# Patient Record
Sex: FEMALE | Race: WHITE | NOT HISPANIC OR LATINO | Employment: OTHER | URBAN - METROPOLITAN AREA
[De-identification: names, ages, dates, MRNs, and addresses within clinical notes are randomized per-mention and may not be internally consistent; named-entity substitution may affect disease eponyms.]

---

## 2017-01-06 DIAGNOSIS — M15.9 GENERALIZED OSTEOARTHRITIS: ICD-10-CM

## 2017-01-06 RX ORDER — LEVOTHYROXINE SODIUM 0.1 MG/1
TABLET ORAL
Qty: 90 TABLET | Refills: 1 | Status: SHIPPED | OUTPATIENT
Start: 2017-01-06 | End: 2017-10-09 | Stop reason: DRUGHIGH

## 2017-01-06 RX ORDER — AMLODIPINE BESYLATE 5 MG/1
TABLET ORAL
Qty: 90 TABLET | Refills: 1 | Status: SHIPPED | OUTPATIENT
Start: 2017-01-06 | End: 2017-06-05 | Stop reason: SDUPTHER

## 2017-01-06 RX ORDER — LISINOPRIL AND HYDROCHLOROTHIAZIDE 25; 20 MG/1; MG/1
TABLET ORAL
Qty: 90 TABLET | Refills: 1 | Status: SHIPPED | OUTPATIENT
Start: 2017-01-06 | End: 2017-05-11

## 2017-01-06 RX ORDER — OMEPRAZOLE 40 MG/1
CAPSULE, DELAYED RELEASE ORAL
Qty: 90 CAPSULE | Refills: 1 | Status: SHIPPED | OUTPATIENT
Start: 2017-01-06 | End: 2017-08-30

## 2017-01-06 RX ORDER — ALENDRONATE SODIUM 70 MG/1
TABLET ORAL
Qty: 12 TABLET | Refills: 1 | Status: SHIPPED | OUTPATIENT
Start: 2017-01-06 | End: 2017-08-30 | Stop reason: SDUPTHER

## 2017-01-06 RX ORDER — ATENOLOL 50 MG/1
TABLET ORAL
Qty: 90 TABLET | Refills: 1 | Status: SHIPPED | OUTPATIENT
Start: 2017-01-06 | End: 2017-09-22 | Stop reason: SDUPTHER

## 2017-01-06 RX ORDER — ATORVASTATIN CALCIUM 40 MG/1
TABLET, FILM COATED ORAL
Qty: 90 TABLET | Refills: 1 | Status: SHIPPED | OUTPATIENT
Start: 2017-01-06 | End: 2017-06-05 | Stop reason: SDUPTHER

## 2017-01-06 RX ORDER — PAROXETINE HYDROCHLORIDE 20 MG/1
TABLET, FILM COATED ORAL
Qty: 90 TABLET | Refills: 1 | Status: SHIPPED | OUTPATIENT
Start: 2017-01-06 | End: 2017-05-11 | Stop reason: SDUPTHER

## 2017-01-06 RX ORDER — DICLOFENAC SODIUM 75 MG/1
TABLET, DELAYED RELEASE ORAL
Qty: 90 TABLET | Refills: 1 | Status: SHIPPED | OUTPATIENT
Start: 2017-01-06 | End: 2017-06-05 | Stop reason: SDUPTHER

## 2017-05-11 ENCOUNTER — OFFICE VISIT (OUTPATIENT)
Dept: INTERNAL MEDICINE | Facility: CLINIC | Age: 77
End: 2017-05-11

## 2017-05-11 VITALS
DIASTOLIC BLOOD PRESSURE: 72 MMHG | HEIGHT: 64 IN | OXYGEN SATURATION: 98 % | BODY MASS INDEX: 27.72 KG/M2 | HEART RATE: 56 BPM | WEIGHT: 162.4 LBS | SYSTOLIC BLOOD PRESSURE: 122 MMHG

## 2017-05-11 DIAGNOSIS — F43.21 GRIEF REACTION: ICD-10-CM

## 2017-05-11 DIAGNOSIS — R15.9 INCONTINENCE OF BOWEL: ICD-10-CM

## 2017-05-11 DIAGNOSIS — I10 ESSENTIAL HYPERTENSION: ICD-10-CM

## 2017-05-11 DIAGNOSIS — Z23 ENCOUNTER FOR IMMUNIZATION: ICD-10-CM

## 2017-05-11 DIAGNOSIS — E55.9 VITAMIN D DEFICIENCY: ICD-10-CM

## 2017-05-11 DIAGNOSIS — I35.8 AORTIC VALVE SCLEROSIS: ICD-10-CM

## 2017-05-11 DIAGNOSIS — Z00.00 MEDICARE ANNUAL WELLNESS VISIT, SUBSEQUENT: Primary | ICD-10-CM

## 2017-05-11 DIAGNOSIS — I70.0 ATHEROSCLEROSIS OF AORTA (HCC): ICD-10-CM

## 2017-05-11 PROBLEM — I35.0 AORTIC VALVE STENOSIS: Status: ACTIVE | Noted: 2017-05-11

## 2017-05-11 LAB
25(OH)D3 SERPL-MCNC: 31.1 NG/ML
ALBUMIN SERPL-MCNC: 4.3 G/DL (ref 3.2–4.8)
ALBUMIN/GLOB SERPL: 1.7 G/DL (ref 1.5–2.5)
ALP SERPL-CCNC: 85 U/L (ref 25–100)
ALT SERPL W P-5'-P-CCNC: 20 U/L (ref 7–40)
ANION GAP SERPL CALCULATED.3IONS-SCNC: 8 MMOL/L (ref 3–11)
ARTICHOKE IGE QN: 144 MG/DL (ref 0–130)
AST SERPL-CCNC: 26 U/L (ref 0–33)
BILIRUB SERPL-MCNC: 0.6 MG/DL (ref 0.3–1.2)
BUN BLD-MCNC: 15 MG/DL (ref 9–23)
BUN/CREAT SERPL: 15 (ref 7–25)
CALCIUM SPEC-SCNC: 9.5 MG/DL (ref 8.7–10.4)
CHLORIDE SERPL-SCNC: 95 MMOL/L (ref 99–109)
CHOLEST SERPL-MCNC: 246 MG/DL (ref 0–200)
CO2 SERPL-SCNC: 29 MMOL/L (ref 20–31)
CREAT BLD-MCNC: 1 MG/DL (ref 0.6–1.3)
DEPRECATED RDW RBC AUTO: 44.1 FL (ref 37–54)
ERYTHROCYTE [DISTWIDTH] IN BLOOD BY AUTOMATED COUNT: 13.4 % (ref 11.3–14.5)
GFR SERPL CREATININE-BSD FRML MDRD: 54 ML/MIN/1.73
GLOBULIN UR ELPH-MCNC: 2.6 GM/DL
GLUCOSE BLD-MCNC: 93 MG/DL (ref 70–100)
HCT VFR BLD AUTO: 39.2 % (ref 34.5–44)
HDLC SERPL-MCNC: 74 MG/DL (ref 40–60)
HGB BLD-MCNC: 13.1 G/DL (ref 11.5–15.5)
MCH RBC QN AUTO: 29.7 PG (ref 27–31)
MCHC RBC AUTO-ENTMCNC: 33.4 G/DL (ref 32–36)
MCV RBC AUTO: 88.9 FL (ref 80–99)
PLATELET # BLD AUTO: 275 10*3/MM3 (ref 150–450)
PMV BLD AUTO: 9.6 FL (ref 6–12)
POTASSIUM BLD-SCNC: 3.8 MMOL/L (ref 3.5–5.5)
PROT SERPL-MCNC: 6.9 G/DL (ref 5.7–8.2)
RBC # BLD AUTO: 4.41 10*6/MM3 (ref 3.89–5.14)
SODIUM BLD-SCNC: 132 MMOL/L (ref 132–146)
TRIGL SERPL-MCNC: 114 MG/DL (ref 0–150)
WBC NRBC COR # BLD: 5.12 10*3/MM3 (ref 3.5–10.8)

## 2017-05-11 PROCEDURE — 90670 PCV13 VACCINE IM: CPT | Performed by: INTERNAL MEDICINE

## 2017-05-11 PROCEDURE — G0439 PPPS, SUBSEQ VISIT: HCPCS | Performed by: INTERNAL MEDICINE

## 2017-05-11 PROCEDURE — 80053 COMPREHEN METABOLIC PANEL: CPT | Performed by: INTERNAL MEDICINE

## 2017-05-11 PROCEDURE — 80061 LIPID PANEL: CPT | Performed by: INTERNAL MEDICINE

## 2017-05-11 PROCEDURE — G0009 ADMIN PNEUMOCOCCAL VACCINE: HCPCS | Performed by: INTERNAL MEDICINE

## 2017-05-11 PROCEDURE — 99214 OFFICE O/P EST MOD 30 MIN: CPT | Performed by: INTERNAL MEDICINE

## 2017-05-11 PROCEDURE — 85027 COMPLETE CBC AUTOMATED: CPT | Performed by: INTERNAL MEDICINE

## 2017-05-11 PROCEDURE — 82306 VITAMIN D 25 HYDROXY: CPT | Performed by: INTERNAL MEDICINE

## 2017-05-11 PROCEDURE — G0444 DEPRESSION SCREEN ANNUAL: HCPCS | Performed by: INTERNAL MEDICINE

## 2017-05-11 RX ORDER — MIRTAZAPINE 15 MG/1
15 TABLET, FILM COATED ORAL NIGHTLY
Qty: 30 TABLET | Refills: 3 | Status: SHIPPED | OUTPATIENT
Start: 2017-05-11 | End: 2017-08-30

## 2017-05-11 RX ORDER — LOSARTAN POTASSIUM 100 MG/1
100 TABLET ORAL DAILY
Qty: 30 TABLET | Refills: 3 | Status: SHIPPED | OUTPATIENT
Start: 2017-05-11 | End: 2017-08-30 | Stop reason: SDUPTHER

## 2017-05-11 RX ORDER — PAROXETINE HYDROCHLORIDE 20 MG/1
40 TABLET, FILM COATED ORAL EVERY MORNING
Qty: 180 TABLET | Refills: 1 | Status: SHIPPED | OUTPATIENT
Start: 2017-05-11 | End: 2017-06-05 | Stop reason: SDUPTHER

## 2017-05-12 ENCOUNTER — HOSPITAL ENCOUNTER (OUTPATIENT)
Dept: CARDIOLOGY | Facility: HOSPITAL | Age: 77
Discharge: HOME | End: 2017-05-12
Attending: INTERNAL MEDICINE | Admitting: INTERNAL MEDICINE

## 2017-05-12 LAB
BH CV ECHO MEAS - AO MAX PG (FULL): 5.6 MMHG
BH CV ECHO MEAS - AO MAX PG: 9.1 MMHG
BH CV ECHO MEAS - AO MEAN PG (FULL): 3 MMHG
BH CV ECHO MEAS - AO MEAN PG: 5 MMHG
BH CV ECHO MEAS - AO ROOT AREA (BSA CORRECTED): 1.6
BH CV ECHO MEAS - AO ROOT AREA: 6.2 CM^2
BH CV ECHO MEAS - AO ROOT DIAM: 2.8 CM
BH CV ECHO MEAS - AO V2 MAX: 151 CM/SEC
BH CV ECHO MEAS - AO V2 MEAN: 101 CM/SEC
BH CV ECHO MEAS - AO V2 VTI: 42.9 CM
BH CV ECHO MEAS - AVA(I,A): 2 CM^2
BH CV ECHO MEAS - AVA(I,D): 2 CM^2
BH CV ECHO MEAS - AVA(V,A): 2 CM^2
BH CV ECHO MEAS - AVA(V,D): 2 CM^2
BH CV ECHO MEAS - BSA(HAYCOCK): 1.8 M^2
BH CV ECHO MEAS - BSA: 1.8 M^2
BH CV ECHO MEAS - BZI_BMI: 28.7 KILOGRAMS/M^2
BH CV ECHO MEAS - BZI_METRIC_HEIGHT: 160 CM
BH CV ECHO MEAS - BZI_METRIC_WEIGHT: 73.5 KG
BH CV ECHO MEAS - EDV(CUBED): 65.9 ML
BH CV ECHO MEAS - EDV(TEICH): 71.7 ML
BH CV ECHO MEAS - EF(CUBED): 69.8 %
BH CV ECHO MEAS - EF(TEICH): 62 %
BH CV ECHO MEAS - ESV(CUBED): 19.9 ML
BH CV ECHO MEAS - ESV(TEICH): 27.3 ML
BH CV ECHO MEAS - FS: 32.9 %
BH CV ECHO MEAS - IVS/LVPW: 0.95
BH CV ECHO MEAS - IVSD: 0.91 CM
BH CV ECHO MEAS - LA DIMENSION: 3.9 CM
BH CV ECHO MEAS - LA/AO: 1.4
BH CV ECHO MEAS - LV MASS(C)D: 116.6 GRAMS
BH CV ECHO MEAS - LV MASS(C)DI: 66 GRAMS/M^2
BH CV ECHO MEAS - LV MAX PG: 3.5 MMHG
BH CV ECHO MEAS - LV MEAN PG: 2 MMHG
BH CV ECHO MEAS - LV V1 MAX: 94 CM/SEC
BH CV ECHO MEAS - LV V1 MEAN: 58.7 CM/SEC
BH CV ECHO MEAS - LV V1 VTI: 27.8 CM
BH CV ECHO MEAS - LVIDD: 4 CM
BH CV ECHO MEAS - LVIDS: 2.7 CM
BH CV ECHO MEAS - LVOT AREA (M): 3.1 CM^2
BH CV ECHO MEAS - LVOT AREA: 3.1 CM^2
BH CV ECHO MEAS - LVOT DIAM: 2 CM
BH CV ECHO MEAS - LVPWD: 0.96 CM
BH CV ECHO MEAS - MV A MAX VEL: 86.4 CM/SEC
BH CV ECHO MEAS - MV DEC SLOPE: 404 CM/SEC^2
BH CV ECHO MEAS - MV E MAX VEL: 91.8 CM/SEC
BH CV ECHO MEAS - MV E/A: 1.1
BH CV ECHO MEAS - MV P1/2T MAX VEL: 89.3 CM/SEC
BH CV ECHO MEAS - MV P1/2T: 64.7 MSEC
BH CV ECHO MEAS - MVA P1/2T LCG: 2.5 CM^2
BH CV ECHO MEAS - MVA(P1/2T): 3.4 CM^2
BH CV ECHO MEAS - PA ACC SLOPE: 549.5 CM/SEC^2
BH CV ECHO MEAS - PA ACC TIME: 0.16 SEC
BH CV ECHO MEAS - PA MAX PG: 4.1 MMHG
BH CV ECHO MEAS - PA PR(ACCEL): 5.4 MMHG
BH CV ECHO MEAS - PA V2 MAX: 101.1 CM/SEC
BH CV ECHO MEAS - RAP SYSTOLE: 3 MMHG
BH CV ECHO MEAS - RVDD: 1.6 CM
BH CV ECHO MEAS - RVSP: 33 MMHG
BH CV ECHO MEAS - SI(AO): 149.4 ML/M^2
BH CV ECHO MEAS - SI(CUBED): 26 ML/M^2
BH CV ECHO MEAS - SI(LVOT): 49.4 ML/M^2
BH CV ECHO MEAS - SI(TEICH): 25.1 ML/M^2
BH CV ECHO MEAS - SV(AO): 264.2 ML
BH CV ECHO MEAS - SV(CUBED): 46 ML
BH CV ECHO MEAS - SV(LVOT): 87.3 ML
BH CV ECHO MEAS - SV(TEICH): 44.4 ML
BH CV ECHO MEAS - TAPSE (>1.6): 2.7 CM2
BH CV ECHO MEAS - TR MAX VEL: 275 CM/SEC
BH CV XLRA - RV BASE: 2.4 CM
BH CV XLRA - RV LENGTH: 5.1 CM
BH CV XLRA - RV MID: 2.5 CM
LV EF 2D ECHO EST: 60 %

## 2017-05-12 PROCEDURE — 93306 TTE W/DOPPLER COMPLETE: CPT | Performed by: INTERNAL MEDICINE

## 2017-05-12 PROCEDURE — 93306 TTE W/DOPPLER COMPLETE: CPT

## 2017-05-25 ENCOUNTER — OFFICE VISIT (OUTPATIENT)
Dept: INTERNAL MEDICINE | Facility: CLINIC | Age: 77
End: 2017-05-25

## 2017-05-25 VITALS — SYSTOLIC BLOOD PRESSURE: 120 MMHG | HEART RATE: 61 BPM | OXYGEN SATURATION: 98 % | DIASTOLIC BLOOD PRESSURE: 74 MMHG

## 2017-05-25 DIAGNOSIS — G47.09 OTHER INSOMNIA: ICD-10-CM

## 2017-05-25 DIAGNOSIS — Z91.89 ENCOUNTER FOR GYNECOLOGIC EXAMINATION FOR HIGH-RISK PATIENT COVERED BY MEDICARE: ICD-10-CM

## 2017-05-25 DIAGNOSIS — IMO0002 CYSTOCELE, UNSPECIFIED CYSTOCELE LOCATION: Primary | ICD-10-CM

## 2017-05-25 PROCEDURE — G0101 CA SCREEN;PELVIC/BREAST EXAM: HCPCS | Performed by: INTERNAL MEDICINE

## 2017-06-05 DIAGNOSIS — F43.21 GRIEF REACTION: ICD-10-CM

## 2017-06-05 DIAGNOSIS — M15.9 GENERALIZED OSTEOARTHRITIS: ICD-10-CM

## 2017-06-05 RX ORDER — LISINOPRIL AND HYDROCHLOROTHIAZIDE 25; 20 MG/1; MG/1
TABLET ORAL
Qty: 90 TABLET | Refills: 1 | Status: SHIPPED | OUTPATIENT
Start: 2017-06-05 | End: 2017-08-30

## 2017-06-05 RX ORDER — ATORVASTATIN CALCIUM 40 MG/1
TABLET, FILM COATED ORAL
Qty: 90 TABLET | Refills: 1 | Status: SHIPPED | OUTPATIENT
Start: 2017-06-05 | End: 2018-04-18 | Stop reason: SDUPTHER

## 2017-06-05 RX ORDER — PAROXETINE HYDROCHLORIDE 20 MG/1
TABLET, FILM COATED ORAL
Qty: 90 TABLET | Refills: 1 | Status: SHIPPED | OUTPATIENT
Start: 2017-06-05 | End: 2018-03-08 | Stop reason: SDUPTHER

## 2017-06-05 RX ORDER — AMLODIPINE BESYLATE 5 MG/1
TABLET ORAL
Qty: 90 TABLET | Refills: 1 | Status: SHIPPED | OUTPATIENT
Start: 2017-06-05 | End: 2017-09-22 | Stop reason: SDUPTHER

## 2017-06-05 RX ORDER — DICLOFENAC SODIUM 75 MG/1
TABLET, DELAYED RELEASE ORAL
Qty: 90 TABLET | Refills: 1 | Status: SHIPPED | OUTPATIENT
Start: 2017-06-05 | End: 2017-08-30

## 2017-06-08 ENCOUNTER — HOSPITAL ENCOUNTER (OUTPATIENT)
Dept: MAMMOGRAPHY | Facility: HOSPITAL | Age: 77
Discharge: HOME | End: 2017-06-08
Attending: INTERNAL MEDICINE | Admitting: INTERNAL MEDICINE

## 2017-06-08 DIAGNOSIS — R92.8 ABNORMAL MAMMOGRAM: ICD-10-CM

## 2017-06-08 PROCEDURE — G0206 DX MAMMO INCL CAD UNI: HCPCS | Performed by: RADIOLOGY

## 2017-06-08 PROCEDURE — G0279 TOMOSYNTHESIS, MAMMO: HCPCS

## 2017-06-08 PROCEDURE — G0279 TOMOSYNTHESIS, MAMMO: HCPCS | Performed by: RADIOLOGY

## 2017-06-08 PROCEDURE — G0206 DX MAMMO INCL CAD UNI: HCPCS

## 2017-08-30 ENCOUNTER — OFFICE VISIT (OUTPATIENT)
Dept: INTERNAL MEDICINE | Facility: CLINIC | Age: 77
End: 2017-08-30

## 2017-08-30 VITALS
DIASTOLIC BLOOD PRESSURE: 80 MMHG | BODY MASS INDEX: 28.94 KG/M2 | HEART RATE: 62 BPM | SYSTOLIC BLOOD PRESSURE: 156 MMHG | WEIGHT: 166 LBS | OXYGEN SATURATION: 99 %

## 2017-08-30 DIAGNOSIS — R60.9 EDEMA, UNSPECIFIED TYPE: ICD-10-CM

## 2017-08-30 DIAGNOSIS — R59.0 CERVICAL LYMPHADENOPATHY: ICD-10-CM

## 2017-08-30 DIAGNOSIS — K21.9 GERD WITHOUT ESOPHAGITIS: Primary | ICD-10-CM

## 2017-08-30 DIAGNOSIS — I10 ESSENTIAL HYPERTENSION: ICD-10-CM

## 2017-08-30 PROCEDURE — 99214 OFFICE O/P EST MOD 30 MIN: CPT | Performed by: INTERNAL MEDICINE

## 2017-08-30 PROCEDURE — G0008 ADMIN INFLUENZA VIRUS VAC: HCPCS | Performed by: INTERNAL MEDICINE

## 2017-08-30 PROCEDURE — 90662 IIV NO PRSV INCREASED AG IM: CPT | Performed by: INTERNAL MEDICINE

## 2017-08-30 RX ORDER — LOSARTAN POTASSIUM 100 MG/1
100 TABLET ORAL DAILY
Qty: 90 TABLET | Refills: 1 | Status: SHIPPED | OUTPATIENT
Start: 2017-08-30 | End: 2017-11-30

## 2017-08-30 RX ORDER — TRIAMTERENE AND HYDROCHLOROTHIAZIDE 37.5; 25 MG/1; MG/1
.5-1 TABLET ORAL DAILY PRN
Qty: 90 TABLET | Refills: 1 | Status: SHIPPED | OUTPATIENT
Start: 2017-08-30 | End: 2017-11-30

## 2017-08-30 RX ORDER — ALENDRONATE SODIUM 70 MG/1
70 TABLET ORAL
Qty: 12 TABLET | Refills: 1 | Status: SHIPPED | OUTPATIENT
Start: 2017-08-30 | End: 2018-04-18 | Stop reason: SDUPTHER

## 2017-08-30 RX ORDER — OMEPRAZOLE 20 MG/1
20 CAPSULE, DELAYED RELEASE ORAL DAILY
Qty: 90 CAPSULE | Refills: 0 | Status: SHIPPED | OUTPATIENT
Start: 2017-08-30 | End: 2017-12-12 | Stop reason: SDUPTHER

## 2017-09-06 ENCOUNTER — HOSPITAL ENCOUNTER (OUTPATIENT)
Dept: CT IMAGING | Facility: HOSPITAL | Age: 77
Discharge: HOME | End: 2017-09-06
Attending: INTERNAL MEDICINE | Admitting: INTERNAL MEDICINE

## 2017-09-06 PROCEDURE — 70490 CT SOFT TISSUE NECK W/O DYE: CPT

## 2017-09-11 ENCOUNTER — TELEPHONE (OUTPATIENT)
Dept: INTERNAL MEDICINE | Facility: CLINIC | Age: 77
End: 2017-09-11

## 2017-09-13 ENCOUNTER — CONSULT (OUTPATIENT)
Dept: CARDIOLOGY | Facility: CLINIC | Age: 77
End: 2017-09-13

## 2017-09-13 VITALS
HEIGHT: 63 IN | BODY MASS INDEX: 29.59 KG/M2 | HEART RATE: 76 BPM | WEIGHT: 167 LBS | DIASTOLIC BLOOD PRESSURE: 78 MMHG | SYSTOLIC BLOOD PRESSURE: 150 MMHG | OXYGEN SATURATION: 97 %

## 2017-09-13 DIAGNOSIS — I10 ESSENTIAL HYPERTENSION: Primary | ICD-10-CM

## 2017-09-13 DIAGNOSIS — I50.32 CHRONIC DIASTOLIC HEART FAILURE (HCC): ICD-10-CM

## 2017-09-13 PROCEDURE — 93000 ELECTROCARDIOGRAM COMPLETE: CPT | Performed by: INTERNAL MEDICINE

## 2017-09-13 PROCEDURE — 99204 OFFICE O/P NEW MOD 45 MIN: CPT | Performed by: INTERNAL MEDICINE

## 2017-09-13 NOTE — PROGRESS NOTES
Encounter Date:09/13/2017    Location: [facility]    Patient ID: Zohra Hall is a 76 y.o. female    1940  Subjective:      Chief Complaint/Reason for visit:    Chief Complaint   Patient presents with   • abn echo       Problem List:  1.  Diastolic heart failure   A.  Echocardiogram 5/2017: EF 60%, grade 2 diastolic dysfunction consistent with pseudonormalization, trace MR and trace TR with RVSP of 33 mmHg  2.  Hypertension  3.  Hyperlipidemia  4.  Surgeries:   A.  spinal fusion   B.  Hip replacement   C.  Thyroidectomy   D.  Cholecystectomy   E.  Hysterectomy    HPI:  Patient is a pleasant 76-year-old white female with the above-noted medical history who presents today in consultation for further evaluation of an abnormal echocardiogram revealing diastolic dysfunction.  She recently been seen by her primary care physician for difficulty maintaining her blood pressure was beginning to have bilateral lower extremity edema of unknown origin.  An echocardiogram was performed with results as noted above.  She states that she's been having difficulty maintaining her blood pressure over the past 3 months.  She had been tried on lisinopril and developed an Ace induced cough.  She recently was started on losartan and did not start taking it immediately.  She states that she is reluctant to put anything into her body that she does not have to.  Due to her blood pressure continuing to be greater than 160 systolic, she recently did start taking the losartan and is giving her benefit.  Her blood pressures at home tend to run in the upper 130s and low 140s.  She denies having 2 pillow orthopnea, PND, dyspnea on exertion.  She does state that she's noticed an increase in shortness of breath.  She denies having any chest pain, fatigue, palpitations, dizziness and syncope.    Cardiac ROS:  Positive for shortness of breath, edema.  Negative for chest pain,  dyspnea on exertion, orthopnea, PND,  fatigue, palpitations, dizziness, pre-syncope/ syncope, snoring/ JOEL    Cardiac Risk Factors: advanced age (older than 55 for men, 65 for women), dyslipidemia, hypertension, sedentary lifestyle and smoking/ tobacco exposure  Social History     Social History   • Marital status:      Spouse name: N/A   • Number of children: N/A   • Years of education: N/A     Occupational History   • Not on file.     Social History Main Topics   • Smoking status: Former Smoker     Packs/day: 1.00     Years: 30.00     Types: Cigarettes     Quit date: 9/13/1992   • Smokeless tobacco: Never Used   • Alcohol use No   • Drug use: No   • Sexual activity: Defer     Other Topics Concern   • Not on file     Social History Narrative       family history includes Breast cancer (age of onset: 84) in her mother; Cancer in her mother. There is no history of BRCA 1/2, Colon cancer, Endometrial cancer, or Ovarian cancer.     has a past medical history of Breast cancer (1987); Cellulitis; Cervical lymphadenopathy; Chest pain; Convulsions; GERD (gastroesophageal reflux disease); Glossitis; Grief reaction; Hypertension; Lower back pain; Oral thrush; Papillary adenocarcinoma; Papillary adenocarcinoma of thyroid; Piriformis syndrome; Tingling; and Xerostomia.    Allergies   Allergen Reactions   • Dilaudid [Hydromorphone Hcl]      TABS         Current Outpatient Prescriptions:   •  alendronate (FOSAMAX) 70 MG tablet, Take 1 tablet by mouth Every 7 (Seven) Days., Disp: 12 tablet, Rfl: 1  •  amLODIPine (NORVASC) 5 MG tablet, Take 1 tablet by mouth  daily, Disp: 90 tablet, Rfl: 1  •  atenolol (TENORMIN) 50 MG tablet, Take 1 tablet by mouth  daily, Disp: 90 tablet, Rfl: 1  •  atorvastatin (LIPITOR) 40 MG tablet, Take 1 tablet by mouth  daily, Disp: 90 tablet, Rfl: 1  •  levothyroxine (SYNTHROID, LEVOTHROID) 100 MCG tablet, Take 1 tablet by mouth  daily, Disp: 90 tablet, Rfl: 1  •  losartan (COZAAR) 100 MG tablet, Take 1 tablet by mouth Daily., Disp: 90  tablet, Rfl: 1  •  metroNIDAZOLE (METROCREAM) 0.75 % cream, Apply  topically 2 (Two) Times a Day., Disp: 60 g, Rfl: 2  •  omeprazole (priLOSEC) 20 MG capsule, Take 1 capsule by mouth Daily., Disp: 90 capsule, Rfl: 0  •  PARoxetine (PAXIL) 20 MG tablet, Take 1 tablet by mouth  daily, Disp: 90 tablet, Rfl: 1  •  triamterene-hydrochlorothiazide (MAXZIDE-25) 37.5-25 MG per tablet, Take 0.5-1 tablets by mouth Daily As Needed (edema)., Disp: 90 tablet, Rfl: 1    Review of Systems   Constitution: Negative.   HENT: Negative.    Eyes:        Wears glasses   Cardiovascular: Positive for leg swelling.   Respiratory: Negative.    Endocrine: Positive for cold intolerance.   Hematologic/Lymphatic: Bruises/bleeds easily.   Skin: Negative.    Musculoskeletal: Positive for arthritis and joint pain (bilateral hips).   Gastrointestinal: Positive for heartburn.   Genitourinary: Positive for bladder incontinence.   Neurological: Negative.    Psychiatric/Behavioral: Positive for depression.   Allergic/Immunologic: Negative.        Vitals:    09/13/17 1049   BP: 150/78   Pulse: 76   SpO2: 97%          Objective:       Physical Exam   Constitutional: She is oriented to person, place, and time. She appears well-developed and well-nourished.   HENT:   Head: Normocephalic and atraumatic.   Eyes: Right eye exhibits no discharge. Left eye exhibits no discharge.   Neck: Normal range of motion. Neck supple. No JVD present.   Cardiovascular: Normal rate, regular rhythm and intact distal pulses.  Exam reveals no gallop and no friction rub.    Murmur (1/6 systolic ejection murmur; likely phsyiologic) heard.  Pulmonary/Chest: Effort normal and breath sounds normal. She has no wheezes.   Abdominal: Soft. Bowel sounds are normal. There is no tenderness.   Musculoskeletal: Normal range of motion. She exhibits no edema.   Neurological: She is alert and oriented to person, place, and time.   Skin: Skin is warm and dry.   Psychiatric: She has a normal mood  and affect. Her behavior is normal.       Data Review:     ECG 12 Lead  Date/Time: 9/13/2017 11:08 AM  Performed by: RASHID CÁRDENAS  Authorized by: RASHID CÁRDENAS   Rhythm: sinus rhythm  Rate: normal  BPM: 75  QRS axis: normal  Clinical impression: normal ECG        Assessment:      ICD-10-CM ICD-9-CM   1. Essential hypertension- well controlled per home readings I10 401.9   2. Chronic diastolic heart failure- stable I50.32 428.32       Plan:  Continue current medications  F/up in 12 months or sooner if needed.        Scribed for Kelly Simpson MD by ELVIS Ryder. 9/13/2017  11:20 AM     I Kelly Simpson MD personally performed the services described in this documentation as scribed by the above individual in my presence, and it is both accurate and complete.    Kelly Simpson MD, FACC

## 2017-09-22 RX ORDER — ATENOLOL 50 MG/1
TABLET ORAL
Qty: 90 TABLET | Refills: 0 | Status: SHIPPED | OUTPATIENT
Start: 2017-09-22 | End: 2017-12-12 | Stop reason: SDUPTHER

## 2017-09-22 RX ORDER — AMLODIPINE BESYLATE 5 MG/1
TABLET ORAL
Qty: 90 TABLET | Refills: 0 | Status: SHIPPED | OUTPATIENT
Start: 2017-09-22 | End: 2017-11-30 | Stop reason: SDUPTHER

## 2017-09-22 RX ORDER — DICLOFENAC SODIUM 75 MG/1
TABLET, DELAYED RELEASE ORAL
Qty: 90 TABLET | Refills: 0 | Status: SHIPPED | OUTPATIENT
Start: 2017-09-22 | End: 2018-02-06

## 2017-09-29 ENCOUNTER — HOSPITAL ENCOUNTER (OUTPATIENT)
Dept: GENERAL RADIOLOGY | Facility: HOSPITAL | Age: 77
Discharge: HOME | End: 2017-09-29
Admitting: NURSE PRACTITIONER

## 2017-09-29 ENCOUNTER — OFFICE VISIT (OUTPATIENT)
Dept: INTERNAL MEDICINE | Facility: CLINIC | Age: 77
End: 2017-09-29

## 2017-09-29 ENCOUNTER — TELEPHONE (OUTPATIENT)
Dept: INTERNAL MEDICINE | Facility: CLINIC | Age: 77
End: 2017-09-29

## 2017-09-29 VITALS
OXYGEN SATURATION: 98 % | WEIGHT: 161 LBS | BODY MASS INDEX: 28.53 KG/M2 | SYSTOLIC BLOOD PRESSURE: 134 MMHG | HEART RATE: 76 BPM | DIASTOLIC BLOOD PRESSURE: 88 MMHG | HEIGHT: 63 IN

## 2017-09-29 DIAGNOSIS — M54.50 ACUTE RIGHT-SIDED LOW BACK PAIN WITHOUT SCIATICA: Primary | ICD-10-CM

## 2017-09-29 DIAGNOSIS — M54.50 ACUTE RIGHT-SIDED LOW BACK PAIN WITHOUT SCIATICA: ICD-10-CM

## 2017-09-29 PROCEDURE — 99213 OFFICE O/P EST LOW 20 MIN: CPT | Performed by: NURSE PRACTITIONER

## 2017-09-29 PROCEDURE — 96372 THER/PROPH/DIAG INJ SC/IM: CPT | Performed by: NURSE PRACTITIONER

## 2017-09-29 PROCEDURE — 72100 X-RAY EXAM L-S SPINE 2/3 VWS: CPT

## 2017-09-29 RX ORDER — MIRABEGRON 25 MG/1
TABLET, FILM COATED, EXTENDED RELEASE ORAL
COMMUNITY
Start: 2017-09-27 | End: 2018-02-06 | Stop reason: ALTCHOICE

## 2017-09-29 RX ORDER — IBUPROFEN 800 MG/1
800 TABLET ORAL EVERY 6 HOURS PRN
Qty: 20 TABLET | Refills: 0 | Status: SHIPPED | OUTPATIENT
Start: 2017-09-29 | End: 2018-02-06 | Stop reason: SDUPTHER

## 2017-09-29 RX ORDER — METHYLPREDNISOLONE 4 MG/1
TABLET ORAL
Qty: 21 TABLET | Refills: 0 | Status: SHIPPED | OUTPATIENT
Start: 2017-09-29 | End: 2018-02-06

## 2017-09-29 NOTE — TELEPHONE ENCOUNTER
Informed patient of worsening of degenerative disc disease, told her to continue heat cold therapy and medication, Mel will message Dr. Regan about any follow up. Pt verbalized understanding.

## 2017-10-03 PROBLEM — M54.50 ACUTE RIGHT-SIDED LOW BACK PAIN WITHOUT SCIATICA: Status: ACTIVE | Noted: 2017-10-03

## 2017-10-09 ENCOUNTER — OFFICE VISIT (OUTPATIENT)
Dept: INTERNAL MEDICINE | Facility: CLINIC | Age: 77
End: 2017-10-09

## 2017-10-09 ENCOUNTER — TELEPHONE (OUTPATIENT)
Dept: INTERNAL MEDICINE | Facility: CLINIC | Age: 77
End: 2017-10-09

## 2017-10-09 VITALS
SYSTOLIC BLOOD PRESSURE: 128 MMHG | BODY MASS INDEX: 28.72 KG/M2 | WEIGHT: 162.1 LBS | HEIGHT: 63 IN | OXYGEN SATURATION: 95 % | DIASTOLIC BLOOD PRESSURE: 80 MMHG | HEART RATE: 82 BPM

## 2017-10-09 DIAGNOSIS — E03.9 HYPOTHYROIDISM (ACQUIRED): ICD-10-CM

## 2017-10-09 DIAGNOSIS — E55.9 VITAMIN D DEFICIENCY: ICD-10-CM

## 2017-10-09 DIAGNOSIS — G25.2 COARSE TREMORS: ICD-10-CM

## 2017-10-09 DIAGNOSIS — J30.2 ACUTE SEASONAL ALLERGIC RHINITIS, UNSPECIFIED TRIGGER: Primary | ICD-10-CM

## 2017-10-09 DIAGNOSIS — M79.10 MYALGIA: ICD-10-CM

## 2017-10-09 DIAGNOSIS — R53.82 CHRONIC FATIGUE: ICD-10-CM

## 2017-10-09 DIAGNOSIS — E03.9 HYPOTHYROIDISM (ACQUIRED): Primary | ICD-10-CM

## 2017-10-09 LAB
25(OH)D3 SERPL-MCNC: 31.3 NG/ML
BASOPHILS # BLD AUTO: 0.03 10*3/MM3 (ref 0–0.2)
BASOPHILS NFR BLD AUTO: 0.5 % (ref 0–1)
CRP SERPL-MCNC: 0.73 MG/DL (ref 0–1)
DEPRECATED RDW RBC AUTO: 41.5 FL (ref 37–54)
EOSINOPHIL # BLD AUTO: 0.08 10*3/MM3 (ref 0–0.3)
EOSINOPHIL NFR BLD AUTO: 1.3 % (ref 0–3)
ERYTHROCYTE [DISTWIDTH] IN BLOOD BY AUTOMATED COUNT: 13.2 % (ref 11.3–14.5)
ERYTHROCYTE [SEDIMENTATION RATE] IN BLOOD: 8 MM/HR (ref 0–30)
HCT VFR BLD AUTO: 40.9 % (ref 34.5–44)
HGB BLD-MCNC: 13.7 G/DL (ref 11.5–15.5)
IMM GRANULOCYTES # BLD: 0.08 10*3/MM3 (ref 0–0.03)
IMM GRANULOCYTES NFR BLD: 1.3 % (ref 0–0.6)
LYMPHOCYTES # BLD AUTO: 1.31 10*3/MM3 (ref 0.6–4.8)
LYMPHOCYTES NFR BLD AUTO: 21.2 % (ref 24–44)
MCH RBC QN AUTO: 28.7 PG (ref 27–31)
MCHC RBC AUTO-ENTMCNC: 33.5 G/DL (ref 32–36)
MCV RBC AUTO: 85.6 FL (ref 80–99)
MONOCYTES # BLD AUTO: 0.85 10*3/MM3 (ref 0–1)
MONOCYTES NFR BLD AUTO: 13.8 % (ref 0–12)
NEUTROPHILS # BLD AUTO: 3.83 10*3/MM3 (ref 1.5–8.3)
NEUTROPHILS NFR BLD AUTO: 61.9 % (ref 41–71)
PLATELET # BLD AUTO: 249 10*3/MM3 (ref 150–450)
PMV BLD AUTO: 9.2 FL (ref 6–12)
RBC # BLD AUTO: 4.78 10*6/MM3 (ref 3.89–5.14)
TSH SERPL DL<=0.05 MIU/L-ACNC: 0.09 MIU/ML (ref 0.35–5.35)
WBC NRBC COR # BLD: 6.18 10*3/MM3 (ref 3.5–10.8)

## 2017-10-09 PROCEDURE — 85652 RBC SED RATE AUTOMATED: CPT | Performed by: NURSE PRACTITIONER

## 2017-10-09 PROCEDURE — 84443 ASSAY THYROID STIM HORMONE: CPT | Performed by: NURSE PRACTITIONER

## 2017-10-09 PROCEDURE — 86038 ANTINUCLEAR ANTIBODIES: CPT | Performed by: NURSE PRACTITIONER

## 2017-10-09 PROCEDURE — 85025 COMPLETE CBC W/AUTO DIFF WBC: CPT | Performed by: NURSE PRACTITIONER

## 2017-10-09 PROCEDURE — 82306 VITAMIN D 25 HYDROXY: CPT | Performed by: NURSE PRACTITIONER

## 2017-10-09 PROCEDURE — 99214 OFFICE O/P EST MOD 30 MIN: CPT | Performed by: NURSE PRACTITIONER

## 2017-10-09 PROCEDURE — 86140 C-REACTIVE PROTEIN: CPT | Performed by: NURSE PRACTITIONER

## 2017-10-09 PROCEDURE — 86431 RHEUMATOID FACTOR QUANT: CPT | Performed by: NURSE PRACTITIONER

## 2017-10-09 RX ORDER — FLUTICASONE PROPIONATE 50 MCG
1 SPRAY, SUSPENSION (ML) NASAL 2 TIMES DAILY
Qty: 1 BOTTLE | Refills: 3 | Status: SHIPPED | OUTPATIENT
Start: 2017-10-09 | End: 2018-08-29

## 2017-10-09 RX ORDER — LEVOTHYROXINE SODIUM 0.07 MG/1
75 TABLET ORAL DAILY
Qty: 30 TABLET | Refills: 1 | Status: SHIPPED | OUTPATIENT
Start: 2017-10-09 | End: 2018-02-21

## 2017-10-09 NOTE — PROGRESS NOTES
"Chief Complaint   Patient presents with   • Follow-up   • Back Pain       HPI  Zohra Hall is a 76 y.o. female  presents with complaint of intermittent weakness, myalgias, increased fatigue over past few months. She is also having tremors in both hands which make it very difficult to sew and fiona or apply make-up--tremors are worse when she is concentrating; she has discomfort in joints; tightness in neck and low back up; extreme muscle fatigue; occ lightheadedness; she also c/o continuously being cold;  She denies head congestion and other upper respiratory symptoms; she denies falling, headaches, chest pain, shortness of breath, palpitations, swelling in lower extremities.      Past Medical History:   Diagnosis Date   • Breast cancer 1987    left- pt states \"in situ\", no radiation, Tamoxifen for 5 years   • Cellulitis    • Cervical lymphadenopathy    • Chest pain    • Convulsions    • GERD (gastroesophageal reflux disease)    • Glossitis    • Grief reaction     · Last Impression: 29 Jan 2015  counselled, given ativan for prn use.  Ale[facility] Regan   • Hypertension    • Lower back pain    • Oral thrush    • Papillary adenocarcinoma     Papillary adenocarcinoma of thyroid   • Papillary adenocarcinoma of thyroid     · resection Ramirez- 1/13,  2 x 2 x 1.5 cm  T3 N1 MXpost op low calcium and diminished  voiceablation Ain- 3/7 metastatic nodes and invasion to strap muscles · Last Impression: 29 Oct 2014  s/p Eval by berry Méndez.  Aleah Regan (Internal   • Piriformis syndrome    • Tingling    • Xerostomia        Family History   Problem Relation Age of Onset   • Breast cancer Mother 84   • Cancer Mother    • BRCA 1/2 Neg Hx    • Colon cancer Neg Hx    • Endometrial cancer Neg Hx    • Ovarian cancer Neg Hx        Social History     Social History   • Marital status:      Spouse name: N/A   • Number of children: N/A   • Years of education: N/A     Occupational History   • Not on file.     Social " "History Main Topics   • Smoking status: Former Smoker     Packs/day: 1.00     Years: 30.00     Types: Cigarettes     Quit date: 9/13/1992   • Smokeless tobacco: Never Used   • Alcohol use No   • Drug use: No   • Sexual activity: Defer     Other Topics Concern   • Not on file     Social History Narrative       Review of Systems   Constitutional: Positive for activity change and fatigue. Negative for appetite change.   Respiratory: Negative for shortness of breath.    Cardiovascular: Negative for chest pain, palpitations and leg swelling.   Endocrine: Positive for cold intolerance. Negative for heat intolerance.   Musculoskeletal: Positive for arthralgias, back pain and myalgias.   Neurological: Positive for light-headedness. Negative for dizziness, facial asymmetry, numbness and headaches.   All other systems reviewed and are negative.        /80  Pulse 82  Ht 63\" (160 cm)  Wt 162 lb 1.6 oz (73.5 kg)  SpO2 95%  BMI 28.71 kg/m2      Physical Exam:     General: Alert, NAD  Head:  Normocephalic, without obvious abnormality, atraumatic  Eye:    PERRL, EOMI,  Ears:   TMs are bulging with clear effusion, dull, light reflex is   distorted bilaterally  Nose:   Nares are normal, septum is midline, mucosa normal, no   drainage or sinus tenderness  Throat:  Oral mucosa is normal w/o lesions; tongue is normal;   oropharynx is clear; Uvula rises midline  Neck:  Supple, ROM; no thyromegaly, no JVD  Lymph: No cervical adenopathy  Cardiac: RRR, S1/S2; no murmur, gallops, or rubs  Lungs: Clear to auscultation; no respiratory distress; no wheezes,   rhonchi, rales; Normal air movement    Extremities: Extremities normal; no LE edema, clubbing, or cyanosis;   Coarse tremors noted in bilateral hands  Musc:  FROM of all extremities; strength and sensation intact  Skin:  Normal color, turgor; no lesions or rashes  Neuro:   DTRs are 2+; normal strength; gait is steady  Psych:  Mood stable; no anxiety/depression, normal thought "   content; behavior is normal          1. Acute seasonal allergic rhinitis, unspecified trigger  -start steroid nasal spray and OTC antihistamine--zyrtec, claritin, allegra  - fluticasone (FLONASE) 50 MCG/ACT nasal spray; 1 spray into each nostril 2 (Two) Times a Day.  Dispense: 1 bottle; Refill: 3    2. Chronic fatigue  -will check for anemia due to past history  - CBC & Differential  - MARIAJOSE    3. Coarse tremors  -evaluate thyroid level which could be cause  -TSH  -if TSH not abnormal, consider other neurology testing; possible neuro referral    4. Myalgia  -will check for other autoimmune d/o  - C-reactive protein  - Sedimentation Rate  - Rheumatoid Factor    5. Hypothyroidism (acquired)  -evaluate thyroid and response to medication  - TSH    6. Vitamin D deficiency  -check for deficiency since this has been an issue in the past  - Vitamin D 25 Hydroxy    F/U if symptoms persist or do not improve; otherwise keep next scheduled appointment with Dr. Regan    Patient verbalizes understanding of medication dosage, comfort measures, instructions for treatment and follow-up.    ELVIS Godfrey

## 2017-10-10 LAB
ANA SER QL: NEGATIVE
RHEUMATOID FACT SERPL-ACNC: NEGATIVE [IU]/ML

## 2017-10-19 ENCOUNTER — TRANSCRIBE ORDERS (OUTPATIENT)
Dept: ADMINISTRATIVE | Facility: HOSPITAL | Age: 77
End: 2017-10-19

## 2017-10-19 DIAGNOSIS — R92.8 ABNORMAL MAMMOGRAM: Primary | ICD-10-CM

## 2017-11-30 ENCOUNTER — OFFICE VISIT (OUTPATIENT)
Dept: INTERNAL MEDICINE | Facility: CLINIC | Age: 77
End: 2017-11-30

## 2017-11-30 VITALS
SYSTOLIC BLOOD PRESSURE: 142 MMHG | OXYGEN SATURATION: 94 % | HEART RATE: 54 BPM | WEIGHT: 160 LBS | HEIGHT: 63 IN | DIASTOLIC BLOOD PRESSURE: 78 MMHG | BODY MASS INDEX: 28.35 KG/M2

## 2017-11-30 DIAGNOSIS — E55.9 VITAMIN D DEFICIENCY: ICD-10-CM

## 2017-11-30 DIAGNOSIS — M54.50 LOW BACK PAIN WITHOUT SCIATICA, UNSPECIFIED BACK PAIN LATERALITY, UNSPECIFIED CHRONICITY: ICD-10-CM

## 2017-11-30 DIAGNOSIS — E61.1 IRON DEFICIENCY: ICD-10-CM

## 2017-11-30 DIAGNOSIS — I70.0 ATHEROSCLEROSIS OF AORTA (HCC): ICD-10-CM

## 2017-11-30 DIAGNOSIS — K21.9 GERD WITHOUT ESOPHAGITIS: ICD-10-CM

## 2017-11-30 DIAGNOSIS — I10 ESSENTIAL HYPERTENSION: Primary | ICD-10-CM

## 2017-11-30 LAB
25(OH)D3 SERPL-MCNC: 37.3 NG/ML
ALBUMIN SERPL-MCNC: 4.5 G/DL (ref 3.2–4.8)
ALBUMIN/GLOB SERPL: 1.8 G/DL (ref 1.5–2.5)
ALP SERPL-CCNC: 92 U/L (ref 25–100)
ALT SERPL W P-5'-P-CCNC: 18 U/L (ref 7–40)
ANION GAP SERPL CALCULATED.3IONS-SCNC: 8 MMOL/L (ref 3–11)
ARTICHOKE IGE QN: 161 MG/DL (ref 0–130)
AST SERPL-CCNC: 23 U/L (ref 0–33)
BILIRUB SERPL-MCNC: 0.5 MG/DL (ref 0.3–1.2)
BUN BLD-MCNC: 19 MG/DL (ref 9–23)
BUN/CREAT SERPL: 14.6 (ref 7–25)
CALCIUM SPEC-SCNC: 9.4 MG/DL (ref 8.7–10.4)
CHLORIDE SERPL-SCNC: 97 MMOL/L (ref 99–109)
CHOLEST SERPL-MCNC: 241 MG/DL (ref 0–200)
CO2 SERPL-SCNC: 28 MMOL/L (ref 20–31)
CREAT BLD-MCNC: 1.3 MG/DL (ref 0.6–1.3)
DEPRECATED RDW RBC AUTO: 46.9 FL (ref 37–54)
ERYTHROCYTE [DISTWIDTH] IN BLOOD BY AUTOMATED COUNT: 14.5 % (ref 11.3–14.5)
GFR SERPL CREATININE-BSD FRML MDRD: 40 ML/MIN/1.73
GLOBULIN UR ELPH-MCNC: 2.5 GM/DL
GLUCOSE BLD-MCNC: 80 MG/DL (ref 70–100)
HCT VFR BLD AUTO: 38.2 % (ref 34.5–44)
HDLC SERPL-MCNC: 68 MG/DL (ref 40–60)
HGB BLD-MCNC: 12.4 G/DL (ref 11.5–15.5)
MCH RBC QN AUTO: 28.4 PG (ref 27–31)
MCHC RBC AUTO-ENTMCNC: 32.5 G/DL (ref 32–36)
MCV RBC AUTO: 87.6 FL (ref 80–99)
PLATELET # BLD AUTO: 305 10*3/MM3 (ref 150–450)
PMV BLD AUTO: 9.4 FL (ref 6–12)
POTASSIUM BLD-SCNC: 4.8 MMOL/L (ref 3.5–5.5)
PROT SERPL-MCNC: 7 G/DL (ref 5.7–8.2)
RBC # BLD AUTO: 4.36 10*6/MM3 (ref 3.89–5.14)
SODIUM BLD-SCNC: 133 MMOL/L (ref 132–146)
TRIGL SERPL-MCNC: 140 MG/DL (ref 0–150)
WBC NRBC COR # BLD: 6.4 10*3/MM3 (ref 3.5–10.8)

## 2017-11-30 PROCEDURE — 85027 COMPLETE CBC AUTOMATED: CPT | Performed by: INTERNAL MEDICINE

## 2017-11-30 PROCEDURE — 80061 LIPID PANEL: CPT | Performed by: INTERNAL MEDICINE

## 2017-11-30 PROCEDURE — 82306 VITAMIN D 25 HYDROXY: CPT | Performed by: INTERNAL MEDICINE

## 2017-11-30 PROCEDURE — 80053 COMPREHEN METABOLIC PANEL: CPT | Performed by: INTERNAL MEDICINE

## 2017-11-30 PROCEDURE — 99214 OFFICE O/P EST MOD 30 MIN: CPT | Performed by: INTERNAL MEDICINE

## 2017-11-30 RX ORDER — AMLODIPINE BESYLATE 5 MG/1
5 TABLET ORAL DAILY
Qty: 90 TABLET | Refills: 1 | Status: SHIPPED | OUTPATIENT
Start: 2017-11-30 | End: 2018-04-18 | Stop reason: SDUPTHER

## 2017-11-30 RX ORDER — LOSARTAN POTASSIUM AND HYDROCHLOROTHIAZIDE 25; 100 MG/1; MG/1
1 TABLET ORAL DAILY
Qty: 90 TABLET | Refills: 1 | Status: SHIPPED | OUTPATIENT
Start: 2017-11-30 | End: 2018-04-18 | Stop reason: SDUPTHER

## 2017-11-30 RX ORDER — TIZANIDINE 2 MG/1
2 TABLET ORAL NIGHTLY PRN
Qty: 30 TABLET | Refills: 5 | Status: SHIPPED | OUTPATIENT
Start: 2017-11-30 | End: 2019-06-18

## 2017-11-30 RX ORDER — TIZANIDINE 2 MG/1
TABLET ORAL
COMMUNITY
Start: 2017-10-23 | End: 2017-11-30 | Stop reason: SDUPTHER

## 2017-12-11 ENCOUNTER — HOSPITAL ENCOUNTER (OUTPATIENT)
Dept: MAMMOGRAPHY | Facility: HOSPITAL | Age: 77
Discharge: HOME | End: 2017-12-11
Attending: INTERNAL MEDICINE | Admitting: INTERNAL MEDICINE

## 2017-12-11 DIAGNOSIS — R92.8 ABNORMAL MAMMOGRAM: ICD-10-CM

## 2017-12-11 PROCEDURE — G0204 DX MAMMO INCL CAD BI: HCPCS | Performed by: RADIOLOGY

## 2017-12-11 PROCEDURE — G0204 DX MAMMO INCL CAD BI: HCPCS

## 2017-12-11 PROCEDURE — G0279 TOMOSYNTHESIS, MAMMO: HCPCS

## 2017-12-11 PROCEDURE — G0279 TOMOSYNTHESIS, MAMMO: HCPCS | Performed by: RADIOLOGY

## 2017-12-12 DIAGNOSIS — K21.9 GERD WITHOUT ESOPHAGITIS: ICD-10-CM

## 2017-12-12 RX ORDER — OMEPRAZOLE 20 MG/1
CAPSULE, DELAYED RELEASE ORAL
Qty: 90 CAPSULE | Refills: 1 | Status: SHIPPED | OUTPATIENT
Start: 2017-12-12 | End: 2018-04-18 | Stop reason: SDUPTHER

## 2017-12-12 RX ORDER — ATENOLOL 50 MG/1
TABLET ORAL
Qty: 90 TABLET | Refills: 1 | Status: SHIPPED | OUTPATIENT
Start: 2017-12-12 | End: 2018-04-18 | Stop reason: SDUPTHER

## 2018-02-06 ENCOUNTER — OFFICE VISIT (OUTPATIENT)
Dept: INTERNAL MEDICINE | Facility: CLINIC | Age: 78
End: 2018-02-06

## 2018-02-06 VITALS
RESPIRATION RATE: 12 BRPM | DIASTOLIC BLOOD PRESSURE: 68 MMHG | BODY MASS INDEX: 26.66 KG/M2 | OXYGEN SATURATION: 97 % | HEART RATE: 55 BPM | HEIGHT: 65 IN | WEIGHT: 160 LBS | SYSTOLIC BLOOD PRESSURE: 102 MMHG

## 2018-02-06 DIAGNOSIS — M54.50 CHRONIC RIGHT-SIDED LOW BACK PAIN WITHOUT SCIATICA: ICD-10-CM

## 2018-02-06 DIAGNOSIS — M25.531 RIGHT WRIST PAIN: Primary | ICD-10-CM

## 2018-02-06 DIAGNOSIS — G89.29 CHRONIC RIGHT-SIDED LOW BACK PAIN WITHOUT SCIATICA: ICD-10-CM

## 2018-02-06 PROCEDURE — 99214 OFFICE O/P EST MOD 30 MIN: CPT | Performed by: INTERNAL MEDICINE

## 2018-02-06 RX ORDER — PREDNISONE 10 MG/1
TABLET ORAL
Qty: 18 TABLET | Refills: 0 | Status: SHIPPED | OUTPATIENT
Start: 2018-02-06 | End: 2018-03-08

## 2018-02-06 RX ORDER — TRAMADOL HYDROCHLORIDE 50 MG/1
50 TABLET ORAL EVERY 6 HOURS PRN
Qty: 15 TABLET | Refills: 0 | Status: SHIPPED | OUTPATIENT
Start: 2018-02-06 | End: 2018-08-29

## 2018-02-06 RX ORDER — IBUPROFEN 800 MG/1
800 TABLET ORAL EVERY 6 HOURS PRN
Qty: 20 TABLET | Refills: 0 | Status: SHIPPED | OUTPATIENT
Start: 2018-02-06 | End: 2018-04-10

## 2018-02-06 NOTE — PROGRESS NOTES
Back Pain (Lower back. Pt stated that her lower back muscles and down into her hip joint, the muscles feel worn out.) and Wrist Pain (Right wrist. Pt stated she is not sure but her symptoms fit carpal tunnel. )    Subjective   Zohra Hall is a 77 y.o. female is here today for follow-up.    History of Present Illness   Having some chronic low back pain, - dull tired feeling, gets worse as she walks.  Also rt wrist pain that's on and off, with numbness of the 1st 2 fingers and pain on the ulnar aspect of wrist and hand.  Back pain feels like Muscle spasm, and is helped by stretching    Current Outpatient Prescriptions:   •  alendronate (FOSAMAX) 70 MG tablet, Take 1 tablet by mouth Every 7 (Seven) Days., Disp: 12 tablet, Rfl: 1  •  amLODIPine (NORVASC) 5 MG tablet, Take 1 tablet by mouth Daily., Disp: 90 tablet, Rfl: 1  •  atenolol (TENORMIN) 50 MG tablet, TAKE 1 TABLET BY MOUTH  DAILY, Disp: 90 tablet, Rfl: 1  •  atorvastatin (LIPITOR) 40 MG tablet, Take 1 tablet by mouth  daily, Disp: 90 tablet, Rfl: 1  •  fluticasone (FLONASE) 50 MCG/ACT nasal spray, 1 spray into each nostril 2 (Two) Times a Day., Disp: 1 bottle, Rfl: 3  •  ibuprofen (ADVIL,MOTRIN) 800 MG tablet, Take 1 tablet by mouth Every 6 (Six) Hours As Needed for Mild Pain ., Disp: 20 tablet, Rfl: 0  •  levothyroxine (SYNTHROID) 75 MCG tablet, Take 1 tablet by mouth Daily., Disp: 30 tablet, Rfl: 1  •  losartan-hydrochlorothiazide (HYZAAR) 100-25 MG per tablet, Take 1 tablet by mouth Daily., Disp: 90 tablet, Rfl: 1  •  metroNIDAZOLE (METROCREAM) 0.75 % cream, Apply  topically 2 (Two) Times a Day., Disp: 60 g, Rfl: 2  •  omeprazole (priLOSEC) 20 MG capsule, TAKE 1 CAPSULE BY MOUTH  DAILY, Disp: 90 capsule, Rfl: 1  •  PARoxetine (PAXIL) 20 MG tablet, Take 1 tablet by mouth  daily, Disp: 90 tablet, Rfl: 1  •  tiZANidine (ZANAFLEX) 2 MG tablet, Take 1 tablet by mouth At Night As Needed for Muscle Spasms., Disp: 30 tablet, Rfl: 5  •  predniSONE (DELTASONE) 10  "MG tablet, Take 3 tabs daily x 3 days then 2 tabs daily x 3 days then 1 tab daily x 3 days then stop., Disp: 18 tablet, Rfl: 0  •  traMADol (ULTRAM) 50 MG tablet, Take 1 tablet by mouth Every 6 (Six) Hours As Needed for Moderate Pain ., Disp: 15 tablet, Rfl: 0      The following portions of the patient's history were reviewed and updated as appropriate: allergies, current medications, past family history, past medical history, past social history, past surgical history and problem list.    Review of Systems   Constitutional: Negative.  Negative for chills and fever.   HENT: Negative for ear discharge, ear pain, sinus pressure and sore throat.    Respiratory: Negative for cough, chest tightness and shortness of breath.    Cardiovascular: Negative for chest pain, palpitations and leg swelling.   Gastrointestinal: Negative for diarrhea, nausea and vomiting.   Musculoskeletal: Positive for back pain and gait problem. Negative for arthralgias and myalgias.   Neurological: Negative for dizziness, syncope and headaches.   Psychiatric/Behavioral: Negative for confusion and sleep disturbance.       Objective   /68  Pulse 55  Resp 12  Ht 165.1 cm (65\")  Wt 72.6 kg (160 lb)  SpO2 97%  BMI 26.63 kg/m2  Physical Exam   Constitutional: She is oriented to person, place, and time. She appears well-developed and well-nourished.   HENT:   Head: Normocephalic and atraumatic.   Mouth/Throat: No oropharyngeal exudate.   Eyes: Conjunctivae are normal. Pupils are equal, round, and reactive to light.   Neck: Neck supple. No thyromegaly present.   Cardiovascular: Normal rate and regular rhythm.    Pulmonary/Chest: Effort normal and breath sounds normal.   Abdominal: Soft. Bowel sounds are normal. She exhibits no distension. There is no tenderness.   Musculoskeletal: She exhibits tenderness. She exhibits no edema or deformity.   Neurological: She is alert and oriented to person, place, and time. No cranial nerve deficit.   Skin: " Skin is warm and dry.   Psychiatric: She has a normal mood and affect. Judgment normal.   Nursing note and vitals reviewed.        Results for orders placed or performed in visit on 11/30/17   CBC (No Diff)   Result Value Ref Range    WBC 6.40 3.50 - 10.80 10*3/mm3    RBC 4.36 3.89 - 5.14 10*6/mm3    Hemoglobin 12.4 11.5 - 15.5 g/dL    Hematocrit 38.2 34.5 - 44.0 %    MCV 87.6 80.0 - 99.0 fL    MCH 28.4 27.0 - 31.0 pg    MCHC 32.5 32.0 - 36.0 g/dL    RDW 14.5 11.3 - 14.5 %    RDW-SD 46.9 37.0 - 54.0 fl    MPV 9.4 6.0 - 12.0 fL    Platelets 305 150 - 450 10*3/mm3   Comprehensive Metabolic Panel   Result Value Ref Range    Glucose 80 70 - 100 mg/dL    BUN 19 9 - 23 mg/dL    Creatinine 1.30 0.60 - 1.30 mg/dL    Sodium 133 132 - 146 mmol/L    Potassium 4.8 3.5 - 5.5 mmol/L    Chloride 97 (L) 99 - 109 mmol/L    CO2 28.0 20.0 - 31.0 mmol/L    Calcium 9.4 8.7 - 10.4 mg/dL    Total Protein 7.0 5.7 - 8.2 g/dL    Albumin 4.50 3.20 - 4.80 g/dL    ALT (SGPT) 18 7 - 40 U/L    AST (SGOT) 23 0 - 33 U/L    Alkaline Phosphatase 92 25 - 100 U/L    Total Bilirubin 0.5 0.3 - 1.2 mg/dL    eGFR Non African Amer 40 (L) >60 mL/min/1.73    Globulin 2.5 gm/dL    A/G Ratio 1.8 1.5 - 2.5 g/dL    BUN/Creatinine Ratio 14.6 7.0 - 25.0    Anion Gap 8.0 3.0 - 11.0 mmol/L   Lipid Panel   Result Value Ref Range    Total Cholesterol 241 (H) 0 - 200 mg/dL    Triglycerides 140 0 - 150 mg/dL    HDL Cholesterol 68 (H) 40 - 60 mg/dL    LDL Cholesterol  161 (H) 0 - 130 mg/dL   Vitamin D 25 Hydroxy   Result Value Ref Range    25 Hydroxy, Vitamin D 37.3 ng/ml             Assessment/Plan   Diagnoses and all orders for this visit:    Right wrist pain  -     ibuprofen (ADVIL,MOTRIN) 800 MG tablet; Take 1 tablet by mouth Every 6 (Six) Hours As Needed for Mild Pain .  -     XR wrist 2 vw right    Chronic right-sided low back pain without sciatica  Comments:  Take 2 zanaflex HS, and ibuprofen prn.  check xrays  Orders:  -     ibuprofen (ADVIL,MOTRIN) 800 MG tablet;  Take 1 tablet by mouth Every 6 (Six) Hours As Needed for Mild Pain .  -     predniSONE (DELTASONE) 10 MG tablet; Take 3 tabs daily x 3 days then 2 tabs daily x 3 days then 1 tab daily x 3 days then stop.  -     XR Spine Lumbar AP & Lateral  -     traMADol (ULTRAM) 50 MG tablet; Take 1 tablet by mouth Every 6 (Six) Hours As Needed for Moderate Pain .                 Return in about 2 weeks (around 2/20/2018) for Recheck.

## 2018-02-07 ENCOUNTER — HOSPITAL ENCOUNTER (OUTPATIENT)
Dept: GENERAL RADIOLOGY | Facility: HOSPITAL | Age: 78
Discharge: HOME | End: 2018-02-07
Attending: INTERNAL MEDICINE | Admitting: INTERNAL MEDICINE

## 2018-02-07 PROCEDURE — 73100 X-RAY EXAM OF WRIST: CPT

## 2018-02-07 PROCEDURE — 72100 X-RAY EXAM L-S SPINE 2/3 VWS: CPT

## 2018-02-21 ENCOUNTER — OFFICE VISIT (OUTPATIENT)
Dept: INTERNAL MEDICINE | Facility: CLINIC | Age: 78
End: 2018-02-21

## 2018-02-21 VITALS
HEART RATE: 84 BPM | SYSTOLIC BLOOD PRESSURE: 120 MMHG | HEIGHT: 65 IN | DIASTOLIC BLOOD PRESSURE: 58 MMHG | OXYGEN SATURATION: 96 % | BODY MASS INDEX: 26.33 KG/M2 | WEIGHT: 158 LBS

## 2018-02-21 DIAGNOSIS — C73 PAPILLARY ADENOCARCINOMA OF THYROID (HCC): ICD-10-CM

## 2018-02-21 DIAGNOSIS — R63.4 UNINTENDED WEIGHT LOSS: Primary | ICD-10-CM

## 2018-02-21 LAB
BASOPHILS # BLD AUTO: 0.02 10*3/MM3 (ref 0–0.2)
BASOPHILS NFR BLD AUTO: 0.3 % (ref 0–1)
BILIRUB BLD-MCNC: ABNORMAL MG/DL
CLARITY, POC: CLEAR
COLOR UR: YELLOW
DEPRECATED RDW RBC AUTO: 43.4 FL (ref 37–54)
EOSINOPHIL # BLD AUTO: 0.12 10*3/MM3 (ref 0–0.3)
EOSINOPHIL NFR BLD AUTO: 2.1 % (ref 0–3)
ERYTHROCYTE [DISTWIDTH] IN BLOOD BY AUTOMATED COUNT: 13.5 % (ref 11.3–14.5)
ERYTHROCYTE [SEDIMENTATION RATE] IN BLOOD: 56 MM/HR (ref 0–30)
GLUCOSE UR STRIP-MCNC: NEGATIVE MG/DL
HCT VFR BLD AUTO: 34.5 % (ref 34.5–44)
HGB BLD-MCNC: 11.4 G/DL (ref 11.5–15.5)
IMM GRANULOCYTES # BLD: 0.03 10*3/MM3 (ref 0–0.03)
IMM GRANULOCYTES NFR BLD: 0.5 % (ref 0–0.6)
KETONES UR QL: NEGATIVE
LEUKOCYTE EST, POC: ABNORMAL
LYMPHOCYTES # BLD AUTO: 1.44 10*3/MM3 (ref 0.6–4.8)
LYMPHOCYTES NFR BLD AUTO: 24.7 % (ref 24–44)
MCH RBC QN AUTO: 28.9 PG (ref 27–31)
MCHC RBC AUTO-ENTMCNC: 33 G/DL (ref 32–36)
MCV RBC AUTO: 87.3 FL (ref 80–99)
MONOCYTES # BLD AUTO: 0.61 10*3/MM3 (ref 0–1)
MONOCYTES NFR BLD AUTO: 10.5 % (ref 0–12)
NEUTROPHILS # BLD AUTO: 3.61 10*3/MM3 (ref 1.5–8.3)
NEUTROPHILS NFR BLD AUTO: 61.9 % (ref 41–71)
NITRITE UR-MCNC: NEGATIVE MG/ML
PH UR: 7 [PH] (ref 5–8)
PLATELET # BLD AUTO: 309 10*3/MM3 (ref 150–450)
PMV BLD AUTO: 9.6 FL (ref 6–12)
PROT UR STRIP-MCNC: NEGATIVE MG/DL
RBC # BLD AUTO: 3.95 10*6/MM3 (ref 3.89–5.14)
RBC # UR STRIP: NEGATIVE /UL
SP GR UR: 1.01 (ref 1–1.03)
T4 FREE SERPL-MCNC: 1.74 NG/DL (ref 0.89–1.76)
TSH SERPL DL<=0.05 MIU/L-ACNC: 3.39 MIU/ML (ref 0.35–5.35)
UROBILINOGEN UR QL: ABNORMAL
WBC NRBC COR # BLD: 5.83 10*3/MM3 (ref 3.5–10.8)

## 2018-02-21 PROCEDURE — 81003 URINALYSIS AUTO W/O SCOPE: CPT | Performed by: INTERNAL MEDICINE

## 2018-02-21 PROCEDURE — 84439 ASSAY OF FREE THYROXINE: CPT | Performed by: INTERNAL MEDICINE

## 2018-02-21 PROCEDURE — 85025 COMPLETE CBC W/AUTO DIFF WBC: CPT | Performed by: INTERNAL MEDICINE

## 2018-02-21 PROCEDURE — 84443 ASSAY THYROID STIM HORMONE: CPT | Performed by: INTERNAL MEDICINE

## 2018-02-21 PROCEDURE — 99214 OFFICE O/P EST MOD 30 MIN: CPT | Performed by: INTERNAL MEDICINE

## 2018-02-21 RX ORDER — LEVOTHYROXINE SODIUM 0.1 MG/1
100 TABLET ORAL DAILY
Qty: 90 TABLET | Refills: 1 | Status: SHIPPED | OUTPATIENT
Start: 2018-02-21 | End: 2018-08-10 | Stop reason: DRUGHIGH

## 2018-02-21 NOTE — PROGRESS NOTES
Wrist Pain (2 week follow up)    Subjective   Zohra Hall is a 77 y.o. female is here today for follow-up.    History of Present Illness   Feels tired from head to toe. Last time she felt this way, had labs and her thyroid was cut down to 75mcg from 100 in October., which she took for 30 days, and now back on the 100, since the beginining of the Year.  Reports she couldn't tell any difference on the lower dose.  Also having a lot of unintentional weight loss.  Prior smoker , quit in '92, has a 30 + pack year smoking history.    Current Outpatient Prescriptions:   •  alendronate (FOSAMAX) 70 MG tablet, Take 1 tablet by mouth Every 7 (Seven) Days., Disp: 12 tablet, Rfl: 1  •  amLODIPine (NORVASC) 5 MG tablet, Take 1 tablet by mouth Daily., Disp: 90 tablet, Rfl: 1  •  atenolol (TENORMIN) 50 MG tablet, TAKE 1 TABLET BY MOUTH  DAILY, Disp: 90 tablet, Rfl: 1  •  atorvastatin (LIPITOR) 40 MG tablet, Take 1 tablet by mouth  daily, Disp: 90 tablet, Rfl: 1  •  fluticasone (FLONASE) 50 MCG/ACT nasal spray, 1 spray into each nostril 2 (Two) Times a Day., Disp: 1 bottle, Rfl: 3  •  ibuprofen (ADVIL,MOTRIN) 800 MG tablet, Take 1 tablet by mouth Every 6 (Six) Hours As Needed for Mild Pain ., Disp: 20 tablet, Rfl: 0  •  levothyroxine (SYNTHROID) 100 MCG tablet, Take 1 tablet by mouth Daily., Disp: 90 tablet, Rfl: 1  •  losartan-hydrochlorothiazide (HYZAAR) 100-25 MG per tablet, Take 1 tablet by mouth Daily., Disp: 90 tablet, Rfl: 1  •  metroNIDAZOLE (METROCREAM) 0.75 % cream, Apply  topically 2 (Two) Times a Day., Disp: 60 g, Rfl: 2  •  omeprazole (priLOSEC) 20 MG capsule, TAKE 1 CAPSULE BY MOUTH  DAILY, Disp: 90 capsule, Rfl: 1  •  PARoxetine (PAXIL) 20 MG tablet, Take 1 tablet by mouth  daily, Disp: 90 tablet, Rfl: 1  •  predniSONE (DELTASONE) 10 MG tablet, Take 3 tabs daily x 3 days then 2 tabs daily x 3 days then 1 tab daily x 3 days then stop., Disp: 18 tablet, Rfl: 0  •  tiZANidine (ZANAFLEX) 2 MG tablet, Take 1 tablet  "by mouth At Night As Needed for Muscle Spasms., Disp: 30 tablet, Rfl: 5  •  traMADol (ULTRAM) 50 MG tablet, Take 1 tablet by mouth Every 6 (Six) Hours As Needed for Moderate Pain ., Disp: 15 tablet, Rfl: 0      The following portions of the patient's history were reviewed and updated as appropriate: allergies, current medications, past family history, past medical history, past social history, past surgical history and problem list.    Review of Systems   Constitutional: Positive for fatigue and unexpected weight change. Negative for chills and fever.   HENT: Negative for ear discharge, ear pain, sinus pressure and sore throat.    Respiratory: Negative for cough, chest tightness and shortness of breath.    Cardiovascular: Negative for chest pain, palpitations and leg swelling.   Gastrointestinal: Positive for abdominal distention and nausea. Negative for diarrhea and vomiting.   Musculoskeletal: Positive for arthralgias, myalgias and neck pain. Negative for back pain.   Neurological: Positive for dizziness and weakness. Negative for syncope and headaches.   Psychiatric/Behavioral: Negative for confusion and sleep disturbance.       Objective   /58  Pulse 84  Ht 165.1 cm (65\")  Wt 71.7 kg (158 lb)  SpO2 96%  BMI 26.29 kg/m2  Physical Exam   Constitutional: She is oriented to person, place, and time. She appears well-developed and well-nourished. She appears distressed (appears tired , weak).   HENT:   Head: Normocephalic and atraumatic.   Mouth/Throat: No oropharyngeal exudate.   Eyes: Conjunctivae are normal. Pupils are equal, round, and reactive to light.   Neck: Neck supple. No thyromegaly present.   Cardiovascular: Normal rate and regular rhythm.    No murmur heard.  Pulmonary/Chest: Effort normal and breath sounds normal. She has no wheezes.   Abdominal: Soft. Bowel sounds are normal. She exhibits no distension. There is no tenderness.   Musculoskeletal: She exhibits tenderness.   Neurological: She is " alert and oriented to person, place, and time.   Skin: Skin is warm and dry.   Psychiatric: She has a normal mood and affect. Judgment normal.   Nursing note and vitals reviewed.        Results for orders placed or performed in visit on 02/21/18   TSH   Result Value Ref Range    TSH 3.394 0.350 - 5.350 mIU/mL   T4, Free   Result Value Ref Range    Free T4 1.74 0.89 - 1.76 ng/dL   Sedimentation Rate   Result Value Ref Range    Sed Rate 56 (H) 0 - 30 mm/hr   CBC Auto Differential   Result Value Ref Range    WBC 5.83 3.50 - 10.80 10*3/mm3    RBC 3.95 3.89 - 5.14 10*6/mm3    Hemoglobin 11.4 (L) 11.5 - 15.5 g/dL    Hematocrit 34.5 34.5 - 44.0 %    MCV 87.3 80.0 - 99.0 fL    MCH 28.9 27.0 - 31.0 pg    MCHC 33.0 32.0 - 36.0 g/dL    RDW 13.5 11.3 - 14.5 %    RDW-SD 43.4 37.0 - 54.0 fl    MPV 9.6 6.0 - 12.0 fL    Platelets 309 150 - 450 10*3/mm3    Neutrophil % 61.9 41.0 - 71.0 %    Lymphocyte % 24.7 24.0 - 44.0 %    Monocyte % 10.5 0.0 - 12.0 %    Eosinophil % 2.1 0.0 - 3.0 %    Basophil % 0.3 0.0 - 1.0 %    Immature Grans % 0.5 0.0 - 0.6 %    Neutrophils, Absolute 3.61 1.50 - 8.30 10*3/mm3    Lymphocytes, Absolute 1.44 0.60 - 4.80 10*3/mm3    Monocytes, Absolute 0.61 0.00 - 1.00 10*3/mm3    Eosinophils, Absolute 0.12 0.00 - 0.30 10*3/mm3    Basophils, Absolute 0.02 0.00 - 0.20 10*3/mm3    Immature Grans, Absolute 0.03 0.00 - 0.03 10*3/mm3   POCT urinalysis dipstick, automated   Result Value Ref Range    Color Yellow Yellow, Straw, Dark Yellow, Ninfa    Clarity, UA Clear Clear    Glucose, UA Negative Negative, 1000 mg/dL (3+) mg/dL    Bilirubin 1 mg/dL (A) Negative    Ketones, UA Negative Negative    Specific Gravity  1.010 1.005 - 1.030    Blood, UA Negative Negative    pH, Urine 7.0 5.0 - 8.0    Protein, POC Negative Negative mg/dL    Urobilinogen, UA 1 E.U./dL  (A) Normal    Leukocytes 25 Jc/ul (A) Negative    Nitrite, UA Negative Negative       Low-Dose CT: Lung Cancer Screening  Criteria:  • Age 55-77 years of age  (CMS) , 55-80 years of age (Commercial) and;  o Patient Age: 77 y.o.  • 30 pack-year smoking history (# yrs smoked X avg #ppd = pack/yrs); if  pt has quit smoking it must be within <15yrs and;  • Asymptomatic for lung cancer  ICD-10 Codes:  • History of smoking  • Tobacco abuse/addiction  ? Z72.0 (current smoker)  ? Z87.891 (personal history of smoking/nicotine dependence) use with CMS   • Patient Smoking History  History   Smoking Status   • Former Smoker   • Packs/day: 1.00   • Years: 30.00   • Types: Cigarettes   • Quit date: 9/13/1992     CPT Codes:  • 27882 low dose (must say low dose otherwise is CT without contrast)  / (for patients with [facility] and CMS)      Assessment/Plan   Diagnoses and all orders for this visit:    Unintended weight loss  -     CBC & Differential  -     TSH  -     T4, Free  -     levothyroxine (SYNTHROID) 100 MCG tablet; Take 1 tablet by mouth Daily.  -     Sedimentation Rate  -     CT Chest Without Contrast  -     CT Abdomen Pelvis Without Contrast  -     CBC Auto Differential  -     POCT urinalysis dipstick, automated    Papillary adenocarcinoma of thyroid  -     CT Chest Without Contrast  -     CT Abdomen Pelvis Without Contrast         Adv. If workup negative, will change or increase anti-depressant.      Return in about 3 weeks (around 3/14/2018) for Next scheduled follow up.

## 2018-02-28 ENCOUNTER — HOSPITAL ENCOUNTER (OUTPATIENT)
Dept: CT IMAGING | Facility: HOSPITAL | Age: 78
Discharge: HOME | End: 2018-02-28
Attending: INTERNAL MEDICINE | Admitting: INTERNAL MEDICINE

## 2018-02-28 PROCEDURE — 71250 CT THORAX DX C-: CPT

## 2018-02-28 PROCEDURE — A9270 NON-COVERED ITEM OR SERVICE: HCPCS | Performed by: INTERNAL MEDICINE

## 2018-02-28 PROCEDURE — 74176 CT ABD & PELVIS W/O CONTRAST: CPT

## 2018-02-28 PROCEDURE — 63710000001 BARIUM 2 % SUSPENSION: Performed by: INTERNAL MEDICINE

## 2018-02-28 RX ADMIN — BARIUM SULFATE 450 ML: 21 SUSPENSION ORAL at 16:00

## 2018-03-05 ENCOUNTER — TELEPHONE (OUTPATIENT)
Dept: INTERNAL MEDICINE | Facility: CLINIC | Age: 78
End: 2018-03-05

## 2018-03-08 ENCOUNTER — OFFICE VISIT (OUTPATIENT)
Dept: INTERNAL MEDICINE | Facility: CLINIC | Age: 78
End: 2018-03-08

## 2018-03-08 VITALS
RESPIRATION RATE: 16 BRPM | BODY MASS INDEX: 26.85 KG/M2 | HEART RATE: 78 BPM | DIASTOLIC BLOOD PRESSURE: 62 MMHG | SYSTOLIC BLOOD PRESSURE: 124 MMHG | WEIGHT: 161.38 LBS

## 2018-03-08 DIAGNOSIS — R10.13 EPIGASTRIC PAIN: Primary | ICD-10-CM

## 2018-03-08 DIAGNOSIS — F43.21 GRIEF REACTION: ICD-10-CM

## 2018-03-08 LAB
AMYLASE SERPL-CCNC: 35 U/L (ref 30–118)
LIPASE SERPL-CCNC: 31 U/L (ref 6–51)

## 2018-03-08 PROCEDURE — 99214 OFFICE O/P EST MOD 30 MIN: CPT | Performed by: INTERNAL MEDICINE

## 2018-03-08 PROCEDURE — 86677 HELICOBACTER PYLORI ANTIBODY: CPT | Performed by: INTERNAL MEDICINE

## 2018-03-08 PROCEDURE — 82150 ASSAY OF AMYLASE: CPT | Performed by: INTERNAL MEDICINE

## 2018-03-08 PROCEDURE — 83690 ASSAY OF LIPASE: CPT | Performed by: INTERNAL MEDICINE

## 2018-03-08 RX ORDER — PAROXETINE HYDROCHLORIDE 20 MG/1
40 TABLET, FILM COATED ORAL EVERY MORNING
Qty: 180 TABLET | Refills: 1 | Status: SHIPPED | OUTPATIENT
Start: 2018-03-08 | End: 2018-08-01 | Stop reason: SDUPTHER

## 2018-03-08 NOTE — PROGRESS NOTES
Follow-up (on labs)    Subjective   Zohra Hall is a 77 y.o. female is here today for follow-up.    History of Present Illness   Zohra is here for a follow upon her recent weight loss and depression and CT scans and labs.  She is under a lot of stress  With her son who has seizure hx and will not take care of himself as a 56 yo.holding off moving him in her house.  Continues to have  Some pain in her upper stomach.    Current Outpatient Prescriptions:   •  alendronate (FOSAMAX) 70 MG tablet, Take 1 tablet by mouth Every 7 (Seven) Days., Disp: 12 tablet, Rfl: 1  •  amLODIPine (NORVASC) 5 MG tablet, Take 1 tablet by mouth Daily., Disp: 90 tablet, Rfl: 1  •  atenolol (TENORMIN) 50 MG tablet, TAKE 1 TABLET BY MOUTH  DAILY, Disp: 90 tablet, Rfl: 1  •  atorvastatin (LIPITOR) 40 MG tablet, Take 1 tablet by mouth  daily, Disp: 90 tablet, Rfl: 1  •  fluticasone (FLONASE) 50 MCG/ACT nasal spray, 1 spray into each nostril 2 (Two) Times a Day., Disp: 1 bottle, Rfl: 3  •  ibuprofen (ADVIL,MOTRIN) 800 MG tablet, Take 1 tablet by mouth Every 6 (Six) Hours As Needed for Mild Pain ., Disp: 20 tablet, Rfl: 0  •  levothyroxine (SYNTHROID) 100 MCG tablet, Take 1 tablet by mouth Daily., Disp: 90 tablet, Rfl: 1  •  losartan-hydrochlorothiazide (HYZAAR) 100-25 MG per tablet, Take 1 tablet by mouth Daily., Disp: 90 tablet, Rfl: 1  •  metroNIDAZOLE (METROCREAM) 0.75 % cream, Apply  topically 2 (Two) Times a Day., Disp: 60 g, Rfl: 2  •  omeprazole (priLOSEC) 20 MG capsule, TAKE 1 CAPSULE BY MOUTH  DAILY, Disp: 90 capsule, Rfl: 1  •  PARoxetine (PAXIL) 20 MG tablet, Take 2 tablets by mouth Every Morning., Disp: 180 tablet, Rfl: 1  •  tiZANidine (ZANAFLEX) 2 MG tablet, Take 1 tablet by mouth At Night As Needed for Muscle Spasms., Disp: 30 tablet, Rfl: 5  •  traMADol (ULTRAM) 50 MG tablet, Take 1 tablet by mouth Every 6 (Six) Hours As Needed for Moderate Pain ., Disp: 15 tablet, Rfl: 0      The following portions of the patient's history  were reviewed and updated as appropriate: allergies, current medications, past family history, past medical history, past social history, past surgical history and problem list.    Review of Systems   Constitutional: Positive for fatigue. Negative for chills and fever.   HENT: Negative for ear discharge, ear pain, sinus pressure and sore throat.    Respiratory: Negative for cough, chest tightness and shortness of breath.    Cardiovascular: Negative for chest pain, palpitations and leg swelling.   Gastrointestinal: Negative for diarrhea, nausea and vomiting.   Musculoskeletal: Negative for arthralgias, back pain and myalgias.   Neurological: Positive for tremors. Negative for dizziness, syncope and headaches.   Psychiatric/Behavioral: Positive for dysphoric mood. Negative for confusion and sleep disturbance. The patient is nervous/anxious.        Objective   /62 (BP Location: Left arm, Patient Position: Sitting)  Pulse 78  Resp 16  Wt 73.2 kg (161 lb 6 oz)  BMI 26.85 kg/m2  Physical Exam   Constitutional: She is oriented to person, place, and time. She appears well-developed and well-nourished.   HENT:   Head: Normocephalic and atraumatic.   Right Ear: External ear normal.   Left Ear: External ear normal.   Mouth/Throat: No oropharyngeal exudate.   Eyes: Conjunctivae are normal. Pupils are equal, round, and reactive to light.   Neck: Neck supple. No thyromegaly present.   Cardiovascular: Normal rate and regular rhythm.    No murmur heard.  Pulmonary/Chest: Effort normal and breath sounds normal. She has no wheezes.   Abdominal: Soft. Bowel sounds are normal. She exhibits no distension. There is no tenderness.   Musculoskeletal: She exhibits edema and tenderness.   Neurological: She is alert and oriented to person, place, and time. No cranial nerve deficit.   tremors   Skin: Skin is warm and dry.   Psychiatric: She has a normal mood and affect. Judgment normal.   Nursing note and vitals  reviewed.        Results for orders placed or performed in visit on 03/08/18   Amylase   Result Value Ref Range    Amylase 35 30 - 118 U/L   Lipase   Result Value Ref Range    Lipase 31 6 - 51 U/L   H.pylori,IgG / IgA Antibodies   Result Value Ref Range    H. pylori, IgA ABS <9.0 0.0 - 8.9 units    H. pylori IgG 0.75 0.00 - 0.79 Index Value             Assessment/Plan   Diagnoses and all orders for this visit:    Epigastric pain  -     Amylase  -     Lipase  -     H.pylori,IgG / IgA Antibodies    Grief reaction  Comments:  start remeron hs. Given info for Hospice grief counseling, she will contact them.  Orders:  -     PARoxetine (PAXIL) 20 MG tablet; Take 2 tablets by mouth Every Morning.      Reassured tremors likely essential and not parkinsonian. Monitor for now.             Return in about 6 weeks (around 4/19/2018).

## 2018-03-09 LAB
H PYLORI IGA SER IA-ACNC: <9 UNITS (ref 0–8.9)
H PYLORI IGG SER IA-ACNC: 0.75 INDEX VALUE (ref 0–0.79)

## 2018-04-10 ENCOUNTER — OFFICE VISIT (OUTPATIENT)
Dept: INTERNAL MEDICINE | Facility: CLINIC | Age: 78
End: 2018-04-10

## 2018-04-10 VITALS
HEART RATE: 70 BPM | WEIGHT: 162 LBS | HEIGHT: 65 IN | BODY MASS INDEX: 26.99 KG/M2 | SYSTOLIC BLOOD PRESSURE: 122 MMHG | OXYGEN SATURATION: 98 % | DIASTOLIC BLOOD PRESSURE: 78 MMHG

## 2018-04-10 DIAGNOSIS — M54.50 CHRONIC LEFT-SIDED LOW BACK PAIN WITHOUT SCIATICA: ICD-10-CM

## 2018-04-10 DIAGNOSIS — G25.0 BENIGN ESSENTIAL TREMOR: ICD-10-CM

## 2018-04-10 DIAGNOSIS — G89.29 CHRONIC LEFT-SIDED LOW BACK PAIN WITHOUT SCIATICA: ICD-10-CM

## 2018-04-10 DIAGNOSIS — R10.13 EPIGASTRIC PAIN: ICD-10-CM

## 2018-04-10 DIAGNOSIS — D50.0 IRON DEFICIENCY ANEMIA DUE TO CHRONIC BLOOD LOSS: Primary | ICD-10-CM

## 2018-04-10 PROCEDURE — 99214 OFFICE O/P EST MOD 30 MIN: CPT | Performed by: INTERNAL MEDICINE

## 2018-04-10 RX ORDER — TRIAMTERENE AND HYDROCHLOROTHIAZIDE 37.5; 25 MG/1; MG/1
TABLET ORAL
COMMUNITY
Start: 2018-02-01 | End: 2018-04-18 | Stop reason: SDUPTHER

## 2018-04-10 NOTE — PROGRESS NOTES
Follow-up (6 week); Abdominal Pain; and Grief reaction    Subjective   Zohra Hall is a 77 y.o. female is here today for follow-up.    History of Present Illness   Here for a follow up on her abdominal pain, s/p labs and CT.Concerned about her tremor.  Left low back pain is acting up, better with physical therapy.    Current Outpatient Prescriptions:   •  alendronate (FOSAMAX) 70 MG tablet, Take 1 tablet by mouth Every 7 (Seven) Days., Disp: 12 tablet, Rfl: 1  •  amLODIPine (NORVASC) 5 MG tablet, Take 1 tablet by mouth Daily., Disp: 90 tablet, Rfl: 1  •  atenolol (TENORMIN) 50 MG tablet, TAKE 1 TABLET BY MOUTH  DAILY, Disp: 90 tablet, Rfl: 1  •  atorvastatin (LIPITOR) 40 MG tablet, Take 1 tablet by mouth  daily, Disp: 90 tablet, Rfl: 1  •  fluticasone (FLONASE) 50 MCG/ACT nasal spray, 1 spray into each nostril 2 (Two) Times a Day., Disp: 1 bottle, Rfl: 3  •  levothyroxine (SYNTHROID) 100 MCG tablet, Take 1 tablet by mouth Daily., Disp: 90 tablet, Rfl: 1  •  losartan-hydrochlorothiazide (HYZAAR) 100-25 MG per tablet, Take 1 tablet by mouth Daily., Disp: 90 tablet, Rfl: 1  •  metroNIDAZOLE (METROCREAM) 0.75 % cream, Apply  topically 2 (Two) Times a Day., Disp: 60 g, Rfl: 2  •  omeprazole (priLOSEC) 20 MG capsule, TAKE 1 CAPSULE BY MOUTH  DAILY, Disp: 90 capsule, Rfl: 1  •  PARoxetine (PAXIL) 20 MG tablet, Take 2 tablets by mouth Every Morning., Disp: 180 tablet, Rfl: 1  •  tiZANidine (ZANAFLEX) 2 MG tablet, Take 1 tablet by mouth At Night As Needed for Muscle Spasms., Disp: 30 tablet, Rfl: 5  •  traMADol (ULTRAM) 50 MG tablet, Take 1 tablet by mouth Every 6 (Six) Hours As Needed for Moderate Pain ., Disp: 15 tablet, Rfl: 0  •  triamterene-hydrochlorothiazide (MAXZIDE-25) 37.5-25 MG per tablet, , Disp: , Rfl:       The following portions of the patient's history were reviewed and updated as appropriate: allergies, current medications, past family history, past medical history, past social history, past surgical  "history and problem list.    Review of Systems   Constitutional: Positive for fatigue. Negative for chills and fever.   HENT: Negative for ear discharge, ear pain, sinus pressure and sore throat.    Respiratory: Negative for cough, chest tightness and shortness of breath.    Cardiovascular: Negative for chest pain, palpitations and leg swelling.   Gastrointestinal: Negative for diarrhea, nausea and vomiting.   Musculoskeletal: Positive for arthralgias, back pain and myalgias.   Neurological: Positive for tremors. Negative for dizziness, syncope and headaches.   Psychiatric/Behavioral: Negative for confusion and sleep disturbance.       Objective   /78   Pulse 70   Ht 165.1 cm (65\")   Wt 73.5 kg (162 lb)   SpO2 98%   BMI 26.96 kg/m²   Physical Exam   Constitutional: She is oriented to person, place, and time. She appears well-developed and well-nourished.   HENT:   Head: Normocephalic and atraumatic.   Mouth/Throat: No oropharyngeal exudate.   Eyes: Conjunctivae are normal. Pupils are equal, round, and reactive to light.   Neck: Neck supple. No thyromegaly present.   Cardiovascular: Normal rate and regular rhythm.    Pulmonary/Chest: Effort normal and breath sounds normal.   Abdominal: Soft. Bowel sounds are normal. She exhibits no distension. There is tenderness.   Musculoskeletal: She exhibits tenderness. She exhibits no edema.   Neurological: She is alert and oriented to person, place, and time. No cranial nerve deficit.   Tremors fine, -intentional, not present at rest   Skin: Skin is warm and dry.   Psychiatric: She has a normal mood and affect. Judgment normal.   Nursing note and vitals reviewed.        Results for orders placed or performed in visit on 03/08/18   Amylase   Result Value Ref Range    Amylase 35 30 - 118 U/L   Lipase   Result Value Ref Range    Lipase 31 6 - 51 U/L   H.pylori,IgG / IgA Antibodies   Result Value Ref Range    H. pylori, IgA ABS <9.0 0.0 - 8.9 units    H. pylori IgG 0.75 " 0.00 - 0.79 Index Value             Assessment/Plan   Diagnoses and all orders for this visit:    Iron deficiency anemia due to chronic blood loss  -     Ambulatory referral for Screening EGD    Chronic left-sided low back pain without sciatica  Comments:  continue zanaflex and tylenol, avoid nsaids.    Epigastric pain    Benign essential tremor  Comments:  Reassured not c/w Parkinsons. Not severe, hence no indication for BB.    Other orders  -     triamterene-hydrochlorothiazide (MAXZIDE-25) 37.5-25 MG per tablet;       Labs with normal pancreas enzymes, CT unremarkable.reviewed with patient.  She does have a drop in her hemoglobin, hence proceed with egd.             Return in about 10 weeks (around 6/19/2018) for Next scheduled follow up.

## 2018-04-12 PROBLEM — G25.0 BENIGN ESSENTIAL TREMOR: Status: ACTIVE | Noted: 2018-04-12

## 2018-04-18 DIAGNOSIS — R60.9 EDEMA, UNSPECIFIED TYPE: ICD-10-CM

## 2018-04-18 DIAGNOSIS — I10 ESSENTIAL HYPERTENSION: ICD-10-CM

## 2018-04-18 DIAGNOSIS — K21.9 GERD WITHOUT ESOPHAGITIS: ICD-10-CM

## 2018-04-18 RX ORDER — ALENDRONATE SODIUM 70 MG/1
TABLET ORAL
Qty: 12 TABLET | Refills: 0 | Status: SHIPPED | OUTPATIENT
Start: 2018-04-18 | End: 2018-06-02 | Stop reason: SDUPTHER

## 2018-04-18 RX ORDER — OMEPRAZOLE 20 MG/1
CAPSULE, DELAYED RELEASE ORAL
Qty: 90 CAPSULE | Refills: 0 | Status: SHIPPED | OUTPATIENT
Start: 2018-04-18 | End: 2018-09-08

## 2018-04-18 RX ORDER — DICLOFENAC SODIUM 75 MG/1
TABLET, DELAYED RELEASE ORAL
Qty: 90 TABLET | Refills: 0 | Status: SHIPPED | OUTPATIENT
Start: 2018-04-18 | End: 2018-06-06 | Stop reason: SDUPTHER

## 2018-04-18 RX ORDER — LOSARTAN POTASSIUM 100 MG/1
100 TABLET ORAL DAILY
Qty: 90 TABLET | Refills: 0 | Status: SHIPPED | OUTPATIENT
Start: 2018-04-18 | End: 2018-06-06 | Stop reason: SDUPTHER

## 2018-04-18 RX ORDER — ATORVASTATIN CALCIUM 40 MG/1
TABLET, FILM COATED ORAL
Qty: 90 TABLET | Refills: 0 | Status: SHIPPED | OUTPATIENT
Start: 2018-04-18 | End: 2018-06-06 | Stop reason: SDUPTHER

## 2018-04-18 RX ORDER — TRIAMTERENE AND HYDROCHLOROTHIAZIDE 37.5; 25 MG/1; MG/1
TABLET ORAL
Qty: 90 TABLET | Refills: 0 | Status: SHIPPED | OUTPATIENT
Start: 2018-04-18 | End: 2018-06-06 | Stop reason: SDUPTHER

## 2018-04-18 RX ORDER — ATENOLOL 50 MG/1
TABLET ORAL
Qty: 90 TABLET | Refills: 0 | Status: SHIPPED | OUTPATIENT
Start: 2018-04-18 | End: 2018-09-27 | Stop reason: SDUPTHER

## 2018-04-18 RX ORDER — AMLODIPINE BESYLATE 5 MG/1
5 TABLET ORAL DAILY
Qty: 90 TABLET | Refills: 0 | Status: SHIPPED | OUTPATIENT
Start: 2018-04-18 | End: 2018-06-06 | Stop reason: SDUPTHER

## 2018-04-26 ENCOUNTER — LAB REQUISITION (OUTPATIENT)
Dept: LAB | Facility: HOSPITAL | Age: 78
End: 2018-04-26

## 2018-04-26 DIAGNOSIS — D64.9 ANEMIA: ICD-10-CM

## 2018-04-26 PROCEDURE — 88305 TISSUE EXAM BY PATHOLOGIST: CPT | Performed by: INTERNAL MEDICINE

## 2018-04-27 LAB
CYTO UR: NORMAL
LAB AP CASE REPORT: NORMAL
LAB AP CLINICAL INFORMATION: NORMAL
Lab: NORMAL
PATH REPORT.FINAL DX SPEC: NORMAL
PATH REPORT.GROSS SPEC: NORMAL

## 2018-05-21 ENCOUNTER — OFFICE VISIT (OUTPATIENT)
Dept: INTERNAL MEDICINE | Facility: CLINIC | Age: 78
End: 2018-05-21

## 2018-05-21 VITALS
HEIGHT: 65 IN | OXYGEN SATURATION: 100 % | HEART RATE: 78 BPM | SYSTOLIC BLOOD PRESSURE: 140 MMHG | DIASTOLIC BLOOD PRESSURE: 84 MMHG

## 2018-05-21 DIAGNOSIS — M25.551 ACUTE HIP PAIN, RIGHT: Primary | ICD-10-CM

## 2018-05-21 PROCEDURE — 99213 OFFICE O/P EST LOW 20 MIN: CPT | Performed by: NURSE PRACTITIONER

## 2018-05-21 RX ORDER — IBUPROFEN 800 MG/1
800 TABLET ORAL EVERY 6 HOURS PRN
Qty: 30 TABLET | Refills: 1 | Status: SHIPPED | OUTPATIENT
Start: 2018-05-21 | End: 2018-09-08

## 2018-05-21 NOTE — PROGRESS NOTES
"CHIEF COMPLAINT  Chief Complaint   Patient presents with   • Hip Pain     Right hip       HPI  Zohra Hall is a 77 y.o. female  presents with complaint of pain in right hip and right low back; this is a chronic issue, but the right hip pain has worsened.  She would like to have an xray to find out what might be causing the hip pain; she denies injury or any activity that could have exacerbated the pain.  She would also like to have rx strength ibuprofen which does help with the pain      Past Medical History:   Diagnosis Date   • Breast cancer 1987    left- pt states \"in situ\", no radiation, Tamoxifen for 5 years   • Cellulitis    • Cervical lymphadenopathy    • Chest pain    • Convulsions    • GERD (gastroesophageal reflux disease)    • Glossitis    • Grief reaction     · Last Impression: 29 Jan 2015  counselled, given ativan for prn use.  Aleah Regan   • Hypertension    • Lower back pain    • Oral thrush    • Papillary adenocarcinoma     Papillary adenocarcinoma of thyroid   • Papillary adenocarcinoma of thyroid     · resection Ramirez- 1/13,  2 x 2 x 1.5 cm  T3 N1 MXpost op low calcium and diminished  voiceablation Ain- 3/7 metastatic nodes and invasion to strap muscles · Last Impression: 29 Oct 2014  s/p Eval by berry Méndez.  Aleah Regan (Internal   • Piriformis syndrome    • Tingling    • Xerostomia        Family History   Problem Relation Age of Onset   • Breast cancer Mother 84   • Cancer Mother    • BRCA 1/2 Neg Hx    • Colon cancer Neg Hx    • Endometrial cancer Neg Hx    • Ovarian cancer Neg Hx        Social History     Social History   • Marital status:      Spouse name: N/A   • Number of children: N/A   • Years of education: N/A     Occupational History   • Not on file.     Social History Main Topics   • Smoking status: Former Smoker     Packs/day: 1.00     Years: 30.00     Types: Cigarettes     Quit date: 9/13/1992   • Smokeless tobacco: Never Used   • Alcohol use No   • Drug " "use: No   • Sexual activity: Defer     Other Topics Concern   • Not on file     Social History Narrative   • No narrative on file       ROS  Review of Systems   Constitutional: Negative for activity change.   Musculoskeletal: Positive for arthralgias.       /84   Pulse 78   Ht 165.1 cm (65\")   SpO2 100%     PHYSICAL EXAM  Physical Exam   Constitutional: She is oriented to person, place, and time. She appears well-developed and well-nourished.   HENT:   Head: Normocephalic and atraumatic.   Musculoskeletal:   No TTP of right hip or SI joint; no swelling noted; gait steady w/o limp   Neurological: She is alert and oriented to person, place, and time.   Skin: Skin is warm, dry and intact.   Vitals reviewed.      ASSESSMENT/PLAN  1. Acute hip pain, right  -will call with result of xray  - XR hip w or wo pelvis 2-3 view right; Future  - ibuprofen (ADVIL,MOTRIN) 800 MG tablet; Take 1 tablet by mouth Every 6 (Six) Hours As Needed for Mild Pain .  Dispense: 30 tablet; Refill: 1      FOLLOW-UP  Keep next scheduled f/u on 6/19/18 with Dr. Regan    RTC sooner as needed.    Patient verbalizes understanding of medication dosage, comfort measures, instructions for treatment and follow-up.    Mel Lynne, APRN  05/21/2018      "

## 2018-05-22 ENCOUNTER — HOSPITAL ENCOUNTER (OUTPATIENT)
Dept: GENERAL RADIOLOGY | Facility: HOSPITAL | Age: 78
Discharge: HOME | End: 2018-05-22
Admitting: NURSE PRACTITIONER

## 2018-05-22 DIAGNOSIS — M25.551 ACUTE HIP PAIN, RIGHT: ICD-10-CM

## 2018-05-22 PROCEDURE — 73502 X-RAY EXAM HIP UNI 2-3 VIEWS: CPT

## 2018-05-23 ENCOUNTER — TELEPHONE (OUTPATIENT)
Dept: INTERNAL MEDICINE | Facility: CLINIC | Age: 78
End: 2018-05-23

## 2018-05-23 NOTE — TELEPHONE ENCOUNTER
Please let Ms. Hall know that her right hip xray shows mild-moderate arthritis, but no fracture or dislocation of the joint.  I recommend she discuss further treatment with Dr. Regan when she sees her on 6/19/18; I can go ahead and initiate physical therapy if she is agreeable to this.

## 2018-05-23 NOTE — TELEPHONE ENCOUNTER
Spoke with the patient regarding her hip xray results. Pt verbalized understanding. Pt did not want to continue with PT and will just discuss further options at her appt with Dr. Regan.

## 2018-05-28 ENCOUNTER — TELEPHONE (OUTPATIENT)
Dept: INTERNAL MEDICINE | Facility: CLINIC | Age: 78
End: 2018-05-28

## 2018-05-28 DIAGNOSIS — M25.551 PAIN OF RIGHT HIP JOINT: Primary | ICD-10-CM

## 2018-05-28 NOTE — TELEPHONE ENCOUNTER
----- Message from ELVIS Yousif sent at 5/28/2018  5:43 PM EDT -----  Please notify Ms. Hall that her hip xray shows mild-moderate degenerative changes in the right hip--arthritic changes; no evidence of fracture or dislocation; I checked with Dr. Regan and she would like to send her to Ortho--I will placed the referral for Dr. Donovan Fountain, unless she prefers another ortho surgeon

## 2018-05-29 NOTE — TELEPHONE ENCOUNTER
Pt has called and I advised her what the note said and that the referral has been placed.  She will wait on a call from the ortho office.  Thanks.

## 2018-06-02 RX ORDER — ALENDRONATE SODIUM 70 MG/1
TABLET ORAL
Qty: 12 TABLET | Refills: 0 | Status: SHIPPED | OUTPATIENT
Start: 2018-06-02 | End: 2018-09-23 | Stop reason: SDUPTHER

## 2018-06-06 ENCOUNTER — OFFICE VISIT (OUTPATIENT)
Dept: ORTHOPEDIC SURGERY | Facility: CLINIC | Age: 78
End: 2018-06-06

## 2018-06-06 VITALS — WEIGHT: 148.81 LBS | HEART RATE: 71 BPM | HEIGHT: 65 IN | OXYGEN SATURATION: 98 % | BODY MASS INDEX: 24.79 KG/M2

## 2018-06-06 DIAGNOSIS — I10 ESSENTIAL HYPERTENSION: ICD-10-CM

## 2018-06-06 DIAGNOSIS — M16.11 ARTHRITIS OF RIGHT HIP: Primary | ICD-10-CM

## 2018-06-06 DIAGNOSIS — R60.9 EDEMA, UNSPECIFIED TYPE: ICD-10-CM

## 2018-06-06 DIAGNOSIS — Z96.642 HISTORY OF LEFT HIP REPLACEMENT: ICD-10-CM

## 2018-06-06 PROCEDURE — 99204 OFFICE O/P NEW MOD 45 MIN: CPT | Performed by: PHYSICIAN ASSISTANT

## 2018-06-06 RX ORDER — AMLODIPINE BESYLATE 5 MG/1
5 TABLET ORAL DAILY
Qty: 90 TABLET | Refills: 0 | Status: SHIPPED | OUTPATIENT
Start: 2018-06-06 | End: 2018-09-27 | Stop reason: SDUPTHER

## 2018-06-06 RX ORDER — ATORVASTATIN CALCIUM 40 MG/1
TABLET, FILM COATED ORAL
Qty: 90 TABLET | Refills: 0 | Status: SHIPPED | OUTPATIENT
Start: 2018-06-06 | End: 2018-09-27 | Stop reason: SDUPTHER

## 2018-06-06 RX ORDER — TRIAMTERENE AND HYDROCHLOROTHIAZIDE 37.5; 25 MG/1; MG/1
TABLET ORAL
Qty: 90 TABLET | Refills: 0 | Status: SHIPPED | OUTPATIENT
Start: 2018-06-06 | End: 2018-09-27 | Stop reason: SDUPTHER

## 2018-06-06 RX ORDER — LOSARTAN POTASSIUM 100 MG/1
100 TABLET ORAL DAILY
Qty: 90 TABLET | Refills: 0 | Status: SHIPPED | OUTPATIENT
Start: 2018-06-06 | End: 2018-09-27 | Stop reason: SDUPTHER

## 2018-06-06 RX ORDER — DICLOFENAC SODIUM 75 MG/1
TABLET, DELAYED RELEASE ORAL
Qty: 90 TABLET | Refills: 0 | Status: SHIPPED | OUTPATIENT
Start: 2018-06-06 | End: 2018-06-27

## 2018-06-06 NOTE — PROGRESS NOTES
"        [facility] Note    Subjective     Patient: Zohra Hall  : 1940    Primary Care Provider: Aleah Regan MD    Requesting Provider: As above    Pain of the Right Hip and Pain of the Left Hip (Total left hip arthroplasty 10-12 years ago -- [facility])      History    Chief Complaint: Bilateral hip fatigue and right groin pain     History of Present Illness: This is a very pleasant 77-year-old female presenting today to discuss her right groin and buttock pain as well as bilateral \"hip tiredness\" for the past 1 month.  She denies any trauma or injury.  She denies any radiating pain.  She complains of groin and buttock right-sided pain with abduction of the hip getting up from a seated position and sometimes with walking and weightbearing.  She also has occasional pain at night from the right hip.  She reports that her activity has been limited secondary to both hips being very fatigued and having to stop while shopping and doing housework and yardwork.  She is status post left total hip arthroplasty in [place] 10-12 years ago and has done very well.  She denies any groin pain or buttock pain on the left.  Rates pain on the right to be moderate to severe when it does bother her.  She has a history of lumbar surgery.  She is treated with diclofenac without any improved pain.    Current Outpatient Prescriptions on File Prior to Visit   Medication Sig Dispense Refill   • alendronate (FOSAMAX) 70 MG tablet TAKE 1 TABLET BY MOUTH  EVERY 7 DAYS 12 tablet 0   • atenolol (TENORMIN) 50 MG tablet TAKE 1 TABLET BY MOUTH  DAILY 90 tablet 0   • fluticasone (FLONASE) 50 MCG/ACT nasal spray 1 spray into each nostril 2 (Two) Times a Day. 1 bottle 3   • ibuprofen (ADVIL,MOTRIN) 800 MG tablet Take 1 tablet by mouth Every 6 (Six) Hours As Needed for Mild Pain . 30 tablet 1   • levothyroxine (SYNTHROID) 100 MCG tablet Take 1 tablet by mouth Daily. 90 tablet 1   • metroNIDAZOLE " "(METROCREAM) 0.75 % cream Apply  topically 2 (Two) Times a Day. 60 g 2   • omeprazole (priLOSEC) 20 MG capsule TAKE 1 CAPSULE BY MOUTH  DAILY 90 capsule 0   • PARoxetine (PAXIL) 20 MG tablet Take 2 tablets by mouth Every Morning. 180 tablet 1   • tiZANidine (ZANAFLEX) 2 MG tablet Take 1 tablet by mouth At Night As Needed for Muscle Spasms. 30 tablet 5   • traMADol (ULTRAM) 50 MG tablet Take 1 tablet by mouth Every 6 (Six) Hours As Needed for Moderate Pain . 15 tablet 0   • [DISCONTINUED] amLODIPine (NORVASC) 5 MG tablet TAKE 1 TABLET BY MOUTH  DAILY 90 tablet 0   • [DISCONTINUED] atorvastatin (LIPITOR) 40 MG tablet TAKE 1 TABLET BY MOUTH  DAILY 90 tablet 0   • [DISCONTINUED] diclofenac (VOLTAREN) 75 MG EC tablet TAKE 1 TABLET BY MOUTH  DAILY 90 tablet 0   • [DISCONTINUED] losartan (COZAAR) 100 MG tablet TAKE 1 TABLET BY MOUTH  DAILY 90 tablet 0   • [DISCONTINUED] triamterene-hydrochlorothiazide (MAXZIDE-25) 37.5-25 MG per tablet TAKE 1/2 TO 1 TABLET BY  MOUTH DAILY AS NEEDED FOR  EDEMA 90 tablet 0     No current facility-administered medications on file prior to visit.       Allergies   Allergen Reactions   • Dilaudid [Hydromorphone Hcl] Hallucinations     TABS      Past Medical History:   Diagnosis Date   • Breast cancer 1987    left- pt states \"in situ\", no radiation, Tamoxifen for 5 years   • Cellulitis    • Cervical lymphadenopathy    • Chest pain    • Convulsions    • GERD (gastroesophageal reflux disease)    • Glossitis    • Grief reaction     · Last Impression: 29 Jan 2015  counselled, given ativan for prn use.  Aleah Regan   • Hypertension    • Lower back pain    • Oral thrush    • Papillary adenocarcinoma     Papillary adenocarcinoma of thyroid   • Papillary adenocarcinoma of thyroid     · resection Ramirez- 1/13,  2 x 2 x 1.5 cm  T3 N1 MXpost op low calcium and diminished  voiceablation Ain- 3/7 metastatic nodes and invasion to strap muscles · Last Impression: 29 Oct 2014  s/p Eval by Dr. Elizabeth, " stable.  Aleah Regan (Internal   • Piriformis syndrome    • Tingling    • Xerostomia      Past Surgical History:   Procedure Laterality Date   • BREAST BIOPSY Right 10/10/2013    stereo bx   • BREAST BIOPSY Left 10/19/2015    stereo bx   • BREAST CYST EXCISION      right   • BREAST EXCISIONAL BIOPSY Right     affirm bx and loc 2016.   • BREAST LUMPECTOMY Left 1987   • HYSTERECTOMY  1979   • TOTAL HIP ARTHROPLASTY     • TOTAL THYROIDECTOMY      Thyroid Surgery Total Thyroidectomy     Family History   Problem Relation Age of Onset   • Breast cancer Mother 84   • Cancer Mother    • BRCA 1/2 Neg Hx    • Colon cancer Neg Hx    • Endometrial cancer Neg Hx    • Ovarian cancer Neg Hx       Social History     Social History   • Marital status:      Spouse name: N/A   • Number of children: N/A   • Years of education: N/A     Occupational History   • Not on file.     Social History Main Topics   • Smoking status: Former Smoker     Packs/day: 1.00     Years: 30.00     Types: Cigarettes     Quit date: 9/13/1992   • Smokeless tobacco: Never Used   • Alcohol use No   • Drug use: No   • Sexual activity: Defer     Other Topics Concern   • Not on file     Social History Narrative   • No narrative on file        Review of Systems   Constitutional: Positive for activity change.   HENT: Negative.    Eyes: Negative.    Respiratory: Negative.    Cardiovascular: Negative.    Gastrointestinal: Positive for constipation.   Endocrine: Positive for cold intolerance.   Genitourinary: Negative.    Musculoskeletal: Positive for arthralgias and back pain.   Skin: Negative.    Allergic/Immunologic: Negative.    Neurological: Positive for tremors.   Hematological: Negative.    Psychiatric/Behavioral: Positive for sleep disturbance.       The following portions of the patient's history were reviewed and updated as appropriate: allergies, current medications, past family history, past medical history, past social history, past surgical  "history and problem list.      Objective      Physical Exam  Pulse 71   Ht 165.1 cm (65\")   Wt 67.5 kg (148 lb 13 oz)   SpO2 98%   BMI 24.76 kg/m²     Body mass index is 24.76 kg/m².    GENERAL: Body habitus: normal weight for height    Lower extremity edema: Left: trace; Right: trace    Varicose veins:  Left: mild; Right: mild    Gait: antalgic     Mental Status:  awake and alert; oriented to person, place, and time    Voice:  clear  SKIN:  Normal    Hair Growth:  Right:normal; Left:  normal  HEENT: Head: Normocephalic, atraumatic,  without obvious abnormality.  eye: normal external eye, no icterus   PULM:  Repiratory effort normal    Ortho Exam  Right  hip    RANGE OF MOTION:   FLEXION CONTRACTURE: None   FLEXION: 110 degrees   INTERNAL ROTATION: 20 degrees at 90 degrees of flexion  With pain with IR  EXTERNAL ROTATION: 40 degrees at 90 degrees of flexion    PAIN WITH HIP MOTION: moderate with IR and abduction  PAIN WITH LOGROLL: no  STINCHFIELD TEST: negative    STRENGTH:  5/5 hip adduction, abduction, flexion. 5/5 strength knee flexion, extension. 5/5 strength ankle dorsiflexion and plantarflexion.     GREATER TROCHANTER BURSAL PAIN:  No    SENSATION TO LIGHT TOUCH:  DEEP PERONEAL/SUPERFICIAL PERONEAL/SURAL/SAPHENOUS/TIBIAL:  intact    ESTRAIGHT LEG TEST:   negative      Left hip exam:    RANGE OF MOTION:   FLEXION CONTRACTURE: None   FLEXION: 110 degrees   INTERNAL ROTATION: 20 degrees at 90 degrees of flexion   EXTERNAL ROTATION: 40 degrees at 90 degrees of flexion    PAIN WITH HIP MOTION: no  PAIN WITH LOGROLL: no  STINCHFIELD TEST: negative    STRENGTH:  5/5 hip adduction, abduction, flexion. 5/5 strength knee flexion, extension. 5/5 strength ankle dorsiflexion and plantarflexion.     GREATER TROCHANTER BURSAL PAIN:  No    SENSATION TO LIGHT TOUCH:  DEEP PERONEAL/SUPERFICIAL PERONEAL/SURAL/SAPHENOUS/TIBIAL:  intact    STRAIGHT LEG TEST:   negative          Medical Decision Making    Data Review: " "  reviewed radiology images    Assessment:  1. Arthritis of right hip        Plan:  Right hip arthritis.  I reviewed x-rays of the right hip from 5/22/18 and clinical findings with the patient.  On exam, she has groin and low buttock pain with internal rotation and abduction of the right hip.  The left hip has good range of motion with no reproducible pain.  X-rays today from 5/22/18 show moderate degenerative changes in the right hip with well-positioned left total hip arthroplasty with no evidence of osteolysis, subsidence or fracture in the acetabulum or the stem that is visible on x-ray.  I think her right-sided groin and buttock pain are secondary to the hip arthritis.  Her complaints of \"tiredness\" in both legs may be coming from her lumbar spine or make the secondary to the hip.  We discussed treatment options for hip arthritis including continued conservative treatment with cane, anti-inflammatories and activity modification, intra-articular joint injection or hip replacement.  I explained the intra-articular injection would be both diagnostic and therapeutic E us know how much of her pain would be resolved following hip replacement.  It also be helpful in understanding how much of her pain is coming from her lumbar spine.  Patient would like to think about her options and will call when she is ready for further treatment.     Patient history, diagnosis and treatment plan discussed with Dr. Fountain.        Leisa Maldonado PA-C  06/06/18  5:20 PM  "

## 2018-06-19 ENCOUNTER — OFFICE VISIT (OUTPATIENT)
Dept: INTERNAL MEDICINE | Facility: CLINIC | Age: 78
End: 2018-06-19

## 2018-06-19 VITALS
BODY MASS INDEX: 25.76 KG/M2 | SYSTOLIC BLOOD PRESSURE: 124 MMHG | HEART RATE: 75 BPM | OXYGEN SATURATION: 98 % | WEIGHT: 154.8 LBS | DIASTOLIC BLOOD PRESSURE: 68 MMHG

## 2018-06-19 DIAGNOSIS — M54.41 CHRONIC BILATERAL LOW BACK PAIN WITH BILATERAL SCIATICA: ICD-10-CM

## 2018-06-19 DIAGNOSIS — M54.42 CHRONIC BILATERAL LOW BACK PAIN WITH BILATERAL SCIATICA: ICD-10-CM

## 2018-06-19 DIAGNOSIS — D50.9 IRON DEFICIENCY ANEMIA, UNSPECIFIED IRON DEFICIENCY ANEMIA TYPE: Primary | ICD-10-CM

## 2018-06-19 DIAGNOSIS — I10 ESSENTIAL HYPERTENSION: ICD-10-CM

## 2018-06-19 DIAGNOSIS — G89.29 CHRONIC BILATERAL LOW BACK PAIN WITH BILATERAL SCIATICA: ICD-10-CM

## 2018-06-19 DIAGNOSIS — E78.5 HYPERLIPIDEMIA, UNSPECIFIED HYPERLIPIDEMIA TYPE: ICD-10-CM

## 2018-06-19 DIAGNOSIS — M25.551 RIGHT HIP PAIN: ICD-10-CM

## 2018-06-19 LAB
ALBUMIN SERPL-MCNC: 4.47 G/DL (ref 3.2–4.8)
ALBUMIN/GLOB SERPL: 1.8 G/DL (ref 1.5–2.5)
ALP SERPL-CCNC: 89 U/L (ref 25–100)
ALT SERPL W P-5'-P-CCNC: 13 U/L (ref 7–40)
ANION GAP SERPL CALCULATED.3IONS-SCNC: 9 MMOL/L (ref 3–11)
ARTICHOKE IGE QN: 128 MG/DL (ref 0–130)
AST SERPL-CCNC: 23 U/L (ref 0–33)
BILIRUB SERPL-MCNC: 0.6 MG/DL (ref 0.3–1.2)
BUN BLD-MCNC: 22 MG/DL (ref 9–23)
BUN/CREAT SERPL: 14.3 (ref 7–25)
CALCIUM SPEC-SCNC: 9.7 MG/DL (ref 8.7–10.4)
CHLORIDE SERPL-SCNC: 98 MMOL/L (ref 99–109)
CHOLEST SERPL-MCNC: 211 MG/DL (ref 0–200)
CO2 SERPL-SCNC: 28 MMOL/L (ref 20–31)
CREAT BLD-MCNC: 1.54 MG/DL (ref 0.6–1.3)
DEPRECATED RDW RBC AUTO: 43 FL (ref 37–54)
ERYTHROCYTE [DISTWIDTH] IN BLOOD BY AUTOMATED COUNT: 13.6 % (ref 11.3–14.5)
GFR SERPL CREATININE-BSD FRML MDRD: 32 ML/MIN/1.73
GLOBULIN UR ELPH-MCNC: 2.5 GM/DL
GLUCOSE BLD-MCNC: 82 MG/DL (ref 70–100)
HCT VFR BLD AUTO: 36.4 % (ref 34.5–44)
HDLC SERPL-MCNC: 67 MG/DL (ref 40–60)
HGB BLD-MCNC: 12 G/DL (ref 11.5–15.5)
MCH RBC QN AUTO: 28.4 PG (ref 27–31)
MCHC RBC AUTO-ENTMCNC: 33 G/DL (ref 32–36)
MCV RBC AUTO: 86.3 FL (ref 80–99)
PLATELET # BLD AUTO: 286 10*3/MM3 (ref 150–450)
PMV BLD AUTO: 10 FL (ref 6–12)
POTASSIUM BLD-SCNC: 4.5 MMOL/L (ref 3.5–5.5)
PROT SERPL-MCNC: 7 G/DL (ref 5.7–8.2)
RBC # BLD AUTO: 4.22 10*6/MM3 (ref 3.89–5.14)
SODIUM BLD-SCNC: 135 MMOL/L (ref 132–146)
TRIGL SERPL-MCNC: 120 MG/DL (ref 0–150)
WBC NRBC COR # BLD: 6.27 10*3/MM3 (ref 3.5–10.8)

## 2018-06-19 PROCEDURE — 99214 OFFICE O/P EST MOD 30 MIN: CPT | Performed by: INTERNAL MEDICINE

## 2018-06-19 PROCEDURE — 85027 COMPLETE CBC AUTOMATED: CPT | Performed by: INTERNAL MEDICINE

## 2018-06-19 PROCEDURE — 80053 COMPREHEN METABOLIC PANEL: CPT | Performed by: INTERNAL MEDICINE

## 2018-06-19 PROCEDURE — 80061 LIPID PANEL: CPT | Performed by: INTERNAL MEDICINE

## 2018-06-19 NOTE — PROGRESS NOTES
Follow-up (10 week follow up )    Subjective   Zohra Hall is a 77 y.o. female is here today for follow-up.    History of Present Illness   Here for a follow up on her iron deficiency anemia, and Since then has had rt hip pain, and has seen Ortho, and was advised injection.  Has been waiting to decide.  Reports the tiredness in her legs have been more of a problem than just the pain in her hip.  S/p egd and dxed with Richey's.    Current Outpatient Prescriptions:   •  alendronate (FOSAMAX) 70 MG tablet, TAKE 1 TABLET BY MOUTH  EVERY 7 DAYS, Disp: 12 tablet, Rfl: 0  •  amLODIPine (NORVASC) 5 MG tablet, TAKE 1 TABLET BY MOUTH  DAILY, Disp: 90 tablet, Rfl: 0  •  atenolol (TENORMIN) 50 MG tablet, TAKE 1 TABLET BY MOUTH  DAILY, Disp: 90 tablet, Rfl: 0  •  atorvastatin (LIPITOR) 40 MG tablet, TAKE 1 TABLET BY MOUTH  DAILY, Disp: 90 tablet, Rfl: 0  •  diclofenac (VOLTAREN) 75 MG EC tablet, TAKE 1 TABLET BY MOUTH  DAILY, Disp: 90 tablet, Rfl: 0  •  fluticasone (FLONASE) 50 MCG/ACT nasal spray, 1 spray into each nostril 2 (Two) Times a Day., Disp: 1 bottle, Rfl: 3  •  ibuprofen (ADVIL,MOTRIN) 800 MG tablet, Take 1 tablet by mouth Every 6 (Six) Hours As Needed for Mild Pain ., Disp: 30 tablet, Rfl: 1  •  levothyroxine (SYNTHROID) 100 MCG tablet, Take 1 tablet by mouth Daily., Disp: 90 tablet, Rfl: 1  •  losartan (COZAAR) 100 MG tablet, TAKE 1 TABLET BY MOUTH  DAILY, Disp: 90 tablet, Rfl: 0  •  metroNIDAZOLE (METROCREAM) 0.75 % cream, Apply  topically 2 (Two) Times a Day., Disp: 60 g, Rfl: 2  •  omeprazole (priLOSEC) 20 MG capsule, TAKE 1 CAPSULE BY MOUTH  DAILY, Disp: 90 capsule, Rfl: 0  •  PARoxetine (PAXIL) 20 MG tablet, Take 2 tablets by mouth Every Morning., Disp: 180 tablet, Rfl: 1  •  tiZANidine (ZANAFLEX) 2 MG tablet, Take 1 tablet by mouth At Night As Needed for Muscle Spasms., Disp: 30 tablet, Rfl: 5  •  traMADol (ULTRAM) 50 MG tablet, Take 1 tablet by mouth Every 6 (Six) Hours As Needed for Moderate Pain .,  Disp: 15 tablet, Rfl: 0  •  triamterene-hydrochlorothiazide (MAXZIDE-25) 37.5-25 MG per tablet, TAKE 1/2 TO 1 TABLET BY  MOUTH DAILY AS NEEDED FOR  EDEMA, Disp: 90 tablet, Rfl: 0      The following portions of the patient's history were reviewed and updated as appropriate: allergies, current medications, past family history, past medical history, past social history, past surgical history and problem list.    Review of Systems   Constitutional: Positive for fatigue and unexpected weight change. Negative for chills and fever.   HENT: Negative for ear discharge, ear pain, sinus pressure and sore throat.    Respiratory: Negative for cough, chest tightness and shortness of breath.    Cardiovascular: Negative for chest pain, palpitations and leg swelling.   Gastrointestinal: Negative for diarrhea, nausea and vomiting.   Musculoskeletal: Positive for arthralgias, back pain, gait problem and myalgias.   Neurological: Positive for weakness. Negative for dizziness, syncope and headaches.   Psychiatric/Behavioral: Negative for confusion and sleep disturbance.       Objective   /68   Pulse 75   Wt 70.2 kg (154 lb 12.8 oz)   SpO2 98%   BMI 25.76 kg/m²   Physical Exam      Results for orders placed or performed in visit on 06/19/18   Comprehensive Metabolic Panel   Result Value Ref Range    Glucose 82 70 - 100 mg/dL    BUN 22 9 - 23 mg/dL    Creatinine 1.54 (H) 0.60 - 1.30 mg/dL    Sodium 135 132 - 146 mmol/L    Potassium 4.5 3.5 - 5.5 mmol/L    Chloride 98 (L) 99 - 109 mmol/L    CO2 28.0 20.0 - 31.0 mmol/L    Calcium 9.7 8.7 - 10.4 mg/dL    Total Protein 7.0 5.7 - 8.2 g/dL    Albumin 4.47 3.20 - 4.80 g/dL    ALT (SGPT) 13 7 - 40 U/L    AST (SGOT) 23 0 - 33 U/L    Alkaline Phosphatase 89 25 - 100 U/L    Total Bilirubin 0.6 0.3 - 1.2 mg/dL    eGFR Non African Amer 32 (L) >60 mL/min/1.73    Globulin 2.5 gm/dL    A/G Ratio 1.8 1.5 - 2.5 g/dL    BUN/Creatinine Ratio 14.3 7.0 - 25.0    Anion Gap 9.0 3.0 - 11.0 mmol/L    Lipid Panel   Result Value Ref Range    Total Cholesterol 211 (H) 0 - 200 mg/dL    Triglycerides 120 0 - 150 mg/dL    HDL Cholesterol 67 (H) 40 - 60 mg/dL    LDL Cholesterol  128 0 - 130 mg/dL   CBC (No Diff)   Result Value Ref Range    WBC 6.27 3.50 - 10.80 10*3/mm3    RBC 4.22 3.89 - 5.14 10*6/mm3    Hemoglobin 12.0 11.5 - 15.5 g/dL    Hematocrit 36.4 34.5 - 44.0 %    MCV 86.3 80.0 - 99.0 fL    MCH 28.4 27.0 - 31.0 pg    MCHC 33.0 32.0 - 36.0 g/dL    RDW 13.6 11.3 - 14.5 %    RDW-SD 43.0 37.0 - 54.0 fl    MPV 10.0 6.0 - 12.0 fL    Platelets 286 150 - 450 10*3/mm3         Tissue Pathology Exam   Order: [id number]   Status:  Final result   Visible to patient:  Yes (MyChart) Dx:  Anemia    1mo ago   Clinical Information    The working history is anemia, esophageal ring, small sliding hiatal hernia, normal stomach, normal duodenum, exclude celiac disease and cause for anemia not identified.    Final Diagnosis    1. DUODENUM BIOPSY:  Duodenum with no significant histopathologic abnormalities.   2. 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               Assessment/Plan   Diagnoses and all orders for this visit:    Iron deficiency anemia, unspecified iron deficiency anemia type  Comments:  s/p EGD and c-scope, reviewed reports with Pt. + Richey's, recheck labs at f/u with Dr. GENAO, and cPill camera if worsening anemia.  Orders:  -     CBC (No Diff)    Chronic bilateral low back pain with bilateral sciatica  -     Ambulatory Referral to Physical Therapy Evaluate and treat    Right hip pain  -     Ambulatory Referral to Physical Therapy Evaluate and treat    Essential hypertension  Comments:  BP stable, continue current regimen.    Hyperlipidemia, unspecified hyperlipidemia type  -     Comprehensive Metabolic Panel  -     Lipid Panel             S/p EGD and c-scope, EGD with Richey's. SHe does have a follow up and he plans to  get labs on her and proceed with pill cam pending results.    Return in about 3 months (around 9/19/2018).

## 2018-06-26 ENCOUNTER — TELEPHONE (OUTPATIENT)
Dept: ORTHOPEDIC SURGERY | Facility: CLINIC | Age: 78
End: 2018-06-26

## 2018-06-26 DIAGNOSIS — M16.11 PRIMARY OSTEOARTHRITIS OF RIGHT HIP: Primary | ICD-10-CM

## 2018-06-26 NOTE — TELEPHONE ENCOUNTER
The patient was seen on 06/06 and discussed getting a hip injection. She would like to talk to someone about that and potentially go ahead and get it scheduled. She can be called back at [phone].

## 2018-06-26 NOTE — TELEPHONE ENCOUNTER
I called MsDada Isabel, she states she would like to get the intra-articular injection in her right hip.  Can you please put an order in for this so Central scheduling can get this set up.  Thank you!  Khushboo

## 2018-06-26 NOTE — TELEPHONE ENCOUNTER
I called patient and explained this and am sending this to Dr. Fountain to put the case request in.  Khushboo

## 2018-06-26 NOTE — TELEPHONE ENCOUNTER
Dr. Fountain performs the intra-articular hip injections himself at [facility].  If you would like to call the patient and explain this to her, that is fine.  We need to get Dr. Fountain to put in a case request.  Thanks

## 2018-06-27 ENCOUNTER — TELEPHONE (OUTPATIENT)
Dept: INTERNAL MEDICINE | Facility: CLINIC | Age: 78
End: 2018-06-27

## 2018-06-27 RX ORDER — CELECOXIB 200 MG/1
200 CAPSULE ORAL DAILY
Qty: 30 CAPSULE | Refills: 5 | Status: SHIPPED | OUTPATIENT
Start: 2018-06-27 | End: 2018-08-29

## 2018-06-27 NOTE — TELEPHONE ENCOUNTER
PATIENT STATES THAT AT HER June 19 2018 APPOINTMENT WITH DR CARNEY  SHE SPOKE WITH DR NAJERA ABOUT GETTING SOMETHING ELSE CALLED INTO HER PHARMACY FOR HER ARTHRITIS PAIN SHE DOES NOT REMEMBER THE NAME OF THE MEDICATION BUT STATES THAT DR NAJERA WOULD. THE PATIENT WOULD LIKE THIS TO BE CALLED INTO HER PHARMACY THE PATIENT CAN BE REACHED [phone]

## 2018-06-28 NOTE — TELEPHONE ENCOUNTER
She is on diclofenac currently , I suggested celebrex or Meloxicam due to her Richey's esophagus as its safer for her stomach.    I have sent the Rx for celebrex to [facility], she should try to fill it and let us know if not covered.    thanks

## 2018-07-10 ENCOUNTER — TELEPHONE (OUTPATIENT)
Dept: ORTHOPEDIC SURGERY | Facility: CLINIC | Age: 78
End: 2018-07-10

## 2018-07-10 NOTE — TELEPHONE ENCOUNTER
----- Message from Molly Rivera sent at 7/10/2018  1:13 PM EDT -----  PATIENT WANTS TO CANCEL HIP INJ PLEASE CALL HER TO [facility]

## 2018-08-01 DIAGNOSIS — F43.21 GRIEF REACTION: ICD-10-CM

## 2018-08-01 RX ORDER — PAROXETINE HYDROCHLORIDE 20 MG/1
40 TABLET, FILM COATED ORAL EVERY MORNING
Qty: 180 TABLET | Refills: 0 | Status: SHIPPED | OUTPATIENT
Start: 2018-08-01 | End: 2018-11-01 | Stop reason: SDUPTHER

## 2018-08-08 RX ORDER — PEG-3350, SODIUM SULFATE, SODIUM CHLORIDE, POTASSIUM CHLORIDE, SODIUM ASCORBATE AND ASCORBIC ACID 7.5-2.691G
KIT ORAL
Qty: 1000 ML | Refills: 0 | Status: SHIPPED | OUTPATIENT
Start: 2018-08-08 | End: 2019-01-02

## 2018-08-09 ENCOUNTER — TELEPHONE (OUTPATIENT)
Dept: GASTROENTEROLOGY | Facility: CLINIC | Age: 78
End: 2018-08-09

## 2018-08-10 ENCOUNTER — OFFICE VISIT (OUTPATIENT)
Dept: INTERNAL MEDICINE | Facility: CLINIC | Age: 78
End: 2018-08-10

## 2018-08-10 VITALS
WEIGHT: 147 LBS | HEIGHT: 65 IN | DIASTOLIC BLOOD PRESSURE: 68 MMHG | OXYGEN SATURATION: 97 % | HEART RATE: 94 BPM | SYSTOLIC BLOOD PRESSURE: 158 MMHG | BODY MASS INDEX: 24.49 KG/M2

## 2018-08-10 DIAGNOSIS — R25.1 TREMOR OF HANDS AND FACE: Primary | ICD-10-CM

## 2018-08-10 PROCEDURE — 99213 OFFICE O/P EST LOW 20 MIN: CPT | Performed by: PHYSICIAN ASSISTANT

## 2018-08-10 RX ORDER — LEVOTHYROXINE SODIUM 112 UG/1
1 TABLET ORAL DAILY
COMMUNITY
Start: 2018-08-03 | End: 2020-09-16 | Stop reason: SDUPTHER

## 2018-08-10 RX ORDER — DICLOFENAC SODIUM 75 MG/1
1 TABLET, DELAYED RELEASE ORAL DAILY
COMMUNITY
Start: 2018-08-09 | End: 2018-08-10

## 2018-08-10 NOTE — PROGRESS NOTES
Chief Complaint   Patient presents with   • Jaw, hands, body shaking       Subjective   Zohra Hall is a 77 y.o. female.       History of Present Illness     Pt noticed some shaking in her jaw about 2-3 weeks ago. Shakes so much at times that her teeth will chatter. She has had a new right hand tremor for several months. Her hand writing has changed. She has had some falls in the past, now is more careful and does not fall. Some mild difficulty forming words d/t her mouth shaking. No memory loss. No swallowing issues. She gets fatigued easily.     Over the past few months she has become anemic and some weight loss. Undergoing eval for blood loss.      Current Outpatient Prescriptions:   •  amLODIPine (NORVASC) 5 MG tablet, TAKE 1 TABLET BY MOUTH  DAILY, Disp: 90 tablet, Rfl: 0  •  atenolol (TENORMIN) 50 MG tablet, TAKE 1 TABLET BY MOUTH  DAILY, Disp: 90 tablet, Rfl: 0  •  atorvastatin (LIPITOR) 40 MG tablet, TAKE 1 TABLET BY MOUTH  DAILY, Disp: 90 tablet, Rfl: 0  •  levothyroxine (SYNTHROID, LEVOTHROID) 112 MCG tablet, Take 1 tablet by mouth Daily., Disp: , Rfl:   •  omeprazole (priLOSEC) 20 MG capsule, TAKE 1 CAPSULE BY MOUTH  DAILY, Disp: 90 capsule, Rfl: 0  •  PARoxetine (PAXIL) 20 MG tablet, TAKE 2 TABLETS BY MOUTH  EVERY MORNING, Disp: 180 tablet, Rfl: 0  •  tiZANidine (ZANAFLEX) 2 MG tablet, Take 1 tablet by mouth At Night As Needed for Muscle Spasms., Disp: 30 tablet, Rfl: 5  •  alendronate (FOSAMAX) 70 MG tablet, TAKE 1 TABLET BY MOUTH  EVERY 7 DAYS, Disp: 12 tablet, Rfl: 0  •  celecoxib (CELEBREX) 200 MG capsule, Take 1 capsule by mouth Daily. Replaces diclofenac., Disp: 30 capsule, Rfl: 5  •  fluticasone (FLONASE) 50 MCG/ACT nasal spray, 1 spray into each nostril 2 (Two) Times a Day., Disp: 1 bottle, Rfl: 3  •  ibuprofen (ADVIL,MOTRIN) 800 MG tablet, Take 1 tablet by mouth Every 6 (Six) Hours As Needed for Mild Pain ., Disp: 30 tablet, Rfl: 1  •  losartan (COZAAR) 100 MG tablet, TAKE 1 TABLET BY MOUTH  " DAILY, Disp: 90 tablet, Rfl: 0  •  metroNIDAZOLE (METROCREAM) 0.75 % cream, Apply  topically 2 (Two) Times a Day., Disp: 60 g, Rfl: 2  •  PEG-KCl-NaCl-NaSulf-Na Asc-C (MOVIPREP) 100 g reconstituted solution powder, Starting at 5am on morning of procedure, drink 1/4 liter (250ml) every 15 minutes so full liter is consumed in 1 hour., Disp: 1000 mL, Rfl: 0  •  traMADol (ULTRAM) 50 MG tablet, Take 1 tablet by mouth Every 6 (Six) Hours As Needed for Moderate Pain ., Disp: 15 tablet, Rfl: 0  •  triamterene-hydrochlorothiazide (MAXZIDE-25) 37.5-25 MG per tablet, TAKE 1/2 TO 1 TABLET BY  MOUTH DAILY AS NEEDED FOR  EDEMA, Disp: 90 tablet, Rfl: 0     PMFSH  The following portions of the patient's history were reviewed and updated as appropriate: allergies, current medications, past family history, past medical history, past social history, past surgical history and problem list.    Review of Systems   Constitutional: Negative for activity change, appetite change and fatigue.   HENT: Negative for congestion and rhinorrhea.    Eyes: Negative for visual disturbance.   Respiratory: Negative for chest tightness and shortness of breath.    Cardiovascular: Negative for chest pain and palpitations.   Gastrointestinal: Negative for abdominal pain.   Genitourinary: Negative for dysuria.   Musculoskeletal: Negative for arthralgias and myalgias.   Neurological: Positive for tremors. Negative for dizziness, syncope, facial asymmetry, speech difficulty, weakness, light-headedness and headaches.   Psychiatric/Behavioral: Negative for dysphoric mood. The patient is not nervous/anxious.        Objective   /68   Pulse 94   Ht 165.1 cm (65\")   Wt 66.7 kg (147 lb)   SpO2 97%   BMI 24.46 kg/m²     Physical Exam   Constitutional: She appears well-developed and well-nourished.   HENT:   Head: Normocephalic.   Right Ear: Hearing, tympanic membrane, external ear and ear canal normal.   Left Ear: Hearing, tympanic membrane, external ear " and ear canal normal.   Nose: Nose normal.   Mouth/Throat: Oropharynx is clear and moist.   Eyes: Pupils are equal, round, and reactive to light. Conjunctivae are normal.   Neck: Normal range of motion.   Cardiovascular: Normal rate, regular rhythm and normal heart sounds.    Pulmonary/Chest: Effort normal and breath sounds normal. She has no decreased breath sounds. She has no wheezes. She has no rhonchi. She has no rales.   Musculoskeletal: Normal range of motion.   Neurological: She is alert. She has normal strength and normal reflexes. She displays tremor (resting tremor in bilateral hands, R>L; jaw and head). No cranial nerve deficit or sensory deficit. She displays a negative Romberg sign. Gait (slow) abnormal.   Skin: Skin is warm and dry.   Psychiatric: She has a normal mood and affect. Her behavior is normal.   Nursing note and vitals reviewed.      Results for orders placed or performed in visit on 06/19/18   Comprehensive Metabolic Panel   Result Value Ref Range    Glucose 82 70 - 100 mg/dL    BUN 22 9 - 23 mg/dL    Creatinine 1.54 (H) 0.60 - 1.30 mg/dL    Sodium 135 132 - 146 mmol/L    Potassium 4.5 3.5 - 5.5 mmol/L    Chloride 98 (L) 99 - 109 mmol/L    CO2 28.0 20.0 - 31.0 mmol/L    Calcium 9.7 8.7 - 10.4 mg/dL    Total Protein 7.0 5.7 - 8.2 g/dL    Albumin 4.47 3.20 - 4.80 g/dL    ALT (SGPT) 13 7 - 40 U/L    AST (SGOT) 23 0 - 33 U/L    Alkaline Phosphatase 89 25 - 100 U/L    Total Bilirubin 0.6 0.3 - 1.2 mg/dL    eGFR Non African Amer 32 (L) >60 mL/min/1.73    Globulin 2.5 gm/dL    A/G Ratio 1.8 1.5 - 2.5 g/dL    BUN/Creatinine Ratio 14.3 7.0 - 25.0    Anion Gap 9.0 3.0 - 11.0 mmol/L   Lipid Panel   Result Value Ref Range    Total Cholesterol 211 (H) 0 - 200 mg/dL    Triglycerides 120 0 - 150 mg/dL    HDL Cholesterol 67 (H) 40 - 60 mg/dL    LDL Cholesterol  128 0 - 130 mg/dL   CBC (No Diff)   Result Value Ref Range    WBC 6.27 3.50 - 10.80 10*3/mm3    RBC 4.22 3.89 - 5.14 10*6/mm3    Hemoglobin 12.0  11.5 - 15.5 g/dL    Hematocrit 36.4 34.5 - 44.0 %    MCV 86.3 80.0 - 99.0 fL    MCH 28.4 27.0 - 31.0 pg    MCHC 33.0 32.0 - 36.0 g/dL    RDW 13.6 11.3 - 14.5 %    RDW-SD 43.0 37.0 - 54.0 fl    MPV 10.0 6.0 - 12.0 fL    Platelets 286 150 - 450 10*3/mm3        ASSESSMENT/PLAN    Problem List Items Addressed This Visit     None      Visit Diagnoses     Tremor of hands and face    -  Primary    Refer to neurology. Will defer imaging to their recommendations.    Relevant Orders    Ambulatory Referral to Neurology               Return for Next scheduled follow up.

## 2018-08-13 NOTE — PROGRESS NOTES
I have reviewed the notes, assessments, and/or procedures performed by Jessica Strange PA-C, I concur with her/his documentation of Zohra Hall.

## 2018-08-20 ENCOUNTER — HOSPITAL ENCOUNTER (OUTPATIENT)
Dept: GASTROENTEROLOGY | Facility: HOSPITAL | Age: 78
Setting detail: HOSPITAL OUTPATIENT SURGERY
Discharge: HOME | End: 2018-08-20
Admitting: PHYSICIAN ASSISTANT

## 2018-08-20 DIAGNOSIS — D50.0 IRON DEFICIENCY ANEMIA DUE TO CHRONIC BLOOD LOSS: Primary | ICD-10-CM

## 2018-08-20 PROCEDURE — 91110 GI TRC IMG INTRAL ESOPH-ILE: CPT

## 2018-08-20 RX ORDER — SIMETHICONE 20 MG/.3ML
200 EMULSION ORAL ONCE
Status: COMPLETED | OUTPATIENT
Start: 2018-08-20 | End: 2018-08-20

## 2018-08-20 RX ADMIN — SIMETHICONE 200 MG: 20 SUSPENSION/ DROPS ORAL at 09:04

## 2018-08-20 NOTE — PERIOPERATIVE NURSING NOTE
Pt given instructions about pill cam exam and agrees to proceed.  Mylicon given with a sip of water.  Pill cam swallowed without difficulty.  Pt to return in am with equipment.

## 2018-08-29 ENCOUNTER — OFFICE VISIT (OUTPATIENT)
Dept: NEUROLOGY | Facility: CLINIC | Age: 78
End: 2018-08-29

## 2018-08-29 VITALS
WEIGHT: 147 LBS | HEART RATE: 68 BPM | BODY MASS INDEX: 24.49 KG/M2 | SYSTOLIC BLOOD PRESSURE: 140 MMHG | HEIGHT: 65 IN | DIASTOLIC BLOOD PRESSURE: 80 MMHG | OXYGEN SATURATION: 98 %

## 2018-08-29 DIAGNOSIS — R25.1 TREMORS OF NERVOUS SYSTEM: Primary | ICD-10-CM

## 2018-08-29 PROBLEM — C73 THYROID CANCER (HCC): Status: ACTIVE | Noted: 2018-08-29

## 2018-08-29 PROCEDURE — 99204 OFFICE O/P NEW MOD 45 MIN: CPT | Performed by: PSYCHIATRY & NEUROLOGY

## 2018-08-29 RX ORDER — PRIMIDONE 50 MG/1
TABLET ORAL
Qty: 30 TABLET | Refills: 3 | Status: SHIPPED | OUTPATIENT
Start: 2018-08-29 | End: 2018-10-10 | Stop reason: SDUPTHER

## 2018-08-29 NOTE — PROGRESS NOTES
Subjective:    CC: Zohra Hall is seen today in consultation at the request of ROSENDO Lane for Tremors       HPI:  77-year-old female with a history of hypertension, hyperlipidemia, thyroid cancer status post radiation in remission for the past 6 years, arthritis, depression, low back pain status post spinal fusion at L3, 4, 5 several years ago presents with tremors.  As per patient she started having tremors about 6 weeks ago.  She denies any recent changes in medications and has never been on any antipsychotic medications.  Her tremors are mainly in her chin, tongue and both hands.  They become worse on doing tasks such as reaching out for objects.  Otherwise there are no aggravating or relieving factors.  She does not have any family history of tremors.  She also reports to having balance problems for the past 6-8 months.  Was found to be anemic when she started to have balance problems with dizziness. She is still undergoing workup for that.  She has also had a 10 pound weight loss in the past 3-4 months.  Denies any history of constipation or acting out her dreams.  Has mild anosmia since her thyroid surgery.    The following portions of the patient's history were reviewed today and updated as of 08/29/2018  : allergies, current medications, past family history, past medical history, past social history, past surgical history and problem list  These document will be scanned to patient's chart.      Current Outpatient Prescriptions:   •  alendronate (FOSAMAX) 70 MG tablet, TAKE 1 TABLET BY MOUTH  EVERY 7 DAYS, Disp: 12 tablet, Rfl: 0  •  amLODIPine (NORVASC) 5 MG tablet, TAKE 1 TABLET BY MOUTH  DAILY, Disp: 90 tablet, Rfl: 0  •  atenolol (TENORMIN) 50 MG tablet, TAKE 1 TABLET BY MOUTH  DAILY, Disp: 90 tablet, Rfl: 0  •  atorvastatin (LIPITOR) 40 MG tablet, TAKE 1 TABLET BY MOUTH  DAILY, Disp: 90 tablet, Rfl: 0  •  ibuprofen (ADVIL,MOTRIN) 800 MG tablet, Take 1 tablet by mouth Every 6 (Six) Hours As  "Needed for Mild Pain ., Disp: 30 tablet, Rfl: 1  •  levothyroxine (SYNTHROID, LEVOTHROID) 112 MCG tablet, Take 1 tablet by mouth Daily., Disp: , Rfl:   •  losartan (COZAAR) 100 MG tablet, TAKE 1 TABLET BY MOUTH  DAILY, Disp: 90 tablet, Rfl: 0  •  metroNIDAZOLE (METROCREAM) 0.75 % cream, Apply  topically 2 (Two) Times a Day., Disp: 60 g, Rfl: 2  •  omeprazole (priLOSEC) 20 MG capsule, TAKE 1 CAPSULE BY MOUTH  DAILY, Disp: 90 capsule, Rfl: 0  •  PARoxetine (PAXIL) 20 MG tablet, TAKE 2 TABLETS BY MOUTH  EVERY MORNING, Disp: 180 tablet, Rfl: 0  •  PEG-KCl-NaCl-NaSulf-Na Asc-C (MOVIPREP) 100 g reconstituted solution powder, Starting at 5am on morning of procedure, drink 1/4 liter (250ml) every 15 minutes so full liter is consumed in 1 hour., Disp: 1000 mL, Rfl: 0  •  tiZANidine (ZANAFLEX) 2 MG tablet, Take 1 tablet by mouth At Night As Needed for Muscle Spasms., Disp: 30 tablet, Rfl: 5  •  triamterene-hydrochlorothiazide (MAXZIDE-25) 37.5-25 MG per tablet, TAKE 1/2 TO 1 TABLET BY  MOUTH DAILY AS NEEDED FOR  EDEMA, Disp: 90 tablet, Rfl: 0  •  primidone (MYSOLINE) 50 MG tablet, Start with half a tablet at night for 2 weeks then increase to 1 tablet at night thereafter, Disp: 30 tablet, Rfl: 3   Past Medical History:   Diagnosis Date   • Breast cancer (CMS/HCC) 1987    left- pt states \"in situ\", no radiation, Tamoxifen for 5 years   • Cellulitis    • Cervical lymphadenopathy    • Chest pain    • Convulsions (CMS/HCC)    • GERD (gastroesophageal reflux disease)    • Glossitis    • Grief reaction     · Last Impression: 29 Jan 2015  counselled, given ativan for prn use.  Aleah Regan   • Hypertension    • Lower back pain    • Oral thrush    • Papillary adenocarcinoma (CMS/HCC)     Papillary adenocarcinoma of thyroid   • Papillary adenocarcinoma of thyroid (CMS/HCC)     · resection Ramirez- 1/13,  2 x 2 x 1.5 cm  T3 N1 MXpost op low calcium and diminished  voiceablation Ain- 3/7 metastatic nodes and invasion to strap " "muscles · Last Impression: 29 Oct 2014  s/p Eval by berry Méndez.  Aleah Regan (Internal   • Piriformis syndrome    • Tingling    • Xerostomia       Past Surgical History:   Procedure Laterality Date   • BREAST BIOPSY Right 10/10/2013    stereo bx   • BREAST BIOPSY Left 10/19/2015    stereo bx   • BREAST CYST EXCISION      right   • BREAST EXCISIONAL BIOPSY Right     affirm bx and loc 2016.   • BREAST LUMPECTOMY Left 1987   • HYSTERECTOMY  1979   • TOTAL HIP ARTHROPLASTY     • TOTAL THYROIDECTOMY      Thyroid Surgery Total Thyroidectomy      Family History   Problem Relation Age of Onset   • Breast cancer Mother 84   • Cancer Mother    • BRCA 1/2 Neg Hx    • Colon cancer Neg Hx    • Endometrial cancer Neg Hx    • Ovarian cancer Neg Hx       Social History     Social History   • Marital status:      Spouse name: N/A   • Number of children: N/A   • Years of education: N/A     Occupational History   • Not on file.     Social History Main Topics   • Smoking status: Former Smoker     Packs/day: 1.00     Years: 30.00     Types: Cigarettes     Quit date: 9/13/1992   • Smokeless tobacco: Never Used   • Alcohol use No   • Drug use: No   • Sexual activity: Defer     Other Topics Concern   • Not on file     Social History Narrative   • No narrative on file     Review of Systems   Constitutional: Positive for fatigue and unexpected weight loss.   Endocrine: Positive for cold intolerance.   Musculoskeletal: Positive for gait problem.   Neurological: Positive for tremors and weakness.   All other systems reviewed and are negative.      Objective:    /80 (BP Location: Right arm, Patient Position: Sitting, Cuff Size: Adult)   Pulse 68   Ht 165.1 cm (65\")   Wt 66.7 kg (147 lb)   SpO2 98%   BMI 24.46 kg/m²     Neurology Exam:    General apperance: NAD.     Mental status: Alert, awake and oriented to time place and person.    Recent and Remote memory: Intact.    Attention span and Concentration: Normal. "     Language and Speech: Intact- No dysarthria.    Fluency, Naming , Repitition and Comprehension:  Intact    Cranial Nerves:   CN II: Visual fields are full. Intact. Fundi - Normal, No papillederma, Pupils - MELI  CN III, IV and VI: Extraocular movements are intact. Normal saccades.   CN V: Facial sensation is intact.   CN VII: Muscles of facial expression reveal no asymmetry. Intact.   CN VIII: Hearing is intact. Whispered voice intact.   CN IX and X: Palate elevates symmetrically. Intact  CN XI: Shoulder shrug is intact.   CN XII: Tongue is midline without evidence of atrophy or fasciculation.  Tremors on tongue protrusion    Motor: Has chin and tongue tremors along with postural > intention tremors in both hands.  No resting tremors appreciated  Right UE muscle strength 5/5.  Mildly increased tone in both upper extremities    Left UE muscle strength 5/5.      Right LE muscle strength5/5. Normal tone.     Left LE muscle strength 5/5. Normal tone.      Sensory: Normal light touch, vibration and pinprick sensation bilaterally.    DTRs: 2+ bilaterally in upper and lower extremities.    Babinski: Negative bilaterally.    Co-ordination: Normal finger-to-nose, heel to shin B/L, Normal finger tapping    Rhomberg: Negative.    Gait: Normal.  Normal arm swing, very mild tremors noted in hands    No micrographia.  Could draw a Roths wheel perfectly    Cardiovascular: Regular rate and rhythm without murmur, gallop or rub.    Assessment and Plan:  1. Tremors of nervous system  I feel she most likely has essential tremors.  I will start her on primidone gradually increasing to 50 mg at night.  I have made her aware of the side effect profile.  I have also asked her to take vitamin B12 1000 µg daily as it may help with her balance.  I will get an MRI brain as she has a history of thyroid cancer and has had recent weight loss.  - MRI Brain Without Contrast; Future       Return in about 6 weeks (around 10/10/2018).          Angy Gallagher MD

## 2018-09-04 ENCOUNTER — HOSPITAL ENCOUNTER (OUTPATIENT)
Dept: MRI IMAGING | Facility: HOSPITAL | Age: 78
Discharge: HOME | End: 2018-09-04
Attending: PSYCHIATRY & NEUROLOGY | Admitting: PSYCHIATRY & NEUROLOGY

## 2018-09-04 DIAGNOSIS — R25.1 TREMORS OF NERVOUS SYSTEM: ICD-10-CM

## 2018-09-04 PROCEDURE — 70551 MRI BRAIN STEM W/O DYE: CPT

## 2018-09-06 ENCOUNTER — OFFICE VISIT (OUTPATIENT)
Dept: INTERNAL MEDICINE | Facility: CLINIC | Age: 78
End: 2018-09-06

## 2018-09-06 ENCOUNTER — TELEPHONE (OUTPATIENT)
Dept: NEUROLOGY | Facility: CLINIC | Age: 78
End: 2018-09-06

## 2018-09-06 VITALS
SYSTOLIC BLOOD PRESSURE: 130 MMHG | OXYGEN SATURATION: 98 % | BODY MASS INDEX: 24.83 KG/M2 | WEIGHT: 149 LBS | DIASTOLIC BLOOD PRESSURE: 62 MMHG | HEIGHT: 65 IN | HEART RATE: 52 BPM

## 2018-09-06 DIAGNOSIS — Z23 NEED FOR IMMUNIZATION AGAINST INFLUENZA: ICD-10-CM

## 2018-09-06 DIAGNOSIS — G89.29 CHRONIC MIDLINE LOW BACK PAIN WITH RIGHT-SIDED SCIATICA: ICD-10-CM

## 2018-09-06 DIAGNOSIS — M54.41 CHRONIC MIDLINE LOW BACK PAIN WITH RIGHT-SIDED SCIATICA: ICD-10-CM

## 2018-09-06 DIAGNOSIS — G25.0 ESSENTIAL TREMOR: ICD-10-CM

## 2018-09-06 DIAGNOSIS — N17.9 AKI (ACUTE KIDNEY INJURY) (HCC): Primary | ICD-10-CM

## 2018-09-06 LAB
ANION GAP SERPL CALCULATED.3IONS-SCNC: 2 MMOL/L (ref 3–11)
BUN BLD-MCNC: 34 MG/DL (ref 9–23)
BUN/CREAT SERPL: 18.7 (ref 7–25)
CALCIUM SPEC-SCNC: 9.3 MG/DL (ref 8.7–10.4)
CHLORIDE SERPL-SCNC: 106 MMOL/L (ref 99–109)
CO2 SERPL-SCNC: 27 MMOL/L (ref 20–31)
CREAT BLD-MCNC: 1.82 MG/DL (ref 0.6–1.3)
GFR SERPL CREATININE-BSD FRML MDRD: 27 ML/MIN/1.73
GLUCOSE BLD-MCNC: 66 MG/DL (ref 70–100)
POTASSIUM BLD-SCNC: 5.1 MMOL/L (ref 3.5–5.5)
SODIUM BLD-SCNC: 135 MMOL/L (ref 132–146)

## 2018-09-06 PROCEDURE — 99214 OFFICE O/P EST MOD 30 MIN: CPT | Performed by: INTERNAL MEDICINE

## 2018-09-06 PROCEDURE — 90662 IIV NO PRSV INCREASED AG IM: CPT | Performed by: INTERNAL MEDICINE

## 2018-09-06 PROCEDURE — 80048 BASIC METABOLIC PNL TOTAL CA: CPT | Performed by: INTERNAL MEDICINE

## 2018-09-06 PROCEDURE — G0008 ADMIN INFLUENZA VIRUS VAC: HCPCS | Performed by: INTERNAL MEDICINE

## 2018-09-06 RX ORDER — LANOLIN ALCOHOL/MO/W.PET/CERES
1000 CREAM (GRAM) TOPICAL DAILY
COMMUNITY
End: 2019-08-23

## 2018-09-06 NOTE — TELEPHONE ENCOUNTER
----- Message from Angy Gallagher MD sent at 9/5/2018 10:56 PM EDT -----  Her mri shows age related changes as well as small old strokes but no new changes

## 2018-09-06 NOTE — PROGRESS NOTES
Jaw problems ( Patient in today for f/u on jaw problems)    Subjective   Zohra Hall is a 77 y.o. female is here today for follow-up.    History of Present Illness   Zohra is here for a follow up on her tremor,GRACIA and LBP,  has seen Dr. Gallagher, who's dxed her with Essential tremor and is on Primidone with significant improvement.  Last labs with elevated kidney function, reports not on any NSAIDs.  Does not drink as much fluids as she needs.      Current Outpatient Prescriptions:   •  alendronate (FOSAMAX) 70 MG tablet, TAKE 1 TABLET BY MOUTH  EVERY 7 DAYS, Disp: 12 tablet, Rfl: 0  •  amLODIPine (NORVASC) 5 MG tablet, TAKE 1 TABLET BY MOUTH  DAILY, Disp: 90 tablet, Rfl: 0  •  atenolol (TENORMIN) 50 MG tablet, TAKE 1 TABLET BY MOUTH  DAILY, Disp: 90 tablet, Rfl: 0  •  atorvastatin (LIPITOR) 40 MG tablet, TAKE 1 TABLET BY MOUTH  DAILY, Disp: 90 tablet, Rfl: 0  •  levothyroxine (SYNTHROID, LEVOTHROID) 112 MCG tablet, Take 1 tablet by mouth Daily., Disp: , Rfl:   •  losartan (COZAAR) 100 MG tablet, TAKE 1 TABLET BY MOUTH  DAILY, Disp: 90 tablet, Rfl: 0  •  metroNIDAZOLE (METROCREAM) 0.75 % cream, Apply  topically 2 (Two) Times a Day., Disp: 60 g, Rfl: 2  •  PARoxetine (PAXIL) 20 MG tablet, TAKE 2 TABLETS BY MOUTH  EVERY MORNING, Disp: 180 tablet, Rfl: 0  •  PEG-KCl-NaCl-NaSulf-Na Asc-C (MOVIPREP) 100 g reconstituted solution powder, Starting at 5am on morning of procedure, drink 1/4 liter (250ml) every 15 minutes so full liter is consumed in 1 hour., Disp: 1000 mL, Rfl: 0  •  primidone (MYSOLINE) 50 MG tablet, Start with half a tablet at night for 2 weeks then increase to 1 tablet at night thereafter, Disp: 30 tablet, Rfl: 3  •  tiZANidine (ZANAFLEX) 2 MG tablet, Take 1 tablet by mouth At Night As Needed for Muscle Spasms., Disp: 30 tablet, Rfl: 5  •  triamterene-hydrochlorothiazide (MAXZIDE-25) 37.5-25 MG per tablet, TAKE 1/2 TO 1 TABLET BY  MOUTH DAILY AS NEEDED FOR  EDEMA, Disp: 90 tablet, Rfl: 0  •  vitamin  "B-12 (CYANOCOBALAMIN) 1000 MCG tablet, Take 1,000 mcg by mouth Daily., Disp: , Rfl:       The following portions of the patient's history were reviewed and updated as appropriate: allergies, current medications, past family history, past medical history, past social history, past surgical history and problem list.    Review of Systems   Constitutional: Negative.  Negative for chills and fever.   HENT: Negative for ear discharge, ear pain, sinus pressure and sore throat.    Respiratory: Negative for cough, chest tightness and shortness of breath.    Cardiovascular: Negative for chest pain, palpitations and leg swelling.   Gastrointestinal: Negative for diarrhea, nausea and vomiting.   Musculoskeletal: Negative for arthralgias, back pain and myalgias.   Neurological: Positive for tremors. Negative for dizziness, syncope and headaches.   Psychiatric/Behavioral: Negative for confusion and sleep disturbance.       Objective   /62   Pulse 52   Ht 165.1 cm (65\")   Wt 67.6 kg (149 lb)   SpO2 98%   BMI 24.79 kg/m²   Physical Exam   Constitutional: She is oriented to person, place, and time. She appears well-developed and well-nourished.   HENT:   Head: Normocephalic and atraumatic.   Mouth/Throat: No oropharyngeal exudate.   Eyes: Pupils are equal, round, and reactive to light. Conjunctivae are normal.   Neck: Neck supple. No thyromegaly present.   Cardiovascular: Normal rate and regular rhythm.    Pulmonary/Chest: Effort normal and breath sounds normal.   Abdominal: Soft. Bowel sounds are normal. She exhibits no distension. There is no tenderness.   Musculoskeletal: She exhibits tenderness. She exhibits no edema.   Neurological: She is alert and oriented to person, place, and time. No cranial nerve deficit.   Hand tremors significantly better,  Jaw tremor minimal.   Skin: Skin is warm and dry.   Psychiatric: She has a normal mood and affect. Judgment normal.   Nursing note and vitals reviewed.        Results for " orders placed or performed in visit on 09/06/18   Basic Metabolic Panel   Result Value Ref Range    Glucose 66 (L) 70 - 100 mg/dL    BUN 34 (H) 9 - 23 mg/dL    Creatinine 1.82 (H) 0.60 - 1.30 mg/dL    Sodium 135 132 - 146 mmol/L    Potassium 5.1 3.5 - 5.5 mmol/L    Chloride 106 99 - 109 mmol/L    CO2 27.0 20.0 - 31.0 mmol/L    Calcium 9.3 8.7 - 10.4 mg/dL    eGFR Non African Amer 27 (L) >60 mL/min/1.73    BUN/Creatinine Ratio 18.7 7.0 - 25.0    Anion Gap 2.0 (L) 3.0 - 11.0 mmol/L             Assessment/Plan   Diagnoses and all orders for this visit:    GRACIA (acute kidney injury) (CMS/[facility])  -     Basic Metabolic Panel  -     Ambulatory Referral to Nephrology    Chronic midline low back pain with right-sided sciatica    Essential tremor  Comments:  managed by dr. Gallagher. on primidone, better.    Need for immunization against influenza  -     Flu Vaccine High Dose PF 65YR+    Other orders  -     vitamin B-12 (CYANOCOBALAMIN) 1000 MCG tablet; Take 1,000 mcg by mouth Daily.    back stable, continue exercises. Avoid nsaids.  Check labs and if not better stop hctz, omeprazole and ibuprofen.           Return in about 4 months (around 1/6/2019) for Medicare Wellness in 4 months.

## 2018-09-10 ENCOUNTER — TELEPHONE (OUTPATIENT)
Dept: NEUROLOGY | Facility: CLINIC | Age: 78
End: 2018-09-10

## 2018-09-10 NOTE — TELEPHONE ENCOUNTER
Advised patient of test results. Patient stated that she had no questions or concerns at this time

## 2018-09-24 RX ORDER — ALENDRONATE SODIUM 70 MG/1
TABLET ORAL
Qty: 12 TABLET | Refills: 0 | Status: SHIPPED | OUTPATIENT
Start: 2018-09-24 | End: 2018-12-16 | Stop reason: SDUPTHER

## 2018-09-27 DIAGNOSIS — I10 ESSENTIAL HYPERTENSION: ICD-10-CM

## 2018-09-27 DIAGNOSIS — R60.9 EDEMA, UNSPECIFIED TYPE: ICD-10-CM

## 2018-09-27 RX ORDER — DICLOFENAC SODIUM 75 MG/1
TABLET, DELAYED RELEASE ORAL
Qty: 90 TABLET | Refills: 0 | Status: SHIPPED | OUTPATIENT
Start: 2018-09-27 | End: 2019-03-19 | Stop reason: SDUPTHER

## 2018-09-27 RX ORDER — OMEPRAZOLE 20 MG/1
CAPSULE, DELAYED RELEASE ORAL
Qty: 90 CAPSULE | Refills: 0 | Status: SHIPPED | OUTPATIENT
Start: 2018-09-27 | End: 2018-12-20 | Stop reason: SDUPTHER

## 2018-09-27 RX ORDER — ATENOLOL 50 MG/1
TABLET ORAL
Qty: 90 TABLET | Refills: 0 | Status: SHIPPED | OUTPATIENT
Start: 2018-09-27 | End: 2018-12-20 | Stop reason: SDUPTHER

## 2018-09-27 RX ORDER — ATORVASTATIN CALCIUM 40 MG/1
TABLET, FILM COATED ORAL
Qty: 90 TABLET | Refills: 0 | Status: SHIPPED | OUTPATIENT
Start: 2018-09-27 | End: 2018-12-20 | Stop reason: SDUPTHER

## 2018-09-27 RX ORDER — TRIAMTERENE AND HYDROCHLOROTHIAZIDE 37.5; 25 MG/1; MG/1
TABLET ORAL
Qty: 90 TABLET | Refills: 0 | Status: SHIPPED | OUTPATIENT
Start: 2018-09-27 | End: 2018-12-20 | Stop reason: SDUPTHER

## 2018-09-27 RX ORDER — AMLODIPINE BESYLATE 5 MG/1
5 TABLET ORAL DAILY
Qty: 90 TABLET | Refills: 0 | Status: SHIPPED | OUTPATIENT
Start: 2018-09-27 | End: 2018-12-20 | Stop reason: SDUPTHER

## 2018-09-27 RX ORDER — LOSARTAN POTASSIUM 100 MG/1
100 TABLET ORAL DAILY
Qty: 90 TABLET | Refills: 0 | Status: SHIPPED | OUTPATIENT
Start: 2018-09-27 | End: 2018-12-20 | Stop reason: SDUPTHER

## 2018-10-10 ENCOUNTER — OFFICE VISIT (OUTPATIENT)
Dept: NEUROLOGY | Facility: CLINIC | Age: 78
End: 2018-10-10

## 2018-10-10 VITALS
HEIGHT: 65 IN | OXYGEN SATURATION: 99 % | DIASTOLIC BLOOD PRESSURE: 72 MMHG | SYSTOLIC BLOOD PRESSURE: 126 MMHG | WEIGHT: 149 LBS | HEART RATE: 59 BPM | RESPIRATION RATE: 16 BRPM | BODY MASS INDEX: 24.83 KG/M2

## 2018-10-10 DIAGNOSIS — G25.0 BENIGN ESSENTIAL TREMOR: Primary | ICD-10-CM

## 2018-10-10 PROCEDURE — 99213 OFFICE O/P EST LOW 20 MIN: CPT | Performed by: PSYCHIATRY & NEUROLOGY

## 2018-10-10 RX ORDER — PRIMIDONE 50 MG/1
TABLET ORAL
Qty: 60 TABLET | Refills: 5 | Status: SHIPPED | OUTPATIENT
Start: 2018-10-10 | End: 2019-01-10 | Stop reason: SDUPTHER

## 2018-10-10 NOTE — PROGRESS NOTES
Subjective:    CC: Zohra Hall is seen today  for Tremors (6 week follow up)       HPI:  Current visit-since her last visit she has noticed a marked improvement in her hand tremors after starting primidone now at 50 mg.  Her chin tremors have also improved but she still has them.  She denies having any side effects with primidone.  She had an MRI brain that showed chronic ischemic changes as well as chronic lacunar infarcts bilaterally in the corona radiata but no other acute intracranial abnormalities.  She is also currently being worked up for renal failure and has an appointment with her nephrologist.     Initial visit- 77-year-old female with a history of hypertension, hyperlipidemia, thyroid cancer status post radiation in remission for the past 6 years, arthritis, depression, low back pain status post spinal fusion at L3, 4, 5 several years ago presents with tremors.  As per patient she started having tremors about 6 weeks ago.  She denies any recent changes in medications and has never been on any antipsychotic medications.  Her tremors are mainly in her chin, tongue and both hands.  They become worse on doing tasks such as reaching out for objects.  Otherwise there are no aggravating or relieving factors.  She does not have any family history of tremors.  She also reports to having balance problems for the past 6-8 months.  Was found to be anemic when she started to have balance problems with dizziness. She is still undergoing workup for that.  She has also had a 10 pound weight loss in the past 3-4 months.  Denies any history of constipation or acting out her dreams.  Has mild anosmia since her thyroid surgery.    The following portions of the patient's history were reviewed today and updated as of 10/10/2018  : allergies, current medications, past family history, past medical history, past social history, past surgical history and problem list  These document will be scanned to patient's  "chart.      Current Outpatient Prescriptions:   •  alendronate (FOSAMAX) 70 MG tablet, TAKE 1 TABLET BY MOUTH  EVERY 7 DAYS, Disp: 12 tablet, Rfl: 0  •  amLODIPine (NORVASC) 5 MG tablet, TAKE 1 TABLET BY MOUTH  DAILY, Disp: 90 tablet, Rfl: 0  •  atenolol (TENORMIN) 50 MG tablet, TAKE 1 TABLET BY MOUTH  DAILY, Disp: 90 tablet, Rfl: 0  •  atorvastatin (LIPITOR) 40 MG tablet, TAKE 1 TABLET BY MOUTH  DAILY, Disp: 90 tablet, Rfl: 0  •  diclofenac (VOLTAREN) 75 MG EC tablet, TAKE 1 TABLET BY MOUTH  DAILY, Disp: 90 tablet, Rfl: 0  •  levothyroxine (SYNTHROID, LEVOTHROID) 112 MCG tablet, Take 1 tablet by mouth Daily., Disp: , Rfl:   •  losartan (COZAAR) 100 MG tablet, TAKE 1 TABLET BY MOUTH  DAILY, Disp: 90 tablet, Rfl: 0  •  metroNIDAZOLE (METROCREAM) 0.75 % cream, Apply  topically 2 (Two) Times a Day., Disp: 60 g, Rfl: 2  •  omeprazole (priLOSEC) 20 MG capsule, TAKE 1 CAPSULE BY MOUTH  DAILY, Disp: 90 capsule, Rfl: 0  •  PARoxetine (PAXIL) 20 MG tablet, TAKE 2 TABLETS BY MOUTH  EVERY MORNING, Disp: 180 tablet, Rfl: 0  •  PEG-KCl-NaCl-NaSulf-Na Asc-C (MOVIPREP) 100 g reconstituted solution powder, Starting at 5am on morning of procedure, drink 1/4 liter (250ml) every 15 minutes so full liter is consumed in 1 hour., Disp: 1000 mL, Rfl: 0  •  primidone (MYSOLINE) 50 MG tablet, Take 2 tablets at night, Disp: 60 tablet, Rfl: 5  •  tiZANidine (ZANAFLEX) 2 MG tablet, Take 1 tablet by mouth At Night As Needed for Muscle Spasms., Disp: 30 tablet, Rfl: 5  •  triamterene-hydrochlorothiazide (MAXZIDE-25) 37.5-25 MG per tablet, TAKE 1/2 TO 1 TABLET BY  MOUTH DAILY AS NEEDED FOR  EDEMA, Disp: 90 tablet, Rfl: 0  •  vitamin B-12 (CYANOCOBALAMIN) 1000 MCG tablet, Take 1,000 mcg by mouth Daily., Disp: , Rfl:    Past Medical History:   Diagnosis Date   • Breast cancer (CMS/HCC) 1987    left- pt states \"in situ\", no radiation, Tamoxifen for 5 years   • Cellulitis    • Cervical lymphadenopathy    • Chest pain    • Convulsions (CMS/HCC)    • " "GERD (gastroesophageal reflux disease)    • Glossitis    • Grief reaction     · Last Impression: 29 Jan 2015  counselled, given ativan for prn use.  Aleah Regan   • Hypertension    • Lower back pain    • Oral thrush    • Papillary adenocarcinoma (CMS/HCC)     Papillary adenocarcinoma of thyroid   • Papillary adenocarcinoma of thyroid (CMS/HCC)     · resection Ramirez- 1/13,  2 x 2 x 1.5 cm  T3 N1 MXpost op low calcium and diminished  voiceablation Ain- 3/7 metastatic nodes and invasion to strap muscles · Last Impression: 29 Oct 2014  s/p Eval by berry Méndez.  Aleah Regan (Internal   • Piriformis syndrome    • Tingling    • Xerostomia       Past Surgical History:   Procedure Laterality Date   • BREAST BIOPSY Right 10/10/2013    stereo bx   • BREAST BIOPSY Left 10/19/2015    stereo bx   • BREAST CYST EXCISION      right   • BREAST EXCISIONAL BIOPSY Right     affirm bx and loc 2016.   • BREAST LUMPECTOMY Left 1987   • HYSTERECTOMY  1979   • TOTAL HIP ARTHROPLASTY     • TOTAL THYROIDECTOMY      Thyroid Surgery Total Thyroidectomy      Family History   Problem Relation Age of Onset   • Breast cancer Mother 84   • Cancer Mother    • BRCA 1/2 Neg Hx    • Colon cancer Neg Hx    • Endometrial cancer Neg Hx    • Ovarian cancer Neg Hx       Social History     Social History   • Marital status:      Spouse name: N/A   • Number of children: N/A   • Years of education: N/A     Occupational History   • Not on file.     Social History Main Topics   • Smoking status: Former Smoker     Packs/day: 1.00     Years: 30.00     Types: Cigarettes     Quit date: 9/13/1992   • Smokeless tobacco: Never Used   • Alcohol use No   • Drug use: No   • Sexual activity: Defer     Other Topics Concern   • Not on file     Social History Narrative   • No narrative on file     Review of Systems    Objective:    /72   Pulse 59   Resp 16   Ht 165.1 cm (65\")   Wt 67.6 kg (149 lb)   SpO2 99%   BMI 24.79 kg/m²     Neurology " Exam:    General apperance: NAD.     Mental status: Alert, awake and oriented to time place and person.    Recent and Remote memory: Intact.    Attention span and Concentration: Normal.     Language and Speech: Intact- No dysarthria.    Fluency, Naming , Repitition and Comprehension:  Intact    Cranial Nerves:   CN II: Visual fields are full. Intact. Fundi - Normal, No papillederma, Pupils - MELI  CN III, IV and VI: Extraocular movements are intact. Normal saccades.   CN V: Facial sensation is intact.   CN VII: Muscles of facial expression reveal no asymmetry. Intact.   CN VIII: Hearing is intact. Whispered voice intact.   CN IX and X: Palate elevates symmetrically. Intact  CN XI: Shoulder shrug is intact.   CN XII: Tongue is midline without evidence of atrophy or fasciculation.     Ophthalmoscopic exam of optic disc-normal    Motor:-Chin tremors present, very mild postural tremors in the left hand but no kinetic tremors (marked improvement since last visit)  Right UE muscle strength 5/5. Normal tone.     Left UE muscle strength 5/5. Normal tone.      Right LE muscle strength5/5. Normal tone.     Left LE muscle strength 5/5. Normal tone.      Sensory: Normal light touch, vibration and pinprick sensation bilaterally.    DTRs: 2+ bilaterally in upper and lower extremities.    Babinski: Negative bilaterally.    Co-ordination: Normal finger-to-nose, heel to shin B/L.    Rhomberg: Negative.    Gait: Normal.    Cardiovascular: Regular rate and rhythm without murmur, gallop or rub.    Assessment and Plan:  1. Benign essential tremor  I will further increase her primidone to 100 mg at night gradually.  Since her MRI showed chronic lacunar infarcts I have asked her to take aspirin 81 mg every day or if she has bruising every other day.       Return in about 3 months (around 1/10/2019).         Angy Gallagher MD

## 2018-10-18 ENCOUNTER — TRANSCRIBE ORDERS (OUTPATIENT)
Dept: LAB | Facility: HOSPITAL | Age: 78
End: 2018-10-18

## 2018-10-18 ENCOUNTER — LAB (OUTPATIENT)
Dept: LAB | Facility: HOSPITAL | Age: 78
End: 2018-10-18

## 2018-10-18 DIAGNOSIS — N18.9 CHRONIC KIDNEY DISEASE, UNSPECIFIED CKD STAGE: ICD-10-CM

## 2018-10-18 DIAGNOSIS — N18.9 CHRONIC KIDNEY DISEASE, UNSPECIFIED CKD STAGE: Primary | ICD-10-CM

## 2018-10-18 LAB
ALBUMIN SERPL-MCNC: 4.61 G/DL (ref 3.2–4.8)
ANION GAP SERPL CALCULATED.3IONS-SCNC: 8 MMOL/L (ref 3–11)
BACTERIA UR QL AUTO: ABNORMAL /HPF
BASOPHILS # BLD AUTO: 0.01 10*3/MM3 (ref 0–0.2)
BASOPHILS NFR BLD AUTO: 0.2 % (ref 0–1)
BILIRUB UR QL STRIP: NEGATIVE
BUN BLD-MCNC: 20 MG/DL (ref 9–23)
BUN/CREAT SERPL: 17.5 (ref 7–25)
CALCIUM SPEC-SCNC: 9.4 MG/DL (ref 8.7–10.4)
CHLORIDE SERPL-SCNC: 93 MMOL/L (ref 99–109)
CLARITY UR: CLEAR
CO2 SERPL-SCNC: 29 MMOL/L (ref 20–31)
COLOR UR: YELLOW
CREAT BLD-MCNC: 1.14 MG/DL (ref 0.6–1.3)
CREAT UR-MCNC: 55.4 MG/DL
DEPRECATED RDW RBC AUTO: 44.5 FL (ref 37–54)
EOSINOPHIL # BLD AUTO: 0.09 10*3/MM3 (ref 0–0.3)
EOSINOPHIL NFR BLD AUTO: 1.4 % (ref 0–3)
ERYTHROCYTE [DISTWIDTH] IN BLOOD BY AUTOMATED COUNT: 13.6 % (ref 11.3–14.5)
GFR SERPL CREATININE-BSD FRML MDRD: 46 ML/MIN/1.73
GLUCOSE BLD-MCNC: 88 MG/DL (ref 70–100)
GLUCOSE UR STRIP-MCNC: NEGATIVE MG/DL
HCT VFR BLD AUTO: 38.2 % (ref 34.5–44)
HGB BLD-MCNC: 12.1 G/DL (ref 11.5–15.5)
HGB UR QL STRIP.AUTO: NEGATIVE
HYALINE CASTS UR QL AUTO: ABNORMAL /LPF
IMM GRANULOCYTES # BLD: 0.02 10*3/MM3 (ref 0–0.03)
IMM GRANULOCYTES NFR BLD: 0.3 % (ref 0–0.6)
KETONES UR QL STRIP: NEGATIVE
LEUKOCYTE ESTERASE UR QL STRIP.AUTO: NEGATIVE
LYMPHOCYTES # BLD AUTO: 1.63 10*3/MM3 (ref 0.6–4.8)
LYMPHOCYTES NFR BLD AUTO: 24.5 % (ref 24–44)
MCH RBC QN AUTO: 28 PG (ref 27–31)
MCHC RBC AUTO-ENTMCNC: 31.7 G/DL (ref 32–36)
MCV RBC AUTO: 88.4 FL (ref 80–99)
MONOCYTES # BLD AUTO: 0.42 10*3/MM3 (ref 0–1)
MONOCYTES NFR BLD AUTO: 6.3 % (ref 0–12)
NEUTROPHILS # BLD AUTO: 4.49 10*3/MM3 (ref 1.5–8.3)
NEUTROPHILS NFR BLD AUTO: 67.3 % (ref 41–71)
NITRITE UR QL STRIP: NEGATIVE
PH UR STRIP.AUTO: 7 [PH] (ref 5–8)
PHOSPHATE SERPL-MCNC: 4.3 MG/DL (ref 2.4–5.1)
PLATELET # BLD AUTO: 294 10*3/MM3 (ref 150–450)
PMV BLD AUTO: 10.1 FL (ref 6–12)
POTASSIUM BLD-SCNC: 4.7 MMOL/L (ref 3.5–5.5)
PROT UR QL STRIP: NEGATIVE
PROT UR-MCNC: 8 MG/DL (ref 1–14)
RBC # BLD AUTO: 4.32 10*6/MM3 (ref 3.89–5.14)
RBC # UR: ABNORMAL /HPF
REF LAB TEST METHOD: ABNORMAL
SODIUM BLD-SCNC: 130 MMOL/L (ref 132–146)
SP GR UR STRIP: 1.01 (ref 1–1.03)
SQUAMOUS #/AREA URNS HPF: ABNORMAL /HPF
UROBILINOGEN UR QL STRIP: NORMAL
WBC NRBC COR # BLD: 6.66 10*3/MM3 (ref 3.5–10.8)
WBC UR QL AUTO: ABNORMAL /HPF

## 2018-10-18 PROCEDURE — 82570 ASSAY OF URINE CREATININE: CPT

## 2018-10-18 PROCEDURE — 85025 COMPLETE CBC W/AUTO DIFF WBC: CPT

## 2018-10-18 PROCEDURE — 81001 URINALYSIS AUTO W/SCOPE: CPT | Performed by: INTERNAL MEDICINE

## 2018-10-18 PROCEDURE — 84156 ASSAY OF PROTEIN URINE: CPT

## 2018-10-18 PROCEDURE — 80069 RENAL FUNCTION PANEL: CPT

## 2018-10-18 PROCEDURE — 36415 COLL VENOUS BLD VENIPUNCTURE: CPT

## 2018-11-01 DIAGNOSIS — F43.21 GRIEF REACTION: ICD-10-CM

## 2018-11-01 RX ORDER — PAROXETINE HYDROCHLORIDE 20 MG/1
40 TABLET, FILM COATED ORAL EVERY MORNING
Qty: 180 TABLET | Refills: 0 | Status: SHIPPED | OUTPATIENT
Start: 2018-11-01 | End: 2018-12-20 | Stop reason: SDUPTHER

## 2018-11-05 ENCOUNTER — LAB (OUTPATIENT)
Dept: LAB | Facility: HOSPITAL | Age: 78
End: 2018-11-05

## 2018-11-05 ENCOUNTER — TRANSCRIBE ORDERS (OUTPATIENT)
Dept: LAB | Facility: HOSPITAL | Age: 78
End: 2018-11-05

## 2018-11-05 DIAGNOSIS — N18.30 CHRONIC KIDNEY DISEASE, STAGE III (MODERATE) (HCC): ICD-10-CM

## 2018-11-05 DIAGNOSIS — N18.30 CHRONIC KIDNEY DISEASE, STAGE III (MODERATE) (HCC): Primary | ICD-10-CM

## 2018-11-05 LAB
25(OH)D3 SERPL-MCNC: 22.4 NG/ML
ALBUMIN SERPL-MCNC: 4.46 G/DL (ref 3.2–4.8)
ANION GAP SERPL CALCULATED.3IONS-SCNC: 12 MMOL/L (ref 3–11)
BACTERIA UR QL AUTO: ABNORMAL /HPF
BILIRUB UR QL STRIP: NEGATIVE
BUN BLD-MCNC: 16 MG/DL (ref 9–23)
BUN/CREAT SERPL: 16 (ref 7–25)
CALCIUM SPEC-SCNC: 9.2 MG/DL (ref 8.7–10.4)
CHLORIDE SERPL-SCNC: 95 MMOL/L (ref 99–109)
CK SERPL-CCNC: 76 U/L (ref 26–174)
CLARITY UR: CLEAR
CO2 SERPL-SCNC: 24 MMOL/L (ref 20–31)
COLOR UR: YELLOW
CREAT BLD-MCNC: 1 MG/DL (ref 0.6–1.3)
EOSINOPHIL SPEC QL MICRO: 0 % EOS/100 CELLS (ref 0–0)
GFR SERPL CREATININE-BSD FRML MDRD: 53 ML/MIN/1.73
GLUCOSE BLD-MCNC: 75 MG/DL (ref 70–100)
GLUCOSE UR STRIP-MCNC: NEGATIVE MG/DL
HGB UR QL STRIP.AUTO: ABNORMAL
HYALINE CASTS UR QL AUTO: ABNORMAL /LPF
KETONES UR QL STRIP: NEGATIVE
LEUKOCYTE ESTERASE UR QL STRIP.AUTO: ABNORMAL
NITRITE UR QL STRIP: NEGATIVE
PH UR STRIP.AUTO: 7 [PH] (ref 5–8)
PHOSPHATE SERPL-MCNC: 4.3 MG/DL (ref 2.4–5.1)
POTASSIUM BLD-SCNC: 4.7 MMOL/L (ref 3.5–5.5)
PROT UR QL STRIP: NEGATIVE
PTH-INTACT SERPL-MCNC: 52.4 PG/ML (ref 11–80)
RBC # UR: ABNORMAL /HPF
REF LAB TEST METHOD: ABNORMAL
SODIUM BLD-SCNC: 131 MMOL/L (ref 132–146)
SP GR UR STRIP: 1.02 (ref 1–1.03)
SQUAMOUS #/AREA URNS HPF: ABNORMAL /HPF
UROBILINOGEN UR QL STRIP: ABNORMAL
WBC UR QL AUTO: ABNORMAL /HPF

## 2018-11-05 PROCEDURE — 81001 URINALYSIS AUTO W/SCOPE: CPT | Performed by: INTERNAL MEDICINE

## 2018-11-05 PROCEDURE — 36415 COLL VENOUS BLD VENIPUNCTURE: CPT

## 2018-11-05 PROCEDURE — 80069 RENAL FUNCTION PANEL: CPT

## 2018-11-05 PROCEDURE — 82306 VITAMIN D 25 HYDROXY: CPT

## 2018-11-05 PROCEDURE — 82550 ASSAY OF CK (CPK): CPT

## 2018-11-05 PROCEDURE — 83970 ASSAY OF PARATHORMONE: CPT

## 2018-11-05 PROCEDURE — 87205 SMEAR GRAM STAIN: CPT

## 2018-12-14 ENCOUNTER — LAB (OUTPATIENT)
Dept: LAB | Facility: HOSPITAL | Age: 78
End: 2018-12-14

## 2018-12-14 ENCOUNTER — TRANSCRIBE ORDERS (OUTPATIENT)
Dept: LAB | Facility: HOSPITAL | Age: 78
End: 2018-12-14

## 2018-12-14 DIAGNOSIS — N18.30 CHRONIC KIDNEY DISEASE, STAGE III (MODERATE) (HCC): Primary | ICD-10-CM

## 2018-12-14 DIAGNOSIS — E55.9 AVITAMINOSIS D: ICD-10-CM

## 2018-12-14 DIAGNOSIS — N18.30 CHRONIC KIDNEY DISEASE, STAGE III (MODERATE) (HCC): ICD-10-CM

## 2018-12-14 LAB
25(OH)D3 SERPL-MCNC: 34.5 NG/ML
ALBUMIN SERPL-MCNC: 4.25 G/DL (ref 3.2–4.8)
ANION GAP SERPL CALCULATED.3IONS-SCNC: 7 MMOL/L (ref 3–11)
BACTERIA UR QL AUTO: ABNORMAL /HPF
BILIRUB UR QL STRIP: NEGATIVE
BUN BLD-MCNC: 15 MG/DL (ref 9–23)
BUN/CREAT SERPL: 15.6 (ref 7–25)
CALCIUM SPEC-SCNC: 9 MG/DL (ref 8.7–10.4)
CHLORIDE SERPL-SCNC: 98 MMOL/L (ref 99–109)
CK SERPL-CCNC: 122 U/L (ref 26–174)
CLARITY UR: CLEAR
CO2 SERPL-SCNC: 28 MMOL/L (ref 20–31)
COLOR UR: YELLOW
CREAT BLD-MCNC: 0.96 MG/DL (ref 0.6–1.3)
EOSINOPHIL SPEC QL MICRO: 0 % EOS/100 CELLS (ref 0–0)
GFR SERPL CREATININE-BSD FRML MDRD: 56 ML/MIN/1.73
GLUCOSE BLD-MCNC: 101 MG/DL (ref 70–100)
GLUCOSE UR STRIP-MCNC: NEGATIVE MG/DL
HGB UR QL STRIP.AUTO: NEGATIVE
HYALINE CASTS UR QL AUTO: ABNORMAL /LPF
KETONES UR QL STRIP: NEGATIVE
LEUKOCYTE ESTERASE UR QL STRIP.AUTO: NEGATIVE
NITRITE UR QL STRIP: NEGATIVE
PH UR STRIP.AUTO: 7.5 [PH] (ref 5–8)
PHOSPHATE SERPL-MCNC: 4.2 MG/DL (ref 2.4–5.1)
POTASSIUM BLD-SCNC: 4.5 MMOL/L (ref 3.5–5.5)
PROT UR QL STRIP: NEGATIVE
PTH-INTACT SERPL-MCNC: 55.2 PG/ML (ref 11–80)
RBC # UR: ABNORMAL /HPF
REF LAB TEST METHOD: ABNORMAL
SODIUM BLD-SCNC: 133 MMOL/L (ref 132–146)
SP GR UR STRIP: 1.01 (ref 1–1.03)
SQUAMOUS #/AREA URNS HPF: ABNORMAL /HPF
UROBILINOGEN UR QL STRIP: NORMAL
WBC UR QL AUTO: ABNORMAL /HPF

## 2018-12-14 PROCEDURE — 81001 URINALYSIS AUTO W/SCOPE: CPT | Performed by: INTERNAL MEDICINE

## 2018-12-14 PROCEDURE — 80069 RENAL FUNCTION PANEL: CPT

## 2018-12-14 PROCEDURE — 82550 ASSAY OF CK (CPK): CPT

## 2018-12-14 PROCEDURE — 82306 VITAMIN D 25 HYDROXY: CPT

## 2018-12-14 PROCEDURE — 87205 SMEAR GRAM STAIN: CPT

## 2018-12-14 PROCEDURE — 36415 COLL VENOUS BLD VENIPUNCTURE: CPT

## 2018-12-14 PROCEDURE — 83970 ASSAY OF PARATHORMONE: CPT

## 2018-12-17 RX ORDER — ALENDRONATE SODIUM 70 MG/1
TABLET ORAL
Qty: 12 TABLET | Refills: 0 | Status: SHIPPED | OUTPATIENT
Start: 2018-12-17 | End: 2019-03-19 | Stop reason: SDUPTHER

## 2018-12-20 DIAGNOSIS — I10 ESSENTIAL HYPERTENSION: ICD-10-CM

## 2018-12-20 DIAGNOSIS — R60.9 EDEMA, UNSPECIFIED TYPE: ICD-10-CM

## 2018-12-20 DIAGNOSIS — F43.21 GRIEF REACTION: ICD-10-CM

## 2018-12-20 RX ORDER — ATENOLOL 50 MG/1
TABLET ORAL
Qty: 90 TABLET | Refills: 0 | Status: SHIPPED | OUTPATIENT
Start: 2018-12-20 | End: 2019-03-19 | Stop reason: SDUPTHER

## 2018-12-20 RX ORDER — TRIAMTERENE AND HYDROCHLOROTHIAZIDE 37.5; 25 MG/1; MG/1
TABLET ORAL
Qty: 90 TABLET | Refills: 1 | Status: SHIPPED | OUTPATIENT
Start: 2018-12-20 | End: 2019-06-18 | Stop reason: SDUPTHER

## 2018-12-20 RX ORDER — OMEPRAZOLE 20 MG/1
CAPSULE, DELAYED RELEASE ORAL
Qty: 90 CAPSULE | Refills: 0 | Status: SHIPPED | OUTPATIENT
Start: 2018-12-20 | End: 2019-06-18

## 2018-12-20 RX ORDER — AMLODIPINE BESYLATE 5 MG/1
5 TABLET ORAL DAILY
Qty: 90 TABLET | Refills: 0 | Status: SHIPPED | OUTPATIENT
Start: 2018-12-20 | End: 2019-03-19 | Stop reason: SDUPTHER

## 2018-12-20 RX ORDER — ATORVASTATIN CALCIUM 40 MG/1
TABLET, FILM COATED ORAL
Qty: 90 TABLET | Refills: 0 | Status: SHIPPED | OUTPATIENT
Start: 2018-12-20 | End: 2019-03-19 | Stop reason: SDUPTHER

## 2018-12-20 RX ORDER — PAROXETINE HYDROCHLORIDE 20 MG/1
40 TABLET, FILM COATED ORAL EVERY MORNING
Qty: 180 TABLET | Refills: 1 | Status: SHIPPED | OUTPATIENT
Start: 2018-12-20 | End: 2019-01-02 | Stop reason: ALTCHOICE

## 2018-12-20 RX ORDER — LOSARTAN POTASSIUM 100 MG/1
100 TABLET ORAL DAILY
Qty: 90 TABLET | Refills: 1 | Status: SHIPPED | OUTPATIENT
Start: 2018-12-20 | End: 2019-01-02 | Stop reason: ALTCHOICE

## 2019-01-02 ENCOUNTER — OFFICE VISIT (OUTPATIENT)
Dept: INTERNAL MEDICINE | Facility: CLINIC | Age: 79
End: 2019-01-02

## 2019-01-02 VITALS
OXYGEN SATURATION: 98 % | HEART RATE: 56 BPM | SYSTOLIC BLOOD PRESSURE: 156 MMHG | BODY MASS INDEX: 27.16 KG/M2 | WEIGHT: 163.2 LBS | DIASTOLIC BLOOD PRESSURE: 88 MMHG

## 2019-01-02 DIAGNOSIS — I10 ESSENTIAL HYPERTENSION: Primary | ICD-10-CM

## 2019-01-02 DIAGNOSIS — I63.81 LACUNAR STROKE (HCC): ICD-10-CM

## 2019-01-02 DIAGNOSIS — M77.8 RIGHT HAND TENDONITIS: ICD-10-CM

## 2019-01-02 DIAGNOSIS — M25.551 PAIN OF RIGHT HIP JOINT: ICD-10-CM

## 2019-01-02 DIAGNOSIS — F43.89 REACTION TO CHRONIC STRESS: ICD-10-CM

## 2019-01-02 PROCEDURE — 99215 OFFICE O/P EST HI 40 MIN: CPT | Performed by: INTERNAL MEDICINE

## 2019-01-02 RX ORDER — IRBESARTAN 150 MG/1
150 TABLET ORAL DAILY
Qty: 30 TABLET | Refills: 5 | Status: SHIPPED | OUTPATIENT
Start: 2019-01-02 | End: 2019-03-19

## 2019-01-02 RX ORDER — DULOXETIN HYDROCHLORIDE 60 MG/1
60 CAPSULE, DELAYED RELEASE ORAL DAILY
Qty: 30 CAPSULE | Refills: 5 | Status: SHIPPED | OUTPATIENT
Start: 2019-01-02 | End: 2019-06-18 | Stop reason: SDUPTHER

## 2019-01-02 RX ORDER — ASPIRIN 81 MG/1
81 TABLET ORAL DAILY
COMMUNITY
End: 2019-03-19

## 2019-01-02 NOTE — PROGRESS NOTES
Follow-up and back and leg pain    Subjective   Zohra Hall is a 78 y.o. female is here today for follow-up.    History of Present Illness   S/p recent MRI by Neurology, and showed lacunar infarcts.  Tremors are a little better ont he primidone, and reports mints helps.  TMJs feel tired and weak.  Paxil is not helping. Son who Is epileptic,and she believes may be addicted to opiates, is stressing her. would like alternatives to paxil.  Rt. Hip acting up. S/p left THR ( [facility])  Rt isa finger is weak, unable to sraighten, able to form a .    Current Outpatient Medications:   •  alendronate (FOSAMAX) 70 MG tablet, TAKE 1 TABLET BY MOUTH  EVERY 7 DAYS, Disp: 12 tablet, Rfl: 0  •  amLODIPine (NORVASC) 5 MG tablet, TAKE 1 TABLET BY MOUTH  DAILY, Disp: 90 tablet, Rfl: 0  •  aspirin 81 MG EC tablet, Take 81 mg by mouth Daily., Disp: , Rfl:   •  atenolol (TENORMIN) 50 MG tablet, TAKE 1 TABLET BY MOUTH  DAILY, Disp: 90 tablet, Rfl: 0  •  atorvastatin (LIPITOR) 40 MG tablet, TAKE 1 TABLET BY MOUTH  DAILY, Disp: 90 tablet, Rfl: 0  •  diclofenac (VOLTAREN) 75 MG EC tablet, TAKE 1 TABLET BY MOUTH  DAILY, Disp: 90 tablet, Rfl: 0  •  levothyroxine (SYNTHROID, LEVOTHROID) 112 MCG tablet, Take 1 tablet by mouth Daily., Disp: , Rfl:   •  metroNIDAZOLE (METROCREAM) 0.75 % cream, Apply  topically 2 (Two) Times a Day., Disp: 60 g, Rfl: 2  •  omeprazole (priLOSEC) 20 MG capsule, TAKE 1 CAPSULE BY MOUTH  DAILY, Disp: 90 capsule, Rfl: 0  •  primidone (MYSOLINE) 50 MG tablet, Take 2 tablets at night, Disp: 60 tablet, Rfl: 5  •  tiZANidine (ZANAFLEX) 2 MG tablet, Take 1 tablet by mouth At Night As Needed for Muscle Spasms., Disp: 30 tablet, Rfl: 5  •  triamterene-hydrochlorothiazide (MAXZIDE-25) 37.5-25 MG per tablet, TAKE 1/2 TO 1 TABLET BY  MOUTH DAILY AS NEEDED FOR  EDEMA, Disp: 90 tablet, Rfl: 1  •  vitamin B-12 (CYANOCOBALAMIN) 1000 MCG tablet, Take 1,000 mcg by mouth Daily., Disp: , Rfl:   •  DULoxetine (CYMBALTA) 60 MG  capsule, Take 1 capsule by mouth Daily. For stress, Disp: 30 capsule, Rfl: 5  •  irbesartan (AVAPRO) 150 MG tablet, Take 1 tablet by mouth Daily., Disp: 30 tablet, Rfl: 5      The following portions of the patient's history were reviewed and updated as appropriate: allergies, current medications, past family history, past medical history, past social history, past surgical history and problem list.    Review of Systems   Constitutional: Positive for fatigue. Negative for chills and fever.   HENT: Negative for ear discharge, ear pain, sinus pressure and sore throat.    Respiratory: Negative for cough, chest tightness and shortness of breath.    Cardiovascular: Negative for chest pain, palpitations and leg swelling.   Gastrointestinal: Negative for diarrhea, nausea and vomiting.   Endocrine: Negative for heat intolerance and polydipsia.   Genitourinary: Negative for frequency and pelvic pain.   Musculoskeletal: Positive for arthralgias, gait problem (due to hip pain) and joint swelling (weakness). Negative for back pain and myalgias.   Neurological: Positive for tremors. Negative for dizziness, syncope and headaches.   Psychiatric/Behavioral: Positive for dysphoric mood. Negative for confusion and sleep disturbance.       Objective   /88   Pulse 56   Wt 74 kg (163 lb 3.2 oz)   SpO2 98% Comment: ra  BMI 27.16 kg/m²   Physical Exam   Constitutional: She is oriented to person, place, and time. She appears well-developed and well-nourished.   HENT:   Head: Normocephalic and atraumatic.   Right Ear: External ear normal.   Left Ear: External ear normal.   Mouth/Throat: No oropharyngeal exudate.   Eyes: Conjunctivae are normal. Pupils are equal, round, and reactive to light.   Neck: Neck supple. No thyromegaly present.   Cardiovascular: Normal rate and regular rhythm.   No murmur heard.  Pulmonary/Chest: Effort normal and breath sounds normal.   Abdominal: Soft. Bowel sounds are normal. She exhibits no distension.  There is no tenderness.   Musculoskeletal: She exhibits tenderness. She exhibits no edema.        Right wrist: She exhibits decreased range of motion and tenderness.        Arms:  Antalgic gait   Neurological: She is alert and oriented to person, place, and time. No cranial nerve deficit.   tremors   Skin: Skin is warm and dry.   Psychiatric: She has a normal mood and affect. Judgment normal.   Anxious   Nursing note and vitals reviewed.        Results for orders placed or performed in visit on 12/14/18   Eosinophil Smear - Urine, Urine, Clean Catch   Result Value Ref Range    Eosinophil Smear 0 0 - 0 % EOS/100 Cells   CK   Result Value Ref Range    Creatine Kinase 122 26 - 174 U/L   PTH, Intact   Result Value Ref Range    PTH, Intact 55.2 11.0 - 80.0 pg/mL   Renal Function Panel   Result Value Ref Range    Glucose 101 (H) 70 - 100 mg/dL    BUN 15 9 - 23 mg/dL    Creatinine 0.96 0.60 - 1.30 mg/dL    Sodium 133 132 - 146 mmol/L    Potassium 4.5 3.5 - 5.5 mmol/L    Chloride 98 (L) 99 - 109 mmol/L    CO2 28.0 20.0 - 31.0 mmol/L    Calcium 9.0 8.7 - 10.4 mg/dL    Albumin 4.25 3.20 - 4.80 g/dL    Phosphorus 4.2 2.4 - 5.1 mg/dL    Anion Gap 7.0 3.0 - 11.0 mmol/L    BUN/Creatinine Ratio 15.6 7.0 - 25.0    eGFR Non African Amer 56 (L) >60 mL/min/1.73   Vitamin D 25 Hydroxy   Result Value Ref Range    25 Hydroxy, Vitamin D 34.5 ng/ml             Assessment/Plan   Diagnoses and all orders for this visit:    Essential hypertension  Comments:  start irbesartan as uncontrolled on losartan, adv. importance of BP contol with lacunar strokes.  Orders:  -     irbesartan (AVAPRO) 150 MG tablet; Take 1 tablet by mouth Daily.    Reaction to chronic stress  Comments:  start cymbalta, counseled re: meds and behavioural txt.  Orders:  -     DULoxetine (CYMBALTA) 60 MG capsule; Take 1 capsule by mouth Daily. For stress    Pain of right hip joint  -     Ambulatory Referral to Orthopedic Surgery  -     XR Hip With or Without Pelvis 2 - 3  View Right    Lacunar stroke  Comments:  on asa. BP control as above    Right hand tendonitis  Comments:  likely from weakness, suggested PT, she wants to wait until hip better.  Adv. to xercise regularly with stress ball.    Other orders  -     aspirin 81 MG EC tablet; Take 81 mg by mouth Daily.    Reviewed Neurology notes and recent imaging done.         Total time spent today with Zohra Hall  was 45 minutes (level 5).  Of this time, 75% was spent face-to-face time coordinating care, answering her questions and counseling regarding pathophysiology of her presenting problem along with plans for any diagnositc work-up and treatment.  Return in about 4 weeks (around 1/30/2019) for Next scheduled follow up.

## 2019-01-03 ENCOUNTER — HOSPITAL ENCOUNTER (OUTPATIENT)
Dept: GENERAL RADIOLOGY | Facility: HOSPITAL | Age: 79
Discharge: HOME | End: 2019-01-03
Attending: INTERNAL MEDICINE | Admitting: INTERNAL MEDICINE

## 2019-01-03 PROCEDURE — 73502 X-RAY EXAM HIP UNI 2-3 VIEWS: CPT

## 2019-01-10 ENCOUNTER — OFFICE VISIT (OUTPATIENT)
Dept: NEUROLOGY | Facility: CLINIC | Age: 79
End: 2019-01-10

## 2019-01-10 VITALS
HEIGHT: 65 IN | HEART RATE: 56 BPM | SYSTOLIC BLOOD PRESSURE: 132 MMHG | BODY MASS INDEX: 27.16 KG/M2 | DIASTOLIC BLOOD PRESSURE: 84 MMHG | OXYGEN SATURATION: 99 % | WEIGHT: 163 LBS

## 2019-01-10 DIAGNOSIS — G25.0 BENIGN ESSENTIAL TREMOR: Primary | ICD-10-CM

## 2019-01-10 PROCEDURE — 99213 OFFICE O/P EST LOW 20 MIN: CPT | Performed by: PSYCHIATRY & NEUROLOGY

## 2019-01-10 RX ORDER — PRIMIDONE 50 MG/1
TABLET ORAL
Qty: 90 TABLET | Refills: 5 | Status: SHIPPED | OUTPATIENT
Start: 2019-01-10 | End: 2019-07-25 | Stop reason: SDUPTHER

## 2019-01-10 NOTE — PROGRESS NOTES
Subjective:    CC: Zohra Hall is seen today for Tremors       HPI:  Current visit-patient tells me today that her hand tremors have nearly resolved and she only notices them on fine movements such as writing.  She still has chin tremors although they have also improved since she started taking primidone.  Currently on a dose of 100 mg at night which she is tolerating well.  She has also started to take aspirin 81 mg as prescribed by me for chronic lacunar infarcts and Cymbalta given to her by her PCP for anxiety and depression.    Last visit- since her last visit she has noticed a marked improvement in her hand tremors after starting primidone now at 50 mg.  Her chin tremors have also improved but she still has them.  She denies having any side effects with primidone.  She had an MRI brain that showed chronic ischemic changes as well as chronic lacunar infarcts bilaterally in the corona radiata but no other acute intracranial abnormalities.  She is also currently being worked up for renal failure and has an appointment with her nephrologist.      Initial visit- 77-year-old female with a history of hypertension, hyperlipidemia, thyroid cancer status post radiation in remission for the past 6 years, arthritis, depression, low back pain status post spinal fusion at L3, 4, 5 several years ago presents with tremors.  As per patient she started having tremors about 6 weeks ago.  She denies any recent changes in medications and has never been on any antipsychotic medications.  Her tremors are mainly in her chin, tongue and both hands.  They become worse on doing tasks such as reaching out for objects.  Otherwise there are no aggravating or relieving factors.  She does not have any family history of tremors.  She also reports to having balance problems for the past 6-8 months.  Was found to be anemic when she started to have balance problems with dizziness. She is still undergoing workup for that.  She has also had a  10 pound weight loss in the past 3-4 months.  Denies any history of constipation or acting out her dreams.  Has mild anosmia since her thyroid surgery.       The following portions of the patient's history were reviewed today and updated as of 01/10/2019  : allergies, current medications, past family history, past medical history, past social history, past surgical history and problem list  These document will be scanned to patient's chart.      Current Outpatient Medications:   •  alendronate (FOSAMAX) 70 MG tablet, TAKE 1 TABLET BY MOUTH  EVERY 7 DAYS, Disp: 12 tablet, Rfl: 0  •  amLODIPine (NORVASC) 5 MG tablet, TAKE 1 TABLET BY MOUTH  DAILY, Disp: 90 tablet, Rfl: 0  •  aspirin 81 MG EC tablet, Take 81 mg by mouth Daily., Disp: , Rfl:   •  atenolol (TENORMIN) 50 MG tablet, TAKE 1 TABLET BY MOUTH  DAILY, Disp: 90 tablet, Rfl: 0  •  atorvastatin (LIPITOR) 40 MG tablet, TAKE 1 TABLET BY MOUTH  DAILY, Disp: 90 tablet, Rfl: 0  •  diclofenac (VOLTAREN) 75 MG EC tablet, TAKE 1 TABLET BY MOUTH  DAILY, Disp: 90 tablet, Rfl: 0  •  DULoxetine (CYMBALTA) 60 MG capsule, Take 1 capsule by mouth Daily. For stress, Disp: 30 capsule, Rfl: 5  •  irbesartan (AVAPRO) 150 MG tablet, Take 1 tablet by mouth Daily., Disp: 30 tablet, Rfl: 5  •  levothyroxine (SYNTHROID, LEVOTHROID) 112 MCG tablet, Take 1 tablet by mouth Daily., Disp: , Rfl:   •  metroNIDAZOLE (METROCREAM) 0.75 % cream, Apply  topically 2 (Two) Times a Day., Disp: 60 g, Rfl: 2  •  omeprazole (priLOSEC) 20 MG capsule, TAKE 1 CAPSULE BY MOUTH  DAILY, Disp: 90 capsule, Rfl: 0  •  primidone (MYSOLINE) 50 MG tablet, Take 3 tablets at night, Disp: 90 tablet, Rfl: 5  •  tiZANidine (ZANAFLEX) 2 MG tablet, Take 1 tablet by mouth At Night As Needed for Muscle Spasms., Disp: 30 tablet, Rfl: 5  •  triamterene-hydrochlorothiazide (MAXZIDE-25) 37.5-25 MG per tablet, TAKE 1/2 TO 1 TABLET BY  MOUTH DAILY AS NEEDED FOR  EDEMA, Disp: 90 tablet, Rfl: 1  •  vitamin B-12 (CYANOCOBALAMIN) 1000  "MCG tablet, Take 1,000 mcg by mouth Daily., Disp: , Rfl:    Past Medical History:   Diagnosis Date   • Breast cancer (CMS/HCC) 1987    left- pt states \"in situ\", no radiation, Tamoxifen for 5 years   • Cellulitis    • Cervical lymphadenopathy    • Chest pain    • Convulsions (CMS/HCC)    • GERD (gastroesophageal reflux disease)    • Glossitis    • Grief reaction     · Last Impression: 29 Jan 2015  counselled, given ativan for prn use.  Aleah Regan   • Hypertension    • Lower back pain    • Oral thrush    • Papillary adenocarcinoma (CMS/HCC)     Papillary adenocarcinoma of thyroid   • Papillary adenocarcinoma of thyroid (CMS/HCC)     · resection Ramirez- 1/13,  2 x 2 x 1.5 cm  T3 N1 MXpost op low calcium and diminished  voiceablation Ain- 3/7 metastatic nodes and invasion to strap muscles · Last Impression: 29 Oct 2014  s/p Eval by berry Méndez.  Aleah Regan (Internal   • Piriformis syndrome    • Tingling    • Xerostomia       Past Surgical History:   Procedure Laterality Date   • BREAST BIOPSY Right 10/10/2013    stereo bx   • BREAST BIOPSY Left 10/19/2015    stereo bx   • BREAST CYST EXCISION      right   • BREAST EXCISIONAL BIOPSY Right     affirm bx and loc 2016.   • BREAST LUMPECTOMY Left 1987   • HYSTERECTOMY  1979   • TOTAL HIP ARTHROPLASTY     • TOTAL THYROIDECTOMY      Thyroid Surgery Total Thyroidectomy      Family History   Problem Relation Age of Onset   • Breast cancer Mother 84   • Cancer Mother    • BRCA 1/2 Neg Hx    • Colon cancer Neg Hx    • Endometrial cancer Neg Hx    • Ovarian cancer Neg Hx       Social History     Socioeconomic History   • Marital status:      Spouse name: Not on file   • Number of children: Not on file   • Years of education: Not on file   • Highest education level: Not on file   Social Needs   • Financial resource strain: Not on file   • Food insecurity - worry: Not on file   • Food insecurity - inability: Not on file   • Transportation needs - medical: " "Not on file   • Transportation needs - non-medical: Not on file   Occupational History   • Not on file   Tobacco Use   • Smoking status: Former Smoker     Packs/day: 1.00     Years: 30.00     Pack years: 30.00     Types: Cigarettes     Last attempt to quit: 1992     Years since quittin.3   • Smokeless tobacco: Never Used   Substance and Sexual Activity   • Alcohol use: No   • Drug use: No   • Sexual activity: Defer   Other Topics Concern   • Not on file   Social History Narrative   • Not on file     Review of Systems   Cardiovascular: Positive for leg swelling.   Endocrine: Positive for cold intolerance.   Musculoskeletal: Positive for arthralgias and back pain.   Neurological: Positive for tremors.   Hematological: Bruises/bleeds easily.   All other systems reviewed and are negative.      Objective:    /84 (BP Location: Right arm, Patient Position: Sitting, Cuff Size: Adult)   Pulse 56   Ht 165.1 cm (65\")   Wt 73.9 kg (163 lb)   SpO2 99%   BMI 27.12 kg/m²     Neurology Exam:    General apperance: NAD.     Mental status: Alert, awake and oriented to time place and person.    Recent and Remote memory: Intact.    Attention span and Concentration: Normal.     Language and Speech: Intact- No dysarthria.    Fluency, Naming , Repitition and Comprehension:  Intact    Cranial Nerves:   CN II: Visual fields are full. Intact. Fundi - Normal, No papillederma, Pupils - MELI  CN III, IV and VI: Extraocular movements are intact. Normal saccades.   CN V: Facial sensation is intact.   CN VII: Muscles of facial expression reveal no asymmetry. Intact.   CN VIII: Hearing is intact. Whispered voice intact.   CN IX and X: Palate elevates symmetrically. Intact  CN XI: Shoulder shrug is intact.   CN XII: Tongue is midline without evidence of atrophy or fasciculation.     Ophthalmoscopic exam of optic disc-normal    Motor:-Mild chin tremors noted   Right UE muscle strength 5/5. Normal tone.     Left UE muscle strength " 5/5. Normal tone.      Right LE muscle strength5/5. Normal tone.     Left LE muscle strength 5/5. Normal tone.      Sensory: Normal light touch, vibration and pinprick sensation bilaterally.    DTRs: 2+ bilaterally in upper and lower extremities.    Babinski: Negative bilaterally.    Co-ordination: Normal finger-to-nose, heel to shin B/L.    Rhomberg: Negative.    Gait: Normal.    Cardiovascular: Regular rate and rhythm without murmur, gallop or rub.    Assessment and Plan:  1. Benign essential tremor  I will gradually increase her dose of primidone to 150 mg daily however I have told her to stay at a dose of 925 mg if that helps       Return in about 3 months (around 4/10/2019).       Angy Gallagher MD

## 2019-02-04 ENCOUNTER — OFFICE VISIT (OUTPATIENT)
Dept: INTERNAL MEDICINE | Facility: CLINIC | Age: 79
End: 2019-02-04

## 2019-02-04 VITALS
HEIGHT: 65 IN | WEIGHT: 153 LBS | HEART RATE: 54 BPM | SYSTOLIC BLOOD PRESSURE: 124 MMHG | OXYGEN SATURATION: 99 % | DIASTOLIC BLOOD PRESSURE: 62 MMHG | BODY MASS INDEX: 25.49 KG/M2

## 2019-02-04 DIAGNOSIS — I10 ESSENTIAL HYPERTENSION: ICD-10-CM

## 2019-02-04 DIAGNOSIS — M25.551 PAIN OF BOTH HIP JOINTS: ICD-10-CM

## 2019-02-04 DIAGNOSIS — I63.81 LACUNAR STROKE (HCC): Primary | ICD-10-CM

## 2019-02-04 DIAGNOSIS — Z78.0 POSTMENOPAUSAL: ICD-10-CM

## 2019-02-04 DIAGNOSIS — N17.9 ACUTE RENAL FAILURE, UNSPECIFIED ACUTE RENAL FAILURE TYPE (HCC): ICD-10-CM

## 2019-02-04 DIAGNOSIS — M25.552 PAIN OF BOTH HIP JOINTS: ICD-10-CM

## 2019-02-04 LAB
ANION GAP SERPL CALCULATED.3IONS-SCNC: 10 MMOL/L (ref 3–11)
BUN BLD-MCNC: 32 MG/DL (ref 9–23)
BUN/CREAT SERPL: 20.4 (ref 7–25)
CALCIUM SPEC-SCNC: 9.5 MG/DL (ref 8.7–10.4)
CHLORIDE SERPL-SCNC: 92 MMOL/L (ref 99–109)
CO2 SERPL-SCNC: 24 MMOL/L (ref 20–31)
CREAT BLD-MCNC: 1.57 MG/DL (ref 0.6–1.3)
GFR SERPL CREATININE-BSD FRML MDRD: 32 ML/MIN/1.73
GLUCOSE BLD-MCNC: 94 MG/DL (ref 70–100)
POTASSIUM BLD-SCNC: 4.8 MMOL/L (ref 3.5–5.5)
SODIUM BLD-SCNC: 126 MMOL/L (ref 132–146)

## 2019-02-04 PROCEDURE — 80048 BASIC METABOLIC PNL TOTAL CA: CPT | Performed by: INTERNAL MEDICINE

## 2019-02-04 PROCEDURE — 99214 OFFICE O/P EST MOD 30 MIN: CPT | Performed by: INTERNAL MEDICINE

## 2019-02-04 NOTE — PROGRESS NOTES
Hypertension (Med change) and Depression (Med change)    Subjective   Zohra Hall is a 78 y.o. female is here today for follow-up.    History of Present Illness   Here for a follow up on her HTN and chronic stress. Feels like she is doing better on the cymbalta.  BP better today.  B/ hips feel Adan on 2/19.    Current Outpatient Medications:   •  alendronate (FOSAMAX) 70 MG tablet, TAKE 1 TABLET BY MOUTH  EVERY 7 DAYS, Disp: 12 tablet, Rfl: 0  •  amLODIPine (NORVASC) 5 MG tablet, TAKE 1 TABLET BY MOUTH  DAILY, Disp: 90 tablet, Rfl: 0  •  aspirin 81 MG EC tablet, Take 81 mg by mouth Daily., Disp: , Rfl:   •  atenolol (TENORMIN) 50 MG tablet, TAKE 1 TABLET BY MOUTH  DAILY, Disp: 90 tablet, Rfl: 0  •  atorvastatin (LIPITOR) 40 MG tablet, TAKE 1 TABLET BY MOUTH  DAILY, Disp: 90 tablet, Rfl: 0  •  diclofenac (VOLTAREN) 75 MG EC tablet, TAKE 1 TABLET BY MOUTH  DAILY, Disp: 90 tablet, Rfl: 0  •  DULoxetine (CYMBALTA) 60 MG capsule, Take 1 capsule by mouth Daily. For stress, Disp: 30 capsule, Rfl: 5  •  irbesartan (AVAPRO) 150 MG tablet, Take 1 tablet by mouth Daily., Disp: 30 tablet, Rfl: 5  •  levothyroxine (SYNTHROID, LEVOTHROID) 112 MCG tablet, Take 1 tablet by mouth Daily., Disp: , Rfl:   •  metroNIDAZOLE (METROCREAM) 0.75 % cream, Apply  topically 2 (Two) Times a Day., Disp: 60 g, Rfl: 2  •  omeprazole (priLOSEC) 20 MG capsule, TAKE 1 CAPSULE BY MOUTH  DAILY, Disp: 90 capsule, Rfl: 0  •  primidone (MYSOLINE) 50 MG tablet, Take 3 tablets at night, Disp: 90 tablet, Rfl: 5  •  tiZANidine (ZANAFLEX) 2 MG tablet, Take 1 tablet by mouth At Night As Needed for Muscle Spasms., Disp: 30 tablet, Rfl: 5  •  triamterene-hydrochlorothiazide (MAXZIDE-25) 37.5-25 MG per tablet, TAKE 1/2 TO 1 TABLET BY  MOUTH DAILY AS NEEDED FOR  EDEMA, Disp: 90 tablet, Rfl: 1  •  vitamin B-12 (CYANOCOBALAMIN) 1000 MCG tablet, Take 1,000 mcg by mouth Daily., Disp: , Rfl:       The following portions of the patient's history were reviewed  "and updated as appropriate: allergies, current medications, past family history, past medical history, past social history, past surgical history and problem list.    Review of Systems   Constitutional: Negative for chills, fatigue and fever.   HENT: Negative for ear discharge, ear pain, sinus pressure and sore throat.    Respiratory: Negative for cough, chest tightness and shortness of breath.    Cardiovascular: Negative for chest pain, palpitations and leg swelling.   Gastrointestinal: Negative for diarrhea, nausea and vomiting.   Endocrine: Negative for heat intolerance and polydipsia.   Genitourinary: Negative for frequency and pelvic pain.   Musculoskeletal: Positive for arthralgias and gait problem (due to hip pain). Negative for back pain, joint swelling and myalgias.   Neurological: Positive for tremors. Negative for dizziness, syncope and headaches.   Psychiatric/Behavioral: Negative for confusion, dysphoric mood and sleep disturbance.       Objective   /62   Pulse 54   Ht 165.1 cm (65\")   Wt 69.4 kg (153 lb)   SpO2 99% Comment: ra  BMI 25.46 kg/m²   Physical Exam   Constitutional: She is oriented to person, place, and time. She appears well-developed and well-nourished.   HENT:   Head: Normocephalic and atraumatic.   Mouth/Throat: No oropharyngeal exudate.   Eyes: Conjunctivae are normal. Pupils are equal, round, and reactive to light.   Neck: Neck supple. No thyromegaly present.   Cardiovascular: Normal rate and regular rhythm.   Pulmonary/Chest: Effort normal and breath sounds normal.   Musculoskeletal: She exhibits tenderness. She exhibits no edema.   Neurological: She is alert and oriented to person, place, and time. No cranial nerve deficit.   Hand tremors significantly better,  Jaw tremor minimal.   Skin: Skin is warm and dry.   Psychiatric: She has a normal mood and affect. Judgment normal.   Nursing note and vitals reviewed.        Results for orders placed or performed in visit on " 02/04/19   Basic Metabolic Panel   Result Value Ref Range    Glucose 94 70 - 100 mg/dL    BUN 32 (H) 9 - 23 mg/dL    Creatinine 1.57 (H) 0.60 - 1.30 mg/dL    Sodium 126 (L) 132 - 146 mmol/L    Potassium 4.8 3.5 - 5.5 mmol/L    Chloride 92 (L) 99 - 109 mmol/L    CO2 24.0 20.0 - 31.0 mmol/L    Calcium 9.5 8.7 - 10.4 mg/dL    eGFR Non African Amer 32 (L) >60 mL/min/1.73    BUN/Creatinine Ratio 20.4 7.0 - 25.0    Anion Gap 10.0 3.0 - 11.0 mmol/L             Assessment/Plan   Diagnoses and all orders for this visit:    Lacunar stroke  Comments:  Monitored by Neurology. Continue blood pressure control.    Essential hypertension  Comments:  Improved on avapro. continue curent meds.  Orders:  -     Basic Metabolic Panel    Postmenopausal  -     DEXA Bone Density Axial    Pain of both hip joints  -     Ambulatory Referral to Physical Therapy      Adv. To proceed with PT, when waiting on Ortho appointment.       Abnormal bmp,? Dehydration. Increase fluids and recheck in 1 week.    Return in about 3 months (around 5/4/2019) for Next scheduled follow up.

## 2019-02-05 ENCOUNTER — TRANSCRIBE ORDERS (OUTPATIENT)
Dept: ADMINISTRATIVE | Facility: HOSPITAL | Age: 79
End: 2019-02-05

## 2019-02-05 DIAGNOSIS — Z12.31 VISIT FOR SCREENING MAMMOGRAM: Primary | ICD-10-CM

## 2019-02-06 ENCOUNTER — TELEPHONE (OUTPATIENT)
Dept: INTERNAL MEDICINE | Facility: CLINIC | Age: 79
End: 2019-02-06

## 2019-02-06 NOTE — TELEPHONE ENCOUNTER
----- Message from Aleah Regan MD sent at 2/5/2019 12:07 AM EST -----  Please make sure patient is aware of the following [facility] message:    Your kidney function and electrolytes show you are very dehydrated.    Please increase your fluids to 80- 100 oz daily and stop by for recheck labs in 1 week, no appointment needed.

## 2019-02-11 ENCOUNTER — LAB (OUTPATIENT)
Dept: INTERNAL MEDICINE | Facility: CLINIC | Age: 79
End: 2019-02-11

## 2019-02-11 DIAGNOSIS — N17.9 ACUTE RENAL FAILURE, UNSPECIFIED ACUTE RENAL FAILURE TYPE (HCC): ICD-10-CM

## 2019-02-11 LAB
ANION GAP SERPL CALCULATED.3IONS-SCNC: 4 MMOL/L (ref 3–11)
BUN BLD-MCNC: 20 MG/DL (ref 9–23)
BUN/CREAT SERPL: 18.7 (ref 7–25)
CALCIUM SPEC-SCNC: 9.3 MG/DL (ref 8.7–10.4)
CHLORIDE SERPL-SCNC: 97 MMOL/L (ref 99–109)
CO2 SERPL-SCNC: 29 MMOL/L (ref 20–31)
CREAT BLD-MCNC: 1.07 MG/DL (ref 0.6–1.3)
GFR SERPL CREATININE-BSD FRML MDRD: 49 ML/MIN/1.73
GLUCOSE BLD-MCNC: 89 MG/DL (ref 70–100)
POTASSIUM BLD-SCNC: 4.8 MMOL/L (ref 3.5–5.5)
SODIUM BLD-SCNC: 130 MMOL/L (ref 132–146)

## 2019-02-11 PROCEDURE — 80048 BASIC METABOLIC PNL TOTAL CA: CPT | Performed by: INTERNAL MEDICINE

## 2019-02-12 ENCOUNTER — HOSPITAL ENCOUNTER (OUTPATIENT)
Dept: PHYSICAL THERAPY | Facility: HOSPITAL | Age: 79
Setting detail: THERAPIES SERIES
Discharge: HOME | End: 2019-02-12

## 2019-02-12 DIAGNOSIS — M25.552 BILATERAL HIP PAIN: Primary | ICD-10-CM

## 2019-02-12 DIAGNOSIS — M25.551 BILATERAL HIP PAIN: Primary | ICD-10-CM

## 2019-02-12 PROCEDURE — 97161 PT EVAL LOW COMPLEX 20 MIN: CPT | Performed by: PHYSICAL THERAPIST

## 2019-02-12 NOTE — THERAPY EVALUATION
"    Outpatient Physical Therapy Ortho Initial Evaluation  [facility]     Patient Name: Zohra Hall  : 1940  MRN: [id number]  Today's Date: 2019      Visit Date: 2019    Patient Active Problem List   Diagnosis   • Generalized osteoarthritis   • Iron deficiency   • Vitamin D deficiency   • Skin neoplasm   • Sialadenitis   • Restless leg syndrome   • Reaction to chronic stress   • Piriformis syndrome   • Papillary adenocarcinoma of thyroid (CMS/HCC)   • Hypothyroidism (acquired)   • Hypertension   • GERD without esophagitis   • Dyslipidemia   • Cervical lymphadenopathy   • Cellulitis   • Atherosclerosis of aorta (CMS/HCC)   • Breast mass   • Aortic valve stenosis   • Incontinence of bowel   • Chronic diastolic heart failure (CMS/HCC)   • Acute right-sided low back pain without sciatica   • Benign essential tremor   • Pain of right hip joint   • Thyroid cancer (CMS/HCC)   • Lacunar stroke        Past Medical History:   Diagnosis Date   • Breast cancer (CMS/HCC)     left- pt states \"in situ\", no radiation, Tamoxifen for 5 years   • Cellulitis    • Cervical lymphadenopathy    • Chest pain    • Convulsions (CMS/HCC)    • GERD (gastroesophageal reflux disease)    • Glossitis    • Grief reaction     · Last Impression: 2015  counselled, given ativan for prn use.  Aleah Regan   • Hypertension    • Lower back pain    • Oral thrush    • Papillary adenocarcinoma (CMS/HCC)     Papillary adenocarcinoma of thyroid   • Papillary adenocarcinoma of thyroid (CMS/HCC)     · resection Ramirez- 113,  2 x 2 x 1.5 cm  T3 N1 MXpost op low calcium and diminished  voiceablation Ain- 3/ metastatic nodes and invasion to strap muscles · Last Impression: 29 Oct 2014  s/p Eval by berry Méndez.  Aleah Regan (Internal   • Piriformis syndrome    • Tingling    • Xerostomia         Past Surgical History:   Procedure Laterality Date   • BREAST BIOPSY Right 10/10/2013    stereo bx   • BREAST BIOPSY " Left 10/19/2015    stereo bx   • BREAST CYST EXCISION      right   • BREAST EXCISIONAL BIOPSY Right     affirm bx and loc 2016.   • BREAST LUMPECTOMY Left 1987   • HYSTERECTOMY  1979   • TOTAL HIP ARTHROPLASTY     • TOTAL THYROIDECTOMY      Thyroid Surgery Total Thyroidectomy       Visit Dx:     ICD-10-CM ICD-9-CM   1. Bilateral hip pain M25.551 719.45    M25.552        Patient History     Row Name 02/12/19 1400             History    Chief Complaint  Pain  -CR      Type of Pain  Hip pain  -CR      Date Current Problem(s) Began  02/12/17  -CR      Brief Description of Current Complaint  Client reports 2 years ago after losing , she underwent period of inactivity that has lead to bilateral hip pain left > right.  She reports sleeping on hips are bothersome.  History includes lumbar laminectomy and spinal fusion.  Chief complaint is low back and bilateral hip pain, limiting functional activity.    -CR      Patient/Caregiver Goals  Relieve pain;Improve mobility  -CR      Current Tobacco Use  none  -CR      Smoking Status  none  -CR      Patient's Rating of General Health  Good  -CR      Hand Dominance  right-handed  -CR      Occupation/sports/leisure activities  sewing  -CR      Patient seeing anyone else for problem(s)?  no  -CR      How has patient tried to help current problem?  none  -CR      What clinical tests have you had for this problem?  X-ray  -CR      Results of Clinical Tests  osteoarthritis   -CR      Surgery/Hospitalization  none  -CR      History of Previous Related Injuries  lumbar laminectomy and fusion around 2003  -CR      Are you or can you be pregnant  No  -CR         Pain     Pain Location  Hip  -CR      Pain at Present  3  -CR      Pain at Worst  10  -CR      Pain Frequency  Several days a week;Constant/continuous  -CR      Pain Description  Aching  -CR      What Performance Factors Make the Current Problem(s) WORSE?  walking, sitting, laying on affected side   -CR      Tolerance Time-  Sitting  10 minutes  -CR      Tolerance Time- Walking  20 feet  -CR      Is your sleep disturbed?  Yes  -CR      Is medication used to assist with sleep?  No  -CR      Difficulties at work?  n/a  -CR      Difficulties with ADL's?  yes, household chores  -CR      Multiple Pain Sites  No  -CR         Fall Risk Assessment    Any falls in the past year:  No  -CR      Does patient have a fear of falling  No  -CR         Services    Prior Rehab/Home Health Experiences  No  -CR      Do you plan to receive Home Health services in the near future  No  -CR         Daily Activities    Primary Language  English  -CR      Are you able to read  Yes  -CR      Are you able to write  Yes  -CR      How does patient learn best?  Demonstration  -CR      Teaching needs identified  Home Exercise Program;Management of Condition  -CR      Patient is concerned about/has problems with  Performing home management (household chores, shopping, care of dependents)  -CR      Does patient have problems with the following?  None  -CR      Barriers to learning  None  -CR      Functional Status  mobility issues preventing performance of daily activities;grooming  -CR      Pt Participated in POC and Goals  Yes  -CR         Safety    Are you being hurt, hit, or frightened by anyone at home or in your life?  No  -CR      Are you being neglected by a caregiver  No  -CR        User Key  (r) = Recorded By, (t) = Taken By, (c) = Cosigned By    Initials Name Provider Type    Brady Ramey PT Physical Therapist          PT Ortho     Row Name 02/12/19 1400       Special Tests/Palpation    Special Tests/Palpation  Hip  -CR       Hip/Thigh Palpation    Hip/Thigh Palpation?  Yes  -CR    Gluteus Medius  Right:;Tender  -CR    SI  Right:;Tender  -CR       Hip Special Tests    LOLITA (hip vs SI pathology)  Right:;Positive  -CR    Hip scour test (labral vs hip pathology)  Right:;Positive  -CR       General ROM    Head/Neck/Trunk  Trunk Extension;Trunk  Flexion;Trunk Lt Lateral Flexion;Trunk Rt Lateral Flexion  -CR    RT Lower Ext  Rt Hip ABduction;Rt Hip Flexion;Rt Hip External Rotation;Rt Hip Internal Rotation  -CR    LT Lower Ext  Lt Hip ABduction;Lt Hip Flexion;Lt Hip External Rotation;Lt Hip Internal Rotation  -CR       Head/Neck/Trunk    Trunk Extension AROM  mod restriction  -CR    Trunk Flexion AROM  min restriction  -CR    Trunk Lt Lateral Flexion AROM  mod restriction right sided low back pain   -CR    Trunk Rt Lateral Flexion AROM  mod restriction  right sided low back pain   -CR       Right Lower Ext    Rt Hip ABduction PROM  30 adductor stretch  -CR    Rt Hip Flexion PROM  95  -CR    Rt Hip External Rotation PROM  50  -CR    Rt Hip Internal Rotation PROM  10 right hip/groin pain   -CR       Left Lower Ext    Lt Hip ABduction PROM  30 adductor stretch  -CR    Lt Hip Flexion PROM  100  -CR    Lt Hip External Rotation PROM  50  -CR    Lt Hip Internal Rotation PROM  25  -CR       MMT (Manual Muscle Testing)    Rt Lower Ext  Rt Hip Flexion;Rt Hip ADduction;Rt Hip ABduction;Rt Hip Extension  -CR    Lt Lower Ext  Lt Hip Flexion;Lt Hip Extension;Lt Hip ABduction;Lt Hip ADduction  -CR       MMT Right Lower Ext    Rt Hip Flexion MMT, Gross Movement  (4-/5) good minus  -CR    Rt Hip Extension MMT, Gross Movement  (3+/5) fair plus  -CR    Rt Hip ABduction MMT, Gross Movement  (3+/5) fair plus  -CR    Rt Hip ADduction MMT, Gross Movement  (4-/5) good minus  -CR       MMT Left Lower Ext    Lt Hip Flexion MMT, Gross Movement  (4-/5) good minus  -CR    Lt Hip Extension MMT, Gross Movement  (3+/5) fair plus  -CR    Lt Hip ABduction MMT, Gross Movement  (3+/5) fair plus  -CR    Lt Hip ADduction MMT, Gross Movement  (4-/5) good minus  -CR       Flexibility    Flexibility Tested?  Lower Extremity  -CR       Lower Extremity Flexibility    Hamstrings  Mildly limited  -CR    Hip Flexors  Moderately limited  -CR    Quadriceps  Moderately limited  -CR       Transfers     Sit-Stand [place] (Transfers)  independent  -CR    Stand-Sit [place] (Transfers)  independent  -CR       Gait/Stairs Assessment/Training    [place] Level (Gait)  independent  -CR      User Key  (r) = Recorded By, (t) = Taken By, (c) = Cosigned By    Initials Name Provider Type    Brady Ramey PT Physical Therapist                      Therapy Education  Education Details: Written HEP provided to client including hip abduction, hip adduction, HS stretching, and bridging TE for hip strengthening   Given: HEP, Symptoms/condition management, Posture/body mechanics  Program: New  How Provided: Verbal, Demonstration, Written  Provided to: Patient  Level of Understanding: Verbalized, Demonstrated     PT OP Goals     Row Name 02/12/19 1400          PT Short Term Goals    STG Date to Achieve  03/12/19  -CR     STG 1  Client will be independent with HEP   -CR     STG 1 Progress  New  -CR     STG 2  Client will demontrate pain free PROM right hip   -CR     STG 2 Progress  New  -CR     STG 3  Client will demonstrate 4/5 hip extension strength  -CR     STG 3 Progress  New  -CR     STG 4  Client will report decreased TTP right posterior hip  -CR     STG 4 Progress  New  -CR        Long Term Goals    LTG Date to Achieve  04/09/19  -CR     LTG 1  Client will complete house chores with min pain right hip  -CR     LTG 1 Progress  New  -CR     LTG 2  Client will report OSW limited < 20%   -CR     LTG 2 Progress  New  -CR        Time Calculation    PT Goal Re-Cert Due Date  05/13/19  -CR       User Key  (r) = Recorded By, (t) = Taken By, (c) = Cosigned By    Initials Name Provider Type    Brady Ramey PT Physical Therapist          PT Assessment/Plan     Row Name 02/12/19 1446          PT Assessment    Functional Limitations  Limitation in home management;Performance in self-care ADL  -CR     Impairments  Muscle strength;Impaired flexibility;Pain;Joint integrity;Poor body mechanics;Joint  mobility;Locomotion;Range of motion  -CR     Assessment Comments  77 yo client arrives with chief complaint of bilateral hip pain R > L contributing to functional mobility limtiations.  She demonstrates deficits in PROM, strength, and functional use of right LE for ambulation and housechores.  Signs and symptoms are consistent with contribution of right hip to chief complaint.   Client is appropriate for skilled therapy services to address impairments and progress towards goals.    -CR     Please refer to paper survey for additional self-reported information  Yes  -CR     Rehab Potential  Fair  -CR     Patient/caregiver participated in establishment of treatment plan and goals  Yes  -CR     Patient would benefit from skilled therapy intervention  Yes  -CR        PT Plan    PT Frequency  1x/week;2x/week  -CR     Predicted Duration of Therapy Intervention (Therapy Eval)  12 visits   -CR     Planned CPT's?  PT EVAL LOW COMPLEXITY: 11083;PT RE-EVAL: 64945;PT MANUAL THERAPY EA 15 MIN: 13728;PT THER PROC EA 15 MIN: 03742;PT NEUROMUSC RE-EDUCATION EA 15 MIN: 28626;PT SELF CARE/HOME MGMT/TRAIN EA 15: 65998;PT GAIT TRAINING EA 15 MIN: 45502;PT THER ACT EA 15 MIN: 63094;PT SELF CARE/MGMT/TRAIN 15 MIN: 48468  -CR     Physical Therapy Interventions (Optional Details)  home exercise program;joint mobilization;manual therapy techniques;lumbar stabilization;neuromuscular re-education;stretching;strengthening;ROM (Range of Motion);patient/family education  -CR     PT Plan Comments  1-2x/week for hip ROM, strengthening, functional mobility training, and development of HEP   -CR       User Key  (r) = Recorded By, (t) = Taken By, (c) = Cosigned By    Initials Name Provider Type    Brady Ramey, PT Physical Therapist                              Outcome Measure Options: Lower Extremity Functional Scale (LEFS)  Lower Extremity Functional Index  Walking between rooms: Quite a bit of difficulty  Performing heavy activities  around your home: Quite a bit of difficulty  Getting into or out of a car: Moderate difficulty  Walking 2 blocks: Extreme difficulty or unable to perform activity  Walking a mile: Extreme difficulty or unable to perform activity  Sitting for 1 hour: Extreme difficulty or unable to perform activity  Running on even ground: Extreme difficulty or unable to perform activity  Running on uneven ground: Extreme difficulty or unable to perform activity  Making sharp turns while running fast: Extreme difficulty or unable to perform activity  Hopping: Extreme difficulty or unable to perform activity  Rolling over in bed: Quite a bit of difficulty      Time Calculation:     Therapy Suggested Charges     Code   Minutes Charges    None             Start Time: 1400     Therapy Charges for Today     Code Description Service Date Service Provider Modifiers Qty    66311138919 HC PT EVAL LOW COMPLEXITY 3 2/12/2019 Brady Haynes, PT GP 1          PT G-Codes  Outcome Measure Options: Lower Extremity Functional Scale (LEFS)         Brady Haynes, PT  2/12/2019

## 2019-02-18 ENCOUNTER — HOSPITAL ENCOUNTER (OUTPATIENT)
Dept: PHYSICAL THERAPY | Facility: HOSPITAL | Age: 79
Setting detail: THERAPIES SERIES
Discharge: HOME | End: 2019-02-18

## 2019-02-18 DIAGNOSIS — M25.552 BILATERAL HIP PAIN: Primary | ICD-10-CM

## 2019-02-18 DIAGNOSIS — M25.551 BILATERAL HIP PAIN: Primary | ICD-10-CM

## 2019-02-18 PROCEDURE — 97110 THERAPEUTIC EXERCISES: CPT | Performed by: PHYSICAL THERAPIST

## 2019-02-18 NOTE — THERAPY TREATMENT NOTE
"    Outpatient Physical Therapy Ortho Treatment Note  [facility]     Patient Name: Zohra Hall  : 1940  MRN: [id number]  Today's Date: 2019      Visit Date: 2019    Visit Dx:    ICD-10-CM ICD-9-CM   1. Bilateral hip pain M25.551 719.45    M25.552        Patient Active Problem List   Diagnosis   • Generalized osteoarthritis   • Iron deficiency   • Vitamin D deficiency   • Skin neoplasm   • Sialadenitis   • Restless leg syndrome   • Reaction to chronic stress   • Piriformis syndrome   • Papillary adenocarcinoma of thyroid (CMS/HCC)   • Hypothyroidism (acquired)   • Hypertension   • GERD without esophagitis   • Dyslipidemia   • Cervical lymphadenopathy   • Cellulitis   • Atherosclerosis of aorta (CMS/HCC)   • Breast mass   • Aortic valve stenosis   • Incontinence of bowel   • Chronic diastolic heart failure (CMS/HCC)   • Acute right-sided low back pain without sciatica   • Benign essential tremor   • Pain of right hip joint   • Thyroid cancer (CMS/HCC)   • Lacunar stroke        Past Medical History:   Diagnosis Date   • Breast cancer (CMS/HCC) 1987    left- pt states \"in situ\", no radiation, Tamoxifen for 5 years   • Cellulitis    • Cervical lymphadenopathy    • Chest pain    • Convulsions (CMS/HCC)    • GERD (gastroesophageal reflux disease)    • Glossitis    • Grief reaction     · Last Impression: 2015  counselled, given ativan for prn use.  Aleah Regan   • Hypertension    • Lower back pain    • Oral thrush    • Papillary adenocarcinoma (CMS/HCC)     Papillary adenocarcinoma of thyroid   • Papillary adenocarcinoma of thyroid (CMS/HCC)     · resection Ramirez- ,  2 x 2 x 1.5 cm  T3 N1 MXpost op low calcium and diminished  voiceablation Ain- 3/ metastatic nodes and invasion to strap muscles · Last Impression: 29 Oct 2014  s/p Eval by berry Méndez.  Aleah Regan (Internal   • Piriformis syndrome    • Tingling    • Xerostomia         Past Surgical History:   Procedure " Laterality Date   • BREAST BIOPSY Right 10/10/2013    stereo bx   • BREAST BIOPSY Left 10/19/2015    stereo bx   • BREAST CYST EXCISION      right   • BREAST EXCISIONAL BIOPSY Right     affirm bx and loc 2016.   • BREAST LUMPECTOMY Left 1987   • HYSTERECTOMY  1979   • TOTAL HIP ARTHROPLASTY     • TOTAL THYROIDECTOMY      Thyroid Surgery Total Thyroidectomy                       PT Assessment/Plan     Row Name 02/18/19 1546          PT Assessment    Assessment Comments  Client tolerating TE interventions well today with required frequent rest breaks in session. Verbal cueing for hip fili pattern and utilization of glutes in transfers educated in session today.  Hip strength/endurance playing role in fatigue and limitations in functional mobility tolerance.  Client will benefit from ongoing progression with endurance work in session.   -CR        PT Plan    PT Plan Comments  Progress with care  -CR       User Key  (r) = Recorded By, (t) = Taken By, (c) = Cosigned By    Initials Name Provider Type    CR Brady Haynes, PT Physical Therapist              Exercises     Row Name 02/18/19 1400             Subjective Comments    Subjective Comments  States not able to work on home exercises  -CR         Subjective Pain    Able to rate subjective pain?  yes  -CR      Pre-Treatment Pain Level  2  -CR         Total Minutes    98390 - PT Therapeutic Exercise Minutes  40  -CR         Exercise 1    Exercise Name 1  Hamstring / Quad stretching   -CR      Sets 1  5  -CR         Exercise 2    Exercise Name 2  Prone hip extension  -CR      Sets 2  1  -CR      Reps 2  20  -CR      Additional Comments  bilaterally   -CR         Exercise 3    Exercise Name 3  clamshells  -CR      Sets 3  1  -CR      Reps 3  30  -CR      Additional Comments  red t band  -CR         Exercise 4    Exercise Name 4  Hip adduction and bridging  -CR      Reps 4  30  -CR         Exercise 5    Exercise Name 5  Sit to stands  -CR      Reps 5  10  -CR          Exercise 6    Exercise Name 6  NU step  -CR      Additional Comments  6'  -CR         Exercise 7    Exercise Name 7  Standing hip abduction  -CR      Sets 7  2  -CR      Reps 7  10  -CR         Exercise 8    Exercise Name 8  standing marching   -CR      Sets 8  2  -CR      Reps 8  10  -CR        User Key  (r) = Recorded By, (t) = Taken By, (c) = Cosigned By    Initials Name Provider Type    CR Brady Haynes, PT Physical Therapist                                            Time Calculation:   Start Time: 0430  Therapy Suggested Charges     Code   Minutes Charges    91485 (CPT®) Hc Pt Neuromusc Re Education Ea 15 Min      90419 (CPT®) Hc Pt Ther Proc Ea 15 Min 40 3    85877 (CPT®) Hc Gait Training Ea 15 Min      81223 (CPT®) Hc Pt Therapeutic Act Ea 15 Min      65413 (CPT®) Hc Pt Manual Therapy Ea 15 Min      38029 (CPT®) Hc Pt Ther Massage- Per 15 Min      57017 (CPT®) Hc Pt Iontophoresis Ea 15 Min      43754 (CPT®) Hc Pt Elec Stim Ea-Per 15 Min      86091 (CPT®) Hc Pt Ultrasound Ea 15 Min      77854 (CPT®) Hc Pt Self Care/Mgmt/Train Ea 15 Min      40881 (CPT®) Hc Pt Prosthetic (S) Train Initial Encounter, Each 15 Min      80627 (CPT®) Hc Orthotic(S) Mgmt/Train Initial Encounter, Each 15min      68210 (CPT®) Hc Pt Aquatic Therapy Ea 15 Min      49114 (CPT®) Hc Pt Orthotic(S)/Prosthetic(S) Encounter, Each 15 Min       (CPT®) Hc Pt Electrical Stim Unattended      Total  40 3        Therapy Charges for Today     Code Description Service Date Service Provider Modifiers Qty    08538141224 HC PT THER PROC EA 15 MIN 2/18/2019 Brady Haynes, PT GP 3                    Brady Haynes, PT  2/18/2019

## 2019-02-20 ENCOUNTER — HOSPITAL ENCOUNTER (OUTPATIENT)
Dept: PHYSICAL THERAPY | Facility: HOSPITAL | Age: 79
Setting detail: THERAPIES SERIES
Discharge: HOME | End: 2019-02-20

## 2019-02-20 DIAGNOSIS — M25.551 BILATERAL HIP PAIN: Primary | ICD-10-CM

## 2019-02-20 DIAGNOSIS — M25.552 BILATERAL HIP PAIN: Primary | ICD-10-CM

## 2019-02-20 PROCEDURE — 97110 THERAPEUTIC EXERCISES: CPT | Performed by: PHYSICAL THERAPIST

## 2019-02-20 NOTE — THERAPY TREATMENT NOTE
"    Outpatient Physical Therapy Ortho Treatment Note  [facility]     Patient Name: Zohra Hall  : 1940  MRN: [id number]  Today's Date: 2019      Visit Date: 2019    Visit Dx:    ICD-10-CM ICD-9-CM   1. Bilateral hip pain M25.551 719.45    M25.552        Patient Active Problem List   Diagnosis   • Generalized osteoarthritis   • Iron deficiency   • Vitamin D deficiency   • Skin neoplasm   • Sialadenitis   • Restless leg syndrome   • Reaction to chronic stress   • Piriformis syndrome   • Papillary adenocarcinoma of thyroid (CMS/HCC)   • Hypothyroidism (acquired)   • Hypertension   • GERD without esophagitis   • Dyslipidemia   • Cervical lymphadenopathy   • Cellulitis   • Atherosclerosis of aorta (CMS/HCC)   • Breast mass   • Aortic valve stenosis   • Incontinence of bowel   • Chronic diastolic heart failure (CMS/HCC)   • Acute right-sided low back pain without sciatica   • Benign essential tremor   • Pain of right hip joint   • Thyroid cancer (CMS/HCC)   • Lacunar stroke        Past Medical History:   Diagnosis Date   • Breast cancer (CMS/HCC) 1987    left- pt states \"in situ\", no radiation, Tamoxifen for 5 years   • Cellulitis    • Cervical lymphadenopathy    • Chest pain    • Convulsions (CMS/HCC)    • GERD (gastroesophageal reflux disease)    • Glossitis    • Grief reaction     · Last Impression: 2015  counselled, given ativan for prn use.  Aleah Regan   • Hypertension    • Lower back pain    • Oral thrush    • Papillary adenocarcinoma (CMS/HCC)     Papillary adenocarcinoma of thyroid   • Papillary adenocarcinoma of thyroid (CMS/HCC)     · resection Ramirez- ,  2 x 2 x 1.5 cm  T3 N1 MXpost op low calcium and diminished  voiceablation Ain- 3/ metastatic nodes and invasion to strap muscles · Last Impression: 29 Oct 2014  s/p Eval by berry Méndez.  Aleah Regan (Internal   • Piriformis syndrome    • Tingling    • Xerostomia         Past Surgical History:   Procedure " Laterality Date   • BREAST BIOPSY Right 10/10/2013    stereo bx   • BREAST BIOPSY Left 10/19/2015    stereo bx   • BREAST CYST EXCISION      right   • BREAST EXCISIONAL BIOPSY Right     affirm bx and loc 2016.   • BREAST LUMPECTOMY Left 1987   • HYSTERECTOMY  1979   • TOTAL HIP ARTHROPLASTY     • TOTAL THYROIDECTOMY      Thyroid Surgery Total Thyroidectomy       PT Ortho     Row Name 02/20/19 1500       Posture/Observations    Posture/Observations Comments  Tenderness to palpation along anterior left hip, hip flexors and TFL   -CR      User Key  (r) = Recorded By, (t) = Taken By, (c) = Cosigned By    Initials Name Provider Type    Brady Ramey, PT Physical Therapist                      PT Assessment/Plan     Row Name 02/20/19 1517          PT Assessment    Assessment Comments  Client experiencing anterior left hip soreness that appears to be muscular in nature and benefits in session by heat and stretching.  Recreation of complaint with standing hip abduction work, and discussion with client regarding reps and sets of TE as well as natural progression of muscle sorness in session today. Todays soreness is likely contributed to initiation of new TE program and anticipate this will decrease in irritability over 2-3 sessions.   -CR        PT Plan    PT Plan Comments  Progres with care, assess left hip soreness, flexibility included in program   -CR       User Key  (r) = Recorded By, (t) = Taken By, (c) = Cosigned By    Initials Name Provider Type    Brady Ramey, PT Physical Therapist          Modalities     Row Name 02/20/19 1500             Moist Heat    MH Applied  Yes  -CR      Location  left anterior hip  -CR      Rx Minutes  10 mins  -CR      MH Prior to Rx  Yes  -CR        User Key  (r) = Recorded By, (t) = Taken By, (c) = Cosigned By    Initials Name Provider Type    Brady Ramey, PT Physical Therapist          Exercises     Row Name 02/20/19 1500             Subjective Comments     Subjective Comments  Client reports soreness along   -CR         Subjective Pain    Able to rate subjective pain?  yes  -CR      Pre-Treatment Pain Level  4  -CR      Post-Treatment Pain Level  4  -CR         Total Minutes    31351 - PT Therapeutic Exercise Minutes  30  -CR         Exercise 1    Exercise Name 1  Hamstring / Quad stretching   -CR      Sets 1  5  -CR         Exercise 5    Exercise Name 5  Sit to stands  -CR      Reps 5  10  -CR         Exercise 6    Exercise Name 6  NU step  -CR      Additional Comments  6'  -CR         Exercise 7    Exercise Name 7  Standing hip abduction  -CR      Sets 7  2  -CR      Reps 7  10  -CR         Exercise 9    Exercise Name 9  Bacilio stretch off edge of mat  -CR      Sets 9  5  -CR        User Key  (r) = Recorded By, (t) = Taken By, (c) = Cosigned By    Initials Name Provider Type    Brady Ramye PT Physical Therapist                                            Time Calculation:   Start Time: 1430  Therapy Suggested Charges     Code   Minutes Charges    73319 (CPT®) Hc Pt Neuromusc Re Education Ea 15 Min      69570 (CPT®) Hc Pt Ther Proc Ea 15 Min 30 2    59677 (CPT®) Hc Gait Training Ea 15 Min      02704 (CPT®) Hc Pt Therapeutic Act Ea 15 Min      93999 (CPT®) Hc Pt Manual Therapy Ea 15 Min      11930 (CPT®) Hc Pt Ther Massage- Per 15 Min      30546 (CPT®) Hc Pt Iontophoresis Ea 15 Min      99879 (CPT®) Hc Pt Elec Stim Ea-Per 15 Min      68118 (CPT®) Hc Pt Ultrasound Ea 15 Min      84334 (CPT®) Hc Pt Self Care/Mgmt/Train Ea 15 Min      62453 (CPT®) Hc Pt Prosthetic (S) Train Initial Encounter, Each 15 Min      41540 (CPT®) Hc Orthotic(S) Mgmt/Train Initial Encounter, Each 15min      00436 (CPT®) Hc Pt Aquatic Therapy Ea 15 Min      83154 (CPT®) Hc Pt Orthotic(S)/Prosthetic(S) Encounter, Each 15 Min       (CPT®) Hc Pt Electrical Stim Unattended      Total  30 2        Therapy Charges for Today     Code Description Service Date Service Provider Modifiers  Qty    43831187511  PT THER PROC EA 15 MIN 2/20/2019 Brady Haynes, PT GP 2                    Brady Haynes, PT  2/20/2019

## 2019-02-21 ENCOUNTER — HOSPITAL ENCOUNTER (OUTPATIENT)
Dept: BONE DENSITY | Facility: HOSPITAL | Age: 79
Discharge: HOME | End: 2019-02-21
Attending: INTERNAL MEDICINE | Admitting: INTERNAL MEDICINE

## 2019-02-21 PROCEDURE — 77080 DXA BONE DENSITY AXIAL: CPT

## 2019-02-27 ENCOUNTER — HOSPITAL ENCOUNTER (OUTPATIENT)
Dept: PHYSICAL THERAPY | Facility: HOSPITAL | Age: 79
Setting detail: THERAPIES SERIES
Discharge: HOME | End: 2019-02-27

## 2019-02-27 DIAGNOSIS — M25.551 BILATERAL HIP PAIN: Primary | ICD-10-CM

## 2019-02-27 DIAGNOSIS — M25.552 BILATERAL HIP PAIN: Primary | ICD-10-CM

## 2019-02-27 PROCEDURE — 97535 SELF CARE MNGMENT TRAINING: CPT | Performed by: PHYSICAL THERAPIST

## 2019-02-27 NOTE — THERAPY TREATMENT NOTE
"    Outpatient Physical Therapy Ortho Treatment Note  [facility]     Patient Name: Zohra Hall  : 1940  MRN: [id number]  Today's Date: 2019      Visit Date: 2019    Visit Dx:    ICD-10-CM ICD-9-CM   1. Bilateral hip pain M25.551 719.45    M25.552        Patient Active Problem List   Diagnosis   • Generalized osteoarthritis   • Iron deficiency   • Vitamin D deficiency   • Skin neoplasm   • Sialadenitis   • Restless leg syndrome   • Reaction to chronic stress   • Piriformis syndrome   • Papillary adenocarcinoma of thyroid (CMS/HCC)   • Hypothyroidism (acquired)   • Hypertension   • GERD without esophagitis   • Dyslipidemia   • Cervical lymphadenopathy   • Cellulitis   • Atherosclerosis of aorta (CMS/HCC)   • Breast mass   • Aortic valve stenosis   • Incontinence of bowel   • Chronic diastolic heart failure (CMS/HCC)   • Acute right-sided low back pain without sciatica   • Benign essential tremor   • Pain of right hip joint   • Thyroid cancer (CMS/HCC)   • Lacunar stroke        Past Medical History:   Diagnosis Date   • Breast cancer (CMS/HCC) 1987    left- pt states \"in situ\", no radiation, Tamoxifen for 5 years   • Cellulitis    • Cervical lymphadenopathy    • Chest pain    • Convulsions (CMS/HCC)    • GERD (gastroesophageal reflux disease)    • Glossitis    • Grief reaction     · Last Impression: 2015  counselled, given ativan for prn use.  Aleah Regan   • Hypertension    • Lower back pain    • Oral thrush    • Papillary adenocarcinoma (CMS/HCC)     Papillary adenocarcinoma of thyroid   • Papillary adenocarcinoma of thyroid (CMS/HCC)     · resection Ramirez- ,  2 x 2 x 1.5 cm  T3 N1 MXpost op low calcium and diminished  voiceablation Ain- 3/ metastatic nodes and invasion to strap muscles · Last Impression: 29 Oct 2014  s/p Eval by berry Méndez.  Aleah Regan (Internal   • Piriformis syndrome    • Tingling    • Xerostomia         Past Surgical History:   Procedure " Laterality Date   • BREAST BIOPSY Right 10/10/2013    stereo bx   • BREAST BIOPSY Left 10/19/2015    stereo bx   • BREAST CYST EXCISION      right   • BREAST EXCISIONAL BIOPSY Right     affirm bx and loc 2016.   • BREAST LUMPECTOMY Left 1987   • HYSTERECTOMY  1979   • TOTAL HIP ARTHROPLASTY     • TOTAL THYROIDECTOMY      Thyroid Surgery Total Thyroidectomy                       PT Assessment/Plan     Row Name 02/27/19 1502          PT Assessment    Assessment Comments  Client arrives today to discuss POC and managment of condition with recent development of plan to undergo DENISE.  She is unaware of exact date for DENISE completion, however will have intracapsular hip injection next week and plans to follow up [facility] PT services post injection to discuss further POC.  Discussion today with client re: self managment, pain management, and ongoing self care pre operatively.    -CR        PT Plan    PT Plan Comments  Follow up post injection.    -CR       User Key  (r) = Recorded By, (t) = Taken By, (c) = Cosigned By    Initials Name Provider Type    Brady Ramey, PT Physical Therapist              Exercises     Row Name 02/27/19 1400             Subjective Comments    Subjective Comments  Client states she met with ortho and has decided to undergo DENISE.  She states this will not occur for > 1 month however will have an injection completed next week.. She indicates plan for injection and will folllow up wt therapy to addres POC following injection.     -CR         Subjective Pain    Able to rate subjective pain?  yes  -CR      Pre-Treatment Pain Level  5  -CR      Post-Treatment Pain Level  5  -CR        User Key  (r) = Recorded By, (t) = Taken By, (c) = Cosigned By    Initials Name Provider Type    Brady Ramey, PT Physical Therapist                             Therapy Education  Education Details: Discussion with client re: POC, expectations and outcomes s/p DENISE, as well as HEP and ongoing pain management  suggestions.  Client indicates plan to follow up with PT after injection.    Given: Symptoms/condition management, Pain management, HEP  Program: Reinforced  36347 - PT Self Care/Mgmt Minutes: 15              Time Calculation:   Start Time: 1430  Therapy Suggested Charges     Code   Minutes Charges    54736 (CPT®) Hc Pt Neuromusc Re Education Ea 15 Min      10751 (CPT®) Hc Pt Ther Proc Ea 15 Min      15100 (CPT®) Hc Gait Training Ea 15 Min      93614 (CPT®) Hc Pt Therapeutic Act Ea 15 Min      88518 (CPT®) Hc Pt Manual Therapy Ea 15 Min      28151 (CPT®) Hc Pt Ther Massage- Per 15 Min      57709 (CPT®) Hc Pt Iontophoresis Ea 15 Min      91746 (CPT®) Hc Pt Elec Stim Ea-Per 15 Min      41960 (CPT®) Hc Pt Ultrasound Ea 15 Min      24264 (CPT®) Hc Pt Self Care/Mgmt/Train Ea 15 Min 15 1    64000 (CPT®) Hc Pt Prosthetic (S) Train Initial Encounter, Each 15 Min      26970 (CPT®) Hc Orthotic(S) Mgmt/Train Initial Encounter, Each 15min      12648 (CPT®) Hc Pt Aquatic Therapy Ea 15 Min      55625 (CPT®) Hc Pt Orthotic(S)/Prosthetic(S) Encounter, Each 15 Min       (CPT®) Hc Pt Electrical Stim Unattended      Total  15 1        Therapy Charges for Today     Code Description Service Date Service Provider Modifiers Qty    18088200101 HC PT SELF CARE/MGMT/TRAIN EA 15 MIN 2/27/2019 Brady Haynes, PT GP 1                    Brady Haynes, PT  2/27/2019

## 2019-03-14 ENCOUNTER — TELEPHONE (OUTPATIENT)
Dept: INTERNAL MEDICINE | Facility: CLINIC | Age: 79
End: 2019-03-14

## 2019-03-14 NOTE — TELEPHONE ENCOUNTER
PTS BLOOD PRESSURE HAS BEEN RUNNING HIGH 200/98 SHE WANTED TO KNOW WHAT SHE NEEDS TO DO. IF SHE NEEDS TO CHANGE HER MEDICI ATION TO SOMETHING ELSE     PLEASE CALL [phone]

## 2019-03-14 NOTE — TELEPHONE ENCOUNTER
Pt states her bp has been running 216/90 or around there since her appt in Jan.  Has appt for fu here 4/22/18, advised pt that she needs seen sooner, sched appt for 3/18/19 tbs with Dr Regan.

## 2019-03-19 ENCOUNTER — DOCUMENTATION (OUTPATIENT)
Dept: PHYSICAL THERAPY | Facility: HOSPITAL | Age: 79
End: 2019-03-19

## 2019-03-19 ENCOUNTER — OFFICE VISIT (OUTPATIENT)
Dept: INTERNAL MEDICINE | Facility: CLINIC | Age: 79
End: 2019-03-19

## 2019-03-19 VITALS — DIASTOLIC BLOOD PRESSURE: 76 MMHG | HEART RATE: 56 BPM | SYSTOLIC BLOOD PRESSURE: 174 MMHG | OXYGEN SATURATION: 98 %

## 2019-03-19 DIAGNOSIS — M66.241 NONTRAUMATIC RUPTURE OF EXTENSOR TENDONS OF RIGHT HAND AND WRIST: Primary | ICD-10-CM

## 2019-03-19 DIAGNOSIS — M25.551 BILATERAL HIP PAIN: Primary | ICD-10-CM

## 2019-03-19 DIAGNOSIS — M81.0 AGE-RELATED OSTEOPOROSIS WITHOUT CURRENT PATHOLOGICAL FRACTURE: ICD-10-CM

## 2019-03-19 DIAGNOSIS — I10 ESSENTIAL HYPERTENSION: ICD-10-CM

## 2019-03-19 DIAGNOSIS — M25.552 BILATERAL HIP PAIN: Primary | ICD-10-CM

## 2019-03-19 DIAGNOSIS — M66.231 NONTRAUMATIC RUPTURE OF EXTENSOR TENDONS OF RIGHT HAND AND WRIST: Primary | ICD-10-CM

## 2019-03-19 DIAGNOSIS — I63.81 LACUNAR STROKE (HCC): ICD-10-CM

## 2019-03-19 PROCEDURE — 99213 OFFICE O/P EST LOW 20 MIN: CPT | Performed by: INTERNAL MEDICINE

## 2019-03-19 RX ORDER — ALENDRONATE SODIUM 70 MG/1
70 TABLET ORAL
Qty: 12 TABLET | Refills: 1 | Status: SHIPPED | OUTPATIENT
Start: 2019-03-19 | End: 2019-06-18 | Stop reason: SDUPTHER

## 2019-03-19 RX ORDER — ATENOLOL 50 MG/1
50 TABLET ORAL DAILY
Qty: 90 TABLET | Refills: 1 | Status: SHIPPED | OUTPATIENT
Start: 2019-03-19 | End: 2019-06-18 | Stop reason: SDUPTHER

## 2019-03-19 RX ORDER — IRBESARTAN 150 MG/1
150 TABLET ORAL 2 TIMES DAILY
Qty: 60 TABLET | Refills: 5 | Status: SHIPPED | OUTPATIENT
Start: 2019-03-19 | End: 2019-03-28 | Stop reason: CLARIF

## 2019-03-19 RX ORDER — AMLODIPINE BESYLATE 5 MG/1
5 TABLET ORAL DAILY
Qty: 90 TABLET | Refills: 1 | Status: SHIPPED | OUTPATIENT
Start: 2019-03-19 | End: 2019-06-18 | Stop reason: SDUPTHER

## 2019-03-19 RX ORDER — ATORVASTATIN CALCIUM 40 MG/1
40 TABLET, FILM COATED ORAL DAILY
Qty: 90 TABLET | Refills: 1 | Status: SHIPPED | OUTPATIENT
Start: 2019-03-19 | End: 2019-06-18 | Stop reason: SDUPTHER

## 2019-03-19 RX ORDER — DICLOFENAC SODIUM 75 MG/1
75 TABLET, DELAYED RELEASE ORAL DAILY
Qty: 90 TABLET | Refills: 1 | Status: SHIPPED | OUTPATIENT
Start: 2019-03-19 | End: 2019-03-29

## 2019-03-19 NOTE — THERAPY DISCHARGE NOTE
Outpatient Physical Therapy Discharge Summary         Patient Name: Zohra Hall  : 1940  MRN: [id number]    Today's Date: 3/19/2019    Visit Dx:    ICD-10-CM ICD-9-CM   1. Bilateral hip pain M25.551 719.45    M25.552        PT OP Goals     Row Name 19 0900          PT Short Term Goals    STG Date to Achieve  19  -CR     STG 1  Client will be independent with HEP   -CR     STG 1 Progress  Met  -CR     STG 2  Client will demontrate pain free PROM right hip   -CR     STG 2 Progress  Not Met  -CR     STG 3  Client will demonstrate 4/5 hip extension strength  -CR     STG 3 Progress  Not Met  -CR     STG 4  Client will report decreased TTP right posterior hip  -CR     STG 4 Progress  Not Met  -CR        Long Term Goals    LTG Date to Achieve  19  -CR     LTG 1  Client will complete house chores with min pain right hip  -CR     LTG 1 Progress  New  -CR     LTG 2  Client will report OSW limited < 20%   -CR     LTG 2 Progress  New  -CR       User Key  (r) = Recorded By, (t) = Taken By, (c) = Cosigned By    Initials Name Provider Type    Brady Ramey, PT Physical Therapist          OP PT Discharge Summary  Date of Discharge: 19  Reason for Discharge: Patient/Caregiver request  Outcomes Achieved: Patient able to partially acheive established goals, Unable to tolerate or actively participate in therapy  Discharge Destination: Home with home program  Discharge Instructions/Additional Comments: Client to persue DENISE per report.  WIll restart therapy services post intervention.       Brady Haynes, PT  3/19/2019

## 2019-03-19 NOTE — PROGRESS NOTES
Elevated Blood Pressure and Bleeding/Bruising (of hand)    Subjective   Zohra Hall is a 78 y.o. female is here today for follow-up.    History of Present Illness   Rt. Hand  Swelling and pain , seen at [facility]- dxed with broken blood vessel. Seems to be getting worse.   BP has been high , bith at [facility] and since.   No known injury but notes bent down to  something and felt something snap in the back of her hand    Current Outpatient Medications:   •  alendronate (FOSAMAX) 70 MG tablet, Take 1 tablet by mouth Every 7 (Seven) Days., Disp: 12 tablet, Rfl: 1  •  amLODIPine (NORVASC) 5 MG tablet, Take 1 tablet by mouth Daily., Disp: 90 tablet, Rfl: 1  •  atenolol (TENORMIN) 50 MG tablet, Take 1 tablet by mouth Daily., Disp: 90 tablet, Rfl: 1  •  atorvastatin (LIPITOR) 40 MG tablet, Take 1 tablet by mouth Daily., Disp: 90 tablet, Rfl: 1  •  diclofenac (VOLTAREN) 75 MG EC tablet, Take 1 tablet by mouth Daily., Disp: 90 tablet, Rfl: 1  •  DULoxetine (CYMBALTA) 60 MG capsule, Take 1 capsule by mouth Daily. For stress, Disp: 30 capsule, Rfl: 5  •  irbesartan (AVAPRO) 150 MG tablet, Take 1 tablet by mouth 2 (Two) Times a Day., Disp: 60 tablet, Rfl: 5  •  levothyroxine (SYNTHROID, LEVOTHROID) 112 MCG tablet, Take 1 tablet by mouth Daily., Disp: , Rfl:   •  metroNIDAZOLE (METROCREAM) 0.75 % cream, Apply  topically 2 (Two) Times a Day., Disp: 60 g, Rfl: 2  •  omeprazole (priLOSEC) 20 MG capsule, TAKE 1 CAPSULE BY MOUTH  DAILY, Disp: 90 capsule, Rfl: 0  •  primidone (MYSOLINE) 50 MG tablet, Take 3 tablets at night, Disp: 90 tablet, Rfl: 5  •  tiZANidine (ZANAFLEX) 2 MG tablet, Take 1 tablet by mouth At Night As Needed for Muscle Spasms., Disp: 30 tablet, Rfl: 5  •  triamterene-hydrochlorothiazide (MAXZIDE-25) 37.5-25 MG per tablet, TAKE 1/2 TO 1 TABLET BY  MOUTH DAILY AS NEEDED FOR  EDEMA, Disp: 90 tablet, Rfl: 1  •  vitamin B-12 (CYANOCOBALAMIN) 1000 MCG tablet, Take 1,000 mcg by mouth Daily., Disp: , Rfl:       The  following portions of the patient's history were reviewed and updated as appropriate: allergies, current medications, past family history, past medical history, past social history, past surgical history and problem list.    Review of Systems   Constitutional: Positive for activity change and fatigue. Negative for chills and fever.   HENT: Negative for ear discharge, ear pain, sinus pressure and sore throat.    Respiratory: Negative for cough, chest tightness and shortness of breath.    Cardiovascular: Negative for chest pain, palpitations and leg swelling.   Gastrointestinal: Negative for diarrhea, nausea and vomiting.   Musculoskeletal: Negative for arthralgias, back pain and myalgias.   Skin: Positive for color change. Negative for rash and wound.   Neurological: Positive for tremors. Negative for dizziness, syncope and headaches.   Hematological: Bruises/bleeds easily.   Psychiatric/Behavioral: Negative for confusion and sleep disturbance.       Objective   /76   Pulse 56   SpO2 98% Comment: ra  Physical Exam   Constitutional: She is oriented to person, place, and time. She appears well-developed and well-nourished.   HENT:   Head: Normocephalic and atraumatic.   Mouth/Throat: No oropharyngeal exudate.   Eyes: Conjunctivae are normal. Pupils are equal, round, and reactive to light.   Cardiovascular: Normal rate and regular rhythm.   Pulmonary/Chest: Effort normal and breath sounds normal.   Abdominal: Soft. Bowel sounds are normal.   Musculoskeletal: She exhibits no edema.        Arms:       Right hand: She exhibits decreased range of motion, tenderness, deformity and swelling. Decreased strength noted. She exhibits finger abduction.        Hands:  Neurological: She is alert and oriented to person, place, and time. No cranial nerve deficit.   Skin: Skin is warm and dry. Bruising and ecchymosis noted. There is erythema.   Rt hand   Psychiatric: She has a normal mood and affect. Judgment normal.   Nursing  note and vitals reviewed.        Results for orders placed or performed in visit on 02/11/19   Basic Metabolic Panel   Result Value Ref Range    Glucose 89 70 - 100 mg/dL    BUN 20 9 - 23 mg/dL    Creatinine 1.07 0.60 - 1.30 mg/dL    Sodium 130 (L) 132 - 146 mmol/L    Potassium 4.8 3.5 - 5.5 mmol/L    Chloride 97 (L) 99 - 109 mmol/L    CO2 29.0 20.0 - 31.0 mmol/L    Calcium 9.3 8.7 - 10.4 mg/dL    eGFR Non African Amer 49 (L) >60 mL/min/1.73    BUN/Creatinine Ratio 18.7 7.0 - 25.0    Anion Gap 4.0 3.0 - 11.0 mmol/L             Assessment/Plan   Diagnoses and all orders for this visit:    Nontraumatic rupture of extensor tendons of right hand and wrist  Comments:  with blood vessel tear causing sxs.   Rec Ice and PT.  Orders:  -     diclofenac (VOLTAREN) 75 MG EC tablet; Take 1 tablet by mouth Daily.  -     Ambulatory Referral to Physical Therapy Evaluate and treat    Essential hypertension  Comments:  start irbesartan as uncontrolled on losartan, adv. importance of BP contol with lacunar strokes.  Orders:  -     amLODIPine (NORVASC) 5 MG tablet; Take 1 tablet by mouth Daily.  -     atenolol (TENORMIN) 50 MG tablet; Take 1 tablet by mouth Daily.  -     irbesartan (AVAPRO) 150 MG tablet; Take 1 tablet by mouth 2 (Two) Times a Day.    Lacunar stroke  -     atorvastatin (LIPITOR) 40 MG tablet; Take 1 tablet by mouth Daily.    Age-related osteoporosis without current pathological fracture  -     alendronate (FOSAMAX) 70 MG tablet; Take 1 tablet by mouth Every 7 (Seven) Days.                 Return in about 10 days (around 3/29/2019).

## 2019-03-20 ENCOUNTER — HOSPITAL ENCOUNTER (OUTPATIENT)
Dept: MAMMOGRAPHY | Facility: HOSPITAL | Age: 79
Discharge: HOME | End: 2019-03-20
Admitting: INTERNAL MEDICINE

## 2019-03-20 DIAGNOSIS — Z12.31 VISIT FOR SCREENING MAMMOGRAM: ICD-10-CM

## 2019-03-20 PROCEDURE — 77067 SCR MAMMO BI INCL CAD: CPT | Performed by: RADIOLOGY

## 2019-03-20 PROCEDURE — 77063 BREAST TOMOSYNTHESIS BI: CPT | Performed by: RADIOLOGY

## 2019-03-20 PROCEDURE — 77067 SCR MAMMO BI INCL CAD: CPT

## 2019-03-20 PROCEDURE — 77063 BREAST TOMOSYNTHESIS BI: CPT

## 2019-03-28 RX ORDER — IRBESARTAN 300 MG/1
TABLET ORAL
Qty: 30 TABLET | Refills: 5 | Status: SHIPPED | OUTPATIENT
Start: 2019-03-28 | End: 2019-03-30

## 2019-03-28 NOTE — TELEPHONE ENCOUNTER
Changing medication from 150mg bid to 300mg 1/2 tablet bid due to insurance notice. Left a detailed message for pt, and new script sent to pharmacy with message to ca script for 150mg.

## 2019-03-29 ENCOUNTER — TELEPHONE (OUTPATIENT)
Dept: INTERNAL MEDICINE | Facility: CLINIC | Age: 79
End: 2019-03-29

## 2019-03-29 ENCOUNTER — OFFICE VISIT (OUTPATIENT)
Dept: INTERNAL MEDICINE | Facility: CLINIC | Age: 79
End: 2019-03-29

## 2019-03-29 VITALS
OXYGEN SATURATION: 98 % | SYSTOLIC BLOOD PRESSURE: 172 MMHG | HEART RATE: 63 BPM | DIASTOLIC BLOOD PRESSURE: 82 MMHG | WEIGHT: 164 LBS | BODY MASS INDEX: 29.06 KG/M2 | HEIGHT: 63 IN

## 2019-03-29 DIAGNOSIS — M66.231 NONTRAUMATIC RUPTURE OF EXTENSOR TENDONS OF RIGHT HAND AND WRIST: Primary | ICD-10-CM

## 2019-03-29 DIAGNOSIS — M66.241 NONTRAUMATIC RUPTURE OF EXTENSOR TENDONS OF RIGHT HAND AND WRIST: Primary | ICD-10-CM

## 2019-03-29 DIAGNOSIS — I10 UNCONTROLLED HYPERTENSION: ICD-10-CM

## 2019-03-29 PROCEDURE — 99213 OFFICE O/P EST LOW 20 MIN: CPT | Performed by: INTERNAL MEDICINE

## 2019-03-29 RX ORDER — IRBESARTAN 300 MG/1
300 TABLET ORAL DAILY
Qty: 30 TABLET | Refills: 5 | Status: CANCELLED | OUTPATIENT
Start: 2019-03-29

## 2019-03-29 NOTE — PROGRESS NOTES
Hypertension (discussed dose change with pt from yesterday) and Bleeding/Bruising (right hand, worse)    Subjective   Zohra Hall is a 78 y.o. female is here today for follow-up.    History of Present Illness   Here for a follow up on her rt hand swelling and bruising. And Blood pressures are staying high, has not been on the 300 mg irbesartan.    Current Outpatient Medications:   •  alendronate (FOSAMAX) 70 MG tablet, Take 1 tablet by mouth Every 7 (Seven) Days., Disp: 12 tablet, Rfl: 1  •  amLODIPine (NORVASC) 5 MG tablet, Take 1 tablet by mouth Daily., Disp: 90 tablet, Rfl: 1  •  atenolol (TENORMIN) 50 MG tablet, Take 1 tablet by mouth Daily., Disp: 90 tablet, Rfl: 1  •  atorvastatin (LIPITOR) 40 MG tablet, Take 1 tablet by mouth Daily., Disp: 90 tablet, Rfl: 1  •  diclofenac (VOLTAREN) 75 MG EC tablet, Take 1 tablet by mouth Daily As Needed (joint pain)., Disp: 90 tablet, Rfl: 1  •  DULoxetine (CYMBALTA) 60 MG capsule, Take 1 capsule by mouth Daily. For stress, Disp: 30 capsule, Rfl: 5  •  levothyroxine (SYNTHROID, LEVOTHROID) 112 MCG tablet, Take 1 tablet by mouth Daily., Disp: , Rfl:   •  metroNIDAZOLE (METROCREAM) 0.75 % cream, Apply  topically 2 (Two) Times a Day., Disp: 60 g, Rfl: 2  •  omeprazole (priLOSEC) 20 MG capsule, TAKE 1 CAPSULE BY MOUTH  DAILY, Disp: 90 capsule, Rfl: 0  •  primidone (MYSOLINE) 50 MG tablet, Take 3 tablets at night, Disp: 90 tablet, Rfl: 5  •  tiZANidine (ZANAFLEX) 2 MG tablet, Take 1 tablet by mouth At Night As Needed for Muscle Spasms., Disp: 30 tablet, Rfl: 5  •  triamterene-hydrochlorothiazide (MAXZIDE-25) 37.5-25 MG per tablet, TAKE 1/2 TO 1 TABLET BY  MOUTH DAILY AS NEEDED FOR  EDEMA, Disp: 90 tablet, Rfl: 1  •  vitamin B-12 (CYANOCOBALAMIN) 1000 MCG tablet, Take 1,000 mcg by mouth Daily., Disp: , Rfl:   •  valsartan (DIOVAN) 320 MG tablet, Take 1 tablet by mouth Daily. Replaces irbesartan, Disp: 30 tablet, Rfl: 3      The following portions of the patient's history were  "reviewed and updated as appropriate: allergies, current medications, past family history, past medical history, past social history, past surgical history and problem list.    Review of Systems   Constitutional: Negative.  Negative for chills and fever.   HENT: Negative for ear discharge, ear pain, sinus pressure and sore throat.    Respiratory: Negative for cough, chest tightness and shortness of breath.    Cardiovascular: Negative for chest pain, palpitations and leg swelling.   Gastrointestinal: Negative for diarrhea, nausea and vomiting.   Musculoskeletal: Positive for arthralgias and joint swelling. Negative for back pain and myalgias.   Skin: Positive for color change.   Neurological: Negative for dizziness, syncope and headaches.   Psychiatric/Behavioral: Negative for confusion and sleep disturbance.       Objective   /82   Pulse 63   Ht 160 cm (63\")   Wt 74.4 kg (164 lb)   SpO2 98% Comment: ra  BMI 29.05 kg/m²   Physical Exam   Constitutional: She is oriented to person, place, and time. She appears well-developed and well-nourished.   HENT:   Head: Normocephalic and atraumatic.   Mouth/Throat: No oropharyngeal exudate.   Eyes: Conjunctivae are normal. Pupils are equal, round, and reactive to light.   Cardiovascular: Normal rate and regular rhythm.   Pulmonary/Chest: Effort normal and breath sounds normal.   Abdominal: Soft. Bowel sounds are normal.   Musculoskeletal: She exhibits tenderness (better). She exhibits no edema.        Arms:       Right hand: She exhibits decreased range of motion, tenderness, deformity and swelling. Decreased strength noted. She exhibits finger abduction.        Hands:  Neurological: She is alert and oriented to person, place, and time. No cranial nerve deficit.   Skin: Skin is warm and dry. Bruising and ecchymosis noted. There is erythema.   Rt hand- bruising more extesnive to fingers and wrists, but swelling better   Psychiatric: She has a normal mood and affect. " Judgment normal.   Nursing note and vitals reviewed.        Results for orders placed or performed in visit on 02/11/19   Basic Metabolic Panel   Result Value Ref Range    Glucose 89 70 - 100 mg/dL    BUN 20 9 - 23 mg/dL    Creatinine 1.07 0.60 - 1.30 mg/dL    Sodium 130 (L) 132 - 146 mmol/L    Potassium 4.8 3.5 - 5.5 mmol/L    Chloride 97 (L) 99 - 109 mmol/L    CO2 29.0 20.0 - 31.0 mmol/L    Calcium 9.3 8.7 - 10.4 mg/dL    eGFR Non African Amer 49 (L) >60 mL/min/1.73    BUN/Creatinine Ratio 18.7 7.0 - 25.0    Anion Gap 4.0 3.0 - 11.0 mmol/L             Assessment/Plan   Diagnoses and all orders for this visit:    Nontraumatic rupture of extensor tendons of right hand and wrist  Comments:  with blood vessel tear causing sxs.   Rec Ice and PT.  Orders:  -     diclofenac (VOLTAREN) 75 MG EC tablet; Take 1 tablet by mouth Daily As Needed (joint pain).    Uncontrolled hypertension  -     valsartan (DIOVAN) 320 MG tablet; Take 1 tablet by mouth Daily. Replaces irbesartan    Other orders  -     Cancel: irbesartan (AVAPRO) 300 MG tablet; Take 1 tablet by mouth Daily.    Pharmacy contacted - irbesartan 300 on backorder, so changed to diovan.             Return in about 3 weeks (around 4/19/2019).

## 2019-03-30 RX ORDER — VALSARTAN 320 MG/1
320 TABLET ORAL DAILY
Qty: 30 TABLET | Refills: 3 | Status: SHIPPED | OUTPATIENT
Start: 2019-03-30 | End: 2019-06-18 | Stop reason: SDUPTHER

## 2019-03-30 RX ORDER — DICLOFENAC SODIUM 75 MG/1
75 TABLET, DELAYED RELEASE ORAL DAILY PRN
Qty: 90 TABLET | Refills: 1 | Status: SHIPPED | OUTPATIENT
Start: 2019-03-30 | End: 2019-05-06

## 2019-04-11 ENCOUNTER — OFFICE VISIT (OUTPATIENT)
Dept: NEUROLOGY | Facility: CLINIC | Age: 79
End: 2019-04-11

## 2019-04-11 VITALS
SYSTOLIC BLOOD PRESSURE: 116 MMHG | BODY MASS INDEX: 29.06 KG/M2 | HEIGHT: 63 IN | OXYGEN SATURATION: 98 % | DIASTOLIC BLOOD PRESSURE: 70 MMHG | HEART RATE: 62 BPM | WEIGHT: 164 LBS

## 2019-04-11 DIAGNOSIS — R25.1 TREMORS OF NERVOUS SYSTEM: Primary | ICD-10-CM

## 2019-04-11 PROCEDURE — 99213 OFFICE O/P EST LOW 20 MIN: CPT | Performed by: PSYCHIATRY & NEUROLOGY

## 2019-04-11 NOTE — PROGRESS NOTES
Subjective:    CC: Zohra Hall is seen today   for Tremors       HPI:  Current visit- since her last visit her hand tremors have remained well controlled however she continues to get chin tremors.  In fact they have been a little worse in the past few days as she has been very anxious regarding her son's illness.  Continues to take primidone 150 mg nightly and is tolerating it well.    Last visit- patient tells me today that her hand tremors have nearly resolved and she only notices them on fine movements such as writing.  She still has chin tremors although they have also improved since she started taking primidone.  Currently on a dose of 100 mg at night which she is tolerating well.  She has also started to take aspirin 81 mg as prescribed by me for chronic lacunar infarcts and Cymbalta given to her by her PCP for anxiety and depression.     Last visit- since her last visit she has noticed a marked improvement in her hand tremors after starting primidone now at 50 mg.  Her chin tremors have also improved but she still has them.  She denies having any side effects with primidone.  She had an MRI brain that showed chronic ischemic changes as well as chronic lacunar infarcts bilaterally in the corona radiata but no other acute intracranial abnormalities.  She is also currently being worked up for renal failure and has an appointment with her nephrologist.      Initial visit- 77-year-old female with a history of hypertension, hyperlipidemia, thyroid cancer status post radiation in remission for the past 6 years, arthritis, depression, low back pain status post spinal fusion at L3, 4, 5 several years ago presents with tremors.  As per patient she started having tremors about 6 weeks ago.  She denies any recent changes in medications and has never been on any antipsychotic medications.  Her tremors are mainly in her chin, tongue and both hands.  They become worse on doing tasks such as reaching out for objects.   Otherwise there are no aggravating or relieving factors.  She does not have any family history of tremors.  She also reports to having balance problems for the past 6-8 months.  Was found to be anemic when she started to have balance problems with dizziness. She is still undergoing workup for that.  She has also had a 10 pound weight loss in the past 3-4 months.  Denies any history of constipation or acting out her dreams.  Has mild anosmia since her thyroid surgery.    The following portions of the patient's history were reviewed today and updated as of 04/11/2019  : allergies, current medications, past family history, past medical history, past social history, past surgical history and problem list  These document will be scanned to patient's chart.      Current Outpatient Medications:   •  alendronate (FOSAMAX) 70 MG tablet, Take 1 tablet by mouth Every 7 (Seven) Days., Disp: 12 tablet, Rfl: 1  •  amLODIPine (NORVASC) 5 MG tablet, Take 1 tablet by mouth Daily., Disp: 90 tablet, Rfl: 1  •  atenolol (TENORMIN) 50 MG tablet, Take 1 tablet by mouth Daily., Disp: 90 tablet, Rfl: 1  •  atorvastatin (LIPITOR) 40 MG tablet, Take 1 tablet by mouth Daily., Disp: 90 tablet, Rfl: 1  •  diclofenac (VOLTAREN) 75 MG EC tablet, Take 1 tablet by mouth Daily As Needed (joint pain)., Disp: 90 tablet, Rfl: 1  •  DULoxetine (CYMBALTA) 60 MG capsule, Take 1 capsule by mouth Daily. For stress, Disp: 30 capsule, Rfl: 5  •  levothyroxine (SYNTHROID, LEVOTHROID) 112 MCG tablet, Take 1 tablet by mouth Daily., Disp: , Rfl:   •  metroNIDAZOLE (METROCREAM) 0.75 % cream, Apply  topically 2 (Two) Times a Day., Disp: 60 g, Rfl: 2  •  omeprazole (priLOSEC) 20 MG capsule, TAKE 1 CAPSULE BY MOUTH  DAILY, Disp: 90 capsule, Rfl: 0  •  primidone (MYSOLINE) 50 MG tablet, Take 3 tablets at night, Disp: 90 tablet, Rfl: 5  •  tiZANidine (ZANAFLEX) 2 MG tablet, Take 1 tablet by mouth At Night As Needed for Muscle Spasms., Disp: 30 tablet, Rfl: 5  •   "triamterene-hydrochlorothiazide (MAXZIDE-25) 37.5-25 MG per tablet, TAKE 1/2 TO 1 TABLET BY  MOUTH DAILY AS NEEDED FOR  EDEMA, Disp: 90 tablet, Rfl: 1  •  valsartan (DIOVAN) 320 MG tablet, Take 1 tablet by mouth Daily. Replaces irbesartan, Disp: 30 tablet, Rfl: 3  •  vitamin B-12 (CYANOCOBALAMIN) 1000 MCG tablet, Take 1,000 mcg by mouth Daily., Disp: , Rfl:    Past Medical History:   Diagnosis Date   • Breast cancer (CMS/HCC) 1987    left- pt states \"in situ\", no radiation, Tamoxifen for 5 years   • Cellulitis    • Cervical lymphadenopathy    • Chest pain    • Convulsions (CMS/HCC)    • GERD (gastroesophageal reflux disease)    • Glossitis    • Grief reaction     · Last Impression: 29 Jan 2015  counselled, given ativan for prn use.  Aleah Regan   • Hypertension    • Lower back pain    • Oral thrush    • Papillary adenocarcinoma (CMS/HCC)     Papillary adenocarcinoma of thyroid   • Papillary adenocarcinoma of thyroid (CMS/HCC)     · resection Ramirez- 1/13,  2 x 2 x 1.5 cm  T3 N1 MXpost op low calcium and diminished  voiceablation Ain- 3/7 metastatic nodes and invasion to strap muscles · Last Impression: 29 Oct 2014  s/p Eval by berry Méndez.  Aleah Regan (Internal   • Piriformis syndrome    • Tingling    • Xerostomia       Past Surgical History:   Procedure Laterality Date   • BREAST BIOPSY Right 10/10/2013    stereo bx   • BREAST BIOPSY Left 10/19/2015    stereo bx   • BREAST CYST EXCISION      right   • BREAST EXCISIONAL BIOPSY Right     affirm bx and loc 2016.   • BREAST LUMPECTOMY Left 1987   • HYSTERECTOMY  1979   • TOTAL HIP ARTHROPLASTY     • TOTAL THYROIDECTOMY      Thyroid Surgery Total Thyroidectomy      Family History   Problem Relation Age of Onset   • Breast cancer Mother 84   • Cancer Mother    • BRCA 1/2 Neg Hx    • Colon cancer Neg Hx    • Endometrial cancer Neg Hx    • Ovarian cancer Neg Hx       Social History     Socioeconomic History   • Marital status:      Spouse name: Not " "on file   • Number of children: Not on file   • Years of education: Not on file   • Highest education level: Not on file   Tobacco Use   • Smoking status: Former Smoker     Packs/day: 1.00     Years: 30.00     Pack years: 30.00     Types: Cigarettes     Last attempt to quit: 1992     Years since quittin.5   • Smokeless tobacco: Never Used   Substance and Sexual Activity   • Alcohol use: No   • Drug use: No   • Sexual activity: Not Currently     Review of Systems   Constitutional: Positive for fatigue.   Musculoskeletal: Positive for arthralgias and gait problem.   Psychiatric/Behavioral: Positive for decreased concentration and sleep disturbance. The patient is nervous/anxious.    All other systems reviewed and are negative.      Objective:    /70 (BP Location: Right arm, Patient Position: Sitting, Cuff Size: Adult)   Pulse 62   Ht 160 cm (63\")   Wt 74.4 kg (164 lb)   SpO2 98%   BMI 29.05 kg/m²     Neurology Exam:    General apperance: NAD.     Mental status: Alert, awake and oriented to time place and person.    Recent and Remote memory: Intact.    Attention span and Concentration: Normal.     Language and Speech: Intact- No dysarthria.    Fluency, Naming , Repitition and Comprehension:  Intact    Cranial Nerves:   CN II: Visual fields are full. Intact. Fundi - Normal, No papillederma, Pupils - MELI  CN III, IV and VI: Extraocular movements are intact. Normal saccades.   CN V: Facial sensation is intact.   CN VII: Muscles of facial expression reveal no asymmetry. Intact.   CN VIII: Hearing is intact. Whispered voice intact.   CN IX and X: Palate elevates symmetrically. Intact  CN XI: Shoulder shrug is intact.   CN XII: Tongue is midline without evidence of atrophy or fasciculation.     Ophthalmoscopic exam of optic disc-normal    Motor:-No postural hand tremors noted today.  Mild chin tremors noted  Right UE muscle strength 5/5. Normal tone.     Left UE muscle strength 5/5. Normal tone.    "   Right LE muscle strength5/5. Normal tone.     Left LE muscle strength 5/5. Normal tone.      Sensory: Normal light touch, vibration and pinprick sensation bilaterally.    DTRs: 2+ bilaterally in upper and lower extremities.    Babinski: Negative bilaterally.    Co-ordination: Normal finger-to-nose, heel to shin B/L.    Rhomberg: Negative.    Gait: Normal.    Cardiovascular: Regular rate and rhythm without murmur, gallop or rub.    Assessment and Plan:  1. Tremors of nervous system  Continue primidone 150 mg however I have asked her to divide the dose into and take 50 mg in the morning and 100 mg at night.       Return in about 6 months (around 10/11/2019).     I spent over 15  minutes with the patient face to face out of which over 50% (7.5 minutes) was spent in management, instructions and education regarding her tremors.     Angy Gallagher MD

## 2019-04-15 ENCOUNTER — TRANSCRIBE ORDERS (OUTPATIENT)
Dept: LAB | Facility: HOSPITAL | Age: 79
End: 2019-04-15

## 2019-04-15 ENCOUNTER — LAB (OUTPATIENT)
Dept: LAB | Facility: HOSPITAL | Age: 79
End: 2019-04-15

## 2019-04-15 DIAGNOSIS — N18.30 CHRONIC KIDNEY DISEASE, STAGE III (MODERATE) (HCC): ICD-10-CM

## 2019-04-15 DIAGNOSIS — N18.30 CHRONIC KIDNEY DISEASE, STAGE III (MODERATE) (HCC): Primary | ICD-10-CM

## 2019-04-15 LAB
BACTERIA UR QL AUTO: ABNORMAL /HPF
BILIRUB UR QL STRIP: NEGATIVE
CLARITY UR: CLEAR
COLOR UR: ABNORMAL
GLUCOSE UR STRIP-MCNC: NEGATIVE MG/DL
HGB UR QL STRIP.AUTO: NEGATIVE
HYALINE CASTS UR QL AUTO: ABNORMAL /LPF
KETONES UR QL STRIP: ABNORMAL
LEUKOCYTE ESTERASE UR QL STRIP.AUTO: ABNORMAL
NITRITE UR QL STRIP: NEGATIVE
PH UR STRIP.AUTO: 6 [PH] (ref 5–8)
PROT UR QL STRIP: ABNORMAL
RBC # UR: ABNORMAL /HPF
REF LAB TEST METHOD: ABNORMAL
SP GR UR STRIP: 1.02 (ref 1–1.03)
SQUAMOUS #/AREA URNS HPF: ABNORMAL /HPF
UROBILINOGEN UR QL STRIP: ABNORMAL
WBC UR QL AUTO: ABNORMAL /HPF

## 2019-04-15 PROCEDURE — 80069 RENAL FUNCTION PANEL: CPT

## 2019-04-15 PROCEDURE — 36415 COLL VENOUS BLD VENIPUNCTURE: CPT

## 2019-04-15 PROCEDURE — 81001 URINALYSIS AUTO W/SCOPE: CPT | Performed by: INTERNAL MEDICINE

## 2019-04-16 LAB
ALBUMIN SERPL-MCNC: 4.4 G/DL (ref 3.5–5.2)
ANION GAP SERPL CALCULATED.3IONS-SCNC: 17.1 MMOL/L
BUN BLD-MCNC: 7 MG/DL (ref 8–23)
BUN/CREAT SERPL: 7.5 (ref 7–25)
CALCIUM SPEC-SCNC: 9.7 MG/DL (ref 8.6–10.5)
CHLORIDE SERPL-SCNC: 90 MMOL/L (ref 98–107)
CO2 SERPL-SCNC: 24.9 MMOL/L (ref 22–29)
CREAT BLD-MCNC: 0.93 MG/DL (ref 0.57–1)
GFR SERPL CREATININE-BSD FRML MDRD: 58 ML/MIN/1.73
GLUCOSE BLD-MCNC: 141 MG/DL (ref 65–99)
PHOSPHATE SERPL-MCNC: 3.3 MG/DL (ref 2.5–4.5)
POTASSIUM BLD-SCNC: 3.8 MMOL/L (ref 3.5–5.2)
SODIUM BLD-SCNC: 132 MMOL/L (ref 136–145)

## 2019-04-22 ENCOUNTER — OFFICE VISIT (OUTPATIENT)
Dept: INTERNAL MEDICINE | Facility: CLINIC | Age: 79
End: 2019-04-22

## 2019-04-22 ENCOUNTER — TELEPHONE (OUTPATIENT)
Dept: INTERNAL MEDICINE | Facility: CLINIC | Age: 79
End: 2019-04-22

## 2019-04-22 VITALS
WEIGHT: 162 LBS | SYSTOLIC BLOOD PRESSURE: 130 MMHG | HEART RATE: 62 BPM | TEMPERATURE: 98.2 F | DIASTOLIC BLOOD PRESSURE: 66 MMHG | OXYGEN SATURATION: 96 % | BODY MASS INDEX: 28.7 KG/M2

## 2019-04-22 DIAGNOSIS — M66.231 NONTRAUMATIC RUPTURE OF EXTENSOR TENDONS OF RIGHT HAND AND WRIST: Primary | ICD-10-CM

## 2019-04-22 DIAGNOSIS — M66.241 NONTRAUMATIC RUPTURE OF EXTENSOR TENDONS OF RIGHT HAND AND WRIST: Primary | ICD-10-CM

## 2019-04-22 PROCEDURE — 99214 OFFICE O/P EST MOD 30 MIN: CPT | Performed by: INTERNAL MEDICINE

## 2019-04-22 RX ORDER — KETOROLAC TROMETHAMINE 30 MG/ML
30 INJECTION, SOLUTION INTRAMUSCULAR; INTRAVENOUS ONCE
Status: SHIPPED | OUTPATIENT
Start: 2019-04-22 | End: 2019-04-27

## 2019-04-22 NOTE — PROGRESS NOTES
Hand Pain (not any better- very painful)    Subjective   Zohra Hall is a 78 y.o. female is here today for follow-up.    History of Present Illness   Pt. Initially scheduled for a preop exam for her hip surgery that is scheduled in July. Reports will not be able to use a walker with her rt. Hand due to the severe pain.  She is unabel to move her left fingers wo pain.    Current Outpatient Medications:   •  alendronate (FOSAMAX) 70 MG tablet, Take 1 tablet by mouth Every 7 (Seven) Days., Disp: 12 tablet, Rfl: 1  •  amLODIPine (NORVASC) 5 MG tablet, Take 1 tablet by mouth Daily., Disp: 90 tablet, Rfl: 1  •  atenolol (TENORMIN) 50 MG tablet, Take 1 tablet by mouth Daily., Disp: 90 tablet, Rfl: 1  •  atorvastatin (LIPITOR) 40 MG tablet, Take 1 tablet by mouth Daily., Disp: 90 tablet, Rfl: 1  •  diclofenac (VOLTAREN) 75 MG EC tablet, Take 1 tablet by mouth Daily As Needed (joint pain)., Disp: 90 tablet, Rfl: 1  •  DULoxetine (CYMBALTA) 60 MG capsule, Take 1 capsule by mouth Daily. For stress, Disp: 30 capsule, Rfl: 5  •  levothyroxine (SYNTHROID, LEVOTHROID) 112 MCG tablet, Take 1 tablet by mouth Daily., Disp: , Rfl:   •  metroNIDAZOLE (METROCREAM) 0.75 % cream, Apply  topically 2 (Two) Times a Day., Disp: 60 g, Rfl: 2  •  omeprazole (priLOSEC) 20 MG capsule, TAKE 1 CAPSULE BY MOUTH  DAILY, Disp: 90 capsule, Rfl: 0  •  primidone (MYSOLINE) 50 MG tablet, Take 3 tablets at night, Disp: 90 tablet, Rfl: 5  •  tiZANidine (ZANAFLEX) 2 MG tablet, Take 1 tablet by mouth At Night As Needed for Muscle Spasms., Disp: 30 tablet, Rfl: 5  •  triamterene-hydrochlorothiazide (MAXZIDE-25) 37.5-25 MG per tablet, TAKE 1/2 TO 1 TABLET BY  MOUTH DAILY AS NEEDED FOR  EDEMA, Disp: 90 tablet, Rfl: 1  •  valsartan (DIOVAN) 320 MG tablet, Take 1 tablet by mouth Daily. Replaces irbesartan, Disp: 30 tablet, Rfl: 3  •  vitamin B-12 (CYANOCOBALAMIN) 1000 MCG tablet, Take 1,000 mcg by mouth Daily., Disp: , Rfl:   •  Ketamine HCl powder,  Ketoprofen 10 % cream, bupivacaine PF 0.25 % solution, lidocaine 5 % ointment, Meloxicam powder, Apply to rt hand bid - tid, Disp: 60 g, Rfl: 0    Current Facility-Administered Medications:   •  ketorolac (TORADOL) injection 30 mg, 30 mg, Intramuscular, Once, Aleah Regan MD      The following portions of the patient's history were reviewed and updated as appropriate: allergies, current medications, past family history, past medical history, past social history, past surgical history and problem list.    Review of Systems   Constitutional: Negative.  Negative for chills and fever.   HENT: Negative for ear discharge, ear pain, sinus pressure and sore throat.    Respiratory: Negative for cough, chest tightness and shortness of breath.    Cardiovascular: Negative for chest pain, palpitations and leg swelling.   Gastrointestinal: Negative for diarrhea, nausea and vomiting.   Musculoskeletal: Positive for arthralgias and joint swelling. Negative for back pain and myalgias.   Skin: Positive for color change (better). Negative for wound.   Neurological: Negative for dizziness, syncope and headaches.   Psychiatric/Behavioral: Negative for confusion and sleep disturbance.       Objective   /66 (BP Location: Left arm, Patient Position: Sitting, Cuff Size: Adult)   Pulse 62   Temp 98.2 °F (36.8 °C) (Oral)   Wt 73.5 kg (162 lb)   SpO2 96%   BMI 28.70 kg/m²   Physical Exam   Constitutional: She is oriented to person, place, and time. She appears well-developed and well-nourished.   HENT:   Head: Normocephalic and atraumatic.   Mouth/Throat: No oropharyngeal exudate.   Eyes: Conjunctivae are normal. Pupils are equal, round, and reactive to light.   Cardiovascular: Normal rate and regular rhythm.   Pulmonary/Chest: Effort normal and breath sounds normal.   Abdominal: Soft. Bowel sounds are normal.   Musculoskeletal: She exhibits tenderness (persisting). She exhibits no edema.        Arms:       Right hand: She  exhibits decreased range of motion, tenderness, deformity and swelling. Decreased strength noted. She exhibits finger abduction.        Hands:  Severely restricted rom - rt hand   Neurological: She is alert and oriented to person, place, and time. No cranial nerve deficit.   Skin: Skin is warm and dry. Bruising and ecchymosis noted. There is erythema.   Rt hand- bruising and swelling better   Psychiatric: She has a normal mood and affect. Judgment normal.   Nursing note and vitals reviewed.        Results for orders placed or performed in visit on 04/15/19   Renal Function Panel   Result Value Ref Range    Glucose 141 (H) 65 - 99 mg/dL    BUN 7 (L) 8 - 23 mg/dL    Creatinine 0.93 0.57 - 1.00 mg/dL    Sodium 132 (L) 136 - 145 mmol/L    Potassium 3.8 3.5 - 5.2 mmol/L    Chloride 90 (L) 98 - 107 mmol/L    CO2 24.9 22.0 - 29.0 mmol/L    Calcium 9.7 8.6 - 10.5 mg/dL    Albumin 4.40 3.50 - 5.20 g/dL    Phosphorus 3.3 2.5 - 4.5 mg/dL    Anion Gap 17.1 mmol/L    BUN/Creatinine Ratio 7.5 7.0 - 25.0    eGFR Non African Amer 58 (L) >60 mL/min/1.73     Study Result     EXAMINATION: XR HAND 3+ VW, RIGHT-03/14/2019:      INDICATION: Hand swelling and pain.      COMPARISON: 02/07/2018.     FINDINGS: Extensive degenerative changes and joint space loss fairly  symmetric within the first CMC joint of osteoarthritis again noted.  Carpal rows grossly intact without acute fracture or dislocation.  Degenerative changes of the distal interphalangeal joints including  asymmetric subluxed appearance of the second ray DIP joint.     IMPRESSION:  No acute osseous abnormality of the right hand or visualized  wrist grossly unchanged from 02/07/2018 comparison with extensive  degenerative changes of the radial side of wrist at the first CMC joint  from osteoarthritis as well as interphalangeal degenerative disease,  greatest second ray where there is mild subluxation and asymmetric joint  space narrowing present.             Assessment/Plan    Diagnoses and all orders for this visit:    Nontraumatic rupture of extensor tendons of right hand and wrist  -     Ambulatory Referral to Hand Surgery  -     ketorolac (TORADOL) injection 30 mg  -     Ketamine HCl powder, Ketoprofen 10 % cream, bupivacaine PF 0.25 % solution, lidocaine 5 % ointment, Meloxicam powder; Apply to rt hand bid - tid    compound cream rx to help until hand surgery eval.             Return in about 1 month (around 5/22/2019) for Next scheduled follow up.

## 2019-04-22 NOTE — TELEPHONE ENCOUNTER
MS CARLOS WOULD LIKE TO KNOW IF SHE CAN RETURN TO THE OFFICE FOR THE INJECTION FOR HAND PAIN THAT WAS DISCUSSED DURING TODAY'S VISIT.

## 2019-05-06 ENCOUNTER — OFFICE VISIT (OUTPATIENT)
Dept: INTERNAL MEDICINE | Facility: CLINIC | Age: 79
End: 2019-05-06

## 2019-05-06 VITALS
BODY MASS INDEX: 28.35 KG/M2 | HEIGHT: 63 IN | WEIGHT: 160 LBS | OXYGEN SATURATION: 98 % | DIASTOLIC BLOOD PRESSURE: 90 MMHG | HEART RATE: 57 BPM | SYSTOLIC BLOOD PRESSURE: 156 MMHG

## 2019-05-06 DIAGNOSIS — M66.241 NONTRAUMATIC RUPTURE OF EXTENSOR TENDONS OF RIGHT HAND AND WRIST: Primary | ICD-10-CM

## 2019-05-06 DIAGNOSIS — E03.9 HYPOTHYROIDISM (ACQUIRED): ICD-10-CM

## 2019-05-06 DIAGNOSIS — S60.221D: ICD-10-CM

## 2019-05-06 DIAGNOSIS — M66.231 NONTRAUMATIC RUPTURE OF EXTENSOR TENDONS OF RIGHT HAND AND WRIST: Primary | ICD-10-CM

## 2019-05-06 DIAGNOSIS — I10 ESSENTIAL HYPERTENSION: ICD-10-CM

## 2019-05-06 LAB
ALBUMIN SERPL-MCNC: 4.4 G/DL (ref 3.5–5.2)
ALBUMIN/GLOB SERPL: 1.6 G/DL
ALP SERPL-CCNC: 62 U/L (ref 39–117)
ALT SERPL W P-5'-P-CCNC: 15 U/L (ref 1–33)
ANION GAP SERPL CALCULATED.3IONS-SCNC: 10.7 MMOL/L
AST SERPL-CCNC: 16 U/L (ref 1–32)
BILIRUB SERPL-MCNC: 0.3 MG/DL (ref 0.2–1.2)
BUN BLD-MCNC: 10 MG/DL (ref 8–23)
BUN/CREAT SERPL: 12 (ref 7–25)
CALCIUM SPEC-SCNC: 9 MG/DL (ref 8.6–10.5)
CHLORIDE SERPL-SCNC: 85 MMOL/L (ref 98–107)
CHOLEST SERPL-MCNC: 246 MG/DL (ref 0–200)
CO2 SERPL-SCNC: 28.3 MMOL/L (ref 22–29)
CREAT BLD-MCNC: 0.83 MG/DL (ref 0.57–1)
DEPRECATED RDW RBC AUTO: 45.4 FL (ref 37–54)
ERYTHROCYTE [DISTWIDTH] IN BLOOD BY AUTOMATED COUNT: 13.4 % (ref 12.3–15.4)
GFR SERPL CREATININE-BSD FRML MDRD: 66 ML/MIN/1.73
GLOBULIN UR ELPH-MCNC: 2.7 GM/DL
GLUCOSE BLD-MCNC: 94 MG/DL (ref 65–99)
HCT VFR BLD AUTO: 38.4 % (ref 34–46.6)
HDLC SERPL-MCNC: 93 MG/DL (ref 40–60)
HGB BLD-MCNC: 12.6 G/DL (ref 12–15.9)
LDLC SERPL CALC-MCNC: 121 MG/DL (ref 0–100)
LDLC/HDLC SERPL: 1.3 {RATIO}
MCH RBC QN AUTO: 30.1 PG (ref 26.6–33)
MCHC RBC AUTO-ENTMCNC: 32.8 G/DL (ref 31.5–35.7)
MCV RBC AUTO: 91.6 FL (ref 79–97)
PLATELET # BLD AUTO: 290 10*3/MM3 (ref 140–450)
PMV BLD AUTO: 9.4 FL (ref 6–12)
POTASSIUM BLD-SCNC: 3.7 MMOL/L (ref 3.5–5.2)
PROT SERPL-MCNC: 7.1 G/DL (ref 6–8.5)
RBC # BLD AUTO: 4.19 10*6/MM3 (ref 3.77–5.28)
SODIUM BLD-SCNC: 124 MMOL/L (ref 136–145)
TRIGL SERPL-MCNC: 161 MG/DL (ref 0–150)
TSH SERPL DL<=0.05 MIU/L-ACNC: 28.5 MIU/ML (ref 0.27–4.2)
VLDLC SERPL-MCNC: 32.2 MG/DL (ref 5–40)
WBC NRBC COR # BLD: 5.81 10*3/MM3 (ref 3.4–10.8)

## 2019-05-06 PROCEDURE — 96372 THER/PROPH/DIAG INJ SC/IM: CPT | Performed by: INTERNAL MEDICINE

## 2019-05-06 PROCEDURE — 80061 LIPID PANEL: CPT | Performed by: INTERNAL MEDICINE

## 2019-05-06 PROCEDURE — 85027 COMPLETE CBC AUTOMATED: CPT | Performed by: INTERNAL MEDICINE

## 2019-05-06 PROCEDURE — 84443 ASSAY THYROID STIM HORMONE: CPT | Performed by: INTERNAL MEDICINE

## 2019-05-06 PROCEDURE — 99214 OFFICE O/P EST MOD 30 MIN: CPT | Performed by: INTERNAL MEDICINE

## 2019-05-06 PROCEDURE — 80053 COMPREHEN METABOLIC PANEL: CPT | Performed by: INTERNAL MEDICINE

## 2019-05-06 RX ORDER — KETOROLAC TROMETHAMINE 30 MG/ML
30 INJECTION, SOLUTION INTRAMUSCULAR; INTRAVENOUS ONCE
Status: COMPLETED | OUTPATIENT
Start: 2019-05-06 | End: 2019-05-06

## 2019-05-06 RX ORDER — SENNOSIDES 8.6 MG
650 CAPSULE ORAL EVERY 8 HOURS PRN
Start: 2019-05-06 | End: 2021-01-05 | Stop reason: HOSPADM

## 2019-05-06 RX ADMIN — KETOROLAC TROMETHAMINE 30 MG: 30 INJECTION, SOLUTION INTRAMUSCULAR; INTRAVENOUS at 13:10

## 2019-05-06 NOTE — PROGRESS NOTES
Hypertension and Hand Pain    Subjective   Zohra Hall is a 78 y.o. female is here today for follow-up.    History of Present Illness   Left hand hematoma- seen by Hand surgery, and hematoma aspirated and cortisone shot injected. Moved it the wrong way and is again in severe pain today.    Current Outpatient Medications:   •  alendronate (FOSAMAX) 70 MG tablet, Take 1 tablet by mouth Every 7 (Seven) Days., Disp: 12 tablet, Rfl: 1  •  amLODIPine (NORVASC) 5 MG tablet, Take 1 tablet by mouth Daily., Disp: 90 tablet, Rfl: 1  •  atenolol (TENORMIN) 50 MG tablet, Take 1 tablet by mouth Daily., Disp: 90 tablet, Rfl: 1  •  atorvastatin (LIPITOR) 40 MG tablet, Take 1 tablet by mouth Daily., Disp: 90 tablet, Rfl: 1  •  DULoxetine (CYMBALTA) 60 MG capsule, Take 1 capsule by mouth Daily. For stress, Disp: 30 capsule, Rfl: 5  •  Ketamine HCl powder, Ketoprofen 10 % cream, bupivacaine PF 0.25 % solution, lidocaine 5 % ointment, Meloxicam powder, Apply to rt hand bid - tid, Disp: 60 g, Rfl: 0  •  levothyroxine (SYNTHROID, LEVOTHROID) 112 MCG tablet, Take 1 tablet by mouth Daily., Disp: , Rfl:   •  metroNIDAZOLE (METROCREAM) 0.75 % cream, Apply  topically 2 (Two) Times a Day., Disp: 60 g, Rfl: 2  •  omeprazole (priLOSEC) 20 MG capsule, TAKE 1 CAPSULE BY MOUTH  DAILY, Disp: 90 capsule, Rfl: 0  •  primidone (MYSOLINE) 50 MG tablet, Take 3 tablets at night, Disp: 90 tablet, Rfl: 5  •  tiZANidine (ZANAFLEX) 2 MG tablet, Take 1 tablet by mouth At Night As Needed for Muscle Spasms., Disp: 30 tablet, Rfl: 5  •  triamterene-hydrochlorothiazide (MAXZIDE-25) 37.5-25 MG per tablet, TAKE 1/2 TO 1 TABLET BY  MOUTH DAILY AS NEEDED FOR  EDEMA, Disp: 90 tablet, Rfl: 1  •  valsartan (DIOVAN) 320 MG tablet, Take 1 tablet by mouth Daily. Replaces irbesartan, Disp: 30 tablet, Rfl: 3  •  vitamin B-12 (CYANOCOBALAMIN) 1000 MCG tablet, Take 1,000 mcg by mouth Daily., Disp: , Rfl:   •  acetaminophen (TYLENOL) 650 MG 8 hr tablet, Take 1 tablet by  "mouth Every 8 (Eight) Hours As Needed for Mild Pain ., Disp: , Rfl:       The following portions of the patient's history were reviewed and updated as appropriate: allergies, current medications, past family history, past medical history, past social history, past surgical history and problem list.    Review of Systems   Constitutional: Positive for activity change. Negative for chills and fever.   HENT: Negative for ear discharge, ear pain, sinus pressure and sore throat.    Respiratory: Negative for cough, chest tightness and shortness of breath.    Cardiovascular: Negative for chest pain, palpitations and leg swelling.   Gastrointestinal: Negative for diarrhea, nausea and vomiting.   Musculoskeletal: Positive for arthralgias and joint swelling. Negative for back pain and myalgias.   Skin: Positive for color change. Negative for wound.   Neurological: Negative for dizziness, syncope and headaches.   Psychiatric/Behavioral: Negative for confusion and sleep disturbance.       Objective   /90   Pulse 57   Ht 160 cm (63\")   Wt 72.6 kg (160 lb)   SpO2 98% Comment: ra  BMI 28.34 kg/m²   Physical Exam   Constitutional: She is oriented to person, place, and time. She appears well-developed and well-nourished.   HENT:   Head: Normocephalic and atraumatic.   Mouth/Throat: No oropharyngeal exudate.   Eyes: Conjunctivae are normal. Pupils are equal, round, and reactive to light.   Cardiovascular: Normal rate and regular rhythm.   Pulmonary/Chest: Effort normal and breath sounds normal.   Abdominal: Soft. Bowel sounds are normal.   Musculoskeletal: She exhibits tenderness (persisting). She exhibits no edema.        Arms:       Right hand: She exhibits decreased range of motion, tenderness, deformity and swelling. Decreased strength noted. She exhibits finger abduction.        Hands:  Severely restricted rom - rt hand   Neurological: She is alert and oriented to person, place, and time. No cranial nerve deficit. "   Skin: Skin is warm and dry. Bruising and ecchymosis noted. There is erythema.   Rt hand- bruising and swelling better   Psychiatric: She has a normal mood and affect. Judgment normal.   Nursing note and vitals reviewed.        Results for orders placed or performed in visit on 05/06/19   CBC (No Diff)   Result Value Ref Range    WBC 5.81 3.40 - 10.80 10*3/mm3    RBC 4.19 3.77 - 5.28 10*6/mm3    Hemoglobin 12.6 12.0 - 15.9 g/dL    Hematocrit 38.4 34.0 - 46.6 %    MCV 91.6 79.0 - 97.0 fL    MCH 30.1 26.6 - 33.0 pg    MCHC 32.8 31.5 - 35.7 g/dL    RDW 13.4 12.3 - 15.4 %    RDW-SD 45.4 37.0 - 54.0 fl    MPV 9.4 6.0 - 12.0 fL    Platelets 290 140 - 450 10*3/mm3   Lipid Panel   Result Value Ref Range    Total Cholesterol 246 (H) 0 - 200 mg/dL    Triglycerides 161 (H) 0 - 150 mg/dL    HDL Cholesterol 93 (H) 40 - 60 mg/dL    LDL Cholesterol  121 (H) 0 - 100 mg/dL    VLDL Cholesterol 32.2 5 - 40 mg/dL    LDL/HDL Ratio 1.30    Comprehensive Metabolic Panel   Result Value Ref Range    Glucose 94 65 - 99 mg/dL    BUN 10 8 - 23 mg/dL    Creatinine 0.83 0.57 - 1.00 mg/dL    Sodium 124 (L) 136 - 145 mmol/L    Potassium 3.7 3.5 - 5.2 mmol/L    Chloride 85 (L) 98 - 107 mmol/L    CO2 28.3 22.0 - 29.0 mmol/L    Calcium 9.0 8.6 - 10.5 mg/dL    Total Protein 7.1 6.0 - 8.5 g/dL    Albumin 4.40 3.50 - 5.20 g/dL    ALT (SGPT) 15 1 - 33 U/L    AST (SGOT) 16 1 - 32 U/L    Alkaline Phosphatase 62 39 - 117 U/L    Total Bilirubin 0.3 0.2 - 1.2 mg/dL    eGFR Non African Amer 66 >60 mL/min/1.73    Globulin 2.7 gm/dL    A/G Ratio 1.6 g/dL    BUN/Creatinine Ratio 12.0 7.0 - 25.0    Anion Gap 10.7 mmol/L   TSH   Result Value Ref Range    TSH 28.500 (H) 0.270 - 4.200 mIU/mL             Assessment/Plan   Diagnoses and all orders for this visit:    Nontraumatic rupture of extensor tendons of right hand and wrist  -     acetaminophen (TYLENOL) 650 MG 8 hr tablet; Take 1 tablet by mouth Every 8 (Eight) Hours As Needed for Mild Pain .  -     CBC (No  Diff)  -     ketorolac (TORADOL) injection 30 mg    Paroxysmal hematoma of right hand, subsequent encounter  -     CBC (No Diff)    Essential hypertension  Comments:  up from pain, monitor    Hypothyroidism (acquired)  -     Lipid Panel  -     Comprehensive Metabolic Panel  -     TSH    continue to ice, and keep elevated.         Repeat hand surgery apt. In 2 weeks, monior.  D/c diclofenac to avoid bruising, and tylenol 650 inn[place]    Return in about 6 weeks (around 6/17/2019).

## 2019-06-18 ENCOUNTER — OFFICE VISIT (OUTPATIENT)
Dept: INTERNAL MEDICINE | Facility: CLINIC | Age: 79
End: 2019-06-18

## 2019-06-18 VITALS
BODY MASS INDEX: 28.35 KG/M2 | WEIGHT: 160 LBS | SYSTOLIC BLOOD PRESSURE: 180 MMHG | DIASTOLIC BLOOD PRESSURE: 90 MMHG | HEART RATE: 62 BPM | HEIGHT: 63 IN | OXYGEN SATURATION: 90 %

## 2019-06-18 DIAGNOSIS — M66.241 NONTRAUMATIC RUPTURE OF EXTENSOR TENDONS OF RIGHT HAND AND WRIST: ICD-10-CM

## 2019-06-18 DIAGNOSIS — M66.231 NONTRAUMATIC RUPTURE OF EXTENSOR TENDONS OF RIGHT HAND AND WRIST: ICD-10-CM

## 2019-06-18 DIAGNOSIS — E03.8 OTHER SPECIFIED HYPOTHYROIDISM: Primary | ICD-10-CM

## 2019-06-18 DIAGNOSIS — R60.9 EDEMA, UNSPECIFIED TYPE: ICD-10-CM

## 2019-06-18 DIAGNOSIS — I10 ESSENTIAL HYPERTENSION: ICD-10-CM

## 2019-06-18 DIAGNOSIS — F43.89 REACTION TO CHRONIC STRESS: ICD-10-CM

## 2019-06-18 DIAGNOSIS — M81.0 AGE-RELATED OSTEOPOROSIS WITHOUT CURRENT PATHOLOGICAL FRACTURE: ICD-10-CM

## 2019-06-18 DIAGNOSIS — E87.1 HYPONATREMIA: ICD-10-CM

## 2019-06-18 DIAGNOSIS — I63.81 LACUNAR STROKE (HCC): ICD-10-CM

## 2019-06-18 DIAGNOSIS — I10 UNCONTROLLED HYPERTENSION: ICD-10-CM

## 2019-06-18 PROCEDURE — 99214 OFFICE O/P EST MOD 30 MIN: CPT | Performed by: INTERNAL MEDICINE

## 2019-06-18 PROCEDURE — 84443 ASSAY THYROID STIM HORMONE: CPT | Performed by: INTERNAL MEDICINE

## 2019-06-18 PROCEDURE — 80048 BASIC METABOLIC PNL TOTAL CA: CPT | Performed by: INTERNAL MEDICINE

## 2019-06-18 PROCEDURE — 84439 ASSAY OF FREE THYROXINE: CPT | Performed by: INTERNAL MEDICINE

## 2019-06-18 RX ORDER — TRIAMTERENE AND HYDROCHLOROTHIAZIDE 37.5; 25 MG/1; MG/1
1 TABLET ORAL DAILY
Qty: 90 TABLET | Refills: 1 | Status: SHIPPED | OUTPATIENT
Start: 2019-06-18 | End: 2020-01-10 | Stop reason: SDUPTHER

## 2019-06-18 RX ORDER — DULOXETIN HYDROCHLORIDE 60 MG/1
60 CAPSULE, DELAYED RELEASE ORAL DAILY
Qty: 90 CAPSULE | Refills: 1 | Status: SHIPPED | OUTPATIENT
Start: 2019-06-18 | End: 2019-10-22 | Stop reason: SDUPTHER

## 2019-06-18 RX ORDER — ALENDRONATE SODIUM 70 MG/1
70 TABLET ORAL
Qty: 12 TABLET | Refills: 1 | Status: SHIPPED | OUTPATIENT
Start: 2019-06-18 | End: 2020-01-10 | Stop reason: SDUPTHER

## 2019-06-18 RX ORDER — AMLODIPINE BESYLATE 5 MG/1
5 TABLET ORAL DAILY
Qty: 90 TABLET | Refills: 1 | Status: SHIPPED | OUTPATIENT
Start: 2019-06-18 | End: 2020-01-10 | Stop reason: SDUPTHER

## 2019-06-18 RX ORDER — ATENOLOL 50 MG/1
50 TABLET ORAL DAILY
Qty: 90 TABLET | Refills: 1 | Status: SHIPPED | OUTPATIENT
Start: 2019-06-18 | End: 2020-01-10 | Stop reason: SDUPTHER

## 2019-06-18 RX ORDER — VALSARTAN 320 MG/1
320 TABLET ORAL DAILY
Qty: 90 TABLET | Refills: 1 | Status: SHIPPED | OUTPATIENT
Start: 2019-06-18 | End: 2020-01-10 | Stop reason: SDUPTHER

## 2019-06-18 RX ORDER — ATORVASTATIN CALCIUM 40 MG/1
40 TABLET, FILM COATED ORAL DAILY
Qty: 90 TABLET | Refills: 1 | Status: SHIPPED | OUTPATIENT
Start: 2019-06-18 | End: 2019-12-30

## 2019-06-18 NOTE — PROGRESS NOTES
fu on hand (has not improved) and worried about memory    Subjective   Zohra Hall is a 78 y.o. female is here today for follow-up.    History of Present Illness   Hand still painful, and trouble with mvmts.  Hand surgery released and asked her to come prn.  Also afraid to drive so stays at home for the most part, uses the computer a lot which doesn't hep.      Here for f/u on her labs- thyroid was very low, TSH- 28, and     Current Outpatient Medications:   •  acetaminophen (TYLENOL) 650 MG 8 hr tablet, Take 1 tablet by mouth Every 8 (Eight) Hours As Needed for Mild Pain ., Disp: , Rfl:   •  alendronate (FOSAMAX) 70 MG tablet, Take 1 tablet by mouth Every 7 (Seven) Days., Disp: 12 tablet, Rfl: 1  •  amLODIPine (NORVASC) 5 MG tablet, Take 1 tablet by mouth Daily., Disp: 90 tablet, Rfl: 1  •  atenolol (TENORMIN) 50 MG tablet, Take 1 tablet by mouth Daily., Disp: 90 tablet, Rfl: 1  •  atorvastatin (LIPITOR) 40 MG tablet, Take 1 tablet by mouth Daily., Disp: 90 tablet, Rfl: 1  •  DULoxetine (CYMBALTA) 60 MG capsule, Take 1 capsule by mouth Daily. For stress, Disp: 90 capsule, Rfl: 1  •  primidone (MYSOLINE) 50 MG tablet, Take 3 tablets at night, Disp: 90 tablet, Rfl: 5  •  triamterene-hydrochlorothiazide (MAXZIDE-25) 37.5-25 MG per tablet, Take 1 tablet by mouth Daily., Disp: 90 tablet, Rfl: 1  •  Ketamine HCl powder, Ketoprofen 10 % cream, bupivacaine PF 0.25 % solution, lidocaine 5 % ointment, Meloxicam powder, Apply to rt hand bid - tid, Disp: 60 g, Rfl: 0  •  levothyroxine (SYNTHROID, LEVOTHROID) 112 MCG tablet, Take 1 tablet by mouth Daily., Disp: , Rfl:   •  metroNIDAZOLE (METROCREAM) 0.75 % cream, Apply  topically 2 (Two) Times a Day., Disp: 60 g, Rfl: 2  •  valsartan (DIOVAN) 320 MG tablet, Take 1 tablet by mouth Daily. Replaces irbesartan, Disp: 90 tablet, Rfl: 1  •  vitamin B-12 (CYANOCOBALAMIN) 1000 MCG tablet, Take 1,000 mcg by mouth Daily., Disp: , Rfl:       The following portions of the patient's  "history were reviewed and updated as appropriate: allergies, current medications, past family history, past medical history, past social history, past surgical history and problem list.    Review of Systems   Constitutional: Positive for activity change. Negative for chills and fever.   HENT: Negative for ear discharge, ear pain, sinus pressure and sore throat.    Respiratory: Negative for cough, chest tightness and shortness of breath.    Cardiovascular: Negative for chest pain, palpitations and leg swelling.   Gastrointestinal: Negative for diarrhea, nausea and vomiting.   Musculoskeletal: Positive for arthralgias and joint swelling. Negative for back pain and myalgias.   Skin: Positive for color change. Negative for wound.   Neurological: Negative for dizziness, syncope and headaches.   Psychiatric/Behavioral: Negative for confusion and sleep disturbance.       Objective   /90   Pulse 62   Ht 160 cm (63\")   Wt 72.6 kg (160 lb)   SpO2 90% Comment: ra  BMI 28.34 kg/m²   Physical Exam   Constitutional: She is oriented to person, place, and time. She appears well-developed and well-nourished.   HENT:   Head: Normocephalic and atraumatic.   Mouth/Throat: No oropharyngeal exudate.   Eyes: Conjunctivae are normal. Pupils are equal, round, and reactive to light.   Cardiovascular: Normal rate and regular rhythm.   Pulmonary/Chest: Effort normal and breath sounds normal.   Abdominal: Soft. Bowel sounds are normal.   Musculoskeletal: She exhibits tenderness (persisting). She exhibits no edema.        Right hand: She exhibits decreased range of motion, tenderness, deformity and swelling. Decreased strength noted.        Left hand: Normal.        Hands:  Severely restricted rom - rt hand   Neurological: She is alert and oriented to person, place, and time. No cranial nerve deficit.   Skin: Skin is warm and dry. Bruising and ecchymosis noted. There is erythema.        Rt hand- bruising and swelling better "   Psychiatric: She has a normal mood and affect. Judgment normal.   Nursing note and vitals reviewed.        Results for orders placed or performed in visit on 06/18/19   TSH   Result Value Ref Range    TSH 0.898 0.270 - 4.200 mIU/mL   T4, Free   Result Value Ref Range    Free T4 1.44 0.93 - 1.70 ng/dL   Basic Metabolic Panel   Result Value Ref Range    Glucose 87 65 - 99 mg/dL    BUN 12 8 - 23 mg/dL    Creatinine 0.89 0.57 - 1.00 mg/dL    Sodium 137 136 - 145 mmol/L    Potassium 4.2 3.5 - 5.2 mmol/L    Chloride 97 (L) 98 - 107 mmol/L    CO2 27.0 22.0 - 29.0 mmol/L    Calcium 9.6 8.6 - 10.5 mg/dL    eGFR Non African Amer 61 >60 mL/min/1.73    BUN/Creatinine Ratio 13.5 7.0 - 25.0    Anion Gap 13.0 mmol/L             Assessment/Plan   Diagnoses and all orders for this visit:    Other specified hypothyroidism  Comments:  followed by Dr. Elizabeth's office. recheck and if low, reiterated to f/u with them due to h/o thyroid cancer.  Orders:  -     TSH  -     T4, Free    Hyponatremia  -     Basic Metabolic Panel    Nontraumatic rupture of extensor tendons of right hand and wrist  -     Ambulatory Referral to Physical Therapy Evaluate and treat    Uncontrolled hypertension  -     valsartan (DIOVAN) 320 MG tablet; Take 1 tablet by mouth Daily. Replaces irbesartan    Edema, unspecified type  -     triamterene-hydrochlorothiazide (MAXZIDE-25) 37.5-25 MG per tablet; Take 1 tablet by mouth Daily.    Reaction to chronic stress  Comments:  start cymbalta, counseled re: meds and behavioural txt.  Orders:  -     DULoxetine (CYMBALTA) 60 MG capsule; Take 1 capsule by mouth Daily. For stress    Lacunar stroke  -     atorvastatin (LIPITOR) 40 MG tablet; Take 1 tablet by mouth Daily.    Essential hypertension  Comments:  better on meds  Orders:  -     atenolol (TENORMIN) 50 MG tablet; Take 1 tablet by mouth Daily.  -     amLODIPine (NORVASC) 5 MG tablet; Take 1 tablet by mouth Daily.    Age-related osteoporosis without current pathological  fracture  -     alendronate (FOSAMAX) 70 MG tablet; Take 1 tablet by mouth Every 7 (Seven) Days.             Return in about 10 weeks (around 8/27/2019) for Next scheduled follow up.

## 2019-06-19 LAB
ANION GAP SERPL CALCULATED.3IONS-SCNC: 13 MMOL/L
BUN BLD-MCNC: 12 MG/DL (ref 8–23)
BUN/CREAT SERPL: 13.5 (ref 7–25)
CALCIUM SPEC-SCNC: 9.6 MG/DL (ref 8.6–10.5)
CHLORIDE SERPL-SCNC: 97 MMOL/L (ref 98–107)
CO2 SERPL-SCNC: 27 MMOL/L (ref 22–29)
CREAT BLD-MCNC: 0.89 MG/DL (ref 0.57–1)
GFR SERPL CREATININE-BSD FRML MDRD: 61 ML/MIN/1.73
GLUCOSE BLD-MCNC: 87 MG/DL (ref 65–99)
POTASSIUM BLD-SCNC: 4.2 MMOL/L (ref 3.5–5.2)
SODIUM BLD-SCNC: 137 MMOL/L (ref 136–145)
T4 FREE SERPL-MCNC: 1.44 NG/DL (ref 0.93–1.7)
TSH SERPL DL<=0.05 MIU/L-ACNC: 0.9 MIU/ML (ref 0.27–4.2)

## 2019-07-03 ENCOUNTER — HOSPITAL ENCOUNTER (OUTPATIENT)
Dept: PHYSICAL THERAPY | Facility: HOSPITAL | Age: 79
Setting detail: THERAPIES SERIES
Discharge: HOME | End: 2019-07-03

## 2019-07-03 DIAGNOSIS — M66.241 NONTRAUMATIC RUPTURE OF EXTENSOR TENDONS OF RIGHT HAND AND WRIST: Primary | ICD-10-CM

## 2019-07-03 DIAGNOSIS — M66.231 NONTRAUMATIC RUPTURE OF EXTENSOR TENDONS OF RIGHT HAND AND WRIST: Primary | ICD-10-CM

## 2019-07-03 DIAGNOSIS — M25.531 RIGHT WRIST PAIN: Primary | ICD-10-CM

## 2019-07-03 PROCEDURE — 97161 PT EVAL LOW COMPLEX 20 MIN: CPT | Performed by: PHYSICAL THERAPIST

## 2019-07-03 NOTE — THERAPY EVALUATION
"    Outpatient Physical Therapy Ortho Initial Evaluation  [facility]     Patient Name: Zohra Hall  : 1940  MRN: [id number]  Today's Date: 7/3/2019      Visit Date: 2019    Patient Active Problem List   Diagnosis   • Generalized osteoarthritis   • Iron deficiency   • Vitamin D deficiency   • Skin neoplasm   • Sialadenitis   • Restless leg syndrome   • Reaction to chronic stress   • Piriformis syndrome   • Papillary adenocarcinoma of thyroid (CMS/HCC)   • Hypothyroidism (acquired)   • Hypertension   • GERD without esophagitis   • Dyslipidemia   • Cervical lymphadenopathy   • Cellulitis   • Atherosclerosis of aorta (CMS/HCC)   • Breast mass   • Aortic valve stenosis   • Incontinence of bowel   • Chronic diastolic heart failure (CMS/HCC)   • Acute right-sided low back pain without sciatica   • Benign essential tremor   • Pain of right hip joint   • Thyroid cancer (CMS/HCC)   • Lacunar stroke   • Nontraumatic rupture of extensor tendons of right hand and wrist        Past Medical History:   Diagnosis Date   • Breast cancer (CMS/HCC) 1987    left- pt states \"in situ\", no radiation, Tamoxifen for 5 years   • Cellulitis    • Cervical lymphadenopathy    • Chest pain    • Convulsions (CMS/HCC)    • GERD (gastroesophageal reflux disease)    • Glossitis    • Grief reaction     · Last Impression: 2015  counselled, given ativan for prn use.  Aleah Regan   • Hypertension    • Lower back pain    • Oral thrush    • Papillary adenocarcinoma (CMS/HCC)     Papillary adenocarcinoma of thyroid   • Papillary adenocarcinoma of thyroid (CMS/HCC)     · resection Ramirez- ,  2 x 2 x 1.5 cm  T3 N1 MXpost op low calcium and diminished  voiceablation Ain- 3/7 metastatic nodes and invasion to strap muscles · Last Impression: 29 Oct 2014  s/p Eval by berry Méndez.  Aleah Regan (Internal   • Piriformis syndrome    • Tingling    • Xerostomia         Past Surgical History:   Procedure Laterality Date "   • BREAST BIOPSY Right 10/10/2013    stereo bx   • BREAST BIOPSY Left 10/19/2015    stereo bx   • BREAST CYST EXCISION      right   • BREAST EXCISIONAL BIOPSY Right     affirm bx and loc 2016.   • BREAST LUMPECTOMY Left 1987   • HYSTERECTOMY  1979   • TOTAL HIP ARTHROPLASTY     • TOTAL THYROIDECTOMY      Thyroid Surgery Total Thyroidectomy       Visit Dx:     ICD-10-CM ICD-9-CM   1. Nontraumatic rupture of extensor tendons of right hand and wrist M66.241 727.63         Patient History     Row Name 07/03/19 1400             History    Chief Complaint  Pain;Difficulty with daily activities  -RON      Type of Pain  Wrist pain right  -RON      Date Current Problem(s) Began  -- March 2019  -RON      Brief Description of Current Complaint  Pt. reports no injury or trauma to R hand.  She was sitting in her chair and she reached down to pick something up when she felt a burning pain in her wrist and hand.  She had x-rays and was referred to hand specialist who aspirated blood from the affected area.  She continues to have pain and limited use of her R hand.  She reports that her R 5th finger has not moved for about a year after she injured it while trimming hedges.  Pt. also says she had a stroke in January of this year.    -RON      Patient/Caregiver Goals  Improve mobility;Know what to do to help the symptoms  -RON      Hand Dominance  right-handed  -RON      Occupation/sports/leisure activities  retired nurse  -RON      What clinical tests have you had for this problem?  X-ray  -RON      Results of Clinical Tests  no fractures  -RON         Pain     Pain Location  Wrist;Hand  -RON      Pain at Present  5  -RON      Pain at Best  5  -RON      Pain at Worst  10  -RON      What Performance Factors Make the Current Problem(s) WORSE?  movement  -RON      What Performance Factors Make the Current Problem(s) BETTER?  rest  -RON      Is your sleep disturbed?  Yes  -RON         Daily Activities    Primary Language  English  -RON      Patient is  concerned about/has problems with  Repetitive movements of the hand, arm, shoulder;Performing home management (household chores, shopping, care of dependents);Difficulty with self care (i.e. bathing, dressing, toileting:  -RON      Does patient have problems with the following?  Anxiety  -RON      Recommended Referrals  Occupational Therapy  -RON      Pt Participated in POC and Goals  Yes  -RON         Safety    Are you being hurt, hit, or frightened by anyone at home or in your life?  No  -RON      Are you being neglected by a caregiver  No  -RON        User Key  (r) = Recorded By, (t) = Taken By, (c) = Cosigned By    Initials Name Provider Type    Molly Shay, PT Physical Therapist          PT Ortho     Row Name 07/03/19 1600       Posture/Observations    Posture/Observations Comments  R distal forearm and wrist are discolored/bruised on both dorsal and ventral surfaces.  -RON       Right Upper Ext    Rt Elbow Supination AROM  84  -RON    Rt Elbow Pronation AROM  WNL  -RON    Rt Wrist Flexion AROM  55  -RON    Rt Wrist Extension AROM  35  -RON    Rt  Ulnar Deviation AROM  30  -RON    Rt  Radial Deviation AROM  15  -RON    Rt Upper Extremity Comments   Elbow flex/ext WNL  -RON       Left Upper Ext    Lt Elbow Supination AROM  WNL  -RON    Lt Elbow Pronation AROM  WNL  -RON    Lt Wrist Flexion AROM  75  -RON    Lt Wrist Extension AROM  75  -RON    Lt  Ulnar Deviation AROM  45  -RON    Lt  Radial Deviation AROM  25  -RON    Lt Upper Extremity Comments   elbow ROM WNL's  -RON       MMT Right Upper Ext    Rt Elbow Flexion MMT, Gross Movement:  (5/5) normal  -RON    Rt Elbow Extension MMT, Gross Movement:  (5/5) normal  -RON    Rt Wrist Flexion MMT, Gross Movement  (5/5) normal  -RON    Rt Wrist Extension MMT, Gross Movement  (4/5) good within available ROM  -RON    Rt Upper Extremity Comments   Pt. lacks active extension of R 5th finger.  4th finger partially extends to near neutral.  1st and 2nd fingers extend to neutral and pt. is  able to hold fingers extended against resistance.  R thumb is WNL's.  -RON        Strength Right    # Reps  3  -RON    Right Rung  2  -RON    Right  Test 1  26  -RON    Right  Test 2  27  -RON    Right  Test 3  32  -RON     Strength Average Right  28.33  -RON        Strength Left    # Reps  3  -RON    Left Rung  2  -RON    Left  Test 1  45  -RON    Left  Test 2  45  -RON    Left  Test 3  52  -RON     Strength Average Left  47.33  -RON      User Key  (r) = Recorded By, (t) = Taken By, (c) = Cosigned By    Initials Name Provider Type    Molly Shay, PT Physical Therapist           Hand Therapy (last 24 hours)      Hand Eval     Row Name 07/03/19 1600             Right Hand Strength - Pinch (lbs)    Lateral  10 lbs  -RON      Tip (2 point)  3 lbs  -RON      Tip (3 point)  4 lbs  -RON         Left Hand Strength - Pinch (lbs)    Lateral  10 lbs  -RON      Tip (2 point)  9 lbs  -RON      Tip (3 point)  7 lbs  -RON        User Key  (r) = Recorded By, (t) = Taken By, (c) = Cosigned By    Initials Name Provider Type    Molly Shay, PT Physical Therapist                    Therapy Education  Given: HEP  Program: New  How Provided: Verbal, Demonstration, Written  Provided to: Patient  Level of Understanding: Verbalized, Demonstrated     PT OP Goals     Row Name 07/03/19 1600          PT Short Term Goals    STG 1  Refer to OT.  -RON       User Key  (r) = Recorded By, (t) = Taken By, (c) = Cosigned By    Initials Name Provider Type    Molly Shay, PRAVEEN Physical Therapist          PT Assessment/Plan     Row Name 07/03/19 1632          PT Assessment    Functional Limitations  Limitations in functional capacity and performance;Limitation in home management;Performance in self-care ADL  -RON     Impairments  Muscle strength;Pain;Range of motion;Joint mobility  -RON     Assessment Comments  Tendon injury of R wrist and hand/finger extensors, most pronounced in 4th and 5th digits of R  hand.  Pt. demonstrates partial AROM of R wrist extension, and finger extension to neutral or near neutral of digits 1-3.  The thumb is unaffected.  She still has significant bruising 4 minths after the non-traumativ event.  She would benefit from referral to OT for detailed treatment of R hand function.     -RON     Please refer to paper survey for additional self-reported information  Yes  -RON     Rehab Potential  Fair  -RON     Patient/caregiver participated in establishment of treatment plan and goals  Yes  -RON     Patient would benefit from skilled therapy intervention  Yes  -RON        PT Plan    Planned CPT's?  PT EVAL LOW COMPLEXITY: 30823  -RON     PT Plan Comments  Refer to OT for wrist and hand rehab.  -RON       User Key  (r) = Recorded By, (t) = Taken By, (c) = Cosigned By    Initials Name Provider Type    Molly Shay, PT Physical Therapist                              Outcome Measure Options: Quick DASH  Quick DASH  Open a tight or new jar.: Unable  Do heavy household chores (e.g., wash walls, wash floors): Unable  Carry a shopping bag or briefcase: Unable  Wash your back: Unable  Use a knife to cut food: Unable  Recreational activities in which you take some force or impact through your arm, should or hand (e.g. golf, hammering, tennis, etc.): Unable  During the past week, to what extent has your arm, shoulder, or hand problem interfered with your normal social activites with family, friends, neighbors or groups?: Extremely  During the past week, were you limited in your work or other regular daily activities as a result of your arm, shoulder or hand problem?: Unable  Arm, Shoulder, or hand pain: Extreme  Tingling (pins and needles) in your arm, shoulder, or hand: Extreme  During the past week, how much difficulty have you had sleeping because of the pain in your arm, shoulder or hand?: So much Difficulty that I can't sleep  Number of Questions Answered: 11  Quick DASH Score: 100  Quick Dash  Comments: (Pt. used R hand to fill out paperwork today.)         Time Calculation:     Start Time: 1430     Therapy Charges for Today     Code Description Service Date Service Provider Modifiers Qty    [id number] HC PT EVAL LOW COMPLEXITY 3 7/3/2019 Molly Hernandez, PT GP 1          PT G-Codes  Outcome Measure Options: Quick DASH  Quick DASH Score: 100         Molly Hernandez, PT  7/3/2019

## 2019-07-03 NOTE — THERAPY EVALUATION
"    Outpatient Physical Therapy Ortho Initial Evaluation  [facility]     Patient Name: Zohra Hall  : 1940  MRN: [id number]  Today's Date: 7/3/2019      Visit Date: 2019    Patient Active Problem List   Diagnosis   • Generalized osteoarthritis   • Iron deficiency   • Vitamin D deficiency   • Skin neoplasm   • Sialadenitis   • Restless leg syndrome   • Reaction to chronic stress   • Piriformis syndrome   • Papillary adenocarcinoma of thyroid (CMS/HCC)   • Hypothyroidism (acquired)   • Hypertension   • GERD without esophagitis   • Dyslipidemia   • Cervical lymphadenopathy   • Cellulitis   • Atherosclerosis of aorta (CMS/HCC)   • Breast mass   • Aortic valve stenosis   • Incontinence of bowel   • Chronic diastolic heart failure (CMS/HCC)   • Acute right-sided low back pain without sciatica   • Benign essential tremor   • Pain of right hip joint   • Thyroid cancer (CMS/HCC)   • Lacunar stroke   • Nontraumatic rupture of extensor tendons of right hand and wrist        Past Medical History:   Diagnosis Date   • Breast cancer (CMS/HCC) 1987    left- pt states \"in situ\", no radiation, Tamoxifen for 5 years   • Cellulitis    • Cervical lymphadenopathy    • Chest pain    • Convulsions (CMS/HCC)    • GERD (gastroesophageal reflux disease)    • Glossitis    • Grief reaction     · Last Impression: 2015  counselled, given ativan for prn use.  Aleah Regan   • Hypertension    • Lower back pain    • Oral thrush    • Papillary adenocarcinoma (CMS/HCC)     Papillary adenocarcinoma of thyroid   • Papillary adenocarcinoma of thyroid (CMS/HCC)     · resection Ramirez- ,  2 x 2 x 1.5 cm  T3 N1 MXpost op low calcium and diminished  voiceablation Ain- 3/7 metastatic nodes and invasion to strap muscles · Last Impression: 29 Oct 2014  s/p Eval by berry Méndez.  Aleah Regan (Internal   • Piriformis syndrome    • Tingling    • Xerostomia         Past Surgical History:   Procedure Laterality Date "   • BREAST BIOPSY Right 10/10/2013    stereo bx   • BREAST BIOPSY Left 10/19/2015    stereo bx   • BREAST CYST EXCISION      right   • BREAST EXCISIONAL BIOPSY Right     affirm bx and loc 2016.   • BREAST LUMPECTOMY Left 1987   • HYSTERECTOMY  1979   • TOTAL HIP ARTHROPLASTY     • TOTAL THYROIDECTOMY      Thyroid Surgery Total Thyroidectomy       Visit Dx:     ICD-10-CM ICD-9-CM   1. Nontraumatic rupture of extensor tendons of right hand and wrist M66.241 727.63         Patient History     Row Name 07/03/19 1400             History    Chief Complaint  Pain;Difficulty with daily activities  -RON      Type of Pain  Wrist pain right  -RON      Date Current Problem(s) Began  — March 2019  -RON      Brief Description of Current Complaint  Pt. reports no injury or trauma to R hand.  She was sitting in her chair and she reached down to pick something up when she felt a burning pain in her wrist and hand.  She had x-rays and was referred to hand specialist who aspirated blood from the affected area.  She continues to have pain and limited use of her R hand.  She reports that her R 5th finger has not moved for about a year after she injured it while trimming hedges.  Pt. also says she had a stroke in January of this year.  Pt. lives alone.  She is able to drive. SHe does as much as she can with her left hand.  -RON      Patient/Caregiver Goals  Improve mobility;Know what to do to help the symptoms  -RON      Hand Dominance  right-handed  -RON      Occupation/sports/leisure activities  retired nurse  -RON      What clinical tests have you had for this problem?  X-ray  -RON      Results of Clinical Tests  no fractures  -RON         Pain     Pain Location  Wrist;Hand  -RON      Pain at Present  5  -RON      Pain at Best  5  -RON      Pain at Worst  10  -RON      What Performance Factors Make the Current Problem(s) WORSE?  movement  -RON      What Performance Factors Make the Current Problem(s) BETTER?  rest  -RON      Is your sleep disturbed?   Yes  -RON         Daily Activities    Primary Language  English  -RON      Patient is concerned about/has problems with  Repetitive movements of the hand, arm, shoulder;Performing home management (household chores, shopping, care of dependents);Difficulty with self care (i.e. bathing, dressing, toileting:  -RON      Does patient have problems with the following?  Anxiety  -ORN      Recommended Referrals  Occupational Therapy  -RON      Pt Participated in POC and Goals  Yes  -RON         Safety    Are you being hurt, hit, or frightened by anyone at home or in your life?  No  -RON      Are you being neglected by a caregiver  No  -RON        User Key  (r) = Recorded By, (t) = Taken By, (c) = Cosigned By    Initials Name Provider Type    RON Molly Hernandez, PT Physical Therapist          PT Ortho     Row Name 07/03/19 1600       Posture/Observations    Posture/Observations Comments  R distal forearm and wrist are discolored/bruised on both dorsal and ventral surfaces.  -RON       Right Upper Ext    Rt Elbow Supination AROM  84  -RON    Rt Elbow Pronation AROM  WNL  -RON    Rt Wrist Flexion AROM  55  -RON    Rt Wrist Extension AROM  35  -RON    Rt  Ulnar Deviation AROM  30  -RON    Rt  Radial Deviation AROM  15  -RON    Rt Upper Extremity Comments   Elbow flex/ext WNL  -RON       Left Upper Ext    Lt Elbow Supination AROM  WNL  -RON    Lt Elbow Pronation AROM  WNL  -RON    Lt Wrist Flexion AROM  75  -RON    Lt Wrist Extension AROM  75  -RON    Lt  Ulnar Deviation AROM  45  -RON    Lt  Radial Deviation AROM  25  -RON    Lt Upper Extremity Comments   elbow ROM WNL's  -RON       MMT Right Upper Ext    Rt Elbow Flexion MMT, Gross Movement:  (5/5) normal  -RON    Rt Elbow Extension MMT, Gross Movement:  (5/5) normal  -RON    Rt Wrist Flexion MMT, Gross Movement  (5/5) normal  -RON    Rt Wrist Extension MMT, Gross Movement  (4/5) good within available ROM  -RON    Rt Upper Extremity Comments   Pt. lacks active extension of R 5th finger.  4th finger  partially extends to near neutral.  1st and 2nd fingers extend to neutral and pt. is able to hold fingers extended against resistance.  R thumb is WNL's.  -RON        Strength Right    # Reps  3  -RON    Right Rung  2  -RON    Right  Test 1  26  -RON    Right  Test 2  27  -RON    Right  Test 3  32  -RON     Strength Average Right  28.33  -RON        Strength Left    # Reps  3  -RON    Left Rung  2  -RON    Left  Test 1  45  -RON    Left  Test 2  45  -RON    Left  Test 3  52  -RON     Strength Average Left  47.33  -RON       RUE Quick Girth (cm)    Wrist crease  18.3 cm  -RON       LUE Quick Girth (cm)    Wrist crease  16 cm  -RON      User Key  (r) = Recorded By, (t) = Taken By, (c) = Cosigned By    Initials Name Provider Type    Mloly Shay, PRAVEEN Physical Therapist           Hand Therapy (last 24 hours)      Hand Eval     Row Name 07/03/19 1600             Right Hand Strength - Pinch (lbs)    Lateral  10 lbs  -RON      Tip (2 point)  3 lbs  -RON      Tip (3 point)  4 lbs  -RON         Left Hand Strength - Pinch (lbs)    Lateral  10 lbs  -RON      Tip (2 point)  9 lbs  -RON      Tip (3 point)  7 lbs  -RON        User Key  (r) = Recorded By, (t) = Taken By, (c) = Cosigned By    Initials Name Provider Type    Molly Shay, PT Physical Therapist                    Therapy Education  Given: HEP  Program: New  How Provided: Verbal, Demonstration, Written  Provided to: Patient  Level of Understanding: Verbalized, Demonstrated     PT OP Goals     Row Name 07/03/19 1600          PT Short Term Goals    STG 1  Refer to OT.  -RON       User Key  (r) = Recorded By, (t) = Taken By, (c) = Cosigned By    Initials Name Provider Type    Molly Shay, PRAVEEN Physical Therapist          PT Assessment/Plan     Row Name 07/03/19 1632          PT Assessment    Functional Limitations  Limitations in functional capacity and performance;Limitation in home management;Performance in self-care ADL  -RON      Impairments  Muscle strength;Pain;Range of motion;Joint mobility  -     Assessment Comments  Tendon injury of R wrist and hand/finger extensors, most pronounced in 4th and 5th digits of R hand.  Pt. demonstrates partial AROM of R wrist extension, and finger extension to neutral or near neutral of digits 1-3.  The thumb is unaffected.  She still has significant bruising 4 minths after the non-traumativ event.  She would benefit from referral to OT for detailed treatment of R hand function.     -RON     Please refer to paper survey for additional self-reported information  Yes  -RON     Rehab Potential  Fair  -RON     Patient/caregiver participated in establishment of treatment plan and goals  Yes  -RON     Patient would benefit from skilled therapy intervention  Yes  -RON        PT Plan    Planned CPT's?  PT EVAL LOW COMPLEXITY: 86321  -RON     PT Plan Comments  Refer to OT for wrist and hand rehab.  -RON       User Key  (r) = Recorded By, (t) = Taken By, (c) = Cosigned By    Initials Name Provider Type    Molly Shay, PT Physical Therapist                              Outcome Measure Options: Quick DASH  Quick DASH  Open a tight or new jar.: Unable  Do heavy household chores (e.g., wash walls, wash floors): Unable  Carry a shopping bag or briefcase: Unable  Wash your back: Unable  Use a knife to cut food: Unable  Recreational activities in which you take some force or impact through your arm, should or hand (e.g. golf, hammering, tennis, etc.): Unable  During the past week, to what extent has your arm, shoulder, or hand problem interfered with your normal social activites with family, friends, neighbors or groups?: Extremely  During the past week, were you limited in your work or other regular daily activities as a result of your arm, shoulder or hand problem?: Unable  Arm, Shoulder, or hand pain: Extreme  Tingling (pins and needles) in your arm, shoulder, or hand: Extreme  During the past week, how much  difficulty have you had sleeping because of the pain in your arm, shoulder or hand?: So much Difficulty that I can't sleep  Number of Questions Answered: 11  Quick DASH Score: 100  Quick Dash Comments: (Pt. used R hand to fill out paperwork today.)         Time Calculation:     Start Time: 1430     Therapy Charges for Today     Code Description Service Date Service Provider Modifiers Qty    52317180627 HC PT EVAL LOW COMPLEXITY 3 7/3/2019 Molly Hernandez, PT GP 1          PT G-Codes  Outcome Measure Options: Quick DASH  Quick DASH Score: 100         Molly Hernandez, PT  7/3/2019

## 2019-07-09 ENCOUNTER — OFFICE VISIT (OUTPATIENT)
Dept: INTERNAL MEDICINE | Facility: CLINIC | Age: 79
End: 2019-07-09

## 2019-07-09 VITALS
OXYGEN SATURATION: 92 % | BODY MASS INDEX: 28.34 KG/M2 | HEART RATE: 60 BPM | DIASTOLIC BLOOD PRESSURE: 70 MMHG | HEIGHT: 63 IN | SYSTOLIC BLOOD PRESSURE: 146 MMHG

## 2019-07-09 DIAGNOSIS — M66.231 NONTRAUMATIC RUPTURE OF EXTENSOR TENDONS OF RIGHT HAND AND WRIST: ICD-10-CM

## 2019-07-09 DIAGNOSIS — F43.9 SITUATIONAL STRESS: Primary | ICD-10-CM

## 2019-07-09 DIAGNOSIS — M66.241 NONTRAUMATIC RUPTURE OF EXTENSOR TENDONS OF RIGHT HAND AND WRIST: ICD-10-CM

## 2019-07-09 PROCEDURE — 99213 OFFICE O/P EST LOW 20 MIN: CPT | Performed by: INTERNAL MEDICINE

## 2019-07-09 NOTE — PROGRESS NOTES
Fu on hand (Has ca'ed hip surgery due to son and issues)    Subjective   Zohra Hall is a 78 y.o. female is here today for follow-up.    History of Present Illness   Zohra was originally scheduled for her preop for her hip surgery.  Her son in the meantime had his meds stolen from him- seizure meds( diazepam and depakote) , and since then has been very confused, lethargic, and depressed, so is unable to count on him after her surgery for recovery.  Hand is still recovering- getting PT, and to start OT next week.        Current Outpatient Medications:   •  acetaminophen (TYLENOL) 650 MG 8 hr tablet, Take 1 tablet by mouth Every 8 (Eight) Hours As Needed for Mild Pain ., Disp: , Rfl:   •  alendronate (FOSAMAX) 70 MG tablet, Take 1 tablet by mouth Every 7 (Seven) Days., Disp: 12 tablet, Rfl: 1  •  amLODIPine (NORVASC) 5 MG tablet, Take 1 tablet by mouth Daily., Disp: 90 tablet, Rfl: 1  •  atenolol (TENORMIN) 50 MG tablet, Take 1 tablet by mouth Daily., Disp: 90 tablet, Rfl: 1  •  atorvastatin (LIPITOR) 40 MG tablet, Take 1 tablet by mouth Daily., Disp: 90 tablet, Rfl: 1  •  DULoxetine (CYMBALTA) 60 MG capsule, Take 1 capsule by mouth Daily. For stress, Disp: 90 capsule, Rfl: 1  •  Ketamine HCl powder, Ketoprofen 10 % cream, bupivacaine PF 0.25 % solution, lidocaine 5 % ointment, Meloxicam powder, Apply to rt hand bid - tid, Disp: 60 g, Rfl: 0  •  levothyroxine (SYNTHROID, LEVOTHROID) 112 MCG tablet, Take 1 tablet by mouth Daily., Disp: , Rfl:   •  metroNIDAZOLE (METROCREAM) 0.75 % cream, Apply  topically 2 (Two) Times a Day., Disp: 60 g, Rfl: 2  •  primidone (MYSOLINE) 50 MG tablet, Take 3 tablets at night, Disp: 90 tablet, Rfl: 5  •  triamterene-hydrochlorothiazide (MAXZIDE-25) 37.5-25 MG per tablet, Take 1 tablet by mouth Daily., Disp: 90 tablet, Rfl: 1  •  valsartan (DIOVAN) 320 MG tablet, Take 1 tablet by mouth Daily. Replaces irbesartan, Disp: 90 tablet, Rfl: 1  •  vitamin B-12 (CYANOCOBALAMIN) 1000 MCG  "tablet, Take 1,000 mcg by mouth Daily., Disp: , Rfl:       The following portions of the patient's history were reviewed and updated as appropriate: allergies, current medications, past family history, past medical history, past social history, past surgical history and problem list.    Review of Systems   Constitutional: Negative.  Negative for chills and fever.   HENT: Negative for ear discharge, ear pain, sinus pressure and sore throat.    Respiratory: Negative for cough, chest tightness and shortness of breath.    Cardiovascular: Negative for chest pain, palpitations and leg swelling.   Gastrointestinal: Negative for diarrhea, nausea and vomiting.   Musculoskeletal: Positive for arthralgias and joint swelling. Negative for back pain and myalgias.   Skin: Positive for color change.   Neurological: Negative for dizziness, syncope and headaches.   Psychiatric/Behavioral: Positive for dysphoric mood. Negative for confusion and sleep disturbance. The patient is nervous/anxious.        Objective   /70   Pulse 60   Ht 160 cm (63\")   SpO2 92% Comment: ra  BMI 28.34 kg/m²   Physical Exam   Constitutional: She is oriented to person, place, and time. She appears well-developed and well-nourished.   HENT:   Head: Normocephalic and atraumatic.   Mouth/Throat: No oropharyngeal exudate.   Neck: Neck supple. No thyromegaly present.   Cardiovascular: Normal rate and regular rhythm.   Pulmonary/Chest: Effort normal and breath sounds normal.   Abdominal: Soft. Bowel sounds are normal. She exhibits no distension. There is no tenderness.   Musculoskeletal: She exhibits edema and deformity.   Neurological: She is alert and oriented to person, place, and time. No cranial nerve deficit.   Skin: Skin is warm and dry. There is erythema.   Swelling hand better   Psychiatric: She has a normal mood and affect. Judgment normal.   Nursing note and vitals reviewed.        Results for orders placed or performed in visit on 06/18/19 "   TSH   Result Value Ref Range    TSH 0.898 0.270 - 4.200 mIU/mL   T4, Free   Result Value Ref Range    Free T4 1.44 0.93 - 1.70 ng/dL   Basic Metabolic Panel   Result Value Ref Range    Glucose 87 65 - 99 mg/dL    BUN 12 8 - 23 mg/dL    Creatinine 0.89 0.57 - 1.00 mg/dL    Sodium 137 136 - 145 mmol/L    Potassium 4.2 3.5 - 5.2 mmol/L    Chloride 97 (L) 98 - 107 mmol/L    CO2 27.0 22.0 - 29.0 mmol/L    Calcium 9.6 8.6 - 10.5 mg/dL    eGFR Non African Amer 61 >60 mL/min/1.73    BUN/Creatinine Ratio 13.5 7.0 - 25.0    Anion Gap 13.0 mmol/L             Assessment/Plan   Diagnoses and all orders for this visit:    Situational stress  Comments:  counseled, adv. to take son to [place] is not better after starting on meds    Nontraumatic rupture of extensor tendons of right hand and wrist  Comments:  improving, continue PT/OT.    OT order placed last week to help wit her hand       Will hold on to the preop paperwork, encouraged to get back on Dr. Arellano's schedule.    Total time spent today with Zohra Hall  was 20 minutes (level 3).  Of this time, 15% was spent face-to-face time coordinating care, answering her questions and counseling regarding pathophysiology of her presenting problem along with plans for any diagnositc work-up and treatment.    Return for Next scheduled follow up.

## 2019-07-17 ENCOUNTER — HOSPITAL ENCOUNTER (OUTPATIENT)
Dept: OCCUPATIONAL THERAPY | Facility: HOSPITAL | Age: 79
Setting detail: THERAPIES SERIES
Discharge: HOME | End: 2019-07-17

## 2019-07-17 DIAGNOSIS — R27.8 DECREASED COORDINATION: ICD-10-CM

## 2019-07-17 DIAGNOSIS — Z78.9 DECREASED INDEPENDENCE WITH ACTIVITIES OF DAILY LIVING: Primary | ICD-10-CM

## 2019-07-17 DIAGNOSIS — M79.601 PAIN OF RIGHT UPPER EXTREMITY: ICD-10-CM

## 2019-07-17 PROCEDURE — 97165 OT EVAL LOW COMPLEX 30 MIN: CPT | Performed by: OCCUPATIONAL THERAPIST

## 2019-07-17 NOTE — THERAPY EVALUATION
" Outpatient Occupational Therapy Neuro Initial Evaluation  [facility]     Patient Name: Zohra Hall  : 1940  MRN: [id number]  Today's Date: 2019      Visit Date: 2019    Patient Active Problem List   Diagnosis   • Generalized osteoarthritis   • Iron deficiency   • Vitamin D deficiency   • Skin neoplasm   • Sialadenitis   • Restless leg syndrome   • Reaction to chronic stress   • Piriformis syndrome   • Papillary adenocarcinoma of thyroid (CMS/HCC)   • Hypothyroidism (acquired)   • Hypertension   • GERD without esophagitis   • Dyslipidemia   • Cervical lymphadenopathy   • Cellulitis   • Atherosclerosis of aorta (CMS/HCC)   • Breast mass   • Aortic valve stenosis   • Incontinence of bowel   • Chronic diastolic heart failure (CMS/HCC)   • Acute right-sided low back pain without sciatica   • Benign essential tremor   • Pain of right hip joint   • Thyroid cancer (CMS/HCC)   • Lacunar stroke   • Nontraumatic rupture of extensor tendons of right hand and wrist        Past Medical History:   Diagnosis Date   • Breast cancer (CMS/HCC) 1987    left- pt states \"in situ\", no radiation, Tamoxifen for 5 years   • Cellulitis    • Cervical lymphadenopathy    • Chest pain    • Convulsions (CMS/HCC)    • GERD (gastroesophageal reflux disease)    • Glossitis    • Grief reaction     · Last Impression: 2015  counselled, given ativan for prn use.  Aleah Regan   • Hypertension    • Lower back pain    • Oral thrush    • Papillary adenocarcinoma (CMS/HCC)     Papillary adenocarcinoma of thyroid   • Papillary adenocarcinoma of thyroid (CMS/HCC)     · resection Ramirez- ,  2 x 2 x 1.5 cm  T3 N1 MXpost op low calcium and diminished  voiceablation Ain- 3/7 metastatic nodes and invasion to strap muscles · Last Impression: 29 Oct 2014  s/p Eval by berry Méndez.  Aleah Regan (Internal   • Piriformis syndrome    • Tingling    • Xerostomia         Past Surgical History:   Procedure Laterality Date "   • BREAST BIOPSY Right 10/10/2013    stereo bx   • BREAST BIOPSY Left 10/19/2015    stereo bx   • BREAST CYST EXCISION      right   • BREAST EXCISIONAL BIOPSY Right     affirm bx and loc 2016.   • BREAST LUMPECTOMY Left 1987   • HYSTERECTOMY  1979   • TOTAL HIP ARTHROPLASTY     • TOTAL THYROIDECTOMY      Thyroid Surgery Total Thyroidectomy         Visit Dx:      ICD-10-CM ICD-9-CM   1. Decreased independence with activities of daily living Z65.8 V62.89   2. Decreased coordination R27.9 781.3   3. Pain of right upper extremity M79.601 729.5       Patient History     Row Name 07/17/19 1445 07/17/19 0245          History    Chief Complaint  Pain;Difficulty with daily activities  -EM  --     Type of Pain  Wrist pain right  -EM  --  -EM     Date Current Problem(s) Began  -- March 2019  -EM  --  -EM     Brief Description of Current Complaint  Pt. reports no injury or trauma to R hand.  She was sitting in her chair and she reached down to pick something up when she felt a burning pain in her wrist and hand.  She had x-rays and was referred to hand specialist who aspirated blood from the affected area.  She continues to have pain and limited use of her R hand.  She reports that her R 5th finger has not moved for about a year after she injured it while trimming hedges.  Pt. also says she had a stroke in January of this year.  Pt. lives alone.  She is able to drive. SHe does as much as she can with her left hand.  -EM  --     Patient/Caregiver Goals  Improve mobility;Know what to do to help the symptoms  -EM  --     Hand Dominance  right-handed  -EM  --     Occupation/sports/leisure activities  retired nurse, enjoys sewing, [employer], [employer], enjoys reading  -EM  --     What clinical tests have you had for this problem?  X-ray  -EM  --     Results of Clinical Tests  no fractures  -EM  --        Pain     Pain Location  Hand;Wrist  -EM  --     Pain at Present  2  -EM  --     Pain at Best  2  -EM  --     Pain at Worst  10   -EM  --     What Performance Factors Make the Current Problem(s) WORSE?  movement  -EM  --     What Performance Factors Make the Current Problem(s) BETTER?  rest  -EM  --     Is your sleep disturbed?  Yes  -EM  --        Daily Activities    Primary Language  English  -EM  --     Patient is concerned about/has problems with  Repetitive movements of the hand, arm, shoulder;Performing home management (household chores, shopping, care of dependents);Difficulty with self care (i.e. bathing, dressing, toileting:  -EM  --     Does patient have problems with the following?  Anxiety  -EM  --     Pt Participated in POC and Goals  Yes  -EM  --        Safety    Are you being hurt, hit, or frightened by anyone at home or in your life?  No  -EM  --     Are you being neglected by a caregiver  No  -EM  --       User Key  (r) = Recorded By, (t) = Taken By, (c) = Cosigned By    Initials Name Provider Type    Yaquelin Lawrence OTR Occupational Therapist          OT Neuro     Row Name 07/17/19 4381             Subjective Pain    Able to rate subjective pain?  yes  -EM      Pre-Treatment Pain Level  2  -EM      Post-Treatment Pain Level  2  -EM         Home Living    Living Environment Comment  lives alone with no concerns.  -EM         Right Upper Ext    Rt Elbow Supination AROM  84  -EM      Rt Elbow Pronation AROM  WFL  -EM      Rt Wrist Flexion AROM  55  -EM      Rt Wrist Extension AROM  35  -EM      Rt  Ulnar Deviation AROM  30  -EM      Rt  Radial Deviation AROM  15  -EM      Rt Upper Extremity Comments   elbow flex/ext WFL  -EM         Left Upper Ext    Lt Elbow Supination AROM  WFL  -EM      Lt Elbow Pronation AROM  WFL  -EM      Lt Wrist Flexion AROM  75  -EM      Lt Wrist Extension AROM  75  -EM      Lt  Ulnar Deviation AROM  45  -EM      Lt  Radial Deviation AROM  25  -EM      Lt Upper Extremity Comments   elbow ROM WFL's  -EM         MMT Right Upper Ext    Rt Elbow Flexion MMT, Gross Movement:  (5/5) normal  -EM      Rt  Elbow Extension MMT, Gross Movement:  (5/5) normal  -EM      Rt Wrist Flexion MMT, Gross Movement  (5/5) normal  -EM      Rt Wrist Extension MMT, Gross Movement  (4/5) good within available range  -EM      Rt Upper Extremity Comments   Pt. lacks active extension of R 5th digit. 4th finger partially extends to near neutral. 1st and 2nd fingers extend to neutral and pt. is able to hold fingers extended against resistance. R thumb is WFL's.  -EM         ADL Assessment/Intervention    Comment, IADL Assessment/Training  Pt reports she has to use her L hand a lot for ADL tasks and breaks up the activities d/t pain at times in R wrist.   -EM      Additional Documentation  Comment, IADL Assessment/Training (Row)  -EM        User Key  (r) = Recorded By, (t) = Taken By, (c) = Cosigned By    Initials Name Provider Type    Yaquelin Lawrence OTR Occupational Therapist             Hand Therapy (last 24 hours)      Hand Eval     Row Name 07/17/19 1445              Strength Right    # Reps  3  -EM      Right Rung  2  -EM      Right  Test 1  33  -EM      Right  Test 2  37  -EM      Right  Test 3  40  -EM       Strength Average Right  36.67  -EM          Strength Left    # Reps  3  -EM      Left Rung  2  -EM      Left  Test 1  50  -EM      Left  Test 2  52  -EM      Left  Test 3  36  -EM       Strength Average Left  46  -EM         Right Hand Strength - Pinch (lbs)    Lateral  13 lbs  -EM         Left Hand Strength - Pinch (lbs)    Lateral  12.3 lbs  -EM        User Key  (r) = Recorded By, (t) = Taken By, (c) = Cosigned By    Initials Name Provider Type    Yaquelin Lawrence OTR Occupational Therapist              Therapy Education  Education Details: role of OT and POC  Program: New  How Provided: Verbal  Provided to: Patient  Level of Understanding: Verbalized    OT Goals     Row Name 07/17/19 1445          OT Short Term Goals    STG Date to Achieve  08/14/19  -EM     STG 1  Pt will complete  hand and wrist strengthening and ROM with min A following written HEP  -EM     STG 1 Progress  New  -EM     STG 2  Pt will complete 9 HPT in < 30 secs w/ R hand to indicate increased FMC for daily tasks  -EM     STG 2 Progress  New  -EM     STG 3  Pt will demo increased  strength in R hand to 40 avg. to indicated increased functional strengthe  -EM     STG 3 Progress  New  -EM        Long Term Goals    LTG Date to Achieve  09/11/19  -EM     LTG 1  Pt will complete all HEPs with independence to increase strength, coordination and decrease pain of R UE for functional tasks  -EM     LTG 1 Progress  New  -EM     LTG 2  Pt will complete 9 HPT in <28 secs to indicate increased functional coordination  -EM     LTG 2 Progress  New  -EM     LTG 3  Pt will demo increased  strength in R hand to 45# to indicate increased functional strength for ADLs  -EM     LTG 3 Progress  New  -EM        Time Calculation    OT Goal Re-Cert Due Date  10/15/19  -EM       User Key  (r) = Recorded By, (t) = Taken By, (c) = Cosigned By    Initials Name Provider Type    Yaquelin Lawrence, OTR Occupational Therapist          OT Assessment/Plan     Row Name 07/17/19 1446          OT Assessment    Functional Limitations  Limitation in home management;Limitations in community activities;Performance in leisure activities;Limitations in functional capacity and performance;Performance in self-care ADL  -EM     Impairments  Coordination;Dexterity;Muscle strength;Pain;Range of motion;Motor function  -EM     Assessment Comments  Pt is a 79 y/o female w/ PMH of CVA in January and comes to therapy today to address pain and decreased use of R hand and wrist. Pt demo decreased independence, safety and satisfaction w/ ADL tasks and engaging in desired occuaptions d/t above listed impairments.   -EM     Please refer to paper survey for additional self-reported information  Yes  -EM     OT Rehab Potential  Good  -EM     Patient/caregiver participated in  establishment of treatment plan and goals  Yes  -EM     Patient would benefit from skilled therapy intervention  Yes  -EM        OT Plan    OT Frequency  1x/week  -EM     Predicted Duration of Therapy Intervention (Therapy Eval)  8 visits  -EM     Planned CPT's?  OT EVAL LOW COMPLEXITY: 90189;OT NEUROMUSC RE EDUCATION EA 15 MIN: 62612;OT SELF CARE/MGMT/TRAIN 15 MIN: 95346;OT THER ACT EA 15 MIN: 90045HB;OT THER PROC EA 15 MIN: 05970XC  -EM     Planned Therapy Interventions (Optional Details)  home exercise program;motor coordination training;strengthening;ROM (Range of Motion);patient/family education;neuromuscular re-education;stretching  -EM       User Key  (r) = Recorded By, (t) = Taken By, (c) = Cosigned By    Initials Name Provider Type    Yaquelin Lawrence, OTR Occupational Therapist              Outcome Measure Options: 9 Hole Peg, Quick DASH  9 Hole Peg  9-Hole Peg Left: 28.09  9-Hole Peg Right: 36.81  Quick DASH  Open a tight or new jar.: Moderate Difficulty  Do heavy household chores (e.g., wash walls, wash floors): Moderate Difficulty  Carry a shopping bag or briefcase: Moderate Difficulty  Wash your back: Severe Difficulty  Use a knife to cut food: Moderate Difficulty  Recreational activities in which you take some force or impact through your arm, should or hand (e.g. golf, hammering, tennis, etc.): Moderate Difficulty  During the past week, to what extent has your arm, shoulder, or hand problem interfered with your normal social activites with family, friends, neighbors or groups?: Slightly  During the past week, were you limited in your work or other regular daily activities as a result of your arm, shoulder or hand problem?: Moderately Limited  Arm, Shoulder, or hand pain: Severe  Tingling (pins and needles) in your arm, shoulder, or hand: None  During the past week, how much difficulty have you had sleeping because of the pain in your arm, shoulder or hand?: Mild Difficulty  Number of Questions  Answered: 11  Quick DASH Score: 45.45         Time Calculation:   OT Start Time: 1445     Therapy Charges for Today     Code Description Service Date Service Provider Modifiers Qty    31361874901 HC OT EVAL LOW COMPLEXITY 4 7/17/2019 Yaquelin Betancur, OTR GO 1                     Yaquelin Betancur, OTR  7/17/2019

## 2019-07-25 ENCOUNTER — TELEPHONE (OUTPATIENT)
Dept: NEUROLOGY | Facility: CLINIC | Age: 79
End: 2019-07-25

## 2019-07-25 RX ORDER — PRIMIDONE 50 MG/1
TABLET ORAL
Qty: 90 TABLET | Refills: 5 | Status: SHIPPED | OUTPATIENT
Start: 2019-07-25 | End: 2019-11-13 | Stop reason: SDUPTHER

## 2019-07-25 NOTE — TELEPHONE ENCOUNTER
----- Message from Tamika Greenberg sent at 7/25/2019  3:59 PM EDT -----  Regarding: Elliott- RX refill  Contact: [phone]  Patient needs refill on Primidone.

## 2019-07-31 ENCOUNTER — HOSPITAL ENCOUNTER (OUTPATIENT)
Dept: OCCUPATIONAL THERAPY | Facility: HOSPITAL | Age: 79
Setting detail: THERAPIES SERIES
Discharge: HOME | End: 2019-07-31

## 2019-07-31 DIAGNOSIS — R27.8 DECREASED COORDINATION: ICD-10-CM

## 2019-07-31 DIAGNOSIS — Z78.9 DECREASED INDEPENDENCE WITH ACTIVITIES OF DAILY LIVING: Primary | ICD-10-CM

## 2019-07-31 DIAGNOSIS — M79.601 PAIN OF RIGHT UPPER EXTREMITY: ICD-10-CM

## 2019-07-31 PROCEDURE — 97110 THERAPEUTIC EXERCISES: CPT | Performed by: OCCUPATIONAL THERAPIST

## 2019-08-01 NOTE — THERAPY TREATMENT NOTE
"Outpatient Occupational Therapy Neuro Treatment Note  [facility]     Patient Name: Zohra Hall  : 1940  MRN: [id number]  Today's Date: 2019       Visit Date: 2019    Patient Active Problem List   Diagnosis   • Generalized osteoarthritis   • Iron deficiency   • Vitamin D deficiency   • Skin neoplasm   • Sialadenitis   • Restless leg syndrome   • Reaction to chronic stress   • Piriformis syndrome   • Papillary adenocarcinoma of thyroid (CMS/HCC)   • Hypothyroidism (acquired)   • Hypertension   • GERD without esophagitis   • Dyslipidemia   • Cervical lymphadenopathy   • Cellulitis   • Atherosclerosis of aorta (CMS/HCC)   • Breast mass   • Aortic valve stenosis   • Incontinence of bowel   • Chronic diastolic heart failure (CMS/HCC)   • Acute right-sided low back pain without sciatica   • Benign essential tremor   • Pain of right hip joint   • Thyroid cancer (CMS/HCC)   • Lacunar stroke   • Nontraumatic rupture of extensor tendons of right hand and wrist        Past Medical History:   Diagnosis Date   • Breast cancer (CMS/HCC) 1987    left- pt states \"in situ\", no radiation, Tamoxifen for 5 years   • Cellulitis    • Cervical lymphadenopathy    • Chest pain    • Convulsions (CMS/HCC)    • GERD (gastroesophageal reflux disease)    • Glossitis    • Grief reaction     · Last Impression: 2015  counselled, given ativan for prn use.  Aleah Regan   • Hypertension    • Lower back pain    • Oral thrush    • Papillary adenocarcinoma (CMS/HCC)     Papillary adenocarcinoma of thyroid   • Papillary adenocarcinoma of thyroid (CMS/HCC)     · resection Ramirez- ,  2 x 2 x 1.5 cm  T3 N1 MXpost op low calcium and diminished  voiceablation Aiannika- 3/7 metastatic nodes and invasion to strap muscles · Last Impression: 29 Oct 2014  s/p Eval by berry Méndez.  Aleah Regan (Internal   • Piriformis syndrome    • Tingling    • Xerostomia         Past Surgical History:   Procedure Laterality Date   • " BREAST BIOPSY Right 10/10/2013    stereo bx   • BREAST BIOPSY Left 10/19/2015    stereo bx   • BREAST CYST EXCISION      right   • BREAST EXCISIONAL BIOPSY Right     affirm bx and loc 2016.   • BREAST LUMPECTOMY Left 1987   • HYSTERECTOMY  1979   • TOTAL HIP ARTHROPLASTY     • TOTAL THYROIDECTOMY      Thyroid Surgery Total Thyroidectomy         Visit Dx:    ICD-10-CM ICD-9-CM   1. Decreased independence with activities of daily living Z65.8 V62.89   2. Decreased coordination R27.9 781.3   3. Pain of right upper extremity M79.601 729.5                   OT Assessment/Plan     Row Name 07/31/19 1400          OT Assessment    Assessment Comments  Pt reports her pain isn't as bad as it was and she has been doing a lot of projects around the house. She reports she is eager to get her hip replaced and wants to hold off on OT until after that. She has tried WBing through wrist using her RW at home and hasn't had a problem. She dmeo good understanding of stretches and ways to protect wrist if using the RW following therapy becomes a problem.   -EM        OT Plan    OT Plan Comments  On hold at this time. Pt to notify therapist if she needs further therapy on her wrist prior to hip replacement. Will discharge in 15-30 days if OT is not needed so pt can focus on therapy for her hip.  -EM       User Key  (r) = Recorded By, (t) = Taken By, (c) = Cosigned By    Initials Name Provider Type    Yaquelin Lawrence, OTR Occupational Therapist          OT Goals     Row Name 07/31/19 1400          OT Short Term Goals    STG Date to Achieve  08/14/19  -EM     STG 1  Pt will complete hand and wrist strengthening and ROM with min A following written HEP  -EM     STG 1 Progress  Ongoing  -EM     STG 2  Pt will complete 9 HPT in < 30 secs w/ R hand to indicate increased FMC for daily tasks  -EM     STG 2 Progress  Ongoing  -EM     STG 3  Pt will demo increased  strength in R hand to 40 avg. to indicated increased functional strengthe  -EM      STG 3 Progress  Ongoing  -EM        Long Term Goals    LTG Date to Achieve  09/11/19  -EM     LTG 1  Pt will complete all HEPs with independence to increase strength, coordination and decrease pain of R UE for functional tasks  -EM     LTG 1 Progress  Ongoing  -EM     LTG 2  Pt will complete 9 HPT in <28 secs to indicate increased functional coordination  -EM     LTG 2 Progress  Ongoing  -EM     LTG 3  Pt will demo increased  strength in R hand to 45# to indicate increased functional strength for ADLs  -EM     LTG 3 Progress  Ongoing  -EM       User Key  (r) = Recorded By, (t) = Taken By, (c) = Cosigned By    Initials Name Provider Type    Yaquelin Lawrence, OTR Occupational Therapist          Therapy Education  Education Details: discussed patient's desire to focus on hip replacement and taking a break with OT to address that. Also discussed ways to protect wrist after hip replacement when using RW.   Given: HEP  Program: Reinforced  How Provided: Verbal, Demonstration  Provided to: Patient  Level of Understanding: Verbalized, Demonstrated      OT Exercises     Row Name 07/31/19 1400             Precautions    Existing Precautions/Restrictions  no known precautions/restrictions  -EM         Subjective Comments    Subjective Comments  Pt reports she is scheduled to get her hip replacement and wants to focus on that.   -EM         Subjective Pain    Able to rate subjective pain?  yes  -EM      Pre-Treatment Pain Level  1  -EM      Post-Treatment Pain Level  1  -EM         Total Minutes    88225 - OT Therapeutic Exercise Minutes  30  -EM         Exercise 1    Exercise Name 1  UE ROM and strength  -EM      Equipment 1  UE Ergometer  -EM      Time (Minutes) 1  5 mins each direction at level 6  -EM         Exercise 2    Exercise Name 2  Educated and reviewed wrist stretches and ROM throughout wrist and digits.   -EM        User Key  (r) = Recorded By, (t) = Taken By, (c) = Cosigned By    Initials Name Provider  Type    Yaquelin Lawrence, OTCHON Occupational Therapist                      Time Calculation:   OT Start Time: 1400     Therapy Charges for Today     Code Description Service Date Service Provider Modifiers Qty    [id number]  OT THER PROC EA 15 MIN 7/31/2019 Yaquelin Betancur OTR GO 2                    NATALIA Ware  8/1/2019

## 2019-08-21 ENCOUNTER — DOCUMENTATION (OUTPATIENT)
Dept: OCCUPATIONAL THERAPY | Facility: HOSPITAL | Age: 79
End: 2019-08-21

## 2019-08-21 DIAGNOSIS — Z78.9 DECREASED INDEPENDENCE WITH ACTIVITIES OF DAILY LIVING: Primary | ICD-10-CM

## 2019-08-21 DIAGNOSIS — M79.601 PAIN OF RIGHT UPPER EXTREMITY: ICD-10-CM

## 2019-08-21 DIAGNOSIS — R27.8 DECREASED COORDINATION: ICD-10-CM

## 2019-08-21 NOTE — THERAPY DISCHARGE NOTE
Outpatient Occupational Therapy Discharge Summary         Patient Name: Zohra Hall  : 1940  MRN: [id number]    Today's Date: 2019      Visit Date: 2019      OT Goals     Row Name 19 1537          OT Short Term Goals    STG Date to Achieve  19  -EM     STG 1  Pt will complete hand and wrist strengthening and ROM with min A following written HEP  -EM     STG 1 Progress  Not Met  -EM     STG 2  Pt will complete 9 HPT in < 30 secs w/ R hand to indicate increased FMC for daily tasks  -EM     STG 2 Progress  Not Met  -EM     STG 3  Pt will demo increased  strength in R hand to 40 avg. to indicated increased functional strengthe  -EM     STG 3 Progress  Not Met  -EM        Long Term Goals    LTG Date to Achieve  19  -EM     LTG 1  Pt will complete all HEPs with independence to increase strength, coordination and decrease pain of R UE for functional tasks  -EM     LTG 1 Progress  Not Met  -EM     LTG 2  Pt will complete 9 HPT in <28 secs to indicate increased functional coordination  -EM     LTG 2 Progress  Not Met  -EM     LTG 3  Pt will demo increased  strength in R hand to 45# to indicate increased functional strength for ADLs  -EM     LTG 3 Progress  Not Met  -EM       User Key  (r) = Recorded By, (t) = Taken By, (c) = Cosigned By    Initials Name Provider Type    Yaquelin Lawrence OTR Occupational Therapist           OP OT Discharge Summary  Date of Discharge: 19  Reason for Discharge: other (comment)(focusing on her hip replacement)  Outcomes Achieved: Unable to make functional progress toward goals at this time  Discharge Destination: Unknown      Time Calculation:                         NATALIA Ware   2019

## 2019-08-22 ENCOUNTER — DOCUMENTATION (OUTPATIENT)
Dept: PHYSICAL THERAPY | Facility: HOSPITAL | Age: 79
End: 2019-08-22

## 2019-08-22 DIAGNOSIS — M66.231 NONTRAUMATIC RUPTURE OF EXTENSOR TENDONS OF RIGHT HAND AND WRIST: Primary | ICD-10-CM

## 2019-08-22 DIAGNOSIS — M66.241 NONTRAUMATIC RUPTURE OF EXTENSOR TENDONS OF RIGHT HAND AND WRIST: Primary | ICD-10-CM

## 2019-08-22 NOTE — THERAPY DISCHARGE NOTE
Outpatient Physical Therapy Discharge Summary         Patient Name: Zohra Hall  : 1940  MRN: [id number]    Today's Date: 2019    Visit Dx:    ICD-10-CM ICD-9-CM   1. Nontraumatic rupture of extensor tendons of right hand and wrist M66.241 727.63       PT OP Goals     Row Name 19 1400          PT Short Term Goals    STG 1  Refer to OT.  -RON       User Key  (r) = Recorded By, (t) = Taken By, (c) = Cosigned By    Initials Name Provider Type    Molly Shay, PT Physical Therapist          OP PT Discharge Summary  Date of Discharge: 19  Reason for Discharge: other (comment)(referred to OT after initial evaluation)  Outcomes Achieved: Discharge from facility occurred on same date as evluation  Discharge Destination: Other (comment)(referred to OT)      Time Calculation:                    Molly Hernandez PT  2019

## 2019-08-23 ENCOUNTER — OFFICE VISIT (OUTPATIENT)
Dept: INTERNAL MEDICINE | Facility: CLINIC | Age: 79
End: 2019-08-23

## 2019-08-23 VITALS
BODY MASS INDEX: 26.93 KG/M2 | OXYGEN SATURATION: 95 % | HEART RATE: 56 BPM | WEIGHT: 152 LBS | HEIGHT: 63 IN | SYSTOLIC BLOOD PRESSURE: 136 MMHG | DIASTOLIC BLOOD PRESSURE: 64 MMHG | RESPIRATION RATE: 16 BRPM

## 2019-08-23 DIAGNOSIS — E87.1 HYPONATREMIA: ICD-10-CM

## 2019-08-23 DIAGNOSIS — N30.00 ACUTE CYSTITIS WITHOUT HEMATURIA: Primary | ICD-10-CM

## 2019-08-23 PROCEDURE — 99213 OFFICE O/P EST LOW 20 MIN: CPT | Performed by: NURSE PRACTITIONER

## 2019-08-23 RX ORDER — CEFDINIR 300 MG/1
300 CAPSULE ORAL 2 TIMES DAILY
Qty: 20 CAPSULE | Refills: 0 | Status: SHIPPED | OUTPATIENT
Start: 2019-08-23 | End: 2019-09-04

## 2019-08-23 NOTE — PATIENT INSTRUCTIONS
We will repeat BMP today.  We will obtain urine culture if she can urinate.  We will begin patient on Omnicef.  Because of the hyponatremia she is to reduce her fluid intake by one fourth..  Activity as tolerated, diet as tolerated.  If her weakness worsens in any way she is to go to the emergency department immediately.Pt verbalizes understanding and agreement with plan of care.

## 2019-08-23 NOTE — PROGRESS NOTES
Subjective   Zohra Hall is a 78 y.o. female.   Chief Complaint   Patient presents with   • Urinary Frequency      History of Present Illness patient had preop testing done yesterday.  It was noted that her sodium was low at 123.  Has urinary frequecny (has positive urine at [facility] yesterday during preop testing)  Feels weak.   No SOA, C/P  abdominal pain,  painful urination, hematuria.   She has not been taking anything for her symptoms.    The following portions of the patient's history were reviewed and updated as appropriate: allergies, current medications, past family history, past medical history, past social history, past surgical history and problem list.    Current Outpatient Medications:   •  acetaminophen (TYLENOL) 650 MG 8 hr tablet, Take 1 tablet by mouth Every 8 (Eight) Hours As Needed for Mild Pain ., Disp: , Rfl:   •  alendronate (FOSAMAX) 70 MG tablet, Take 1 tablet by mouth Every 7 (Seven) Days., Disp: 12 tablet, Rfl: 1  •  amLODIPine (NORVASC) 5 MG tablet, Take 1 tablet by mouth Daily., Disp: 90 tablet, Rfl: 1  •  atenolol (TENORMIN) 50 MG tablet, Take 1 tablet by mouth Daily., Disp: 90 tablet, Rfl: 1  •  atorvastatin (LIPITOR) 40 MG tablet, Take 1 tablet by mouth Daily., Disp: 90 tablet, Rfl: 1  •  DULoxetine (CYMBALTA) 60 MG capsule, Take 1 capsule by mouth Daily. For stress, Disp: 90 capsule, Rfl: 1  •  Ketamine HCl powder, Ketoprofen 10 % cream, bupivacaine PF 0.25 % solution, lidocaine 5 % ointment, Meloxicam powder, Apply to rt hand bid - tid, Disp: 60 g, Rfl: 0  •  levothyroxine (SYNTHROID, LEVOTHROID) 112 MCG tablet, Take 1 tablet by mouth Daily., Disp: , Rfl:   •  primidone (MYSOLINE) 50 MG tablet, Take 3 tablets at night, Disp: 90 tablet, Rfl: 5  •  triamterene-hydrochlorothiazide (MAXZIDE-25) 37.5-25 MG per tablet, Take 1 tablet by mouth Daily., Disp: 90 tablet, Rfl: 1  •  valsartan (DIOVAN) 320 MG tablet, Take 1 tablet by mouth Daily. Replaces irbesartan, Disp: 90 tablet, Rfl: 1  •   "cefdinir (OMNICEF) 300 MG capsule, Take 1 capsule by mouth 2 (Two) Times a Day., Disp: 20 capsule, Rfl: 0  •  metroNIDAZOLE (METROCREAM) 0.75 % cream, Apply  topically 2 (Two) Times a Day., Disp: 60 g, Rfl: 2    Review of Systems Consitutional, HEENT, Respiratory, CV, GI, , Skin, Musculoskeletal, Neuro-mental, Endocrinological, Hematological were reviewed.  Positives were discussed in the HPI, otherwise ROS was negative   /64   Pulse 56   Resp 16   Ht 160 cm (63\")   Wt 68.9 kg (152 lb)   SpO2 95%   Breastfeeding? No   BMI 26.93 kg/m²     Objective   Allergies   Allergen Reactions   • Dilaudid [Hydromorphone Hcl] Hallucinations     TABS       Physical Exam   Constitutional: She is oriented to person, place, and time. She appears well-developed and well-nourished. No distress.   HENT:   Head: Normocephalic.   Neck: Neck supple.   Cardiovascular: Normal rate, regular rhythm, normal heart sounds and intact distal pulses. Exam reveals no gallop and no friction rub.   No murmur heard.  Pulmonary/Chest: Effort normal and breath sounds normal. No stridor. No respiratory distress. She has no wheezes. She has no rales.   Abdominal: Soft. She exhibits no mass. There is no tenderness.   Musculoskeletal:   Walks slowly and with assistance from walker   Neurological: She is alert and oriented to person, place, and time.   Skin: Skin is warm and dry. Capillary refill takes less than 2 seconds.   Is pink, no rash    Nursing note and vitals reviewed.      Procedures    LABS  Results for orders placed or performed in visit on 06/18/19   TSH   Result Value Ref Range    TSH 0.898 0.270 - 4.200 mIU/mL   T4, Free   Result Value Ref Range    Free T4 1.44 0.93 - 1.70 ng/dL   Basic Metabolic Panel   Result Value Ref Range    Glucose 87 65 - 99 mg/dL    BUN 12 8 - 23 mg/dL    Creatinine 0.89 0.57 - 1.00 mg/dL    Sodium 137 136 - 145 mmol/L    Potassium 4.2 3.5 - 5.2 mmol/L    Chloride 97 (L) 98 - 107 mmol/L    CO2 27.0 22.0 - " 29.0 mmol/L    Calcium 9.6 8.6 - 10.5 mg/dL    eGFR Non African Amer 61 >60 mL/min/1.73    BUN/Creatinine Ratio 13.5 7.0 - 25.0    Anion Gap 13.0 mmol/L       Assessment/Plan   Zohra was seen today for urinary frequency.    Diagnoses and all orders for this visit:    Acute cystitis without hematuria  -     cefdinir (OMNICEF) 300 MG capsule; Take 1 capsule by mouth 2 (Two) Times a Day.    Hyponatremia  -     Basic metabolic panel; Future      Patient Instructions   We will repeat BMP today.  We will obtain urine culture if she can urinate.  We will begin patient on Omnicef.  Because of the hyponatremia she is to reduce her fluid intake by one fourth..  Activity as tolerated, diet as tolerated.  If her weakness worsens in any way she is to go to the emergency department immediately.Pt verbalizes understanding and agreement with plan of care.       EMR Dragon/transcription disclaimer:  Please note that portions of this note were completed with a voice recognition program.  Electronic transcription of the voice recognition program may permit erroneous words or phrases to be inadvertently transcribed.  Although I have reviewed the note for such errors, some may still exist in this documentation     ELVIS Kearney

## 2019-08-26 ENCOUNTER — OFFICE VISIT (OUTPATIENT)
Dept: INTERNAL MEDICINE | Facility: CLINIC | Age: 79
End: 2019-08-26

## 2019-08-26 VITALS
HEART RATE: 58 BPM | OXYGEN SATURATION: 98 % | SYSTOLIC BLOOD PRESSURE: 142 MMHG | WEIGHT: 156.6 LBS | BODY MASS INDEX: 27.75 KG/M2 | DIASTOLIC BLOOD PRESSURE: 84 MMHG | HEIGHT: 63 IN

## 2019-08-26 DIAGNOSIS — Z01.818 PREOP GENERAL PHYSICAL EXAM: Primary | ICD-10-CM

## 2019-08-26 DIAGNOSIS — E87.1 HYPONATREMIA: ICD-10-CM

## 2019-08-26 DIAGNOSIS — N39.0 URINARY TRACT INFECTION WITHOUT HEMATURIA, SITE UNSPECIFIED: ICD-10-CM

## 2019-08-26 LAB
ANION GAP SERPL CALCULATED.3IONS-SCNC: 11.8 MMOL/L (ref 5–15)
BUN BLD-MCNC: 14 MG/DL (ref 8–23)
BUN/CREAT SERPL: 15.2 (ref 7–25)
CALCIUM SPEC-SCNC: 9.1 MG/DL (ref 8.6–10.5)
CHLORIDE SERPL-SCNC: 86 MMOL/L (ref 98–107)
CO2 SERPL-SCNC: 27.2 MMOL/L (ref 22–29)
CREAT BLD-MCNC: 0.92 MG/DL (ref 0.57–1)
GFR SERPL CREATININE-BSD FRML MDRD: 59 ML/MIN/1.73
GLUCOSE BLD-MCNC: 92 MG/DL (ref 65–99)
POTASSIUM BLD-SCNC: 3.9 MMOL/L (ref 3.5–5.2)
SODIUM BLD-SCNC: 125 MMOL/L (ref 136–145)

## 2019-08-26 PROCEDURE — 99214 OFFICE O/P EST MOD 30 MIN: CPT | Performed by: INTERNAL MEDICINE

## 2019-08-26 PROCEDURE — 80048 BASIC METABOLIC PNL TOTAL CA: CPT | Performed by: INTERNAL MEDICINE

## 2019-08-26 NOTE — PROGRESS NOTES
Had pre op at [facility] (has a UTI and low sodium (132))    Subjective   Zohra Hall is a 78 y.o. female is here today for follow-up.    Pre-Op Visit (Brief): The patient is being seen for a preoperative visit.   Procedure : THR  Date scheduled : 9/10/19  Surgeon: .Dr. Arellano  Indication for surgery:OA hip, pain.    Surgical Risk Assessment:   Prior Anesthesia: yes.  Pertinent Past Medical History: see problem list  Exercise Capacity: unable to walk 4 blocks, two flights of stairs without symptoms, due to hip pain.  Lifestyle Factors: Denies tobacco use, alcohol se or Illicit drug use.   Symptoms: no easy bleeding, no easy bruising, no frequent nosebleeds, no chest pain, rt hand tendon rupture.  Pertinent Family History: no pertinent family history early sudden death, ischemic heart disease :   but no family history of an adverse reaction to anesthesia, no aneurysm, no bleeding problems and no stroke.   Living Situation: home is secure and supportive and no post-op concerns with his living situation      History of Present Illness   Verna reports she had her Preop workup at [place] and was found to have hyponatremia, with sodium of 123. Previously was low but better after thyroid corected.  Also dxed with uti, and started on Omnicef int he meantime.  She still has 7 more days of Omnicef.      Patient Active Problem List    Diagnosis   • Hyponatremia [E87.1]   • Nontraumatic rupture of extensor tendons of right hand and wrist [M66.241]   • Lacunar stroke [I63.81]   • Thyroid cancer (CMS/HCC) [C73]   • Pain of right hip joint [M25.551]   • Benign essential tremor [G25.0]   • Acute right-sided low back pain without sciatica [M54.5]   • Chronic diastolic heart failure (CMS/HCC) [I50.32]   • Aortic valve stenosis [I35.0]   • Incontinence of bowel [R15.9]   • Breast mass [N63.0]   • Vitamin D deficiency [E55.9]   • Skin neoplasm [D49.2]   • Sialadenitis [K11.20]   • Restless leg syndrome [G25.81]   • Reaction to  chronic stress [F43.8]   • Hypothyroidism (acquired) [E03.9]   • Hypertension [I10]   • GERD without esophagitis [K21.9]   • Dyslipidemia [E78.5]   • Cervical lymphadenopathy [R59.0]   • Atherosclerosis of aorta (CMS/HCC) [I70.0]   • Piriformis syndrome [G57.00]   • 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]   • Cellulitis [L03.90]   • Generalized osteoarthritis [M15.9]   • Iron deficiency [E61.1]         Current Outpatient Medications:   •  acetaminophen (TYLENOL) 650 MG 8 hr tablet, Take 1 tablet by mouth Every 8 (Eight) Hours As Needed for Mild Pain ., Disp: , Rfl:   •  alendronate (FOSAMAX) 70 MG tablet, Take 1 tablet by mouth Every 7 (Seven) Days., Disp: 12 tablet, Rfl: 1  •  amLODIPine (NORVASC) 5 MG tablet, Take 1 tablet by mouth Daily., Disp: 90 tablet, Rfl: 1  •  atenolol (TENORMIN) 50 MG tablet, Take 1 tablet by mouth Daily., Disp: 90 tablet, Rfl: 1  •  atorvastatin (LIPITOR) 40 MG tablet, Take 1 tablet by mouth Daily., Disp: 90 tablet, Rfl: 1  •  cefdinir (OMNICEF) 300 MG capsule, Take 1 capsule by mouth 2 (Two) Times a Day., Disp: 20 capsule, Rfl: 0  •  DULoxetine (CYMBALTA) 60 MG capsule, Take 1 capsule by mouth Daily. For stress, Disp: 90 capsule, Rfl: 1  •  Ketamine HCl powder, Ketoprofen 10 % cream, bupivacaine PF 0.25 % solution, lidocaine 5 % ointment, Meloxicam powder, Apply to rt hand bid - tid, Disp: 60 g, Rfl: 0  •  levothyroxine (SYNTHROID, LEVOTHROID) 112 MCG tablet, Take 1 tablet by mouth Daily., Disp: , Rfl:   •  metroNIDAZOLE (METROCREAM) 0.75 % cream, Apply  topically 2 (Two) Times a Day., Disp: 60 g, Rfl: 2  •  primidone (MYSOLINE) 50 MG tablet, Take 3 tablets at night, Disp: 90 tablet, Rfl: 5  •  triamterene-hydrochlorothiazide (MAXZIDE-25) 37.5-25 MG per tablet, Take 1 tablet by mouth Daily., Disp: 90 tablet, Rfl: 1  •  valsartan (DIOVAN) 320 MG tablet, Take 1 tablet by mouth Daily. Replaces irbesartan, Disp: 90 tablet, Rfl: 1      The following portions of the patient's  "history were reviewed and updated as appropriate: allergies, current medications, past family history, past medical history, past social history, past surgical history and problem list.    Review of Systems   Constitutional: Positive for fatigue. Negative for chills and fever.   HENT: Negative for ear discharge, ear pain, sinus pressure and sore throat.    Respiratory: Negative for cough, chest tightness and shortness of breath.    Cardiovascular: Negative for chest pain, palpitations and leg swelling.   Gastrointestinal: Negative for diarrhea, nausea and vomiting.   Musculoskeletal: Positive for arthralgias, joint swelling and myalgias. Negative for back pain.   Neurological: Positive for weakness. Negative for dizziness, syncope and headaches.   Psychiatric/Behavioral: Negative for confusion and sleep disturbance. The patient is nervous/anxious.        Objective   /84   Pulse 58   Ht 160 cm (63\")   Wt 71 kg (156 lb 9.6 oz)   SpO2 98% Comment: ra  BMI 27.74 kg/m²   Physical Exam   Constitutional: She is oriented to person, place, and time. She appears well-developed and well-nourished.   HENT:   Head: Normocephalic and atraumatic.   Right Ear: External ear normal.   Left Ear: External ear normal.   Mouth/Throat: No oropharyngeal exudate.   Eyes: Conjunctivae are normal. Pupils are equal, round, and reactive to light.   Neck: Neck supple. No thyromegaly present.   Cardiovascular: Normal rate and regular rhythm.   Pulmonary/Chest: Effort normal and breath sounds normal.   Abdominal: Soft. Bowel sounds are normal. She exhibits no distension. There is tenderness.   Musculoskeletal: She exhibits tenderness. She exhibits no edema.   Neurological: She is alert and oriented to person, place, and time. No cranial nerve deficit.   Skin: Skin is warm and dry.   Psychiatric: She has a normal mood and affect. Judgment normal.   Nursing note and vitals reviewed.        Results for orders placed or performed in visit " on 08/26/19   Basic Metabolic Panel   Result Value Ref Range    Glucose 92 65 - 99 mg/dL    BUN 14 8 - 23 mg/dL    Creatinine 0.92 0.57 - 1.00 mg/dL    Sodium 125 (L) 136 - 145 mmol/L    Potassium 3.9 3.5 - 5.2 mmol/L    Chloride 86 (L) 98 - 107 mmol/L    CO2 27.2 22.0 - 29.0 mmol/L    Calcium 9.1 8.6 - 10.5 mg/dL    eGFR Non African Amer 59 (L) >60 mL/min/1.73    BUN/Creatinine Ratio 15.2 7.0 - 25.0    Anion Gap 11.8 5.0 - 15.0 mmol/L             Assessment/Plan   Diagnoses and all orders for this visit:    Preop general physical exam    Hyponatremia  -     Basic Metabolic Panel    Urinary tract infection without hematuria, site unspecified  -     Cancel: Urinalysis With Culture If Indicated - Urine, Clean Catch      Reviewed EKG and labs from her preop at [facility].  Sodium is very low at 123.  Recheck today and if persistently low will stop the Maxide.  Regarding the UTI, patient was advised to come in after completing the Omnicef and give a urine sample.  She was advised to take only 5 more days of the Omnicef.  Her last day will be Thursday, August 29 and she will come in on August 30 to give urine sample and repeat blood if needed.           Discussion/Summary  Surgical Clearance: She is at a Low to moderate risk from a cardiovascular standpoint at this time without any additional cardiac testing. Reevaluation needed, if he should present with symptoms prior to surgery/procedure.    Return in about 3 months (around 11/26/2019) for Next scheduled follow up and wellness in 6 months.

## 2019-08-27 ENCOUNTER — TELEPHONE (OUTPATIENT)
Dept: INTERNAL MEDICINE | Facility: CLINIC | Age: 79
End: 2019-08-27

## 2019-08-27 DIAGNOSIS — E87.1 HYPONATREMIA: Primary | ICD-10-CM

## 2019-08-27 NOTE — TELEPHONE ENCOUNTER
ROSENDO TAN WITH [facility] IS REQUESTING A RETURN CALL [phone] TO DISCUSS THIS PATIENT'S LOW SODIUM LEVEL AND PRE-OP CLEARANCE. SHE STATES THAT SODIUM LEVEL IS TOO LOW FOR SURGERY AT THIS TIME.

## 2019-08-27 NOTE — TELEPHONE ENCOUNTER
Spoke to Kelly, states that the sodium cut off is 130 and pt is at 125.  Please advise on if you are wanting to do.

## 2019-08-30 ENCOUNTER — LAB (OUTPATIENT)
Dept: INTERNAL MEDICINE | Facility: CLINIC | Age: 79
End: 2019-08-30

## 2019-08-30 DIAGNOSIS — N39.0 URINARY TRACT INFECTION WITHOUT HEMATURIA, SITE UNSPECIFIED: Primary | ICD-10-CM

## 2019-08-30 LAB
ANION GAP SERPL CALCULATED.3IONS-SCNC: 12.1 MMOL/L (ref 5–15)
BUN BLD-MCNC: 11 MG/DL (ref 8–23)
BUN/CREAT SERPL: 12.8 (ref 7–25)
CALCIUM SPEC-SCNC: 9.1 MG/DL (ref 8.6–10.5)
CHLORIDE SERPL-SCNC: 88 MMOL/L (ref 98–107)
CO2 SERPL-SCNC: 26.9 MMOL/L (ref 22–29)
CREAT BLD-MCNC: 0.86 MG/DL (ref 0.57–1)
GFR SERPL CREATININE-BSD FRML MDRD: 63 ML/MIN/1.73
GLUCOSE BLD-MCNC: 114 MG/DL (ref 65–99)
POTASSIUM BLD-SCNC: 4.3 MMOL/L (ref 3.5–5.2)
SODIUM BLD-SCNC: 127 MMOL/L (ref 136–145)

## 2019-08-30 PROCEDURE — 81003 URINALYSIS AUTO W/O SCOPE: CPT | Performed by: INTERNAL MEDICINE

## 2019-08-30 PROCEDURE — 80048 BASIC METABOLIC PNL TOTAL CA: CPT | Performed by: INTERNAL MEDICINE

## 2019-08-31 LAB
BILIRUB UR QL STRIP: NEGATIVE
CLARITY UR: CLEAR
COLOR UR: ABNORMAL
GLUCOSE UR STRIP-MCNC: NEGATIVE MG/DL
HGB UR QL STRIP.AUTO: NEGATIVE
KETONES UR QL STRIP: ABNORMAL
LEUKOCYTE ESTERASE UR QL STRIP.AUTO: NEGATIVE
NITRITE UR QL STRIP: NEGATIVE
PH UR STRIP.AUTO: 6 [PH] (ref 5–8)
PROT UR QL STRIP: NEGATIVE
SP GR UR STRIP: 1.02 (ref 1–1.03)
UROBILINOGEN UR QL STRIP: ABNORMAL

## 2019-09-04 ENCOUNTER — OFFICE VISIT (OUTPATIENT)
Dept: INTERNAL MEDICINE | Facility: CLINIC | Age: 79
End: 2019-09-04

## 2019-09-04 VITALS
BODY MASS INDEX: 28.03 KG/M2 | DIASTOLIC BLOOD PRESSURE: 90 MMHG | OXYGEN SATURATION: 96 % | SYSTOLIC BLOOD PRESSURE: 200 MMHG | WEIGHT: 158.2 LBS | HEART RATE: 58 BPM | HEIGHT: 63 IN

## 2019-09-04 DIAGNOSIS — I10 UNCONTROLLED HYPERTENSION: ICD-10-CM

## 2019-09-04 DIAGNOSIS — N39.0 URINARY TRACT INFECTION WITHOUT HEMATURIA, SITE UNSPECIFIED: ICD-10-CM

## 2019-09-04 DIAGNOSIS — E87.1 HYPONATREMIA: Primary | ICD-10-CM

## 2019-09-04 LAB
ANION GAP SERPL CALCULATED.3IONS-SCNC: 12.7 MMOL/L (ref 5–15)
BUN BLD-MCNC: 11 MG/DL (ref 8–23)
BUN/CREAT SERPL: 12.9 (ref 7–25)
CALCIUM SPEC-SCNC: 9.4 MG/DL (ref 8.6–10.5)
CHLORIDE SERPL-SCNC: 92 MMOL/L (ref 98–107)
CO2 SERPL-SCNC: 26.3 MMOL/L (ref 22–29)
CREAT BLD-MCNC: 0.85 MG/DL (ref 0.57–1)
GFR SERPL CREATININE-BSD FRML MDRD: 64 ML/MIN/1.73
GLUCOSE BLD-MCNC: 81 MG/DL (ref 65–99)
POTASSIUM BLD-SCNC: 4 MMOL/L (ref 3.5–5.2)
SODIUM BLD-SCNC: 131 MMOL/L (ref 136–145)

## 2019-09-04 PROCEDURE — 99213 OFFICE O/P EST LOW 20 MIN: CPT | Performed by: INTERNAL MEDICINE

## 2019-09-04 PROCEDURE — 80048 BASIC METABOLIC PNL TOTAL CA: CPT | Performed by: INTERNAL MEDICINE

## 2019-09-04 RX ORDER — CLONIDINE HYDROCHLORIDE 0.1 MG/1
0.1 TABLET ORAL 2 TIMES DAILY
Qty: 60 TABLET | Refills: 2 | Status: SHIPPED | OUTPATIENT
Start: 2019-09-04 | End: 2019-09-06
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
## 2019-09-06 ENCOUNTER — OFFICE VISIT (OUTPATIENT)
Dept: INTERNAL MEDICINE | Facility: CLINIC | Age: 79
End: 2019-09-06

## 2019-09-06 VITALS
OXYGEN SATURATION: 97 % | HEART RATE: 58 BPM | HEIGHT: 63 IN | BODY MASS INDEX: 28.02 KG/M2 | DIASTOLIC BLOOD PRESSURE: 90 MMHG | SYSTOLIC BLOOD PRESSURE: 172 MMHG

## 2019-09-06 DIAGNOSIS — R60.9 EDEMA, UNSPECIFIED TYPE: ICD-10-CM

## 2019-09-06 DIAGNOSIS — E87.1 HYPONATREMIA: Primary | ICD-10-CM

## 2019-09-06 DIAGNOSIS — I10 UNCONTROLLED HYPERTENSION: ICD-10-CM

## 2019-09-06 PROCEDURE — 99213 OFFICE O/P EST LOW 20 MIN: CPT | Performed by: INTERNAL MEDICINE

## 2019-09-06 RX ORDER — CLONIDINE HYDROCHLORIDE 0.1 MG/1
0.2 TABLET ORAL 2 TIMES DAILY
Qty: 120 TABLET | Refills: 2 | Status: SHIPPED | OUTPATIENT
Start: 2019-09-06 | End: 2020-06-10

## 2019-09-06 NOTE — PROGRESS NOTES
Fu on labs and Hypertension    Subjective   Zohra Hall is a 78 y.o. female is here today for follow-up.    History of Present Illness   Here for a follow up on her low sodium and BP this am was 176/73, legs swollen.  She has been driving her son around for all his errands,  And has been off the maxzide.      Current Outpatient Medications:   •  acetaminophen (TYLENOL) 650 MG 8 hr tablet, Take 1 tablet by mouth Every 8 (Eight) Hours As Needed for Mild Pain ., Disp: , Rfl:   •  alendronate (FOSAMAX) 70 MG tablet, Take 1 tablet by mouth Every 7 (Seven) Days., Disp: 12 tablet, Rfl: 1  •  amLODIPine (NORVASC) 5 MG tablet, Take 1 tablet by mouth Daily., Disp: 90 tablet, Rfl: 1  •  atenolol (TENORMIN) 50 MG tablet, Take 1 tablet by mouth Daily., Disp: 90 tablet, Rfl: 1  •  atorvastatin (LIPITOR) 40 MG tablet, Take 1 tablet by mouth Daily., Disp: 90 tablet, Rfl: 1  •  CloNIDine (CATAPRES) 0.1 MG tablet, Take 2 tablets by mouth 2 (Two) Times a Day. Note- dose change, Disp: 120 tablet, Rfl: 2  •  DULoxetine (CYMBALTA) 60 MG capsule, Take 1 capsule by mouth Daily. For stress, Disp: 90 capsule, Rfl: 1  •  Ketamine HCl powder, Ketoprofen 10 % cream, bupivacaine PF 0.25 % solution, lidocaine 5 % ointment, Meloxicam powder, Apply to rt hand bid - tid, Disp: 60 g, Rfl: 0  •  levothyroxine (SYNTHROID, LEVOTHROID) 112 MCG tablet, Take 1 tablet by mouth Daily., Disp: , Rfl:   •  metroNIDAZOLE (METROCREAM) 0.75 % cream, Apply  topically 2 (Two) Times a Day., Disp: 60 g, Rfl: 2  •  primidone (MYSOLINE) 50 MG tablet, Take 3 tablets at night, Disp: 90 tablet, Rfl: 5  •  triamterene-hydrochlorothiazide (MAXZIDE-25) 37.5-25 MG per tablet, Take 1 tablet by mouth Daily., Disp: 90 tablet, Rfl: 1  •  valsartan (DIOVAN) 320 MG tablet, Take 1 tablet by mouth Daily. Replaces irbesartan, Disp: 90 tablet, Rfl: 1      The following portions of the patient's history were reviewed and updated as appropriate: allergies, current medications, past  "family history, past medical history, past social history, past surgical history and problem list.    Review of Systems   Constitutional: Negative.  Negative for chills and fever.   HENT: Negative for ear discharge, ear pain, sinus pressure and sore throat.    Respiratory: Negative for cough, chest tightness and shortness of breath.    Cardiovascular: Positive for leg swelling. Negative for chest pain and palpitations.   Gastrointestinal: Negative for diarrhea, nausea and vomiting.   Musculoskeletal: Positive for arthralgias and gait problem. Negative for back pain and myalgias.   Neurological: Negative for dizziness, syncope and headaches.   Psychiatric/Behavioral: Negative for confusion and sleep disturbance.        Stressed       Objective   /90   Pulse 58   Ht 160 cm (63\")   SpO2 97% Comment: ra  BMI 28.02 kg/m²   Physical Exam   Constitutional: She is oriented to person, place, and time. She appears well-developed and well-nourished.   HENT:   Head: Normocephalic and atraumatic.   Mouth/Throat: No oropharyngeal exudate.   Eyes: Conjunctivae are normal. Pupils are equal, round, and reactive to light.   Neck: Neck supple. No thyromegaly present.   Cardiovascular: Normal rate and regular rhythm.   Pulmonary/Chest: Effort normal and breath sounds normal.   Abdominal: Soft. Bowel sounds are normal.   Musculoskeletal: She exhibits edema.   Neurological: She is alert and oriented to person, place, and time. No cranial nerve deficit.   Skin: Skin is warm and dry.   Psychiatric: She has a normal mood and affect. Judgment normal.   Nursing note and vitals reviewed.        Results for orders placed or performed in visit on 09/04/19   Basic Metabolic Panel   Result Value Ref Range    Glucose 81 65 - 99 mg/dL    BUN 11 8 - 23 mg/dL    Creatinine 0.85 0.57 - 1.00 mg/dL    Sodium 131 (L) 136 - 145 mmol/L    Potassium 4.0 3.5 - 5.2 mmol/L    Chloride 92 (L) 98 - 107 mmol/L    CO2 26.3 22.0 - 29.0 mmol/L    Calcium " 9.4 8.6 - 10.5 mg/dL    eGFR Non African Amer 64 >60 mL/min/1.73    BUN/Creatinine Ratio 12.9 7.0 - 25.0    Anion Gap 12.7 5.0 - 15.0 mmol/L             Assessment/Plan   Diagnoses and all orders for this visit:    Hyponatremia  Comments:  sodium up to 131, better.    Uncontrolled hypertension  Comments:  improved, increase clonidine to 0.2mg bid.  monitor BP.  Orders:  -     CloNIDine (CATAPRES) 0.1 MG tablet; Take 2 tablets by mouth 2 (Two) Times a Day. Note- dose change    Edema, unspecified type  Comments:  restart maxzide at 1/2 po every other day.               Zohra is cleared medically for surgery.She is at a LOW risk from a cardiovascular standpoint at this time without any additional cardiac testing. Reevaluation needed, if she should present with symptoms prior to surgery/procedure.    Return in about 6 weeks (around 10/18/2019) for Next scheduled follow up.

## 2019-09-08 ENCOUNTER — OFFICE VISIT (OUTPATIENT)
Dept: INTERNAL MEDICINE | Facility: CLINIC | Age: 79
End: 2019-09-08

## 2019-09-08 VITALS
TEMPERATURE: 98.2 F | HEART RATE: 58 BPM | HEIGHT: 63 IN | DIASTOLIC BLOOD PRESSURE: 80 MMHG | BODY MASS INDEX: 27.64 KG/M2 | OXYGEN SATURATION: 98 % | SYSTOLIC BLOOD PRESSURE: 150 MMHG | WEIGHT: 156 LBS

## 2019-09-08 DIAGNOSIS — N30.00 ACUTE CYSTITIS WITHOUT HEMATURIA: Primary | ICD-10-CM

## 2019-09-08 LAB
BILIRUB BLD-MCNC: NEGATIVE MG/DL
CLARITY, POC: ABNORMAL
COLOR UR: YELLOW
GLUCOSE UR STRIP-MCNC: NEGATIVE MG/DL
KETONES UR QL: NEGATIVE
LEUKOCYTE EST, POC: ABNORMAL
NITRITE UR-MCNC: NEGATIVE MG/ML
PH UR: 7 [PH] (ref 5–8)
PROT UR STRIP-MCNC: NEGATIVE MG/DL
RBC # UR STRIP: NEGATIVE /UL
SP GR UR: 1 (ref 1–1.03)
UROBILINOGEN UR QL: NORMAL

## 2019-09-08 PROCEDURE — 87086 URINE CULTURE/COLONY COUNT: CPT | Performed by: NURSE PRACTITIONER

## 2019-09-08 PROCEDURE — 99213 OFFICE O/P EST LOW 20 MIN: CPT | Performed by: NURSE PRACTITIONER

## 2019-09-08 PROCEDURE — 81003 URINALYSIS AUTO W/O SCOPE: CPT | Performed by: NURSE PRACTITIONER

## 2019-09-08 RX ORDER — CEFUROXIME AXETIL 500 MG/1
500 TABLET ORAL 2 TIMES DAILY
Qty: 20 TABLET | Refills: 0 | Status: SHIPPED | OUTPATIENT
Start: 2019-09-08 | End: 2019-09-18

## 2019-09-08 NOTE — PROGRESS NOTES
"Chief Complaint   Patient presents with   • Urinary Tract Infection       History of Present Illness  78 y.o.female presents for uti  The patient describes dysuria for 2 days not improving with home care.  The patient reports associated urinary frequency, urgency and nocturia.  There is no associated gross hematuria, incontinence or pelvic pain.  The patient denies fever, urethral/urogenital discharge or flank pain.  The patient continues to hydrate well.  This is a recurrent problem for her she had same symptoms at the end of August and was treated with 7 days of Omnicef.  Patient states that her symptoms definitely improved but returned within just a couple of days of being off the antibiotics.  The voices concern as she has an upcoming hip surgery next week on Tuesday.      Review of Systems   Constitutional: Negative for chills, fatigue and fever.   Respiratory: Negative for shortness of breath.    Cardiovascular: Negative for chest pain.   Gastrointestinal: Positive for abdominal pain. Negative for constipation, diarrhea, nausea and vomiting.   Genitourinary: Positive for dysuria, frequency and urgency. Negative for difficulty urinating, flank pain and hematuria.   Musculoskeletal: Negative for myalgias.       [facility]  The following portions of the patient's history were reviewed and updated as appropriate: allergies, current medications, past family history, past medical history, past social history, past surgical history and problem list.       Past Medical History:   Diagnosis Date   • Breast cancer (CMS/HCC) 1987    left- pt states \"in situ\", no radiation, Tamoxifen for 5 years   • Cellulitis    • Cervical lymphadenopathy    • Chest pain    • Convulsions (CMS/HCC)    • GERD (gastroesophageal reflux disease)    • Glossitis    • Grief reaction     · Last Impression: 29 Jan 2015  counselled, given ativan for prn use.  Aleah Regan   • Hypertension    • Lower back pain    • Oral thrush    • Papillary " adenocarcinoma (CMS/HCC)     Papillary adenocarcinoma of thyroid   • Papillary adenocarcinoma of thyroid (CMS/HCC)     · resection Ramirez- 1/13,  2 x 2 x 1.5 cm  T3 N1 MXpost op low calcium and diminished  voiceablation Ain- 3/7 metastatic nodes and invasion to strap muscles · Last Impression: 29 Oct 2014  s/p Eval by berry Méndez.  Aleah Regan (Internal   • Piriformis syndrome    • Tingling    • Xerostomia       Past Surgical History:   Procedure Laterality Date   • BREAST BIOPSY Right 10/10/2013    stereo bx   • BREAST BIOPSY Left 10/19/2015    stereo bx   • BREAST CYST EXCISION      right   • BREAST EXCISIONAL BIOPSY Right     affirm bx and loc 2016.   • BREAST LUMPECTOMY Left 1987   • HYSTERECTOMY  1979   • TOTAL HIP ARTHROPLASTY     • TOTAL THYROIDECTOMY      Thyroid Surgery Total Thyroidectomy      Allergies   Allergen Reactions   • Dilaudid [Hydromorphone Hcl] Hallucinations     TABS      Family History   Problem Relation Age of Onset   • Breast cancer Mother 84   • Cancer Mother    • BRCA 1/2 Neg Hx    • Colon cancer Neg Hx    • Endometrial cancer Neg Hx    • Ovarian cancer Neg Hx             Current Outpatient Medications:   •  acetaminophen (TYLENOL) 650 MG 8 hr tablet, Take 1 tablet by mouth Every 8 (Eight) Hours As Needed for Mild Pain ., Disp: , Rfl:   •  alendronate (FOSAMAX) 70 MG tablet, Take 1 tablet by mouth Every 7 (Seven) Days., Disp: 12 tablet, Rfl: 1  •  amLODIPine (NORVASC) 5 MG tablet, Take 1 tablet by mouth Daily., Disp: 90 tablet, Rfl: 1  •  atenolol (TENORMIN) 50 MG tablet, Take 1 tablet by mouth Daily., Disp: 90 tablet, Rfl: 1  •  atorvastatin (LIPITOR) 40 MG tablet, Take 1 tablet by mouth Daily., Disp: 90 tablet, Rfl: 1  •  CloNIDine (CATAPRES) 0.1 MG tablet, Take 2 tablets by mouth 2 (Two) Times a Day. Note- dose change, Disp: 120 tablet, Rfl: 2  •  DULoxetine (CYMBALTA) 60 MG capsule, Take 1 capsule by mouth Daily. For stress, Disp: 90 capsule, Rfl: 1  •  Ketamine HCl powder,  "Ketoprofen 10 % cream, bupivacaine PF 0.25 % solution, lidocaine 5 % ointment, Meloxicam powder, Apply to rt hand bid - tid, Disp: 60 g, Rfl: 0  •  levothyroxine (SYNTHROID, LEVOTHROID) 112 MCG tablet, Take 1 tablet by mouth Daily., Disp: , Rfl:   •  metroNIDAZOLE (METROCREAM) 0.75 % cream, Apply  topically 2 (Two) Times a Day., Disp: 60 g, Rfl: 2  •  primidone (MYSOLINE) 50 MG tablet, Take 3 tablets at night, Disp: 90 tablet, Rfl: 5  •  triamterene-hydrochlorothiazide (MAXZIDE-25) 37.5-25 MG per tablet, Take 1 tablet by mouth Daily., Disp: 90 tablet, Rfl: 1  •  valsartan (DIOVAN) 320 MG tablet, Take 1 tablet by mouth Daily. Replaces irbesartan, Disp: 90 tablet, Rfl: 1    VITALS:  /80   Pulse 58   Temp 98.2 °F (36.8 °C)   Ht 160 cm (63\")   Wt 70.8 kg (156 lb)   SpO2 98%   BMI 27.63 kg/m²     Physical Exam   Constitutional: She is oriented to person, place, and time. She appears well-developed and well-nourished. No distress.   Cardiovascular: Normal rate.   Pulmonary/Chest: Effort normal.   Abdominal: There is no tenderness. There is no CVA tenderness.   Neurological: She is alert and oriented to person, place, and time.   Skin: Skin is warm and dry. No rash noted.       LABS  Results for orders placed or performed in visit on 09/08/19   POCT urinalysis dipstick, automated   Result Value Ref Range    Color Yellow Yellow, Straw, Dark Yellow, Ninfa    Clarity, UA Cloudy (A) Clear    Specific Gravity  1.000 (A) 1.005 - 1.030    pH, Urine 7.0 5.0 - 8.0    Leukocytes 25 Jc/ul (A) Negative    Nitrite, UA Negative Negative    Protein, POC Negative Negative mg/dL    Glucose, UA Negative Negative, 1000 mg/dL (3+) mg/dL    Ketones, UA Negative Negative    Urobilinogen, UA Normal Normal    Bilirubin Negative Negative    Blood, UA Negative Negative       ASSESSMENT/PLAN  Zohra was seen today for urinary tract infection.    Diagnoses and all orders for this visit:    Acute cystitis without hematuria  -     POCT " urinalysis dipstick, automated  -     Urine Culture - Urine, Urine, Clean Catch  -     cefuroxime (CEFTIN) 500 MG tablet; Take 1 tablet by mouth 2 (Two) Times a Day for 10 days.    Drink plenty water.  If has bladder spasms can try OTC AZO; if uses AZO does turn urine orange.  Complete all medication as instructed.  Will follow up on urine culture and notify patient if antibiotic prescribed does not cover identified bacteria. If patient has worsening of symptoms or no improvement of fever >101.5 or molina flank pain, pt should notify our office for reeval or seek emergency care.  I did review different antibiotic options for treatment in UTI for an 78-year-old female.  Would try to avoid nitrofurantoin and Bactrim.  Since she did get some better with a cephalosporin we will try Ceftin for a total of 10 days and again we will check her culture.  I advised her to notify her orthopedic surgeon office tomorrow that she has had recent UTI.    I discussed the patients findings and my recommendations with patient.  Patient was encouraged to keep me informed of any acute changes, lack of improvement, or any new concerning symptoms.    Patient voiced understanding of all instructions and denied further questions.      FOLLOW-UP  Return if symptoms worsen or fail to improve.    Electronically signed by:    ELVIS Hernandez  09/08/2019

## 2019-09-09 LAB — BACTERIA SPEC AEROBE CULT: NO GROWTH

## 2019-09-13 ENCOUNTER — TELEPHONE (OUTPATIENT)
Dept: ENDOCRINOLOGY | Facility: CLINIC | Age: 79
End: 2019-09-13

## 2019-09-13 NOTE — TELEPHONE ENCOUNTER
----- Message from ELVIS Whitman sent at 9/12/2019  8:06 PM EDT -----  Let pt know urine culture negative. Can stop abx.

## 2019-09-23 ENCOUNTER — TELEPHONE (OUTPATIENT)
Dept: INTERNAL MEDICINE | Facility: CLINIC | Age: 79
End: 2019-09-23

## 2019-09-23 NOTE — TELEPHONE ENCOUNTER
JUAN WITH [facility] CALLED TODAY TO REQUEST AN UPDATED ORDER FOR PHYSICAL THERAPY WITH HOME HEALTH. THE PATIENT HAS RECENTLY BEEN DISCHARGED FROM [facility] AFTER HIP SURGERY AND WILL NEED CONTINUED PT SERVICES AT HOME.     [phone] [facility]

## 2019-10-22 DIAGNOSIS — F43.89 REACTION TO CHRONIC STRESS: ICD-10-CM

## 2019-10-23 RX ORDER — DULOXETIN HYDROCHLORIDE 60 MG/1
60 CAPSULE, DELAYED RELEASE ORAL DAILY
Qty: 90 CAPSULE | Refills: 1 | Status: SHIPPED | OUTPATIENT
Start: 2019-10-23 | End: 2020-01-10 | Stop reason: SDUPTHER

## 2019-11-08 ENCOUNTER — TRANSCRIBE ORDERS (OUTPATIENT)
Dept: LAB | Facility: HOSPITAL | Age: 79
End: 2019-11-08

## 2019-11-08 ENCOUNTER — LAB (OUTPATIENT)
Dept: LAB | Facility: HOSPITAL | Age: 79
End: 2019-11-08

## 2019-11-08 DIAGNOSIS — M06.049 SERONEGATIVE RHEUMATOID ARTHRITIS OF HAND, UNSPECIFIED LATERALITY (HCC): ICD-10-CM

## 2019-11-08 DIAGNOSIS — M06.049 SERONEGATIVE RHEUMATOID ARTHRITIS OF HAND, UNSPECIFIED LATERALITY (HCC): Primary | ICD-10-CM

## 2019-11-08 LAB
CHROMATIN AB SERPL-ACNC: 13 IU/ML (ref 0–14)
CRP SERPL-MCNC: 11.7 MG/DL (ref 0–0.5)
ERYTHROCYTE [SEDIMENTATION RATE] IN BLOOD: 122 MM/HR (ref 0–30)
URATE SERPL-MCNC: 5.1 MG/DL (ref 2.4–5.7)

## 2019-11-08 PROCEDURE — 86140 C-REACTIVE PROTEIN: CPT

## 2019-11-08 PROCEDURE — 86038 ANTINUCLEAR ANTIBODIES: CPT

## 2019-11-08 PROCEDURE — 86200 CCP ANTIBODY: CPT

## 2019-11-08 PROCEDURE — 84550 ASSAY OF BLOOD/URIC ACID: CPT

## 2019-11-08 PROCEDURE — 86431 RHEUMATOID FACTOR QUANT: CPT

## 2019-11-08 PROCEDURE — 85652 RBC SED RATE AUTOMATED: CPT

## 2019-11-08 PROCEDURE — 36415 COLL VENOUS BLD VENIPUNCTURE: CPT

## 2019-11-11 LAB
ANA SER QL IA: POSITIVE
ANA SPECKLED TITR SER: NORMAL {TITER}
CCP IGA+IGG SERPL IA-ACNC: 8 UNITS (ref 0–19)
Lab: NORMAL

## 2019-11-13 ENCOUNTER — TELEPHONE (OUTPATIENT)
Dept: NEUROLOGY | Facility: CLINIC | Age: 79
End: 2019-11-13

## 2019-11-13 RX ORDER — PRIMIDONE 50 MG/1
TABLET ORAL
Qty: 90 TABLET | Refills: 1 | Status: SHIPPED | OUTPATIENT
Start: 2019-11-13 | End: 2020-01-13

## 2019-11-13 NOTE — TELEPHONE ENCOUNTER
----- Message from Khushboo Ashby sent at 11/13/2019  9:51 AM EST -----  Contact: Zohra Gallagher,    Pt called in requesting a refill on RX primidone (MYSOLINE) 50 MG tablet to be called into [address] - [address] [place] [address] AT [facility] [place] - [phone]  - [phone] FX.

## 2019-12-30 DIAGNOSIS — I63.81 LACUNAR STROKE (HCC): ICD-10-CM

## 2019-12-30 RX ORDER — ATORVASTATIN CALCIUM 40 MG/1
40 TABLET, FILM COATED ORAL DAILY
Qty: 90 TABLET | Refills: 1 | Status: SHIPPED | OUTPATIENT
Start: 2019-12-30 | End: 2020-08-10

## 2020-01-10 ENCOUNTER — OFFICE VISIT (OUTPATIENT)
Dept: INTERNAL MEDICINE | Facility: CLINIC | Age: 80
End: 2020-01-10

## 2020-01-10 VITALS
HEIGHT: 63 IN | WEIGHT: 151.2 LBS | DIASTOLIC BLOOD PRESSURE: 72 MMHG | HEART RATE: 63 BPM | OXYGEN SATURATION: 98 % | SYSTOLIC BLOOD PRESSURE: 128 MMHG | BODY MASS INDEX: 26.79 KG/M2

## 2020-01-10 DIAGNOSIS — R60.9 EDEMA, UNSPECIFIED TYPE: ICD-10-CM

## 2020-01-10 DIAGNOSIS — I10 ESSENTIAL HYPERTENSION: ICD-10-CM

## 2020-01-10 DIAGNOSIS — L57.0 ACTINIC KERATOSIS: ICD-10-CM

## 2020-01-10 DIAGNOSIS — M81.0 AGE-RELATED OSTEOPOROSIS WITHOUT CURRENT PATHOLOGICAL FRACTURE: ICD-10-CM

## 2020-01-10 DIAGNOSIS — L40.50 PSORIATIC ARTHRITIS (HCC): Primary | ICD-10-CM

## 2020-01-10 DIAGNOSIS — S66.911S: ICD-10-CM

## 2020-01-10 DIAGNOSIS — F43.89 REACTION TO CHRONIC STRESS: ICD-10-CM

## 2020-01-10 DIAGNOSIS — I10 UNCONTROLLED HYPERTENSION: ICD-10-CM

## 2020-01-10 PROCEDURE — G0008 ADMIN INFLUENZA VIRUS VAC: HCPCS | Performed by: INTERNAL MEDICINE

## 2020-01-10 PROCEDURE — 90653 IIV ADJUVANT VACCINE IM: CPT | Performed by: INTERNAL MEDICINE

## 2020-01-10 PROCEDURE — 99213 OFFICE O/P EST LOW 20 MIN: CPT | Performed by: INTERNAL MEDICINE

## 2020-01-10 RX ORDER — VALSARTAN 320 MG/1
320 TABLET ORAL DAILY
Qty: 90 TABLET | Refills: 1 | Status: SHIPPED | OUTPATIENT
Start: 2020-01-10 | End: 2020-03-26

## 2020-01-10 RX ORDER — ALENDRONATE SODIUM 70 MG/1
70 TABLET ORAL
Qty: 12 TABLET | Refills: 1 | Status: SHIPPED | OUTPATIENT
Start: 2020-01-10 | End: 2020-03-26

## 2020-01-10 RX ORDER — AMLODIPINE BESYLATE 5 MG/1
5 TABLET ORAL DAILY
Qty: 90 TABLET | Refills: 1 | Status: SHIPPED | OUTPATIENT
Start: 2020-01-10 | End: 2020-03-26

## 2020-01-10 RX ORDER — DULOXETIN HYDROCHLORIDE 60 MG/1
60 CAPSULE, DELAYED RELEASE ORAL DAILY
Qty: 90 CAPSULE | Refills: 1 | Status: SHIPPED | OUTPATIENT
Start: 2020-01-10 | End: 2020-04-29 | Stop reason: SDUPTHER

## 2020-01-10 RX ORDER — TRIAMTERENE AND HYDROCHLOROTHIAZIDE 37.5; 25 MG/1; MG/1
1 TABLET ORAL DAILY
Qty: 90 TABLET | Refills: 1 | Status: SHIPPED | OUTPATIENT
Start: 2020-01-10 | End: 2020-06-10

## 2020-01-10 RX ORDER — ATENOLOL 50 MG/1
50 TABLET ORAL DAILY
Qty: 90 TABLET | Refills: 1 | Status: SHIPPED | OUTPATIENT
Start: 2020-01-10 | End: 2020-03-26

## 2020-01-10 NOTE — PROGRESS NOTES
having surgery on wrist 1/22/20    Subjective   Zohra Hall is a 79 y.o. female is here today for follow-up.    History of Present Illness   Here for a follow up on her htn, hlp.  Dr. Fonseca, wanted her to have her wrist operated on by Dr. Cody. Seen by Dr. Zheng rheumatology, who has dxed her with psoriatic arthritis and plan to start MTX.  Having some bumps on thigh and arm, unsure whatit is, previously seen by derm( Dr. Elizabeth Woods)      Current Outpatient Medications:   •  acetaminophen (TYLENOL) 650 MG 8 hr tablet, Take 1 tablet by mouth Every 8 (Eight) Hours As Needed for Mild Pain ., Disp: , Rfl:   •  alendronate (FOSAMAX) 70 MG tablet, Take 1 tablet by mouth Every 7 (Seven) Days., Disp: 12 tablet, Rfl: 1  •  amLODIPine (NORVASC) 5 MG tablet, Take 1 tablet by mouth Daily., Disp: 90 tablet, Rfl: 1  •  atenolol (TENORMIN) 50 MG tablet, Take 1 tablet by mouth Daily., Disp: 90 tablet, Rfl: 1  •  atorvastatin (LIPITOR) 40 MG tablet, TAKE 1 TABLET BY MOUTH  DAILY, Disp: 90 tablet, Rfl: 1  •  CloNIDine (CATAPRES) 0.1 MG tablet, Take 2 tablets by mouth 2 (Two) Times a Day. Note- dose change, Disp: 120 tablet, Rfl: 2  •  DULoxetine (CYMBALTA) 60 MG capsule, Take 1 capsule by mouth Daily. For stress, Disp: 90 capsule, Rfl: 1  •  Ketamine HCl powder, Ketoprofen 10 % cream, bupivacaine PF 0.25 % solution, lidocaine 5 % ointment, Meloxicam powder, Apply to rt hand bid - tid, Disp: 60 g, Rfl: 0  •  levothyroxine (SYNTHROID, LEVOTHROID) 112 MCG tablet, Take 1 tablet by mouth Daily., Disp: , Rfl:   •  metroNIDAZOLE (METROCREAM) 0.75 % cream, Apply  topically 2 (Two) Times a Day., Disp: 60 g, Rfl: 2  •  primidone (MYSOLINE) 50 MG tablet, Take 3 tablets at night, Disp: 90 tablet, Rfl: 1  •  triamterene-hydrochlorothiazide (MAXZIDE-25) 37.5-25 MG per tablet, Take 1 tablet by mouth Daily., Disp: 90 tablet, Rfl: 1  •  valsartan (DIOVAN) 320 MG tablet, Take 1 tablet by mouth Daily. Replaces irbesartan, Disp: 90  "tablet, Rfl: 1      The following portions of the patient's history were reviewed and updated as appropriate: allergies, current medications, past family history, past medical history, past social history, past surgical history and problem list.    Review of Systems   Constitutional: Negative.  Negative for chills and fever.   HENT: Negative for ear discharge, ear pain, sinus pressure and sore throat.    Respiratory: Negative for cough, chest tightness and shortness of breath.    Cardiovascular: Negative for chest pain, palpitations and leg swelling.   Gastrointestinal: Negative for diarrhea, nausea and vomiting.   Musculoskeletal: Positive for arthralgias and joint swelling. Negative for back pain and myalgias.   Skin: Positive for color change.   Neurological: Negative for dizziness, syncope and headaches.   Psychiatric/Behavioral: Negative for confusion and sleep disturbance. The patient is nervous/anxious.        Objective   /72   Pulse 63   Ht 160 cm (63\")   Wt 68.6 kg (151 lb 3.2 oz)   SpO2 98% Comment: ra  BMI 26.78 kg/m²   Physical Exam   Constitutional: She is oriented to person, place, and time. She appears well-developed and well-nourished.   HENT:   Head: Normocephalic and atraumatic.   Mouth/Throat: No oropharyngeal exudate.   Eyes: Pupils are equal, round, and reactive to light. Conjunctivae are normal.   Neck: Neck supple. No thyromegaly present.   Cardiovascular: Normal rate and regular rhythm.   Pulmonary/Chest: Effort normal and breath sounds normal.   Abdominal: Soft. Bowel sounds are normal. She exhibits no distension. There is no tenderness.   Musculoskeletal: She exhibits no edema.   Neurological: She is alert and oriented to person, place, and time. No cranial nerve deficit.   Skin: Skin is warm and dry. There is erythema.   Large bruising over rt wrist, decreased rom   Psychiatric: She has a normal mood and affect. Judgment normal.   Nursing note and vitals reviewed.        Results " for orders placed or performed in visit on 11/08/19   Rheumatoid Factor   Result Value Ref Range    Rheumatoid Factor Quantitative 13.0 0.0 - 14.0 IU/mL   Uric Acid   Result Value Ref Range    Uric Acid 5.1 2.4 - 5.7 mg/dL   Sedimentation Rate   Result Value Ref Range    Sed Rate 122 (H) 0 - 30 mm/hr   Cyclic Citrul Peptide Antibody, IgG / IgA   Result Value Ref Range    CCP Antibodies IgG/IgA 8 0 - 19 units   C-reactive Protein   Result Value Ref Range    C-Reactive Protein 11.70 (H) 0.00 - 0.50 mg/dL   Nuclear Antigen Antibody, IFA   Result Value Ref Range    MARIAJOSE Positive (A)    MARTELL Staining Patterns   Result Value Ref Range    Speckled Pattern 1:80     Note: (Reference) Comment              Assessment/Plan   Diagnoses and all orders for this visit:    Psoriatic arthritis (CMS/HCC)    Tendon rupture of wrist, right, sequela  Comments:  to get wrist surgery by Dr. Cody.    Actinic keratosis  Comments:  wants to wait on derm.      Essential hypertension  -     atenolol (TENORMIN) 50 MG tablet; Take 1 tablet by mouth Daily.  -     amLODIPine (NORVASC) 5 MG tablet; Take 1 tablet by mouth Daily.    Essential hypertension  Comments:  better on meds  Orders:  -     atenolol (TENORMIN) 50 MG tablet; Take 1 tablet by mouth Daily.  -     amLODIPine (NORVASC) 5 MG tablet; Take 1 tablet by mouth Daily.    Reaction to chronic stress  Comments:  start cymbalta, counseled re: meds and behavioural txt.  Orders:  -     DULoxetine (CYMBALTA) 60 MG capsule; Take 1 capsule by mouth Daily. For stress    Edema, unspecified type  -     triamterene-hydrochlorothiazide (MAXZIDE-25) 37.5-25 MG per tablet; Take 1 tablet by mouth Daily.    Uncontrolled hypertension  -     valsartan (DIOVAN) 320 MG tablet; Take 1 tablet by mouth Daily. Replaces irbesartan    Age-related osteoporosis without current pathological fracture  -     alendronate (FOSAMAX) 70 MG tablet; Take 1 tablet by mouth Every 7 (Seven) Days.    Other orders  -     Fluad  Quad >65 years (1735-1901)             Return in about 3 months (around 4/10/2020) for Medicare Wellness- please cancel feb 28 appt and reschedule to end of april..

## 2020-01-13 ENCOUNTER — OFFICE VISIT (OUTPATIENT)
Dept: NEUROLOGY | Facility: CLINIC | Age: 80
End: 2020-01-13

## 2020-01-13 VITALS
SYSTOLIC BLOOD PRESSURE: 138 MMHG | WEIGHT: 153 LBS | BODY MASS INDEX: 27.11 KG/M2 | HEIGHT: 63 IN | DIASTOLIC BLOOD PRESSURE: 78 MMHG | HEART RATE: 87 BPM | OXYGEN SATURATION: 96 %

## 2020-01-13 DIAGNOSIS — G25.0 BENIGN ESSENTIAL TREMOR: Primary | ICD-10-CM

## 2020-01-13 PROCEDURE — 99214 OFFICE O/P EST MOD 30 MIN: CPT | Performed by: PSYCHIATRY & NEUROLOGY

## 2020-01-13 RX ORDER — PREDNISONE 1 MG/1
TABLET ORAL
COMMUNITY
Start: 2019-12-05 | End: 2020-06-10

## 2020-01-13 RX ORDER — PRIMIDONE 50 MG/1
TABLET ORAL
Qty: 120 TABLET | Refills: 1 | Status: SHIPPED | OUTPATIENT
Start: 2020-01-13 | End: 2020-04-20 | Stop reason: SDUPTHER

## 2020-01-13 NOTE — PROGRESS NOTES
Subjective:    CC: Zohra Hall is seen today for Tremors       HPI:  Current visit- since the last visit patient feels that her tremors have worsened.  In addition to the chin tremors she has now started to get her hand tremors again as well mainly in the right hand sometimes even while sleeping at night.  She has been having excruciating pain in her right hand recently due to a spontaneous tendon rupture for which she will be undergoing surgery later this month.  For her tremors she continues to take primidone 150 mg at night.    Last visit-since her last visit her hand tremors have remained well controlled however she continues to get chin tremors.  In fact they have been a little worse in the past few days as she has been very anxious regarding her son's illness.  Continues to take primidone 150 mg nightly and is tolerating it well.    Last visit- patient tells me today that her hand tremors have nearly resolved and she only notices them on fine movements such as writing.  She still has chin tremors although they have also improved since she started taking primidone.  Currently on a dose of 100 mg at night which she is tolerating well.  She has also started to take aspirin 81 mg as prescribed by me for chronic lacunar infarcts and Cymbalta given to her by her PCP for anxiety and depression.     Last visit- since her last visit she has noticed a marked improvement in her hand tremors after starting primidone now at 50 mg.  Her chin tremors have also improved but she still has them.  She denies having any side effects with primidone.  She had an MRI brain that showed chronic ischemic changes as well as chronic lacunar infarcts bilaterally in the corona radiata but no other acute intracranial abnormalities.  She is also currently being worked up for renal failure and has an appointment with her nephrologist.      Initial visit- 77-year-old female with a history of hypertension, hyperlipidemia, thyroid cancer  status post radiation in remission for the past 6 years, arthritis, depression, low back pain status post spinal fusion at L3, 4, 5 several years ago presents with tremors.  As per patient she started having tremors about 6 weeks ago.  She denies any recent changes in medications and has never been on any antipsychotic medications.  Her tremors are mainly in her chin, tongue and both hands.  They become worse on doing tasks such as reaching out for objects.  Otherwise there are no aggravating or relieving factors.  She does not have any family history of tremors.  She also reports to having balance problems for the past 6-8 months.  Was found to be anemic when she started to have balance problems with dizziness. She is still undergoing workup for that.  She has also had a 10 pound weight loss in the past 3-4 months.  Denies any history of constipation or acting out her dreams.  Has mild anosmia since her thyroid surgery.    The following portions of the patient's history were reviewed today and updated as of 01/13/2020  : allergies, current medications, past family history, past medical history, past social history, past surgical history and problem list  These document will be scanned to patient's chart.      Current Outpatient Medications:   •  acetaminophen (TYLENOL) 650 MG 8 hr tablet, Take 1 tablet by mouth Every 8 (Eight) Hours As Needed for Mild Pain ., Disp: , Rfl:   •  alendronate (FOSAMAX) 70 MG tablet, Take 1 tablet by mouth Every 7 (Seven) Days., Disp: 12 tablet, Rfl: 1  •  amLODIPine (NORVASC) 5 MG tablet, Take 1 tablet by mouth Daily., Disp: 90 tablet, Rfl: 1  •  atenolol (TENORMIN) 50 MG tablet, Take 1 tablet by mouth Daily., Disp: 90 tablet, Rfl: 1  •  atorvastatin (LIPITOR) 40 MG tablet, TAKE 1 TABLET BY MOUTH  DAILY, Disp: 90 tablet, Rfl: 1  •  CloNIDine (CATAPRES) 0.1 MG tablet, Take 2 tablets by mouth 2 (Two) Times a Day. Note- dose change, Disp: 120 tablet, Rfl: 2  •  DULoxetine (CYMBALTA) 60 MG  "capsule, Take 1 capsule by mouth Daily. For stress, Disp: 90 capsule, Rfl: 1  •  Ketamine HCl powder, Ketoprofen 10 % cream, bupivacaine PF 0.25 % solution, lidocaine 5 % ointment, Meloxicam powder, Apply to rt hand bid - tid, Disp: 60 g, Rfl: 0  •  levothyroxine (SYNTHROID, LEVOTHROID) 112 MCG tablet, Take 1 tablet by mouth Daily., Disp: , Rfl:   •  methotrexate 2.5 MG tablet, , Disp: , Rfl:   •  metroNIDAZOLE (METROCREAM) 0.75 % cream, Apply  topically 2 (Two) Times a Day., Disp: 60 g, Rfl: 2  •  predniSONE (DELTASONE) 5 MG tablet, , Disp: , Rfl:   •  triamterene-hydrochlorothiazide (MAXZIDE-25) 37.5-25 MG per tablet, Take 1 tablet by mouth Daily., Disp: 90 tablet, Rfl: 1  •  valsartan (DIOVAN) 320 MG tablet, Take 1 tablet by mouth Daily. Replaces irbesartan, Disp: 90 tablet, Rfl: 1  •  primidone (MYSOLINE) 50 MG tablet, Take 1 tablet in the morning and 3 tablets at night, Disp: 120 tablet, Rfl: 1   Past Medical History:   Diagnosis Date   • Breast cancer (CMS/HCC) 1987    left- pt states \"in situ\", no radiation, Tamoxifen for 5 years   • Cellulitis    • Cervical lymphadenopathy    • Chest pain    • Convulsions (CMS/HCC)    • GERD (gastroesophageal reflux disease)    • Glossitis    • Grief reaction     · Last Impression: 29 Jan 2015  counselled, given ativan for prn use.  Aleah Regan   • Hypertension    • Lower back pain    • Oral thrush    • Papillary adenocarcinoma (CMS/HCC)     Papillary adenocarcinoma of thyroid   • Papillary adenocarcinoma of thyroid (CMS/HCC)     · resection Ramirez- 1/13,  2 x 2 x 1.5 cm  T3 N1 MXpost op low calcium and diminished  voiceablation Ain- 3/7 metastatic nodes and invasion to strap muscles · Last Impression: 29 Oct 2014  s/p Eval by berry Méndez.  Aleah Regan (Internal   • Piriformis syndrome    • Tingling    • Xerostomia       Past Surgical History:   Procedure Laterality Date   • BREAST BIOPSY Right 10/10/2013    stereo bx   • BREAST BIOPSY Left 10/19/2015    " "stereo bx   • BREAST CYST EXCISION      right   • BREAST EXCISIONAL BIOPSY Right     affirm bx and loc 2016.   • BREAST LUMPECTOMY Left    • HYSTERECTOMY     • TOTAL HIP ARTHROPLASTY     • TOTAL THYROIDECTOMY      Thyroid Surgery Total Thyroidectomy      Family History   Problem Relation Age of Onset   • Breast cancer Mother 84   • Cancer Mother    • BRCA 1/2 Neg Hx    • Colon cancer Neg Hx    • Endometrial cancer Neg Hx    • Ovarian cancer Neg Hx       Social History     Socioeconomic History   • Marital status:      Spouse name: Not on file   • Number of children: Not on file   • Years of education: Not on file   • Highest education level: Not on file   Tobacco Use   • Smoking status: Former Smoker     Packs/day: 1.00     Years: 30.00     Pack years: 30.00     Types: Cigarettes     Last attempt to quit: 1992     Years since quittin.3   • Smokeless tobacco: Never Used   Substance and Sexual Activity   • Alcohol use: No   • Drug use: No   • Sexual activity: Not Currently     Review of Systems   Neurological: Positive for tremors.   All other systems reviewed and are negative.      Objective:    /78   Pulse 87   Ht 160 cm (63\")   Wt 69.4 kg (153 lb)   SpO2 96%   BMI 27.10 kg/m²     Neurology Exam:    General apperance: NAD.     Mental status: Alert, awake and oriented to time place and person.    Recent and Remote memory: Intact.    Attention span and Concentration: Normal.     Language and Speech: Intact- No dysarthria.    Fluency, Naming , Repitition and Comprehension:  Intact    Cranial Nerves:   CN II: Visual fields are full. Intact. Fundi - Normal, No papillederma, Pupils - MELI  CN III, IV and VI: Extraocular movements are intact. Normal saccades.   CN V: Facial sensation is intact.   CN VII: Muscles of facial expression reveal no asymmetry. Intact.   CN VIII: Hearing is intact. Whispered voice intact.   CN IX and X: Palate elevates symmetrically. Intact  CN XI: Shoulder " shrug is intact.   CN XII: Tongue is midline without evidence of atrophy or fasciculation.     Ophthalmoscopic exam of optic disc-normal    Motor:-Chin tremors noted.  Extremely mild postural tremors in the right hand.  Extensive bruise present in the right hand and forearm    Right UE muscle strength 5/5. Normal tone.     Left UE muscle strength 5/5. Normal tone.      Right LE muscle strength5/5. Normal tone.     Left LE muscle strength 5/5. Normal tone.      Sensory: Normal light touch, vibration and pinprick sensation bilaterally.    DTRs: 2+ bilaterally in upper and lower extremities.    Babinski: Negative bilaterally.    Co-ordination: Normal finger-to-nose, heel to shin B/L.    Rhomberg: Negative.    Gait: Normal.    Cardiovascular: Regular rate and rhythm without murmur, gallop or rub.    Assessment and Plan:  1. Benign essential tremor  I have asked her to gradually increase her dose of primidone to 200 mg that is 1 tablet in the morning and 3 tablets at night  Patient will call me and let me know if she can tolerate the medication and we will send another prescription to [facility]       Return in about 3 months (around 4/13/2020).          Angy Gallagher MD

## 2020-01-14 DIAGNOSIS — I10 ESSENTIAL HYPERTENSION: ICD-10-CM

## 2020-01-14 DIAGNOSIS — F43.89 REACTION TO CHRONIC STRESS: ICD-10-CM

## 2020-01-14 DIAGNOSIS — M81.0 AGE-RELATED OSTEOPOROSIS WITHOUT CURRENT PATHOLOGICAL FRACTURE: ICD-10-CM

## 2020-01-14 DIAGNOSIS — I10 UNCONTROLLED HYPERTENSION: ICD-10-CM

## 2020-01-14 RX ORDER — DULOXETIN HYDROCHLORIDE 60 MG/1
60 CAPSULE, DELAYED RELEASE ORAL DAILY
Qty: 90 CAPSULE | Refills: 1 | OUTPATIENT
Start: 2020-01-14

## 2020-01-14 RX ORDER — ALENDRONATE SODIUM 70 MG/1
TABLET ORAL
Qty: 12 TABLET | Refills: 1 | OUTPATIENT
Start: 2020-01-14

## 2020-01-14 RX ORDER — ATENOLOL 50 MG/1
50 TABLET ORAL DAILY
Qty: 90 TABLET | Refills: 1 | OUTPATIENT
Start: 2020-01-14

## 2020-01-14 RX ORDER — VALSARTAN 320 MG/1
320 TABLET ORAL DAILY
Qty: 90 TABLET | Refills: 1 | OUTPATIENT
Start: 2020-01-14

## 2020-01-14 RX ORDER — AMLODIPINE BESYLATE 5 MG/1
5 TABLET ORAL DAILY
Qty: 90 TABLET | Refills: 1 | OUTPATIENT
Start: 2020-01-14

## 2020-02-26 DIAGNOSIS — M81.0 AGE-RELATED OSTEOPOROSIS WITHOUT CURRENT PATHOLOGICAL FRACTURE: ICD-10-CM

## 2020-02-26 RX ORDER — ALENDRONATE SODIUM 70 MG/1
TABLET ORAL
Qty: 12 TABLET | Refills: 1 | OUTPATIENT
Start: 2020-02-26

## 2020-03-01 DIAGNOSIS — I10 ESSENTIAL HYPERTENSION: ICD-10-CM

## 2020-03-01 DIAGNOSIS — F43.89 REACTION TO CHRONIC STRESS: ICD-10-CM

## 2020-03-01 DIAGNOSIS — I10 UNCONTROLLED HYPERTENSION: ICD-10-CM

## 2020-03-01 RX ORDER — DULOXETIN HYDROCHLORIDE 60 MG/1
60 CAPSULE, DELAYED RELEASE ORAL DAILY
Qty: 90 CAPSULE | Refills: 1 | OUTPATIENT
Start: 2020-03-01

## 2020-03-01 RX ORDER — ATENOLOL 50 MG/1
50 TABLET ORAL DAILY
Qty: 90 TABLET | Refills: 1 | OUTPATIENT
Start: 2020-03-01

## 2020-03-01 RX ORDER — VALSARTAN 320 MG/1
320 TABLET ORAL DAILY
Qty: 90 TABLET | Refills: 1 | OUTPATIENT
Start: 2020-03-01

## 2020-03-01 RX ORDER — AMLODIPINE BESYLATE 5 MG/1
5 TABLET ORAL DAILY
Qty: 90 TABLET | Refills: 1 | OUTPATIENT
Start: 2020-03-01

## 2020-03-09 DIAGNOSIS — F43.89 REACTION TO CHRONIC STRESS: ICD-10-CM

## 2020-03-09 DIAGNOSIS — I10 ESSENTIAL HYPERTENSION: ICD-10-CM

## 2020-03-09 DIAGNOSIS — I10 UNCONTROLLED HYPERTENSION: ICD-10-CM

## 2020-03-09 RX ORDER — ATENOLOL 50 MG/1
50 TABLET ORAL DAILY
Qty: 90 TABLET | Refills: 1 | OUTPATIENT
Start: 2020-03-09

## 2020-03-09 RX ORDER — DULOXETIN HYDROCHLORIDE 60 MG/1
60 CAPSULE, DELAYED RELEASE ORAL DAILY
Qty: 90 CAPSULE | Refills: 1 | OUTPATIENT
Start: 2020-03-09

## 2020-03-09 RX ORDER — VALSARTAN 320 MG/1
320 TABLET ORAL DAILY
Qty: 90 TABLET | Refills: 1 | OUTPATIENT
Start: 2020-03-09

## 2020-03-09 RX ORDER — AMLODIPINE BESYLATE 5 MG/1
5 TABLET ORAL DAILY
Qty: 90 TABLET | Refills: 1 | OUTPATIENT
Start: 2020-03-09

## 2020-03-26 DIAGNOSIS — I10 ESSENTIAL HYPERTENSION: ICD-10-CM

## 2020-03-26 DIAGNOSIS — I10 UNCONTROLLED HYPERTENSION: ICD-10-CM

## 2020-03-26 DIAGNOSIS — M81.0 AGE-RELATED OSTEOPOROSIS WITHOUT CURRENT PATHOLOGICAL FRACTURE: ICD-10-CM

## 2020-03-26 RX ORDER — VALSARTAN 320 MG/1
320 TABLET ORAL DAILY
Qty: 90 TABLET | Refills: 1 | Status: SHIPPED | OUTPATIENT
Start: 2020-03-26 | End: 2020-07-20

## 2020-03-26 RX ORDER — ATENOLOL 50 MG/1
50 TABLET ORAL DAILY
Qty: 90 TABLET | Refills: 1 | Status: SHIPPED | OUTPATIENT
Start: 2020-03-26 | End: 2020-08-10

## 2020-03-26 RX ORDER — AMLODIPINE BESYLATE 5 MG/1
5 TABLET ORAL DAILY
Qty: 90 TABLET | Refills: 1 | Status: SHIPPED | OUTPATIENT
Start: 2020-03-26 | End: 2020-08-10

## 2020-03-26 RX ORDER — ALENDRONATE SODIUM 70 MG/1
TABLET ORAL
Qty: 12 TABLET | Refills: 1 | Status: SHIPPED | OUTPATIENT
Start: 2020-03-26 | End: 2020-08-03

## 2020-04-20 RX ORDER — PRIMIDONE 50 MG/1
TABLET ORAL
Qty: 120 TABLET | Refills: 1 | Status: SHIPPED | OUTPATIENT
Start: 2020-04-20 | End: 2020-06-22 | Stop reason: SDUPTHER

## 2020-04-29 DIAGNOSIS — F43.89 REACTION TO CHRONIC STRESS: ICD-10-CM

## 2020-04-29 RX ORDER — DULOXETIN HYDROCHLORIDE 60 MG/1
60 CAPSULE, DELAYED RELEASE ORAL DAILY
Qty: 90 CAPSULE | Refills: 0 | Status: SHIPPED | OUTPATIENT
Start: 2020-04-29 | End: 2020-06-10 | Stop reason: SDUPTHER

## 2020-04-29 NOTE — TELEPHONE ENCOUNTER
PT CALLED TO REQUEST REFILL FOR   DULoxetine (CYMBALTA) 60 MG capsule.    PLEASE ADVISE.      [facility] [place] [place] [facility] [address] [facility]

## 2020-06-10 ENCOUNTER — LAB (OUTPATIENT)
Dept: LAB | Facility: HOSPITAL | Age: 80
End: 2020-06-10

## 2020-06-10 ENCOUNTER — OFFICE VISIT (OUTPATIENT)
Dept: INTERNAL MEDICINE | Facility: CLINIC | Age: 80
End: 2020-06-10

## 2020-06-10 VITALS
RESPIRATION RATE: 16 BRPM | TEMPERATURE: 97.6 F | SYSTOLIC BLOOD PRESSURE: 128 MMHG | WEIGHT: 145 LBS | HEART RATE: 58 BPM | OXYGEN SATURATION: 98 % | HEIGHT: 63 IN | DIASTOLIC BLOOD PRESSURE: 82 MMHG | BODY MASS INDEX: 25.69 KG/M2

## 2020-06-10 DIAGNOSIS — E55.9 VITAMIN D DEFICIENCY: ICD-10-CM

## 2020-06-10 DIAGNOSIS — R60.9 EDEMA, UNSPECIFIED TYPE: ICD-10-CM

## 2020-06-10 DIAGNOSIS — I10 ESSENTIAL HYPERTENSION: ICD-10-CM

## 2020-06-10 DIAGNOSIS — Z00.00 MEDICARE ANNUAL WELLNESS VISIT, SUBSEQUENT: Primary | ICD-10-CM

## 2020-06-10 DIAGNOSIS — E78.5 DYSLIPIDEMIA: ICD-10-CM

## 2020-06-10 DIAGNOSIS — N30.00 ACUTE CYSTITIS WITHOUT HEMATURIA: ICD-10-CM

## 2020-06-10 DIAGNOSIS — E87.1 HYPONATREMIA: ICD-10-CM

## 2020-06-10 DIAGNOSIS — F43.89 REACTION TO CHRONIC STRESS: ICD-10-CM

## 2020-06-10 DIAGNOSIS — D50.9 IRON DEFICIENCY ANEMIA, UNSPECIFIED IRON DEFICIENCY ANEMIA TYPE: ICD-10-CM

## 2020-06-10 PROBLEM — G45.9 TRANSIENT ISCHEMIC ATTACK: Status: ACTIVE | Noted: 2020-06-10

## 2020-06-10 LAB
BILIRUB BLD-MCNC: ABNORMAL MG/DL
CLARITY, POC: CLEAR
COLOR UR: YELLOW
GLUCOSE UR STRIP-MCNC: NEGATIVE MG/DL
KETONES UR QL: ABNORMAL
LEUKOCYTE EST, POC: ABNORMAL
NITRITE UR-MCNC: POSITIVE MG/ML
PH UR: 6 [PH] (ref 5–8)
PROT UR STRIP-MCNC: NEGATIVE MG/DL
RBC # UR STRIP: NEGATIVE /UL
SP GR UR: 1.02 (ref 1–1.03)
UROBILINOGEN UR QL: NORMAL

## 2020-06-10 PROCEDURE — 80053 COMPREHEN METABOLIC PANEL: CPT | Performed by: INTERNAL MEDICINE

## 2020-06-10 PROCEDURE — G0444 DEPRESSION SCREEN ANNUAL: HCPCS | Performed by: INTERNAL MEDICINE

## 2020-06-10 PROCEDURE — 99213 OFFICE O/P EST LOW 20 MIN: CPT | Performed by: INTERNAL MEDICINE

## 2020-06-10 PROCEDURE — 81003 URINALYSIS AUTO W/O SCOPE: CPT | Performed by: INTERNAL MEDICINE

## 2020-06-10 PROCEDURE — 80061 LIPID PANEL: CPT | Performed by: INTERNAL MEDICINE

## 2020-06-10 PROCEDURE — 81001 URINALYSIS AUTO W/SCOPE: CPT | Performed by: INTERNAL MEDICINE

## 2020-06-10 PROCEDURE — 85027 COMPLETE CBC AUTOMATED: CPT | Performed by: INTERNAL MEDICINE

## 2020-06-10 PROCEDURE — G0439 PPPS, SUBSEQ VISIT: HCPCS | Performed by: INTERNAL MEDICINE

## 2020-06-10 PROCEDURE — 82306 VITAMIN D 25 HYDROXY: CPT | Performed by: INTERNAL MEDICINE

## 2020-06-10 RX ORDER — TRIAMTERENE AND HYDROCHLOROTHIAZIDE 37.5; 25 MG/1; MG/1
1 TABLET ORAL EVERY OTHER DAY
Qty: 45 TABLET | Refills: 1 | Status: SHIPPED | OUTPATIENT
Start: 2020-06-10 | End: 2020-07-20

## 2020-06-10 RX ORDER — DULOXETIN HYDROCHLORIDE 60 MG/1
60 CAPSULE, DELAYED RELEASE ORAL DAILY
Qty: 90 CAPSULE | Refills: 1 | Status: SHIPPED | OUTPATIENT
Start: 2020-06-10 | End: 2020-11-05 | Stop reason: HOSPADM

## 2020-06-10 NOTE — PROGRESS NOTES
The ABCs of the Annual Wellness Visit  Subsequent Medicare Wellness Visit    Chief Complaint   Patient presents with   • Medicare Wellness-subsequent     Pt needs to discuss BP meds instructions, valsartan       Subjective   History of Present Illness:  Zohra Hall is a 79 y.o. female who presents for a Subsequent Medicare Wellness Visit.    HEALTH RISK ASSESSMENT    Recent Hospitalizations:  Recently treated at the following:  Other: outpatient hand surgery    Current Medical Providers:  Patient Care Team:  Aleah Regan MD as PCP - General  Aleah Regan MD as PCP - Family Medicine  Aleah Regan MD as PCP - Claims Attributed  Ain, Wicho Martinez MD as Consulting Physician (Endocrinology)  Simeon Burch MD as Consulting Physician (Gastroenterology)    Smoking Status:  Social History     Tobacco Use   Smoking Status Former Smoker   • Packs/day: 1.00   • Years: 30.00   • Pack years: 30.00   • Types: Cigarettes   • Last attempt to quit: 1992   • Years since quittin.7   Smokeless Tobacco Never Used       Alcohol Consumption:  Social History     Substance and Sexual Activity   Alcohol Use No       Depression Screen:   PHQ-2/PHQ-9 Depression Screening 6/10/2020   Little interest or pleasure in doing things 0   Feeling down, depressed, or hopeless 0   Trouble falling or staying asleep, or sleeping too much -   Feeling tired or having little energy -   Poor appetite or overeating -   Feeling bad about yourself - or that you are a failure or have let yourself or your family down -   Trouble concentrating on things, such as reading the newspaper or watching television -   Moving or speaking so slowly that other people could have noticed. Or the opposite - being so fidgety or restless that you have been moving around a lot more than usual -   Thoughts that you would be better off dead, or of hurting yourself in some way -   Total Score 0   If you checked off any problems, how  difficult have these problems made it for you to do your work, take care of things at home, or get along with other people? -       Fall Risk Screen:  CHRIS Fall Risk Assessment was completed, and patient is at LOW risk for falls.Assessment completed on:6/10/2020    Health Habits and Functional and Cognitive Screening:  Functional & Cognitive Status 6/10/2020   Do you have difficulty preparing food and eating? No   Do you have difficulty bathing yourself, getting dressed or grooming yourself? No   Do you have difficulty using the toilet? No   Do you have difficulty moving around from place to place? Yes   Do you have trouble with steps or getting out of a bed or a chair? Yes   Current Diet Limited Junk Food   Dental Exam Up to date   Eye Exam Up to date   Exercise (times per week) 0 times per week   Current Exercise Activities Include None   Do you need help using the phone?  No   Are you deaf or do you have serious difficulty hearing?  No   Do you need help with transportation? Yes   Do you need help shopping? No   Do you need help preparing meals?  No   Do you need help with housework?  Yes   Do you need help with laundry? No   Do you need help taking your medications? No   Do you need help managing money? No   Do you ever drive or ride in a car without wearing a seat belt? Yes   Have you felt unusual stress, anger or loneliness in the last month? Yes   Who do you live with? Alone   If you need help, do you have trouble finding someone available to you? No   Have you been bothered in the last four weeks by sexual problems? No   Do you have difficulty concentrating, remembering or making decisions? Yes         Does the patient have evidence of cognitive impairment? No    Asprin use counseling:Does not need ASA (and currently is not on it)    Age-appropriate Screening Schedule:  Refer to the list below for future screening recommendations based on patient's age, sex and/or medical conditions. Orders for these  recommended tests are listed in the plan section. The patient has been provided with a written plan.    Health Maintenance   Topic Date Due   • MAMMOGRAM  03/20/2020   • ZOSTER VACCINE (2 of 2) 06/18/2020 (Originally 7/6/2017)   • INFLUENZA VACCINE  08/01/2020   • DXA SCAN  02/21/2021   • LIPID PANEL  06/10/2021   • COLONOSCOPY  05/11/2028   • TDAP/TD VACCINES  Discontinued          The following portions of the patient's history were reviewed and updated as appropriate: allergies, current medications, past family history, past medical history, past social history, past surgical history and problem list.    Outpatient Medications Prior to Visit   Medication Sig Dispense Refill   • acetaminophen (TYLENOL) 650 MG 8 hr tablet Take 1 tablet by mouth Every 8 (Eight) Hours As Needed for Mild Pain .     • alendronate (FOSAMAX) 70 MG tablet TAKE 1 TABLET BY MOUTH  EVERY 7 DAYS 12 tablet 1   • amLODIPine (NORVASC) 5 MG tablet TAKE 1 TABLET BY MOUTH  DAILY 90 tablet 1   • atenolol (TENORMIN) 50 MG tablet TAKE 1 TABLET BY MOUTH  DAILY 90 tablet 1   • atorvastatin (LIPITOR) 40 MG tablet TAKE 1 TABLET BY MOUTH  DAILY 90 tablet 1   • Ketamine HCl powder, Ketoprofen 10 % cream, bupivacaine PF 0.25 % solution, lidocaine 5 % ointment, Meloxicam powder Apply to rt hand bid - tid 60 g 0   • levothyroxine (SYNTHROID, LEVOTHROID) 112 MCG tablet Take 1 tablet by mouth Daily.     • methotrexate 2.5 MG tablet      • metroNIDAZOLE (METROCREAM) 0.75 % cream Apply  topically 2 (Two) Times a Day. 60 g 2   • primidone (MYSOLINE) 50 MG tablet Take 1 tablet in the morning and 3 tablets at night 120 tablet 1   • valsartan (DIOVAN) 320 MG tablet TAKE 1 TABLET BY MOUTH  DAILY. REPLACES IRBESARTAN 90 tablet 1   • CloNIDine (CATAPRES) 0.1 MG tablet Take 2 tablets by mouth 2 (Two) Times a Day. Note- dose change 120 tablet 2   • DULoxetine (Cymbalta) 60 MG capsule Take 1 capsule by mouth Daily. For stress 90 capsule 0   • predniSONE (DELTASONE) 5 MG  tablet      • triamterene-hydrochlorothiazide (MAXZIDE-25) 37.5-25 MG per tablet Take 1 tablet by mouth Daily. 90 tablet 1     No facility-administered medications prior to visit.        Patient Active Problem List   Diagnosis   • Generalized osteoarthritis   • Iron deficiency   • Vitamin D deficiency   • Skin neoplasm   • Sialadenitis   • Restless leg syndrome   • Reaction to chronic stress   • Piriformis syndrome   • Papillary adenocarcinoma of thyroid (CMS/HCC)   • Hypothyroidism (acquired)   • Hypertension   • GERD without esophagitis   • Dyslipidemia   • Cervical lymphadenopathy   • Cellulitis   • Atherosclerosis of aorta (CMS/HCC)   • Breast mass   • Aortic valve stenosis   • Incontinence of bowel   • Chronic diastolic heart failure (CMS/HCC)   • Acute right-sided low back pain without sciatica   • Benign essential tremor   • Pain of right hip joint   • Thyroid cancer (CMS/HCC)   • Lacunar stroke (CMS/HCC)   • Nontraumatic rupture of extensor tendons of right hand and wrist   • Hyponatremia   • Actinic keratosis   • Transient ischemic attack       Advanced Care Planning:  ACP discussion was held with the patient during this visit. Patient has an advance directive (not in EMR), copy requested.    Review of Systems   Constitutional: Positive for fatigue. Negative for chills and fever.   HENT: Negative for ear discharge, ear pain, sinus pressure and sore throat.    Respiratory: Negative for cough, chest tightness and shortness of breath.    Cardiovascular: Negative for chest pain, palpitations and leg swelling.   Gastrointestinal: Negative for diarrhea, nausea and vomiting.   Musculoskeletal: Positive for arthralgias and gait problem. Negative for back pain and myalgias.   Neurological: Positive for tremors and weakness. Negative for dizziness, syncope and headaches.   Psychiatric/Behavioral: Negative for confusion and sleep disturbance.       Compared to one year ago, the patient feels her physical health is  "better.  Compared to one year ago, the patient feels her mental health is better.    Reviewed chart for potential of high risk medication in the elderly: yes  Reviewed chart for potential of harmful drug interactions in the elderly:yes    Objective         Vitals:    06/10/20 1151   BP: 128/82   Pulse: 58   Resp: 16   Temp: 97.6 °F (36.4 °C)   SpO2: 98%   Weight: 65.8 kg (145 lb)   Height: 160 cm (63\")   PainSc:   8       Body mass index is 25.69 kg/m².  Discussed the patient's BMI with her. The BMI is in the acceptable range.    Physical Exam   Constitutional: She is oriented to person, place, and time. She appears well-developed and well-nourished.   HENT:   Head: Normocephalic and atraumatic.   Right Ear: External ear normal.   Left Ear: External ear normal.   Mouth/Throat: No oropharyngeal exudate.   Eyes: Pupils are equal, round, and reactive to light. Conjunctivae are normal. No scleral icterus.   Neck: Neck supple. No thyromegaly present.   Cardiovascular: Normal rate, regular rhythm and intact distal pulses. Exam reveals no friction rub.   No murmur heard.  Pulmonary/Chest: Effort normal and breath sounds normal. She has no wheezes. She has no rales.   Abdominal: Soft. Bowel sounds are normal. She exhibits no distension and no mass. There is no tenderness. No hernia.   Musculoskeletal: She exhibits tenderness (hand) and deformity. She exhibits no edema.   Lymphadenopathy:     She has no cervical adenopathy.   Neurological: She is alert and oriented to person, place, and time. She displays normal reflexes. No cranial nerve deficit or sensory deficit. She exhibits abnormal muscle tone. Coordination normal.   + tremors   Skin: Skin is warm and dry.   Psychiatric: She has a normal mood and affect. Her behavior is normal. Judgment and thought content normal.   Nursing note and vitals reviewed.      Lab Results   Component Value Date    TRIG 134 06/10/2020    HDL 81 (H) 06/10/2020    LDL 97 06/10/2020    VLDL 26.8 " 06/10/2020        Assessment/Plan   Medicare Risks and Personalized Health Plan  CMS Preventative Services Quick Reference  Advance Directive Discussion  Breast Cancer/Mammogram Screening  Dementia/Memory   Diabetic Lab Screening   Fall Risk  Immunizations Discussed/Encouraged (specific immunizations; Shingrix )  Inactivity/Sedentary  Osteoprorosis Risk    The above risks/problems have been discussed with the patient.  Pertinent information has been shared with the patient in the After Visit Summary.  Follow up plans and orders are seen below in the Assessment/Plan Section.    Medicare Wellness-subsequent (Pt needs to discuss BP meds instructions, valsartan)    Subjective   Zohra Hall is a 79 y.o. female is here today for follow-up.  HPI  Rt hip s/p replacement by Dr. GENAO last year. Having issues with it.  Some bladder issues. Tremor better but worried about balance issues.      Current Outpatient Medications:   •  acetaminophen (TYLENOL) 650 MG 8 hr tablet, Take 1 tablet by mouth Every 8 (Eight) Hours As Needed for Mild Pain ., Disp: , Rfl:   •  alendronate (FOSAMAX) 70 MG tablet, TAKE 1 TABLET BY MOUTH  EVERY 7 DAYS, Disp: 12 tablet, Rfl: 1  •  amLODIPine (NORVASC) 5 MG tablet, TAKE 1 TABLET BY MOUTH  DAILY, Disp: 90 tablet, Rfl: 1  •  atenolol (TENORMIN) 50 MG tablet, TAKE 1 TABLET BY MOUTH  DAILY, Disp: 90 tablet, Rfl: 1  •  atorvastatin (LIPITOR) 40 MG tablet, TAKE 1 TABLET BY MOUTH  DAILY, Disp: 90 tablet, Rfl: 1  •  DULoxetine (Cymbalta) 60 MG capsule, Take 1 capsule by mouth Daily. For stress, Disp: 90 capsule, Rfl: 1  •  Ketamine HCl powder, Ketoprofen 10 % cream, bupivacaine PF 0.25 % solution, lidocaine 5 % ointment, Meloxicam powder, Apply to rt hand bid - tid, Disp: 60 g, Rfl: 0  •  levothyroxine (SYNTHROID, LEVOTHROID) 112 MCG tablet, Take 1 tablet by mouth Daily., Disp: , Rfl:   •  methotrexate 2.5 MG tablet, , Disp: , Rfl:   •  metroNIDAZOLE (METROCREAM) 0.75 % cream, Apply  topically 2 (Two)  "Times a Day., Disp: 60 g, Rfl: 2  •  primidone (MYSOLINE) 50 MG tablet, Take 1 tablet in the morning and 3 tablets at night, Disp: 120 tablet, Rfl: 1  •  triamterene-hydrochlorothiazide (MAXZIDE-25) 37.5-25 MG per tablet, Take 1 tablet by mouth Every Other Day., Disp: 45 tablet, Rfl: 1  •  valsartan (DIOVAN) 320 MG tablet, TAKE 1 TABLET BY MOUTH  DAILY. REPLACES IRBESARTAN, Disp: 90 tablet, Rfl: 1  •  nitrofurantoin, macrocrystal-monohydrate, (Macrobid) 100 MG capsule, Take 1 capsule by mouth 2 (Two) Times a Day., Disp: 14 capsule, Rfl: 0      The following portions of the patient's history were reviewed and updated as appropriate: allergies, current medications, past family history, past medical history, past social history, past surgical history and problem list.        Objective   /82   Pulse 58   Temp 97.6 °F (36.4 °C)   Resp 16   Ht 160 cm (63\")   Wt 65.8 kg (145 lb)   SpO2 98%   Breastfeeding No   BMI 25.69 kg/m²         Results for orders placed or performed in visit on 06/10/20   POCT urinalysis dipstick, automated   Result Value Ref Range    Color Yellow Yellow, Straw, Dark Yellow, Ninfa    Clarity, UA Clear Clear    Specific Gravity  1.025 1.005 - 1.030    pH, Urine 6.0 5.0 - 8.0    Leukocytes Small (1+) (A) Negative    Nitrite, UA Positive (A) Negative    Protein, POC Negative Negative mg/dL    Glucose, UA Negative Negative, 1000 mg/dL (3+) mg/dL    Ketones, UA 1+ (A) Negative    Urobilinogen, UA Normal Normal    Bilirubin Small (1+) (A) Negative    Blood, UA Negative Negative             Assessment/Plan   Diagnoses and all orders for this visit:    Medicare annual wellness visit, subsequent    Reaction to chronic stress  Comments:  start cymbalta, counseled re: meds and behavioural txt.  Orders:  -     DULoxetine (Cymbalta) 60 MG capsule; Take 1 capsule by mouth Daily. For stress    Edema, unspecified type  -     triamterene-hydrochlorothiazide (MAXZIDE-25) 37.5-25 MG per tablet; Take 1 " tablet by mouth Every Other Day.    Essential hypertension  -     POCT urinalysis dipstick, automated    Dyslipidemia  -     Comprehensive Metabolic Panel; Future  -     Lipid Panel; Future    Iron deficiency anemia, unspecified iron deficiency anemia type  -     CBC (No Diff); Future    Vitamin D deficiency  -     Vitamin D 25 Hydroxy; Future    Acute cystitis without hematuria  -     Urinalysis With Microscopic - Urine, Clean Catch; Future  -     nitrofurantoin, macrocrystal-monohydrate, (Macrobid) 100 MG capsule; Take 1 capsule by mouth 2 (Two) Times a Day.  -     Basic Metabolic Panel; Future               PHQ-2/PHQ-9 Depression screening reviewed with patient . Total time spent today for depression screening  with Zohra Hall  was 15 minutes in counseling, along with plans for any diagnositc work-up and treatment.  Follow Up:  Return in about 6 months (around 12/10/2020) for Next scheduled follow up.     An After Visit Summary and PPPS were given to the patient.

## 2020-06-11 LAB
25(OH)D3 SERPL-MCNC: 26.4 NG/ML (ref 30–100)
ALBUMIN SERPL-MCNC: 4.4 G/DL (ref 3.5–5.2)
ALBUMIN/GLOB SERPL: 1.6 G/DL
ALP SERPL-CCNC: 62 U/L (ref 39–117)
ALT SERPL W P-5'-P-CCNC: 9 U/L (ref 1–33)
ANION GAP SERPL CALCULATED.3IONS-SCNC: 11.9 MMOL/L (ref 5–15)
AST SERPL-CCNC: 16 U/L (ref 1–32)
BACTERIA UR QL AUTO: ABNORMAL /HPF
BILIRUB SERPL-MCNC: 0.3 MG/DL (ref 0.2–1.2)
BILIRUB UR QL STRIP: NEGATIVE
BUN BLD-MCNC: 17 MG/DL (ref 8–23)
BUN/CREAT SERPL: 13.7 (ref 7–25)
CALCIUM SPEC-SCNC: 9.4 MG/DL (ref 8.6–10.5)
CHLORIDE SERPL-SCNC: 91 MMOL/L (ref 98–107)
CHOLEST SERPL-MCNC: 205 MG/DL (ref 0–200)
CLARITY UR: ABNORMAL
CO2 SERPL-SCNC: 25.1 MMOL/L (ref 22–29)
COLOR UR: ABNORMAL
CREAT BLD-MCNC: 1.24 MG/DL (ref 0.57–1)
DEPRECATED RDW RBC AUTO: 45.2 FL (ref 37–54)
ERYTHROCYTE [DISTWIDTH] IN BLOOD BY AUTOMATED COUNT: 14.3 % (ref 12.3–15.4)
GFR SERPL CREATININE-BSD FRML MDRD: 42 ML/MIN/1.73
GLOBULIN UR ELPH-MCNC: 2.7 GM/DL
GLUCOSE BLD-MCNC: 129 MG/DL (ref 65–99)
GLUCOSE UR STRIP-MCNC: NEGATIVE MG/DL
HCT VFR BLD AUTO: 36.7 % (ref 34–46.6)
HDLC SERPL-MCNC: 81 MG/DL (ref 40–60)
HGB BLD-MCNC: 12.4 G/DL (ref 12–15.9)
HGB UR QL STRIP.AUTO: NEGATIVE
HYALINE CASTS UR QL AUTO: ABNORMAL /LPF
KETONES UR QL STRIP: ABNORMAL
LDLC SERPL CALC-MCNC: 97 MG/DL (ref 0–100)
LDLC/HDLC SERPL: 1.2 {RATIO}
LEUKOCYTE ESTERASE UR QL STRIP.AUTO: ABNORMAL
MCH RBC QN AUTO: 29 PG (ref 26.6–33)
MCHC RBC AUTO-ENTMCNC: 33.8 G/DL (ref 31.5–35.7)
MCV RBC AUTO: 85.9 FL (ref 79–97)
NITRITE UR QL STRIP: POSITIVE
PH UR STRIP.AUTO: 5.5 [PH] (ref 5–8)
PLATELET # BLD AUTO: 223 10*3/MM3 (ref 140–450)
PMV BLD AUTO: 10.3 FL (ref 6–12)
POTASSIUM BLD-SCNC: 4.2 MMOL/L (ref 3.5–5.2)
PROT SERPL-MCNC: 7.1 G/DL (ref 6–8.5)
PROT UR QL STRIP: ABNORMAL
RBC # BLD AUTO: 4.27 10*6/MM3 (ref 3.77–5.28)
RBC # UR: ABNORMAL /HPF
REF LAB TEST METHOD: ABNORMAL
SODIUM BLD-SCNC: 128 MMOL/L (ref 136–145)
SP GR UR STRIP: 1.02 (ref 1–1.03)
SQUAMOUS #/AREA URNS HPF: ABNORMAL /HPF
TRIGL SERPL-MCNC: 134 MG/DL (ref 0–150)
UROBILINOGEN UR QL STRIP: ABNORMAL
VLDLC SERPL-MCNC: 26.8 MG/DL (ref 5–40)
WBC NRBC COR # BLD: 6.41 10*3/MM3 (ref 3.4–10.8)
WBC UR QL AUTO: ABNORMAL /HPF

## 2020-06-15 RX ORDER — NITROFURANTOIN 25; 75 MG/1; MG/1
100 CAPSULE ORAL 2 TIMES DAILY
Qty: 14 CAPSULE | Refills: 0 | Status: SHIPPED | OUTPATIENT
Start: 2020-06-15 | End: 2020-07-20

## 2020-06-16 ENCOUNTER — TELEPHONE (OUTPATIENT)
Dept: INTERNAL MEDICINE | Facility: CLINIC | Age: 80
End: 2020-06-16

## 2020-06-16 DIAGNOSIS — E87.1 HYPONATREMIA: Primary | ICD-10-CM

## 2020-06-16 DIAGNOSIS — N39.0 URINARY TRACT INFECTION WITHOUT HEMATURIA, SITE UNSPECIFIED: ICD-10-CM

## 2020-06-16 NOTE — TELEPHONE ENCOUNTER
Called and confirmed patient had received [employer] message.  Patient states she has picked up her medication and is taking.  Reminded of need to come in for repeat labs in 2-3 weeks.  Patient verbalized understanding

## 2020-06-16 NOTE — TELEPHONE ENCOUNTER
----- Message from Aleah Regan MD sent at 6/15/2020 12:54 AM EDT -----  Please make sure patient is aware of the following mychart message:    Urinalysis is consistent with a UTI.  Please start the Rx for Macrobid antibiotic sent to your pharmacy.  Electrolytes show low sodium and chloride and kidney function is slightly high and this could be related to your UTI and should get better after treatment.  Please stop by in 2 to 3 weeks for a recheck, no appointment needed.  Rest of the labs including cholesterol and blood counts look good.    Continue current regimen.

## 2020-06-22 ENCOUNTER — TRANSCRIBE ORDERS (OUTPATIENT)
Dept: ADMINISTRATIVE | Facility: HOSPITAL | Age: 80
End: 2020-06-22

## 2020-06-22 DIAGNOSIS — Z12.31 VISIT FOR SCREENING MAMMOGRAM: Primary | ICD-10-CM

## 2020-06-22 RX ORDER — PRIMIDONE 50 MG/1
TABLET ORAL
Qty: 120 TABLET | Refills: 1 | Status: SHIPPED | OUTPATIENT
Start: 2020-06-22 | End: 2020-07-01

## 2020-06-24 ENCOUNTER — LAB (OUTPATIENT)
Dept: LAB | Facility: HOSPITAL | Age: 80
End: 2020-06-24

## 2020-06-24 DIAGNOSIS — E87.1 HYPONATREMIA: ICD-10-CM

## 2020-06-24 DIAGNOSIS — N39.0 URINARY TRACT INFECTION WITHOUT HEMATURIA, SITE UNSPECIFIED: ICD-10-CM

## 2020-06-24 DIAGNOSIS — N30.00 ACUTE CYSTITIS WITHOUT HEMATURIA: ICD-10-CM

## 2020-06-24 LAB
ANION GAP SERPL CALCULATED.3IONS-SCNC: 11.2 MMOL/L (ref 5–15)
BILIRUB UR QL STRIP: NEGATIVE
BUN BLD-MCNC: 12 MG/DL (ref 8–23)
BUN/CREAT SERPL: 11.4 (ref 7–25)
CALCIUM SPEC-SCNC: 9.3 MG/DL (ref 8.6–10.5)
CHLORIDE SERPL-SCNC: 87 MMOL/L (ref 98–107)
CLARITY UR: ABNORMAL
CO2 SERPL-SCNC: 26.8 MMOL/L (ref 22–29)
COLOR UR: ABNORMAL
CREAT BLD-MCNC: 1.05 MG/DL (ref 0.57–1)
GFR SERPL CREATININE-BSD FRML MDRD: 50 ML/MIN/1.73
GLUCOSE BLD-MCNC: 116 MG/DL (ref 65–99)
GLUCOSE UR STRIP-MCNC: NEGATIVE MG/DL
HGB UR QL STRIP.AUTO: NEGATIVE
KETONES UR QL STRIP: ABNORMAL
LEUKOCYTE ESTERASE UR QL STRIP.AUTO: ABNORMAL
NITRITE UR QL STRIP: NEGATIVE
PH UR STRIP.AUTO: 5.5 [PH] (ref 5–8)
POTASSIUM BLD-SCNC: 4.6 MMOL/L (ref 3.5–5.2)
PROT UR QL STRIP: ABNORMAL
SODIUM BLD-SCNC: 125 MMOL/L (ref 136–145)
SP GR UR STRIP: 1.03 (ref 1–1.03)
UROBILINOGEN UR QL STRIP: ABNORMAL

## 2020-06-24 PROCEDURE — 80048 BASIC METABOLIC PNL TOTAL CA: CPT | Performed by: INTERNAL MEDICINE

## 2020-06-24 PROCEDURE — 81001 URINALYSIS AUTO W/SCOPE: CPT | Performed by: INTERNAL MEDICINE

## 2020-06-25 ENCOUNTER — HOSPITAL ENCOUNTER (OUTPATIENT)
Dept: MAMMOGRAPHY | Facility: HOSPITAL | Age: 80
Discharge: HOME | End: 2020-06-25
Admitting: INTERNAL MEDICINE

## 2020-06-25 DIAGNOSIS — Z12.31 VISIT FOR SCREENING MAMMOGRAM: ICD-10-CM

## 2020-06-25 LAB
BACTERIA UR QL AUTO: ABNORMAL /HPF
HYALINE CASTS UR QL AUTO: ABNORMAL /LPF
RBC # UR: ABNORMAL /HPF
REF LAB TEST METHOD: ABNORMAL
SQUAMOUS #/AREA URNS HPF: ABNORMAL /HPF
TRANS CELLS #/AREA URNS HPF: ABNORMAL /HPF
WBC UR QL AUTO: ABNORMAL /HPF

## 2020-06-25 PROCEDURE — 77067 SCR MAMMO BI INCL CAD: CPT

## 2020-06-25 PROCEDURE — 77067 SCR MAMMO BI INCL CAD: CPT | Performed by: RADIOLOGY

## 2020-06-25 PROCEDURE — 77063 BREAST TOMOSYNTHESIS BI: CPT

## 2020-06-25 PROCEDURE — 77063 BREAST TOMOSYNTHESIS BI: CPT | Performed by: RADIOLOGY

## 2020-06-30 ENCOUNTER — TELEPHONE (OUTPATIENT)
Dept: INTERNAL MEDICINE | Facility: CLINIC | Age: 80
End: 2020-06-30

## 2020-06-30 NOTE — TELEPHONE ENCOUNTER
----- Message from Aleah Regan MD sent at 6/29/2020 12:38 AM EDT -----  Please make sure patient is aware of the following [employer] message:    Your labs show that sodium is low at 125. This could be causing your weakness and worsening tremors.  Please stop the Maxzide( triamterene-hctz) for 2 weeks, and follow up with me.  Please stop by for a recheck of your lab 2 days before your follow up.    thanks

## 2020-07-01 RX ORDER — PRIMIDONE 50 MG/1
TABLET ORAL
Qty: 120 TABLET | Refills: 0 | Status: SHIPPED | OUTPATIENT
Start: 2020-07-01 | End: 2020-10-27

## 2020-07-16 ENCOUNTER — LAB (OUTPATIENT)
Dept: LAB | Facility: HOSPITAL | Age: 80
End: 2020-07-16

## 2020-07-16 DIAGNOSIS — E87.1 HYPONATREMIA: ICD-10-CM

## 2020-07-16 LAB
ANION GAP SERPL CALCULATED.3IONS-SCNC: 11.7 MMOL/L (ref 5–15)
BUN SERPL-MCNC: 14 MG/DL (ref 8–23)
BUN/CREAT SERPL: 13.3 (ref 7–25)
CALCIUM SPEC-SCNC: 8.9 MG/DL (ref 8.6–10.5)
CHLORIDE SERPL-SCNC: 98 MMOL/L (ref 98–107)
CO2 SERPL-SCNC: 26.3 MMOL/L (ref 22–29)
CREAT SERPL-MCNC: 1.05 MG/DL (ref 0.57–1)
GFR SERPL CREATININE-BSD FRML MDRD: 50 ML/MIN/1.73
GLUCOSE SERPL-MCNC: 118 MG/DL (ref 65–99)
POTASSIUM SERPL-SCNC: 3.9 MMOL/L (ref 3.5–5.2)
SODIUM SERPL-SCNC: 136 MMOL/L (ref 136–145)

## 2020-07-16 PROCEDURE — 80048 BASIC METABOLIC PNL TOTAL CA: CPT | Performed by: INTERNAL MEDICINE

## 2020-07-20 ENCOUNTER — OFFICE VISIT (OUTPATIENT)
Dept: INTERNAL MEDICINE | Facility: CLINIC | Age: 80
End: 2020-07-20

## 2020-07-20 VITALS
BODY MASS INDEX: 26.15 KG/M2 | DIASTOLIC BLOOD PRESSURE: 84 MMHG | HEART RATE: 68 BPM | SYSTOLIC BLOOD PRESSURE: 188 MMHG | OXYGEN SATURATION: 99 % | WEIGHT: 147.6 LBS

## 2020-07-20 DIAGNOSIS — M25.551 BILATERAL HIP PAIN: ICD-10-CM

## 2020-07-20 DIAGNOSIS — I10 UNCONTROLLED HYPERTENSION: Primary | ICD-10-CM

## 2020-07-20 DIAGNOSIS — M54.42 CHRONIC BILATERAL LOW BACK PAIN WITH BILATERAL SCIATICA: ICD-10-CM

## 2020-07-20 DIAGNOSIS — G89.29 CHRONIC BILATERAL LOW BACK PAIN WITH BILATERAL SCIATICA: ICD-10-CM

## 2020-07-20 DIAGNOSIS — M25.552 BILATERAL HIP PAIN: ICD-10-CM

## 2020-07-20 DIAGNOSIS — M54.41 CHRONIC BILATERAL LOW BACK PAIN WITH BILATERAL SCIATICA: ICD-10-CM

## 2020-07-20 DIAGNOSIS — E87.1 HYPONATREMIA: ICD-10-CM

## 2020-07-20 PROCEDURE — 99214 OFFICE O/P EST MOD 30 MIN: CPT | Performed by: INTERNAL MEDICINE

## 2020-07-20 RX ORDER — ENALAPRIL MALEATE 20 MG/1
20 TABLET ORAL DAILY
Qty: 90 TABLET | Refills: 1 | Status: SHIPPED | OUTPATIENT
Start: 2020-07-20 | End: 2020-07-20

## 2020-07-20 RX ORDER — ENALAPRIL MALEATE 20 MG/1
20 TABLET ORAL DAILY
Qty: 30 TABLET | Refills: 0 | Status: SHIPPED | OUTPATIENT
Start: 2020-07-20 | End: 2020-09-16 | Stop reason: SDUPTHER

## 2020-07-20 NOTE — PROGRESS NOTES
Follow-up (Elevated Sodium, Medication Management )    Subjective   Zohra Hall is a 79 y.o. female is here today for follow-up.    History of Present Illness   Stopped the valsartan and maxzide as she is concerned about cancer risk and we advised her to stop the triamterene due to hyponatremia.  Was Rxed Enbrel, but very expensive, and it needs a ? PA.  Thinks rheum doesn't want to do it.    Current Outpatient Medications:   •  acetaminophen (TYLENOL) 650 MG 8 hr tablet, Take 1 tablet by mouth Every 8 (Eight) Hours As Needed for Mild Pain ., Disp: , Rfl:   •  alendronate (FOSAMAX) 70 MG tablet, TAKE 1 TABLET BY MOUTH  EVERY 7 DAYS, Disp: 12 tablet, Rfl: 1  •  amLODIPine (NORVASC) 5 MG tablet, TAKE 1 TABLET BY MOUTH  DAILY, Disp: 90 tablet, Rfl: 1  •  atenolol (TENORMIN) 50 MG tablet, TAKE 1 TABLET BY MOUTH  DAILY, Disp: 90 tablet, Rfl: 1  •  atorvastatin (LIPITOR) 40 MG tablet, TAKE 1 TABLET BY MOUTH  DAILY, Disp: 90 tablet, Rfl: 1  •  DULoxetine (Cymbalta) 60 MG capsule, Take 1 capsule by mouth Daily. For stress, Disp: 90 capsule, Rfl: 1  •  levothyroxine (SYNTHROID, LEVOTHROID) 112 MCG tablet, Take 1 tablet by mouth Daily., Disp: , Rfl:   •  metroNIDAZOLE (METROCREAM) 0.75 % cream, Apply  topically 2 (Two) Times a Day., Disp: 60 g, Rfl: 2  •  primidone (MYSOLINE) 50 MG tablet, TAKE ONE TABLET BY MOUTH EVERY MORNING AND TAKE THREE TABLETS BY MOUTH AT NIGHT, Disp: 120 tablet, Rfl: 0  •  enalapril (VASOTEC) 20 MG tablet, Take 1 tablet by mouth Daily. Replaces valsartan, Disp: 30 tablet, Rfl: 0      The following portions of the patient's history were reviewed and updated as appropriate: allergies, current medications, past family history, past medical history, past social history, past surgical history and problem list.    Review of Systems   Constitutional: Negative.  Negative for chills and fever.   HENT: Negative for ear discharge, ear pain, sinus pressure and sore throat.    Respiratory: Negative for cough,  chest tightness and shortness of breath.    Cardiovascular: Negative for chest pain, palpitations and leg swelling.   Gastrointestinal: Negative for diarrhea, nausea and vomiting.   Musculoskeletal: Negative for arthralgias, back pain and myalgias.   Neurological: Positive for headaches. Negative for dizziness and syncope.   Psychiatric/Behavioral: Negative for confusion and sleep disturbance.       Objective   BP (!) 188/84   Pulse 68   Wt 67 kg (147 lb 9.6 oz)   SpO2 99%   Breastfeeding No   BMI 26.15 kg/m²   Physical Exam   Constitutional: She is oriented to person, place, and time. She appears well-developed and well-nourished.   HENT:   Head: Normocephalic and atraumatic.   Mouth/Throat: No oropharyngeal exudate.   Eyes: Pupils are equal, round, and reactive to light. Conjunctivae are normal.   Neck: Neck supple. No thyromegaly present.   Cardiovascular: Normal rate and regular rhythm.   Pulmonary/Chest: Effort normal and breath sounds normal.   Abdominal: Soft. Bowel sounds are normal. She exhibits no distension. There is no tenderness.   Musculoskeletal: She exhibits tenderness and deformity. She exhibits no edema.   Neurological: She is alert and oriented to person, place, and time. No cranial nerve deficit.   Skin: Skin is warm and dry.   Psychiatric: She has a normal mood and affect. Judgment normal.   Nursing note and vitals reviewed.        Results for orders placed or performed in visit on 07/16/20   Basic Metabolic Panel   Result Value Ref Range    Glucose 118 (H) 65 - 99 mg/dL    BUN 14 8 - 23 mg/dL    Creatinine 1.05 (H) 0.57 - 1.00 mg/dL    Sodium 136 136 - 145 mmol/L    Potassium 3.9 3.5 - 5.2 mmol/L    Chloride 98 98 - 107 mmol/L    CO2 26.3 22.0 - 29.0 mmol/L    Calcium 8.9 8.6 - 10.5 mg/dL    eGFR Non African Amer 50 (L) >60 mL/min/1.73    BUN/Creatinine Ratio 13.3 7.0 - 25.0    Anion Gap 11.7 5.0 - 15.0 mmol/L             Assessment/Plan   Diagnoses and all orders for this  visit:    Uncontrolled hypertension  Comments:  off valsartan and maxzide, previously on lis/hctz with mild cough. Will start vasotec.reassess and start low dose chlorthalidone next visit.  Orders:  -     Discontinue: enalapril (VASOTEC) 20 MG tablet; Take 1 tablet by mouth Daily. Replaces valsartan  -     enalapril (VASOTEC) 20 MG tablet; Take 1 tablet by mouth Daily. Replaces valsartan    Chronic bilateral low back pain with bilateral sciatica  -     Ambulatory Referral to Physical Therapy Evaluate and treat    Bilateral hip pain  -     Ambulatory Referral to Physical Therapy Evaluate and treat                 Return in about 3 weeks (around 8/10/2020) for Recheck in 3- 4 weeks.

## 2020-08-01 DIAGNOSIS — M81.0 AGE-RELATED OSTEOPOROSIS WITHOUT CURRENT PATHOLOGICAL FRACTURE: ICD-10-CM

## 2020-08-03 RX ORDER — ALENDRONATE SODIUM 70 MG/1
TABLET ORAL
Qty: 12 TABLET | Refills: 3 | Status: SHIPPED | OUTPATIENT
Start: 2020-08-03 | End: 2021-06-14 | Stop reason: SDUPTHER

## 2020-08-07 DIAGNOSIS — I10 ESSENTIAL HYPERTENSION: ICD-10-CM

## 2020-08-07 DIAGNOSIS — I63.81 LACUNAR STROKE (HCC): ICD-10-CM

## 2020-08-10 RX ORDER — ATORVASTATIN CALCIUM 40 MG/1
40 TABLET, FILM COATED ORAL DAILY
Qty: 90 TABLET | Refills: 3 | Status: SHIPPED | OUTPATIENT
Start: 2020-08-10 | End: 2021-03-03 | Stop reason: SDUPTHER

## 2020-08-10 RX ORDER — AMLODIPINE BESYLATE 5 MG/1
5 TABLET ORAL DAILY
Qty: 90 TABLET | Refills: 3 | Status: SHIPPED | OUTPATIENT
Start: 2020-08-10 | End: 2020-11-05 | Stop reason: HOSPADM

## 2020-08-10 RX ORDER — ATENOLOL 50 MG/1
50 TABLET ORAL DAILY
Qty: 90 TABLET | Refills: 3 | Status: SHIPPED | OUTPATIENT
Start: 2020-08-10 | End: 2021-01-05 | Stop reason: HOSPADM

## 2020-08-13 ENCOUNTER — TELEPHONE (OUTPATIENT)
Dept: INTERNAL MEDICINE | Facility: CLINIC | Age: 80
End: 2020-08-13

## 2020-09-16 ENCOUNTER — OFFICE VISIT (OUTPATIENT)
Dept: INTERNAL MEDICINE | Facility: CLINIC | Age: 80
End: 2020-09-16

## 2020-09-16 ENCOUNTER — LAB (OUTPATIENT)
Dept: LAB | Facility: HOSPITAL | Age: 80
End: 2020-09-16

## 2020-09-16 VITALS
WEIGHT: 146.6 LBS | DIASTOLIC BLOOD PRESSURE: 80 MMHG | BODY MASS INDEX: 24.43 KG/M2 | HEIGHT: 65 IN | OXYGEN SATURATION: 95 % | HEART RATE: 55 BPM | SYSTOLIC BLOOD PRESSURE: 132 MMHG

## 2020-09-16 DIAGNOSIS — I10 UNCONTROLLED HYPERTENSION: ICD-10-CM

## 2020-09-16 DIAGNOSIS — Z23 NEED FOR VACCINATION: Primary | ICD-10-CM

## 2020-09-16 DIAGNOSIS — M35.3 POLYMYALGIA (HCC): ICD-10-CM

## 2020-09-16 PROCEDURE — 99213 OFFICE O/P EST LOW 20 MIN: CPT | Performed by: INTERNAL MEDICINE

## 2020-09-16 PROCEDURE — 86140 C-REACTIVE PROTEIN: CPT | Performed by: INTERNAL MEDICINE

## 2020-09-16 PROCEDURE — G0008 ADMIN INFLUENZA VIRUS VAC: HCPCS | Performed by: INTERNAL MEDICINE

## 2020-09-16 PROCEDURE — 85652 RBC SED RATE AUTOMATED: CPT | Performed by: INTERNAL MEDICINE

## 2020-09-16 PROCEDURE — 86431 RHEUMATOID FACTOR QUANT: CPT | Performed by: INTERNAL MEDICINE

## 2020-09-16 PROCEDURE — 90694 VACC AIIV4 NO PRSRV 0.5ML IM: CPT | Performed by: INTERNAL MEDICINE

## 2020-09-16 PROCEDURE — 80048 BASIC METABOLIC PNL TOTAL CA: CPT | Performed by: INTERNAL MEDICINE

## 2020-09-16 RX ORDER — PREDNISONE 10 MG/1
TABLET ORAL
Qty: 15 TABLET | Refills: 0 | Status: SHIPPED | OUTPATIENT
Start: 2020-09-16 | End: 2020-11-05 | Stop reason: HOSPADM

## 2020-09-16 RX ORDER — LEVOTHYROXINE SODIUM 112 UG/1
112 TABLET ORAL DAILY
Qty: 90 TABLET | Refills: 1 | Status: SHIPPED | OUTPATIENT
Start: 2020-09-16 | End: 2020-11-05 | Stop reason: HOSPADM

## 2020-09-16 RX ORDER — ENALAPRIL MALEATE 20 MG/1
20 TABLET ORAL DAILY
Qty: 90 TABLET | Refills: 1 | Status: ON HOLD | OUTPATIENT
Start: 2020-09-16 | End: 2021-01-05 | Stop reason: SDUPTHER

## 2020-09-16 NOTE — PROGRESS NOTES
Hypertension and Hypothyroidism    Subjective   Zohra Hall is a 79 y.o. female is here today for follow-up.    History of Present Illness   Hands still bother her, trying to exercise regularly. Had to go to [facility], due to   High BP.  Doing well on the vasotec, denies any cough.  Has appt. With Dr. Fonseca.  Hurts all over.        Current Outpatient Medications:   •  acetaminophen (TYLENOL) 650 MG 8 hr tablet, Take 1 tablet by mouth Every 8 (Eight) Hours As Needed for Mild Pain ., Disp: , Rfl:   •  alendronate (FOSAMAX) 70 MG tablet, TAKE 1 TABLET BY MOUTH  EVERY 7 DAYS, Disp: 12 tablet, Rfl: 3  •  amLODIPine (NORVASC) 5 MG tablet, TAKE 1 TABLET BY MOUTH  DAILY, Disp: 90 tablet, Rfl: 3  •  atenolol (TENORMIN) 50 MG tablet, TAKE 1 TABLET BY MOUTH  DAILY, Disp: 90 tablet, Rfl: 3  •  atorvastatin (LIPITOR) 40 MG tablet, TAKE 1 TABLET BY MOUTH  DAILY, Disp: 90 tablet, Rfl: 3  •  DULoxetine (Cymbalta) 60 MG capsule, Take 1 capsule by mouth Daily. For stress, Disp: 90 capsule, Rfl: 1  •  enalapril (VASOTEC) 20 MG tablet, Take 1 tablet by mouth Daily., Disp: 90 tablet, Rfl: 1  •  levothyroxine (SYNTHROID, LEVOTHROID) 112 MCG tablet, Take 1 tablet by mouth Daily., Disp: 90 tablet, Rfl: 1  •  metroNIDAZOLE (METROCREAM) 0.75 % cream, Apply  topically 2 (Two) Times a Day., Disp: 60 g, Rfl: 2  •  primidone (MYSOLINE) 50 MG tablet, TAKE ONE TABLET BY MOUTH EVERY MORNING AND TAKE THREE TABLETS BY MOUTH AT NIGHT, Disp: 120 tablet, Rfl: 0  •  predniSONE (DELTASONE) 10 MG tablet, Take 1 tabs BID x 3 days then 1&1/2 tab daily x 3 days then 1 tab daily x 3 days then 1/2 tab daily x 3days then stop., Disp: 15 tablet, Rfl: 0      The following portions of the patient's history were reviewed and updated as appropriate: allergies, current medications, past family history, past medical history, past social history, past surgical history and problem list.    Review of Systems   Constitutional: Negative for chills and fever.   HENT:  "Negative for ear discharge, ear pain, sinus pressure and sore throat.    Respiratory: Negative for cough, chest tightness and shortness of breath.    Cardiovascular: Negative for chest pain, palpitations and leg swelling.   Gastrointestinal: Negative for diarrhea, nausea and vomiting.   Musculoskeletal: Negative for arthralgias, back pain and myalgias.   Neurological: Positive for tremors and weakness. Negative for dizziness, syncope and headaches.   Psychiatric/Behavioral: Negative for confusion and sleep disturbance.       Objective   /80   Pulse 55   Ht 165.1 cm (65\")   Wt 66.5 kg (146 lb 9.6 oz)   SpO2 95%   BMI 24.40 kg/m²   Physical Exam  Vitals signs and nursing note reviewed.   Constitutional:       Appearance: She is well-developed.   HENT:      Head: Normocephalic and atraumatic.      Right Ear: External ear normal.      Left Ear: External ear normal.      Mouth/Throat:      Pharynx: No oropharyngeal exudate.   Eyes:      Conjunctiva/sclera: Conjunctivae normal.      Pupils: Pupils are equal, round, and reactive to light.   Neck:      Musculoskeletal: Neck supple.      Thyroid: No thyromegaly.   Cardiovascular:      Rate and Rhythm: Normal rate and regular rhythm.   Pulmonary:      Effort: Pulmonary effort is normal.      Breath sounds: Normal breath sounds.   Abdominal:      General: Bowel sounds are normal. There is no distension.      Palpations: Abdomen is soft.      Tenderness: There is no abdominal tenderness.   Skin:     General: Skin is warm and dry.   Neurological:      Mental Status: She is alert and oriented to person, place, and time.      Cranial Nerves: No cranial nerve deficit.   Psychiatric:         Judgment: Judgment normal.           Results for orders placed or performed in visit on 07/16/20   Basic Metabolic Panel    Specimen: Blood   Result Value Ref Range    Glucose 118 (H) 65 - 99 mg/dL    BUN 14 8 - 23 mg/dL    Creatinine 1.05 (H) 0.57 - 1.00 mg/dL    Sodium 136 136 - 145 " mmol/L    Potassium 3.9 3.5 - 5.2 mmol/L    Chloride 98 98 - 107 mmol/L    CO2 26.3 22.0 - 29.0 mmol/L    Calcium 8.9 8.6 - 10.5 mg/dL    eGFR Non African Amer 50 (L) >60 mL/min/1.73    BUN/Creatinine Ratio 13.3 7.0 - 25.0    Anion Gap 11.7 5.0 - 15.0 mmol/L             Assessment/Plan   Diagnoses and all orders for this visit:    Need for vaccination  -     Fluad Quad 65+ yrs ([id number])    Uncontrolled hypertension  Comments:  better on  vasotec.check bmp.  Orders:  -     enalapril (VASOTEC) 20 MG tablet; Take 1 tablet by mouth Daily.  -     Basic Metabolic Panel; Future    Polymyalgia (CMS/HCC)  -     predniSONE (DELTASONE) 10 MG tablet; Take 1 tabs BID x 3 days then 1&1/2 tab daily x 3 days then 1 tab daily x 3 days then 1/2 tab daily x 3days then stop.  -     C-reactive Protein; Future  -     Rheumatoid Factor; Future  -     Sedimentation Rate; Future    Other orders  -     levothyroxine (SYNTHROID, LEVOTHROID) 112 MCG tablet; Take 1 tablet by mouth Daily.                 Return in about 3 months (around 12/16/2020) for Next scheduled follow up.

## 2020-09-17 LAB
ANION GAP SERPL CALCULATED.3IONS-SCNC: 9.8 MMOL/L (ref 5–15)
BUN SERPL-MCNC: 13 MG/DL (ref 8–23)
BUN/CREAT SERPL: 12.6 (ref 7–25)
CALCIUM SPEC-SCNC: 9.3 MG/DL (ref 8.6–10.5)
CHLORIDE SERPL-SCNC: 92 MMOL/L (ref 98–107)
CHROMATIN AB SERPL-ACNC: 13.8 IU/ML (ref 0–14)
CO2 SERPL-SCNC: 30.2 MMOL/L (ref 22–29)
CREAT SERPL-MCNC: 1.03 MG/DL (ref 0.57–1)
CRP SERPL-MCNC: 0.15 MG/DL (ref 0–0.5)
ERYTHROCYTE [SEDIMENTATION RATE] IN BLOOD: 17 MM/HR (ref 0–30)
GFR SERPL CREATININE-BSD FRML MDRD: 51 ML/MIN/1.73
GLUCOSE SERPL-MCNC: 86 MG/DL (ref 65–99)
POTASSIUM SERPL-SCNC: 4.7 MMOL/L (ref 3.5–5.2)
SODIUM SERPL-SCNC: 132 MMOL/L (ref 136–145)

## 2020-10-27 RX ORDER — PRIMIDONE 50 MG/1
TABLET ORAL
Qty: 120 TABLET | Refills: 3 | Status: SHIPPED | OUTPATIENT
Start: 2020-10-27 | End: 2020-11-05 | Stop reason: HOSPADM

## 2020-10-30 ENCOUNTER — HOSPITAL ENCOUNTER (INPATIENT)
Facility: HOSPITAL | Age: 80
LOS: 5 days | Discharge: NURSING, REHAB OR LONG-TERM CARE | End: 2020-11-05
Attending: EMERGENCY MEDICINE | Admitting: INTERNAL MEDICINE

## 2020-10-30 ENCOUNTER — OFFICE VISIT (OUTPATIENT)
Dept: INTERNAL MEDICINE | Facility: CLINIC | Age: 80
End: 2020-10-30

## 2020-10-30 VITALS
HEART RATE: 77 BPM | BODY MASS INDEX: 23.4 KG/M2 | OXYGEN SATURATION: 97 % | WEIGHT: 140.6 LBS | DIASTOLIC BLOOD PRESSURE: 90 MMHG | SYSTOLIC BLOOD PRESSURE: 170 MMHG

## 2020-10-30 DIAGNOSIS — I16.0 HYPERTENSIVE URGENCY: ICD-10-CM

## 2020-10-30 DIAGNOSIS — R26.0 ATAXIC GAIT: ICD-10-CM

## 2020-10-30 DIAGNOSIS — Z86.39 HISTORY OF HYPOTHYROIDISM: ICD-10-CM

## 2020-10-30 DIAGNOSIS — R41.841 COGNITIVE COMMUNICATION DEFICIT: ICD-10-CM

## 2020-10-30 DIAGNOSIS — R47.9 DIFFICULTY WITH SPEECH: ICD-10-CM

## 2020-10-30 DIAGNOSIS — R26.89 BALANCE PROBLEM: Primary | ICD-10-CM

## 2020-10-30 DIAGNOSIS — R53.1 GENERALIZED WEAKNESS: Primary | ICD-10-CM

## 2020-10-30 DIAGNOSIS — R41.3 MEMORY LOSS: ICD-10-CM

## 2020-10-30 DIAGNOSIS — R79.89 ELEVATED TSH: ICD-10-CM

## 2020-10-30 DIAGNOSIS — E87.6 HYPOKALEMIA: ICD-10-CM

## 2020-10-30 DIAGNOSIS — R29.6 MULTIPLE FALLS: ICD-10-CM

## 2020-10-30 DIAGNOSIS — Z86.73 HISTORY OF TIA (TRANSIENT ISCHEMIC ATTACK): ICD-10-CM

## 2020-10-30 LAB
ALBUMIN SERPL-MCNC: 4.4 G/DL (ref 3.5–5.2)
ALBUMIN/GLOB SERPL: 1.6 G/DL
ALP SERPL-CCNC: 64 U/L (ref 39–117)
ALT SERPL W P-5'-P-CCNC: 10 U/L (ref 1–33)
ANION GAP SERPL CALCULATED.3IONS-SCNC: 10 MMOL/L (ref 5–15)
AST SERPL-CCNC: 14 U/L (ref 1–32)
BASOPHILS # BLD AUTO: 0.02 10*3/MM3 (ref 0–0.2)
BASOPHILS NFR BLD AUTO: 0.3 % (ref 0–1.5)
BILIRUB SERPL-MCNC: 0.2 MG/DL (ref 0–1.2)
BILIRUB UR QL STRIP: NEGATIVE
BUN SERPL-MCNC: 19 MG/DL (ref 8–23)
BUN/CREAT SERPL: 21.6 (ref 7–25)
CALCIUM SPEC-SCNC: 9.9 MG/DL (ref 8.6–10.5)
CHLORIDE SERPL-SCNC: 94 MMOL/L (ref 98–107)
CLARITY UR: CLEAR
CO2 SERPL-SCNC: 30 MMOL/L (ref 22–29)
COLOR UR: YELLOW
CREAT SERPL-MCNC: 0.88 MG/DL (ref 0.57–1)
D-LACTATE SERPL-SCNC: 1.3 MMOL/L (ref 0.5–2)
DEPRECATED RDW RBC AUTO: 51.1 FL (ref 37–54)
EOSINOPHIL # BLD AUTO: 0.07 10*3/MM3 (ref 0–0.4)
EOSINOPHIL NFR BLD AUTO: 1 % (ref 0.3–6.2)
ERYTHROCYTE [DISTWIDTH] IN BLOOD BY AUTOMATED COUNT: 14.7 % (ref 12.3–15.4)
GFR SERPL CREATININE-BSD FRML MDRD: 62 ML/MIN/1.73
GLOBULIN UR ELPH-MCNC: 2.8 GM/DL
GLUCOSE SERPL-MCNC: 122 MG/DL (ref 65–99)
GLUCOSE UR STRIP-MCNC: NEGATIVE MG/DL
HCT VFR BLD AUTO: 43.7 % (ref 34–46.6)
HGB BLD-MCNC: 14.3 G/DL (ref 12–15.9)
HGB UR QL STRIP.AUTO: NEGATIVE
HOLD SPECIMEN: NORMAL
HOLD SPECIMEN: NORMAL
IMM GRANULOCYTES # BLD AUTO: 0.04 10*3/MM3 (ref 0–0.05)
IMM GRANULOCYTES NFR BLD AUTO: 0.6 % (ref 0–0.5)
KETONES UR QL STRIP: NEGATIVE
LEUKOCYTE ESTERASE UR QL STRIP.AUTO: NEGATIVE
LIPASE SERPL-CCNC: 32 U/L (ref 13–60)
LYMPHOCYTES # BLD AUTO: 1.06 10*3/MM3 (ref 0.7–3.1)
LYMPHOCYTES NFR BLD AUTO: 15.8 % (ref 19.6–45.3)
MCH RBC QN AUTO: 30.9 PG (ref 26.6–33)
MCHC RBC AUTO-ENTMCNC: 32.7 G/DL (ref 31.5–35.7)
MCV RBC AUTO: 94.4 FL (ref 79–97)
MONOCYTES # BLD AUTO: 0.6 10*3/MM3 (ref 0.1–0.9)
MONOCYTES NFR BLD AUTO: 9 % (ref 5–12)
NEUTROPHILS NFR BLD AUTO: 4.9 10*3/MM3 (ref 1.7–7)
NEUTROPHILS NFR BLD AUTO: 73.3 % (ref 42.7–76)
NITRITE UR QL STRIP: NEGATIVE
NRBC BLD AUTO-RTO: 0 /100 WBC (ref 0–0.2)
PH UR STRIP.AUTO: 7.5 [PH] (ref 5–8)
PLATELET # BLD AUTO: 257 10*3/MM3 (ref 140–450)
PMV BLD AUTO: 9.6 FL (ref 6–12)
POTASSIUM SERPL-SCNC: 3.2 MMOL/L (ref 3.5–5.2)
PROT SERPL-MCNC: 7.2 G/DL (ref 6–8.5)
PROT UR QL STRIP: NEGATIVE
RBC # BLD AUTO: 4.63 10*6/MM3 (ref 3.77–5.28)
SODIUM SERPL-SCNC: 134 MMOL/L (ref 136–145)
SP GR UR STRIP: <=1.005 (ref 1–1.03)
TSH SERPL DL<=0.05 MIU/L-ACNC: 18.02 UIU/ML (ref 0.27–4.2)
UROBILINOGEN UR QL STRIP: NORMAL
WBC # BLD AUTO: 6.69 10*3/MM3 (ref 3.4–10.8)
WHOLE BLOOD HOLD SPECIMEN: NORMAL
WHOLE BLOOD HOLD SPECIMEN: NORMAL

## 2020-10-30 PROCEDURE — 80053 COMPREHEN METABOLIC PANEL: CPT

## 2020-10-30 PROCEDURE — 84484 ASSAY OF TROPONIN QUANT: CPT | Performed by: PHYSICIAN ASSISTANT

## 2020-10-30 PROCEDURE — 83690 ASSAY OF LIPASE: CPT

## 2020-10-30 PROCEDURE — 84443 ASSAY THYROID STIM HORMONE: CPT | Performed by: PHYSICIAN ASSISTANT

## 2020-10-30 PROCEDURE — 83605 ASSAY OF LACTIC ACID: CPT

## 2020-10-30 PROCEDURE — 81003 URINALYSIS AUTO W/O SCOPE: CPT | Performed by: EMERGENCY MEDICINE

## 2020-10-30 PROCEDURE — 99285 EMERGENCY DEPT VISIT HI MDM: CPT

## 2020-10-30 PROCEDURE — 85025 COMPLETE CBC W/AUTO DIFF WBC: CPT

## 2020-10-30 PROCEDURE — 99213 OFFICE O/P EST LOW 20 MIN: CPT | Performed by: PHYSICIAN ASSISTANT

## 2020-10-30 RX ORDER — ENALAPRIL MALEATE 10 MG/1
20 TABLET ORAL ONCE
Status: COMPLETED | OUTPATIENT
Start: 2020-10-30 | End: 2020-10-30

## 2020-10-30 RX ORDER — POTASSIUM CHLORIDE 750 MG/1
40 CAPSULE, EXTENDED RELEASE ORAL ONCE
Status: COMPLETED | OUTPATIENT
Start: 2020-10-30 | End: 2020-10-30

## 2020-10-30 RX ORDER — SODIUM CHLORIDE 0.9 % (FLUSH) 0.9 %
10 SYRINGE (ML) INJECTION AS NEEDED
Status: DISCONTINUED | OUTPATIENT
Start: 2020-10-30 | End: 2020-11-05 | Stop reason: HOSPADM

## 2020-10-30 RX ADMIN — SODIUM CHLORIDE 1000 ML: 9 INJECTION, SOLUTION INTRAVENOUS at 23:09

## 2020-10-30 RX ADMIN — POTASSIUM CHLORIDE 40 MEQ: 10 CAPSULE, COATED, EXTENDED RELEASE ORAL at 23:12

## 2020-10-30 RX ADMIN — ENALAPRIL MALEATE 20 MG: 10 TABLET ORAL at 23:14

## 2020-10-30 NOTE — PROGRESS NOTES
Chief Complaint   Patient presents with   • Tremors in hands and jaw     Acute    • Dizziness       Subjective     Zohra Hall is a 79 y.o. female.        History of Present Illness     Pt has been seeing Neurology for about 2 years, since she was diagnosed with lacunar stroke. She has had tremors since that time and has been taking primidone for her tremors. Seemed to help initially, then tremors worsened. She saw her Neurologist in January and primidone dose was increased to 1 tablet in the morning and 3 at night. She did not noticed much improvement.    For the past week she has had feelings of worsening balance issues, feels like she runs into the left side of the wall more frequently. Increasing memory loss. No new weakness. Tremors are worse, more prominent in her face. Having trouble with word finding. Some symptoms similar to when her stroke was initially diagnosed.    This morning she fell against the door frame because she was so off balance.     No current facility-administered medications for this visit.     Current Outpatient Medications:   •  acetaminophen (TYLENOL) 650 MG 8 hr tablet, Take 1 tablet by mouth Every 8 (Eight) Hours As Needed for Mild Pain ., Disp: , Rfl:   •  alendronate (FOSAMAX) 70 MG tablet, TAKE 1 TABLET BY MOUTH  EVERY 7 DAYS, Disp: 12 tablet, Rfl: 3  •  amLODIPine (NORVASC) 5 MG tablet, TAKE 1 TABLET BY MOUTH  DAILY, Disp: 90 tablet, Rfl: 3  •  atenolol (TENORMIN) 50 MG tablet, TAKE 1 TABLET BY MOUTH  DAILY, Disp: 90 tablet, Rfl: 3  •  atorvastatin (LIPITOR) 40 MG tablet, TAKE 1 TABLET BY MOUTH  DAILY, Disp: 90 tablet, Rfl: 3  •  DULoxetine (Cymbalta) 60 MG capsule, Take 1 capsule by mouth Daily. For stress, Disp: 90 capsule, Rfl: 1  •  enalapril (VASOTEC) 20 MG tablet, Take 1 tablet by mouth Daily., Disp: 90 tablet, Rfl: 1  •  levothyroxine (SYNTHROID, LEVOTHROID) 112 MCG tablet, Take 1 tablet by mouth Daily., Disp: 90 tablet, Rfl: 1  •  metroNIDAZOLE (METROCREAM) 0.75 %  cream, Apply  topically 2 (Two) Times a Day., Disp: 60 g, Rfl: 2  •  predniSONE (DELTASONE) 10 MG tablet, Take 1 tabs BID x 3 days then 1&1/2 tab daily x 3 days then 1 tab daily x 3 days then 1/2 tab daily x 3days then stop., Disp: 15 tablet, Rfl: 0  •  primidone (MYSOLINE) 50 MG tablet, TAKE ONE TABLET BY MOUTH EVERY MORNING AND TAKE THREE TABLETS BY MOUTH AT NIGHT, Disp: 120 tablet, Rfl: 3    Facility-Administered Medications Ordered in Other Visits:   •  sodium chloride 0.9 % flush 10 mL, 10 mL, Intravenous, PRN, Marceol Kang MD     PMFSH  The following portions of the patient's history were reviewed and updated as appropriate: allergies, current medications, past family history, past medical history, past social history, past surgical history and problem list.    Review of Systems   Constitutional: Negative for appetite change, chills, fever and unexpected weight change.   HENT: Negative for ear discharge, sinus pressure and tinnitus.    Eyes: Negative for photophobia and pain.   Respiratory: Negative for chest tightness, shortness of breath and wheezing.    Cardiovascular: Negative for chest pain and palpitations.   Gastrointestinal: Negative for abdominal pain and blood in stool.   Endocrine: Negative for cold intolerance, heat intolerance, polydipsia and polyphagia.   Genitourinary: Negative.    Musculoskeletal: Positive for gait problem. Negative for joint swelling.   Skin: Negative for color change and wound.   Allergic/Immunologic: Negative.    Neurological: Positive for dizziness, tremors and weakness. Negative for syncope, facial asymmetry, speech difficulty and light-headedness.   Hematological: Negative for adenopathy.   Psychiatric/Behavioral: Positive for confusion. Negative for hallucinations.       Objective   /90   Pulse 77   Wt 63.8 kg (140 lb 9.6 oz)   SpO2 97%   BMI 23.40 kg/m²     Physical Exam  Vitals signs and nursing note reviewed.   Constitutional:       General: She is not  in acute distress.     Appearance: She is well-developed. She is not diaphoretic.   HENT:      Head: Normocephalic and atraumatic.   Eyes:      General: Lids are normal. No scleral icterus.     Conjunctiva/sclera: Conjunctivae normal.      Pupils: Pupils are equal, round, and reactive to light.   Cardiovascular:      Rate and Rhythm: Normal rate and regular rhythm.      Pulses: Normal pulses.      Heart sounds: Normal heart sounds. No murmur.   Pulmonary:      Effort: Pulmonary effort is normal. No respiratory distress.      Breath sounds: Normal breath sounds. No wheezing or rales.   Chest:      Chest wall: No tenderness.   Abdominal:      Palpations: Abdomen is soft.   Musculoskeletal: Normal range of motion.         General: No deformity.   Lymphadenopathy:      Cervical: No cervical adenopathy.   Neurological:      Mental Status: She is alert. She is disoriented.      Cranial Nerves: Cranial nerves are intact. No cranial nerve deficit or facial asymmetry.      Sensory: No sensory deficit.      Motor: No weakness, tremor, atrophy or abnormal muscle tone.      Coordination: Coordination normal.      Gait: Gait normal.      Deep Tendon Reflexes: Reflexes are normal and symmetric.   Psychiatric:         Behavior: Behavior normal.         Thought Content: Thought content normal.         Judgment: Judgment normal.              ASSESSMENT/PLAN    Diagnoses and all orders for this visit:    1. Balance problem (Primary)    2. Memory loss    3. Difficulty with speech      Due to recent onset of worsening balance, memory changes, word finding difficulties, increased tremor- needs to have eval to r/o new CVA before proceeding with further outpatient eval of symptoms.. Pt's grandson was contacted and will take her directly to ER since she is hemodynamically stable. She declines EMS transport and understands the risk of travelling by private car. She has pending f/u with Neurologist next week and will keep that if acute event  is ruled out.         Return if symptoms worsen or fail to improve, for Next scheduled follow up.

## 2020-10-31 ENCOUNTER — APPOINTMENT (OUTPATIENT)
Dept: CT IMAGING | Facility: HOSPITAL | Age: 80
End: 2020-10-31

## 2020-10-31 ENCOUNTER — APPOINTMENT (OUTPATIENT)
Dept: CARDIOLOGY | Facility: HOSPITAL | Age: 80
End: 2020-10-31

## 2020-10-31 ENCOUNTER — APPOINTMENT (OUTPATIENT)
Dept: MRI IMAGING | Facility: HOSPITAL | Age: 80
End: 2020-10-31

## 2020-10-31 PROBLEM — I10 ACCELERATED HYPERTENSION: Status: ACTIVE | Noted: 2020-10-31

## 2020-10-31 PROBLEM — M35.3 PMR (POLYMYALGIA RHEUMATICA) (HCC): Status: ACTIVE | Noted: 2020-10-31

## 2020-10-31 PROBLEM — M81.0 OSTEOPOROSIS: Status: ACTIVE | Noted: 2020-10-31

## 2020-10-31 PROBLEM — E87.6 HYPOKALEMIA: Status: ACTIVE | Noted: 2020-10-31

## 2020-10-31 PROBLEM — R27.0 ATAXIA: Status: ACTIVE | Noted: 2020-10-31

## 2020-10-31 LAB
B PARAPERT DNA SPEC QL NAA+PROBE: NOT DETECTED
B PERT DNA SPEC QL NAA+PROBE: NOT DETECTED
BH CV ECHO MEAS - AO MAX PG (FULL): 4.3 MMHG
BH CV ECHO MEAS - AO MAX PG: 6.7 MMHG
BH CV ECHO MEAS - AO MEAN PG (FULL): 2.4 MMHG
BH CV ECHO MEAS - AO MEAN PG: 3.3 MMHG
BH CV ECHO MEAS - AO ROOT AREA (BSA CORRECTED): 1.5
BH CV ECHO MEAS - AO ROOT AREA: 4.8 CM^2
BH CV ECHO MEAS - AO ROOT DIAM: 2.5 CM
BH CV ECHO MEAS - AO V2 MAX: 129 CM/SEC
BH CV ECHO MEAS - AO V2 MEAN: 83.9 CM/SEC
BH CV ECHO MEAS - AO V2 VTI: 29.3 CM
BH CV ECHO MEAS - AVA(I,A): 1.5 CM^2
BH CV ECHO MEAS - AVA(I,D): 1.5 CM^2
BH CV ECHO MEAS - AVA(V,A): 1.5 CM^2
BH CV ECHO MEAS - AVA(V,D): 1.5 CM^2
BH CV ECHO MEAS - BSA(HAYCOCK): 1.7 M^2
BH CV ECHO MEAS - BSA: 1.7 M^2
BH CV ECHO MEAS - BZI_BMI: 23.5 KILOGRAMS/M^2
BH CV ECHO MEAS - BZI_METRIC_HEIGHT: 165.1 CM
BH CV ECHO MEAS - BZI_METRIC_WEIGHT: 64 KG
BH CV ECHO MEAS - EDV(CUBED): 65.5 ML
BH CV ECHO MEAS - EDV(MOD-SP2): 58 ML
BH CV ECHO MEAS - EDV(MOD-SP4): 59 ML
BH CV ECHO MEAS - EDV(TEICH): 71.3 ML
BH CV ECHO MEAS - EF(CUBED): 53.1 %
BH CV ECHO MEAS - EF(MOD-BP): 55 %
BH CV ECHO MEAS - EF(MOD-SP2): 44.8 %
BH CV ECHO MEAS - EF(MOD-SP4): 52.5 %
BH CV ECHO MEAS - EF(TEICH): 45.5 %
BH CV ECHO MEAS - ESV(CUBED): 30.8 ML
BH CV ECHO MEAS - ESV(MOD-SP2): 32 ML
BH CV ECHO MEAS - ESV(MOD-SP4): 28 ML
BH CV ECHO MEAS - ESV(TEICH): 38.9 ML
BH CV ECHO MEAS - FS: 22.3 %
BH CV ECHO MEAS - IVS/LVPW: 0.86
BH CV ECHO MEAS - IVSD: 0.75 CM
BH CV ECHO MEAS - LAD MAJOR: 5.9 CM
BH CV ECHO MEAS - LAT PEAK E' VEL: 3.7 CM/SEC
BH CV ECHO MEAS - LATERAL E/E' RATIO: 11.6
BH CV ECHO MEAS - LV DIASTOLIC VOL/BSA (35-75): 34.6 ML/M^2
BH CV ECHO MEAS - LV MASS(C)D: 96.5 GRAMS
BH CV ECHO MEAS - LV MASS(C)DI: 56.6 GRAMS/M^2
BH CV ECHO MEAS - LV MAX PG: 2.4 MMHG
BH CV ECHO MEAS - LV MEAN PG: 0.93 MMHG
BH CV ECHO MEAS - LV SYSTOLIC VOL/BSA (12-30): 16.4 ML/M^2
BH CV ECHO MEAS - LV V1 MAX: 77 CM/SEC
BH CV ECHO MEAS - LV V1 MEAN: 43.4 CM/SEC
BH CV ECHO MEAS - LV V1 VTI: 16.8 CM
BH CV ECHO MEAS - LVIDD: 4 CM
BH CV ECHO MEAS - LVIDS: 3.1 CM
BH CV ECHO MEAS - LVLD AP2: 7.1 CM
BH CV ECHO MEAS - LVLD AP4: 7.1 CM
BH CV ECHO MEAS - LVLS AP2: 6.1 CM
BH CV ECHO MEAS - LVLS AP4: 6.1 CM
BH CV ECHO MEAS - LVOT AREA (M): 2.5 CM^2
BH CV ECHO MEAS - LVOT AREA: 2.6 CM^2
BH CV ECHO MEAS - LVOT DIAM: 1.8 CM
BH CV ECHO MEAS - LVPWD: 0.87 CM
BH CV ECHO MEAS - MED PEAK E' VEL: 4.3 CM/SEC
BH CV ECHO MEAS - MEDIAL E/E' RATIO: 10.1
BH CV ECHO MEAS - MV A MAX VEL: 84.9 CM/SEC
BH CV ECHO MEAS - MV DEC SLOPE: 114.4 CM/SEC^2
BH CV ECHO MEAS - MV DEC TIME: 0.28 SEC
BH CV ECHO MEAS - MV E MAX VEL: 44.9 CM/SEC
BH CV ECHO MEAS - MV E/A: 0.53
BH CV ECHO MEAS - MV P1/2T MAX VEL: 66.5 CM/SEC
BH CV ECHO MEAS - MV P1/2T: 170.3 MSEC
BH CV ECHO MEAS - MVA P1/2T LCG: 3.3 CM^2
BH CV ECHO MEAS - MVA(P1/2T): 1.3 CM^2
BH CV ECHO MEAS - SI(AO): 82.6 ML/M^2
BH CV ECHO MEAS - SI(CUBED): 20.4 ML/M^2
BH CV ECHO MEAS - SI(LVOT): 25.5 ML/M^2
BH CV ECHO MEAS - SI(MOD-SP2): 15.2 ML/M^2
BH CV ECHO MEAS - SI(MOD-SP4): 18.2 ML/M^2
BH CV ECHO MEAS - SI(TEICH): 19 ML/M^2
BH CV ECHO MEAS - SV(AO): 140.8 ML
BH CV ECHO MEAS - SV(CUBED): 34.8 ML
BH CV ECHO MEAS - SV(LVOT): 43.5 ML
BH CV ECHO MEAS - SV(MOD-SP2): 26 ML
BH CV ECHO MEAS - SV(MOD-SP4): 31 ML
BH CV ECHO MEAS - SV(TEICH): 32.4 ML
BH CV ECHO MEAS - TAPSE (>1.6): 1.9 CM
BH CV ECHO MEASUREMENTS AVERAGE E/E' RATIO: 11.23
BH CV XLRA - RV BASE: 2.6 CM
BH CV XLRA - RV LENGTH: 5.1 CM
BH CV XLRA - RV MID: 2.2 CM
C PNEUM DNA NPH QL NAA+NON-PROBE: NOT DETECTED
ERYTHROCYTE [SEDIMENTATION RATE] IN BLOOD: 24 MM/HR (ref 0–30)
FLUAV H1 2009 PAND RNA NPH QL NAA+PROBE: NOT DETECTED
FLUAV H1 HA GENE NPH QL NAA+PROBE: NOT DETECTED
FLUAV H3 RNA NPH QL NAA+PROBE: NOT DETECTED
FLUAV SUBTYP SPEC NAA+PROBE: NOT DETECTED
FLUBV RNA ISLT QL NAA+PROBE: NOT DETECTED
GLUCOSE BLDC GLUCOMTR-MCNC: 109 MG/DL (ref 70–130)
HADV DNA SPEC NAA+PROBE: NOT DETECTED
HCOV 229E RNA SPEC QL NAA+PROBE: NOT DETECTED
HCOV HKU1 RNA SPEC QL NAA+PROBE: NOT DETECTED
HCOV NL63 RNA SPEC QL NAA+PROBE: NOT DETECTED
HCOV OC43 RNA SPEC QL NAA+PROBE: NOT DETECTED
HMPV RNA NPH QL NAA+NON-PROBE: NOT DETECTED
HPIV1 RNA SPEC QL NAA+PROBE: NOT DETECTED
HPIV2 RNA SPEC QL NAA+PROBE: NOT DETECTED
HPIV3 RNA NPH QL NAA+PROBE: NOT DETECTED
HPIV4 P GENE NPH QL NAA+PROBE: NOT DETECTED
LEFT ATRIUM VOLUME INDEX: 42.8 ML/M^2
LEFT ATRIUM VOLUME: 73 ML
M PNEUMO IGG SER IA-ACNC: NOT DETECTED
MAGNESIUM SERPL-MCNC: 1.8 MG/DL (ref 1.6–2.4)
PHENOBARB SERPL-MCNC: 5.2 MCG/ML (ref 10–30)
POTASSIUM SERPL-SCNC: 4.3 MMOL/L (ref 3.5–5.2)
RHINOVIRUS RNA SPEC NAA+PROBE: NOT DETECTED
RSV RNA NPH QL NAA+NON-PROBE: NOT DETECTED
SARS-COV-2 RNA NPH QL NAA+NON-PROBE: NOT DETECTED
TROPONIN T SERPL-MCNC: <0.01 NG/ML (ref 0–0.03)
VIT B12 BLD-MCNC: 449 PG/ML (ref 211–946)

## 2020-10-31 PROCEDURE — 70450 CT HEAD/BRAIN W/O DYE: CPT

## 2020-10-31 PROCEDURE — 93306 TTE W/DOPPLER COMPLETE: CPT | Performed by: INTERNAL MEDICINE

## 2020-10-31 PROCEDURE — 99223 1ST HOSP IP/OBS HIGH 75: CPT | Performed by: INTERNAL MEDICINE

## 2020-10-31 PROCEDURE — 80188 ASSAY OF PRIMIDONE: CPT | Performed by: PSYCHIATRY & NEUROLOGY

## 2020-10-31 PROCEDURE — 93005 ELECTROCARDIOGRAM TRACING: CPT | Performed by: PHYSICIAN ASSISTANT

## 2020-10-31 PROCEDURE — 82962 GLUCOSE BLOOD TEST: CPT

## 2020-10-31 PROCEDURE — 93010 ELECTROCARDIOGRAM REPORT: CPT | Performed by: INTERNAL MEDICINE

## 2020-10-31 PROCEDURE — 93306 TTE W/DOPPLER COMPLETE: CPT

## 2020-10-31 PROCEDURE — 85652 RBC SED RATE AUTOMATED: CPT | Performed by: INTERNAL MEDICINE

## 2020-10-31 PROCEDURE — 82607 VITAMIN B-12: CPT | Performed by: INTERNAL MEDICINE

## 2020-10-31 PROCEDURE — 84132 ASSAY OF SERUM POTASSIUM: CPT | Performed by: FAMILY MEDICINE

## 2020-10-31 PROCEDURE — 80184 ASSAY OF PHENOBARBITAL: CPT | Performed by: PSYCHIATRY & NEUROLOGY

## 2020-10-31 PROCEDURE — 83735 ASSAY OF MAGNESIUM: CPT | Performed by: PSYCHIATRY & NEUROLOGY

## 2020-10-31 PROCEDURE — 0202U NFCT DS 22 TRGT SARS-COV-2: CPT | Performed by: INTERNAL MEDICINE

## 2020-10-31 PROCEDURE — 25010000002 HEPARIN (PORCINE) PER 1000 UNITS: Performed by: INTERNAL MEDICINE

## 2020-10-31 PROCEDURE — 25010000002 HYDRALAZINE PER 20 MG: Performed by: PHYSICIAN ASSISTANT

## 2020-10-31 PROCEDURE — 63710000001 PREDNISONE PER 1 MG: Performed by: INTERNAL MEDICINE

## 2020-10-31 PROCEDURE — 99222 1ST HOSP IP/OBS MODERATE 55: CPT | Performed by: PSYCHIATRY & NEUROLOGY

## 2020-10-31 PROCEDURE — 70551 MRI BRAIN STEM W/O DYE: CPT

## 2020-10-31 RX ORDER — PRIMIDONE 250 MG/1
125 TABLET ORAL NIGHTLY
Status: DISCONTINUED | OUTPATIENT
Start: 2020-10-31 | End: 2020-11-05 | Stop reason: HOSPADM

## 2020-10-31 RX ORDER — LEVOTHYROXINE SODIUM 0.12 MG/1
125 TABLET ORAL
Status: DISCONTINUED | OUTPATIENT
Start: 2020-10-31 | End: 2020-11-05 | Stop reason: HOSPADM

## 2020-10-31 RX ORDER — ATENOLOL 50 MG/1
50 TABLET ORAL DAILY
Status: DISCONTINUED | OUTPATIENT
Start: 2020-10-31 | End: 2020-11-05 | Stop reason: HOSPADM

## 2020-10-31 RX ORDER — HEPARIN SODIUM 5000 [USP'U]/ML
5000 INJECTION, SOLUTION INTRAVENOUS; SUBCUTANEOUS EVERY 12 HOURS SCHEDULED
Status: DISCONTINUED | OUTPATIENT
Start: 2020-10-31 | End: 2020-11-05 | Stop reason: HOSPADM

## 2020-10-31 RX ORDER — HYDRALAZINE HYDROCHLORIDE 20 MG/ML
10 INJECTION INTRAMUSCULAR; INTRAVENOUS ONCE
Status: DISCONTINUED | OUTPATIENT
Start: 2020-10-31 | End: 2020-10-31

## 2020-10-31 RX ORDER — ACETAMINOPHEN 325 MG/1
650 TABLET ORAL EVERY 6 HOURS PRN
Status: DISCONTINUED | OUTPATIENT
Start: 2020-10-31 | End: 2020-11-05 | Stop reason: HOSPADM

## 2020-10-31 RX ORDER — AMLODIPINE BESYLATE 5 MG/1
5 TABLET ORAL DAILY
Status: DISCONTINUED | OUTPATIENT
Start: 2020-10-31 | End: 2020-11-01

## 2020-10-31 RX ORDER — POTASSIUM CHLORIDE 750 MG/1
40 CAPSULE, EXTENDED RELEASE ORAL AS NEEDED
Status: DISCONTINUED | OUTPATIENT
Start: 2020-10-31 | End: 2020-11-05 | Stop reason: HOSPADM

## 2020-10-31 RX ORDER — POTASSIUM CHLORIDE 1.5 G/1.77G
40 POWDER, FOR SOLUTION ORAL AS NEEDED
Status: DISCONTINUED | OUTPATIENT
Start: 2020-10-31 | End: 2020-11-05 | Stop reason: HOSPADM

## 2020-10-31 RX ORDER — HYDRALAZINE HYDROCHLORIDE 20 MG/ML
10 INJECTION INTRAMUSCULAR; INTRAVENOUS ONCE
Status: COMPLETED | OUTPATIENT
Start: 2020-10-31 | End: 2020-10-31

## 2020-10-31 RX ORDER — LIOTHYRONINE SODIUM 25 UG/1
25 TABLET ORAL DAILY
Status: COMPLETED | OUTPATIENT
Start: 2020-10-31 | End: 2020-11-03

## 2020-10-31 RX ORDER — DULOXETIN HYDROCHLORIDE 30 MG/1
30 CAPSULE, DELAYED RELEASE ORAL DAILY
Status: DISCONTINUED | OUTPATIENT
Start: 2020-10-31 | End: 2020-11-05 | Stop reason: HOSPADM

## 2020-10-31 RX ORDER — ENALAPRIL MALEATE 10 MG/1
20 TABLET ORAL DAILY
Status: DISCONTINUED | OUTPATIENT
Start: 2020-10-31 | End: 2020-11-05 | Stop reason: HOSPADM

## 2020-10-31 RX ORDER — PRIMIDONE 50 MG/1
50 TABLET ORAL
Status: DISCONTINUED | OUTPATIENT
Start: 2020-10-31 | End: 2020-11-05 | Stop reason: HOSPADM

## 2020-10-31 RX ORDER — PREDNISONE 20 MG/1
20 TABLET ORAL
Status: DISCONTINUED | OUTPATIENT
Start: 2020-10-31 | End: 2020-11-01

## 2020-10-31 RX ORDER — FAMOTIDINE 20 MG/1
20 TABLET, FILM COATED ORAL
Status: DISCONTINUED | OUTPATIENT
Start: 2020-10-31 | End: 2020-11-05 | Stop reason: HOSPADM

## 2020-10-31 RX ORDER — POTASSIUM CHLORIDE 7.45 MG/ML
10 INJECTION INTRAVENOUS
Status: DISCONTINUED | OUTPATIENT
Start: 2020-10-31 | End: 2020-11-05 | Stop reason: HOSPADM

## 2020-10-31 RX ORDER — LABETALOL HYDROCHLORIDE 5 MG/ML
10 INJECTION, SOLUTION INTRAVENOUS EVERY 4 HOURS PRN
Status: DISCONTINUED | OUTPATIENT
Start: 2020-10-31 | End: 2020-11-05 | Stop reason: HOSPADM

## 2020-10-31 RX ORDER — ATORVASTATIN CALCIUM 40 MG/1
40 TABLET, FILM COATED ORAL NIGHTLY
Status: DISCONTINUED | OUTPATIENT
Start: 2020-10-31 | End: 2020-11-05 | Stop reason: HOSPADM

## 2020-10-31 RX ADMIN — PREDNISONE 20 MG: 20 TABLET ORAL at 09:44

## 2020-10-31 RX ADMIN — ACETAMINOPHEN 650 MG: 325 TABLET, FILM COATED ORAL at 11:06

## 2020-10-31 RX ADMIN — DULOXETINE HYDROCHLORIDE 30 MG: 30 CAPSULE, DELAYED RELEASE ORAL at 09:43

## 2020-10-31 RX ADMIN — AMLODIPINE BESYLATE 5 MG: 5 TABLET ORAL at 09:43

## 2020-10-31 RX ADMIN — HEPARIN SODIUM 5000 UNITS: 5000 INJECTION INTRAVENOUS; SUBCUTANEOUS at 02:25

## 2020-10-31 RX ADMIN — LEVOTHYROXINE SODIUM 125 MCG: 125 TABLET ORAL at 05:16

## 2020-10-31 RX ADMIN — SODIUM CHLORIDE, PRESERVATIVE FREE 10 ML: 5 INJECTION INTRAVENOUS at 09:42

## 2020-10-31 RX ADMIN — ACETAMINOPHEN 650 MG: 325 TABLET, FILM COATED ORAL at 05:16

## 2020-10-31 RX ADMIN — PRIMIDONE 125 MG: 250 TABLET ORAL at 20:04

## 2020-10-31 RX ADMIN — POTASSIUM CHLORIDE 40 MEQ: 10 CAPSULE, COATED, EXTENDED RELEASE ORAL at 05:16

## 2020-10-31 RX ADMIN — ACETAMINOPHEN 650 MG: 325 TABLET, FILM COATED ORAL at 16:27

## 2020-10-31 RX ADMIN — LABETALOL 20 MG/4 ML (5 MG/ML) INTRAVENOUS SYRINGE 10 MG: at 05:15

## 2020-10-31 RX ADMIN — PRIMIDONE 50 MG: 50 TABLET ORAL at 06:19

## 2020-10-31 RX ADMIN — HEPARIN SODIUM 5000 UNITS: 5000 INJECTION INTRAVENOUS; SUBCUTANEOUS at 20:05

## 2020-10-31 RX ADMIN — FAMOTIDINE 20 MG: 20 TABLET, FILM COATED ORAL at 16:28

## 2020-10-31 RX ADMIN — SODIUM CHLORIDE, PRESERVATIVE FREE 10 ML: 5 INJECTION INTRAVENOUS at 20:06

## 2020-10-31 RX ADMIN — POTASSIUM CHLORIDE 40 MEQ: 10 CAPSULE, COATED, EXTENDED RELEASE ORAL at 09:57

## 2020-10-31 RX ADMIN — ATORVASTATIN CALCIUM 40 MG: 40 TABLET, FILM COATED ORAL at 20:05

## 2020-10-31 RX ADMIN — ENALAPRIL MALEATE 20 MG: 10 TABLET ORAL at 09:54

## 2020-10-31 RX ADMIN — ATENOLOL 50 MG: 50 TABLET ORAL at 09:43

## 2020-10-31 RX ADMIN — FAMOTIDINE 20 MG: 20 TABLET, FILM COATED ORAL at 09:43

## 2020-10-31 RX ADMIN — FAMOTIDINE 20 MG: 20 TABLET, FILM COATED ORAL at 17:27

## 2020-10-31 RX ADMIN — LIOTHYRONINE SODIUM 25 MCG: 25 TABLET ORAL at 09:54

## 2020-10-31 RX ADMIN — LABETALOL 20 MG/4 ML (5 MG/ML) INTRAVENOUS SYRINGE 5 MG: at 10:54

## 2020-10-31 RX ADMIN — HYDRALAZINE HYDROCHLORIDE 10 MG: 20 INJECTION, SOLUTION INTRAMUSCULAR; INTRAVENOUS at 04:01

## 2020-11-01 LAB
ANION GAP SERPL CALCULATED.3IONS-SCNC: 12 MMOL/L (ref 5–15)
BACTERIA UR QL AUTO: ABNORMAL /HPF
BILIRUB UR QL STRIP: NEGATIVE
BUN SERPL-MCNC: 24 MG/DL (ref 8–23)
BUN/CREAT SERPL: 29.3 (ref 7–25)
CALCIUM SPEC-SCNC: 9.7 MG/DL (ref 8.6–10.5)
CHLORIDE SERPL-SCNC: 91 MMOL/L (ref 98–107)
CLARITY UR: CLEAR
CO2 SERPL-SCNC: 26 MMOL/L (ref 22–29)
COLOR UR: YELLOW
CREAT SERPL-MCNC: 0.82 MG/DL (ref 0.57–1)
DEPRECATED RDW RBC AUTO: 50.5 FL (ref 37–54)
ERYTHROCYTE [DISTWIDTH] IN BLOOD BY AUTOMATED COUNT: 14.6 % (ref 12.3–15.4)
GFR SERPL CREATININE-BSD FRML MDRD: 67 ML/MIN/1.73
GLUCOSE SERPL-MCNC: 126 MG/DL (ref 65–99)
GLUCOSE UR STRIP-MCNC: NEGATIVE MG/DL
HCT VFR BLD AUTO: 47.3 % (ref 34–46.6)
HGB BLD-MCNC: 15.2 G/DL (ref 12–15.9)
HGB UR QL STRIP.AUTO: NEGATIVE
HYALINE CASTS UR QL AUTO: ABNORMAL /LPF
KETONES UR QL STRIP: ABNORMAL
LEUKOCYTE ESTERASE UR QL STRIP.AUTO: NEGATIVE
MCH RBC QN AUTO: 29.9 PG (ref 26.6–33)
MCHC RBC AUTO-ENTMCNC: 32.1 G/DL (ref 31.5–35.7)
MCV RBC AUTO: 93.1 FL (ref 79–97)
NITRITE UR QL STRIP: NEGATIVE
OSMOLALITY SERPL: 282 MOSM/KG (ref 275–295)
OSMOLALITY UR: 700 MOSM/KG (ref 300–1100)
PH UR STRIP.AUTO: 5.5 [PH] (ref 5–8)
PLATELET # BLD AUTO: 292 10*3/MM3 (ref 140–450)
PMV BLD AUTO: 9.7 FL (ref 6–12)
POTASSIUM SERPL-SCNC: 4.2 MMOL/L (ref 3.5–5.2)
PROT UR QL STRIP: ABNORMAL
RBC # BLD AUTO: 5.08 10*6/MM3 (ref 3.77–5.28)
RBC # UR: ABNORMAL /HPF
REF LAB TEST METHOD: ABNORMAL
SODIUM SERPL-SCNC: 129 MMOL/L (ref 136–145)
SODIUM UR-SCNC: 34 MMOL/L
SP GR UR STRIP: 1.03 (ref 1–1.03)
SQUAMOUS #/AREA URNS HPF: ABNORMAL /HPF
UROBILINOGEN UR QL STRIP: ABNORMAL
WBC # BLD AUTO: 8.88 10*3/MM3 (ref 3.4–10.8)
WBC UR QL AUTO: ABNORMAL /HPF

## 2020-11-01 PROCEDURE — 85027 COMPLETE CBC AUTOMATED: CPT | Performed by: FAMILY MEDICINE

## 2020-11-01 PROCEDURE — 92523 SPEECH SOUND LANG COMPREHEN: CPT

## 2020-11-01 PROCEDURE — 25010000002 HEPARIN (PORCINE) PER 1000 UNITS: Performed by: INTERNAL MEDICINE

## 2020-11-01 PROCEDURE — 80048 BASIC METABOLIC PNL TOTAL CA: CPT | Performed by: FAMILY MEDICINE

## 2020-11-01 PROCEDURE — 81001 URINALYSIS AUTO W/SCOPE: CPT | Performed by: FAMILY MEDICINE

## 2020-11-01 PROCEDURE — 84300 ASSAY OF URINE SODIUM: CPT | Performed by: FAMILY MEDICINE

## 2020-11-01 PROCEDURE — 97530 THERAPEUTIC ACTIVITIES: CPT

## 2020-11-01 PROCEDURE — 63710000001 PREDNISONE PER 1 MG: Performed by: INTERNAL MEDICINE

## 2020-11-01 PROCEDURE — 83935 ASSAY OF URINE OSMOLALITY: CPT | Performed by: FAMILY MEDICINE

## 2020-11-01 PROCEDURE — 83930 ASSAY OF BLOOD OSMOLALITY: CPT | Performed by: FAMILY MEDICINE

## 2020-11-01 PROCEDURE — 99232 SBSQ HOSP IP/OBS MODERATE 35: CPT | Performed by: FAMILY MEDICINE

## 2020-11-01 PROCEDURE — 97166 OT EVAL MOD COMPLEX 45 MIN: CPT

## 2020-11-01 PROCEDURE — 97162 PT EVAL MOD COMPLEX 30 MIN: CPT

## 2020-11-01 RX ORDER — TERAZOSIN 1 MG/1
1 CAPSULE ORAL NIGHTLY
Status: DISCONTINUED | OUTPATIENT
Start: 2020-11-01 | End: 2020-11-05 | Stop reason: HOSPADM

## 2020-11-01 RX ORDER — AMLODIPINE BESYLATE 10 MG/1
10 TABLET ORAL DAILY
Status: DISCONTINUED | OUTPATIENT
Start: 2020-11-02 | End: 2020-11-05 | Stop reason: HOSPADM

## 2020-11-01 RX ORDER — SODIUM CHLORIDE 9 MG/ML
50 INJECTION, SOLUTION INTRAVENOUS CONTINUOUS
Status: ACTIVE | OUTPATIENT
Start: 2020-11-01 | End: 2020-11-01

## 2020-11-01 RX ORDER — AMLODIPINE BESYLATE 5 MG/1
5 TABLET ORAL ONCE
Status: COMPLETED | OUTPATIENT
Start: 2020-11-01 | End: 2020-11-01

## 2020-11-01 RX ORDER — PREDNISONE 10 MG/1
10 TABLET ORAL
Status: COMPLETED | OUTPATIENT
Start: 2020-11-02 | End: 2020-11-02

## 2020-11-01 RX ADMIN — FAMOTIDINE 20 MG: 20 TABLET, FILM COATED ORAL at 08:06

## 2020-11-01 RX ADMIN — TERAZOSIN HYDROCHLORIDE 1 MG: 1 CAPSULE ORAL at 20:38

## 2020-11-01 RX ADMIN — SODIUM CHLORIDE 50 ML/HR: 9 INJECTION, SOLUTION INTRAVENOUS at 13:14

## 2020-11-01 RX ADMIN — PREDNISONE 20 MG: 20 TABLET ORAL at 08:06

## 2020-11-01 RX ADMIN — DULOXETINE HYDROCHLORIDE 30 MG: 30 CAPSULE, DELAYED RELEASE ORAL at 08:06

## 2020-11-01 RX ADMIN — SODIUM CHLORIDE, PRESERVATIVE FREE 10 ML: 5 INJECTION INTRAVENOUS at 08:06

## 2020-11-01 RX ADMIN — ENALAPRIL MALEATE 20 MG: 10 TABLET ORAL at 08:08

## 2020-11-01 RX ADMIN — HEPARIN SODIUM 5000 UNITS: 5000 INJECTION INTRAVENOUS; SUBCUTANEOUS at 20:38

## 2020-11-01 RX ADMIN — HEPARIN SODIUM 5000 UNITS: 5000 INJECTION INTRAVENOUS; SUBCUTANEOUS at 08:07

## 2020-11-01 RX ADMIN — AMLODIPINE BESYLATE 5 MG: 5 TABLET ORAL at 10:59

## 2020-11-01 RX ADMIN — LEVOTHYROXINE SODIUM 125 MCG: 125 TABLET ORAL at 05:37

## 2020-11-01 RX ADMIN — LIOTHYRONINE SODIUM 25 MCG: 25 TABLET ORAL at 08:09

## 2020-11-01 RX ADMIN — PRIMIDONE 125 MG: 250 TABLET ORAL at 20:38

## 2020-11-01 RX ADMIN — ATORVASTATIN CALCIUM 40 MG: 40 TABLET, FILM COATED ORAL at 20:39

## 2020-11-01 RX ADMIN — ATENOLOL 50 MG: 50 TABLET ORAL at 08:06

## 2020-11-01 RX ADMIN — AMLODIPINE BESYLATE 5 MG: 5 TABLET ORAL at 08:06

## 2020-11-01 RX ADMIN — PRIMIDONE 50 MG: 50 TABLET ORAL at 05:37

## 2020-11-01 RX ADMIN — FAMOTIDINE 20 MG: 20 TABLET, FILM COATED ORAL at 17:05

## 2020-11-01 NOTE — THERAPY EVALUATION
"Patient Name: Zohra Hall  : 1940    MRN: [id number]                              Today's Date: 2020       Admit Date: 10/30/2020    Visit Dx:     ICD-10-CM ICD-9-CM   1. Generalized weakness  R53.1 780.79   2. Ataxic gait  R26.0 781.2   3. Multiple falls  R29.6 V15.88   4. History of TIA (transient ischemic attack)  Z86.73 V12.54   5. Hypertensive urgency  I16.0 401.9   6. Hypokalemia  E87.6 276.8   7. Elevated TSH  R79.89 794.5   8. History of hypothyroidism  Z86.39 V12.29     Patient Active Problem List   Diagnosis   • Generalized osteoarthritis   • Iron deficiency   • Vitamin D deficiency   • Skin neoplasm   • Sialadenitis   • Restless leg syndrome   • Reaction to chronic stress   • Piriformis syndrome   • Papillary adenocarcinoma of thyroid (CMS/HCC)   • Hypothyroidism (acquired)   • Hypertension   • GERD without esophagitis   • Dyslipidemia   • Cervical lymphadenopathy   • Cellulitis   • Atherosclerosis of aorta (CMS/HCC)   • Breast mass   • Aortic valve stenosis   • Incontinence of bowel   • Chronic diastolic heart failure (CMS/HCC)   • Acute right-sided low back pain without sciatica   • Benign essential tremor   • Pain of right hip joint   • Thyroid cancer (CMS/HCC)   • Lacunar stroke (CMS/HCC)   • Nontraumatic rupture of extensor tendons of right hand and wrist   • Hyponatremia   • Actinic keratosis   • Transient ischemic attack   • Ataxia   • Hypokalemia   • Accelerated hypertension   • PMR (polymyalgia rheumatica) (CMS/HCC)   • Osteoporosis     Past Medical History:   Diagnosis Date   • Breast cancer (CMS/HCC)     left- pt states \"in situ\", no radiation, Tamoxifen for 5 years   • Cellulitis    • Cervical lymphadenopathy    • Chest pain    • Convulsions (CMS/HCC)    • GERD (gastroesophageal reflux disease)    • Glossitis    • Grief reaction     · Last Impression: 2015  counselled, given ativan for prn use.  Aleah Regan   • Hypertension    • Lower back pain    • Oral " thrush    • Papillary adenocarcinoma (CMS/HCC)     Papillary adenocarcinoma of thyroid   • Papillary adenocarcinoma of thyroid (CMS/HCC)     · resection Ramirez- 1/13,  2 x 2 x 1.5 cm  T3 N1 MXpost op low calcium and diminished  voiceablation Kaylenn- 3/7 metastatic nodes and invasion to strap muscles · Last Impression: 29 Oct 2014  s/p Eval by berry Méndez.  Aleah Regan (Internal   • Piriformis syndrome    • Tingling    • Xerostomia      Past Surgical History:   Procedure Laterality Date   • BREAST BIOPSY Right 10/10/2013    stereo bx   • BREAST BIOPSY Left 10/19/2015    stereo bx   • BREAST CYST EXCISION      right   • BREAST EXCISIONAL BIOPSY Right     affirm bx and loc 2016.   • BREAST LUMPECTOMY Left 1987   • HYSTERECTOMY  1979   • TOTAL HIP ARTHROPLASTY     • TOTAL THYROIDECTOMY      Thyroid Surgery Total Thyroidectomy     General Information     Row Name 11/01/20 0924          Physical Therapy Time and Intention    Document Type  evaluation  -NS     Mode of Treatment  individual therapy;physical therapy  -NS     Row Name 11/01/20 0924          General Information    Patient Profile Reviewed  yes  -NS     Prior Level of Function  independent:;all household mobility;community mobility;gait;transfer;bed mobility;ADL's  -NS     Existing Precautions/Restrictions  fall  -NS     Barriers to Rehab  medically complex  -NS     Row Name 11/01/20 0924          Living Environment    Lives With  alone  -NS     Row Name 11/01/20 0924          Home Main Entrance    Number of Stairs, Main Entrance  two  -NS     Stair Railings, Main Entrance  railing on right side (ascending)  -NS     Row Name 11/01/20 0924          Stairs Within Home, Primary    Number of Stairs, Within Home, Primary  none  -NS     Row Name 11/01/20 0924          Cognition    Orientation Status (Cognition)  oriented to;person;situation;time;disoriented to;place  -NS     Row Name 11/01/20 0924          Safety Issues, Functional Mobility    Safety Issues  Affecting Function (Mobility)  insight into deficits/self-awareness;safety precaution awareness;safety precautions follow-through/compliance;sequencing abilities  -NS     Impairments Affecting Function (Mobility)  balance;coordination;cognition;endurance/activity tolerance;postural/trunk control;strength;motor control  -NS     Cognitive Impairments, Mobility Safety/Performance  insight into deficits/self-awareness;sequencing abilities  -NS       User Key  (r) = Recorded By, (t) = Taken By, (c) = Cosigned By    Initials Name Provider Type    Lalita Finney, PT Physical Therapist        Mobility     Row Name 11/01/20 0924          Bed Mobility    Comment (Bed Mobility)  Not assessed; pt UIC at start and end of eval.  -NS     Row Name 11/01/20 0924          Transfers    Comment (Transfers)  VCs for hand placement and for keeping RW with her until fully turned and ready to sit.  -NS     Row Name 11/01/20 0924          Sit-Stand Transfer    Sit-Stand [place] (Transfers)  contact guard;verbal cues  -NS     Assistive Device (Sit-Stand Transfers)  walker, front-wheeled  -NS     Row Name 11/01/20 0924          Gait/Stairs (Locomotion)    [place] Level (Gait)  contact guard;verbal cues  -NS     Assistive Device (Gait)  walker, front-wheeled  -NS     Distance in Feet (Gait)  76  -NS     Deviations/Abnormal Patterns (Gait)  base of support, narrow;radha decreased;gait speed decreased;stride length decreased  -NS     Bilateral Gait Deviations  heel strike decreased;forward flexed posture  -NS     Comment (Gait/Stairs)  Patient ambulated at slow gait speed with very narrow MAGNUS, demonstrating almost tandem walking throughout gait assessment. She required cues for upright posture and safety during turns. Distance limited by BLE weakness. Pt noted significantly increased fatigue and leg weakness following.  -NS       User Key  (r) = Recorded By, (t) = Taken By, (c) = Cosigned By    Initials Name Provider Type    NS  Lalita Santiago, PT Physical Therapist        Obj/Interventions     Row Name 11/01/20 0924          Range of Motion Comprehensive    General Range of Motion  bilateral lower extremity ROM WFL  -NS     Comment, General Range of Motion  Actively WFL  -NS     Row Name 11/01/20 0924          Strength Comprehensive (MMT)    General Manual Muscle Testing (MMT) Assessment  lower extremity strength deficits identified  -NS     Comment, General Manual Muscle Testing (MMT) Assessment  BLEs: grossly 4-/5  -NS     Row Name 11/01/20 0924          Balance    Balance Assessment  sitting static balance;sitting dynamic balance;standing static balance;standing dynamic balance  -NS     Static Sitting Balance  WFL;unsupported;sitting in chair  -NS     Dynamic Sitting Balance  mild impairment;unsupported;sitting in chair  -NS     Static Standing Balance  mild impairment;supported;standing  -NS     Dynamic Standing Balance  mild impairment;supported;standing  -NS     Row Name 11/01/20 0924          Sensory Assessment (Somatosensory)    Sensory Assessment (Somatosensory)  LE sensation intact Pt notes slightly decreased light touch sensation on L great toe.  -NS       User Key  (r) = Recorded By, (t) = Taken By, (c) = Cosigned By    Initials Name Provider Type    NS Lalita Santiago, PT Physical Therapist        Goals/Plan     Row Name 11/01/20 0924          Bed Mobility Goal 1 (PT)    Activity/Assistive Device (Bed Mobility Goal 1, PT)  sit to supine/supine to sit  -NS     [place] Level/Cues Needed (Bed Mobility Goal 1, PT)  independent  -NS     Time Frame (Bed Mobility Goal 1, PT)  long term goal (LTG);2 weeks  -NS     Row Name 11/01/20 0924          Transfer Goal 1 (PT)    Activity/Assistive Device (Transfer Goal 1, PT)  sit-to-stand/stand-to-sit;bed-to-chair/chair-to-bed;walker, rolling  -NS     [place] Level/Cues Needed (Transfer Goal 1, PT)  supervision required  -NS     Time Frame (Transfer Goal 1, PT)  long term goal (LTG);2  weeks  -NS     Row Name 11/01/20 0924          Gait Training Goal 1 (PT)    Activity/Assistive Device (Gait Training Goal 1, PT)  gait (walking locomotion);assistive device use;walker, rolling  -NS     [place] Level (Gait Training Goal 1, PT)  supervision required  -NS     Distance (Gait Training Goal 1, PT)  200  -NS     Time Frame (Gait Training Goal 1, PT)  long term goal (LTG);2 weeks  -NS       User Key  (r) = Recorded By, (t) = Taken By, (c) = Cosigned By    Initials Name Provider Type    Lalita Finney, PT Physical Therapist        Clinical Impression     Row Name 11/01/20 0924          Pain Scale: Numbers Pre/Post-Treatment    Pretreatment Pain Rating  0/10 - no pain  -NS     Posttreatment Pain Rating  0/10 - no pain  -NS     Row Name 11/01/20 0924          Plan of Care Review    Plan of Care Reviewed With  patient  -NS     Progress  no change PT eval  -NS     Outcome Summary  Patient presents with generalized weakness and decreased activity tolerance affecting functional mobility. She transferred STS with CGA and ambulated 76ft with RW and CGA, demonstrating very narrow MAGNUS and flexed posture. Distance limited by BLE weakness, pt with increased fatigue following. Skilled IP PT warranted at this time. Recommend IRF at discharge as patient lives alone.  -NS     Row Name 11/01/20 0924          Therapy Assessment/Plan (PT)    Patient/Family Therapy Goals Statement (PT)  To get stronger  -NS     Rehab Potential (PT)  good, to achieve stated therapy goals  -NS     Criteria for Skilled Interventions Met (PT)  yes;meets criteria;skilled treatment is necessary  -NS     Predicted Duration of Therapy Intervention (PT)  2 weeks  -NS     Row Name 11/01/20 0924          Vital Signs    Pre Systolic BP Rehab  186  -NS     Pre Treatment Diastolic BP  102  -NS     Post Systolic BP Rehab  187  -NS     Post Treatment Diastolic BP  89  -NS     Pretreatment Heart Rate (beats/min)  75  -NS     Posttreatment Heart Rate  (beats/min)  80  -NS     Pre SpO2 (%)  95  -NS     O2 Delivery Pre Treatment  room air  -NS     Post SpO2 (%)  98  -NS     O2 Delivery Post Treatment  room air  -NS     Pre Patient Position  Sitting  -NS     Intra Patient Position  Standing  -NS     Post Patient Position  Sitting  -NS     Row Name 11/01/20 0924          Positioning and Restraints    Pre-Treatment Position  sitting in chair/recliner  -NS     Post Treatment Position  chair  -NS     In Chair  notified nsg;reclined;call light within reach;encouraged to call for assist;exit alarm on;legs elevated  -NS       User Key  (r) = Recorded By, (t) = Taken By, (c) = Cosigned By    Initials Name Provider Type    Lalita Finney PT Physical Therapist        Outcome Measures     Row Name 11/01/20 0924          How much help from another person do you currently need...    Turning from your back to your side while in flat bed without using bedrails?  4  -NS     Moving from lying on back to sitting on the side of a flat bed without bedrails?  3  -NS     Moving to and from a bed to a chair (including a wheelchair)?  3  -NS     Standing up from a chair using your arms (e.g., wheelchair, bedside chair)?  3  -NS     Climbing 3-5 steps with a railing?  2  -NS     To walk in hospital room?  3  -NS     AM-PAC 6 Clicks Score (PT)  18  -NS     Row Name 11/01/20 0924          Functional Assessment    Outcome Measure Options  AM-PAC 6 Clicks Basic Mobility (PT)  -NS       User Key  (r) = Recorded By, (t) = Taken By, (c) = Cosigned By    Initials Name Provider Type    Lalita Finney PT Physical Therapist        Physical Therapy Education                 Title: PT OT SLP Therapies (In Progress)     Topic: Physical Therapy (In Progress)     Point: Mobility training (Done)     Learning Progress Summary           Patient Acceptance, E, VU by NS at 11/1/2020 1104    Comment: Patient was educated about PT POC, sequencing safe transfers, and safety with mobility with RW.                    Point: Home exercise program (Not Started)     Learner Progress:  Not documented in this visit.          Point: Body mechanics (Done)     Learning Progress Summary           Patient Acceptance, E, VU by NS at 11/1/2020 1104    Comment: Patient was educated about PT POC, sequencing safe transfers, and safety with mobility with RW.                   Point: Precautions (Done)     Learning Progress Summary           Patient Acceptance, E, VU by NS at 11/1/2020 1104    Comment: Patient was educated about PT POC, sequencing safe transfers, and safety with mobility with RW.                               User Key     Initials Effective Dates Name Provider Type Discipline    NS 09/10/19 -  Lalita Santiago PT Physical Therapist PT              PT Recommendation and Plan  Planned Therapy Interventions (PT): balance training, bed mobility training, gait training, home exercise program, neuromuscular re-education, patient/family education, strengthening, transfer training  Plan of Care Reviewed With: patient  Progress: no change(PT eval)  Outcome Summary: Patient presents with generalized weakness and decreased activity tolerance affecting functional mobility. She transferred STS with CGA and ambulated 76ft with RW and CGA, demonstrating very narrow MAGNUS and flexed posture. Distance limited by BLE weakness, pt with increased fatigue following. Skilled IP PT warranted at this time. Recommend IRF at discharge as patient lives alone.     Time Calculation:   PT Charges     Row Name 11/01/20 0924             Time Calculation    Start Time  0924  -NS      PT Received On  11/01/20  -NS      PT Goal Re-Cert Due Date  11/11/20  -NS        User Key  (r) = Recorded By, (t) = Taken By, (c) = Cosigned By    Initials Name Provider Type    Lalita Finney PT Physical Therapist        Therapy Charges for Today     Code Description Service Date Service Provider Modifiers Qty    [id number] HC PT EVAL MOD COMPLEXITY 4 11/1/2020 Jack  Lalita, PT GP 1          PT G-Codes  Outcome Measure Options: AM-PAC 6 Clicks Daily Activity (OT)  AM-PAC 6 Clicks Score (PT): 18  AM-PAC 6 Clicks Score (OT): 18    Lalita Santiago, PT  11/1/2020

## 2020-11-01 NOTE — THERAPY EVALUATION
"Acute Care - Speech Language Pathology Initial Evaluation  [facility]     Patient Name: Zohra Hall  : 1940  MRN: [id number]  Today's Date: 2020               Admit Date: 10/30/2020     Visit Dx:    ICD-10-CM ICD-9-CM   1. Generalized weakness  R53.1 780.79   2. Ataxic gait  R26.0 781.2   3. Multiple falls  R29.6 V15.88   4. History of TIA (transient ischemic attack)  Z86.73 V12.54   5. Hypertensive urgency  I16.0 401.9   6. Hypokalemia  E87.6 276.8   7. Elevated TSH  R79.89 794.5   8. History of hypothyroidism  Z86.39 V12.29     Patient Active Problem List   Diagnosis   • Generalized osteoarthritis   • Iron deficiency   • Vitamin D deficiency   • Skin neoplasm   • Sialadenitis   • Restless leg syndrome   • Reaction to chronic stress   • Piriformis syndrome   • Papillary adenocarcinoma of thyroid (CMS/HCC)   • Hypothyroidism (acquired)   • Hypertension   • GERD without esophagitis   • Dyslipidemia   • Cervical lymphadenopathy   • Cellulitis   • Atherosclerosis of aorta (CMS/HCC)   • Breast mass   • Aortic valve stenosis   • Incontinence of bowel   • Chronic diastolic heart failure (CMS/HCC)   • Acute right-sided low back pain without sciatica   • Benign essential tremor   • Pain of right hip joint   • Thyroid cancer (CMS/HCC)   • Lacunar stroke (CMS/HCC)   • Nontraumatic rupture of extensor tendons of right hand and wrist   • Hyponatremia   • Actinic keratosis   • Transient ischemic attack   • Ataxia   • Hypokalemia   • Accelerated hypertension   • PMR (polymyalgia rheumatica) (CMS/HCC)   • Osteoporosis     Past Medical History:   Diagnosis Date   • Breast cancer (CMS/HCC)     left- pt states \"in situ\", no radiation, Tamoxifen for 5 years   • Cellulitis    • Cervical lymphadenopathy    • Chest pain    • Convulsions (CMS/HCC)    • GERD (gastroesophageal reflux disease)    • Glossitis    • Grief reaction     · Last Impression: 2015  counselled, given ativan for prn use.  Jass, " Aleah   • Hypertension    • Lower back pain    • Oral thrush    • Papillary adenocarcinoma (CMS/HCC)     Papillary adenocarcinoma of thyroid   • Papillary adenocarcinoma of thyroid (CMS/HCC)     · resection Ramirez- 1/13,  2 x 2 x 1.5 cm  T3 N1 MXpost op low calcium and diminished  voiceablation Ain- 3/7 metastatic nodes and invasion to strap muscles · Last Impression: 29 Oct 2014  s/p Eval by berry Méndez.  Aleah Regan (Internal   • Piriformis syndrome    • Tingling    • Xerostomia      Past Surgical History:   Procedure Laterality Date   • BREAST BIOPSY Right 10/10/2013    stereo bx   • BREAST BIOPSY Left 10/19/2015    stereo bx   • BREAST CYST EXCISION      right   • BREAST EXCISIONAL BIOPSY Right     affirm bx and loc 2016.   • BREAST LUMPECTOMY Left 1987   • HYSTERECTOMY  1979   • TOTAL HIP ARTHROPLASTY     • TOTAL THYROIDECTOMY      Thyroid Surgery Total Thyroidectomy        SLP EVALUATION (last 72 hours)      SLP SLC Evaluation     Row Name 11/01/20 1100                   Communication Assessment/Intervention    Document Type  evaluation  -AW        Subjective Information  no complaints  -AW        Patient Observations  alert;cooperative  -AW        Patient/Family/Caregiver Comments/Observations  no family present  -AW        Care Plan Review  evaluation/treatment results reviewed  -AW        Patient Effort  good  -AW        Symptoms Noted During/After Treatment  none  -AW           General Information    Patient Profile Reviewed  yes  -AW        Pertinent History Of Current Problem  stroke path, no new stroke on MRI  -AW        Precautions/Limitations, Vision  WFL with corrective lenses  -AW        Precautions/Limitations, Hearing  WFL  -AW        Prior Level of Function-Communication  WFL;other (see comments) pt has noticed a memory decline over past 6 months  -AW        Plans/Goals Discussed with  patient  -AW        Barriers to Rehab  none identified  -AW        Patient's Goals for Discharge   return to home;other (see comments) would like to look in to Assisted Living  -AW           Pain Scale: Numbers Pre/Post-Treatment    Pretreatment Pain Rating  0/10 - no pain  -AW        Posttreatment Pain Rating  0/10 - no pain  -AW           Comprehension Assessment/Intervention    Comprehension Assessment/Intervention  Auditory Comprehension  -AW           Auditory Comprehension Assessment/Intervention    Auditory Comprehension (Communication)  WFL  -AW        Answers Questions (Communication)  yes/no;wh questions;personal;simple;complex;WFL  -AW        Able to Follow Commands (Communication)  WFL  -AW        Narrative Discourse  conversational level;WFL  -AW           Expression Assessment/Intervention    Expression Assessment/Intervention  verbal expression  -AW           Verbal Expression Assessment/Intervention    Verbal Expression  mild impairment  -AW        Automatic Speech (Communication)  response to greeting;[facility]  -AW        Spontaneous/Functional Words  complex;mild impairment intermittent word finding difficulty with proper nouns  -        Sentence Formulation  WFL  -AW        Conversational Discourse/Fluency  WF  -AW           Oral Motor Structure and Function    Oral Motor Structure and Function  WF  -AW        Dentition Assessment  natural, present and adequate  -AW           Oral Musculature and Cranial Nerve Assessment    Oral Motor General Assessment  WFL  -AW           Motor Speech Assessment/Intervention    Motor Speech Function  WFL  -AW           Cognitive Assessment Intervention- SLP    Cognitive Function (Cognition)  mild impairment  -AW        Orientation Status (Cognition)  awareness of basic personal information;person;place;situation;WFL;time;mild impairment difficulty recalling date - needed to look at board in room  -AW        Memory (Cognitive)  short-term;mild impairment pt reports difficulty with remembering recent hours, days  -AW        Attention (Cognitive)  sustained;WFL   -AW        Reasoning (Cognitive)  simple;WFL  -AW        Executive Function (Cognition)  deficit awareness;WFL  -AW        Pragmatics (Communication)  eye contact;topic maintenance;turn taking;[facility]  -AW           SLP Clinical Impressions    SLP Diagnosis  mild memory and word finding difficulty  -AW        Rehab Potential/Prognosis  good  -AW        SLC Criteria for Skilled Therapy Interventions Met  yes  -AW        Functional Impact  functional impact in social situations;functional impact in ADLs  -AW           Recommendations    Therapy Frequency (SLP SLC)  at least;3 days per week  -AW        Predicted Duration Therapy Intervention (Days)  until discharge  -AW        Anticipated Discharge Disposition (SLP)  home with home health pt would benefit from  SLP services  -AW           Communication Treatment Objective and Progress Goals (SLP)    Verbal Expression Treatment Objectives  Verbal Expression Treatment Objectives (Group)  -AW        Cognitive Linguistic Treatment Objectives  Cognitive Linguistic Treatment Objectives (Group)  -AW           Verbal Expression Treatment Objectives    Word Retrieval Skills Selection  word retrieval, SLP goal 1  -AW           Word Retrieval Skills Goal 1 (SLP)    Improve Word Retrieval Skills By Goal 1 (SLP)  repeating sentences;over 10 words;confrontational naming task;low frequency  -AW        Time Frame (Word Retrieval Goal 1, SLP)  by discharge  -AW           Cognitive Linguistic Treatment Objectives    Orientation Selection  orientation, SLP goal 1  -AW        Memory Skills Selection  memory skills, SLP goal 1  -AW           Orientation Goal 1 (SLP)    Improve Orientation Through Goal 1 (SLP)  demonstrating orientation to month;demonstrating orientation to day;demonstrating orientation to year;90%;independently (over 90% accuracy)  -AW        Time Frame (Orientation Goal 1, SLP)  by discharge  -AW           Memory Skills Goal 1 (SLP)    Improve Memory Skills Through Goal 1  (SLP)  recalling related word lists with an imposed delay;select a word from a list by exclusion;use memory strategies;90%;independently (over 90% accuracy)  -AW        Time Frame (Memory Skills Goal 1, SLP)  by discharge  -AW          User Key  (r) = Recorded By, (t) = Taken By, (c) = Cosigned By    Initials Name Effective Dates    Audrey Larkin MS CCC-SLP 06/22/15 -              EDUCATION  The patient has been educated in the following areas:     Cognitive Impairment Communication Impairment.    SLP Recommendation and Plan  SLP Diagnosis: mild memory and word finding difficulty        SLC Criteria for Skilled Therapy Interventions Met: yes  Anticipated Discharge Disposition (SLP): home with home health(pt would benefit from  SLP services)        Predicted Duration Therapy Intervention (Days): until discharge                   Plan of Care Reviewed With: patient  Progress: no change      SLP GOALS     Row Name 11/01/20 1100             Word Retrieval Skills Goal 1 (SLP)    Improve Word Retrieval Skills By Goal 1 (SLP)  repeating sentences;over 10 words;confrontational naming task;low frequency  -AW      Time Frame (Word Retrieval Goal 1, SLP)  by discharge  -AW         Orientation Goal 1 (SLP)    Improve Orientation Through Goal 1 (SLP)  demonstrating orientation to month;demonstrating orientation to day;demonstrating orientation to year;90%;independently (over 90% accuracy)  -AW      Time Frame (Orientation Goal 1, SLP)  by discharge  -AW         Memory Skills Goal 1 (SLP)    Improve Memory Skills Through Goal 1 (SLP)  recalling related word lists with an imposed delay;select a word from a list by exclusion;use memory strategies;90%;independently (over 90% accuracy)  -AW      Time Frame (Memory Skills Goal 1, SLP)  by discharge  -AW        User Key  (r) = Recorded By, (t) = Taken By, (c) = Cosigned By    Initials Name Provider Type    Audrey Larkin MS CCC-SLP Speech and Language Pathologist                   Time Calculation:     Time Calculation- SLP     Row Name 11/01/20 1130             Time Calculation- SLP    SLP Start Time  1030  -AW      SLP Received On  11/01/20  -        User Key  (r) = Recorded By, (t) = Taken By, (c) = Cosigned By    Initials Name Provider Type    Audrey Larkin MS CCC-SLP Speech and Language Pathologist          Therapy Charges for Today     Code Description Service Date Service Provider Modifiers Qty    30766547765 HC ST EVAL SPEECH AND PROD W LANG  3 11/1/2020 Audrey Arguello MS CCC-SLP GN 1        Patient was wearing a face mask and did not exhibit coughing during this therapy encounter.  Procedure performed was aerosolizing, did not involve close contact (within 6 feet for at least 15 minutes or longer), and did not involve contact with infectious secretions or specimens.  Therapist used appropriate personal protective equipment including gloves, standard procedure mask and eye protection.  Appropriate PPE was worn during the entire therapy session.  Hand hygiene was completed before and after therapy session.              Audrey Arguello MS CCC-SLP  11/1/2020

## 2020-11-01 NOTE — PLAN OF CARE
Problem: Adult Inpatient Plan of Care  Goal: Plan of Care Review  Recent Flowsheet Documentation  Taken 11/1/2020 0924 by Lalita Santiago, PT  Progress: (PT eval) no change  Plan of Care Reviewed With: patient  Outcome Summary: Patient presents with generalized weakness and decreased activity tolerance affecting functional mobility. She transferred STS with CGA and ambulated 76ft with RW and CGA, demonstrating very narrow MAGNUS and flexed posture. Distance limited by BLE weakness, with pt fatiguing quickly into gait assessment. Skilled IP PT warranted at this time. Recommend IRF at discharge as patient lives alone.

## 2020-11-01 NOTE — NURSING NOTE
Pt noted to have elevated BP per charge nurse. Pt given IV labatalol per MAR and BP monitored. No decrease in BP. Dr. Travis notified and cardene gtt order (see Mar). Upon returning to the room with equipment to start cardene gtt pt's Bp noted to be under parameters for gtt. Continue to monitor.

## 2020-11-01 NOTE — PLAN OF CARE
Problem: Adult Inpatient Plan of Care  Goal: Plan of Care Review  Outcome: Ongoing, Progressing  Flowsheets (Taken 11/1/2020 1129)  Progress: no change  Plan of Care Reviewed With: patient   SLP evaluation completed. Will address communication deficits - pt would benefit from  SLP services at discharge. She says transportation would be difficult for OP services. Please see note for further details and recommendations.

## 2020-11-01 NOTE — THERAPY EVALUATION
"Patient Name: Zohra Hall  : 1940    MRN: [id number]                              Today's Date: 2020       Admit Date: 10/30/2020    Visit Dx:     ICD-10-CM ICD-9-CM   1. Generalized weakness  R53.1 780.79   2. Ataxic gait  R26.0 781.2   3. Multiple falls  R29.6 V15.88   4. History of TIA (transient ischemic attack)  Z86.73 V12.54   5. Hypertensive urgency  I16.0 401.9   6. Hypokalemia  E87.6 276.8   7. Elevated TSH  R79.89 794.5   8. History of hypothyroidism  Z86.39 V12.29     Patient Active Problem List   Diagnosis   • Generalized osteoarthritis   • Iron deficiency   • Vitamin D deficiency   • Skin neoplasm   • Sialadenitis   • Restless leg syndrome   • Reaction to chronic stress   • Piriformis syndrome   • Papillary adenocarcinoma of thyroid (CMS/HCC)   • Hypothyroidism (acquired)   • Hypertension   • GERD without esophagitis   • Dyslipidemia   • Cervical lymphadenopathy   • Cellulitis   • Atherosclerosis of aorta (CMS/HCC)   • Breast mass   • Aortic valve stenosis   • Incontinence of bowel   • Chronic diastolic heart failure (CMS/HCC)   • Acute right-sided low back pain without sciatica   • Benign essential tremor   • Pain of right hip joint   • Thyroid cancer (CMS/HCC)   • Lacunar stroke (CMS/HCC)   • Nontraumatic rupture of extensor tendons of right hand and wrist   • Hyponatremia   • Actinic keratosis   • Transient ischemic attack   • Ataxia   • Hypokalemia   • Accelerated hypertension   • PMR (polymyalgia rheumatica) (CMS/HCC)   • Osteoporosis     Past Medical History:   Diagnosis Date   • Breast cancer (CMS/HCC)     left- pt states \"in situ\", no radiation, Tamoxifen for 5 years   • Cellulitis    • Cervical lymphadenopathy    • Chest pain    • Convulsions (CMS/HCC)    • GERD (gastroesophageal reflux disease)    • Glossitis    • Grief reaction     · Last Impression: 2015  counselled, given ativan for prn use.  Aleah Regan   • Hypertension    • Lower back pain    • Oral " thrush    • Papillary adenocarcinoma (CMS/HCC)     Papillary adenocarcinoma of thyroid   • Papillary adenocarcinoma of thyroid (CMS/HCC)     · resection Ramirez- 1/13,  2 x 2 x 1.5 cm  T3 N1 MXpost op low calcium and diminished  voiceablation Kaylenn- 3/7 metastatic nodes and invasion to strap muscles · Last Impression: 29 Oct 2014  s/p Eval by berry Méndez.  Aleah Regan (Internal   • Piriformis syndrome    • Tingling    • Xerostomia      Past Surgical History:   Procedure Laterality Date   • BREAST BIOPSY Right 10/10/2013    stereo bx   • BREAST BIOPSY Left 10/19/2015    stereo bx   • BREAST CYST EXCISION      right   • BREAST EXCISIONAL BIOPSY Right     affirm bx and loc 2016.   • BREAST LUMPECTOMY Left 1987   • HYSTERECTOMY  1979   • TOTAL HIP ARTHROPLASTY     • TOTAL THYROIDECTOMY      Thyroid Surgery Total Thyroidectomy     General Information     Row Name 11/01/20 0926          OT Time and Intention    Document Type  evaluation  -CORIN     Mode of Treatment  individual therapy;occupational therapy  -CORIN     Row Name 11/01/20 0926          General Information    Patient Profile Reviewed  yes  -CORIN     Prior Level of Function  independent:;all household mobility;community mobility;ADL's;home management;driving;shopping  -CORIN     Existing Precautions/Restrictions  fall UE tremors  -CORIN     Barriers to Rehab  medically complex  -CORIN     Row Name 11/01/20 0926          Living Environment    Lives With  alone  -CORIN     Row Name 11/01/20 0926          Home Main Entrance    Number of Stairs, Main Entrance  two  -CORIN     Stair Railings, Main Entrance  railing on right side (ascending) per pt. house to L  -CORIN     Row Name 11/01/20 0926          Stairs Within Home, Primary    Number of Stairs, Within Home, Primary  none  -CORIN     Stairs Comment, Within Home, Primary  wx in shower with seat and bars, RTS in place.  -CORIN     Row Name 11/01/20 0926          Cognition    Orientation Status (Cognition)  oriented to;person;disoriented  to;place;time pt. thought was still October, stated year as 2022 and place as [facility]  -     Row Name 11/01/20 0926          Safety Issues, Functional Mobility    Safety Issues Affecting Function (Mobility)  insight into deficits/self-awareness;safety precaution awareness  -     Impairments Affecting Function (Mobility)  balance;coordination;cognition;endurance/activity tolerance;motor control;postural/trunk control;strength  -     Cognitive Impairments, Mobility Safety/Performance  insight into deficits/self-awareness;safety precaution awareness;safety precaution follow-through  -       User Key  (r) = Recorded By, (t) = Taken By, (c) = Cosigned By    Initials Name Provider Type    CORIN Renuka Cotter, OT Occupational Therapist        Mobility/ADL's     Row Name 11/01/20 0929          Bed Mobility    Bed Mobility  rolling right;supine-sit;scooting/bridging  -     Rolling Right [place] (Bed Mobility)  standby assist;verbal cues  -     Scooting/Bridging [place] (Bed Mobility)  standby assist;verbal cues  -     Supine-Sit [place] (Bed Mobility)  standby assist;verbal cues  -CORIN     Bed Mobility, Safety Issues  decreased use of arms for pushing/pulling;decreased use of legs for bridging/pushing;impaired trunk control for bed mobility  -     Assistive Device (Bed Mobility)  bed rails;head of bed elevated  -     Comment (Bed Mobility)  slow pace but able to peform with cues to intiate and fully scoot to EOB.  Pt. very concerned over purewick throughout session  -     Row Name 11/01/20 0929          Transfers    Transfers  sit-stand transfer  -     Comment (Transfers)  cues for hand placement each time due to wanting to hold to walker  -     Sit-Stand [place] (Transfers)  contact guard;verbal cues  -     Row Name 11/01/20 0929          Sit-Stand Transfer    Assistive Device (Sit-Stand Transfers)  walker, front-wheeled  -     Row Name 11/01/20 0929          Functional  Mobility    Functional Mobility- Ind. Level  contact guard assist;verbal cues required  -     Functional Mobility- Device  rolling walker  -     Functional Mobility-Distance (Feet)  12  -     Functional Mobility- Safety Issues  step length decreased  -     Functional Mobility- Comment  slow pace, pt. heading to end of couch despite prior cues that she was heading to chair.  -     Row Name 11/01/20 0929          Activities of Daily Living    BADL Assessment/Intervention  lower body dressing;grooming;feeding;upper body dressing  -     Row Name 11/01/20 0929          Lower Body Dressing Assessment/Training    [place] Level (Lower Body Dressing)  doff;don;socks;standby assist  -CORIN     Position (Lower Body Dressing)  edge of bed sitting  -CORIN     Row Name 11/01/20 0929          Grooming Assessment/Training    [place] Level (Grooming)  wash face, hands;set up;standby assist  -CORIN     Position (Grooming)  sitting up in bed  -CORIN     Row Name 11/01/20 0929          Self-Feeding Assessment/Training    [place] Level (Feeding)  moderate assist (50% patient effort);prepare tray/open items;liquids to mouth;scoop food and bring to mouth;independent  -CORIN     Position (Self-Feeding)  supported sitting  -CORIN     Comment (Feeding)  pt. needing extra time and effort with spillage due to UE tremors.  -     Row Name 11/01/20 0929          Upper Body Dressing Assessment/Training    [place] Level (Upper Body Dressing)  don;pajama/robe;moderate assist (50% patient effort)  -CORIN     Position (Upper Body Dressing)  edge of bed sitting  -CORIN       User Key  (r) = Recorded By, (t) = Taken By, (c) = Cosigned By    Initials Name Provider Type    Renuka Zhu OT Occupational Therapist        Obj/Interventions     Row Name 11/01/20 0933          Sensory Assessment (Somatosensory)    Sensory Assessment (Somatosensory)  sensation intact;bilateral UE  -     Row Name 11/01/20 0933          Sensory Interventions     Comment, Sensory Intervention  coordination intact minus from tremors.  -CORIN     Row Name 11/01/20 0933          Vision Assessment/Intervention    Visual Impairment/Limitations  corrective lenses full-time  -     Row Name 11/01/20 0933          Range of Motion Comprehensive    General Range of Motion  bilateral upper extremity ROM WNL  -CORIN     Comment, General Range of Motion  minus R hand limited finger flexion grossly 15-20%  -     Row Name 11/01/20 0933          Strength Comprehensive (MMT)    General Manual Muscle Testing (MMT) Assessment  upper extremity strength deficits identified  -     Comment, General Manual Muscle Testing (MMT) Assessment  pt. grossly 4/5 BUE, limited by tremoring.  -CORIN     Row Name 11/01/20 0933          Balance    Balance Assessment  sitting static balance;sitting dynamic balance;standing static balance;standing dynamic balance  -CORIN     Static Sitting Balance  WFL  -CORIN     Dynamic Sitting Balance  mild impairment  -CORIN     Static Standing Balance  mild impairment  -CORIN     Dynamic Standing Balance  mild impairment  -CORIN       User Key  (r) = Recorded By, (t) = Taken By, (c) = Cosigned By    Initials Name Provider Type    Renuka Zhu, OT Occupational Therapist        Goals/Plan     Row Name 11/01/20 0940          Transfer Goal 1 (OT)    Activity/Assistive Device (Transfer Goal 1, OT)  sit-to-stand/stand-to-sit;toilet;walker, rolling;commode;commode, bedside without drop arms  -CORIN     [place] Level/Cues Needed (Transfer Goal 1, OT)  standby assist  -CORIN     Time Frame (Transfer Goal 1, OT)  long term goal (LTG);10 days  -CORIN     Progress/Outcome (Transfer Goal 1, OT)  goal ongoing  -     Row Name 11/01/20 0940          Dressing Goal 1 (OT)    Activity/Device (Dressing Goal 1, OT)  lower body dressing undergarment or pants  -CORIN     [place]/Cues Needed (Dressing Goal 1, OT)  standby assist  -CORIN     Time Frame (Dressing Goal 1, OT)  long term goal (LTG);10 days  -CORIN      Progress/Outcome (Dressing Goal 1, OT)  goal ongoing  -CORIN     Row Name 11/01/20 0940          Toileting Goal 1 (OT)    Activity/Device (Toileting Goal 1, OT)  toileting skills, all  -CORIN     [place] Level/Cues Needed (Toileting Goal 1, OT)  standby assist;verbal cues required  -CORIN     Time Frame (Toileting Goal 1, OT)  long term goal (LTG);10 days  -CORIN     Progress/Outcome (Toileting Goal 1, OT)  goal ongoing  -CORIN     Row Name 11/01/20 0940          Grooming Goal 1 (OT)    Activity/Device (Grooming Goal 1, OT)  hair care;oral care;wash face, hands  -CORIN     [place] (Grooming Goal 1, OT)  standby assist sinkside standing  -CORIN     Time Frame (Grooming Goal 1, OT)  long term goal (LTG);10 days  -CORIN     Progress/Outcome (Grooming Goal 1, OT)  goal ongoing  -     Row Name 11/01/20 0940          Therapy Assessment/Plan (OT)    Planned Therapy Interventions (OT)  activity tolerance training;BADL retraining;cognitive/visual perception retraining;functional balance retraining;IADL retraining;neuromuscular control/coordination retraining;occupation/activity based interventions;patient/caregiver education/training;ROM/therapeutic exercise;transfer/mobility retraining;strengthening exercise  -       User Key  (r) = Recorded By, (t) = Taken By, (c) = Cosigned By    Initials Name Provider Type    Renuka Zhu, OT Occupational Therapist        Clinical Impression     Row Name 11/01/20 0972          Pain Assessment    Additional Documentation  Pain Scale: Numbers Pre/Post-Treatment (Group)  -     Row Name 11/01/20 0915          Pain Scale: Numbers Pre/Post-Treatment    Pretreatment Pain Rating  0/10 - no pain  -CORIN     Posttreatment Pain Rating  0/10 - no pain  -     Row Name 11/01/20 09          Plan of Care Review    Plan of Care Reviewed With  patient  -CORIN     Progress  no change  -CORIN     Outcome Summary  OT evaluation completed. Pt. extra time giving home information and giving orientation information.   Per pt. at [facility], end of October 2022.  Pt. reports she does better talking about past.  Pt. with BUE tremors, but otherwise intact, strength grossly 4/5 and no sensory deficits noted BUE. Pt. SBA to EOB, CGA to stand and mobilize around bed to chair, but cues for safety and directions needed.  Pt. SBA to kiara and doff socks and able to self feed with spillage due to tremors.  Per pt. she lives alone and does all HM tasks.  Pt. appropriate for skilled OT services to promote return to PLOF.  Pt. will likely benefit from IPR due to unable to independently care for self at this time and per pt. with limited outside support.  -     Row Name 11/01/20 0934          Therapy Assessment/Plan (OT)    Patient/Family Therapy Goal Statement (OT)  return to PLOF and be able to return home.  -CORIN     Rehab Potential (OT)  good, to achieve stated therapy goals  -CORIN     Criteria for Skilled Therapeutic Interventions Met (OT)  yes;skilled treatment is necessary  -CORIN     Therapy Frequency (OT)  daily  -CORIN     Row Name 11/01/20 0934          Therapy Plan Review/Discharge Plan (OT)    Equipment Needs Upon Discharge (OT)  -- Pt. with AD from prior THR's and spoused, none recommended.  -CORIN     Anticipated Discharge Disposition (OT)  inpatient rehabilitation facility  -CORIN     Row Name 11/01/20 0934          Vital Signs    Pre Systolic BP Rehab  162  -CORIN     Pre Treatment Diastolic BP  62  -CORIN     Post Systolic BP Rehab  (S) 186  -CORIN     Post Treatment Diastolic BP  (S) 102  -CORIN     Pretreatment Heart Rate (beats/min)  69  -CORIN     Posttreatment Heart Rate (beats/min)  73  -CORIN     Pre SpO2 (%)  96  -CORIN     O2 Delivery Pre Treatment  room air  -CORIN     Post SpO2 (%)  97  -CORIN     O2 Delivery Post Treatment  room air  -CORIN     Row Name 11/01/20 0934          Positioning and Restraints    Pre-Treatment Position  in bed  -CORIN     Post Treatment Position  chair  -CORIN     In Chair  reclined;call light within reach;encouraged to call for  assist;exit alarm on;legs elevated;waffle cushion  -CORIN       User Key  (r) = Recorded By, (t) = Taken By, (c) = Cosigned By    Initials Name Provider Type    Renuka Zhu OT Occupational Therapist        Outcome Measures     Row Name 11/01/20 0942          How much help from another is currently needed...    Putting on and taking off regular lower body clothing?  3  -CORIN     Bathing (including washing, rinsing, and drying)  3  -CORIN     Toileting (which includes using toilet bed pan or urinal)  3  -CORIN     Putting on and taking off regular upper body clothing  3  -CORIN     Taking care of personal grooming (such as brushing teeth)  3  -CORIN     Eating meals  3  -CORIN     AM-PAC 6 Clicks Score (OT)  18  -CORIN     Row Name 11/01/20 0942          Functional Assessment    Outcome Measure Options  AM-PAC 6 Clicks Daily Activity (OT)  -CORIN       User Key  (r) = Recorded By, (t) = Taken By, (c) = Cosigned By    Initials Name Provider Type    Renuka Zhu OT Occupational Therapist        Occupational Therapy Education                 Title: PT OT SLP Therapies (Not Started)     Topic: Occupational Therapy (Not Started)     Point: ADL training (Not Started)     Description:   Instruct learner(s) on proper safety adaptation and remediation techniques during self care or transfers.   Instruct in proper use of assistive devices.              Learner Progress:  Not documented in this visit.          Point: Precautions (Not Started)     Description:   Instruct learner(s) on prescribed precautions during self-care and functional transfers.              Learner Progress:  Not documented in this visit.                          OT Recommendation and Plan  Planned Therapy Interventions (OT): activity tolerance training, BADL retraining, cognitive/visual perception retraining, functional balance retraining, IADL retraining, neuromuscular control/coordination retraining, occupation/activity based interventions, patient/caregiver  education/training, ROM/therapeutic exercise, transfer/mobility retraining, strengthening exercise  Therapy Frequency (OT): daily  Plan of Care Review  Plan of Care Reviewed With: patient  Progress: no change  Outcome Summary: OT evaluation completed. Pt. extra time giving home information and giving orientation information.  Per pt. at [facility], end of October 2022.  Pt. reports she does better talking about past.  Pt. with BUE tremors, but otherwise intact, strength grossly 4/5 and no sensory deficits noted BUE. Pt. SBA to EOB, CGA to stand and mobilize around bed to chair, but cues for safety and directions needed.  Pt. SBA to kiara and doff socks and able to self feed with spillage due to tremors.  Per pt. she lives alone and does all HM tasks.  Pt. appropriate for skilled OT services to promote return to PLOF.  Pt. will likely benefit from IPR due to unable to independently care for self at this time and per pt. with limited outside support.     Time Calculation:   Time Calculation- OT     Row Name 11/01/20 0944             Time Calculation- OT    OT Start Time  0807  -CORIN      OT Received On  11/01/20  -CORIN      OT Goal Re-Cert Due Date  12/11/20  -CORIN         Timed Charges    30666 - OT Therapeutic Activity Minutes  5  -CORIN      36460 - OT Self Care/Mgmt Minutes  5  -CORIN        User Key  (r) = Recorded By, (t) = Taken By, (c) = Cosigned By    Initials Name Provider Type    Renuka Zhu OT Occupational Therapist        Therapy Charges for Today     Code Description Service Date Service Provider Modifiers Qty    [id number]  OT THERAPEUTIC ACT EA 15 MIN 11/1/2020 Renuka Cotter OT GO 1    [id number]  OT EVAL MOD COMPLEXITY 4 11/1/2020 Renuka Cotter OT GO 1               Renuka Cotter OT  11/1/2020

## 2020-11-01 NOTE — PLAN OF CARE
Goal Outcome Evaluation:  Plan of Care Reviewed With: patient  Progress: no change  Outcome Summary: Pt SBP elevated x2 this afternoon, MD notified, see MAR. A&Ox4 with intermittent confusion. NSR per monitor. RA. Up to chair this shift. Tremors noted. Activity encouraged. Plan for pt is d/c possbily to [facility] once stable. Will continue to monitor.

## 2020-11-01 NOTE — PROGRESS NOTES
[facility] Medicine Services  PROGRESS NOTE    Patient Name: Zohra Hall  : 1940  MRN: [id number]    Date of Admission: 10/30/2020  Primary Care Physician: Aleah Regan MD    Subjective   Subjective     CC:  F/U hypertensive urgency, ataxia     HPI:  Pt seen and examined. RN notes reviewed. No acute events overnight. Pt doing well, sitting up in bedside chair. States that this is the first bit of food she has enjoyed. OT worked with her this morning. We discussed she lives by herself. She has a son that has epilepsy that lives separate from her. Likely needs rehab. BP remains elevated.     Review of Systems  Gen- No fevers, chills  CV- No chest pain, palpitations  Resp- No cough, dyspnea  GI- No N/V/D, abd pain    Objective   Objective     Vital Signs:   Temp:  [97.3 °F (36.3 °C)-98 °F (36.7 °C)] 97.9 °F (36.6 °C)  Heart Rate:  [64-87] 78  Resp:  [16-18] 18  BP: (137-223)/() 162/92        Physical Exam:  Constitutional: No acute distress, awake, alert  HENT: NCAT, mucous membranes moist  Respiratory: Clear to auscultation bilaterally, respiratory effort normal   Cardiovascular: RRR, no murmurs, rubs, or gallops, palpable pedal pulses bilaterally  Gastrointestinal: Positive bowel sounds, soft, nontender, nondistended  Musculoskeletal: No bilateral ankle edema  Psychiatric: Appropriate affect, cooperative  Neurologic: Oriented x 3, CROOK, +B/L tremor   Skin: No rashes    Results Reviewed:  Results from last 7 days   Lab Units 10/30/20  1704   WBC 10*3/mm3 6.69   HEMOGLOBIN g/dL 14.3   HEMATOCRIT % 43.7   PLATELETS 10*3/mm3 257     Results from last 7 days   Lab Units 10/31/20  1332 10/30/20  1704   SODIUM mmol/L  --  134*   POTASSIUM mmol/L 4.3 3.2*   CHLORIDE mmol/L  --  94*   CO2 mmol/L  --  30.0*   BUN mg/dL  --  19   CREATININE mg/dL  --  0.88   GLUCOSE mg/dL  --  122*   CALCIUM mg/dL  --  9.9   ALT (SGPT) U/L  --  10   AST (SGOT) U/L  --  14   TROPONIN T ng/mL  --   <0.010     Estimated Creatinine Clearance: 50.7 mL/min (by C-G formula based on SCr of 0.88 mg/dL).    Microbiology Results Abnormal     Procedure Component Value - Date/Time    COVID PRE-OP / PRE-PROCEDURE SCREENING ORDER (NO ISOLATION) - Swab, Nasopharynx [[id number]]  (Normal) Collected: 10/31/20 0225    Lab Status: Final result Specimen: Swab from Nasopharynx Updated: 10/31/20 0435    Narrative:      The following orders were created for panel order COVID PRE-OP / PRE-PROCEDURE SCREENING ORDER (NO ISOLATION) - Swab, Nasopharynx.  Procedure                               Abnormality         Status                     ---------                               -----------         ------                     Respiratory Panel PCR w/...[[id number]]  Normal              Final result                 Please view results for these tests on the individual orders.    Respiratory Panel PCR w/COVID-19(SARS-CoV-2) [facility] In-House, NP Swab in UTM/VTM, 3-4 HR TAT - Swab, Nasopharynx [[id number]]  (Normal) Collected: 10/31/20 0225    Lab Status: Final result Specimen: Swab from Nasopharynx Updated: 10/31/20 0435     ADENOVIRUS, PCR Not Detected     Coronavirus 229E Not Detected     Coronavirus HKU1 Not Detected     Coronavirus NL63 Not Detected     Coronavirus OC43 Not Detected     COVID19 Not Detected     Human Metapneumovirus Not Detected     Human Rhinovirus/Enterovirus Not Detected     Influenza A PCR Not Detected     Influenza A H1 Not Detected     Influenza A H1 2009 PCR Not Detected     Influenza A H3 Not Detected     Influenza B PCR Not Detected     Parainfluenza Virus 1 Not Detected     Parainfluenza Virus 2 Not Detected     Parainfluenza Virus 3 Not Detected     Parainfluenza Virus 4 Not Detected     RSV, PCR Not Detected     Bordetella pertussis pcr Not Detected     Bordetella parapertussis PCR Not Detected     Chlamydophila pneumoniae PCR Not Detected     Mycoplasma pneumo by PCR Not Detected     Narrative:      Fact sheet for providers: https://docs.[employer]/wp-content/uploads/NKL1898-8387-WL5.1-EUA-Provider-Fact-Sheet-3.pdf    Fact sheet for patients: https://docs.[employer]/wp-content/uploads/HYW6695-6037-JE2.1-EUA-Patient-Fact-Sheet-1.pdf          Imaging Results (Last 24 Hours)     ** No results found for the last 24 hours. **          Results for orders placed during the hospital encounter of 10/30/20   Adult Transthoracic Echo Complete W/ Cont if Necessary Per Protocol    Narrative · Calculated left ventricular EF = 55%  · Left ventricular systolic function is normal.  · Left ventricular diastolic function is consistent with (grade Ia w/high   LAP) impaired relaxation.  · The left atrial cavity is mild to moderately dilated.  · No significant structural or functional valvular disease.          I have reviewed the medications:  Scheduled Meds:amLODIPine, 5 mg, Oral, Daily  atenolol, 50 mg, Oral, Daily  atorvastatin, 40 mg, Oral, Nightly  DULoxetine, 30 mg, Oral, Daily  enalapril, 20 mg, Oral, Daily  famotidine, 20 mg, Oral, BID AC  heparin (porcine), 5,000 Units, Subcutaneous, Q12H  levothyroxine, 125 mcg, Oral, Q AM  liothyronine, 25 mcg, Oral, Daily  predniSONE, 20 mg, Oral, Daily With Breakfast  primidone, 125 mg, Oral, Nightly  primidone, 50 mg, Oral, Q AM      Continuous Infusions:niCARdipine, 5-15 mg/hr, Last Rate: Stopped (10/31/20 1207)      PRN Meds:.•  acetaminophen  •  labetalol  •  potassium chloride **OR** potassium chloride **OR** potassium chloride  •  sodium chloride    Assessment/Plan   Assessment & Plan     Active Hospital Problems    Diagnosis  POA   • Ataxia [R27.0]  Yes   • Hypokalemia [E87.6]  Yes   • Accelerated hypertension [I10]  Yes   • PMR (polymyalgia rheumatica) (CMS/HCC) [M35.3]  Yes   • Osteoporosis [M81.0]  Yes   • Hyponatremia [E87.1]  Yes   • Benign essential tremor [G25.0]  Yes   • Chronic diastolic heart failure (CMS/HCC) [I50.32]  Yes   • Aortic valve  stenosis [I35.0]  Yes   • Hypertension [I10]  Yes   • Hypothyroidism (acquired) [E03.9]  Yes   • Restless leg syndrome [G25.81]  Yes   • Vitamin D deficiency [E55.9]  Yes      Resolved Hospital Problems   No resolved problems to display.        Brief Hospital Course to date:  Zohra Hall is a 79 y.o. female with history of hypothyroidism, hypertension, chronic diastolic heart failure, prior lacunar strokes with benign essential tremor presents with ataxia, elevated blood pressure, and worsening memory.    This patient's problems and plans were partially entered by my partner and updated as appropriate by me 11/01/20.     Ataxia, accelerated hypertension with change in mental status  -CT head negative  -MRI negative  -BP elevated in the 180's this AM, will modify BP medications  -Echo reviewed, EF 55%, no significant valvular disease   -Neurology has seen in consult, possible mild toxic metabolic encephalopathy in the setting of uncontrolled HTN and hypothyroidism   -Cymbalta dose cut in half on admission   -Phenobarb and Primidone level ordered by Neuro. Phenobarb level low but patient does not take this medication. Primidone level is pending.     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      DVT prophylaxis: Heparin subcu    Disposition: I expect the patient to be discharged TBD. Feel patient needs rehab as she lives alone, not much family support, has a great fear of falling. CM to follow    CODE STATUS:   Code Status and Medical Interventions:   Ordered at: 10/31/20 0419     Code Status:    CPR     Medical Interventions (Level of Support Prior to Arrest):    Full       Yocasta Travis, DO  11/01/20

## 2020-11-01 NOTE — PLAN OF CARE
Problem: Adult Inpatient Plan of Care  Goal: Plan of Care Review  Recent Flowsheet Documentation  Taken 11/1/2020 0934 by Renuka Cotter OT  Progress: no change  Plan of Care Reviewed With: patient  Outcome Summary: OT evaluation completed. Pt. extra time giving home information and giving orientation information.  Per pt. at [facility], end of October 2022.  Pt. reports she does better talking about past.  Pt. with BUE tremors, but otherwise intact, strength grossly 4/5 and no sensory deficits noted BUE. Pt. SBA to EOB, CGA to stand and mobilize around bed to chair, but cues for safety and directions needed.  Pt. SBA to kiara and doff socks and able to self feed with spillage due to tremors.  Per pt. she lives alone and does all HM tasks.  Elevated BP. Pt. appropriate for skilled OT services to promote return to PLOF.  Pt. will likely benefit from IPR due to unable to independently care for self at this time and per pt. with limited outside support.

## 2020-11-02 LAB
ANION GAP SERPL CALCULATED.3IONS-SCNC: 11 MMOL/L (ref 5–15)
ANION GAP SERPL CALCULATED.3IONS-SCNC: 8 MMOL/L (ref 5–15)
BUN SERPL-MCNC: 22 MG/DL (ref 8–23)
BUN SERPL-MCNC: 24 MG/DL (ref 8–23)
BUN/CREAT SERPL: 29.7 (ref 7–25)
BUN/CREAT SERPL: 30.8 (ref 7–25)
CALCIUM SPEC-SCNC: 8.8 MG/DL (ref 8.6–10.5)
CALCIUM SPEC-SCNC: 9.2 MG/DL (ref 8.6–10.5)
CHLORIDE SERPL-SCNC: 95 MMOL/L (ref 98–107)
CHLORIDE SERPL-SCNC: 98 MMOL/L (ref 98–107)
CO2 SERPL-SCNC: 23 MMOL/L (ref 22–29)
CO2 SERPL-SCNC: 24 MMOL/L (ref 22–29)
CREAT SERPL-MCNC: 0.74 MG/DL (ref 0.57–1)
CREAT SERPL-MCNC: 0.78 MG/DL (ref 0.57–1)
GFR SERPL CREATININE-BSD FRML MDRD: 71 ML/MIN/1.73
GFR SERPL CREATININE-BSD FRML MDRD: 75 ML/MIN/1.73
GLUCOSE SERPL-MCNC: 110 MG/DL (ref 65–99)
GLUCOSE SERPL-MCNC: 128 MG/DL (ref 65–99)
POTASSIUM SERPL-SCNC: 3.8 MMOL/L (ref 3.5–5.2)
POTASSIUM SERPL-SCNC: 4.2 MMOL/L (ref 3.5–5.2)
QT INTERVAL: 396 MS
QTC INTERVAL: 448 MS
SODIUM SERPL-SCNC: 127 MMOL/L (ref 136–145)
SODIUM SERPL-SCNC: 132 MMOL/L (ref 136–145)

## 2020-11-02 PROCEDURE — 99232 SBSQ HOSP IP/OBS MODERATE 35: CPT | Performed by: INTERNAL MEDICINE

## 2020-11-02 PROCEDURE — 80048 BASIC METABOLIC PNL TOTAL CA: CPT | Performed by: INTERNAL MEDICINE

## 2020-11-02 PROCEDURE — 63710000001 PREDNISONE PER 5 MG: Performed by: FAMILY MEDICINE

## 2020-11-02 PROCEDURE — 25010000002 HEPARIN (PORCINE) PER 1000 UNITS: Performed by: INTERNAL MEDICINE

## 2020-11-02 RX ORDER — SODIUM CHLORIDE 9 MG/ML
75 INJECTION, SOLUTION INTRAVENOUS CONTINUOUS
Status: ACTIVE | OUTPATIENT
Start: 2020-11-02 | End: 2020-11-02

## 2020-11-02 RX ADMIN — ATENOLOL 50 MG: 50 TABLET ORAL at 08:01

## 2020-11-02 RX ADMIN — PRIMIDONE 125 MG: 250 TABLET ORAL at 22:50

## 2020-11-02 RX ADMIN — TERAZOSIN HYDROCHLORIDE 1 MG: 1 CAPSULE ORAL at 22:39

## 2020-11-02 RX ADMIN — AMLODIPINE BESYLATE 10 MG: 10 TABLET ORAL at 08:01

## 2020-11-02 RX ADMIN — PREDNISONE 10 MG: 10 TABLET ORAL at 08:01

## 2020-11-02 RX ADMIN — PRIMIDONE 50 MG: 50 TABLET ORAL at 05:01

## 2020-11-02 RX ADMIN — ENALAPRIL MALEATE 20 MG: 10 TABLET ORAL at 08:01

## 2020-11-02 RX ADMIN — LEVOTHYROXINE SODIUM 125 MCG: 125 TABLET ORAL at 05:00

## 2020-11-02 RX ADMIN — SODIUM CHLORIDE, PRESERVATIVE FREE 10 ML: 5 INJECTION INTRAVENOUS at 08:01

## 2020-11-02 RX ADMIN — HEPARIN SODIUM 5000 UNITS: 5000 INJECTION INTRAVENOUS; SUBCUTANEOUS at 08:01

## 2020-11-02 RX ADMIN — ATORVASTATIN CALCIUM 40 MG: 40 TABLET, FILM COATED ORAL at 22:39

## 2020-11-02 RX ADMIN — DULOXETINE HYDROCHLORIDE 30 MG: 30 CAPSULE, DELAYED RELEASE ORAL at 08:01

## 2020-11-02 RX ADMIN — LIOTHYRONINE SODIUM 25 MCG: 25 TABLET ORAL at 08:01

## 2020-11-02 RX ADMIN — FAMOTIDINE 20 MG: 20 TABLET, FILM COATED ORAL at 08:01

## 2020-11-02 RX ADMIN — FAMOTIDINE 20 MG: 20 TABLET, FILM COATED ORAL at 17:15

## 2020-11-02 RX ADMIN — HEPARIN SODIUM 5000 UNITS: 5000 INJECTION INTRAVENOUS; SUBCUTANEOUS at 22:38

## 2020-11-02 RX ADMIN — SODIUM CHLORIDE 75 ML/HR: 9 INJECTION, SOLUTION INTRAVENOUS at 13:42

## 2020-11-02 NOTE — PROGRESS NOTES
Discharge Planning Assessment  [facility]     Patient Name: Zohra Hall  MRN: [id number]  Today's Date: 11/2/2020    Admit Date: 10/30/2020    Discharge Needs Assessment     Row Name 11/02/20 0851       Living Environment    Lives With  alone    Current Living Arrangements  home/apartment/condo    Primary Care Provided by  self    Provides Primary Care For  no one    Family Caregiver if Needed  child(corona), adult    Family Caregiver Names  Simón Diaz (son) [phone]    Able to Return to Prior Arrangements  yes       Resource/Environmental Concerns    Resource/Environmental Concerns  none    Transportation Concerns  car, none       Transition Planning    Patient/Family Anticipates Transition to  inpatient rehabilitation facility    Patient/Family Anticipated Services at Transition  none    Transportation Anticipated  family or friend will provide       Discharge Needs Assessment    Readmission Within the Last 30 Days  no previous admission in last 30 days    Current Outpatient/Agency/Support Group  inpatient rehabilitation facility    Equipment Currently Used at Home  grab bar;cane, straight;walker, rolling    Concerns to be Addressed  denies needs/concerns at this time    Anticipated Changes Related to Illness  none    Equipment Needed After Discharge  none    Outpatient/Agency/Support Group Needs  inpatient rehabilitation facility    Discharge Facility/Level of Care Needs  rehabilitation facility    Provided Post Acute Provider List?  Yes    Post Acute Provider List  Inpatient Rehab    Provided Post Acute Provider Quality & Resource List?  Yes    Post Acute Provider Quality and Resource List  Inpatient Rehab    Delivered To  Patient    Method of Delivery  In person    Patient's Choice of Community Agency(s)  [facility]        Discharge Plan     Row Name 11/02/20 0853       Plan    Plan  [facility]    Plan Comments  Spoke with patient at bedside. Lives alone in [place]. Contact is Simón  Joe (son) [phone]. Is independent with ADL's No problems with Medicare/AARP or medications. Has a straight cane, grab bars and rolling walker at home. Plan is [facility]. Called [facility] and made the referral. CM will continue to follow.    Final Discharge Disposition Code  62 - inpatient rehab facility        Continued Care and Services - Admitted Since 10/30/2020    Coordination has not been started for this encounter.         Demographic Summary     Row Name 11/02/20 0850       General Information    Admission Type  inpatient    Arrived From  physician office    Referral Source  admission list    Reason for Consult  discharge planning    Preferred Language  English     Used During This Interaction  no       Contact Information    Permission Granted to Share Info With      Contact Information Obtained for      Contact Information Comments  PCP is Aleah Regan MD        Functional Status     Row Name 11/02/20 0851       Functional Status    Usual Activity Tolerance  good    Current Activity Tolerance  moderate       Functional Status, IADL    Medications  independent    Meal Preparation  independent    Housekeeping  independent    Laundry  independent    Shopping  independent       Mental Status    General Appearance WDL  WDL       Mental Status Summary    Recent Changes in Mental Status/Cognitive Functioning  no changes       Employment/    Employment Status  retired        Psychosocial    No documentation.       Abuse/Neglect    No documentation.       Legal    No documentation.       Substance Abuse    No documentation.       Patient Forms    No documentation.           Boaz Peres RN

## 2020-11-02 NOTE — PROGRESS NOTES
[facility] Medicine Services  PROGRESS NOTE    Patient Name: Zohra Hall  : 1940  MRN: [id number]    Date of Admission: 10/30/2020  Primary Care Physician: Aleah Regan MD    Subjective   Subjective     CC:  Ataxia, tremor     HPI:  States she feels ok if she is in the bed. Very hesitant to get up and move and doesn't feel ready. Feels she needs rehab.     Review of Systems  Gen- No fevers, chills  CV- No chest pain, palpitations  Resp- No cough, dyspnea  GI- No N/V/D, abd pain       Objective   Objective     Vital Signs:   Temp:  [97.3 °F (36.3 °C)-98 °F (36.7 °C)] 97.7 °F (36.5 °C)  Heart Rate:  [65-80] 67  Resp:  [14-18] 16  BP: (127-171)/(69-90) 153/76        Physical Exam:  Constitutional: No acute distress, awake, alert  HENT: NCAT, mucous membranes moist  Respiratory: Clear to auscultation bilaterally, respiratory effort normal   Cardiovascular: RRR, no murmurs  Gastrointestinal: Positive bowel sounds, soft, nontender, nondistended  Musculoskeletal: No bilateral ankle edema  Psychiatric: Appropriate affect, cooperative  Neurologic: Oriented x 3, strength symmetric in all extremities, Cranial Nerves grossly intact to confrontation, speech clear; upper ext tremor   Skin: No rashes    Results Reviewed:  Results from last 7 days   Lab Units 20  1024 10/30/20  1704   WBC 10*3/mm3 8.88 6.69   HEMOGLOBIN g/dL 15.2 14.3   HEMATOCRIT % 47.3* 43.7   PLATELETS 10*3/mm3 292 257     Results from last 7 days   Lab Units 20  0930 20  1024 10/31/20  1332 10/30/20  1704   SODIUM mmol/L 127* 129*  --  134*   POTASSIUM mmol/L 3.8 4.2 4.3 3.2*   CHLORIDE mmol/L 95* 91*  --  94*   CO2 mmol/L 24.0 26.0  --  30.0*   BUN mg/dL 22 24*  --  19   CREATININE mg/dL 0.74 0.82  --  0.88   GLUCOSE mg/dL 128* 126*  --  122*   CALCIUM mg/dL 9.2 9.7  --  9.9   ALT (SGPT) U/L  --   --   --  10   AST (SGOT) U/L  --   --   --  14   TROPONIN T ng/mL  --   --   --  <0.010     Estimated  Creatinine Clearance: 55.8 mL/min (by C-G formula based on SCr of 0.74 mg/dL).    Microbiology Results Abnormal     Procedure Component Value - Date/Time    COVID PRE-OP / PRE-PROCEDURE SCREENING ORDER (NO ISOLATION) - Swab, Nasopharynx [[id number]]  (Normal) Collected: 10/31/20 0225    Lab Status: Final result Specimen: Swab from Nasopharynx Updated: 10/31/20 0435    Narrative:      The following orders were created for panel order COVID PRE-OP / PRE-PROCEDURE SCREENING ORDER (NO ISOLATION) - Swab, Nasopharynx.  Procedure                               Abnormality         Status                     ---------                               -----------         ------                     Respiratory Panel PCR w/...[[id number]]  Normal              Final result                 Please view results for these tests on the individual orders.    Respiratory Panel PCR w/COVID-19(SARS-CoV-2) [facility] In-House, NP Swab in UTM/VTM, 3-4 HR TAT - Swab, Nasopharynx [[id number]]  (Normal) Collected: 10/31/20 0225    Lab Status: Final result Specimen: Swab from Nasopharynx Updated: 10/31/20 0435     ADENOVIRUS, PCR Not Detected     Coronavirus 229E Not Detected     Coronavirus HKU1 Not Detected     Coronavirus NL63 Not Detected     Coronavirus OC43 Not Detected     COVID19 Not Detected     Human Metapneumovirus Not Detected     Human Rhinovirus/Enterovirus Not Detected     Influenza A PCR Not Detected     Influenza A H1 Not Detected     Influenza A H1 2009 PCR Not Detected     Influenza A H3 Not Detected     Influenza B PCR Not Detected     Parainfluenza Virus 1 Not Detected     Parainfluenza Virus 2 Not Detected     Parainfluenza Virus 3 Not Detected     Parainfluenza Virus 4 Not Detected     RSV, PCR Not Detected     Bordetella pertussis pcr Not Detected     Bordetella parapertussis PCR Not Detected     Chlamydophila pneumoniae PCR Not Detected     Mycoplasma pneumo by PCR Not Detected    Narrative:      Fact sheet  for providers: https://docs.[employer]/wp-content/uploads/COT9331-9976-NU5.1-EUA-Provider-Fact-Sheet-3.pdf    Fact sheet for patients: https://docs.[employer]/wp-content/uploads/XDF5384-7274-RK2.1-EUA-Patient-Fact-Sheet-1.pdf          Imaging Results (Last 24 Hours)     ** No results found for the last 24 hours. **          Results for orders placed during the hospital encounter of 10/30/20   Adult Transthoracic Echo Complete W/ Cont if Necessary Per Protocol    Narrative · Calculated left ventricular EF = 55%  · Left ventricular systolic function is normal.  · Left ventricular diastolic function is consistent with (grade Ia w/high   LAP) impaired relaxation.  · The left atrial cavity is mild to moderately dilated.  · No significant structural or functional valvular disease.          I have reviewed the medications:  Scheduled Meds:amLODIPine, 10 mg, Oral, Daily  atenolol, 50 mg, Oral, Daily  atorvastatin, 40 mg, Oral, Nightly  DULoxetine, 30 mg, Oral, Daily  enalapril, 20 mg, Oral, Daily  famotidine, 20 mg, Oral, BID AC  heparin (porcine), 5,000 Units, Subcutaneous, Q12H  levothyroxine, 125 mcg, Oral, Q AM  liothyronine, 25 mcg, Oral, Daily  primidone, 125 mg, Oral, Nightly  primidone, 50 mg, Oral, Q AM  terazosin, 1 mg, Oral, Nightly      Continuous Infusions:   PRN Meds:.•  acetaminophen  •  labetalol  •  potassium chloride **OR** potassium chloride **OR** potassium chloride  •  sodium chloride    Assessment/Plan   Assessment & Plan     Active Hospital Problems    Diagnosis  POA   • Ataxia [R27.0]  Yes   • Hypokalemia [E87.6]  Yes   • Accelerated hypertension [I10]  Yes   • PMR (polymyalgia rheumatica) (CMS/HCC) [M35.3]  Yes   • Osteoporosis [M81.0]  Yes   • Hyponatremia [E87.1]  Yes   • Benign essential tremor [G25.0]  Yes   • Chronic diastolic heart failure (CMS/HCC) [I50.32]  Yes   • Aortic valve stenosis [I35.0]  Yes   • Hypertension [I10]  Yes   • Hypothyroidism (acquired) [E03.9]  Yes   • Restless leg  syndrome [G25.81]  Yes   • Vitamin D deficiency [E55.9]  Yes      Resolved Hospital Problems   No resolved problems to display.        Brief Hospital Course to date:  Zohra Hall is a 79 y.o. female with history of hypothyroidism, hypertension, chronic diastolic heart failure, prior lacunar strokes with benign essential tremor presents with ataxia, elevated blood pressure, and worsening memory.     This patient's problems and plans were partially entered by my partner and updated as appropriate by me 11/02/20.     Ataxia, accelerated hypertension with change in mental status  -CT head negative  -MRI negative  -Echo reviewed, EF 55%, no significant valvular disease   -Neurology has seen in consult, possible mild toxic metabolic encephalopathy in the setting of uncontrolled HTN and hypothyroidism   -Cymbalta dose cut in half on admission   -Phenobarb and Primidone level ordered by Neuro. Phenobarb level low but patient does not take this medication. Primidone level is pending.   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  -TSH elevated at 18 on admission   -Synthroid dose increased from 112mcg to 125mg on admission -- will need repeat labs in 4-6 weeks   -Pt also ordered 4 doses of Cytomel      Memory loss  -Multifactorial as above     History of PMR   -Sed rate normal on admission  -In review of chart, patient was prescribed a Prednisone taper on 9/16 for a total of 12 days by her PCP  -Concern for flare on admission so she was started on Prednisone 20mg daily - decreased to 10 mg daily x 3 days      DVT prophylaxis: Heparin subcu     Disposition: I expect the patient to be  discharged TBD; rehab referrals pending     CODE STATUS:   Code Status and Medical Interventions:   Ordered at: 10/31/20 0419     Code Status:    CPR     Medical Interventions (Level of Support Prior to Arrest):    Full       Adelina Rooney DO  11/02/20

## 2020-11-02 NOTE — PLAN OF CARE
Goal Outcome Evaluation:  Plan of Care Reviewed With: patient  Progress: improving  Outcome Summary: Pt VSS this shift. SBP improved. A&Ox4 with intermittent confusion. NSR. RA. Activity encouraged. Denies c/o pain. Will continue to monitor.

## 2020-11-03 LAB
ANION GAP SERPL CALCULATED.3IONS-SCNC: 9 MMOL/L (ref 5–15)
BUN SERPL-MCNC: 19 MG/DL (ref 8–23)
BUN/CREAT SERPL: 31.1 (ref 7–25)
CALCIUM SPEC-SCNC: 9.2 MG/DL (ref 8.6–10.5)
CHLORIDE SERPL-SCNC: 99 MMOL/L (ref 98–107)
CO2 SERPL-SCNC: 25 MMOL/L (ref 22–29)
CORTIS SERPL-MCNC: 10.32 MCG/DL
CREAT SERPL-MCNC: 0.61 MG/DL (ref 0.57–1)
GFR SERPL CREATININE-BSD FRML MDRD: 94 ML/MIN/1.73
GLUCOSE SERPL-MCNC: 111 MG/DL (ref 65–99)
POTASSIUM SERPL-SCNC: 3.8 MMOL/L (ref 3.5–5.2)
SODIUM SERPL-SCNC: 133 MMOL/L (ref 136–145)

## 2020-11-03 PROCEDURE — 99232 SBSQ HOSP IP/OBS MODERATE 35: CPT | Performed by: INTERNAL MEDICINE

## 2020-11-03 PROCEDURE — 80048 BASIC METABOLIC PNL TOTAL CA: CPT | Performed by: INTERNAL MEDICINE

## 2020-11-03 PROCEDURE — 92507 TX SP LANG VOICE COMM INDIV: CPT

## 2020-11-03 PROCEDURE — 82533 TOTAL CORTISOL: CPT | Performed by: INTERNAL MEDICINE

## 2020-11-03 PROCEDURE — 25010000002 HEPARIN (PORCINE) PER 1000 UNITS: Performed by: INTERNAL MEDICINE

## 2020-11-03 RX ADMIN — PRIMIDONE 125 MG: 250 TABLET ORAL at 21:26

## 2020-11-03 RX ADMIN — HEPARIN SODIUM 5000 UNITS: 5000 INJECTION INTRAVENOUS; SUBCUTANEOUS at 21:20

## 2020-11-03 RX ADMIN — AMLODIPINE BESYLATE 10 MG: 10 TABLET ORAL at 09:21

## 2020-11-03 RX ADMIN — ATENOLOL 50 MG: 50 TABLET ORAL at 09:21

## 2020-11-03 RX ADMIN — LEVOTHYROXINE SODIUM 125 MCG: 125 TABLET ORAL at 06:15

## 2020-11-03 RX ADMIN — FAMOTIDINE 20 MG: 20 TABLET, FILM COATED ORAL at 17:16

## 2020-11-03 RX ADMIN — HEPARIN SODIUM 5000 UNITS: 5000 INJECTION INTRAVENOUS; SUBCUTANEOUS at 09:22

## 2020-11-03 RX ADMIN — TERAZOSIN HYDROCHLORIDE 1 MG: 1 CAPSULE ORAL at 21:20

## 2020-11-03 RX ADMIN — ENALAPRIL MALEATE 20 MG: 10 TABLET ORAL at 09:22

## 2020-11-03 RX ADMIN — LIOTHYRONINE SODIUM 25 MCG: 25 TABLET ORAL at 09:21

## 2020-11-03 RX ADMIN — PRIMIDONE 50 MG: 50 TABLET ORAL at 06:15

## 2020-11-03 RX ADMIN — DULOXETINE HYDROCHLORIDE 30 MG: 30 CAPSULE, DELAYED RELEASE ORAL at 09:21

## 2020-11-03 RX ADMIN — FAMOTIDINE 20 MG: 20 TABLET, FILM COATED ORAL at 06:15

## 2020-11-03 RX ADMIN — SODIUM CHLORIDE, PRESERVATIVE FREE 10 ML: 5 INJECTION INTRAVENOUS at 21:20

## 2020-11-03 RX ADMIN — SODIUM CHLORIDE, PRESERVATIVE FREE 10 ML: 5 INJECTION INTRAVENOUS at 09:22

## 2020-11-03 RX ADMIN — ATORVASTATIN CALCIUM 40 MG: 40 TABLET, FILM COATED ORAL at 21:20

## 2020-11-03 NOTE — PLAN OF CARE
Alert and oriented.  Respirations unlabored.  Monitor SR.  Purewick in place.  Pivot to recliner 1 assist.  Stated she felt like her legs were weak.  Denied pain.  Family visited this am.

## 2020-11-03 NOTE — THERAPY TREATMENT NOTE
"Acute Care - Speech Language Pathology Treatment Note  [facility]     Patient Name: Zohra Hall  : 1940  MRN: [id number]  Today's Date: 11/3/2020               Admit Date: 10/30/2020     Visit Dx:    ICD-10-CM ICD-9-CM   1. Generalized weakness  R53.1 780.79   2. Ataxic gait  R26.0 781.2   3. Multiple falls  R29.6 V15.88   4. History of TIA (transient ischemic attack)  Z86.73 V12.54   5. Hypertensive urgency  I16.0 401.9   6. Hypokalemia  E87.6 276.8   7. Elevated TSH  R79.89 794.5   8. History of hypothyroidism  Z86.39 V12.29   9. Cognitive communication deficit  R41.841 799.52     Patient Active Problem List   Diagnosis   • Generalized osteoarthritis   • Iron deficiency   • Vitamin D deficiency   • Skin neoplasm   • Sialadenitis   • Restless leg syndrome   • Reaction to chronic stress   • Piriformis syndrome   • Papillary adenocarcinoma of thyroid (CMS/HCC)   • Hypothyroidism (acquired)   • Hypertension   • GERD without esophagitis   • Dyslipidemia   • Cervical lymphadenopathy   • Cellulitis   • Atherosclerosis of aorta (CMS/HCC)   • Breast mass   • Aortic valve stenosis   • Incontinence of bowel   • Chronic diastolic heart failure (CMS/HCC)   • Acute right-sided low back pain without sciatica   • Benign essential tremor   • Pain of right hip joint   • Thyroid cancer (CMS/HCC)   • Lacunar stroke (CMS/HCC)   • Nontraumatic rupture of extensor tendons of right hand and wrist   • Hyponatremia   • Actinic keratosis   • Transient ischemic attack   • Ataxia   • Hypokalemia   • Accelerated hypertension   • PMR (polymyalgia rheumatica) (CMS/HCC)   • Osteoporosis     Past Medical History:   Diagnosis Date   • Breast cancer (CMS/HCC)     left- pt states \"in situ\", no radiation, Tamoxifen for 5 years   • Cellulitis    • Cervical lymphadenopathy    • Chest pain    • Convulsions (CMS/HCC)    • GERD (gastroesophageal reflux disease)    • Glossitis    • Grief reaction     · Last Impression: 2015  " counselled, given ativan for prn use.  Aleah Regan   • Hypertension    • Lower back pain    • Oral thrush    • Papillary adenocarcinoma (CMS/HCC)     Papillary adenocarcinoma of thyroid   • Papillary adenocarcinoma of thyroid (CMS/HCC)     · resection Ramirez- 1/13,  2 x 2 x 1.5 cm  T3 N1 MXpost op low calcium and diminished  voiceablation Ain- 3/7 metastatic nodes and invasion to strap muscles · Last Impression: 29 Oct 2014  s/p Eval by berry Méndez.  Aleah Regan (Internal   • Piriformis syndrome    • Tingling    • Xerostomia      Past Surgical History:   Procedure Laterality Date   • BREAST BIOPSY Right 10/10/2013    stereo bx   • BREAST BIOPSY Left 10/19/2015    stereo bx   • BREAST CYST EXCISION      right   • BREAST EXCISIONAL BIOPSY Right     affirm bx and loc 2016.   • BREAST LUMPECTOMY Left 1987   • HYSTERECTOMY  1979   • TOTAL HIP ARTHROPLASTY     • TOTAL THYROIDECTOMY      Thyroid Surgery Total Thyroidectomy        SLP EVALUATION (last 72 hours)      SLP SLC Evaluation     Row Name 11/03/20 1500 11/01/20 1100                Communication Assessment/Intervention    Document Type  therapy note (daily note)  -AC  evaluation  -AW       Subjective Information  no complaints  -AC  no complaints  -AW       Patient Observations  alert;cooperative  -AC  alert;cooperative  -AW       Patient/Family/Caregiver Comments/Observations  No family present.  -AC  no family present  -AW       Care Plan Review  evaluation/treatment results reviewed;care plan/treatment goals reviewed;risks/benefits reviewed;current/potential barriers reviewed;patient/other agree to care plan  -AC  evaluation/treatment results reviewed  -AW       Patient Effort  good  -AC  good  -AW       Symptoms Noted During/After Treatment  --  none  -AW          General Information    Patient Profile Reviewed  --  yes  -AW       Pertinent History Of Current Problem  --  stroke path, no new stroke on MRI  -AW       Precautions/Limitations,  Vision  --  WFL with corrective lenses  -AW       Precautions/Limitations, Hearing  --  L  -AW       Prior Level of Function-Communication  --  WFL;other (see comments) pt has noticed a memory decline over past 6 months  -AW       Plans/Goals Discussed with  --  patient  -AW       Barriers to Rehab  --  none identified  -       Patient's Goals for Discharge  --  return to home;other (see comments) would like to look in to Assisted Living  -          Pain Scale: Numbers Pre/Post-Treatment    Pretreatment Pain Rating  0/10 - no pain  -AC  0/10 - no pain  -AW       Posttreatment Pain Rating  0/10 - no pain  -AC  0/10 - no pain  -AW          Comprehension Assessment/Intervention    Comprehension Assessment/Intervention  --  Auditory Comprehension  -          Auditory Comprehension Assessment/Intervention    Auditory Comprehension (Communication)  --  [facility]  -       Answers Questions (Communication)  --  yes/no;wh questions;personal;simple;complex;[facility]  -       Able to Follow Commands (Communication)  --  [facility]  -       Narrative Discourse  --  conversational level;[facility]  -          Expression Assessment/Intervention    Expression Assessment/Intervention  --  verbal expression  -          Verbal Expression Assessment/Intervention    Verbal Expression  --  mild impairment  -       Automatic Speech (Communication)  --  response to greeting;[facility]  -       Spontaneous/Functional Words  --  complex;mild impairment intermittent word finding difficulty with proper nouns  -       Sentence Formulation  --  [facility]  -       Conversational Discourse/Fluency  --  [facility]  -          Oral Motor Structure and Function    Oral Motor Structure and Function  --  [facility]       Dentition Assessment  --  natural, present and adequate  -          Oral Musculature and Cranial Nerve Assessment    Oral Motor General Assessment  --  [facility]  -          Motor Speech Assessment/Intervention    Motor Speech Function  --  [facility]  -           Cognitive Assessment Intervention- SLP    Cognitive Function (Cognition)  --  mild impairment  -AW       Orientation Status (Cognition)  --  awareness of basic personal information;person;place;situation;WFL;time;mild impairment difficulty recalling date - needed to look at board in room  -AW       Memory (Cognitive)  --  short-term;mild impairment pt reports difficulty with remembering recent hours, days  -AW       Attention (Cognitive)  --  sustained;WFL  -AW       Reasoning (Cognitive)  --  simple;WFL  -AW       Executive Function (Cognition)  --  deficit awareness;WFL  -AW       Pragmatics (Communication)  --  eye contact;topic maintenance;turn taking;WFL  -AW          SLP Clinical Impressions    Daily Summary of Progress (SLP)  progress toward functional goals as expected  -AC  --       SLP Diagnosis  --  mild memory and word finding difficulty  -AW       Rehab Potential/Prognosis  --  good  -AW       SLC Criteria for Skilled Therapy Interventions Met  --  yes  -AW       Functional Impact  --  functional impact in social situations;functional impact in ADLs  -AW       Plan for Continued Treatment (SLP)  Pt cont to exhibit cognitive-linguistic deficits affecting word retrieval and memory. Pt would benefit from cont'd tx and will likely need further SLP services @ next level of care.  -AC  --          Recommendations    Therapy Frequency (SLP SLC)  3 days per week  -AC  at least;3 days per week  -AW       Predicted Duration Therapy Intervention (Days)  until discharge  -AC  until discharge  -AW       Anticipated Discharge Disposition (SLP)  anticipate therapy at next level of care;inpatient rehabilitation facility  -AC  home with home health pt would benefit from  SLP services  -AW          Communication Treatment Objective and Progress Goals (SLP)    Verbal Expression Treatment Objectives  --  Verbal Expression Treatment Objectives (Group)  -AW       Cognitive Linguistic Treatment Objectives  --  Cognitive  Linguistic Treatment Objectives (Group)  -AW          Verbal Expression Treatment Objectives    Word Retrieval Skills Selection  --  word retrieval, SLP goal 1  -AW          Word Retrieval Skills Goal 1 (SLP)    Improve Word Retrieval Skills By Goal 1 (SLP)  --  repeating sentences;over 10 words;confrontational naming task;low frequency  -AW       Time Frame (Word Retrieval Goal 1, SLP)  --  by discharge  -AW          Cognitive Linguistic Treatment Objectives    Orientation Selection  --  orientation, SLP goal 1  -AW       Memory Skills Selection  --  memory skills, SLP goal 1  -AW          Orientation Goal 1 (SLP)    Improve Orientation Through Goal 1 (SLP)  --  demonstrating orientation to month;demonstrating orientation to day;demonstrating orientation to year;90%;independently (over 90% accuracy)  -AW       Time Frame (Orientation Goal 1, SLP)  --  by discharge  -AW          Memory Skills Goal 1 (SLP)    Improve Memory Skills Through Goal 1 (SLP)  --  recalling related word lists with an imposed delay;select a word from a list by exclusion;use memory strategies;90%;independently (over 90% accuracy)  -AW       Time Frame (Memory Skills Goal 1, SLP)  --  by discharge  -AW         User Key  (r) = Recorded By, (t) = Taken By, (c) = Cosigned By    Initials Name Effective Dates    AC Rachel Carballo, MS CCC-SLP 07/27/17 -     AW Audrey Arguello, MS CCC-SLP 06/22/15 -              EDUCATION  The patient has been educated in the following areas:     Cognitive Impairment Communication Impairment.    SLP Recommendation and Plan              Anticipated Discharge Disposition (SLP): anticipate therapy at next level of care, inpatient rehabilitation facility        Predicted Duration Therapy Intervention (Days): until discharge  Daily Summary of Progress (SLP): progress toward functional goals as expected  Plan for Continued Treatment (SLP): Pt cont to exhibit cognitive-linguistic deficits affecting word retrieval and  memory. Pt would benefit from cont'd tx and will likely need further SLP services @ next level of care.             Plan of Care Reviewed With: patient      SLP GOALS     Row Name 11/03/20 1500 11/01/20 1100          Word Retrieval Skills Goal 1 (SLP)    Improve Word Retrieval Skills By Goal 1 (SLP)  repeating sentences;over 10 words;low frequency;completing a divergent task;90%;with minimal cues (75-90%)  -AC  repeating sentences;over 10 words;confrontational naming task;low frequency  -AW     Time Frame (Word Retrieval Goal 1, SLP)  short term goal (STG)  -AC  by discharge  -AW     Progress (Word Retrieval Skills Goal 1, SLP)  30%;with minimal cues (75-90%)  -AC  --     Progress/Outcomes (Word Retrieval Goal 1, SLP)  continuing progress toward goal;goal revised this date  -AC  --     Comment (Word Retrieval Goal 1, SLP)  Pt met goal for confrontational naming. Able to complete concrete divergent naming tasks w/ 30% acc.   -AC  --        Orientation Goal 1 (SLP)    Improve Orientation Through Goal 1 (SLP)  demonstrating orientation to month;demonstrating orientation to day;demonstrating orientation to year;90%;independently (over 90% accuracy)  -AC  demonstrating orientation to month;demonstrating orientation to day;demonstrating orientation to year;90%;independently (over 90% accuracy)  -AW     Time Frame (Orientation Goal 1, SLP)  by discharge  -AC  by discharge  -AW     Progress (Orientation Goal 1, SLP)  100%;independently (over 90% accuracy);other (comment) using external aids/calendar assist  -AC  --     Progress/Outcomes (Orientation Goal 1, SLP)  goal met  -AC  --        Memory Skills Goal 1 (SLP)    Improve Memory Skills Through Goal 1 (SLP)  recalling related word lists with an imposed delay;select a word from a list by exclusion;use memory strategies;90%;independently (over 90% accuracy)  -AC  recalling related word lists with an imposed delay;select a word from a list by exclusion;use memory  strategies;90%;independently (over 90% accuracy)  -AW     Time Frame (Memory Skills Goal 1, SLP)  short term goal (STG);by discharge  -AC  by discharge  -AW     Progress/Outcomes (Memory Skills Goal 1, SLP)  goal ongoing  -AC  --        Additional Goal 1 (SLP)    Additional Goal 1, SLP  LTG: Pt will improve cognitive-communication skills in order to return to prior home environment/responsibilities w/ 100% acc w/o cues.  -AC  --     Time Frame (Additional Goal 1, SLP)  by discharge  -AC  --     Progress/Outcomes (Additional Goal 1, SLP)  continuing progress toward goal  -AC  --       User Key  (r) = Recorded By, (t) = Taken By, (c) = Cosigned By    Initials Name Provider Type    Rachel Macias MS CCC-SLP Speech and Language Pathologist    Audrey Larkin MS CCC-SLP Speech and Language Pathologist                  Time Calculation:     Time Calculation- SLP     Row Name 11/03/20 1526             Time Calculation- SLP    SLP Start Time  1500  -AC      SLP Received On  11/03/20  -AC        User Key  (r) = Recorded By, (t) = Taken By, (c) = Cosigned By    Initials Name Provider Type    Rachel Macias MS CCC-SLP Speech and Language Pathologist          Therapy Charges for Today     Code Description Service Date Service Provider Modifiers Qty    [id number]  ST TREATMENT SPEECH 2 11/3/2020 Rachel Carballo MS CCC-SLP GN 1        Patient was not wearing a face mask and did not exhibit coughing during this therapy encounter.  Procedure performed was not aerosolizing, involved close contact (within 6 feet for at least 15 minutes or longer), and did not involve contact with infectious secretions or specimens.  Therapist used appropriate personal protective equipment including gloves, standard procedure mask, eye protection and N95 mask.  Appropriate PPE was worn during the entire therapy session.  Hand hygiene was completed before and after therapy session.                MS IRENA Matos  11/3/2020

## 2020-11-03 NOTE — PROGRESS NOTES
Continued Stay Note  [facility]     Patient Name: Zohra Hall  MRN: [id number]  Today's Date: 11/3/2020    Admit Date: 10/30/2020    Discharge Plan     Row Name 11/03/20 1235       Plan    Plan  [facility]    Patient/Family in Agreement with Plan  yes    Plan Comments  Spoke with patient at bedside. Plan is [facility] tomorrow. [facility] van is scheduled for 11:30.  will continue to follow.    Final Discharge Disposition Code  62 - inpatient rehab facility        Discharge Codes    No documentation.             Boaz Peres RN

## 2020-11-03 NOTE — PLAN OF CARE
Problem: Adult Inpatient Plan of Care  Goal: Plan of Care Review  Outcome: Ongoing, Progressing  Flowsheets (Taken 11/3/2020 3196)  Plan of Care Reviewed With: patient   SLP treatment completed. Will continue to address cognitive-linguistic deficits in tx. Please see note for further details and recommendations.

## 2020-11-03 NOTE — PROGRESS NOTES
[facility] Medicine Services  PROGRESS NOTE    Patient Name: Zohra Hall  : 1940  MRN: [id number]    Date of Admission: 10/30/2020  Primary Care Physician: Aleah Regan MD    Subjective   Subjective     CC:  Ataxia, tremor, HTN     HPI:  States she feels better today. Feels ready for rehab and would like to get up and moving.     Review of Systems  Gen- No fevers, chills  CV- No chest pain, palpitations  Resp- No cough, dyspnea  GI- No N/V/D, abd pain     Objective   Objective     Vital Signs:   Temp:  [97.8 °F (36.6 °C)-98.3 °F (36.8 °C)] 98.3 °F (36.8 °C)  Heart Rate:  [60-87] 71  Resp:  [18] 18  BP: (124-185)/(62-88) 133/70        Physical Exam:  Constitutional: No acute distress, awake, alert  HENT: NCAT, mucous membranes moist  Respiratory: Clear to auscultation bilaterally, respiratory effort normal   Cardiovascular: RRR, no murmurs  Gastrointestinal: Positive bowel sounds, soft, nontender, nondistended  Musculoskeletal: No bilateral ankle edema  Psychiatric: Appropriate affect, cooperative  Neurologic: Oriented x 3, strength symmetric in all extremities, Cranial Nerves grossly intact to confrontation, speech clear; upper ext tremor   Skin: No rashes    Results Reviewed:  Results from last 7 days   Lab Units 20  1024 10/30/20  1704   WBC 10*3/mm3 8.88 6.69   HEMOGLOBIN g/dL 15.2 14.3   HEMATOCRIT % 47.3* 43.7   PLATELETS 10*3/mm3 292 257     Results from last 7 days   Lab Units 20  0856 20  1549 20  0930  10/30/20  1704   SODIUM mmol/L 133* 132* 127*   < > 134*   POTASSIUM mmol/L 3.8 4.2 3.8   < > 3.2*   CHLORIDE mmol/L 99 98 95*   < > 94*   CO2 mmol/L 25.0 23.0 24.0   < > 30.0*   BUN mg/dL 19 24* 22   < > 19   CREATININE mg/dL 0.61 0.78 0.74   < > 0.88   GLUCOSE mg/dL 111* 110* 128*   < > 122*   CALCIUM mg/dL 9.2 8.8 9.2   < > 9.9   ALT (SGPT) U/L  --   --   --   --  10   AST (SGOT) U/L  --   --   --   --  14   TROPONIN T ng/mL  --   --    --   --  <0.010    < > = values in this interval not displayed.     Estimated Creatinine Clearance: 55.8 mL/min (by C-G formula based on SCr of 0.61 mg/dL).    Microbiology Results Abnormal     Procedure Component Value - Date/Time    COVID PRE-OP / PRE-PROCEDURE SCREENING ORDER (NO ISOLATION) - Swab, Nasopharynx [[id number]]  (Normal) Collected: 10/31/20 0225    Lab Status: Final result Specimen: Swab from Nasopharynx Updated: 10/31/20 0435    Narrative:      The following orders were created for panel order COVID PRE-OP / PRE-PROCEDURE SCREENING ORDER (NO ISOLATION) - Swab, Nasopharynx.  Procedure                               Abnormality         Status                     ---------                               -----------         ------                     Respiratory Panel PCR w/...[[id number]]  Normal              Final result                 Please view results for these tests on the individual orders.    Respiratory Panel PCR w/COVID-19(SARS-CoV-2) [facility] In-House, NP Swab in UTM/VTM, 3-4 HR TAT - Swab, Nasopharynx [[id number]]  (Normal) Collected: 10/31/20 0225    Lab Status: Final result Specimen: Swab from Nasopharynx Updated: 10/31/20 0435     ADENOVIRUS, PCR Not Detected     Coronavirus 229E Not Detected     Coronavirus HKU1 Not Detected     Coronavirus NL63 Not Detected     Coronavirus OC43 Not Detected     COVID19 Not Detected     Human Metapneumovirus Not Detected     Human Rhinovirus/Enterovirus Not Detected     Influenza A PCR Not Detected     Influenza A H1 Not Detected     Influenza A H1 2009 PCR Not Detected     Influenza A H3 Not Detected     Influenza B PCR Not Detected     Parainfluenza Virus 1 Not Detected     Parainfluenza Virus 2 Not Detected     Parainfluenza Virus 3 Not Detected     Parainfluenza Virus 4 Not Detected     RSV, PCR Not Detected     Bordetella pertussis pcr Not Detected     Bordetella parapertussis PCR Not Detected     Chlamydophila pneumoniae PCR Not  Detected     Mycoplasma pneumo by PCR Not Detected    Narrative:      Fact sheet for providers: https://docs.[employer]/wp-content/uploads/DUJ5346-3543-WL7.1-EUA-Provider-Fact-Sheet-3.pdf    Fact sheet for patients: https://docs.[employer]/wp-content/uploads/TPT1811-6455-UQ3.1-EUA-Patient-Fact-Sheet-1.pdf          Imaging Results (Last 24 Hours)     ** No results found for the last 24 hours. **          Results for orders placed during the hospital encounter of 10/30/20   Adult Transthoracic Echo Complete W/ Cont if Necessary Per Protocol    Narrative · Calculated left ventricular EF = 55%  · Left ventricular systolic function is normal.  · Left ventricular diastolic function is consistent with (grade Ia w/high   LAP) impaired relaxation.  · The left atrial cavity is mild to moderately dilated.  · No significant structural or functional valvular disease.          I have reviewed the medications:  Scheduled Meds:amLODIPine, 10 mg, Oral, Daily  atenolol, 50 mg, Oral, Daily  atorvastatin, 40 mg, Oral, Nightly  DULoxetine, 30 mg, Oral, Daily  enalapril, 20 mg, Oral, Daily  famotidine, 20 mg, Oral, BID AC  heparin (porcine), 5,000 Units, Subcutaneous, Q12H  levothyroxine, 125 mcg, Oral, Q AM  primidone, 125 mg, Oral, Nightly  primidone, 50 mg, Oral, Q AM  terazosin, 1 mg, Oral, Nightly      Continuous Infusions:   PRN Meds:.•  acetaminophen  •  labetalol  •  potassium chloride **OR** potassium chloride **OR** potassium chloride  •  sodium chloride    Assessment/Plan   Assessment & Plan     Active Hospital Problems    Diagnosis  POA   • Ataxia [R27.0]  Yes   • Hypokalemia [E87.6]  Yes   • Accelerated hypertension [I10]  Yes   • PMR (polymyalgia rheumatica) (CMS/HCC) [M35.3]  Yes   • Osteoporosis [M81.0]  Yes   • Hyponatremia [E87.1]  Yes   • Benign essential tremor [G25.0]  Yes   • Chronic diastolic heart failure (CMS/HCC) [I50.32]  Yes   • Aortic valve stenosis [I35.0]  Yes   • Hypertension [I10]  Yes   •  Hypothyroidism (acquired) [E03.9]  Yes   • Restless leg syndrome [G25.81]  Yes   • Vitamin D deficiency [E55.9]  Yes      Resolved Hospital Problems   No resolved problems to display.        Brief Hospital Course to date:  Zohra Hall is a 79 y.o. female with history of hypothyroidism, hypertension, chronic diastolic heart failure, prior lacunar strokes with benign essential tremor presents with ataxia, elevated blood pressure, and worsening memory.     This patient's problems and plans were partially entered by my partner and updated as appropriate by me 11/03/20.     Ataxia, accelerated hypertension with change in mental status  -CT head negative  -MRI negative  -Echo reviewed, EF 55%, no significant valvular disease   -Neurology has seen in consult, possible mild toxic metabolic encephalopathy in the setting of uncontrolled HTN and hypothyroidism   -Cymbalta dose cut in half on admission   -Phenobarb and Primidone level ordered by Neuro. Phenobarb level low but patient does not take this medication. Primidone level is pending.   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  -TSH elevated at 18 on admission   -Synthroid dose increased from 112mcg to 125mg on admission -- will need repeat labs in 4-6 weeks   -Pt also completed 4 doses of Cytomel      Memory loss  -Multifactorial as above     History of PMR   -Sed rate normal on admission  -In review of chart, patient was prescribed a Prednisone taper on 9/16 for a total of 12 days by her PCP  -Concern for flare on admission so she was started on Prednisone 20mg daily - decreased to 10 mg daily x 3 days      DVT prophylaxis:  Heparin subcu     Disposition: I expect the patient to be discharged tomorrow        CODE STATUS:   Code Status and Medical Interventions:   Ordered at: 10/31/20 0419     Code Status:    CPR     Medical Interventions (Level of Support Prior to Arrest):    Full       Adelina Rooney DO  11/03/20

## 2020-11-04 PROBLEM — I10 ACCELERATED HYPERTENSION: Status: RESOLVED | Noted: 2020-10-31 | Resolved: 2020-11-04

## 2020-11-04 PROBLEM — E87.6 HYPOKALEMIA: Status: RESOLVED | Noted: 2020-10-31 | Resolved: 2020-11-04

## 2020-11-04 LAB
GLUCOSE BLDC GLUCOMTR-MCNC: 111 MG/DL (ref 70–130)
PHENOBARB SERPL-MCNC: 5 UG/ML (ref 15–40)
PRIMIDONE SERPL-MCNC: 4.1 UG/ML (ref 5–12)

## 2020-11-04 PROCEDURE — 25010000002 HEPARIN (PORCINE) PER 1000 UNITS: Performed by: INTERNAL MEDICINE

## 2020-11-04 PROCEDURE — 82962 GLUCOSE BLOOD TEST: CPT

## 2020-11-04 PROCEDURE — 99239 HOSP IP/OBS DSCHRG MGMT >30: CPT | Performed by: INTERNAL MEDICINE

## 2020-11-04 RX ORDER — PRIMIDONE 50 MG/1
50 TABLET ORAL DAILY
Status: ON HOLD
Start: 2020-11-04 | End: 2021-01-05 | Stop reason: SDUPTHER

## 2020-11-04 RX ORDER — AMLODIPINE BESYLATE 10 MG/1
10 TABLET ORAL DAILY
Start: 2020-11-05 | End: 2021-03-03 | Stop reason: SDUPTHER

## 2020-11-04 RX ORDER — PRIMIDONE 250 MG/1
125 TABLET ORAL NIGHTLY
Start: 2020-11-04 | End: 2021-02-15

## 2020-11-04 RX ORDER — TERAZOSIN 1 MG/1
1 CAPSULE ORAL NIGHTLY
Start: 2020-11-04 | End: 2021-01-05 | Stop reason: HOSPADM

## 2020-11-04 RX ORDER — LEVOTHYROXINE SODIUM 0.12 MG/1
125 TABLET ORAL DAILY
Start: 2020-11-04 | End: 2021-01-17

## 2020-11-04 RX ORDER — DULOXETIN HYDROCHLORIDE 30 MG/1
30 CAPSULE, DELAYED RELEASE ORAL DAILY
Start: 2020-11-05 | End: 2021-03-03 | Stop reason: SDUPTHER

## 2020-11-04 RX ORDER — FAMOTIDINE 20 MG/1
20 TABLET, FILM COATED ORAL
Start: 2020-11-04 | End: 2021-04-14 | Stop reason: SDUPTHER

## 2020-11-04 RX ADMIN — DULOXETINE HYDROCHLORIDE 30 MG: 30 CAPSULE, DELAYED RELEASE ORAL at 08:30

## 2020-11-04 RX ADMIN — ATORVASTATIN CALCIUM 40 MG: 40 TABLET, FILM COATED ORAL at 21:47

## 2020-11-04 RX ADMIN — SODIUM CHLORIDE, PRESERVATIVE FREE 10 ML: 5 INJECTION INTRAVENOUS at 08:30

## 2020-11-04 RX ADMIN — FAMOTIDINE 20 MG: 20 TABLET, FILM COATED ORAL at 17:22

## 2020-11-04 RX ADMIN — LEVOTHYROXINE SODIUM 125 MCG: 125 TABLET ORAL at 06:30

## 2020-11-04 RX ADMIN — AMLODIPINE BESYLATE 10 MG: 10 TABLET ORAL at 08:30

## 2020-11-04 RX ADMIN — HEPARIN SODIUM 5000 UNITS: 5000 INJECTION INTRAVENOUS; SUBCUTANEOUS at 08:30

## 2020-11-04 RX ADMIN — PRIMIDONE 125 MG: 250 TABLET ORAL at 21:47

## 2020-11-04 RX ADMIN — ATENOLOL 50 MG: 50 TABLET ORAL at 08:30

## 2020-11-04 RX ADMIN — PRIMIDONE 50 MG: 50 TABLET ORAL at 06:29

## 2020-11-04 RX ADMIN — TERAZOSIN HYDROCHLORIDE 1 MG: 1 CAPSULE ORAL at 21:47

## 2020-11-04 RX ADMIN — HEPARIN SODIUM 5000 UNITS: 5000 INJECTION INTRAVENOUS; SUBCUTANEOUS at 21:47

## 2020-11-04 RX ADMIN — FAMOTIDINE 20 MG: 20 TABLET, FILM COATED ORAL at 06:30

## 2020-11-04 RX ADMIN — ENALAPRIL MALEATE 20 MG: 10 TABLET ORAL at 08:30

## 2020-11-04 NOTE — PROGRESS NOTES
Continued Stay Note  [facility]     Patient Name: Zohra Hall  MRN: [id number]  Today's Date: 11/4/2020    Admit Date: 10/30/2020    Discharge Plan     Row Name 11/04/20 1411       Plan    Plan  [facility]    Patient/Family in Agreement with Plan  yes    Plan Comments  Plan is [facility] tomorrow. [facility] van is shceduled for 11:30 on Thu. 11/5. Patient was not able to transfer today due to a vagal response when transport came to get her. CM will continue to follow.    Final Discharge Disposition Code  62 - inpatient rehab facility        Discharge Codes    No documentation.       Expected Discharge Date and Time     Expected Discharge Date Expected Discharge Time    Nov 4, 2020             Boaz Peres RN

## 2020-11-04 NOTE — PROGRESS NOTES
Case Management Discharge Note      Final Note: Plan is [facility] CVA1. [facility] van is scheduled for 11:30. Please put patient in for transport early enough to have her in the 1700 building lobby by 11:20. Nurse to call report to [phone] and fax discharge summary to [phone]. Please send any hard scripts for medications with the patient in the transfer packet.    Provided Post Acute Provider List?: Yes  Post Acute Provider List: Inpatient Rehab  Provided Post Acute Provider Quality & Resource List?: Yes  Post Acute Provider Quality and Resource List: Inpatient Rehab  Delivered To: Patient  Method of Delivery: In person    Selected Continued Care - Admitted Since 10/30/2020     Destination Coordination complete    Service Provider Selected Services Address Phone Fax    [facility]  Inpatient Rehabilitation [address] [facility] [address] [phone]          Durable Medical Equipment    No services have been selected for the patient.              Dialysis/Infusion    No services have been selected for the patient.              Home Medical Care    No services have been selected for the patient.              Therapy    No services have been selected for the patient.              Community Resources    No services have been selected for the patient.                       Final Discharge Disposition Code: 62 - inpatient rehab facility

## 2020-11-04 NOTE — DISCHARGE PLACEMENT REQUEST
"Jamie Gonzalez (79 y.o. Female)     Date of Birth Social Security Number Address Home Phone MRN    1940  [address] [place] [address] [phone] [id number]    [employer] Marital Status          Episcopalian        Admission Date Admission Type Admitting Provider Attending Provider Department, Room/Bed    10/30/20 Emergency Adelina Rooney DO Roesch, Lyndsey, DO [facility] 3F, S314/1    Discharge Date Discharge Disposition Discharge Destination         Home or Self Care              Attending Provider: Adelina Rooney DO    Allergies: Dilaudid [Hydromorphone Hcl], Lactose Intolerance (Gi)    Isolation: None   Infection: None   Code Status: CPR    Ht: 165.1 cm (65\")   Wt: 64.1 kg (141 lb 6.4 oz)    Admission Cmt: None   Principal Problem: None                Active Insurance as of 10/30/2020     Primary Coverage     Payor Plan Insurance Group Employer/Plan Group    MEDICARE MEDICARE A & B      Payor Plan Address Payor Plan Phone Number Payor Plan Fax Number Effective Dates    [address] [phone]  11/1/2003 - None Entered    [facility] [id number]       Subscriber Name Subscriber Birth Date Member ID       JAMIE GONZALEZ 1940 [id number]           Secondary Coverage     Payor Plan Insurance Group Employer/Plan Group    [employer] MC SUP AAR HEALTH CARE OPTIONS      Payor Plan Address Payor Plan Phone Number Payor Plan Fax Number Effective Dates    [facility] [phone]  1/1/2016 - None Entered    [address]       [facility] [id number]       Subscriber Name Subscriber Birth Date Member ID       JAMIE GONZALEZ 1940 [id number]                 Emergency Contacts      (Rel.) Home Phone Work Phone Mobile Phone    Simón Diaz (Son) [phone] -- --               Discharge Summary      Adelina Rooney DO at 11/04/20 0931              [facility] Medicine Services  DISCHARGE SUMMARY    Patient Name: Jamie CALVERT " Isabel  : 1940  MRN: [id number]    Date of Admission: 10/30/2020  7:43 PM  Date of Discharge:  20  Primary Care Physician: Aleah Regan MD    Consults     Date and Time Order Name Status Description    10/31/2020 0507 Inpatient Neurology Consult General Completed           Hospital Course     Presenting Problem:   Ataxia [R27.0]  Ataxia [R27.0]  Accelerated hypertension [I10]    Active Hospital Problems    Diagnosis  POA   • Ataxia [R27.0]  Yes   • PMR (polymyalgia rheumatica) (CMS/HCC) [M35.3]  Yes   • Osteoporosis [M81.0]  Yes   • Hyponatremia [E87.1]  Yes   • Benign essential tremor [G25.0]  Yes   • Chronic diastolic heart failure (CMS/[facility]) [I50.32]  Yes   • Aortic valve stenosis [I35.0]  Yes   • Hypertension [I10]  Yes   • Hypothyroidism (acquired) [E03.9]  Yes   • Restless leg syndrome [G25.81]  Yes   • Vitamin D deficiency [E55.9]  Yes      Resolved Hospital Problems    Diagnosis Date Resolved POA   • Hypokalemia [E87.6] 2020 Yes   • Accelerated hypertension [I10] 2020 Yes          Hospital Course:  Zohra Hall is a 79 y.o. female with history of hypothyroidism, hypertension, chronic diastolic heart failure, prior lacunar strokes with benign essential tremor presents with ataxia, elevated blood pressure, and worsening memory.    Ataxia, accelerated hypertension with change in mental status  - CT head negative; MRI negative   - ECHO reviewed, EF 55%, no significant valvular disease   - Neurology evaluated, possible mild toxic metabolic encephalopathy in the setting of uncontrolled HTN and hypothyroidism   - Cymbalta dose cut in half on admission; BP and thyroid medications adjusted   - Primidone level pending at time of discharge   - Patient would benefit from continued therapy and close follow up with neurology in 2-4 weeks      HTN - improved    -Increase Norvasc to 10mg daily and she was continued on home enelapril and atenolol. 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 Regan 1 week after DC from rehab   Neurology Dr. Gallagher 2-4 weeks   Repeat TSH in 4-6 weeks     Day of Discharge     HPI:   No acute events overnight. Some confusion this morning on year but easily re-oriented. Reviewed ataxia and symptoms multifactorial and her tremor is likely contributing. Attempted to call son with no answer. Patient feels comfortable and agreeable for discharge to rehab today.     Review of Systems  Gen- No fevers, chills  CV- No chest pain, palpitations  Resp- No cough, dyspnea  GI- No N/V/D, abd pain    Vital Signs:   Temp:  [98.2 °F (36.8 °C)-98.8 °F (37.1 °C)] 98.7 °F (37.1 °C)  Heart Rate:  [67-87] 69  Resp:  [18] 18  BP: (112-152)/(63-82) 112/82     Physical Exam:  Constitutional: No acute distress, awake, alert  HENT: NCAT, mucous membranes moist  Respiratory: Clear to auscultation bilaterally, respiratory effort normal   Cardiovascular: RRR, no murmurs  Gastrointestinal: Positive bowel sounds, soft, nontender, nondistended  Musculoskeletal: No bilateral ankle edema  Psychiatric: Appropriate affect, cooperative  Neurologic: Oriented x 2, strength symmetric in all extremities, Cranial  Nerves grossly intact to confrontation, speech clear; fine upper ext tremor   Skin: No rashes    Pertinent  and/or Most Recent Results     Results from last 7 days   Lab Units 11/03/20  0856 11/02/20  1549 11/02/20  0930 11/01/20  1024 10/31/20  1332 10/30/20  1704   WBC 10*3/mm3  --   --   --  8.88  --  6.69   HEMOGLOBIN g/dL  --   --   --  15.2  --  14.3   HEMATOCRIT %  --   --   --  47.3*  --  43.7   PLATELETS 10*3/mm3  --   --   --  292  --  257   SODIUM mmol/L 133* 132* 127* 129*  --  134*   POTASSIUM mmol/L 3.8 4.2 3.8 4.2 4.3 3.2*   CHLORIDE mmol/L 99 98 95* 91*  --  94*   CO2 mmol/L 25.0 23.0 24.0 26.0  --  30.0*   BUN mg/dL 19 24* 22 24*  --  19   CREATININE mg/dL 0.61 0.78 0.74 0.82  --  0.88   GLUCOSE mg/dL 111* 110* 128* 126*  --  122*   CALCIUM mg/dL 9.2 8.8 9.2 9.7  --  9.9     Results from last 7 days   Lab Units 10/30/20  1704   BILIRUBIN mg/dL 0.2   ALK PHOS U/L 64   ALT (SGPT) U/L 10   AST (SGOT) U/L 14           Invalid input(s): TG, LDLCALC, LDLREALC  Results from last 7 days   Lab Units 11/03/20  0856 10/30/20  1704   TSH uIU/mL  --  18.020*   CORTISOL mcg/dL 10.32  --    TROPONIN T ng/mL  --  <0.010   LACTATE mmol/L  --  1.3       Brief Urine Lab Results  (Last result in the past 365 days)      Color   Clarity   Blood   Leuk Est   Nitrite   Protein   CREAT   Urine HCG        11/01/20 1342 Yellow Clear Negative Negative Negative 30 mg/dL (1+)               Microbiology Results Abnormal     Procedure Component Value - Date/Time    COVID PRE-OP / PRE-PROCEDURE SCREENING ORDER (NO ISOLATION) - Swab, Nasopharynx [[id number]]  (Normal) Collected: 10/31/20 0226    Lab Status: Final result Specimen: Swab from Nasopharynx Updated: 10/31/20 0435    Narrative:      The following orders were created for panel order COVID PRE-OP / PRE-PROCEDURE SCREENING ORDER (NO ISOLATION) - Swab, Nasopharynx.  Procedure                               Abnormality         Status                     ---------                                -----------         ------                     Respiratory Panel PCR w/...[[id number]]  Normal              Final result                 Please view results for these tests on the individual orders.    Respiratory Panel PCR w/COVID-19(SARS-CoV-2) [facility] In-House, NP Swab in UTM/VTM, 3-4 HR TAT - Swab, Nasopharynx [[id number]]  (Normal) Collected: 10/31/20 0225    Lab Status: Final result Specimen: Swab from Nasopharynx Updated: 10/31/20 0432     ADENOVIRUS, PCR Not Detected     Coronavirus 229E Not Detected     Coronavirus HKU1 Not Detected     Coronavirus NL63 Not Detected     Coronavirus OC43 Not Detected     COVID19 Not Detected     Human Metapneumovirus Not Detected     Human Rhinovirus/Enterovirus Not Detected     Influenza A PCR Not Detected     Influenza A H1 Not Detected     Influenza A H1 2009 PCR Not Detected     Influenza A H3 Not Detected     Influenza B PCR Not Detected     Parainfluenza Virus 1 Not Detected     Parainfluenza Virus 2 Not Detected     Parainfluenza Virus 3 Not Detected     Parainfluenza Virus 4 Not Detected     RSV, PCR Not Detected     Bordetella pertussis pcr Not Detected     Bordetella parapertussis PCR Not Detected     Chlamydophila pneumoniae PCR Not Detected     Mycoplasma pneumo by PCR Not Detected    Narrative:      Fact sheet for providers: https://docs.Inline.me/wp-content/uploads/HGX2237-8284-FJ4.1-EUA-Provider-Fact-Sheet-3.pdf    Fact sheet for patients: https://docs.Inline.me/wp-content/uploads/NLA7072-3174-SF0.1-EUA-Patient-Fact-Sheet-1.pdf          Imaging Results (All)     Procedure Component Value Units Date/Time    MRI Brain Without Contrast [[id number]] Collected: 10/31/20 0409     Updated: 10/31/20 0411    Narrative:      MRI Brain WO    INDICATION:   History of falls and old infarct. Weakness and ataxia.    TECHNIQUE:   MRI of the brain without IV contrast.    COMPARISON:    9/4/2018    FINDINGS:  Diffusion images reveal no  evidence of acute or subacute infarct. Ventricular size and configuration are normal. There is generalized atrophy. Chronic small vessel ischemic changes are noted in the white matter, similar to the prior study. This includes  changes within the elma. There are scattered dilated perivascular spaces in addition to scattered old tiny lacunar infarcts in the corona radiata and basal ganglia regions. No acute hemorrhage is seen. There are no masses. Craniovertebral junction is  normal. Major intracranial flow voids are maintained.      Impression:        1. No acute findings in the brain and no significant interval change.  2. Atrophy with chronic small vessel ischemic disease in the white matter with scattered tiny old lacunar infarcts.    Signer Name: Zacarias Sanchez MD   Signed: 10/31/2020 4:09 AM   Workstation Name: [facility]    [facility]    CT Head Without Contrast [[id number]] Collected: 10/31/20 0032     Updated: 10/31/20 0034    Narrative:      CT Head WO    HISTORY:   Multiple falls with recent worsening of imbalance.    TECHNIQUE:   Axial unenhanced head CT with multiplanar reformats. Radiation dose reduction techniques included automated exposure control or exposure modulation based on body size. Count of known CT and cardiac nuc med studies performed in previous 12 months: 0.     COMPARISON:   Brain MRI 9/4/2018    FINDINGS:   Ventricular size and configuration are normal. No acute infarct or hemorrhage is identified. There are no masses. There is no skull fracture.  There is generalized atrophy with advanced chronic small vessel ischemic changes in the white matter. Old  infarcts are noted in the basal ganglia and subinsular regions on both sides, left worse than right. Atherosclerotic calcifications are present in the carotid siphons. Visualized paranasal sinuses and mastoid air cells are clear.      Impression:      Senescent changes without acute abnormality.    Signer Name:  Zacarias Sanchez MD   Signed: 10/31/2020 12:32 AM   Workstation Name: [facility]    [facility]                    Results for orders placed during the hospital encounter of 10/30/20   Adult Transthoracic Echo Complete W/ Cont if Necessary Per Protocol    Narrative · Calculated left ventricular EF = 55%  · Left ventricular systolic function is normal.  · Left ventricular diastolic function is consistent with (grade Ia w/high   LAP) impaired relaxation.  · The left atrial cavity is mild to moderately dilated.  · No significant structural or functional valvular disease.          Plan for Follow-up of Pending Labs/Results: primidone level   Pending Labs     Order Current Status    Primidone Level In process        Discharge Details        Discharge Medications      New Medications      Instructions Start Date   famotidine 20 MG tablet  Commonly known as: PEPCID   20 mg, Oral, 2 Times Daily Before Meals      terazosin 1 MG capsule  Commonly known as: HYTRIN   1 mg, Oral, Nightly         Changes to Medications      Instructions Start Date   amLODIPine 10 MG tablet  Commonly known as: NORVASC  What changed:   · medication strength  · how much to take   10 mg, Oral, Daily   Start Date: November 5, 2020     DULoxetine 30 MG capsule  Commonly known as: CYMBALTA  What changed:   · medication strength  · how much to take  · additional instructions   30 mg, Oral, Daily   Start Date: November 5, 2020     levothyroxine 125 MCG tablet  Commonly known as: SYNTHROID, LEVOTHROID  What changed:   · medication strength  · how much to take   125 mcg, Oral, Daily      primidone 50 MG tablet  Commonly known as: MYSOLINE  What changed: See the new instructions.   50 mg, Oral, Daily      primidone 250 MG tablet  Commonly known as: MYSOLINE  What changed: You were already taking a medication with the same name, and this prescription was added. Make sure you understand how and when to take each.   125 mg, Oral,  Nightly         Continue These Medications      Instructions Start Date   acetaminophen 650 MG 8 hr tablet  Commonly known as: TYLENOL   650 mg, Oral, Every 8 Hours PRN      alendronate 70 MG tablet  Commonly known as: FOSAMAX   TAKE 1 TABLET BY MOUTH  EVERY 7 DAYS      atenolol 50 MG tablet  Commonly known as: TENORMIN   50 mg, Oral, Daily      atorvastatin 40 MG tablet  Commonly known as: LIPITOR   40 mg, Oral, Daily      enalapril 20 MG tablet  Commonly known as: VASOTEC   20 mg, Oral, Daily      metroNIDAZOLE 0.75 % cream  Commonly known as: METROCREAM   Topical, 2 Times Daily         Stop These Medications    predniSONE 10 MG tablet  Commonly known as: DELTASONE            Allergies   Allergen Reactions   • Dilaudid [Hydromorphone Hcl] Hallucinations     TABS   • Lactose Intolerance (Gi) Diarrhea         Discharge Disposition:  Home or Self Care    Diet:  Hospital:  Diet Order   Procedures   • Diet Regular       Activity:  Activity Instructions     Activity as Tolerated            Restrictions or Other Recommendations:         CODE STATUS:    Code Status and Medical Interventions:   Ordered at: 10/31/20 0419     Code Status:    CPR     Medical Interventions (Level of Support Prior to Arrest):    Full       Future Appointments   Date Time Provider Department Center   12/16/2020  1:45 PM Aleah Regan MD [facility]   6/23/2021 10:45 AM Aleah Regan MD [facility]       Additional Instructions for the Follow-ups that You Need to Schedule     Discharge Follow-up with PCP   As directed       Currently Documented PCP:    Aleah Regan MD    PCP Phone Number:    [phone]     Follow Up Details: PCP Dr. Regan 1 week after DC from rehab         Discharge Follow-up with Specified Provider: Neurology Dr. Gallagher 2-4 weeks   As directed      To: Neurology Dr. Gallagher 2-4 weeks                     Adelina Rooney DO  11/04/20      Time Spent on Discharge:  I spent  35  minutes on this discharge  activity which included: face-to-face encounter with the patient, reviewing the data in the system, coordination of the care with the nursing staff as well as consultants, documentation, and entering orders.            Electronically signed by Adelina Rooney DO at 11/04/20 0900

## 2020-11-04 NOTE — PLAN OF CARE
Goal Outcome Evaluation:  VSS on RA. SR on monitor. No complaints at this time. Will continue to monitor.

## 2020-11-04 NOTE — DISCHARGE SUMMARY
[facility] Medicine Services  DISCHARGE SUMMARY    Patient Name: Zohra Hall  : 1940  MRN: [id number]    Date of Admission: 10/30/2020  7:43 PM  Date of Discharge:  20  Primary Care Physician: Aleah Regan MD    Consults     Date and Time Order Name Status Description    10/31/2020 0507 Inpatient Neurology Consult General Completed           Hospital Course     Presenting Problem:   Ataxia [R27.0]  Ataxia [R27.0]  Accelerated hypertension [I10]    Active Hospital Problems    Diagnosis  POA   • Ataxia [R27.0]  Yes   • PMR (polymyalgia rheumatica) (CMS/HCC) [M35.3]  Yes   • Osteoporosis [M81.0]  Yes   • Hyponatremia [E87.1]  Yes   • Benign essential tremor [G25.0]  Yes   • Chronic diastolic heart failure (CMS/[facility]) [I50.32]  Yes   • Aortic valve stenosis [I35.0]  Yes   • Hypertension [I10]  Yes   • Hypothyroidism (acquired) [E03.9]  Yes   • Restless leg syndrome [G25.81]  Yes   • Vitamin D deficiency [E55.9]  Yes      Resolved Hospital Problems    Diagnosis Date Resolved POA   • Hypokalemia [E87.6] 2020 Yes   • Accelerated hypertension [I10] 2020 Yes          Hospital Course:  Zohra Hall is a 79 y.o. female with history of hypothyroidism, hypertension, chronic diastolic heart failure, prior lacunar strokes with benign essential tremor presents with ataxia, elevated blood pressure, and worsening memory.    Ataxia, accelerated hypertension with change in mental status  - CT head negative; MRI negative   - ECHO reviewed, EF 55%, no significant valvular disease   - Neurology evaluated, possible mild toxic metabolic encephalopathy in the setting of uncontrolled HTN and hypothyroidism   - Cymbalta dose cut in half on admission; BP and thyroid medications adjusted   - Primidone level pending at time of discharge   - Patient would benefit from continued therapy and close follow up with neurology in 2-4 weeks     Vasovagal syncope  - Prior to discharge   patient had a large BM and then had an episode of vasovagal syncope. She was alert and oriented after being laid down on the bed. Vitals have remained stable. She remembers episode yesterday.      HTN - improved    -Increase Norvasc to 10mg daily and she was continued on home enelapril and atenolol. 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 Regan 1 week after DC from rehab   Neurology Dr. Gallagher 2-4 weeks   Repeat TSH in 4-6 weeks     Day of Discharge     HPI:   No acute events overnight. Some confusion this morning on year but easily re-oriented. Reviewed ataxia and symptoms multifactorial and her tremor is likely contributing. Attempted to call son with no answer. Patient feels comfortable and agreeable for discharge to rehab today.     Review of Systems  Gen- No fevers, chills  CV- No chest pain, palpitations  Resp- No cough, dyspnea  GI- No N/V/D, abd pain    Vital Signs:   Temp:  [98.2 °F (36.8 °C)-98.8 °F (37.1 °C)] 98.7 °F (37.1 °C)  Heart Rate:  [67-87] 69  Resp:  [18] 18  BP: (112-152)/(63-82) 112/82     Physical Exam:  Constitutional: No acute distress, awake, alert  HENT: NCAT, mucous membranes  moist  Respiratory: Clear to auscultation bilaterally, respiratory effort normal   Cardiovascular: RRR, no murmurs  Gastrointestinal: Positive bowel sounds, soft, nontender, nondistended  Musculoskeletal: No bilateral ankle edema  Psychiatric: Appropriate affect, cooperative  Neurologic: Oriented x 2, strength symmetric in all extremities, Cranial Nerves grossly intact to confrontation, speech clear; fine upper ext tremor   Skin: No rashes    Pertinent  and/or Most Recent Results     Results from last 7 days   Lab Units 11/03/20  0856 11/02/20  1549 11/02/20  0930 11/01/20  1024 10/31/20  1332 10/30/20  1704   WBC 10*3/mm3  --   --   --  8.88  --  6.69   HEMOGLOBIN g/dL  --   --   --  15.2  --  14.3   HEMATOCRIT %  --   --   --  47.3*  --  43.7   PLATELETS 10*3/mm3  --   --   --  292  --  257   SODIUM mmol/L 133* 132* 127* 129*  --  134*   POTASSIUM mmol/L 3.8 4.2 3.8 4.2 4.3 3.2*   CHLORIDE mmol/L 99 98 95* 91*  --  94*   CO2 mmol/L 25.0 23.0 24.0 26.0  --  30.0*   BUN mg/dL 19 24* 22 24*  --  19   CREATININE mg/dL 0.61 0.78 0.74 0.82  --  0.88   GLUCOSE mg/dL 111* 110* 128* 126*  --  122*   CALCIUM mg/dL 9.2 8.8 9.2 9.7  --  9.9     Results from last 7 days   Lab Units 10/30/20  1704   BILIRUBIN mg/dL 0.2   ALK PHOS U/L 64   ALT (SGPT) U/L 10   AST (SGOT) U/L 14           Invalid input(s): TG, LDLCALC, LDLREALC  Results from last 7 days   Lab Units 11/03/20  0856 10/30/20  1704   TSH uIU/mL  --  18.020*   CORTISOL mcg/dL 10.32  --    TROPONIN T ng/mL  --  <0.010   LACTATE mmol/L  --  1.3       Brief Urine Lab Results  (Last result in the past 365 days)      Color   Clarity   Blood   Leuk Est   Nitrite   Protein   CREAT   Urine HCG        11/01/20 1342 Yellow Clear Negative Negative Negative 30 mg/dL (1+)               Microbiology Results Abnormal     Procedure Component Value - Date/Time    COVID PRE-OP / PRE-PROCEDURE SCREENING ORDER (NO ISOLATION) - Swab, Nasopharynx [[id number]]  (Normal) Collected: 10/31/20  0225    Lab Status: Final result Specimen: Swab from Nasopharynx Updated: 10/31/20 0435    Narrative:      The following orders were created for panel order COVID PRE-OP / PRE-PROCEDURE SCREENING ORDER (NO ISOLATION) - Swab, Nasopharynx.  Procedure                               Abnormality         Status                     ---------                               -----------         ------                     Respiratory Panel PCR w/...[[id number]]  Normal              Final result                 Please view results for these tests on the individual orders.    Respiratory Panel PCR w/COVID-19(SARS-CoV-2) [facility] In-House, NP Swab in UTM/VTM, 3-4 HR TAT - Swab, Nasopharynx [[id number]]  (Normal) Collected: 10/31/20 0225    Lab Status: Final result Specimen: Swab from Nasopharynx Updated: 10/31/20 0435     ADENOVIRUS, PCR Not Detected     Coronavirus 229E Not Detected     Coronavirus HKU1 Not Detected     Coronavirus NL63 Not Detected     Coronavirus OC43 Not Detected     COVID19 Not Detected     Human Metapneumovirus Not Detected     Human Rhinovirus/Enterovirus Not Detected     Influenza A PCR Not Detected     Influenza A H1 Not Detected     Influenza A H1 2009 PCR Not Detected     Influenza A H3 Not Detected     Influenza B PCR Not Detected     Parainfluenza Virus 1 Not Detected     Parainfluenza Virus 2 Not Detected     Parainfluenza Virus 3 Not Detected     Parainfluenza Virus 4 Not Detected     RSV, PCR Not Detected     Bordetella pertussis pcr Not Detected     Bordetella parapertussis PCR Not Detected     Chlamydophila pneumoniae PCR Not Detected     Mycoplasma pneumo by PCR Not Detected    Narrative:      Fact sheet for providers: https://docs.[employer]/wp-content/uploads/QHM0411-7208-ZG7.1-EUA-Provider-Fact-Sheet-3.pdf    Fact sheet for patients: https://docs.[employer]/wp-content/uploads/BOY7860-0789-RA0.1-EUA-Patient-Fact-Sheet-1.pdf          Imaging Results (All)     Procedure  Component Value Units Date/Time    MRI Brain Without Contrast [[id number]] Collected: 10/31/20 0409     Updated: 10/31/20 0411    Narrative:      MRI Brain WO    INDICATION:   History of falls and old infarct. Weakness and ataxia.    TECHNIQUE:   MRI of the brain without IV contrast.    COMPARISON:    9/4/2018    FINDINGS:  Diffusion images reveal no evidence of acute or subacute infarct. Ventricular size and configuration are normal. There is generalized atrophy. Chronic small vessel ischemic changes are noted in the white matter, similar to the prior study. This includes  changes within the elma. There are scattered dilated perivascular spaces in addition to scattered old tiny lacunar infarcts in the corona radiata and basal ganglia regions. No acute hemorrhage is seen. There are no masses. Craniovertebral junction is  normal. Major intracranial flow voids are maintained.      Impression:        1. No acute findings in the brain and no significant interval change.  2. Atrophy with chronic small vessel ischemic disease in the white matter with scattered tiny old lacunar infarcts.    Signer Name: Zacarias Sanchez MD   Signed: 10/31/2020 4:09 AM   Workstation Name: [email or web address]    [facility]    CT Head Without Contrast [[id number]] Collected: 10/31/20 0032     Updated: 10/31/20 0034    Narrative:      CT Head WO    HISTORY:   Multiple falls with recent worsening of imbalance.    TECHNIQUE:   Axial unenhanced head CT with multiplanar reformats. Radiation dose reduction techniques included automated exposure control or exposure modulation based on body size. Count of known CT and cardiac nuc med studies performed in previous 12 months: 0.     COMPARISON:   Brain MRI 9/4/2018    FINDINGS:   Ventricular size and configuration are normal. No acute infarct or hemorrhage is identified. There are no masses. There is no skull fracture.  There is generalized atrophy with advanced chronic small vessel  ischemic changes in the white matter. Old  infarcts are noted in the basal ganglia and subinsular regions on both sides, left worse than right. Atherosclerotic calcifications are present in the carotid siphons. Visualized paranasal sinuses and mastoid air cells are clear.      Impression:      Senescent changes without acute abnormality.    Signer Name: Zacarias Sanchez MD   Signed: 10/31/2020 12:32 AM   Workstation Name: [facility]    [facility]                    Results for orders placed during the hospital encounter of 10/30/20   Adult Transthoracic Echo Complete W/ Cont if Necessary Per Protocol    Narrative · Calculated left ventricular EF = 55%  · Left ventricular systolic function is normal.  · Left ventricular diastolic function is consistent with (grade Ia w/high   LAP) impaired relaxation.  · The left atrial cavity is mild to moderately dilated.  · No significant structural or functional valvular disease.          Plan for Follow-up of Pending Labs/Results: primidone level   Pending Labs     Order Current Status    Primidone Level In process        Discharge Details        Discharge Medications      New Medications      Instructions Start Date   famotidine 20 MG tablet  Commonly known as: PEPCID   20 mg, Oral, 2 Times Daily Before Meals      terazosin 1 MG capsule  Commonly known as: HYTRIN   1 mg, Oral, Nightly         Changes to Medications      Instructions Start Date   amLODIPine 10 MG tablet  Commonly known as: NORVASC  What changed:   · medication strength  · how much to take   10 mg, Oral, Daily   Start Date: November 5, 2020     DULoxetine 30 MG capsule  Commonly known as: CYMBALTA  What changed:   · medication strength  · how much to take  · additional instructions   30 mg, Oral, Daily   Start Date: November 5, 2020     levothyroxine 125 MCG tablet  Commonly known as: SYNTHROID, LEVOTHROID  What changed:   · medication strength  · how much to take   125 mcg, Oral, Daily       primidone 50 MG tablet  Commonly known as: MYSOLINE  What changed: See the new instructions.   50 mg, Oral, Daily      primidone 250 MG tablet  Commonly known as: MYSOLINE  What changed: You were already taking a medication with the same name, and this prescription was added. Make sure you understand how and when to take each.   125 mg, Oral, Nightly         Continue These Medications      Instructions Start Date   acetaminophen 650 MG 8 hr tablet  Commonly known as: TYLENOL   650 mg, Oral, Every 8 Hours PRN      alendronate 70 MG tablet  Commonly known as: FOSAMAX   TAKE 1 TABLET BY MOUTH  EVERY 7 DAYS      atenolol 50 MG tablet  Commonly known as: TENORMIN   50 mg, Oral, Daily      atorvastatin 40 MG tablet  Commonly known as: LIPITOR   40 mg, Oral, Daily      enalapril 20 MG tablet  Commonly known as: VASOTEC   20 mg, Oral, Daily      metroNIDAZOLE 0.75 % cream  Commonly known as: METROCREAM   Topical, 2 Times Daily         Stop These Medications    predniSONE 10 MG tablet  Commonly known as: DELTASONE            Allergies   Allergen Reactions   • Dilaudid [Hydromorphone Hcl] Hallucinations     TABS   • Lactose Intolerance (Gi) Diarrhea         Discharge Disposition:  Home or Self Care    Diet:  Hospital:  Diet Order   Procedures   • Diet Regular       Activity:  Activity Instructions     Activity as Tolerated            Restrictions or Other Recommendations:         CODE STATUS:    Code Status and Medical Interventions:   Ordered at: 10/31/20 0419     Code Status:    CPR     Medical Interventions (Level of Support Prior to Arrest):    Full       Future Appointments   Date Time Provider Department Center   12/16/2020  1:45 PM Aleah Regan MD [facility]   6/23/2021 10:45 AM Aleah Regan MD [facility]       Additional Instructions for the Follow-ups that You Need to Schedule     Discharge Follow-up with PCP   As directed       Currently Documented PCP:    Aleah Regan MD    PCP Phone  Number:    [phone]     Follow Up Details: PCP Dr. Regan 1 week after DC from rehab         Discharge Follow-up with Specified Provider: Neurology Dr. Gallagher 2-4 weeks   As directed      To: Neurology Dr. Gallagher 2-4 weeks                     Adelina Rooney DO  11/04/20      Time Spent on Discharge:  I spent  35  minutes on this discharge activity which included: face-to-face encounter with the patient, reviewing the data in the system, coordination of the care with the nursing staff as well as consultants, documentation, and entering orders.

## 2020-11-05 VITALS
TEMPERATURE: 98.2 F | HEIGHT: 65 IN | SYSTOLIC BLOOD PRESSURE: 155 MMHG | DIASTOLIC BLOOD PRESSURE: 75 MMHG | HEART RATE: 58 BPM | RESPIRATION RATE: 16 BRPM | OXYGEN SATURATION: 95 % | WEIGHT: 141.4 LBS | BODY MASS INDEX: 23.56 KG/M2

## 2020-11-05 PROCEDURE — 25010000002 HEPARIN (PORCINE) PER 1000 UNITS: Performed by: INTERNAL MEDICINE

## 2020-11-05 PROCEDURE — 99232 SBSQ HOSP IP/OBS MODERATE 35: CPT | Performed by: INTERNAL MEDICINE

## 2020-11-05 RX ORDER — LOPERAMIDE HYDROCHLORIDE 2 MG/1
2 CAPSULE ORAL 4 TIMES DAILY PRN
Status: DISCONTINUED | OUTPATIENT
Start: 2020-11-05 | End: 2020-11-05 | Stop reason: HOSPADM

## 2020-11-05 RX ADMIN — PRIMIDONE 50 MG: 50 TABLET ORAL at 05:17

## 2020-11-05 RX ADMIN — ATENOLOL 50 MG: 50 TABLET ORAL at 08:58

## 2020-11-05 RX ADMIN — DULOXETINE HYDROCHLORIDE 30 MG: 30 CAPSULE, DELAYED RELEASE ORAL at 08:59

## 2020-11-05 RX ADMIN — LOPERAMIDE HYDROCHLORIDE 2 MG: 2 CAPSULE ORAL at 13:45

## 2020-11-05 RX ADMIN — ACETAMINOPHEN 650 MG: 325 TABLET, FILM COATED ORAL at 08:58

## 2020-11-05 RX ADMIN — HEPARIN SODIUM 5000 UNITS: 5000 INJECTION INTRAVENOUS; SUBCUTANEOUS at 08:59

## 2020-11-05 RX ADMIN — ENALAPRIL MALEATE 20 MG: 10 TABLET ORAL at 09:01

## 2020-11-05 RX ADMIN — LEVOTHYROXINE SODIUM 125 MCG: 125 TABLET ORAL at 05:17

## 2020-11-05 RX ADMIN — AMLODIPINE BESYLATE 10 MG: 10 TABLET ORAL at 08:59

## 2020-11-05 RX ADMIN — FAMOTIDINE 20 MG: 20 TABLET, FILM COATED ORAL at 08:58

## 2020-11-05 NOTE — PROGRESS NOTES
[facility] Medicine Services  PROGRESS NOTE    Patient Name: Zohra Hall  : 1940  MRN: [id number]    Date of Admission: 10/30/2020  Primary Care Physician: Aleah Regan MD    Subjective   Subjective     CC:  Ataxia, vasovagal     HPI:  States she feels ok today. She does remember episode yesterday and having a large BM. Denies SOA or CP. States she is ready to go to rehab and feels comfortable with this plan.     Review of Systems  Gen- No fevers, chills  CV- No chest pain, palpitations  Resp- No cough, dyspnea  GI- No N/V/D, abd pain     Objective   Objective     Vital Signs:   Temp:  [97.9 °F (36.6 °C)-98.7 °F (37.1 °C)] 98.2 °F (36.8 °C)  Heart Rate:  [58-89] 58  Resp:  [16] 16  BP: (112-165)/(61-76) 155/75        Physical Exam:  Constitutional: No acute distress, awake, alert  HENT: NCAT, mucous membranes moist  Respiratory: Clear to auscultation bilaterally, respiratory effort normal   Cardiovascular: RRR, no murmurs, rubs, or gallops, palpable pedal pulses bilaterally  Gastrointestinal: Positive bowel sounds, soft, nontender, nondistended  Musculoskeletal: No bilateral ankle edema  Psychiatric: Appropriate affect, cooperative  Neurologic: Oriented x 3, strength symmetric in all extremities, Cranial Nerves grossly intact to confrontation, speech clear; fine upper ext tremor bilaterally   Skin: No rashes    Results Reviewed:  Results from last 7 days   Lab Units 20  1024 10/30/20  1704   WBC 10*3/mm3 8.88 6.69   HEMOGLOBIN g/dL 15.2 14.3   HEMATOCRIT % 47.3* 43.7   PLATELETS 10*3/mm3 292 257     Results from last 7 days   Lab Units 20  0856 20  1549 20  0930  10/30/20  1704   SODIUM mmol/L 133* 132* 127*   < > 134*   POTASSIUM mmol/L 3.8 4.2 3.8   < > 3.2*   CHLORIDE mmol/L 99 98 95*   < > 94*   CO2 mmol/L 25.0 23.0 24.0   < > 30.0*   BUN mg/dL 19 24* 22   < > 19   CREATININE mg/dL 0.61 0.78 0.74   < > 0.88   GLUCOSE mg/dL 111* 110* 128*   < >  122*   CALCIUM mg/dL 9.2 8.8 9.2   < > 9.9   ALT (SGPT) U/L  --   --   --   --  10   AST (SGOT) U/L  --   --   --   --  14   TROPONIN T ng/mL  --   --   --   --  <0.010    < > = values in this interval not displayed.     Estimated Creatinine Clearance: 55.8 mL/min (by C-G formula based on SCr of 0.61 mg/dL).    Microbiology Results Abnormal     Procedure Component Value - Date/Time    COVID PRE-OP / PRE-PROCEDURE SCREENING ORDER (NO ISOLATION) - Swab, Nasopharynx [[id number]]  (Normal) Collected: 10/31/20 0225    Lab Status: Final result Specimen: Swab from Nasopharynx Updated: 10/31/20 0435    Narrative:      The following orders were created for panel order COVID PRE-OP / PRE-PROCEDURE SCREENING ORDER (NO ISOLATION) - Swab, Nasopharynx.  Procedure                               Abnormality         Status                     ---------                               -----------         ------                     Respiratory Panel PCR w/...[[id number]]  Normal              Final result                 Please view results for these tests on the individual orders.    Respiratory Panel PCR w/COVID-19(SARS-CoV-2) [facility] In-House, NP Swab in UTM/VTM, 3-4 HR TAT - Swab, Nasopharynx [[id number]]  (Normal) Collected: 10/31/20 0225    Lab Status: Final result Specimen: Swab from Nasopharynx Updated: 10/31/20 0435     ADENOVIRUS, PCR Not Detected     Coronavirus 229E Not Detected     Coronavirus HKU1 Not Detected     Coronavirus NL63 Not Detected     Coronavirus OC43 Not Detected     COVID19 Not Detected     Human Metapneumovirus Not Detected     Human Rhinovirus/Enterovirus Not Detected     Influenza A PCR Not Detected     Influenza A H1 Not Detected     Influenza A H1 2009 PCR Not Detected     Influenza A H3 Not Detected     Influenza B PCR Not Detected     Parainfluenza Virus 1 Not Detected     Parainfluenza Virus 2 Not Detected     Parainfluenza Virus 3 Not Detected     Parainfluenza Virus 4 Not Detected      RSV, PCR Not Detected     Bordetella pertussis pcr Not Detected     Bordetella parapertussis PCR Not Detected     Chlamydophila pneumoniae PCR Not Detected     Mycoplasma pneumo by PCR Not Detected    Narrative:      Fact sheet for providers: https://docs.[employer]/wp-content/uploads/TCM0123-2275-QP4.1-EUA-Provider-Fact-Sheet-3.pdf    Fact sheet for patients: https://docs.[employer]/wp-content/uploads/UVB3224-8267-LZ0.1-EUA-Patient-Fact-Sheet-1.pdf          Imaging Results (Last 24 Hours)     ** No results found for the last 24 hours. **          Results for orders placed during the hospital encounter of 10/30/20   Adult Transthoracic Echo Complete W/ Cont if Necessary Per Protocol    Narrative · Calculated left ventricular EF = 55%  · Left ventricular systolic function is normal.  · Left ventricular diastolic function is consistent with (grade Ia w/high   LAP) impaired relaxation.  · The left atrial cavity is mild to moderately dilated.  · No significant structural or functional valvular disease.          I have reviewed the medications:  Scheduled Meds:amLODIPine, 10 mg, Oral, Daily  atenolol, 50 mg, Oral, Daily  atorvastatin, 40 mg, Oral, Nightly  DULoxetine, 30 mg, Oral, Daily  enalapril, 20 mg, Oral, Daily  famotidine, 20 mg, Oral, BID AC  heparin (porcine), 5,000 Units, Subcutaneous, Q12H  levothyroxine, 125 mcg, Oral, Q AM  primidone, 125 mg, Oral, Nightly  primidone, 50 mg, Oral, Q AM  terazosin, 1 mg, Oral, Nightly      Continuous Infusions:   PRN Meds:.•  acetaminophen  •  labetalol  •  potassium chloride **OR** potassium chloride **OR** potassium chloride  •  sodium chloride    Assessment/Plan   Assessment & Plan     Active Hospital Problems    Diagnosis  POA   • Ataxia [R27.0]  Yes   • PMR (polymyalgia rheumatica) (CMS/HCC) [M35.3]  Yes   • Osteoporosis [M81.0]  Yes   • Hyponatremia [E87.1]  Yes   • Benign essential tremor [G25.0]  Yes   • Chronic diastolic heart failure (CMS/HCC) [I50.32]  Yes    • Aortic valve stenosis [I35.0]  Yes   • Hypertension [I10]  Yes   • Hypothyroidism (acquired) [E03.9]  Yes   • Restless leg syndrome [G25.81]  Yes   • Vitamin D deficiency [E55.9]  Yes      Resolved Hospital Problems    Diagnosis Date Resolved POA   • Hypokalemia [E87.6] 11/04/2020 Yes   • Accelerated hypertension [I10] 11/04/2020 Yes        Brief Hospital Course to date:  Zohra Hall is a 79 y.o. female with history of hypothyroidism, hypertension, chronic diastolic heart failure, prior lacunar strokes with benign essential tremor presents with ataxia, elevated blood pressure, and worsening memory.     Ataxia, accelerated hypertension with change in mental status  - CT head negative; MRI negative   - ECHO reviewed, EF 55%, no significant valvular disease   - Neurology evaluated, possible mild toxic metabolic encephalopathy in the setting of uncontrolled HTN and hypothyroidism   - Cymbalta dose cut in half on admission; BP and thyroid medications adjusted   - Primidone level pending at time of discharge   - Patient would benefit from continued therapy and close follow up with neurology in 2-4 weeks      Vasovagal syncope  - Prior to discharge 11/4 patient had a large BM and then had an episode of vasovagal syncope. She was alert and oriented after being laid down on the bed. Vitals have remained stable. She remembers episode yesterday.   - Resolved      HTN - improved    -Increase Norvasc to 10mg daily and she was continued on home enelapril and atenolol. 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  -TSH elevated at 18 on admission   -Synthroid dose increased from 112mcg to 125mg on admission and completed 4 doses of cytomel      Memory loss  -Multifactorial as  above     History of PMR   -Sed rate normal on admission  -In review of chart, patient was prescribed a Prednisone taper on 9/16 for a total of 12 days by her PCP  -Concern for flare on admission so she was started on Prednisone 20mg daily - decreased to 10 mg daily and completed 3 days     - Dispo - to [facility] today     CODE STATUS:   Code Status and Medical Interventions:   Ordered at: 10/31/20 0419     Code Status:    CPR     Medical Interventions (Level of Support Prior to Arrest):    Full       Adelina Rooney,   11/05/20

## 2020-11-05 NOTE — PROGRESS NOTES
Case Management Discharge Note      Final Note: Plan is [facility] CVA1. [facility] van is scheduled for 11:30. Please put patient in for transport early enough to have her in the [address] lobby by 11:20. Nurse to call report to [phone] and fax discharge summary to [phone]. Please send any hard scripts for medications with the patient in the transfer packet.    Provided Post Acute Provider List?: Yes  Post Acute Provider List: Inpatient Rehab  Provided Post Acute Provider Quality & Resource List?: Yes  Post Acute Provider Quality and Resource List: Inpatient Rehab  Delivered To: Patient  Method of Delivery: In person    Selected Continued Care - Admitted Since 10/30/2020     Destination Coordination complete    Service Provider Selected Services Address Phone Fax    [facility]  Inpatient Rehabilitation [address] [facility] [address] [address] [phone]          Durable Medical Equipment    No services have been selected for the patient.              Dialysis/Infusion    No services have been selected for the patient.              Home Medical Care    No services have been selected for the patient.              Therapy    No services have been selected for the patient.              Community Resources    No services have been selected for the patient.                       Final Discharge Disposition Code: 62 - inpatient rehab facility

## 2020-11-20 ENCOUNTER — TRANSITIONAL CARE MANAGEMENT TELEPHONE ENCOUNTER (OUTPATIENT)
Dept: CALL CENTER | Facility: HOSPITAL | Age: 80
End: 2020-11-20

## 2020-11-20 ENCOUNTER — TELEPHONE (OUTPATIENT)
Dept: INTERNAL MEDICINE | Facility: CLINIC | Age: 80
End: 2020-11-20

## 2020-11-20 ENCOUNTER — READMISSION MANAGEMENT (OUTPATIENT)
Dept: CALL CENTER | Facility: HOSPITAL | Age: 80
End: 2020-11-20

## 2020-11-20 NOTE — OUTREACH NOTE
Call Center TCM Note      Responses   [facility] patient discharged from?  Non- [[facility]]   Does the patient have one of the following disease processes/diagnoses(primary or secondary)?  Other   TCM attempt successful?  No   Unsuccessful attempts  Attempt 1          Anna Jolley RN    11/20/2020, 11:14 EST

## 2020-11-20 NOTE — OUTREACH NOTE
Call Center TCM Note      Responses   [facility] patient discharged from?  Non-BH   Does the patient have one of the following disease processes/diagnoses(primary or secondary)?  Other   TCM attempt successful?  No   Unsuccessful attempts  Attempt 2          Anna Jolley RN    11/20/2020, 12:18 EST

## 2020-11-20 NOTE — TELEPHONE ENCOUNTER
CUONG WITH [facility]  IS REQUESTING A VERBAL ORDER  TO SEE THE PATIENT FOR EDUCATION ON HYPERTENSION    CUONG CALL BACK [phone]

## 2020-11-20 NOTE — OUTREACH NOTE
Prep Survey      Responses   [facility] facility patient discharged from?  Non-BH   Is LACE score < 7 ?  Non-BH Discharge   Eligibility  [facility]   Date of Admission  11/05/20   Date of Discharge  11/19/20   Discharge Disposition  Home-Health Care Sv   Discharge diagnosis  Ataxia, accelerated hypertension, Vasovagal syncope   Does the patient have one of the following disease processes/diagnoses(primary or secondary)?  Other   Does the patient have Home health ordered?  Yes   What is the Home health agency?   Home - with [facility]   Prep survey completed?  Yes          Gissel Ervin RN

## 2020-11-20 NOTE — TELEPHONE ENCOUNTER
Caller: Zohra Hall    Relationship to patient: Self    Best call back number: [phone]     New or established patient?  [] New  [x] Established    Date of discharge:11-19-20    Facility discharged from: [facility]   Diagnosis/Symptoms: BLOOD PRESSURE     Length of stay (If applicable): 14 DAYS     Specialty Only: Did you see a [facility] provider?    [] Yes  [x] No

## 2020-11-21 ENCOUNTER — TRANSITIONAL CARE MANAGEMENT TELEPHONE ENCOUNTER (OUTPATIENT)
Dept: CALL CENTER | Facility: HOSPITAL | Age: 80
End: 2020-11-21

## 2020-11-21 NOTE — OUTREACH NOTE
Call Center TCM Note      Responses   [facility] patient discharged from?  Non-   Does the patient have one of the following disease processes/diagnoses(primary or secondary)?  Other   TCM attempt successful?  Yes   Call start time  1028   Call end time  1032   Meds reviewed with patient/caregiver?  Yes   Is the patient having any side effects they believe may be caused by any medication additions or changes?  No   Does the patient have all medications ordered at discharge?  Yes   Is the patient taking all medications as directed (includes completed medication regime)?  Yes   Does the patient have a primary care provider?   Yes   Does the patient have an appointment with their PCP within 7 days of discharge?  Yes   Comments regarding PCP  PCP APPOINTMENT IS SCHEDULED FOR 11/23   What is preventing the patient from scheduling follow up appointments within 7 days of discharge?  Earlier appointment not available   Has the patient kept scheduled appointments due by today?  N/A   What is the Home health agency?   Home - with Home Health [facility]   Has home health visited the patient within 72 hours of discharge?  Yes   Did the patient receive a copy of their discharge instructions?  Yes   Nursing interventions  Reviewed instructions with patient   What is the patient's perception of their health status since discharge?  Improving   Is the patient/caregiver able to teach back signs and symptoms related to disease process for when to call PCP?  Yes   Is the patient/caregiver able to teach back signs and symptoms related to disease process for when to call 911?  Yes   Is the patient/caregiver able to teach back the hierarchy of who to call/visit for symptoms/problems? PCP, Specialist, Home health nurse, Urgent Care, ED, 911  Yes   TCM call completed?  Yes          Farheen Moreno LPN    11/21/2020, 10:37 EST

## 2020-11-25 ENCOUNTER — OFFICE VISIT (OUTPATIENT)
Dept: INTERNAL MEDICINE | Facility: CLINIC | Age: 80
End: 2020-11-25

## 2020-11-25 VITALS
WEIGHT: 135.4 LBS | HEART RATE: 63 BPM | SYSTOLIC BLOOD PRESSURE: 128 MMHG | BODY MASS INDEX: 22.56 KG/M2 | HEIGHT: 65 IN | DIASTOLIC BLOOD PRESSURE: 80 MMHG | TEMPERATURE: 97.5 F | OXYGEN SATURATION: 98 %

## 2020-11-25 DIAGNOSIS — R41.3 MEMORY LOSS: Primary | ICD-10-CM

## 2020-11-25 DIAGNOSIS — R53.1 WEAKNESS: ICD-10-CM

## 2020-11-25 DIAGNOSIS — G45.9 TRANSIENT ISCHEMIC ATTACK: ICD-10-CM

## 2020-11-25 DIAGNOSIS — I10 ESSENTIAL HYPERTENSION: ICD-10-CM

## 2020-11-25 PROCEDURE — 96372 THER/PROPH/DIAG INJ SC/IM: CPT | Performed by: INTERNAL MEDICINE

## 2020-11-25 PROCEDURE — 99496 TRANSJ CARE MGMT HIGH F2F 7D: CPT | Performed by: INTERNAL MEDICINE

## 2020-11-25 RX ORDER — LISINOPRIL 20 MG/1
1 TABLET ORAL 2 TIMES DAILY
COMMUNITY
Start: 2020-11-24 | End: 2020-12-28

## 2020-11-25 RX ORDER — CYANOCOBALAMIN 1000 UG/ML
1000 INJECTION, SOLUTION INTRAMUSCULAR; SUBCUTANEOUS ONCE
Status: COMPLETED | OUTPATIENT
Start: 2020-11-25 | End: 2020-11-25

## 2020-11-25 RX ADMIN — CYANOCOBALAMIN 1000 MCG: 1000 INJECTION, SOLUTION INTRAMUSCULAR; SUBCUTANEOUS at 12:34

## 2020-11-25 NOTE — PROGRESS NOTES
Transitional Care Follow Up Visit  Subjective     Zohra Hall is a 80 y.o. female who presents for a transitional care management visit.    Within 48 business hours after discharge our office contacted her via telephone to coordinate her care and needs.      I reviewed and discussed the details of that call along with the discharge summary, hospital problems, inpatient lab results, inpatient diagnostic studies, and consultation reports with Zohra.     Current outpatient and discharge medications have been reconciled for the patient.  Reviewed by: Aleah Regan MD      Date of TCM Phone Call 11/20/2020   [facility]   Date of Admission 11/5/2020   Date of Discharge 11/19/2020   Discharge Disposition Home-Health Care Sv     Risk for Readmission (LACE) No data recorded    History of Present Illness   Having HH come and PT is working with her. Son accompanies her here todayand notes he found her 2 days ago, and she didn't recognize him. Reports scared of such situations,as doesn't know anything about her affairs, her  etc.  Son's very confused  and reports he is worried about her.She notes that her grandson's mom who recently passed away was the trustee, and now her grandson is. She needs to talk to her  to do so.  She denies any abuse issues.  She does note weakness and tremors.      Course During Hospital Stay:    Zohra presented with ataxia, elevated blood pressure, and worsening memory.     Ataxia, accelerated hypertension with change in mental status  - CT head negative; MRI negative   - ECHO reviewed, EF 55%, no significant valvular disease   - Neurology evaluated, possible mild toxic metabolic encephalopathy in the setting of uncontrolled HTN and hypothyroidism   - Cymbalta dose cut in half on admission; BP and thyroid medications adjusted   - Primidone level pending at time of discharge   - Patient would benefit from continued  therapy and close follow up with neurology in 2-4 weeks      Vasovagal syncope  - Prior to discharge 11/4 patient had a large BM and then had an episode of vasovagal syncope. She was alert and oriented after being laid down on the bed. Vitals have remained stable. She remembers episode yesterday.      HTN - improved    -Increase Norvasc to 10mg daily and she was continued on home enelapril and atenolol. 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 Regan 1 week after DC from rehab   Neurology Dr. Gallagher 2-4 weeks   Repeat TSH in 4-6 weeks      The following portions of the patient's history were reviewed and updated as appropriate: allergies, current medications, past family history, past medical history, past social history, past surgical history and problem list.    Review of Systems   Constitutional: Negative for chills and fatigue.   HENT: Negative for congestion, ear pain and sore throat.    Eyes: Negative for pain, redness and visual disturbance.   Respiratory: Negative for cough and shortness of breath.    Cardiovascular: Negative for chest pain, palpitations and leg swelling.   Gastrointestinal: Negative for  abdominal pain, diarrhea and nausea.   Endocrine: Negative for cold intolerance and heat intolerance.   Genitourinary: Negative for flank pain and urgency.   Musculoskeletal: Positive for gait problem and myalgias. Negative for arthralgias.   Skin: Negative for pallor and rash.   Neurological: Positive for tremors and weakness. Negative for dizziness and headaches.   Psychiatric/Behavioral: Negative for dysphoric mood and sleep disturbance. The patient is not nervous/anxious.        Objective   Physical Exam  Vitals signs and nursing note reviewed.   Constitutional:       Appearance: She is well-developed.   HENT:      Head: Normocephalic and atraumatic.      Right Ear: External ear normal.      Left Ear: External ear normal.      Mouth/Throat:      Pharynx: No oropharyngeal exudate.   Eyes:      Conjunctiva/sclera: Conjunctivae normal.      Pupils: Pupils are equal, round, and reactive to light.   Neck:      Musculoskeletal: Neck supple.      Thyroid: No thyromegaly.   Cardiovascular:      Rate and Rhythm: Normal rate and regular rhythm.   Pulmonary:      Effort: Pulmonary effort is normal.      Breath sounds: Normal breath sounds.   Abdominal:      General: Bowel sounds are normal. There is no distension.      Palpations: Abdomen is soft.      Tenderness: There is no abdominal tenderness.   Musculoskeletal:         General: Tenderness present.   Skin:     General: Skin is warm and dry.   Neurological:      Mental Status: She is alert and oriented to person, place, and time.      Cranial Nerves: No cranial nerve deficit.      Motor: Weakness present.      Gait: Gait abnormal.      Comments: tremors   Psychiatric:         Judgment: Judgment normal.             Current Outpatient Medications:   •  acetaminophen (TYLENOL) 650 MG 8 hr tablet, Take 1 tablet by mouth Every 8 (Eight) Hours As Needed for Mild Pain ., Disp: , Rfl:   •  alendronate (FOSAMAX) 70 MG tablet, TAKE 1 TABLET BY MOUTH  EVERY 7 DAYS, Disp: 12  "tablet, Rfl: 3  •  amLODIPine (NORVASC) 10 MG tablet, Take 1 tablet by mouth Daily., Disp:  , Rfl:   •  atenolol (TENORMIN) 50 MG tablet, TAKE 1 TABLET BY MOUTH  DAILY, Disp: 90 tablet, Rfl: 3  •  atorvastatin (LIPITOR) 40 MG tablet, TAKE 1 TABLET BY MOUTH  DAILY, Disp: 90 tablet, Rfl: 3  •  DULoxetine (CYMBALTA) 30 MG capsule, Take 1 capsule by mouth Daily., Disp:  , Rfl:   •  enalapril (VASOTEC) 20 MG tablet, Take 1 tablet by mouth Daily., Disp: 90 tablet, Rfl: 1  •  famotidine (PEPCID) 20 MG tablet, Take 1 tablet by mouth 2 (Two) Times a Day Before Meals., Disp:  , Rfl:   •  levothyroxine (SYNTHROID, LEVOTHROID) 125 MCG tablet, Take 1 tablet by mouth Daily., Disp: , Rfl:   •  metroNIDAZOLE (METROCREAM) 0.75 % cream, Apply  topically 2 (Two) Times a Day., Disp: 60 g, Rfl: 2  •  primidone (MYSOLINE) 250 MG tablet, Take 0.5 tablets by mouth Every Night., Disp:  , Rfl:   •  primidone (MYSOLINE) 50 MG tablet, Take 1 tablet by mouth Daily., Disp: , Rfl:   •  terazosin (HYTRIN) 1 MG capsule, Take 1 capsule by mouth Every Night., Disp:  , Rfl:   •  lisinopril (PRINIVIL,ZESTRIL) 20 MG tablet, Take 1 tablet by mouth 2 (two) times a day., Disp: , Rfl:       /80   Pulse 63   Temp 97.5 °F (36.4 °C)   Ht 165.1 cm (65\")   Wt 61.4 kg (135 lb 6.4 oz)   SpO2 98% Comment: ra  BMI 22.53 kg/m²       Results for orders placed or performed during the hospital encounter of 10/30/20   Respiratory Panel PCR w/COVID-19(SARS-CoV-2) [facility]/LACEY/ANGELY/[facility] In-House, NP Swab in UTM/VTM, 3-4 HR TAT - Swab, Nasopharynx    Specimen: Nasopharynx; Swab   Result Value Ref Range    ADENOVIRUS, PCR Not Detected Not Detected    Coronavirus 229E Not Detected Not Detected    Coronavirus HKU1 Not Detected Not Detected    Coronavirus NL63 Not Detected Not Detected    Coronavirus OC43 Not Detected Not Detected    COVID19 Not Detected Not Detected - Ref. Range    Human Metapneumovirus Not Detected Not Detected    Human Rhinovirus/Enterovirus " Not Detected Not Detected    Influenza A PCR Not Detected Not Detected    Influenza A H1 Not Detected Not Detected    Influenza A H1 2009 PCR Not Detected Not Detected    Influenza A H3 Not Detected Not Detected    Influenza B PCR Not Detected Not Detected    Parainfluenza Virus 1 Not Detected Not Detected    Parainfluenza Virus 2 Not Detected Not Detected    Parainfluenza Virus 3 Not Detected Not Detected    Parainfluenza Virus 4 Not Detected Not Detected    RSV, PCR Not Detected Not Detected    Bordetella pertussis pcr Not Detected Not Detected    Bordetella parapertussis PCR Not Detected Not Detected    Chlamydophila pneumoniae PCR Not Detected Not Detected    Mycoplasma pneumo by PCR Not Detected Not Detected   Comprehensive Metabolic Panel    Specimen: Blood   Result Value Ref Range    Glucose 122 (H) 65 - 99 mg/dL    BUN 19 8 - 23 mg/dL    Creatinine 0.88 0.57 - 1.00 mg/dL    Sodium 134 (L) 136 - 145 mmol/L    Potassium 3.2 (L) 3.5 - 5.2 mmol/L    Chloride 94 (L) 98 - 107 mmol/L    CO2 30.0 (H) 22.0 - 29.0 mmol/L    Calcium 9.9 8.6 - 10.5 mg/dL    Total Protein 7.2 6.0 - 8.5 g/dL    Albumin 4.40 3.50 - 5.20 g/dL    ALT (SGPT) 10 1 - 33 U/L    AST (SGOT) 14 1 - 32 U/L    Alkaline Phosphatase 64 39 - 117 U/L    Total Bilirubin 0.2 0.0 - 1.2 mg/dL    eGFR Non African Amer 62 >60 mL/min/1.73    Globulin 2.8 gm/dL    A/G Ratio 1.6 g/dL    BUN/Creatinine Ratio 21.6 7.0 - 25.0    Anion Gap 10.0 5.0 - 15.0 mmol/L   Lipase    Specimen: Blood   Result Value Ref Range    Lipase 32 13 - 60 U/L   Urinalysis With Microscopic If Indicated (No Culture) - Urine, Clean Catch    Specimen: Urine, Clean Catch   Result Value Ref Range    Color, UA Yellow Yellow, Straw    Appearance, UA Clear Clear    pH, UA 7.5 5.0 - 8.0    Specific Gravity, UA <=1.005 1.001 - 1.030    Glucose, UA Negative Negative    Ketones, UA Negative Negative    Bilirubin, UA Negative Negative    Blood, UA Negative Negative    Protein, UA Negative Negative     Leuk Esterase, UA Negative Negative    Nitrite, UA Negative Negative    Urobilinogen, UA 0.2 E.U./dL 0.2 - 1.0 E.U./dL   Lactic Acid, Plasma    Specimen: Blood   Result Value Ref Range    Lactate 1.3 0.5 - 2.0 mmol/L   CBC Auto Differential    Specimen: Blood   Result Value Ref Range    WBC 6.69 3.40 - 10.80 10*3/mm3    RBC 4.63 3.77 - 5.28 10*6/mm3    Hemoglobin 14.3 12.0 - 15.9 g/dL    Hematocrit 43.7 34.0 - 46.6 %    MCV 94.4 79.0 - 97.0 fL    MCH 30.9 26.6 - 33.0 pg    MCHC 32.7 31.5 - 35.7 g/dL    RDW 14.7 12.3 - 15.4 %    RDW-SD 51.1 37.0 - 54.0 fl    MPV 9.6 6.0 - 12.0 fL    Platelets 257 140 - 450 10*3/mm3    Neutrophil % 73.3 42.7 - 76.0 %    Lymphocyte % 15.8 (L) 19.6 - 45.3 %    Monocyte % 9.0 5.0 - 12.0 %    Eosinophil % 1.0 0.3 - 6.2 %    Basophil % 0.3 0.0 - 1.5 %    Immature Grans % 0.6 (H) 0.0 - 0.5 %    Neutrophils, Absolute 4.90 1.70 - 7.00 10*3/mm3    Lymphocytes, Absolute 1.06 0.70 - 3.10 10*3/mm3    Monocytes, Absolute 0.60 0.10 - 0.90 10*3/mm3    Eosinophils, Absolute 0.07 0.00 - 0.40 10*3/mm3    Basophils, Absolute 0.02 0.00 - 0.20 10*3/mm3    Immature Grans, Absolute 0.04 0.00 - 0.05 10*3/mm3    nRBC 0.0 0.0 - 0.2 /100 WBC   TSH    Specimen: Blood   Result Value Ref Range    TSH 18.020 (H) 0.270 - 4.200 uIU/mL   Troponin    Specimen: Blood   Result Value Ref Range    Troponin T <0.010 0.000 - 0.030 ng/mL   Sedimentation Rate    Specimen: Blood   Result Value Ref Range    Sed Rate 24 0 - 30 mm/hr   Vitamin B12    Specimen: Blood   Result Value Ref Range    Vitamin B-12 449 211 - 946 pg/mL   Urinalysis With Culture If Indicated - Urine, Clean Catch    Specimen: Urine, Clean Catch   Result Value Ref Range    Color, UA Yellow Yellow, Straw    Appearance, UA Clear Clear    pH, UA 5.5 5.0 - 8.0    Specific Gravity, UA 1.026 1.001 - 1.030    Glucose, UA Negative Negative    Ketones, UA Trace (A) Negative    Bilirubin, UA Negative Negative    Blood, UA Negative Negative    Protein, UA 30 mg/dL (1+)  (A) Negative    Leuk Esterase, UA Negative Negative    Nitrite, UA Negative Negative    Urobilinogen, UA 1.0 E.U./dL 0.2 - 1.0 E.U./dL   Potassium    Specimen: Blood   Result Value Ref Range    Potassium 4.3 3.5 - 5.2 mmol/L   Primidone Level    Specimen: Blood   Result Value Ref Range    Primidone Level 4.1 (L) 5.0 - 12.0 ug/mL    Phenobarbital 5 (L) 15 - 40 ug/mL   Phenobarbital Level    Specimen: Blood   Result Value Ref Range    Phenobarbital 5.2 (L) 10.0 - 30.0 mcg/mL   Magnesium    Specimen: Blood   Result Value Ref Range    Magnesium 1.8 1.6 - 2.4 mg/dL   CBC (No Diff)    Specimen: Blood   Result Value Ref Range    WBC 8.88 3.40 - 10.80 10*3/mm3    RBC 5.08 3.77 - 5.28 10*6/mm3    Hemoglobin 15.2 12.0 - 15.9 g/dL    Hematocrit 47.3 (H) 34.0 - 46.6 %    MCV 93.1 79.0 - 97.0 fL    MCH 29.9 26.6 - 33.0 pg    MCHC 32.1 31.5 - 35.7 g/dL    RDW 14.6 12.3 - 15.4 %    RDW-SD 50.5 37.0 - 54.0 fl    MPV 9.7 6.0 - 12.0 fL    Platelets 292 140 - 450 10*3/mm3   Basic Metabolic Panel    Specimen: Blood   Result Value Ref Range    Glucose 126 (H) 65 - 99 mg/dL    BUN 24 (H) 8 - 23 mg/dL    Creatinine 0.82 0.57 - 1.00 mg/dL    Sodium 129 (L) 136 - 145 mmol/L    Potassium 4.2 3.5 - 5.2 mmol/L    Chloride 91 (L) 98 - 107 mmol/L    CO2 26.0 22.0 - 29.0 mmol/L    Calcium 9.7 8.6 - 10.5 mg/dL    eGFR Non African Amer 67 >60 mL/min/1.73    BUN/Creatinine Ratio 29.3 (H) 7.0 - 25.0    Anion Gap 12.0 5.0 - 15.0 mmol/L   Osmolality, Urine - Urine, Catheter    Specimen: Urine, Catheter   Result Value Ref Range    Osmolality, Urine 700 300-1,100 mOsm/kg   Sodium, Urine, Random - Urine, Catheter    Specimen: Urine, Catheter   Result Value Ref Range    Sodium, Urine 34 mmol/L   Osmolality, Serum    Specimen: Blood   Result Value Ref Range    Osmolality 282 275 - 295 mOsm/kg   Urinalysis, Microscopic Only - Urine, Clean Catch    Specimen: Urine, Clean Catch   Result Value Ref Range    RBC, UA 3-6 (A) None Seen, 0-2 /HPF    WBC, UA 0-2 None  Seen, 0-2 /HPF    Bacteria, UA None Seen None Seen, Trace /HPF    Squamous Epithelial Cells, UA 0-2 None Seen, 0-2 /HPF    Hyaline Casts, UA 0-6 0 - 6 /LPF    Methodology Automated Microscopy    Basic Metabolic Panel    Specimen: Blood   Result Value Ref Range    Glucose 128 (H) 65 - 99 mg/dL    BUN 22 8 - 23 mg/dL    Creatinine 0.74 0.57 - 1.00 mg/dL    Sodium 127 (L) 136 - 145 mmol/L    Potassium 3.8 3.5 - 5.2 mmol/L    Chloride 95 (L) 98 - 107 mmol/L    CO2 24.0 22.0 - 29.0 mmol/L    Calcium 9.2 8.6 - 10.5 mg/dL    eGFR Non African Amer 75 >60 mL/min/1.73    BUN/Creatinine Ratio 29.7 (H) 7.0 - 25.0    Anion Gap 8.0 5.0 - 15.0 mmol/L   Basic Metabolic Panel    Specimen: Blood   Result Value Ref Range    Glucose 110 (H) 65 - 99 mg/dL    BUN 24 (H) 8 - 23 mg/dL    Creatinine 0.78 0.57 - 1.00 mg/dL    Sodium 132 (L) 136 - 145 mmol/L    Potassium 4.2 3.5 - 5.2 mmol/L    Chloride 98 98 - 107 mmol/L    CO2 23.0 22.0 - 29.0 mmol/L    Calcium 8.8 8.6 - 10.5 mg/dL    eGFR Non African Amer 71 >60 mL/min/1.73    BUN/Creatinine Ratio 30.8 (H) 7.0 - 25.0    Anion Gap 11.0 5.0 - 15.0 mmol/L   Cortisol    Specimen: Blood   Result Value Ref Range    Cortisol 10.32   mcg/dL   Basic Metabolic Panel    Specimen: Blood   Result Value Ref Range    Glucose 111 (H) 65 - 99 mg/dL    BUN 19 8 - 23 mg/dL    Creatinine 0.61 0.57 - 1.00 mg/dL    Sodium 133 (L) 136 - 145 mmol/L    Potassium 3.8 3.5 - 5.2 mmol/L    Chloride 99 98 - 107 mmol/L    CO2 25.0 22.0 - 29.0 mmol/L    Calcium 9.2 8.6 - 10.5 mg/dL    eGFR Non African Amer 94 >60 mL/min/1.73    BUN/Creatinine Ratio 31.1 (H) 7.0 - 25.0    Anion Gap 9.0 5.0 - 15.0 mmol/L   POC Glucose Once    Specimen: Blood   Result Value Ref Range    Glucose 109 70 - 130 mg/dL   POC Glucose Once    Specimen: Blood   Result Value Ref Range    Glucose 111 70 - 130 mg/dL   ECG 12 Lead   Result Value Ref Range    QT Interval 396 ms    QTC Interval 448 ms   Adult Transthoracic Echo Complete W/ Cont if  Necessary Per Protocol   Result Value Ref Range    BSA 1.7 m^2    IVSd 0.75 cm    LVIDd 4.0 cm    LVIDs 3.1 cm    LVPWd 0.87 cm    IVS/LVPW 0.86     FS 22.3 %    EDV(Teich) 71.3 ml    ESV(Teich) 38.9 ml    EF(Teich) 45.5 %    EDV(cubed) 65.5 ml    ESV(cubed) 30.8 ml    EF(cubed) 53.1 %    LV mass(C)d 96.5 grams    LV mass(C)dI 56.6 grams/m^2    SV(Teich) 32.4 ml    SI(Teich) 19.0 ml/m^2    SV(cubed) 34.8 ml    SI(cubed) 20.4 ml/m^2    Ao root diam 2.5 cm    Ao root area 4.8 cm^2    LVOT diam 1.8 cm    LVOT area 2.6 cm^2    LVOT area(traced) 2.5 cm^2    LAd major 5.9 cm    LVLd ap4 7.1 cm    EDV(MOD-sp4) 59.0 ml    LVLs ap4 6.1 cm    ESV(MOD-sp4) 28.0 ml    EF(MOD-sp4) 52.5 %    LVLd ap2 7.1 cm    EDV(MOD-sp2) 58.0 ml    LVLs ap2 6.1 cm    ESV(MOD-sp2) 32.0 ml    EF(MOD-sp2) 44.8 %    LA volume 73.0 ml    EF(MOD-bp) 55 %    SV(MOD-sp4) 31.0 ml    SI(MOD-sp4) 18.2 ml/m^2    SV(MOD-sp2) 26.0 ml    SI(MOD-sp2) 15.2 ml/m^2    Ao root area (BSA corrected) 1.5     LV Maria Vol (BSA corrected) 34.6 ml/m^2    LV Sys Vol (BSA corrected) 16.4 ml/m^2    LA Volume Index 42.8 ml/m^2    MV E max payam 44.9 cm/sec    MV A max payam 84.9 cm/sec    MV E/A 0.53     MV P1/2t max payam 66.5 cm/sec    MV P1/2t 170.3 msec    MVA(P1/2t) 1.3 cm^2    MV dec slope 114.4 cm/sec^2    MV dec time 0.28 sec    Ao pk payam 129.0 cm/sec    Ao max PG 6.7 mmHg    Ao max PG (full) 4.3 mmHg    Ao V2 mean 83.9 cm/sec    Ao mean PG 3.3 mmHg    Ao mean PG (full) 2.4 mmHg    Ao V2 VTI 29.3 cm    FERNANDO(I,A) 1.5 cm^2    FERNANDO(I,D) 1.5 cm^2    FERNANDO(V,A) 1.5 cm^2    FERNANDO(V,D) 1.5 cm^2    LV V1 max PG 2.4 mmHg    LV V1 mean PG 0.93 mmHg    LV V1 max 77.0 cm/sec    LV V1 mean 43.4 cm/sec    LV V1 VTI 16.8 cm    SV(Ao) 140.8 ml    SI(Ao) 82.6 ml/m^2    SV(LVOT) 43.5 ml    SI(LVOT) 25.5 ml/m^2    MVA P1/2T LCG 3.3 cm^2    Lat E/e'  11.6     Med E/e' 10.1     Lat Peak E' Payam 3.7 cm/sec    Med Peak E' Payam 4.3 cm/sec     CV ECHO JACOB - BZI_BMI 23.5 kilograms/m^2     CV ECHO JACOB  - BSA(HAYCOCK) 1.7 m^2     CV ECHO JACOB - BZI_METRIC_WEIGHT 64.0 kg    BH CV ECHO JAOCB - BZI_METRIC_HEIGHT 165.1 cm    Avg E/e' ratio 11.23     RV Base 2.60 cm    RV Length 5.10 cm    RV Mid 2.20 cm    TAPSE (>1.6) 1.90 cm   Light Blue Top   Result Value Ref Range    Extra Tube hold for add-on    Green Top (Gel)   Result Value Ref Range    Extra Tube Hold for add-ons.    Lavender Top   Result Value Ref Range    Extra Tube hold for add-on    Gold Top - SST   Result Value Ref Range    Extra Tube Hold for add-ons.              Assessment/Plan   Diagnoses and all orders for this visit:    Memory loss  -     TSH; Future  -     T4, Free; Future  -     Comprehensive Metabolic Panel; Future  -     CBC & Differential; Future  -     Ambulatory Referral to Social Work    Weakness  -     TSH; Future  -     T4, Free; Future  -     Comprehensive Metabolic Panel; Future  -     CBC & Differential; Future  -     Ambulatory Referral to Social Work  -     Urinalysis With Culture If Indicated - Urine, Clean Catch; Future  -     cyanocobalamin injection 1,000 mcg    Essential hypertension    Transient ischemic attack  Comments:  likely culprit, start 81mg Asa    Other orders  -     lisinopril (PRINIVIL,ZESTRIL) 20 MG tablet; Take 1 tablet by mouth 2 (two) times a day.            Current outpatient and discharge medications have been reconciled for the patient.  Reviewed by: Aleah Regan MD      Return in about 6 weeks (around 1/6/2021) for Next scheduled follow up.

## 2020-12-01 ENCOUNTER — PATIENT OUTREACH (OUTPATIENT)
Dept: CASE MANAGEMENT | Facility: OTHER | Age: 80
End: 2020-12-01

## 2020-12-01 NOTE — OUTREACH NOTE
"Patient Outreach Note    LAURYN reached out to pt's son Simón to follow up. Simón stated , \"Mom is doing ok. She is having PT come out to the house and help her with exercises. So that's good. But I think at some point we will need to place her in a nursing home. I have met with my  and am trying to become her POA\". LAURYN asked Simón if there were any resources that she could offer the family. Simón stated, \" It may be helpful to have someone that could help us clean and stay with her while I go out to the store\". LAURYN provided Simón with the information for Medicaid Waiver program. Simón stated, \" Ok this may be helpful. Thank you\".     JONATHAN Fajardo  Ambulatory     12/1/2020, 14:22 EST      "

## 2020-12-09 ENCOUNTER — TELEPHONE (OUTPATIENT)
Dept: INTERNAL MEDICINE | Facility: CLINIC | Age: 80
End: 2020-12-09

## 2020-12-09 NOTE — TELEPHONE ENCOUNTER
YUNG FROM [facility] IS CALLING TO REPORT TO THE DOCTOR THAT PATIENT IS REFUSING PHYSICAL AND OCCUPATIONAL THERAPY.     YUNG CALL BACK [phone]

## 2020-12-22 ENCOUNTER — TELEPHONE (OUTPATIENT)
Dept: INTERNAL MEDICINE | Facility: CLINIC | Age: 80
End: 2020-12-22

## 2020-12-22 DIAGNOSIS — R41.3 MEMORY LOSS: ICD-10-CM

## 2020-12-22 DIAGNOSIS — R53.1 WEAKNESS: ICD-10-CM

## 2020-12-22 NOTE — TELEPHONE ENCOUNTER
Malgorzata from [facility] called to advise pt's TSH was 40.  She is having her office fax results over now.

## 2020-12-22 NOTE — TELEPHONE ENCOUNTER
Please let Pt. Know that her thyroid level is very low. With her history of thyroid cancer, this needs to be corrected soon.  Is she taking her thyroid meds regularly?  If yes, she should contact Dr. Elizabeth( her thyroid doctor's) office ASAP and follow up with them.

## 2020-12-24 NOTE — TELEPHONE ENCOUNTER
Let pt know of message. Pt verbalized understanding.  Let Malgorzata from [facility] about message as well, stated that they would contact Dr. Elizabeth's office

## 2020-12-28 ENCOUNTER — HOSPITAL ENCOUNTER (INPATIENT)
Facility: HOSPITAL | Age: 80
LOS: 8 days | Discharge: NURSING, REHAB OR LONG-TERM CARE | End: 2021-01-05
Attending: EMERGENCY MEDICINE | Admitting: INTERNAL MEDICINE

## 2020-12-28 ENCOUNTER — APPOINTMENT (OUTPATIENT)
Dept: GENERAL RADIOLOGY | Facility: HOSPITAL | Age: 80
End: 2020-12-28

## 2020-12-28 ENCOUNTER — APPOINTMENT (OUTPATIENT)
Dept: MRI IMAGING | Facility: HOSPITAL | Age: 80
End: 2020-12-28

## 2020-12-28 DIAGNOSIS — I10 UNCONTROLLED HYPERTENSION: ICD-10-CM

## 2020-12-28 DIAGNOSIS — I67.4 HYPERTENSIVE ENCEPHALOPATHY: Primary | ICD-10-CM

## 2020-12-28 DIAGNOSIS — I16.9 HYPERTENSIVE CRISIS: ICD-10-CM

## 2020-12-28 PROBLEM — R00.1 BRADYCARDIA: Chronic | Status: ACTIVE | Noted: 2020-12-28

## 2020-12-28 PROBLEM — E89.0 POST-SURGICAL HYPOTHYROIDISM: Status: ACTIVE | Noted: 2020-12-28

## 2020-12-28 PROBLEM — R00.1 BRADYCARDIA: Status: ACTIVE | Noted: 2020-12-28

## 2020-12-28 PROBLEM — I95.2 HYPOTENSION DUE TO DRUGS: Status: ACTIVE | Noted: 2020-12-28

## 2020-12-28 LAB
ALBUMIN SERPL-MCNC: 3.8 G/DL (ref 3.5–5.2)
ALBUMIN SERPL-MCNC: 4.4 G/DL (ref 3.5–5.2)
ALBUMIN/GLOB SERPL: 1.2 G/DL
ALBUMIN/GLOB SERPL: 1.4 G/DL
ALP SERPL-CCNC: 91 U/L (ref 39–117)
ALP SERPL-CCNC: 97 U/L (ref 39–117)
ALT SERPL W P-5'-P-CCNC: 7 U/L (ref 1–33)
ALT SERPL W P-5'-P-CCNC: 7 U/L (ref 1–33)
AMMONIA BLD-SCNC: 13 UMOL/L (ref 11–51)
ANION GAP SERPL CALCULATED.3IONS-SCNC: 14 MMOL/L (ref 5–15)
ANION GAP SERPL CALCULATED.3IONS-SCNC: 15 MMOL/L (ref 5–15)
ARTERIAL PATENCY WRIST A: ABNORMAL
AST SERPL-CCNC: 15 U/L (ref 1–32)
AST SERPL-CCNC: 18 U/L (ref 1–32)
ATMOSPHERIC PRESS: ABNORMAL MM[HG]
BASE EXCESS BLDA CALC-SCNC: 2.2 MMOL/L (ref 0–2)
BASOPHILS # BLD AUTO: 0.05 10*3/MM3 (ref 0–0.2)
BASOPHILS # BLD AUTO: 0.05 10*3/MM3 (ref 0–0.2)
BASOPHILS NFR BLD AUTO: 0.7 % (ref 0–1.5)
BASOPHILS NFR BLD AUTO: 1.1 % (ref 0–1.5)
BDY SITE: ABNORMAL
BILIRUB SERPL-MCNC: 0.3 MG/DL (ref 0–1.2)
BILIRUB SERPL-MCNC: 0.4 MG/DL (ref 0–1.2)
BILIRUB UR QL STRIP: NEGATIVE
BODY TEMPERATURE: 37 C
BUN SERPL-MCNC: 10 MG/DL (ref 8–23)
BUN SERPL-MCNC: 9 MG/DL (ref 8–23)
BUN/CREAT SERPL: 11.5 (ref 7–25)
BUN/CREAT SERPL: 9.8 (ref 7–25)
CALCIUM SPEC-SCNC: 9.4 MG/DL (ref 8.6–10.5)
CALCIUM SPEC-SCNC: 9.8 MG/DL (ref 8.6–10.5)
CHLORIDE SERPL-SCNC: 95 MMOL/L (ref 98–107)
CHLORIDE SERPL-SCNC: 98 MMOL/L (ref 98–107)
CLARITY UR: CLEAR
CO2 BLDA-SCNC: 27.1 MMOL/L (ref 22–33)
CO2 SERPL-SCNC: 22 MMOL/L (ref 22–29)
CO2 SERPL-SCNC: 28 MMOL/L (ref 22–29)
COHGB MFR BLD: 1 % (ref 0–2)
COLOR UR: YELLOW
CREAT SERPL-MCNC: 0.87 MG/DL (ref 0.57–1)
CREAT SERPL-MCNC: 0.92 MG/DL (ref 0.57–1)
D-LACTATE SERPL-SCNC: 1.4 MMOL/L (ref 0.5–2)
DEPRECATED RDW RBC AUTO: 44 FL (ref 37–54)
DEPRECATED RDW RBC AUTO: 44.5 FL (ref 37–54)
EOSINOPHIL # BLD AUTO: 0.07 10*3/MM3 (ref 0–0.4)
EOSINOPHIL # BLD AUTO: 0.11 10*3/MM3 (ref 0–0.4)
EOSINOPHIL NFR BLD AUTO: 1.5 % (ref 0.3–6.2)
EOSINOPHIL NFR BLD AUTO: 1.6 % (ref 0.3–6.2)
EPAP: 0
ERYTHROCYTE [DISTWIDTH] IN BLOOD BY AUTOMATED COUNT: 13 % (ref 12.3–15.4)
ERYTHROCYTE [DISTWIDTH] IN BLOOD BY AUTOMATED COUNT: 13.1 % (ref 12.3–15.4)
GFR SERPL CREATININE-BSD FRML MDRD: 58 ML/MIN/1.73
GFR SERPL CREATININE-BSD FRML MDRD: 62 ML/MIN/1.73
GLOBULIN UR ELPH-MCNC: 3.1 GM/DL
GLOBULIN UR ELPH-MCNC: 3.1 GM/DL
GLUCOSE BLDC GLUCOMTR-MCNC: 359 MG/DL (ref 70–130)
GLUCOSE SERPL-MCNC: 171 MG/DL (ref 65–99)
GLUCOSE SERPL-MCNC: 90 MG/DL (ref 65–99)
GLUCOSE UR STRIP-MCNC: NEGATIVE MG/DL
HCO3 BLDA-SCNC: 25.9 MMOL/L (ref 20–26)
HCT VFR BLD AUTO: 39.7 % (ref 34–46.6)
HCT VFR BLD AUTO: 42.8 % (ref 34–46.6)
HCT VFR BLD CALC: 43.4 %
HGB BLD-MCNC: 12.3 G/DL (ref 12–15.9)
HGB BLD-MCNC: 13.7 G/DL (ref 12–15.9)
HGB BLDA-MCNC: 14.2 G/DL (ref 14–18)
HGB UR QL STRIP.AUTO: NEGATIVE
HOLD SPECIMEN: NORMAL
HOLD SPECIMEN: NORMAL
IMM GRANULOCYTES # BLD AUTO: 0.02 10*3/MM3 (ref 0–0.05)
IMM GRANULOCYTES # BLD AUTO: 0.04 10*3/MM3 (ref 0–0.05)
IMM GRANULOCYTES NFR BLD AUTO: 0.4 % (ref 0–0.5)
IMM GRANULOCYTES NFR BLD AUTO: 0.6 % (ref 0–0.5)
INHALED O2 CONCENTRATION: 21 %
IPAP: 0
KETONES UR QL STRIP: ABNORMAL
LEUKOCYTE ESTERASE UR QL STRIP.AUTO: NEGATIVE
LYMPHOCYTES # BLD AUTO: 0.83 10*3/MM3 (ref 0.7–3.1)
LYMPHOCYTES # BLD AUTO: 0.89 10*3/MM3 (ref 0.7–3.1)
LYMPHOCYTES NFR BLD AUTO: 11.8 % (ref 19.6–45.3)
LYMPHOCYTES NFR BLD AUTO: 18.7 % (ref 19.6–45.3)
MAGNESIUM SERPL-MCNC: 1.8 MG/DL (ref 1.6–2.4)
MCH RBC QN AUTO: 28.9 PG (ref 26.6–33)
MCH RBC QN AUTO: 29.5 PG (ref 26.6–33)
MCHC RBC AUTO-ENTMCNC: 31 G/DL (ref 31.5–35.7)
MCHC RBC AUTO-ENTMCNC: 32 G/DL (ref 31.5–35.7)
MCV RBC AUTO: 92 FL (ref 79–97)
MCV RBC AUTO: 93.4 FL (ref 79–97)
METHGB BLD QL: 0.3 % (ref 0–1.5)
MODALITY: ABNORMAL
MONOCYTES # BLD AUTO: 0.42 10*3/MM3 (ref 0.1–0.9)
MONOCYTES # BLD AUTO: 0.56 10*3/MM3 (ref 0.1–0.9)
MONOCYTES NFR BLD AUTO: 8 % (ref 5–12)
MONOCYTES NFR BLD AUTO: 8.8 % (ref 5–12)
NEUTROPHILS NFR BLD AUTO: 3.31 10*3/MM3 (ref 1.7–7)
NEUTROPHILS NFR BLD AUTO: 5.44 10*3/MM3 (ref 1.7–7)
NEUTROPHILS NFR BLD AUTO: 69.5 % (ref 42.7–76)
NEUTROPHILS NFR BLD AUTO: 77.3 % (ref 42.7–76)
NITRITE UR QL STRIP: NEGATIVE
NOTE: ABNORMAL
NRBC BLD AUTO-RTO: 0 /100 WBC (ref 0–0.2)
NRBC BLD AUTO-RTO: 0 /100 WBC (ref 0–0.2)
NT-PROBNP SERPL-MCNC: 1234 PG/ML (ref 0–1800)
OXYHGB MFR BLDV: 94.2 % (ref 94–99)
PAW @ PEAK INSP FLOW SETTING VENT: 0 CMH2O
PCO2 BLDA: 36.6 MM HG (ref 35–45)
PCO2 TEMP ADJ BLD: 36.6 MM HG (ref 35–45)
PH BLDA: 7.46 PH UNITS (ref 7.35–7.45)
PH UR STRIP.AUTO: 7.5 [PH] (ref 5–8)
PH, TEMP CORRECTED: 7.46 PH UNITS
PLATELET # BLD AUTO: 259 10*3/MM3 (ref 140–450)
PLATELET # BLD AUTO: 280 10*3/MM3 (ref 140–450)
PMV BLD AUTO: 9.7 FL (ref 6–12)
PMV BLD AUTO: 9.9 FL (ref 6–12)
PO2 BLDA: 70.9 MM HG (ref 83–108)
PO2 TEMP ADJ BLD: 70.9 MM HG (ref 83–108)
POTASSIUM SERPL-SCNC: 3.4 MMOL/L (ref 3.5–5.2)
POTASSIUM SERPL-SCNC: 3.8 MMOL/L (ref 3.5–5.2)
PROT SERPL-MCNC: 6.9 G/DL (ref 6–8.5)
PROT SERPL-MCNC: 7.5 G/DL (ref 6–8.5)
PROT UR QL STRIP: ABNORMAL
RBC # BLD AUTO: 4.25 10*6/MM3 (ref 3.77–5.28)
RBC # BLD AUTO: 4.65 10*6/MM3 (ref 3.77–5.28)
SODIUM SERPL-SCNC: 135 MMOL/L (ref 136–145)
SODIUM SERPL-SCNC: 137 MMOL/L (ref 136–145)
SP GR UR STRIP: 1.01 (ref 1–1.03)
T4 FREE SERPL-MCNC: 0.74 NG/DL (ref 0.93–1.7)
TOTAL RATE: 0 BREATHS/MINUTE
TROPONIN T SERPL-MCNC: <0.01 NG/ML (ref 0–0.03)
TROPONIN T SERPL-MCNC: <0.01 NG/ML (ref 0–0.03)
TSH SERPL DL<=0.05 MIU/L-ACNC: 34 UIU/ML (ref 0.27–4.2)
UROBILINOGEN UR QL STRIP: ABNORMAL
WBC # BLD AUTO: 4.76 10*3/MM3 (ref 3.4–10.8)
WBC # BLD AUTO: 7.03 10*3/MM3 (ref 3.4–10.8)
WHOLE BLOOD HOLD SPECIMEN: NORMAL
WHOLE BLOOD HOLD SPECIMEN: NORMAL

## 2020-12-28 PROCEDURE — 83880 ASSAY OF NATRIURETIC PEPTIDE: CPT | Performed by: INTERNAL MEDICINE

## 2020-12-28 PROCEDURE — 93005 ELECTROCARDIOGRAM TRACING: CPT | Performed by: EMERGENCY MEDICINE

## 2020-12-28 PROCEDURE — 83605 ASSAY OF LACTIC ACID: CPT | Performed by: INTERNAL MEDICINE

## 2020-12-28 PROCEDURE — 83050 HGB METHEMOGLOBIN QUAN: CPT

## 2020-12-28 PROCEDURE — 84443 ASSAY THYROID STIM HORMONE: CPT | Performed by: EMERGENCY MEDICINE

## 2020-12-28 PROCEDURE — 25010000002 ATROPINE PER 0.01 MG: Performed by: INTERNAL MEDICINE

## 2020-12-28 PROCEDURE — 82805 BLOOD GASES W/O2 SATURATION: CPT

## 2020-12-28 PROCEDURE — 83735 ASSAY OF MAGNESIUM: CPT | Performed by: EMERGENCY MEDICINE

## 2020-12-28 PROCEDURE — 81003 URINALYSIS AUTO W/O SCOPE: CPT | Performed by: INTERNAL MEDICINE

## 2020-12-28 PROCEDURE — 70551 MRI BRAIN STEM W/O DYE: CPT

## 2020-12-28 PROCEDURE — 99223 1ST HOSP IP/OBS HIGH 75: CPT | Performed by: INTERNAL MEDICINE

## 2020-12-28 PROCEDURE — 36600 WITHDRAWAL OF ARTERIAL BLOOD: CPT

## 2020-12-28 PROCEDURE — 84484 ASSAY OF TROPONIN QUANT: CPT | Performed by: EMERGENCY MEDICINE

## 2020-12-28 PROCEDURE — 82962 GLUCOSE BLOOD TEST: CPT

## 2020-12-28 PROCEDURE — 85025 COMPLETE CBC W/AUTO DIFF WBC: CPT | Performed by: INTERNAL MEDICINE

## 2020-12-28 PROCEDURE — 82375 ASSAY CARBOXYHB QUANT: CPT

## 2020-12-28 PROCEDURE — 71045 X-RAY EXAM CHEST 1 VIEW: CPT

## 2020-12-28 PROCEDURE — 80053 COMPREHEN METABOLIC PANEL: CPT | Performed by: EMERGENCY MEDICINE

## 2020-12-28 PROCEDURE — 82140 ASSAY OF AMMONIA: CPT | Performed by: INTERNAL MEDICINE

## 2020-12-28 PROCEDURE — 84484 ASSAY OF TROPONIN QUANT: CPT | Performed by: INTERNAL MEDICINE

## 2020-12-28 PROCEDURE — 93005 ELECTROCARDIOGRAM TRACING: CPT

## 2020-12-28 PROCEDURE — 25010000002 ENOXAPARIN PER 10 MG: Performed by: INTERNAL MEDICINE

## 2020-12-28 PROCEDURE — 84439 ASSAY OF FREE THYROXINE: CPT | Performed by: INTERNAL MEDICINE

## 2020-12-28 PROCEDURE — 99285 EMERGENCY DEPT VISIT HI MDM: CPT

## 2020-12-28 PROCEDURE — 85025 COMPLETE CBC W/AUTO DIFF WBC: CPT

## 2020-12-28 PROCEDURE — 87086 URINE CULTURE/COLONY COUNT: CPT | Performed by: EMERGENCY MEDICINE

## 2020-12-28 PROCEDURE — 80053 COMPREHEN METABOLIC PANEL: CPT | Performed by: INTERNAL MEDICINE

## 2020-12-28 RX ORDER — SODIUM CHLORIDE 0.9 % (FLUSH) 0.9 %
10 SYRINGE (ML) INJECTION AS NEEDED
Status: DISCONTINUED | OUTPATIENT
Start: 2020-12-28 | End: 2020-12-29

## 2020-12-28 RX ORDER — PRIMIDONE 50 MG/1
50 TABLET ORAL DAILY
Status: DISCONTINUED | OUTPATIENT
Start: 2020-12-29 | End: 2021-01-05 | Stop reason: HOSPADM

## 2020-12-28 RX ORDER — AMLODIPINE BESYLATE 10 MG/1
10 TABLET ORAL ONCE
Status: COMPLETED | OUTPATIENT
Start: 2020-12-28 | End: 2020-12-28

## 2020-12-28 RX ORDER — CALCIUM CARBONATE 750 MG/1
750 TABLET, CHEWABLE ORAL 2 TIMES DAILY PRN
Status: DISCONTINUED | OUTPATIENT
Start: 2020-12-28 | End: 2021-01-05 | Stop reason: HOSPADM

## 2020-12-28 RX ORDER — PRIMIDONE 50 MG/1
50 TABLET ORAL ONCE
Status: COMPLETED | OUTPATIENT
Start: 2020-12-28 | End: 2020-12-28

## 2020-12-28 RX ORDER — ACETAMINOPHEN 160 MG/5ML
650 SOLUTION ORAL EVERY 4 HOURS PRN
Status: DISCONTINUED | OUTPATIENT
Start: 2020-12-28 | End: 2021-01-05 | Stop reason: HOSPADM

## 2020-12-28 RX ORDER — SODIUM CHLORIDE 9 MG/ML
INJECTION, SOLUTION INTRAVENOUS
Status: COMPLETED | OUTPATIENT
Start: 2020-12-28 | End: 2020-12-28

## 2020-12-28 RX ORDER — LISINOPRIL 10 MG/1
20 TABLET ORAL ONCE
Status: COMPLETED | OUTPATIENT
Start: 2020-12-28 | End: 2020-12-28

## 2020-12-28 RX ORDER — LEVOTHYROXINE SODIUM 0.12 MG/1
125 TABLET ORAL
Status: DISCONTINUED | OUTPATIENT
Start: 2020-12-29 | End: 2021-01-05 | Stop reason: HOSPADM

## 2020-12-28 RX ORDER — AMOXICILLIN 250 MG
2 CAPSULE ORAL 2 TIMES DAILY PRN
Status: DISCONTINUED | OUTPATIENT
Start: 2020-12-28 | End: 2021-01-05 | Stop reason: HOSPADM

## 2020-12-28 RX ORDER — PRIMIDONE 250 MG/1
125 TABLET ORAL NIGHTLY
Status: DISCONTINUED | OUTPATIENT
Start: 2020-12-28 | End: 2021-01-05 | Stop reason: HOSPADM

## 2020-12-28 RX ORDER — ATROPINE SULFATE 1 MG/ML
INJECTION, SOLUTION INTRAMUSCULAR; INTRAVENOUS; SUBCUTANEOUS
Status: COMPLETED | OUTPATIENT
Start: 2020-12-28 | End: 2020-12-28

## 2020-12-28 RX ORDER — ACETAMINOPHEN 325 MG/1
650 TABLET ORAL EVERY 4 HOURS PRN
Status: DISCONTINUED | OUTPATIENT
Start: 2020-12-28 | End: 2021-01-05 | Stop reason: HOSPADM

## 2020-12-28 RX ORDER — ATORVASTATIN CALCIUM 40 MG/1
40 TABLET, FILM COATED ORAL DAILY
Status: DISCONTINUED | OUTPATIENT
Start: 2020-12-28 | End: 2021-01-05 | Stop reason: HOSPADM

## 2020-12-28 RX ORDER — SODIUM CHLORIDE 0.9 % (FLUSH) 0.9 %
10 SYRINGE (ML) INJECTION AS NEEDED
Status: DISCONTINUED | OUTPATIENT
Start: 2020-12-28 | End: 2021-01-05 | Stop reason: HOSPADM

## 2020-12-28 RX ORDER — SODIUM CHLORIDE 0.9 % (FLUSH) 0.9 %
10 SYRINGE (ML) INJECTION EVERY 12 HOURS SCHEDULED
Status: DISCONTINUED | OUTPATIENT
Start: 2020-12-28 | End: 2021-01-05 | Stop reason: HOSPADM

## 2020-12-28 RX ORDER — FAMOTIDINE 20 MG/1
20 TABLET, FILM COATED ORAL
Status: DISCONTINUED | OUTPATIENT
Start: 2020-12-28 | End: 2021-01-05 | Stop reason: HOSPADM

## 2020-12-28 RX ORDER — ONDANSETRON 4 MG/1
4 TABLET, FILM COATED ORAL EVERY 6 HOURS PRN
Status: DISCONTINUED | OUTPATIENT
Start: 2020-12-28 | End: 2021-01-05 | Stop reason: HOSPADM

## 2020-12-28 RX ORDER — ATENOLOL 50 MG/1
50 TABLET ORAL ONCE
Status: COMPLETED | OUTPATIENT
Start: 2020-12-28 | End: 2020-12-28

## 2020-12-28 RX ORDER — TERAZOSIN 1 MG/1
1 CAPSULE ORAL NIGHTLY
Status: DISCONTINUED | OUTPATIENT
Start: 2020-12-28 | End: 2020-12-28

## 2020-12-28 RX ORDER — ACETAMINOPHEN 650 MG/1
650 SUPPOSITORY RECTAL EVERY 4 HOURS PRN
Status: DISCONTINUED | OUTPATIENT
Start: 2020-12-28 | End: 2021-01-05 | Stop reason: HOSPADM

## 2020-12-28 RX ORDER — DULOXETIN HYDROCHLORIDE 30 MG/1
30 CAPSULE, DELAYED RELEASE ORAL DAILY
Status: DISCONTINUED | OUTPATIENT
Start: 2020-12-28 | End: 2021-01-05 | Stop reason: HOSPADM

## 2020-12-28 RX ORDER — AMLODIPINE BESYLATE 10 MG/1
10 TABLET ORAL DAILY
Status: DISCONTINUED | OUTPATIENT
Start: 2020-12-29 | End: 2020-12-28

## 2020-12-28 RX ORDER — ATENOLOL 50 MG/1
50 TABLET ORAL DAILY
Status: DISCONTINUED | OUTPATIENT
Start: 2020-12-29 | End: 2020-12-28

## 2020-12-28 RX ORDER — ENALAPRIL MALEATE 10 MG/1
20 TABLET ORAL DAILY
Status: DISCONTINUED | OUTPATIENT
Start: 2020-12-29 | End: 2020-12-28

## 2020-12-28 RX ADMIN — TERAZOSIN HYDROCHLORIDE 1 MG: 1 CAPSULE ORAL at 20:01

## 2020-12-28 RX ADMIN — ATENOLOL 50 MG: 50 TABLET ORAL at 13:34

## 2020-12-28 RX ADMIN — ENOXAPARIN SODIUM 40 MG: 40 INJECTION SUBCUTANEOUS at 20:00

## 2020-12-28 RX ADMIN — LISINOPRIL 20 MG: 10 TABLET ORAL at 13:34

## 2020-12-28 RX ADMIN — PRIMIDONE 50 MG: 50 TABLET ORAL at 13:35

## 2020-12-28 RX ADMIN — ATORVASTATIN CALCIUM 40 MG: 40 TABLET, FILM COATED ORAL at 17:14

## 2020-12-28 RX ADMIN — PRIMIDONE 125 MG: 250 TABLET ORAL at 20:01

## 2020-12-28 RX ADMIN — AMLODIPINE BESYLATE 10 MG: 10 TABLET ORAL at 13:35

## 2020-12-28 RX ADMIN — NICARDIPINE HYDROCHLORIDE 5 MG/HR: 0.1 INJECTION, SOLUTION INTRAVENOUS at 13:37

## 2020-12-28 RX ADMIN — SODIUM CHLORIDE, PRESERVATIVE FREE 10 ML: 5 INJECTION INTRAVENOUS at 20:00

## 2020-12-28 RX ADMIN — FAMOTIDINE 20 MG: 20 TABLET, FILM COATED ORAL at 17:15

## 2020-12-28 RX ADMIN — SODIUM CHLORIDE 500 ML: 9 INJECTION, SOLUTION INTRAVENOUS at 21:02

## 2020-12-28 RX ADMIN — ACETAMINOPHEN 650 MG: 325 TABLET, FILM COATED ORAL at 17:14

## 2020-12-28 RX ADMIN — ATROPINE SULFATE 0.5 MG: 1 INJECTION, SOLUTION INTRAMUSCULAR; INTRAVENOUS; SUBCUTANEOUS at 21:14

## 2020-12-28 RX ADMIN — DULOXETINE HYDROCHLORIDE 30 MG: 30 CAPSULE, DELAYED RELEASE ORAL at 17:15

## 2020-12-29 LAB
ALBUMIN SERPL-MCNC: 3.5 G/DL (ref 3.5–5.2)
ALBUMIN/GLOB SERPL: 1.3 G/DL
ALP SERPL-CCNC: 80 U/L (ref 39–117)
ALT SERPL W P-5'-P-CCNC: 6 U/L (ref 1–33)
ANION GAP SERPL CALCULATED.3IONS-SCNC: 10 MMOL/L (ref 5–15)
AST SERPL-CCNC: 13 U/L (ref 1–32)
BACTERIA SPEC AEROBE CULT: NORMAL
BASOPHILS # BLD MANUAL: 0 10*3/MM3 (ref 0–0.2)
BASOPHILS NFR BLD AUTO: 0 % (ref 0–1.5)
BILIRUB SERPL-MCNC: 0.2 MG/DL (ref 0–1.2)
BUN SERPL-MCNC: 11 MG/DL (ref 8–23)
BUN/CREAT SERPL: 11.7 (ref 7–25)
CALCIUM SPEC-SCNC: 8.8 MG/DL (ref 8.6–10.5)
CHLORIDE SERPL-SCNC: 100 MMOL/L (ref 98–107)
CO2 SERPL-SCNC: 27 MMOL/L (ref 22–29)
CREAT SERPL-MCNC: 0.94 MG/DL (ref 0.57–1)
DEPRECATED RDW RBC AUTO: 44.4 FL (ref 37–54)
EOSINOPHIL # BLD MANUAL: 0 10*3/MM3 (ref 0–0.4)
EOSINOPHIL NFR BLD MANUAL: 0 % (ref 0.3–6.2)
ERYTHROCYTE [DISTWIDTH] IN BLOOD BY AUTOMATED COUNT: 13.1 % (ref 12.3–15.4)
GFR SERPL CREATININE-BSD FRML MDRD: 57 ML/MIN/1.73
GLOBULIN UR ELPH-MCNC: 2.8 GM/DL
GLUCOSE SERPL-MCNC: 119 MG/DL (ref 65–99)
HCT VFR BLD AUTO: 39.5 % (ref 34–46.6)
HGB BLD-MCNC: 12.3 G/DL (ref 12–15.9)
LYMPHOCYTES # BLD MANUAL: 1.22 10*3/MM3 (ref 0.7–3.1)
LYMPHOCYTES NFR BLD MANUAL: 11 % (ref 5–12)
LYMPHOCYTES NFR BLD MANUAL: 23 % (ref 19.6–45.3)
MAGNESIUM SERPL-MCNC: 1.9 MG/DL (ref 1.6–2.4)
MCH RBC QN AUTO: 28.9 PG (ref 26.6–33)
MCHC RBC AUTO-ENTMCNC: 31.1 G/DL (ref 31.5–35.7)
MCV RBC AUTO: 92.7 FL (ref 79–97)
MONOCYTES # BLD AUTO: 0.58 10*3/MM3 (ref 0.1–0.9)
NEUTROPHILS # BLD AUTO: 3.35 10*3/MM3 (ref 1.7–7)
NEUTROPHILS NFR BLD MANUAL: 63 % (ref 42.7–76)
PLAT MORPH BLD: NORMAL
PLATELET # BLD AUTO: 257 10*3/MM3 (ref 140–450)
PMV BLD AUTO: 9.8 FL (ref 6–12)
POTASSIUM SERPL-SCNC: 3.6 MMOL/L (ref 3.5–5.2)
POTASSIUM SERPL-SCNC: 4.5 MMOL/L (ref 3.5–5.2)
PROT SERPL-MCNC: 6.3 G/DL (ref 6–8.5)
RBC # BLD AUTO: 4.26 10*6/MM3 (ref 3.77–5.28)
RBC MORPH BLD: NORMAL
SARS-COV-2 RNA RESP QL NAA+PROBE: NOT DETECTED
SODIUM SERPL-SCNC: 137 MMOL/L (ref 136–145)
VARIANT LYMPHS NFR BLD MANUAL: 3 % (ref 0–5)
WBC # BLD AUTO: 5.31 10*3/MM3 (ref 3.4–10.8)
WBC MORPH BLD: NORMAL

## 2020-12-29 PROCEDURE — 97165 OT EVAL LOW COMPLEX 30 MIN: CPT

## 2020-12-29 PROCEDURE — 83735 ASSAY OF MAGNESIUM: CPT | Performed by: INTERNAL MEDICINE

## 2020-12-29 PROCEDURE — 25010000002 ENOXAPARIN PER 10 MG: Performed by: INTERNAL MEDICINE

## 2020-12-29 PROCEDURE — 25010000003 MAGNESIUM SULFATE 4 GM/100ML SOLUTION: Performed by: NURSE PRACTITIONER

## 2020-12-29 PROCEDURE — U0004 COV-19 TEST NON-CDC HGH THRU: HCPCS | Performed by: NURSE PRACTITIONER

## 2020-12-29 PROCEDURE — 84132 ASSAY OF SERUM POTASSIUM: CPT | Performed by: INTERNAL MEDICINE

## 2020-12-29 PROCEDURE — 99232 SBSQ HOSP IP/OBS MODERATE 35: CPT | Performed by: INTERNAL MEDICINE

## 2020-12-29 PROCEDURE — 80053 COMPREHEN METABOLIC PANEL: CPT | Performed by: INTERNAL MEDICINE

## 2020-12-29 PROCEDURE — 85027 COMPLETE CBC AUTOMATED: CPT | Performed by: INTERNAL MEDICINE

## 2020-12-29 PROCEDURE — 85007 BL SMEAR W/DIFF WBC COUNT: CPT | Performed by: INTERNAL MEDICINE

## 2020-12-29 RX ORDER — MAGNESIUM SULFATE HEPTAHYDRATE 40 MG/ML
2 INJECTION, SOLUTION INTRAVENOUS AS NEEDED
Status: DISCONTINUED | OUTPATIENT
Start: 2020-12-29 | End: 2021-01-05 | Stop reason: HOSPADM

## 2020-12-29 RX ORDER — ENALAPRIL MALEATE 10 MG/1
10 TABLET ORAL
Status: DISCONTINUED | OUTPATIENT
Start: 2020-12-29 | End: 2020-12-30

## 2020-12-29 RX ORDER — POTASSIUM CHLORIDE 1.5 G/1.77G
40 POWDER, FOR SOLUTION ORAL AS NEEDED
Status: DISCONTINUED | OUTPATIENT
Start: 2020-12-29 | End: 2021-01-05 | Stop reason: HOSPADM

## 2020-12-29 RX ORDER — MAGNESIUM SULFATE HEPTAHYDRATE 40 MG/ML
4 INJECTION, SOLUTION INTRAVENOUS AS NEEDED
Status: DISCONTINUED | OUTPATIENT
Start: 2020-12-29 | End: 2021-01-05 | Stop reason: HOSPADM

## 2020-12-29 RX ORDER — POTASSIUM CHLORIDE 750 MG/1
40 CAPSULE, EXTENDED RELEASE ORAL AS NEEDED
Status: DISCONTINUED | OUTPATIENT
Start: 2020-12-29 | End: 2021-01-05 | Stop reason: HOSPADM

## 2020-12-29 RX ADMIN — DULOXETINE HYDROCHLORIDE 30 MG: 30 CAPSULE, DELAYED RELEASE ORAL at 08:28

## 2020-12-29 RX ADMIN — ATORVASTATIN CALCIUM 40 MG: 40 TABLET, FILM COATED ORAL at 08:28

## 2020-12-29 RX ADMIN — POTASSIUM CHLORIDE 40 MEQ: 1.5 POWDER, FOR SOLUTION ORAL at 09:19

## 2020-12-29 RX ADMIN — PRIMIDONE 125 MG: 250 TABLET ORAL at 21:59

## 2020-12-29 RX ADMIN — LEVOTHYROXINE SODIUM 125 MCG: 125 TABLET ORAL at 05:47

## 2020-12-29 RX ADMIN — SODIUM CHLORIDE, PRESERVATIVE FREE 10 ML: 5 INJECTION INTRAVENOUS at 08:28

## 2020-12-29 RX ADMIN — PRIMIDONE 50 MG: 50 TABLET ORAL at 09:19

## 2020-12-29 RX ADMIN — POTASSIUM CHLORIDE 40 MEQ: 1.5 POWDER, FOR SOLUTION ORAL at 05:47

## 2020-12-29 RX ADMIN — MAGNESIUM SULFATE HEPTAHYDRATE 4 G: 40 INJECTION, SOLUTION INTRAVENOUS at 05:48

## 2020-12-29 RX ADMIN — FAMOTIDINE 20 MG: 20 TABLET, FILM COATED ORAL at 18:11

## 2020-12-29 RX ADMIN — ENOXAPARIN SODIUM 40 MG: 40 INJECTION SUBCUTANEOUS at 22:00

## 2020-12-29 RX ADMIN — ENALAPRIL MALEATE 10 MG: 10 TABLET ORAL at 12:20

## 2020-12-29 RX ADMIN — SODIUM CHLORIDE, PRESERVATIVE FREE 10 ML: 5 INJECTION INTRAVENOUS at 22:01

## 2020-12-29 RX ADMIN — FAMOTIDINE 20 MG: 20 TABLET, FILM COATED ORAL at 06:49

## 2020-12-30 LAB
ANION GAP SERPL CALCULATED.3IONS-SCNC: 11 MMOL/L (ref 5–15)
BUN SERPL-MCNC: 10 MG/DL (ref 8–23)
BUN/CREAT SERPL: 13.9 (ref 7–25)
CALCIUM SPEC-SCNC: 9.1 MG/DL (ref 8.6–10.5)
CHLORIDE SERPL-SCNC: 95 MMOL/L (ref 98–107)
CO2 SERPL-SCNC: 28 MMOL/L (ref 22–29)
CREAT SERPL-MCNC: 0.72 MG/DL (ref 0.57–1)
DEPRECATED RDW RBC AUTO: 43.5 FL (ref 37–54)
ERYTHROCYTE [DISTWIDTH] IN BLOOD BY AUTOMATED COUNT: 12.8 % (ref 12.3–15.4)
GFR SERPL CREATININE-BSD FRML MDRD: 78 ML/MIN/1.73
GLUCOSE SERPL-MCNC: 127 MG/DL (ref 65–99)
HCT VFR BLD AUTO: 44.5 % (ref 34–46.6)
HGB BLD-MCNC: 14.4 G/DL (ref 12–15.9)
MAGNESIUM SERPL-MCNC: 2 MG/DL (ref 1.6–2.4)
MCH RBC QN AUTO: 29.9 PG (ref 26.6–33)
MCHC RBC AUTO-ENTMCNC: 32.4 G/DL (ref 31.5–35.7)
MCV RBC AUTO: 92.3 FL (ref 79–97)
PHOSPHATE SERPL-MCNC: 3 MG/DL (ref 2.5–4.5)
PLATELET # BLD AUTO: 287 10*3/MM3 (ref 140–450)
PMV BLD AUTO: 9.9 FL (ref 6–12)
POTASSIUM SERPL-SCNC: 3.6 MMOL/L (ref 3.5–5.2)
RBC # BLD AUTO: 4.82 10*6/MM3 (ref 3.77–5.28)
SODIUM SERPL-SCNC: 134 MMOL/L (ref 136–145)
WBC # BLD AUTO: 5.67 10*3/MM3 (ref 3.4–10.8)

## 2020-12-30 PROCEDURE — 99233 SBSQ HOSP IP/OBS HIGH 50: CPT | Performed by: INTERNAL MEDICINE

## 2020-12-30 PROCEDURE — 83735 ASSAY OF MAGNESIUM: CPT | Performed by: INTERNAL MEDICINE

## 2020-12-30 PROCEDURE — 85027 COMPLETE CBC AUTOMATED: CPT | Performed by: INTERNAL MEDICINE

## 2020-12-30 PROCEDURE — 84100 ASSAY OF PHOSPHORUS: CPT | Performed by: INTERNAL MEDICINE

## 2020-12-30 PROCEDURE — 80048 BASIC METABOLIC PNL TOTAL CA: CPT | Performed by: INTERNAL MEDICINE

## 2020-12-30 PROCEDURE — 25010000002 ENOXAPARIN PER 10 MG: Performed by: INTERNAL MEDICINE

## 2020-12-30 PROCEDURE — 25010000002 HYDRALAZINE PER 20 MG: Performed by: NURSE PRACTITIONER

## 2020-12-30 PROCEDURE — 97161 PT EVAL LOW COMPLEX 20 MIN: CPT

## 2020-12-30 RX ORDER — HYDRALAZINE HYDROCHLORIDE 20 MG/ML
10 INJECTION INTRAMUSCULAR; INTRAVENOUS ONCE
Status: COMPLETED | OUTPATIENT
Start: 2020-12-30 | End: 2020-12-30

## 2020-12-30 RX ORDER — ENALAPRIL MALEATE 10 MG/1
20 TABLET ORAL
Status: DISCONTINUED | OUTPATIENT
Start: 2020-12-31 | End: 2021-01-03

## 2020-12-30 RX ORDER — AMLODIPINE BESYLATE 10 MG/1
10 TABLET ORAL
Status: DISCONTINUED | OUTPATIENT
Start: 2020-12-30 | End: 2021-01-05 | Stop reason: HOSPADM

## 2020-12-30 RX ORDER — ENALAPRIL MALEATE 10 MG/1
10 TABLET ORAL ONCE
Status: COMPLETED | OUTPATIENT
Start: 2020-12-30 | End: 2020-12-30

## 2020-12-30 RX ADMIN — ENOXAPARIN SODIUM 40 MG: 40 INJECTION SUBCUTANEOUS at 23:46

## 2020-12-30 RX ADMIN — DULOXETINE HYDROCHLORIDE 30 MG: 30 CAPSULE, DELAYED RELEASE ORAL at 09:45

## 2020-12-30 RX ADMIN — LEVOTHYROXINE SODIUM 125 MCG: 125 TABLET ORAL at 06:10

## 2020-12-30 RX ADMIN — SODIUM CHLORIDE, PRESERVATIVE FREE 10 ML: 5 INJECTION INTRAVENOUS at 09:45

## 2020-12-30 RX ADMIN — FAMOTIDINE 20 MG: 20 TABLET, FILM COATED ORAL at 06:10

## 2020-12-30 RX ADMIN — ENALAPRIL MALEATE 10 MG: 10 TABLET ORAL at 14:57

## 2020-12-30 RX ADMIN — AMLODIPINE BESYLATE 10 MG: 10 TABLET ORAL at 13:12

## 2020-12-30 RX ADMIN — ENALAPRIL MALEATE 10 MG: 10 TABLET ORAL at 09:45

## 2020-12-30 RX ADMIN — HYDRALAZINE HYDROCHLORIDE 10 MG: 20 INJECTION INTRAMUSCULAR; INTRAVENOUS at 02:38

## 2020-12-30 RX ADMIN — ATORVASTATIN CALCIUM 40 MG: 40 TABLET, FILM COATED ORAL at 09:45

## 2020-12-30 RX ADMIN — PRIMIDONE 50 MG: 50 TABLET ORAL at 09:45

## 2020-12-30 RX ADMIN — FAMOTIDINE 20 MG: 20 TABLET, FILM COATED ORAL at 17:36

## 2020-12-31 LAB
ALBUMIN SERPL-MCNC: 4.3 G/DL (ref 3.5–5.2)
ALBUMIN/GLOB SERPL: 1.3 G/DL
ALP SERPL-CCNC: 93 U/L (ref 39–117)
ALT SERPL W P-5'-P-CCNC: 11 U/L (ref 1–33)
ANION GAP SERPL CALCULATED.3IONS-SCNC: 13 MMOL/L (ref 5–15)
AST SERPL-CCNC: 16 U/L (ref 1–32)
BILIRUB SERPL-MCNC: 0.4 MG/DL (ref 0–1.2)
BUN SERPL-MCNC: 12 MG/DL (ref 8–23)
BUN/CREAT SERPL: 15.8 (ref 7–25)
CALCIUM SPEC-SCNC: 9.5 MG/DL (ref 8.6–10.5)
CHLORIDE SERPL-SCNC: 95 MMOL/L (ref 98–107)
CO2 SERPL-SCNC: 27 MMOL/L (ref 22–29)
CREAT SERPL-MCNC: 0.76 MG/DL (ref 0.57–1)
GFR SERPL CREATININE-BSD FRML MDRD: 73 ML/MIN/1.73
GLOBULIN UR ELPH-MCNC: 3.2 GM/DL
GLUCOSE SERPL-MCNC: 132 MG/DL (ref 65–99)
POTASSIUM SERPL-SCNC: 3.7 MMOL/L (ref 3.5–5.2)
PROT SERPL-MCNC: 7.5 G/DL (ref 6–8.5)
SODIUM SERPL-SCNC: 135 MMOL/L (ref 136–145)

## 2020-12-31 PROCEDURE — 25010000002 ENOXAPARIN PER 10 MG: Performed by: INTERNAL MEDICINE

## 2020-12-31 PROCEDURE — 80053 COMPREHEN METABOLIC PANEL: CPT | Performed by: INTERNAL MEDICINE

## 2020-12-31 PROCEDURE — 99232 SBSQ HOSP IP/OBS MODERATE 35: CPT | Performed by: INTERNAL MEDICINE

## 2020-12-31 PROCEDURE — 97530 THERAPEUTIC ACTIVITIES: CPT

## 2020-12-31 PROCEDURE — 97110 THERAPEUTIC EXERCISES: CPT

## 2020-12-31 RX ORDER — CHLORTHALIDONE 25 MG/1
25 TABLET ORAL DAILY
Status: DISCONTINUED | OUTPATIENT
Start: 2020-12-31 | End: 2021-01-03

## 2020-12-31 RX ADMIN — DULOXETINE HYDROCHLORIDE 30 MG: 30 CAPSULE, DELAYED RELEASE ORAL at 08:58

## 2020-12-31 RX ADMIN — PRIMIDONE 50 MG: 50 TABLET ORAL at 10:54

## 2020-12-31 RX ADMIN — FAMOTIDINE 20 MG: 20 TABLET, FILM COATED ORAL at 18:26

## 2020-12-31 RX ADMIN — CHLORTHALIDONE 25 MG: 25 TABLET ORAL at 10:53

## 2020-12-31 RX ADMIN — PRIMIDONE 125 MG: 250 TABLET ORAL at 01:41

## 2020-12-31 RX ADMIN — ENALAPRIL MALEATE 20 MG: 10 TABLET ORAL at 08:58

## 2020-12-31 RX ADMIN — PRIMIDONE 125 MG: 250 TABLET ORAL at 20:59

## 2020-12-31 RX ADMIN — ATORVASTATIN CALCIUM 40 MG: 40 TABLET, FILM COATED ORAL at 08:58

## 2020-12-31 RX ADMIN — ENOXAPARIN SODIUM 40 MG: 40 INJECTION SUBCUTANEOUS at 20:59

## 2020-12-31 RX ADMIN — SODIUM CHLORIDE, PRESERVATIVE FREE 10 ML: 5 INJECTION INTRAVENOUS at 08:58

## 2020-12-31 RX ADMIN — FAMOTIDINE 20 MG: 20 TABLET, FILM COATED ORAL at 09:00

## 2020-12-31 RX ADMIN — AMLODIPINE BESYLATE 10 MG: 10 TABLET ORAL at 08:58

## 2020-12-31 RX ADMIN — LEVOTHYROXINE SODIUM 125 MCG: 125 TABLET ORAL at 05:13

## 2020-12-31 RX ADMIN — SODIUM CHLORIDE, PRESERVATIVE FREE 10 ML: 5 INJECTION INTRAVENOUS at 20:59

## 2020-12-31 RX ADMIN — SODIUM CHLORIDE, PRESERVATIVE FREE 10 ML: 5 INJECTION INTRAVENOUS at 01:41

## 2021-01-01 PROCEDURE — 99232 SBSQ HOSP IP/OBS MODERATE 35: CPT | Performed by: INTERNAL MEDICINE

## 2021-01-01 PROCEDURE — 97530 THERAPEUTIC ACTIVITIES: CPT

## 2021-01-01 PROCEDURE — 97110 THERAPEUTIC EXERCISES: CPT

## 2021-01-01 PROCEDURE — 25010000002 ENOXAPARIN PER 10 MG: Performed by: INTERNAL MEDICINE

## 2021-01-01 RX ORDER — AMOXICILLIN 250 MG
2 CAPSULE ORAL 2 TIMES DAILY
Status: DISCONTINUED | OUTPATIENT
Start: 2021-01-01 | End: 2021-01-02

## 2021-01-01 RX ORDER — AMOXICILLIN 250 MG
2 CAPSULE ORAL ONCE
Status: COMPLETED | OUTPATIENT
Start: 2021-01-01 | End: 2021-01-01

## 2021-01-01 RX ORDER — BISACODYL 5 MG/1
10 TABLET, DELAYED RELEASE ORAL DAILY PRN
Status: DISCONTINUED | OUTPATIENT
Start: 2021-01-01 | End: 2021-01-05 | Stop reason: HOSPADM

## 2021-01-01 RX ORDER — BISACODYL 10 MG
10 SUPPOSITORY, RECTAL RECTAL DAILY PRN
Status: DISCONTINUED | OUTPATIENT
Start: 2021-01-01 | End: 2021-01-05 | Stop reason: HOSPADM

## 2021-01-01 RX ADMIN — PRIMIDONE 50 MG: 50 TABLET ORAL at 09:20

## 2021-01-01 RX ADMIN — DULOXETINE HYDROCHLORIDE 30 MG: 30 CAPSULE, DELAYED RELEASE ORAL at 09:19

## 2021-01-01 RX ADMIN — CHLORTHALIDONE 25 MG: 25 TABLET ORAL at 09:21

## 2021-01-01 RX ADMIN — FAMOTIDINE 20 MG: 20 TABLET, FILM COATED ORAL at 17:24

## 2021-01-01 RX ADMIN — SODIUM CHLORIDE, PRESERVATIVE FREE 10 ML: 5 INJECTION INTRAVENOUS at 09:19

## 2021-01-01 RX ADMIN — LEVOTHYROXINE SODIUM 125 MCG: 125 TABLET ORAL at 05:36

## 2021-01-01 RX ADMIN — ENALAPRIL MALEATE 20 MG: 10 TABLET ORAL at 09:20

## 2021-01-01 RX ADMIN — ATORVASTATIN CALCIUM 40 MG: 40 TABLET, FILM COATED ORAL at 09:21

## 2021-01-01 RX ADMIN — BISACODYL 10 MG: 5 TABLET ORAL at 17:24

## 2021-01-01 RX ADMIN — FAMOTIDINE 20 MG: 20 TABLET, FILM COATED ORAL at 09:21

## 2021-01-01 RX ADMIN — DOCUSATE SODIUM 50MG AND SENNOSIDES 8.6MG 2 TABLET: 8.6; 5 TABLET, FILM COATED ORAL at 17:24

## 2021-01-01 RX ADMIN — AMLODIPINE BESYLATE 10 MG: 10 TABLET ORAL at 09:21

## 2021-01-01 RX ADMIN — ENOXAPARIN SODIUM 40 MG: 40 INJECTION SUBCUTANEOUS at 20:07

## 2021-01-01 RX ADMIN — DOCUSATE SODIUM 50MG AND SENNOSIDES 8.6MG 2 TABLET: 8.6; 5 TABLET, FILM COATED ORAL at 20:07

## 2021-01-01 RX ADMIN — PRIMIDONE 125 MG: 250 TABLET ORAL at 20:08

## 2021-01-01 RX ADMIN — SODIUM CHLORIDE, PRESERVATIVE FREE 10 ML: 5 INJECTION INTRAVENOUS at 20:07

## 2021-01-01 NOTE — PLAN OF CARE
Goal Outcome Evaluation:  Plan of Care Reviewed With: patient  Progress: improving  Outcome Summary: Pt alert to self only this session but pleasant and cooperative with therapy. Increased ambulation distance to 12 ft + 12 ft with CGA and RW limited by weakness and fatigue. BLE ther ex performed and pt quick to fatigue needing rest breaks. Plan to d/c to [facility].

## 2021-01-01 NOTE — THERAPY TREATMENT NOTE
"Patient Name: Zohra Hall  : 1940    MRN: [id number]                              Today's Date: 2021       Admit Date: 2020    Visit Dx:     ICD-10-CM ICD-9-CM   1. Hypertensive encephalopathy  I67.4 437.2   2. Hypertensive crisis  I16.9 401.9     Patient Active Problem List   Diagnosis   • Generalized osteoarthritis   • Iron deficiency   • Vitamin D deficiency   • Skin neoplasm   • Sialadenitis   • Restless leg syndrome   • Reaction to chronic stress   • Piriformis syndrome   • Papillary adenocarcinoma of thyroid (CMS/HCC)   • Hypothyroidism (acquired)   • Hypertension   • GERD without esophagitis   • Dyslipidemia   • Cervical lymphadenopathy   • Cellulitis   • Atherosclerosis of aorta (CMS/HCC)   • Breast mass   • Aortic valve stenosis   • Incontinence of bowel   • Chronic diastolic heart failure (CMS/HCC)   • Acute right-sided low back pain without sciatica   • Benign essential tremor   • Pain of right hip joint   • Thyroid cancer (CMS/HCC)   • Lacunar stroke (CMS/HCC)   • Nontraumatic rupture of extensor tendons of right hand and wrist   • Hyponatremia   • Actinic keratosis   • Transient ischemic attack   • Ataxia   • PMR (polymyalgia rheumatica) (CMS/HCC)   • Osteoporosis   • Hypertensive encephalopathy   • Bradycardia   • Hypotension due to drugs   • Post-surgical hypothyroidism     Past Medical History:   Diagnosis Date   • Breast cancer (CMS/HCC)     left- pt states \"in situ\", no radiation, Tamoxifen for 5 years   • Cellulitis    • Cervical lymphadenopathy    • Chest pain    • Convulsions (CMS/HCC)    • GERD (gastroesophageal reflux disease)    • Glossitis    • Grief reaction     · Last Impression: 2015  counselled, given ativan for prn use.  Aleah Regan   • Hypertension    • Lower back pain    • Oral thrush    • Papillary adenocarcinoma (CMS/HCC)     Papillary adenocarcinoma of thyroid   • Papillary adenocarcinoma of thyroid (CMS/HCC)     · resection Ramirez- ,  2 " x 2 x 1.5 cm  T3 N1 MXpost op low calcium and diminished  voiceablation Ain- 3/7 metastatic nodes and invasion to strap muscles · Last Impression: 29 Oct 2014  s/p Eval by berry Méndez.  Aleah Regan (Internal   • Piriformis syndrome    • Tingling    • Xerostomia      Past Surgical History:   Procedure Laterality Date   • BREAST BIOPSY Right 10/10/2013    stereo bx   • BREAST BIOPSY Left 10/19/2015    stereo bx   • BREAST CYST EXCISION      right   • BREAST EXCISIONAL BIOPSY Right     affirm bx and loc 2016.   • BREAST LUMPECTOMY Left 1987   • HYSTERECTOMY  1979   • OTHER SURGICAL HISTORY Right     right arm surgery-steel roger in place   • TOTAL HIP ARTHROPLASTY     • TOTAL THYROIDECTOMY      Thyroid Surgery Total Thyroidectomy     General Information     [facility] Name 01/01/21 1349          Physical Therapy Time and Intention    Document Type  therapy note (daily note)  -     Mode of Treatment  physical therapy  -     Row Name 01/01/21 1349          General Information    Patient Profile Reviewed  yes  -     Existing Precautions/Restrictions  fall  -     Row Name 01/01/21 1349          Cognition    Orientation Status (Cognition)  oriented to;person;verbal cues/prompts needed for orientation;disoriented to;time;situation  -     Row Name 01/01/21 1349          Safety Issues, Functional Mobility    Safety Issues Affecting Function (Mobility)  ability to follow commands;awareness of need for assistance;insight into deficits/self-awareness;judgment;positioning of assistive device;problem-solving;sequencing abilities  [facility]     Impairments Affecting Function (Mobility)  balance;cognition;endurance/activity tolerance;strength  -     Cognitive Impairments, Mobility Safety/Performance  awareness, need for assistance;safety precaution awareness;insight into deficits/self-awareness;safety precaution follow-through;sequencing abilities  [facility]       User Key  (r) = Recorded By, (t) = Taken By, (c) = Cosigned By     Initials Name Provider Type     Gama Fernandez, PT Physical Therapist        Mobility     Row Name 01/01/21 1349          Bed Mobility    Supine-Sit Gallia (Bed Mobility)  minimum assist (75% patient effort);verbal cues  -     Assistive Device (Bed Mobility)  bed rails;head of bed elevated  -     Comment (Bed Mobility)  Verbal cues for correct sequencing and HHA to pull into sitting EOB  -     Row Name 01/01/21 1349          Transfers    Comment (Transfers)  VCs for hand placement  -     Row Name 01/01/21 1349          Bed-Chair Transfer    Bed-Chair Gallia (Transfers)  contact guard;verbal cues  -     Assistive Device (Bed-Chair Transfers)  walker, front-wheeled  -     Row Name 01/01/21 1349          Sit-Stand Transfer    Sit-Stand Gallia (Transfers)  verbal cues;contact guard  -     Assistive Device (Sit-Stand Transfers)  walker, front-wheeled  -     Row Name 01/01/21 1349          Gait/Stairs (Locomotion)    Gallia Level (Gait)  verbal cues;contact guard  -     Assistive Device (Gait)  walker, front-wheeled  -     Distance in Feet (Gait)  12 ft + 12 ft  -     Deviations/Abnormal Patterns (Gait)  bilateral deviations;radha decreased;stride length decreased  -     Bilateral Gait Deviations  forward flexed posture  -     Comment (Gait/Stairs)  Pt improved ambulation distance to 12 ft + 12 feet limited by fatigue. Slow gait speed and weak LEs noted.  -       User Key  (r) = Recorded By, (t) = Taken By, (c) = Cosigned By    Initials Name Provider Type     Gama Fernandez PT Physical Therapist        Obj/Interventions     Row Name 01/01/21 1352          Motor Skills    Therapeutic Exercise  hip;knee;ankle  -     Row Name 01/01/21 1352          Hip (Therapeutic Exercise)    Hip (Therapeutic Exercise)  AROM (active range of motion)  -     Hip AROM (Therapeutic Exercise)  bilateral;flexion;10 repetitions  -     Row Name 01/01/21 1352          Knee  (Therapeutic Exercise)    Knee (Therapeutic Exercise)  strengthening exercise  -     Knee Strengthening (Therapeutic Exercise)  SLR (straight leg raise);heel slides;10 repetitions;bilateral;LAQ (long arc quad);3 sets  -[facility] Name 01/01/21 1351          Ankle (Therapeutic Exercise)    Ankle (Therapeutic Exercise)  AROM (active range of motion)  -     Ankle AROM (Therapeutic Exercise)  bilateral;dorsiflexion;plantarflexion;10 repetitions;2 sets  -[facility] Name 01/01/21 1352          Balance    Balance Assessment  sitting static balance;sitting dynamic balance;standing static balance;standing dynamic balance  -     Static Sitting Balance  WFL;sitting, edge of bed  -     Dynamic Sitting Balance  sitting, edge of bed;mild impairment  -     Static Standing Balance  mild impairment;supported;standing  -     Dynamic Standing Balance  mild impairment;supported;standing  -     Comment, Balance  right lateral lean occsionally in sitting, pt able to regain balance IND  -       User Key  (r) = Recorded By, (t) = Taken By, (c) = Cosigned By    Initials Name Provider Type     Gama Fernandez, PT Physical Therapist        Goals/Plan    No documentation.       Clinical Impression     [facility] Name 01/01/21 8717          Pain    Additional Documentation  Pain Scale: Numbers Pre/Post-Treatment (Group)  -[facility] Name 01/01/21 9486          Pain Scale: Numbers Pre/Post-Treatment    Pretreatment Pain Rating  0/10 - no pain  -     Posttreatment Pain Rating  0/10 - no pain  -     Pre/Posttreatment Pain Comment  tolerated  -     Pain Intervention(s)  Repositioned;Ambulation/increased activity  -[facility] Name 01/01/21 6222          Plan of Care Review    Plan of Care Reviewed With  patient  [facility]     Progress  improving  -     Outcome Summary  Pt alert to self only this session but pleasant and cooperative with therapy. Increased ambulation distance to 12 ft + 12 ft with CGA and RW limited by weakness and  fatigue. BLE ther ex performed and pt quick to fatigue needing rest breaks. Plan to d/c to IRF.  -     Row Name 01/01/21 1359          Therapy Assessment/Plan (PT)    Rehab Potential (PT)  good, to achieve stated therapy goals  -     Criteria for Skilled Interventions Met (PT)  yes;skilled treatment is necessary;meets criteria  -     Row Name 01/01/21 1358          Vital Signs    Pre Systolic BP Rehab  137  -     Pre Treatment Diastolic BP  75  -     Pretreatment Heart Rate (beats/min)  94  -     Intratreatment Heart Rate (beats/min)  120  -     Posttreatment Heart Rate (beats/min)  97  -     O2 Delivery Pre Treatment  room air  -     O2 Delivery Intra Treatment  room air  -     O2 Delivery Post Treatment  room air  -     Pre Patient Position  Supine  -     Intra Patient Position  Standing  -     Post Patient Position  Sitting  -     Row Name 01/01/21 1702          Positioning and Restraints    Pre-Treatment Position  in bed  -     Post Treatment Position  chair  -     In Chair  notified nsg;reclined;call light within reach;encouraged to call for assist;exit alarm on;legs elevated  -       User Key  (r) = Recorded By, (t) = Taken By, (c) = Cosigned By    Initials Name Provider Type     Gama Fernandez, PT Physical Therapist        Outcome Measures     Row Name 01/01/21 1661          How much help from another person do you currently need...    Turning from your back to your side while in flat bed without using bedrails?  3  -JH     Moving from lying on back to sitting on the side of a flat bed without bedrails?  3  -JH     Moving to and from a bed to a chair (including a wheelchair)?  3  -JH     Standing up from a chair using your arms (e.g., wheelchair, bedside chair)?  3  -JH     Climbing 3-5 steps with a railing?  2  -JH     To walk in hospital room?  3  -     AM-PAC 6 Clicks Score (PT)  17  -[facility] Name 01/01/21 8667          Functional Assessment    Outcome Measure  Options  AM-PAC 6 Clicks Basic Mobility (PT)  -       User Key  (r) = Recorded By, (t) = Taken By, (c) = Cosigned By    Initials Name Provider Type     Gama Fernandez, PT Physical Therapist        Physical Therapy Education                 Title: PT OT SLP Therapies (In Progress)     Topic: Physical Therapy (Done)     Point: Mobility training (Done)     Learning Progress Summary           Patient Acceptance, E, VU,NR by  at 1/1/2021 1355    Comment: edu on safety during mobility and HEP    Acceptance, E,TB, VU by  at 12/31/2020 1519    Acceptance, E,D, NR by  at 12/30/2020 0943                   Point: Home exercise program (Done)     Learning Progress Summary           Patient Acceptance, E, VU,NR by  at 1/1/2021 1355    Comment: edu on safety during mobility and HEP    Acceptance, E,TB, VU by  at 12/31/2020 1519                   Point: Body mechanics (Done)     Learning Progress Summary           Patient Acceptance, E, VU,NR by  at 1/1/2021 1355    Comment: edu on safety during mobility and HEP    Acceptance, E,TB, VU by  at 12/31/2020 1519    Acceptance, E,D, NR by  at 12/30/2020 0943                   Point: Precautions (Done)     Learning Progress Summary           Patient Acceptance, E, VU,NR by  at 1/1/2021 1355    Comment: edu on safety during mobility and HEP    Acceptance, E,TB, VU by  at 12/31/2020 1519    Acceptance, E,D, NR by  at 12/30/2020 0943                               User Key     Initials Effective Dates Name Provider Type Discipline     04/02/18 -  Diana Myrick, PT Physical Therapist PT     08/28/19 -  Raiza Pedraza Physical Therapist PT     09/22/20 -  Gama Fernandez PT Physical Therapist PT              PT Recommendation and Plan     Plan of Care Reviewed With: patient  Progress: improving  Outcome Summary: Pt alert to self only this session but pleasant and cooperative with therapy. Increased ambulation distance to 12 ft + 12 ft with CGA and RW  limited by weakness and fatigue. BLE ther ex performed and pt quick to fatigue needing rest breaks. Plan to d/c to IRF.     Time Calculation:   PT Charges     Row Name 01/01/21 1356             Time Calculation    Start Time  1316  -      PT Received On  01/01/21  -      PT Goal Re-Cert Due Date  01/09/21  -         Time Calculation- PT    Total Timed Code Minutes- PT  29 minute(s)  -         Timed Charges    02170 - PT Therapeutic Exercise Minutes  10  -      62020 - Gait Training Minutes   6  -      38893 - PT Therapeutic Activity Minutes  13  -        User Key  (r) = Recorded By, (t) = Taken By, (c) = Cosigned By    Initials Name Provider Type     Gama Fernandez, PT Physical Therapist        Therapy Charges for Today     Code Description Service Date Service Provider Modifiers Qty    30487653638 HC PT THER PROC EA 15 MIN 1/1/2021 Gama Fernandez, PT GP 1    17579953275 HC PT THERAPEUTIC ACT EA 15 MIN 1/1/2021 Gama Fernandez, PT GP 1          PT G-Codes  Outcome Measure Options: AM-PAC 6 Clicks Basic Mobility (PT)  AM-PAC 6 Clicks Score (PT): 17  AM-PAC 6 Clicks Score (OT): 19    Gama Fernandez PT  1/1/2021

## 2021-01-01 NOTE — PROGRESS NOTES
[facility] Medicine Services  PROGRESS NOTE    Patient Name: Zohra Hall  : 1940  MRN: [id number]    Date of Admission: 2020  Primary Care Physician: Aleah Regan MD    Subjective   Subjective     CC:  Follow up uncontrolled HTN, syncope     HPI:  No acute events overnight. Agrees to go to rehab today. She is alert and oriented to person, but is confused about place and time. Has fair insight into her illness, says she has been forgetful lately and needs to get BP under control. She worries a lot about her son who has epilepsy.    ROS:  Gen- No fevers, chills  CV- No chest pain, palpitations  Resp- No cough, dyspnea  GI- No N/V/D, abd pain     Objective   Objective     Vital Signs:   Temp:  [97.3 °F (36.3 °C)-98.2 °F (36.8 °C)] 98.2 °F (36.8 °C)  Heart Rate:  [61-89] 84  Resp:  [16] 16  BP: (137-188)/(73-95) 188/87        Physical Exam:  Constitutional: No acute distress, awake, alert  HENT: NCAT, mucous membranes moist  Respiratory: Clear to auscultation bilaterally, respiratory effort normal on RA  Cardiovascular: RRR, no murmurs  Gastrointestinal: Positive bowel sounds, soft, nontender, nondistended  Psychiatric: Appropriate affect, cooperative, has fair insight into current illness, realizes she has memory problems but doesn't want long term care   Neurologic: Oriented to person, confused about place and time, strength symmetric in all extremities, Cranial Nerves grossly intact to confrontation, speech clear  Skin: No rashes      Results Reviewed:  Results from last 7 days   Lab Units 20  0842 20  04120  2220   WBC 10*3/mm3 5.67 5.31 7.03   HEMOGLOBIN g/dL 14.4 12.3 12.3   HEMATOCRIT % 44.5 39.5 39.7   PLATELETS 10*3/mm3 287 257 259     Results from last 7 days   Lab Units 20  0949 20  0842 20  1326 20  0419 20  2121 20  1151   SODIUM mmol/L 135* 134*  --  137 135* 137   POTASSIUM mmol/L 3.7 3.6 4.5 3.6  3.4* 3.8   CHLORIDE mmol/L 95* 95*  --  100 98 95*   CO2 mmol/L 27.0 28.0  --  27.0 22.0 28.0   BUN mg/dL 12 10  --  11 10 9   CREATININE mg/dL 0.76 0.72  --  0.94 0.87 0.92   GLUCOSE mg/dL 132* 127*  --  119* 171* 90   CALCIUM mg/dL 9.5 9.1  --  8.8 9.4 9.8   ALT (SGPT) U/L 11  --   --  6 7 7   AST (SGOT) U/L 16  --   --  13 15 18   TROPONIN T ng/mL  --   --   --   --  <0.010 <0.010   PROBNP pg/mL  --   --   --   --  1,234.0  --      Estimated Creatinine Clearance: 54.1 mL/min (by C-G formula based on SCr of 0.76 mg/dL).    Microbiology Results Abnormal     Procedure Component Value - Date/Time    COVID PRE-OP / PRE-PROCEDURE SCREENING ORDER (NO ISOLATION) - Swab, Nasopharynx [[id number]] Collected: 12/29/20 0019    Lab Status: Final result Specimen: Swab from Nasopharynx Updated: 12/29/20 1150    Narrative:      The following orders were created for panel order COVID PRE-OP / PRE-PROCEDURE SCREENING ORDER (NO ISOLATION) - Swab, Nasopharynx.  Procedure                               Abnormality         Status                     ---------                               -----------         ------                     COVID-19 PCR, [facility]...[[id number]]                      Final result                 Please view results for these tests on the individual orders.    COVID-19 PCR, [facility], NP SWAB IN [facility] VIRAL TRANSPORT MEDIA 24-30 HR TAT - Swab, Nasopharynx [[id number]] Collected: 12/29/20 0019    Lab Status: Final result Specimen: Swab from Nasopharynx Updated: 12/29/20 1150     SARS-CoV-2 ROMULO Not Detected    Urine Culture - Urine, Urine, Clean Catch [[id number]] Collected: 12/28/20 1841    Lab Status: Final result Specimen: Urine, Clean Catch Updated: 12/29/20 0942     Urine Culture <10,000 CFU/mL Mixed Margarita Isolated    Narrative:      Specimen contains mixed organisms of questionable pathogenicity which indicates contamination with commensal margarita.  Further identification is unlikely to provide clinically  useful information.  Suggest recollection.          Imaging Results (Last 24 Hours)     ** No results found for the last 24 hours. **          Results for orders placed during the hospital encounter of 10/30/20   Adult Transthoracic Echo Complete W/ Cont if Necessary Per Protocol    Narrative · Calculated left ventricular EF = 55%  · Left ventricular systolic function is normal.  · Left ventricular diastolic function is consistent with (grade Ia w/high   LAP) impaired relaxation.  · The left atrial cavity is mild to moderately dilated.  · No significant structural or functional valvular disease.          I have reviewed the medications:  Scheduled Meds:amLODIPine, 10 mg, Oral, Q24H  atorvastatin, 40 mg, Oral, Daily  chlorthalidone, 25 mg, Oral, Daily  DULoxetine, 30 mg, Oral, Daily  enalapril, 20 mg, Oral, Q24H  enoxaparin, 40 mg, Subcutaneous, Q24H  famotidine, 20 mg, Oral, BID AC  levothyroxine, 125 mcg, Oral, Q AM  primidone, 125 mg, Oral, Nightly  primidone, 50 mg, Oral, Daily  sodium chloride, 10 mL, Intravenous, Q12H      Continuous Infusions:   PRN Meds:.•  acetaminophen **OR** acetaminophen **OR** acetaminophen  •  calcium carbonate EX  •  magnesium sulfate **OR** magnesium sulfate **OR** magnesium sulfate  •  ondansetron  •  potassium chloride  •  potassium chloride  •  senna-docusate sodium  •  sodium chloride    Assessment/Plan   Assessment & Plan     Active Hospital Problems    Diagnosis  POA   • **Hypotension due to drugs [I95.2]  No   • Hypertensive encephalopathy [I67.4]  Yes   • Bradycardia [R00.1]  No   • Post-surgical hypothyroidism [E89.0]  Yes   • Chronic diastolic heart failure (CMS/HCC) [I50.32]  Yes   • Dyslipidemia [E78.5]  Yes   • GERD without esophagitis [K21.9]  Yes      Resolved Hospital Problems   No resolved problems to display.        Brief Hospital Course to date:  Zohra Hall is a 80 y.o. female With past medical history of hypertension who presents to the ED for confusion.  " Patient has been confused she says \"for a while\", she went to see a doctor, they measured her blood pressure and told her to come to the ED. She is also having an associated right-sided, pressure-like headache, nonradiating, associated with blurry vision, and she says some left-sided weakness. She had an episode of syncope w/nonresponsiveness on night of admission and was transferred to ICU.     Hypertensive encephalopathy  - still confused about place and time, has better insight into acute illness, knows she needs to go to rehab before going home   -MRI brain without acute ischemia, or hemorrhage   -patient apparently not taking home medications   -resumed home meds upon admission-amlodipine, atenolol, enalapril, and hytrin, but then that evening patient had episode of vasovagal syncope with hypotension and bradycardia, and was transferred to ICU. While there anti-hypertensive medications were discontinued except enalapril which was restarted l at 1/2 home dose  -on 12/30 increased enalapril to home dose of 20 mg and resumed amlodipine 10 mg  - still hypertensive so added chlorthalidone 25 mg daily on 12/31 - continue these medications and monitor on tele  -avoid beta blockers (atenolol) due to bradycardia and vagal episode   -PT/OT     Hypothyroidism  -TSH 34, check free t4 ; apparently TSH previously was 40 compared to 34  -patient has history of non adherence to medications   -continue levothyroxine 125 mcg daily     Urinary frequency - resolved   Anxiety/depression-continue cymbalta  GERD- continue famotidine      DVT prophylaxis:  lovenox     DISPO: pending arrangement for rehab, spoke with her son Simón, he was agreeable with plan of care    CODE STATUS:   Code Status and Medical Interventions:   Ordered at: 12/28/20 1502     Level Of Support Discussed With:    Patient     Code Status:    No CPR     Medical Interventions (Level of Support Prior to Arrest):    Full       Sunni Rider, " MD  01/01/21

## 2021-01-01 NOTE — PLAN OF CARE
Goal Outcome Evaluation:  Plan of Care Reviewed With: patient  Patient oriented to person only.  Patient remains very weak. She pulled out her IV today. No BM for several days. Bowel regimen started and gave PRN dose of dulcolax this shift. Patient has poor PO intake. Fluids and food encouraged, but patient declines.  Patient voiding small amounts of cloudy, foul smelling urine.

## 2021-01-02 LAB
ANION GAP SERPL CALCULATED.3IONS-SCNC: 12 MMOL/L (ref 5–15)
BUN SERPL-MCNC: 21 MG/DL (ref 8–23)
BUN/CREAT SERPL: 20.2 (ref 7–25)
CALCIUM SPEC-SCNC: 9.4 MG/DL (ref 8.6–10.5)
CHLORIDE SERPL-SCNC: 96 MMOL/L (ref 98–107)
CO2 SERPL-SCNC: 25 MMOL/L (ref 22–29)
CREAT SERPL-MCNC: 1.04 MG/DL (ref 0.57–1)
GFR SERPL CREATININE-BSD FRML MDRD: 51 ML/MIN/1.73
GLUCOSE SERPL-MCNC: 103 MG/DL (ref 65–99)
POTASSIUM SERPL-SCNC: 3.8 MMOL/L (ref 3.5–5.2)
SODIUM SERPL-SCNC: 133 MMOL/L (ref 136–145)

## 2021-01-02 PROCEDURE — 25010000002 ENOXAPARIN PER 10 MG: Performed by: INTERNAL MEDICINE

## 2021-01-02 PROCEDURE — 63710000001 ONDANSETRON PER 8 MG: Performed by: PHYSICIAN ASSISTANT

## 2021-01-02 PROCEDURE — 99232 SBSQ HOSP IP/OBS MODERATE 35: CPT | Performed by: PHYSICIAN ASSISTANT

## 2021-01-02 PROCEDURE — 80048 BASIC METABOLIC PNL TOTAL CA: CPT | Performed by: INTERNAL MEDICINE

## 2021-01-02 RX ORDER — ALUMINA, MAGNESIA, AND SIMETHICONE 2400; 2400; 240 MG/30ML; MG/30ML; MG/30ML
15 SUSPENSION ORAL EVERY 6 HOURS PRN
Status: DISCONTINUED | OUTPATIENT
Start: 2021-01-02 | End: 2021-01-05 | Stop reason: HOSPADM

## 2021-01-02 RX ORDER — DOCUSATE SODIUM 100 MG/1
200 CAPSULE, LIQUID FILLED ORAL DAILY
Status: DISCONTINUED | OUTPATIENT
Start: 2021-01-03 | End: 2021-01-04

## 2021-01-02 RX ORDER — ONDANSETRON 4 MG/1
4 TABLET, FILM COATED ORAL ONCE
Status: COMPLETED | OUTPATIENT
Start: 2021-01-02 | End: 2021-01-02

## 2021-01-02 RX ORDER — ALUMINA, MAGNESIA, AND SIMETHICONE 2400; 2400; 240 MG/30ML; MG/30ML; MG/30ML
30 SUSPENSION ORAL ONCE
Status: COMPLETED | OUTPATIENT
Start: 2021-01-02 | End: 2021-01-02

## 2021-01-02 RX ADMIN — CHLORTHALIDONE 25 MG: 25 TABLET ORAL at 08:21

## 2021-01-02 RX ADMIN — LEVOTHYROXINE SODIUM 125 MCG: 125 TABLET ORAL at 05:42

## 2021-01-02 RX ADMIN — ONDANSETRON HYDROCHLORIDE 4 MG: 4 TABLET, FILM COATED ORAL at 12:04

## 2021-01-02 RX ADMIN — FAMOTIDINE 20 MG: 20 TABLET, FILM COATED ORAL at 17:17

## 2021-01-02 RX ADMIN — ENALAPRIL MALEATE 20 MG: 10 TABLET ORAL at 08:21

## 2021-01-02 RX ADMIN — SODIUM CHLORIDE, PRESERVATIVE FREE 10 ML: 5 INJECTION INTRAVENOUS at 20:49

## 2021-01-02 RX ADMIN — PRIMIDONE 125 MG: 250 TABLET ORAL at 20:49

## 2021-01-02 RX ADMIN — SODIUM CHLORIDE, PRESERVATIVE FREE 10 ML: 5 INJECTION INTRAVENOUS at 08:22

## 2021-01-02 RX ADMIN — ALUMINUM HYDROXIDE, MAGNESIUM HYDROXIDE, AND DIMETHICONE 30 ML: 400; 400; 40 SUSPENSION ORAL at 12:04

## 2021-01-02 RX ADMIN — FAMOTIDINE 20 MG: 20 TABLET, FILM COATED ORAL at 08:21

## 2021-01-02 RX ADMIN — ENOXAPARIN SODIUM 40 MG: 40 INJECTION SUBCUTANEOUS at 20:48

## 2021-01-02 RX ADMIN — ATORVASTATIN CALCIUM 40 MG: 40 TABLET, FILM COATED ORAL at 08:21

## 2021-01-02 RX ADMIN — DULOXETINE HYDROCHLORIDE 30 MG: 30 CAPSULE, DELAYED RELEASE ORAL at 08:21

## 2021-01-02 RX ADMIN — PRIMIDONE 50 MG: 50 TABLET ORAL at 08:21

## 2021-01-02 RX ADMIN — AMLODIPINE BESYLATE 10 MG: 10 TABLET ORAL at 08:21

## 2021-01-02 NOTE — PROGRESS NOTES
"    [facility]  PROGRESS NOTE    Patient Name: Zohra Hall  : 1940  MRN: [id number]    Date of Admission: 2020  Primary Care Physician: Aleah Regan MD    Subjective     CC: f/u hypertensive encephalopathy     HPI:  Sitting up in bed. Complained of abdominal cramping and nausea. No vomting. Says her stomach feels \"sick.\" BP better controlled. She understands she needs more help at home - doesn't want to go to a nursing home but doesn't want to burden her son. Per patient, her son is interested in getting her placed in a nursing home.     ROS:  Gen- No fevers, chills  CV- No chest pain, palpitations  Resp- No cough, dyspnea  GI- as above     Objective     Vital Signs:   Temp:  [97.5 °F (36.4 °C)-98.3 °F (36.8 °C)] 98.2 °F (36.8 °C)  Heart Rate:  [64-90] 71  Resp:  [16] 16  BP: (122-188)/(71-87) 138/86        Physical Exam:  Constitutional: No acute distress, awake, alert and conversant. Sitting up in bed   HENT: NCAT, mucous membranes moist  Respiratory: Clear to auscultation bilaterally, respiratory effort normal on room air   Cardiovascular: RRR, no murmurs, rubs, or gallops  Gastrointestinal: Positive bowel sounds, soft, nontender, nondistended  Musculoskeletal: No bilateral ankle edema  Psychiatric: Appropriate affect, cooperative  Neurologic: Oriented x 3, strength symmetric in all extremities, Cranial Nerves grossly intact to confrontation, speech clear  Skin: No rashes    Results Reviewed:  Results from last 7 days   Lab Units 20  0842 20  04120  2220   WBC 10*3/mm3 5.67 5.31 7.03   HEMOGLOBIN g/dL 14.4 12.3 12.3   HEMATOCRIT % 44.5 39.5 39.7   PLATELETS 10*3/mm3 287 257 259     Results from last 7 days   Lab Units 21  0518 20  0949 20  0842  20  0419 20  2121 20  1151   SODIUM mmol/L 133* 135* 134*  --  137 135* 137   POTASSIUM mmol/L 3.8 3.7 3.6   < > 3.6 3.4* 3.8   CHLORIDE mmol/L 96* " 95* 95*  --  100 98 95*   CO2 mmol/L 25.0 27.0 28.0  --  27.0 22.0 28.0   BUN mg/dL 21 12 10  --  11 10 9   CREATININE mg/dL 1.04* 0.76 0.72  --  0.94 0.87 0.92   GLUCOSE mg/dL 103* 132* 127*  --  119* 171* 90   CALCIUM mg/dL 9.4 9.5 9.1  --  8.8 9.4 9.8   ALT (SGPT) U/L  --  11  --   --  6 7 7   AST (SGOT) U/L  --  16  --   --  13 15 18   TROPONIN T ng/mL  --   --   --   --   --  <0.010 <0.010   PROBNP pg/mL  --   --   --   --   --  1,234.0  --     < > = values in this interval not displayed.     Estimated Creatinine Clearance: 41.6 mL/min (A) (by C-G formula based on SCr of 1.04 mg/dL (H)).    Microbiology Results Abnormal     Procedure Component Value - Date/Time    COVID PRE-OP / PRE-PROCEDURE SCREENING ORDER (NO ISOLATION) - Swab, Nasopharynx [[id number]] Collected: 12/29/20 0019    Lab Status: Final result Specimen: Swab from Nasopharynx Updated: 12/29/20 1150    Narrative:      The following orders were created for panel order COVID PRE-OP / PRE-PROCEDURE SCREENING ORDER (NO ISOLATION) - Swab, Nasopharynx.  Procedure                               Abnormality         Status                     ---------                               -----------         ------                     COVID-19 PCR, [facility]...[[id number]]                      Final result                 Please view results for these tests on the individual orders.    COVID-19 PCR, [facility], NP SWAB IN LEXAR VIRAL TRANSPORT MEDIA 24-30 HR TAT - Swab, Nasopharynx [[id number]] Collected: 12/29/20 0019    Lab Status: Final result Specimen: Swab from Nasopharynx Updated: 12/29/20 1150     SARS-CoV-2 ROMULO Not Detected    Urine Culture - Urine, Urine, Clean Catch [[id number]] Collected: 12/28/20 8835    Lab Status: Final result Specimen: Urine, Clean Catch Updated: 12/29/20 0913     Urine Culture <10,000 CFU/mL Mixed Margarita Isolated    Narrative:      Specimen contains mixed organisms of questionable pathogenicity which indicates contamination with  commensal lucas.  Further identification is unlikely to provide clinically useful information.  Suggest recollection.        Imaging Results (Last 24 Hours)     ** No results found for the last 24 hours. **        Results for orders placed during the hospital encounter of 10/30/20   Adult Transthoracic Echo Complete W/ Cont if Necessary Per Protocol    Narrative · Calculated left ventricular EF = 55%  · Left ventricular systolic function is normal.  · Left ventricular diastolic function is consistent with (grade Ia w/high   LAP) impaired relaxation.  · The left atrial cavity is mild to moderately dilated.  · No significant structural or functional valvular disease.        I have reviewed the medications:  Scheduled Meds:amLODIPine, 10 mg, Oral, Q24H  atorvastatin, 40 mg, Oral, Daily  chlorthalidone, 25 mg, Oral, Daily  DULoxetine, 30 mg, Oral, Daily  enalapril, 20 mg, Oral, Q24H  enoxaparin, 40 mg, Subcutaneous, Q24H  famotidine, 20 mg, Oral, BID AC  levothyroxine, 125 mcg, Oral, Q AM  primidone, 125 mg, Oral, Nightly  primidone, 50 mg, Oral, Daily  senna-docusate sodium, 2 tablet, Oral, BID  sodium chloride, 10 mL, Intravenous, Q12H      Continuous Infusions:   PRN Meds:.•  acetaminophen **OR** acetaminophen **OR** acetaminophen  •  bisacodyl  •  bisacodyl  •  calcium carbonate EX  •  magnesium sulfate **OR** magnesium sulfate **OR** magnesium sulfate  •  ondansetron  •  potassium chloride  •  potassium chloride  •  senna-docusate sodium  •  sodium chloride    Assessment & Plan     Active Hospital Problems    Diagnosis  POA   • **Hypotension due to drugs [I95.2]  No   • Hypertensive encephalopathy [I67.4]  Yes   • Bradycardia [R00.1]  No   • Post-surgical hypothyroidism [E89.0]  Yes   • Chronic diastolic heart failure (CMS/HCC) [I50.32]  Yes   • Dyslipidemia [E78.5]  Yes   • GERD without esophagitis [K21.9]  Yes      Resolved Hospital Problems   No resolved problems to display.     Brief Hospital Course to  "date:  Zohra Hall is an 80 y.o. female with PMH significant for HTN, HLD, chronic diastolic CHF, aortic valve stenosis, polymyalgia rheumatica, post-surgical hypothyroidism, prior lacunar strokes, benign essential tremor, RLS and GERD. She was admitted to [facility] 10/30-11/5/20 for accelerated hypertension with associated mental status change and gait ataxia. She was evaluated by neurology. Her TSH was 18. She received 4 doses of Cytomel and her synthroid was increased from 112 mcg to 125 mcg. She discharged to rehab. It is not clear when she was discharged to home from rehab.      Patient had reportedly been confused for \"a while.\" She also developed headache, blurred vision, left-sided weakness and tremors of her jaw. She had run out of her medications and had not been taking her BP or thyroid medications. She tried to call her PCP but the office was closed. She talked to a nurse who recommended she go to urgent care. She presented to urgent care on 12/28/20. Her BP was 205/105. She was instructed to go to an ER and she presented to [facility] ED. She was admitted to the hospital medicine service. Later that evening, she appeared to have had a vagal response, followed by extreme hypotension and bradycardia. She was given a dose of atropine, which improved hemodynamics. She was transferred to the ICU for epinephrine gtt. She returned to telemetry floor on 12/30.      Hypertensive encephalopathy  Chronic diastolic CHF  - MRI brain without acute ischemia or hemorrhage  - Home meds (amlodipine, atenolol, enalapril, terazosin) restarted on admission. Patient then had an episode of vasovagal syncope with severe hypotension and bradycardia. Home meds held and have slowly been re-introduced  - Avoid beta blockers (atenolol) due to bradycardia / vagal episode  - Continue Amlodipine 10mg, Chlorthalidone 25mg, Enalapril 20mg   - Good control, no changes today   - PT/OT following   - Has " neurology follow up scheduled on 1/25/21     Post-surgical hypothyroidism  History of papillary thyroid adenocarcinoma s/p thyroidectomy   - TSH 34 on admission, free T4 0.74   - Likely in the setting of medication non-compliance  - Continue Levothyroxine 125 mcg - will need repeat TSH / free T4 in 4-6 weeks    Hyponatremia  - Mild. Monitor for now   - AM BMP     Renal dysfunction / probable CKD   - Baseline creatinine appears to be 0.8-1.0   - Creatinine 1.04 today, monitor     Urinary frequency  - 12/28 UA WNL     Anxiety/depression, continue Cymbalta   GERD, Pepcid     DVT Prophylaxis: Lovenox  Disposition: I expect the patient to be discharged to rehab when all is arranged. Suspect she needs more help at home than she is getting. If nothing changes at home, she is high risk for readmission following DC from rehab.     CODE STATUS:   Code Status and Medical Interventions:   Ordered at: 12/28/20 1502     Level Of Support Discussed With:    Patient     Code Status:    No CPR     Medical Interventions (Level of Support Prior to Arrest):    Full     Lauren Stone PA-C  01/02/21

## 2021-01-02 NOTE — PLAN OF CARE
Goal Outcome Evaluation:  Plan of Care Reviewed With: patient  Progress: improving   VSS on RA. Oriented to self. BM x1 this shift. Denies any needs at this time.

## 2021-01-02 NOTE — PLAN OF CARE
Goal Outcome Evaluation:  Plan of Care Reviewed With: patient  Progress: no change  Outcome Summary: PT with no new complaints. VSS. Oriented most of the day. Awaiting rehab / SNF.

## 2021-01-03 LAB
ANION GAP SERPL CALCULATED.3IONS-SCNC: 10 MMOL/L (ref 5–15)
BUN SERPL-MCNC: 24 MG/DL (ref 8–23)
BUN/CREAT SERPL: 22.9 (ref 7–25)
CALCIUM SPEC-SCNC: 9.1 MG/DL (ref 8.6–10.5)
CHLORIDE SERPL-SCNC: 93 MMOL/L (ref 98–107)
CO2 SERPL-SCNC: 26 MMOL/L (ref 22–29)
CREAT SERPL-MCNC: 1.05 MG/DL (ref 0.57–1)
GFR SERPL CREATININE-BSD FRML MDRD: 50 ML/MIN/1.73
GLUCOSE SERPL-MCNC: 95 MG/DL (ref 65–99)
POTASSIUM SERPL-SCNC: 3.7 MMOL/L (ref 3.5–5.2)
QT INTERVAL: 422 MS
QTC INTERVAL: 490 MS
SODIUM SERPL-SCNC: 129 MMOL/L (ref 136–145)

## 2021-01-03 PROCEDURE — 80048 BASIC METABOLIC PNL TOTAL CA: CPT | Performed by: PHYSICIAN ASSISTANT

## 2021-01-03 PROCEDURE — 99232 SBSQ HOSP IP/OBS MODERATE 35: CPT | Performed by: PHYSICIAN ASSISTANT

## 2021-01-03 PROCEDURE — 25010000002 ENOXAPARIN PER 10 MG: Performed by: INTERNAL MEDICINE

## 2021-01-03 RX ORDER — ENALAPRIL MALEATE 10 MG/1
40 TABLET ORAL
Status: DISCONTINUED | OUTPATIENT
Start: 2021-01-04 | End: 2021-01-05 | Stop reason: HOSPADM

## 2021-01-03 RX ADMIN — AMLODIPINE BESYLATE 10 MG: 10 TABLET ORAL at 08:39

## 2021-01-03 RX ADMIN — FAMOTIDINE 20 MG: 20 TABLET, FILM COATED ORAL at 18:33

## 2021-01-03 RX ADMIN — DOCUSATE SODIUM 200 MG: 100 CAPSULE, LIQUID FILLED ORAL at 08:39

## 2021-01-03 RX ADMIN — DULOXETINE HYDROCHLORIDE 30 MG: 30 CAPSULE, DELAYED RELEASE ORAL at 08:39

## 2021-01-03 RX ADMIN — ENOXAPARIN SODIUM 40 MG: 40 INJECTION SUBCUTANEOUS at 22:41

## 2021-01-03 RX ADMIN — LEVOTHYROXINE SODIUM 125 MCG: 125 TABLET ORAL at 05:46

## 2021-01-03 RX ADMIN — ATORVASTATIN CALCIUM 40 MG: 40 TABLET, FILM COATED ORAL at 08:39

## 2021-01-03 RX ADMIN — PRIMIDONE 125 MG: 250 TABLET ORAL at 22:41

## 2021-01-03 RX ADMIN — PRIMIDONE 50 MG: 50 TABLET ORAL at 08:39

## 2021-01-03 RX ADMIN — SODIUM CHLORIDE, PRESERVATIVE FREE 10 ML: 5 INJECTION INTRAVENOUS at 22:41

## 2021-01-03 RX ADMIN — ENALAPRIL MALEATE 20 MG: 10 TABLET ORAL at 08:39

## 2021-01-03 RX ADMIN — CHLORTHALIDONE 25 MG: 25 TABLET ORAL at 08:39

## 2021-01-03 RX ADMIN — FAMOTIDINE 20 MG: 20 TABLET, FILM COATED ORAL at 08:39

## 2021-01-03 RX ADMIN — SODIUM CHLORIDE, PRESERVATIVE FREE 10 ML: 5 INJECTION INTRAVENOUS at 08:40

## 2021-01-03 NOTE — PLAN OF CARE
Goal Outcome Evaluation:  Plan of Care Reviewed With: patient  Progress: no change   VSS on RA. Disoriented to time. Rested well throughout shift. Denies any needs at this time.

## 2021-01-03 NOTE — PROGRESS NOTES
[facility]  PROGRESS NOTE    Patient Name: Zohra Hall  : 1940  MRN: [id number]    Date of Admission: 2020  Primary Care Physician: Aleah Regan MD    Subjective     CC: f/u hypertensive encephalopathy     HPI:  Sitting up in bed. No further abdominal cramping and nausea. Good BP control over the past 24 hours. Today, she is concerned that all of her insurance accounts have been shut down.      ROS:  Gen- No fevers, chills  CV- No chest pain, palpitations  Resp- No cough, dyspnea  GI- No N/V/D, abd pain    Objective     Vital Signs:   Temp:  [97.4 °F (36.3 °C)-98.3 °F (36.8 °C)] 97.4 °F (36.3 °C)  Heart Rate:  [60-78] 63  Resp:  [16-20] 16  BP: (123-131)/(55-80) 131/66        Physical Exam:  Constitutional: No acute distress, awake, alert and conversant. Sitting up in bed   HENT: NCAT, mucous membranes moist  Respiratory: Clear to auscultation bilaterally, respiratory effort normal on room air   Cardiovascular: RRR, no murmurs, rubs, or gallops  Gastrointestinal: Positive bowel sounds, soft, nontender, nondistended  Musculoskeletal: No bilateral ankle edema  Psychiatric: Appropriate affect, cooperative  Neurologic: Oriented x 3, strength symmetric in all extremities, Cranial Nerves grossly intact to confrontation, speech clear  Skin: No rashes    Results Reviewed:  Results from last 7 days   Lab Units 20  0842 20  0419 20  2220   WBC 10*3/mm3 5.67 5.31 7.03   HEMOGLOBIN g/dL 14.4 12.3 12.3   HEMATOCRIT % 44.5 39.5 39.7   PLATELETS 10*3/mm3 287 257 259     Results from last 7 days   Lab Units 21  0626 21  0518 20  0949  20  0419 20  2121 20  1151   SODIUM mmol/L 129* 133* 135*   < > 137 135* 137   POTASSIUM mmol/L 3.7 3.8 3.7   < > 3.6 3.4* 3.8   CHLORIDE mmol/L 93* 96* 95*   < > 100 98 95*   CO2 mmol/L 26.0 25.0 27.0   < > 27.0 22.0 28.0   BUN mg/dL 24* 21 12   < > 11 10 9   CREATININE mg/dL  1.05* 1.04* 0.76   < > 0.94 0.87 0.92   GLUCOSE mg/dL 95 103* 132*   < > 119* 171* 90   CALCIUM mg/dL 9.1 9.4 9.5   < > 8.8 9.4 9.8   ALT (SGPT) U/L  --   --  11  --  6 7 7   AST (SGOT) U/L  --   --  16  --  13 15 18   TROPONIN T ng/mL  --   --   --   --   --  <0.010 <0.010   PROBNP pg/mL  --   --   --   --   --  1,234.0  --     < > = values in this interval not displayed.     Estimated Creatinine Clearance: 41.2 mL/min (A) (by C-G formula based on SCr of 1.05 mg/dL (H)).    Microbiology Results Abnormal     Procedure Component Value - Date/Time    COVID PRE-OP / PRE-PROCEDURE SCREENING ORDER (NO ISOLATION) - Swab, Nasopharynx [[id number]] Collected: 12/29/20 0019    Lab Status: Final result Specimen: Swab from Nasopharynx Updated: 12/29/20 1150    Narrative:      The following orders were created for panel order COVID PRE-OP / PRE-PROCEDURE SCREENING ORDER (NO ISOLATION) - Swab, Nasopharynx.  Procedure                               Abnormality         Status                     ---------                               -----------         ------                     COVID-19 PCR, [facility]...[[id number]]                      Final result                 Please view results for these tests on the individual orders.    COVID-19 PCR, [facility], NP SWAB IN LEXAR VIRAL TRANSPORT MEDIA 24-30 HR TAT - Swab, Nasopharynx [[id number]] Collected: 12/29/20 0019    Lab Status: Final result Specimen: Swab from Nasopharynx Updated: 12/29/20 1150     SARS-CoV-2 ROMULO Not Detected    Urine Culture - Urine, Urine, Clean Catch [[id number]] Collected: 12/28/20 9419    Lab Status: Final result Specimen: Urine, Clean Catch Updated: 12/29/20 0913     Urine Culture <10,000 CFU/mL Mixed Margarita Isolated    Narrative:      Specimen contains mixed organisms of questionable pathogenicity which indicates contamination with commensal margraita.  Further identification is unlikely to provide clinically useful information.  Suggest recollection.         Imaging Results (Last 24 Hours)     ** No results found for the last 24 hours. **        Results for orders placed during the hospital encounter of 10/30/20   Adult Transthoracic Echo Complete W/ Cont if Necessary Per Protocol    Narrative · Calculated left ventricular EF = 55%  · Left ventricular systolic function is normal.  · Left ventricular diastolic function is consistent with (grade Ia w/high   LAP) impaired relaxation.  · The left atrial cavity is mild to moderately dilated.  · No significant structural or functional valvular disease.        I have reviewed the medications:  Scheduled Meds:amLODIPine, 10 mg, Oral, Q24H  atorvastatin, 40 mg, Oral, Daily  chlorthalidone, 25 mg, Oral, Daily  docusate sodium, 200 mg, Oral, Daily  DULoxetine, 30 mg, Oral, Daily  enalapril, 20 mg, Oral, Q24H  enoxaparin, 40 mg, Subcutaneous, Q24H  famotidine, 20 mg, Oral, BID AC  levothyroxine, 125 mcg, Oral, Q AM  primidone, 125 mg, Oral, Nightly  primidone, 50 mg, Oral, Daily  sodium chloride, 10 mL, Intravenous, Q12H      Continuous Infusions:   PRN Meds:.•  acetaminophen **OR** acetaminophen **OR** acetaminophen  •  aluminum-magnesium hydroxide-simethicone  •  bisacodyl  •  bisacodyl  •  calcium carbonate EX  •  magnesium sulfate **OR** magnesium sulfate **OR** magnesium sulfate  •  ondansetron  •  potassium chloride  •  potassium chloride  •  senna-docusate sodium  •  sodium chloride    Assessment & Plan     Active Hospital Problems    Diagnosis  POA   • **Hypotension due to drugs [I95.2]  No   • Hypertensive encephalopathy [I67.4]  Yes   • Bradycardia [R00.1]  No   • Post-surgical hypothyroidism [E89.0]  Yes   • Chronic diastolic heart failure (CMS/HCC) [I50.32]  Yes   • Dyslipidemia [E78.5]  Yes   • GERD without esophagitis [K21.9]  Yes      Resolved Hospital Problems   No resolved problems to display.     Brief Hospital Course to date:  Zohra Hall is an 80 y.o. female with PMH significant for HTN, HLD, chronic  "diastolic CHF, aortic valve stenosis, polymyalgia rheumatica, post-surgical hypothyroidism, prior lacunar strokes, benign essential tremor, RLS and GERD. [facility] for accelerated hypertension with associated mental status change and gait ataxia. She was evaluated by neurology. Her TSH was 18. She received 4 doses of Cytomel and her synthroid was increased from 112 mcg to 125 mcg. She discharged to rehab. It is not clear when she was discharged to home from rehab.      Patient had reportedly been confused for \"a while.\" She also developed headache, blurred vision, left-sided weakness and tremors of her jaw. She had run out of her medications and had not been taking her BP or thyroid medications. She tried to call her PCP but the office was closed. She talked to a nurse who recommended she go to urgent care. She presented to urgent care on 12/28/20. Her BP was 205/105. She was instructed to go to an ER and she presented to [facility]. She was admitted to the hospital medicine service. Later that evening, she appeared to have had a vagal response, followed by extreme hypotension and bradycardia. She was given a dose of atropine, which improved hemodynamics. She was transferred to the ICU for epinephrine gtt. She returned to telemetry floor on 12/30.      Hypertensive encephalopathy  Chronic diastolic CHF  - MRI brain without acute ischemia or hemorrhage  - Home meds (amlodipine, atenolol, enalapril, terazosin) restarted on admission. Patient then had an episode of vasovagal syncope with severe hypotension and bradycardia. Home meds held and have slowly been re-introduced  - Avoid beta blockers (atenolol) due to bradycardia / vagal episode  - Continue Amlodipine 10mg,   - Na trending down, BUN/Cr creeping up - stop chlorthalidone. Increase Enalapril to 40mg PO daily  - Already got diuretic today, expect labs will be worse tomorrow. AM BMP   - PT/OT " following   - Has neurology follow up scheduled on 1/25/21     Post-surgical hypothyroidism  History of papillary thyroid adenocarcinoma s/p thyroidectomy   - TSH 34 on admission, free T4 0.74   - Likely in the setting of medication non-compliance  - Continue Levothyroxine 125 mcg - will need repeat TSH / free T4 in 4-6 weeks    Hyponatremia  - Worse. Stop diuretic.   - AM BMP     Renal dysfunction / probable CKD   - Baseline creatinine appears to be 0.8-1.0   - Creatinine 1.05 today, monitor     Urinary frequency  - 12/28 UA WNL     Anxiety/depression, continue Cymbalta   GERD, Pepcid     DVT Prophylaxis: Lovenox  Disposition: I expect the patient to be discharged to rehab when all is arranged. Suspect she needs more help at home than she is getting. If nothing changes at home, she is high risk for readmission following DC from rehab.  I attempted to call her son Simón today to discuss but there was no answer.   CODE STATUS:   Code Status and Medical Interventions:   Ordered at: 12/28/20 1502     Level Of Support Discussed With:    Patient     Code Status:    No CPR     Medical Interventions (Level of Support Prior to Arrest):    Full     Lauren Stone PA-C  01/03/21

## 2021-01-03 NOTE — PLAN OF CARE
Goal Outcome Evaluation:  Plan of Care Reviewed With: patient, family  Progress: no change  Outcome Summary: VSS on RA, SR with PVC on monitor.  No complaints.  Frequent urination with very odorous urine noted, order for UA received.  Family at bedside intermittently. Pt generally oriented with episodes of confusion to time/place.  Reorients easily.

## 2021-01-04 PROBLEM — I95.2 HYPOTENSION DUE TO DRUGS: Status: RESOLVED | Noted: 2020-12-28 | Resolved: 2021-01-04

## 2021-01-04 PROBLEM — I10 UNCONTROLLED HYPERTENSION: Status: ACTIVE | Noted: 2021-01-04

## 2021-01-04 PROBLEM — R00.1 BRADYCARDIA: Chronic | Status: RESOLVED | Noted: 2020-12-28 | Resolved: 2021-01-04

## 2021-01-04 LAB
ANION GAP SERPL CALCULATED.3IONS-SCNC: 11 MMOL/L (ref 5–15)
BACTERIA UR QL AUTO: ABNORMAL /HPF
BILIRUB UR QL STRIP: NEGATIVE
BUN SERPL-MCNC: 23 MG/DL (ref 8–23)
BUN/CREAT SERPL: 23.7 (ref 7–25)
CALCIUM SPEC-SCNC: 8.9 MG/DL (ref 8.6–10.5)
CHLORIDE SERPL-SCNC: 94 MMOL/L (ref 98–107)
CLARITY UR: ABNORMAL
CO2 SERPL-SCNC: 26 MMOL/L (ref 22–29)
COLOR UR: YELLOW
CREAT SERPL-MCNC: 0.97 MG/DL (ref 0.57–1)
FLUAV RNA RESP QL NAA+PROBE: NOT DETECTED
FLUBV RNA RESP QL NAA+PROBE: NOT DETECTED
GFR SERPL CREATININE-BSD FRML MDRD: 55 ML/MIN/1.73
GLUCOSE SERPL-MCNC: 95 MG/DL (ref 65–99)
GLUCOSE UR STRIP-MCNC: NEGATIVE MG/DL
HGB UR QL STRIP.AUTO: NEGATIVE
HYALINE CASTS UR QL AUTO: ABNORMAL /LPF
KETONES UR QL STRIP: ABNORMAL
LEUKOCYTE ESTERASE UR QL STRIP.AUTO: ABNORMAL
NITRITE UR QL STRIP: NEGATIVE
PH UR STRIP.AUTO: 6 [PH] (ref 5–8)
POTASSIUM SERPL-SCNC: 3.4 MMOL/L (ref 3.5–5.2)
POTASSIUM SERPL-SCNC: 4.3 MMOL/L (ref 3.5–5.2)
PROT UR QL STRIP: NEGATIVE
RBC # UR: ABNORMAL /HPF
REF LAB TEST METHOD: ABNORMAL
SARS-COV-2 RNA RESP QL NAA+PROBE: NOT DETECTED
SODIUM SERPL-SCNC: 131 MMOL/L (ref 136–145)
SP GR UR STRIP: 1.02 (ref 1–1.03)
SQUAMOUS #/AREA URNS HPF: ABNORMAL /HPF
UROBILINOGEN UR QL STRIP: ABNORMAL
WBC UR QL AUTO: ABNORMAL /HPF

## 2021-01-04 PROCEDURE — 80048 BASIC METABOLIC PNL TOTAL CA: CPT | Performed by: PHYSICIAN ASSISTANT

## 2021-01-04 PROCEDURE — 97535 SELF CARE MNGMENT TRAINING: CPT

## 2021-01-04 PROCEDURE — 87636 SARSCOV2 & INF A&B AMP PRB: CPT | Performed by: PHYSICIAN ASSISTANT

## 2021-01-04 PROCEDURE — 25010000002 ENOXAPARIN PER 10 MG: Performed by: INTERNAL MEDICINE

## 2021-01-04 PROCEDURE — 81001 URINALYSIS AUTO W/SCOPE: CPT | Performed by: PHYSICIAN ASSISTANT

## 2021-01-04 PROCEDURE — 87086 URINE CULTURE/COLONY COUNT: CPT | Performed by: PHYSICIAN ASSISTANT

## 2021-01-04 PROCEDURE — 84132 ASSAY OF SERUM POTASSIUM: CPT | Performed by: INTERNAL MEDICINE

## 2021-01-04 PROCEDURE — 99232 SBSQ HOSP IP/OBS MODERATE 35: CPT | Performed by: PHYSICIAN ASSISTANT

## 2021-01-04 RX ORDER — DOCUSATE SODIUM 100 MG/1
200 CAPSULE, LIQUID FILLED ORAL DAILY PRN
Status: DISCONTINUED | OUTPATIENT
Start: 2021-01-04 | End: 2021-01-05 | Stop reason: HOSPADM

## 2021-01-04 RX ORDER — CEFDINIR 300 MG/1
300 CAPSULE ORAL EVERY 12 HOURS SCHEDULED
Status: DISCONTINUED | OUTPATIENT
Start: 2021-01-04 | End: 2021-01-05 | Stop reason: HOSPADM

## 2021-01-04 RX ADMIN — FAMOTIDINE 20 MG: 20 TABLET, FILM COATED ORAL at 05:55

## 2021-01-04 RX ADMIN — CEFDINIR 300 MG: 300 CAPSULE ORAL at 11:48

## 2021-01-04 RX ADMIN — DULOXETINE HYDROCHLORIDE 30 MG: 30 CAPSULE, DELAYED RELEASE ORAL at 08:32

## 2021-01-04 RX ADMIN — DOCUSATE SODIUM 200 MG: 100 CAPSULE, LIQUID FILLED ORAL at 08:32

## 2021-01-04 RX ADMIN — FAMOTIDINE 20 MG: 20 TABLET, FILM COATED ORAL at 17:23

## 2021-01-04 RX ADMIN — CEFDINIR 300 MG: 300 CAPSULE ORAL at 22:33

## 2021-01-04 RX ADMIN — POTASSIUM CHLORIDE 40 MEQ: 10 CAPSULE, COATED, EXTENDED RELEASE ORAL at 08:32

## 2021-01-04 RX ADMIN — LEVOTHYROXINE SODIUM 125 MCG: 125 TABLET ORAL at 05:54

## 2021-01-04 RX ADMIN — ENALAPRIL MALEATE 40 MG: 10 TABLET ORAL at 08:31

## 2021-01-04 RX ADMIN — ATORVASTATIN CALCIUM 40 MG: 40 TABLET, FILM COATED ORAL at 08:31

## 2021-01-04 RX ADMIN — PRIMIDONE 50 MG: 50 TABLET ORAL at 08:31

## 2021-01-04 RX ADMIN — PRIMIDONE 125 MG: 250 TABLET ORAL at 22:35

## 2021-01-04 RX ADMIN — POTASSIUM CHLORIDE 40 MEQ: 10 CAPSULE, COATED, EXTENDED RELEASE ORAL at 11:48

## 2021-01-04 RX ADMIN — AMLODIPINE BESYLATE 10 MG: 10 TABLET ORAL at 08:32

## 2021-01-04 RX ADMIN — ENOXAPARIN SODIUM 40 MG: 40 INJECTION SUBCUTANEOUS at 22:33

## 2021-01-04 NOTE — PROGRESS NOTES
[facility] Medicine Services  PROGRESS NOTE    Patient Name: Zohra Hall  : 1940  MRN: [id number]    Date of Admission: 2020  Primary Care Physician: Aleah Regan MD    Subjective     CC: f/u hypertensive encephalopathy     HPI:  Sitting up in bed. Complained of mild upper abdominal discomfort. No nausea or vomiting. RN noticed foul-smelling urine last night, UA concerning for UTI. She denied further urinary symptoms.     ROS:  Gen- No fevers, chills  CV- No chest pain, palpitations  Resp- No cough, dyspnea  GI- as above     Objective     Vital Signs:   Temp:  [97.8 °F (36.6 °C)-98.5 °F (36.9 °C)] 98.2 °F (36.8 °C)  Heart Rate:  [56-74] 74  Resp:  [16-18] 16  BP: (122-161)/(71-96) 161/74        Physical Exam:  Constitutional: No acute distress, awake, alert and conversant. Sitting up in bed   HENT: NCAT, mucous membranes moist  Respiratory: Clear to auscultation bilaterally, respiratory effort normal on room air   Cardiovascular: RRR, no murmurs, rubs, or gallops  Gastrointestinal: Positive bowel sounds, soft, nontender, nondistended  Musculoskeletal: No bilateral ankle edema  Psychiatric: Appropriate affect, cooperative  Neurologic: Oriented to self and location. Not oriented to time. Not completely oriented to situation. Moves all extremities spontaneously. Speech clear / coherent   Skin: No rashes    Results Reviewed:  Results from last 7 days   Lab Units 20  0842 20  0419 20  2220   WBC 10*3/mm3 5.67 5.31 7.03   HEMOGLOBIN g/dL 14.4 12.3 12.3   HEMATOCRIT % 44.5 39.5 39.7   PLATELETS 10*3/mm3 287 257 259     Results from last 7 days   Lab Units 21  0519 21  0626 21  0518 20  0949  20  0419 20  2121   SODIUM mmol/L 131* 129* 133* 135*   < > 137 135*   POTASSIUM mmol/L 3.4* 3.7 3.8 3.7   < > 3.6 3.4*   CHLORIDE mmol/L 94* 93* 96* 95*   < > 100 98   CO2 mmol/L 26.0 26.0 25.0 27.0   < > 27.0 22.0   BUN mg/dL 23  24* 21 12   < > 11 10   CREATININE mg/dL 0.97 1.05* 1.04* 0.76   < > 0.94 0.87   GLUCOSE mg/dL 95 95 103* 132*   < > 119* 171*   CALCIUM mg/dL 8.9 9.1 9.4 9.5   < > 8.8 9.4   ALT (SGPT) U/L  --   --   --  11  --  6 7   AST (SGOT) U/L  --   --   --  16  --  13 15   TROPONIN T ng/mL  --   --   --   --   --   --  <0.010   PROBNP pg/mL  --   --   --   --   --   --  1,234.0    < > = values in this interval not displayed.     Estimated Creatinine Clearance: 44.6 mL/min (by C-G formula based on SCr of 0.97 mg/dL).    Microbiology Results Abnormal     Procedure Component Value - Date/Time    COVID PRE-OP / PRE-PROCEDURE SCREENING ORDER (NO ISOLATION) - Swab, Nasopharynx [[id number]] Collected: 12/29/20 0019    Lab Status: Final result Specimen: Swab from Nasopharynx Updated: 12/29/20 1150    Narrative:      The following orders were created for panel order COVID PRE-OP / PRE-PROCEDURE SCREENING ORDER (NO ISOLATION) - Swab, Nasopharynx.  Procedure                               Abnormality         Status                     ---------                               -----------         ------                     COVID-19 PCR, [facility]...[[id number]]                      Final result                 Please view results for these tests on the individual orders.    COVID-19 PCR, [facility], NP SWAB IN LEXAR VIRAL TRANSPORT MEDIA 24-30 HR TAT - Swab, Nasopharynx [[id number]] Collected: 12/29/20 0019    Lab Status: Final result Specimen: Swab from Nasopharynx Updated: 12/29/20 1150     SARS-CoV-2 ROMULO Not Detected    Urine Culture - Urine, Urine, Clean Catch [[id number]] Collected: 12/28/20 3917    Lab Status: Final result Specimen: Urine, Clean Catch Updated: 12/29/20 0913     Urine Culture <10,000 CFU/mL Mixed Margarita Isolated    Narrative:      Specimen contains mixed organisms of questionable pathogenicity which indicates contamination with commensal margarita.  Further identification is unlikely to provide clinically useful information.   Suggest recollection.        Imaging Results (Last 24 Hours)     ** No results found for the last 24 hours. **        Results for orders placed during the hospital encounter of 10/30/20   Adult Transthoracic Echo Complete W/ Cont if Necessary Per Protocol    Narrative · Calculated left ventricular EF = 55%  · Left ventricular systolic function is normal.  · Left ventricular diastolic function is consistent with (grade Ia w/high   LAP) impaired relaxation.  · The left atrial cavity is mild to moderately dilated.  · No significant structural or functional valvular disease.        I have reviewed the medications:  Scheduled Meds:amLODIPine, 10 mg, Oral, Q24H  atorvastatin, 40 mg, Oral, Daily  cefdinir, 300 mg, Oral, Q12H  docusate sodium, 200 mg, Oral, Daily  DULoxetine, 30 mg, Oral, Daily  enalapril, 40 mg, Oral, Q24H  enoxaparin, 40 mg, Subcutaneous, Q24H  famotidine, 20 mg, Oral, BID AC  levothyroxine, 125 mcg, Oral, Q AM  primidone, 125 mg, Oral, Nightly  primidone, 50 mg, Oral, Daily  sodium chloride, 10 mL, Intravenous, Q12H      Continuous Infusions:   PRN Meds:.•  acetaminophen **OR** acetaminophen **OR** acetaminophen  •  aluminum-magnesium hydroxide-simethicone  •  bisacodyl  •  bisacodyl  •  calcium carbonate EX  •  magnesium sulfate **OR** magnesium sulfate **OR** magnesium sulfate  •  ondansetron  •  potassium chloride  •  potassium chloride  •  senna-docusate sodium  •  sodium chloride    Assessment & Plan     Active Hospital Problems    Diagnosis  POA   • **Hypertensive encephalopathy [I67.4]  Yes   • Uncontrolled hypertension [I10]  Yes   • Post-surgical hypothyroidism [E89.0]  Yes   • Hyponatremia [E87.1]  Yes   • Benign essential tremor [G25.0]  Yes   • Chronic diastolic heart failure (CMS/HCC) [I50.32]  Yes   • Dyslipidemia [E78.5]  Yes   • GERD without esophagitis [K21.9]  Yes   • Hypothyroid [E03.9]  Yes      Resolved Hospital Problems    Diagnosis Date Resolved POA   • Bradycardia [R00.1]  "01/04/2021 No   • Hypotension due to drugs [I95.2] 01/04/2021 No     Brief Hospital Course to date:  Zohra Hall is an 80 y.o. female with PMH significant for HTN, HLD, chronic diastolic CHF, aortic valve stenosis, polymyalgia rheumatica, post-surgical hypothyroidism, prior lacunar strokes, benign essential tremor, RLS and GERD. She was admitted to [facility] 10/30-11/5/20 for accelerated hypertension with associated mental status change and gait ataxia. She was evaluated by neurology. Her TSH was 18. She received 4 doses of Cytomel and her synthroid was increased from 112 mcg to 125 mcg. She discharged to rehab. It is not clear when she was discharged to home from rehab.      Patient had reportedly been confused for \"a while.\" She also developed headache, blurred vision, left-sided weakness and tremors of her jaw. She had run out of her medications and had not been taking her BP or thyroid medications. She tried to call her PCP but the office was closed. She talked to a nurse who recommended she go to urgent care. She presented to urgent care on 12/28/20. Her BP was 205/105. She was instructed to go to an ER and she presented to [facility] ED. She was admitted to the hospital medicine service. Later that evening, she appeared to have had a vagal response, followed by extreme hypotension and bradycardia. She was given a dose of atropine, which improved hemodynamics. She was transferred to the ICU for epinephrine gtt. She returned to telemetry floor on 12/30.      Hypertensive encephalopathy  Chronic diastolic CHF  - MRI brain without acute ischemia or hemorrhage  - Home meds (amlodipine, atenolol, enalapril, terazosin) restarted on admission. Patient then had an episode of vasovagal syncope with severe hypotension and bradycardia. Home meds held and have slowly been re-introduced  - Avoid beta blockers (atenolol) due to bradycardia / vagal episode  - Continue Amlodipine 10mg,   - " Stopped chlorthalidone on 1/3 due to hyponatremia and rising BUN/creatinine. Enalapril increased 40mg daily   - PT/OT following   - Has neurology follow up scheduled on 1/25/21. Her POA is aware of this     Post-surgical hypothyroidism  History of papillary thyroid adenocarcinoma s/p thyroidectomy   - TSH 34 on admission, free T4 0.74   - Likely in the setting of medication non-compliance  - Continue Levothyroxine 125 mcg - will need repeat TSH / free T4 in 4-6 weeks    Hyponatremia  - Better today. AM BMP     Renal dysfunction / probable CKD   - Baseline creatinine appears to be 0.8-1.0   - Creatinine 0.97 today today, monitor      Urinary frequency  UTI   - 12/28 UA WNL   - 1/4 UA with 4+ bacteria. No IV access. Start Omnicef 300mg BID. Follow culture     Anxiety/depression, continue Cymbalta   GERD, Pepcid     DVT Prophylaxis: Lovenox  Disposition: I expect the patient to be discharged to rehab when all is arranged.   Suspect she needs more help at home than she is getting. If nothing changes at home, she is high risk for readmission following DC from rehab. I discussed these concerns with her grandson/POA, Joey, over the telephone today. He was appreciative for the information. He is looking into options - home with caregivers, assisted living vs long term care     CODE STATUS:   Code Status and Medical Interventions:   Ordered at: 12/28/20 1502     Level Of Support Discussed With:    Patient     Code Status:    No CPR     Medical Interventions (Level of Support Prior to Arrest):    Full     Lauren Stone PA-C  01/04/21

## 2021-01-04 NOTE — THERAPY TREATMENT NOTE
"Patient Name: Zohra Hall  : 1940    MRN: [id number]                              Today's Date: 2021       Admit Date: 2020    Visit Dx:     ICD-10-CM ICD-9-CM   1. Hypertensive encephalopathy  I67.4 437.2   2. Hypertensive crisis  I16.9 401.9     Patient Active Problem List   Diagnosis   • Generalized osteoarthritis   • Iron deficiency   • Vitamin D deficiency   • Skin neoplasm   • Sialadenitis   • Restless leg syndrome   • Reaction to chronic stress   • Piriformis syndrome   • Papillary adenocarcinoma of thyroid (CMS/HCC)   • Hypothyroid   • Hypertension   • GERD without esophagitis   • Dyslipidemia   • Cervical lymphadenopathy   • Cellulitis   • Atherosclerosis of aorta (CMS/HCC)   • Breast mass   • Aortic valve stenosis   • Incontinence of bowel   • Chronic diastolic heart failure (CMS/HCC)   • Acute right-sided low back pain without sciatica   • Benign essential tremor   • Pain of right hip joint   • Thyroid cancer (CMS/HCC)   • Lacunar stroke (CMS/HCC)   • Nontraumatic rupture of extensor tendons of right hand and wrist   • Hyponatremia   • Actinic keratosis   • Transient ischemic attack   • Ataxia   • PMR (polymyalgia rheumatica) (CMS/HCC)   • Osteoporosis   • Hypertensive encephalopathy   • Post-surgical hypothyroidism   • Uncontrolled hypertension     Past Medical History:   Diagnosis Date   • Breast cancer (CMS/HCC)     left- pt states \"in situ\", no radiation, Tamoxifen for 5 years   • Cellulitis    • Cervical lymphadenopathy    • Chest pain    • Convulsions (CMS/HCC)    • GERD (gastroesophageal reflux disease)    • Glossitis    • Grief reaction     · Last Impression: 2015  counselled, given ativan for prn use.  Aleah Regan   • Hypertension    • Lower back pain    • Oral thrush    • Papillary adenocarcinoma (CMS/HCC)     Papillary adenocarcinoma of thyroid   • Papillary adenocarcinoma of thyroid (CMS/HCC)     · resection Ramirez- ,  2 x 2 x 1.5 cm  T3 N1 MXpost " op low calcium and diminished  voiceablation Clara- 3/7 metastatic nodes and invasion to strap muscles · Last Impression: 29 Oct 2014  s/p Eval by berry Méndez.  Aleah Regan (Internal   • Piriformis syndrome    • Tingling    • Xerostomia      Past Surgical History:   Procedure Laterality Date   • BREAST BIOPSY Right 10/10/2013    stereo bx   • BREAST BIOPSY Left 10/19/2015    stereo bx   • BREAST CYST EXCISION      right   • BREAST EXCISIONAL BIOPSY Right     affirm bx and loc 2016.   • BREAST LUMPECTOMY Left 1987   • HYSTERECTOMY  1979   • OTHER SURGICAL HISTORY Right     right arm surgery-steel roger in place   • TOTAL HIP ARTHROPLASTY     • TOTAL THYROIDECTOMY      Thyroid Surgery Total Thyroidectomy     General Information     Row Name 01/04/21 1518          OT Time and Intention    Document Type  therapy note (daily note)  -     Mode of Treatment  occupational therapy  -     Row Name 01/04/21 1518          General Information    Existing Precautions/Restrictions  fall  -     Barriers to Rehab  cognitive status;previous functional deficit;medically complex  -     Row Name 01/04/21 1518          Cognition    Orientation Status (Cognition)  oriented to;person;disoriented to;place;situation;time  -     Row Name 01/04/21 1518          Safety Issues, Functional Mobility    Safety Issues Affecting Function (Mobility)  ability to follow commands;awareness of need for assistance;impulsivity;at risk behavior observed;insight into deficits/self-awareness;judgment;problem-solving;safety precaution awareness;safety precautions follow-through/compliance;sequencing abilities  -HK     Impairments Affecting Function (Mobility)  balance;cognition;endurance/activity tolerance;strength  -HK     Cognitive Impairments, Mobility Safety/Performance  attention;awareness, need for assistance;insight into deficits/self-awareness;judgment;problem-solving/reasoning;safety precaution awareness;safety precaution  "follow-through;sequencing abilities  -       User Key  (r) = Recorded By, (t) = Taken By, (c) = Cosigned By    Initials Name Provider Type    Gris Juarez, OT Occupational Therapist          Mobility/ADL's     Row Name 01/04/21 1521          Bed Mobility    Bed Mobility  scooting/bridging;supine-sit  -HK     Scooting/Bridging [place] (Bed Mobility)  contact guard;verbal cues  -HK     Supine-Sit [place] (Bed Mobility)  contact guard;verbal cues  -HK     Bed Mobility, Safety Issues  decreased use of arms for pushing/pulling  -     Assistive Device (Bed Mobility)  bed rails;head of bed elevated  -HK     Comment (Bed Mobility)  Pt advanced to EOB with CGA and extra time/effort.  -     Row Name 01/04/21 1521          Transfers    Transfers  sit-stand transfer;bed-chair transfer  -HK     Bed-Chair [place] (Transfers)  minimum assist (75% patient effort);1 person assist;verbal cues  -     Assistive Device (Bed-Chair Transfers)  -- L UE support  -     Sit-Stand [place] (Transfers)  minimum assist (75% patient effort);1 person assist  -     Row Name 01/04/21 1521          Sit-Stand Transfer    Assistive Device (Sit-Stand Transfers)  -- L UE support  -     Row Name 01/04/21 1521          Activities of Daily Living    BADL Assessment/Intervention  grooming  -     Row Name 01/04/21 1521          Grooming Assessment/Training    [place] Level (Grooming)  dependent (less than 25% patient effort);oral care regimen;moderate assist (50% patient effort);hair care, combing/brushing;wash face, hands;set up  -     Position (Grooming)  supported sitting  -     Comment (Grooming)  Pt adamantly declined to complete oral care stating she is \"shaking like a leaf\". OT provided dependence. Pt required modA for hair care due to excessive knotting in back.  -       User Key  (r) = Recorded By, (t) = Taken By, (c) = Cosigned By    Initials Name Provider Type    Gris Juarez, ANA MARÍA Occupational " Therapist        Obj/Interventions     Row Name 01/04/21 1524          Sensory Assessment (Somatosensory)    Sensory Assessment (Somatosensory)  sensation intact  -     Row Name 01/04/21 1524          Balance    Balance Assessment  sitting static balance;sitting dynamic balance;standing static balance  -     Static Sitting Balance  WFL;sitting, edge of bed;unsupported  -     Dynamic Sitting Balance  WFL;unsupported;sitting, edge of bed  -     Static Standing Balance  mild impairment;supported;standing  -       User Key  (r) = Recorded By, (t) = Taken By, (c) = Cosigned By    Initials Name Provider Type     Gris Montoya, OT Occupational Therapist        Goals/Plan    No documentation.       Clinical Impression     Row Name 01/04/21 1524          Pain Assessment    Additional Documentation  Pain Scale: Numbers Pre/Post-Treatment (Group)  -[facility] Name 01/04/21 1524          Pain Scale: Numbers Pre/Post-Treatment    Pretreatment Pain Rating  0/10 - no pain  -     Posttreatment Pain Rating  0/10 - no pain  -[facility] Name 01/04/21 1524          Plan of Care Review    Plan of Care Reviewed With  patient  -     Progress  improving  -     Outcome Summary  Pt received in bed wiping teeth with napkin. Pt completes bed mobility with CGA and transfers with minAx1. Pt slightly impulsive and stands before cued. Pt sat supported in chair to complete hair care with modA due to excessive knotting. Pt washed face with setup. Pt adamantly decliend to complete oral care and stated teeth were clean. OT provided dependence for oral care and brushing dentures. At this time pt will require 24/7 assist. If family unable to provide recommend d/c to SNF. Pt is not safe to be alone at home.  -     Row Name 01/04/21 1524          Therapy Plan Review/Discharge Plan (OT)    Anticipated Discharge Disposition (OT)  skilled nursing facility  -     Row Name 01/04/21 1524          Vital Signs    Pre Systolic BP Rehab  -- RN  cleared for tx; VSS  -HK     O2 Delivery Pre Treatment  room air  -HK     O2 Delivery Intra Treatment  room air  -HK     O2 Delivery Post Treatment  room air  -HK     Pre Patient Position  Supine  -HK     Intra Patient Position  Standing  -HK     Post Patient Position  Sitting  -HK     Row Name 01/04/21 1524          Positioning and Restraints    Pre-Treatment Position  in bed  -HK     Post Treatment Position  chair  -HK     In Chair  notified nsg;reclined;call light within reach;encouraged to call for assist;exit alarm on  -HK       User Key  (r) = Recorded By, (t) = Taken By, (c) = Cosigned By    Initials Name Provider Type    Gris Juarez OT Occupational Therapist        Outcome Measures     Row Name 01/04/21 1527          How much help from another is currently needed...    Putting on and taking off regular lower body clothing?  2  -HK     Bathing (including washing, rinsing, and drying)  2  -HK     Toileting (which includes using toilet bed pan or urinal)  3  -HK     Putting on and taking off regular upper body clothing  4  -HK     Taking care of personal grooming (such as brushing teeth)  2  -HK     Eating meals  3  -HK     AM-PAC 6 Clicks Score (OT)  16  -HK     Row Name 01/04/21 1527          Functional Assessment    Outcome Measure Options  AM-PAC 6 Clicks Daily Activity (OT)  -HK       User Key  (r) = Recorded By, (t) = Taken By, (c) = Cosigned By    Initials Name Provider Type    Gris Juarez OT Occupational Therapist        Occupational Therapy Education                 Title: PT OT SLP Therapies (In Progress)     Topic: Occupational Therapy (In Progress)     Point: ADL training (Done)     Description:   Instruct learner(s) on proper safety adaptation and remediation techniques during self care or transfers.   Instruct in proper use of assistive devices.              Learning Progress Summary           Patient Acceptance, E,TB,D,H, VU,NR by  at 1/4/2021 1528    Acceptance, E, VU,NR by AN at  12/31/2020 1430    Comment: Benefits of daily therapeutic ex/activities.    Acceptance, E, VU by CS at 12/29/2020 1159                   Point: Home exercise program (Not Started)     Description:   Instruct learner(s) on appropriate technique for monitoring, assisting and/or progressing therapeutic exercises/activities.              Learner Progress:  Not documented in this visit.          Point: Precautions (Done)     Description:   Instruct learner(s) on prescribed precautions during self-care and functional transfers.              Learning Progress Summary           Patient Acceptance, E,TB,D,H, VU,NR by  at 1/4/2021 1528    Acceptance, E, VU by CS at 12/29/2020 1159                   Point: Body mechanics (Done)     Description:   Instruct learner(s) on proper positioning and spine alignment during self-care, functional mobility activities and/or exercises.              Learning Progress Summary           Patient Acceptance, E,TB,D,H, VU,NR by  at 1/4/2021 1528    Acceptance, E, VU by  at 12/29/2020 1159                               User Key     Initials Effective Dates Name Provider Type Discipline     06/22/15 -  Zuri Spicer, OT Occupational Therapist OT     03/07/18 -  Gris Montoya, OT Occupational Therapist OT     10/21/20 -  Karel Vu, OT Occupational Therapist OT              OT Recommendation and Plan     Plan of Care Review  Plan of Care Reviewed With: patient  Progress: improving  Outcome Summary: Pt received in bed wiping teeth with napkin. Pt completes bed mobility with CGA and transfers with minAx1. Pt slightly impulsive and stands before cued. Pt sat supported in chair to complete hair care with modA due to excessive knotting. Pt washed face with setup. Pt adamantly decliend to complete oral care and stated teeth were clean. OT provided dependence for oral care and brushing dentures. At this time pt will require 24/7 assist. If family unable to provide recommend d/c to SNF.  Pt is not safe to be alone at home.     Time Calculation:   Time Calculation- OT     Row Name 01/04/21 1338             Time Calculation- OT    OT Start Time  1338  -HK      OT Received On  01/04/21  -         Timed Charges    40129 - OT Self Care/Mgmt Minutes  24  -HK        User Key  (r) = Recorded By, (t) = Taken By, (c) = Cosigned By    Initials Name Provider Type     Gris Montoya, OT Occupational Therapist        Therapy Charges for Today     Code Description Service Date Service Provider Modifiers Qty    [id number] HC OT SELF CARE/MGMT/TRAIN EA 15 MIN 1/4/2021 Gris Montoya OT GO 2               Gris Montoya OT  1/4/2021

## 2021-01-04 NOTE — PROGRESS NOTES
"                  Clinical Nutrition     Reason for Visit:   Follow-up protocol    Patient Name: Zohra Hall  YOB: 1940  MRN: [id number]  Date of Encounter: 01/04/21 15:00 EST  Admission date: 12/28/2020    Nutrition Assessment   Assessment     Admission diagnosis  HTN encephalopathy    Additional diagnosis/conditions/procedures this admission  Chronic diastolic CHF  Post-surgical hypothyroidism  Hyponatremia  Urinary frequency  Anxiety/depression  UTI    Additional PMH/procedures:  HTN  DLP  Heart failure  GERd  Hypothyroidism  Glossitis  \"Convulsions\"  Breast cancer  Papillary adenocarcinoma  Former smoker    DENISE  Breast lumpectomy (1987)  Total thyroidectomy  R breast biopsy (10/2013)    Reported/Observed/Food/Nutrition Related History:      Patient states the food has been great and the catering staff have been working with her but she just does not have much of an appetite. She is concerned about insurance and other bills she needs to pay. Possible confusion at time of visit but able to answer RD questions appropriately. She denies any preferences at this time      Anthropometrics     Height: 165.1 cm (65\")  Last filed wt: Weight: 63.2 kg (139 lb 5.3 oz) (12/29/20 0800)  Weight Method: Bed scale    BMI: BMI (Calculated): 23.2  Normal: 18.5-24.9kg/m2    Ideal Body Weight (IBW) (kg): 57.29  Admission wt: 140 lb   Method obtained: stated weight per charting 12/28    Weight Weight (kg) Weight (lbs) Weight Method VISIT REPORT   1/10/2020 68.584 kg 151 lb 3.2 oz     1/13/2020 69.4 kg 153 lb     6/10/2020 65.772 kg 145 lb     7/20/2020 66.951 kg 147 lb 9.6 oz     8/5/2020 68.04 kg 150 lb Stated    9/16/2020 66.497 kg 146 lb 9.6 oz     10/30/2020 63.776 kg 140 lb 9.6 oz     10/30/2020 63.957 kg 141 lb Stated    10/31/2020 64.139 kg 141 lb 6.4 oz Bed scale    10/31/2020 (No Data) (No Data)     11/25/2020 61.417 kg 135 lb 6.4 oz     12/28/2020 63.504 kg 140 lb Stated    12/28/2020 63.504 kg 140 lb   "   12/29/2020 63.2 kg 139 lb 5.3 oz Bed scale         Labs reviewed     Results from last 7 days   Lab Units 01/04/21  0519 01/03/21  0626 01/02/21  0518 12/31/20  0949 12/30/20  0842  12/29/20 0419 12/28/20  2121   GLUCOSE mg/dL 95 95 103* 132* 127*  --  119* 171*   BUN mg/dL 23 24* 21 12 10  --  11 10   CREATININE mg/dL 0.97 1.05* 1.04* 0.76 0.72  --  0.94 0.87   SODIUM mmol/L 131* 129* 133* 135* 134*  --  137 135*   CHLORIDE mmol/L 94* 93* 96* 95* 95*  --  100 98   POTASSIUM mmol/L 3.4* 3.7 3.8 3.7 3.6   < > 3.6 3.4*   PHOSPHORUS mg/dL  --   --   --   --  3.0  --   --   --    MAGNESIUM mg/dL  --   --   --   --  2.0  --  1.9  --    ALT (SGPT) U/L  --   --   --  11  --   --  6 7    < > = values in this interval not displayed.     Results from last 7 days   Lab Units 12/31/20  0949 12/29/20 0419 12/28/20  2121   ALBUMIN g/dL 4.30 3.50 3.80       Results from last 7 days   Lab Units 12/28/20  2102   GLUCOSE mg/dL 359*     No results found for: HGBA1C      Medications reviewed   Pertinent: omnicef, vastoec, synthroid, pepcid, mysoline      Intake/Output 24 hrs (7:00AM - 6:59 AM)     Intake & Output (last day)       01/03 0701 - 01/04 0700 01/04 0701 - 01/05 0700    P.O.      Total Intake(mL/kg)      Net            Urine Unmeasured Occurrence 2 x 1 x    Stool Unmeasured Occurrence  1 x          Current Nutrition Prescription     PO: Diet Regular; Low Sodium; 2,000 mg Na  Intake: 31% x 4 meals    Nutrition Diagnosis     1/4  Problem Inadequate oral intake   Etiology Decreased appetite, clinical status   Signs/Symptoms PO Intake (31% x 4 meals)       Nutrition Intervention     1. Follow treatment progress, Care plan reviewed  2. Advise alternate selection, Interview for preferences   3. Supplements ordered - Boost Plus Daily  4. Encouraged oral intake    Goal:   General: Nutrition support treatment  PO: Increase intake      Monitoring/Evaluation:   Per protocol, PO intake, Pertinent labs, Symptoms    Will Continue to  follow per protocol    Jennifer Brown RDN, LD  Time Spent: 30 minutes

## 2021-01-04 NOTE — PLAN OF CARE
Goal Outcome Evaluation:  Plan of Care Reviewed With: patient  Progress: improving  Outcome Summary: Pt received in bed wiping teeth with napkin. Pt completes bed mobility with CGA and transfers with minAx1. Pt slightly impulsive and stands before cued. Pt sat supported in chair to complete hair care with modA due to excessive knotting. Pt washed face with setup. Pt adamantly decliend to complete oral care and stated teeth were clean. OT provided dependence for oral care and brushing dentures. At this time pt will require 24/7 assist. If family unable to provide recommend d/c to SNF. Pt is not safe to be alone at home.

## 2021-01-04 NOTE — PLAN OF CARE
Goal Outcome Evaluation:  Plan of Care Reviewed With: patient  Progress: improving  Outcome Summary: Patient alert to self and time at times, continues to be forgetful but reorients easily.  Conversations with patient go in many different directions and patient worried about going to [place] due to location and concerns for family safety.  Patient with odorous urine, specimen resulted and orders received for treatment.  Will continue to monitor.  Patient also has decreased appetite and would not eat with encouragement today.

## 2021-01-04 NOTE — PROGRESS NOTES
Continued Stay Note  [facility]     Patient Name: Zohra Hall  MRN: [id number]  Today's Date: 1/4/2021    Admit Date: 12/28/2020    Discharge Plan     Row Name 01/04/21 1353       Plan    Plan  [facility]    Plan Comments  Patient has been accepted by [facility] for skilled rehab and bed available tomorrow. I have spoken with patient and with her POA, Joey arizmendi. He had questions about long term plannng, possible home with caregivers vs assisted/personal care facility. His short term goal is for rehab and plans to pursue additonal care for his grandmother as in a assisted living facility. He will be providing transportation tomorrow and agrees for [facility]. She will need a covid test please. I had additional discussion with patient and she plans to speak with her grandson some more about the referral. Xochitl and FLAQUITA Cook asked for follow up on the other referrals that were given and I plan to do that.     Final Discharge Disposition Code  03 - skilled nursing facility (SNF)        Discharge Codes    No documentation.       Expected Discharge Date and Time     Expected Discharge Date Expected Discharge Time    Jan 6, 2021             Jessica Corbett RN

## 2021-01-05 VITALS
WEIGHT: 139.33 LBS | HEART RATE: 72 BPM | TEMPERATURE: 97.5 F | OXYGEN SATURATION: 97 % | DIASTOLIC BLOOD PRESSURE: 73 MMHG | BODY MASS INDEX: 23.21 KG/M2 | RESPIRATION RATE: 18 BRPM | SYSTOLIC BLOOD PRESSURE: 143 MMHG | HEIGHT: 65 IN

## 2021-01-05 LAB — BACTERIA SPEC AEROBE CULT: NORMAL

## 2021-01-05 PROCEDURE — 97110 THERAPEUTIC EXERCISES: CPT

## 2021-01-05 PROCEDURE — 99239 HOSP IP/OBS DSCHRG MGMT >30: CPT | Performed by: PHYSICIAN ASSISTANT

## 2021-01-05 PROCEDURE — 97530 THERAPEUTIC ACTIVITIES: CPT

## 2021-01-05 RX ORDER — PSEUDOEPHEDRINE HCL 30 MG
200 TABLET ORAL DAILY PRN
Start: 2021-01-05 | End: 2022-01-28

## 2021-01-05 RX ORDER — ENALAPRIL MALEATE 20 MG/1
40 TABLET ORAL DAILY
Qty: 90 TABLET | Refills: 1
Start: 2021-01-05 | End: 2021-03-03 | Stop reason: SDUPTHER

## 2021-01-05 RX ORDER — ALUMINA, MAGNESIA, AND SIMETHICONE 2400; 2400; 240 MG/30ML; MG/30ML; MG/30ML
15 SUSPENSION ORAL EVERY 6 HOURS PRN
Start: 2021-01-05 | End: 2022-07-18

## 2021-01-05 RX ORDER — ACETAMINOPHEN 325 MG/1
650 TABLET ORAL EVERY 4 HOURS PRN
Start: 2021-01-05

## 2021-01-05 RX ORDER — ONDANSETRON 4 MG/1
4 TABLET, FILM COATED ORAL EVERY 6 HOURS PRN
Start: 2021-01-05

## 2021-01-05 RX ORDER — PRIMIDONE 50 MG/1
50 TABLET ORAL EVERY MORNING
Start: 2021-01-05 | End: 2021-02-15 | Stop reason: SDUPTHER

## 2021-01-05 RX ORDER — CEFDINIR 300 MG/1
300 CAPSULE ORAL EVERY 12 HOURS SCHEDULED
Qty: 7 CAPSULE | Refills: 0
Start: 2021-01-05 | End: 2021-02-15

## 2021-01-05 RX ADMIN — SODIUM CHLORIDE, PRESERVATIVE FREE 10 ML: 5 INJECTION INTRAVENOUS at 08:27

## 2021-01-05 RX ADMIN — ATORVASTATIN CALCIUM 40 MG: 40 TABLET, FILM COATED ORAL at 08:27

## 2021-01-05 RX ADMIN — FAMOTIDINE 20 MG: 20 TABLET, FILM COATED ORAL at 08:27

## 2021-01-05 RX ADMIN — PRIMIDONE 50 MG: 50 TABLET ORAL at 08:27

## 2021-01-05 RX ADMIN — AMLODIPINE BESYLATE 10 MG: 10 TABLET ORAL at 08:27

## 2021-01-05 RX ADMIN — DULOXETINE HYDROCHLORIDE 30 MG: 30 CAPSULE, DELAYED RELEASE ORAL at 08:27

## 2021-01-05 RX ADMIN — ENALAPRIL MALEATE 40 MG: 10 TABLET ORAL at 08:27

## 2021-01-05 RX ADMIN — LEVOTHYROXINE SODIUM 125 MCG: 125 TABLET ORAL at 08:27

## 2021-01-05 RX ADMIN — CEFDINIR 300 MG: 300 CAPSULE ORAL at 08:27

## 2021-01-05 NOTE — PROGRESS NOTES
Case Management Discharge Note      Final Note: Patient has been accepted by [facility]. Patient and her POA granddavid Cook has accepted [facility]  tele [phone]. I will fax records. Her ugo Cook plans to transport around 3 pm today.         Selected Continued Care - Admitted Since 12/28/2020     Destination Coordination complete    Service Provider Selected Services Address Phone Fax Patient Preferred    [facility]  [facility]illed [facility] [address] [facility] [id number] [phone] --          Durable Medical Equipment    No services have been selected for the patient.              Dialysis/Infusion    No services have been selected for the patient.              Home Medical Care    No services have been selected for the patient.              Therapy    No services have been selected for the patient.              Community Resources    No services have been selected for the patient.                Selected Continued Care - Prior Encounters Includes selections from prior encounters from 9/29/2020 to 1/5/2021    Discharged on 11/5/2020 Admission date: 10/30/2020 - Discharge disposition: Rehab Facility or Unit (DC - External)    Destination     Service Provider Selected Services Address Phone Fax Patient Preferred    [facility]  [facility] [address] [facility] [id number] [phone] --                         Final Discharge Disposition Code: 03 - skilled nursing facility (SNF)

## 2021-01-05 NOTE — PLAN OF CARE
Goal Outcome Evaluation:  Plan of Care Reviewed With: patient  Progress: improving   VSS this shift. Room air. A/O x2 to person and place. [facility]. Grandson to transport.

## 2021-01-05 NOTE — PLAN OF CARE
Goal Outcome Evaluation:  Plan of Care Reviewed With: patient  Progress: improving  Outcome Summary: Pt requires CGA for sit <> stand, transfer to BSC, and for gait. Ambulated 50 feet with use of RW and limited by fatigue. Improved BLE ther ex participation with ability to perform without therapist assist.

## 2021-01-05 NOTE — DISCHARGE SUMMARY
"      [facility] Medicine Services  DISCHARGE SUMMARY    Patient Name: Zohra Hall  : 1940  MRN: [id number]    Date of Admission: 2020 11:46 AM  Date of Discharge: 2021  Primary Care Physician: Aleah Regan MD    Hospital Course     Presenting Problem: Hypertensive encephalopathy [I67.4]    Active Hospital Problems    Diagnosis  POA   • **Hypertensive encephalopathy [I67.4]  Yes   • Uncontrolled hypertension [I10]  Yes   • Post-surgical hypothyroidism [E89.0]  Yes   • Hyponatremia [E87.1]  Yes   • Benign essential tremor [G25.0]  Yes   • Chronic diastolic heart failure (CMS/HCC) [I50.32]  Yes   • Dyslipidemia [E78.5]  Yes   • GERD without esophagitis [K21.9]  Yes   • Hypothyroid [E03.9]  Yes      Resolved Hospital Problems    Diagnosis Date Resolved POA   • Bradycardia [R00.1] 2021 No   • Hypotension due to drugs [I95.2] 2021 No      Hospital Course:  Zohra Hall is an 80 y.o. female with PMH significant for HTN, HLD, chronic diastolic CHF, aortic valve stenosis, polymyalgia rheumatica, post-surgical hypothyroidism, prior lacunar strokes, benign essential tremor, RLS and GERD. She was admitted to [facility] 10/30-20 for accelerated hypertension with associated mental status change and gait ataxia. She was evaluated by neurology. Her TSH was 18. She received 4 doses of Cytomel and her synthroid was increased from 112 mcg to 125 mcg. She discharged to rehab. It is not clear when she was discharged to home from rehab.       Patient had reportedly been confused for \"a while.\" She also developed headache, blurred vision, left-sided weakness and tremors of her jaw. She had run out of her medications and had not been taking her BP or thyroid medications. She tried to call her PCP but the office was closed. She talked to a nurse who recommended she go to urgent care. She presented to urgent care on 20. Her BP was 205/105. She was " instructed to go to an ER and she presented to [facility] ED. She was admitted to the hospital medicine service. Later that evening, she appeared to have had a vagal response, followed by extreme hypotension and bradycardia. She was given a dose of atropine, which improved hemodynamics. She was transferred to the ICU for epinephrine gtt. She returned to telemetry floor on 12/30.       Hypertensive encephalopathy  Chronic diastolic CHF  - MRI brain without acute ischemia or hemorrhage  - Home meds (amlodipine, atenolol, enalapril, terazosin) restarted on admission. Patient then had an episode of vasovagal syncope with severe hypotension and bradycardia. Home meds held and have slowly been re-introduced  - Avoid beta blockers (atenolol) due to bradycardia / vagal episode  - Continue Amlodipine 10mg  - Stopped chlorthalidone on 1/3 due to hyponatremia and rising BUN/creatinine. Enalapril increased 40mg daily. Good BP control   - PT/OT following   - Attend scheduled neurology follow up on 1/25/21. Her POA (ugoJoey) is aware of this      Post-surgical hypothyroidism  History of papillary thyroid adenocarcinoma s/p thyroidectomy   - TSH 34 on admission, free T4 0.74   - Likely in the setting of medication non-compliance  - Continue Levothyroxine 125 mcg - will need repeat TSH / free T4 in 4-6 weeks     Hyponatremia  - Likely secondary to medications. Improved      Renal dysfunction / probable CKD   - Baseline creatinine appears to be 0.8-1.0   - Creatinine 0.97 today today, monitor      Urinary frequency  UTI   - 12/28 UA WNL   - 1/4 UA with 4+ bacteria. No IV access. StartedOmnicef 300mg BID  - Culture results still pending. Will notify facility if antibioticis inappropriate based on culture / susceptibility are no     Anxiety/depression, continue Cymbalta   GERD, Pepcid     Discharge Follow Up Recommendations for outpatient labs/diagnostics:  - PT/OT recommend rehab. [facility] has accepted. She will  "transfer in stable condition on 1/5/20  - Please repeat BMP in 1 week to follow up on creatinine / sodium with recent medication changes  - Needs repeat TSH in 4-6 weeks  - Follow up with PCP 1 week after DC from rehab     Day of Discharge     HPI:   Sitting up in chair, getting started with breakfast. She says she slept well last night and is feeling \"pretty good\" this morning. No complaints aside from loose stools, she says she does not want more stool softeners.     Review of Systems  Gen- No fevers, chills  CV- No chest pain, palpitations  Resp- No cough, dyspnea  GI- (+) loose stools, no abdominal pain, nausea or vomiting     Vital Signs:   Temp:  [97.4 °F (36.3 °C)-98.2 °F (36.8 °C)] 97.4 °F (36.3 °C)  Heart Rate:  [59-77] 60  Resp:  [16-18] 18  BP: (115-161)/(60-75) 127/66     Physical Exam:  Constitutional: No acute distress, awake, alert and conversant. Sitting upright in bed   HENT: NCAT, mucous membranes moist  Respiratory: Clear to auscultation bilaterally, respiratory effort normal on room air   Cardiovascular: Rate 70's, regular rhythm without m/r/g   Gastrointestinal: Positive bowel sounds, soft, nontender, nondistended  Musculoskeletal: No bilateral ankle edema  Psychiatric: Appropriate affect, cooperative  Neurologic: Did not ask orientation questions today. No focal deficits. Speech slowed but clear   Skin: No rashes to exposed skin     Pertinent  and/or Most Recent Results     Results from last 7 days   Lab Units 01/04/21  1918 01/04/21  0519 01/03/21  0626 01/02/21  0518 12/31/20  0949 12/30/20  0842 12/29/20  1326   WBC 10*3/mm3  --   --   --   --   --  5.67  --    HEMOGLOBIN g/dL  --   --   --   --   --  14.4  --    HEMATOCRIT %  --   --   --   --   --  44.5  --    PLATELETS 10*3/mm3  --   --   --   --   --  287  --    SODIUM mmol/L  --  131* 129* 133* 135* 134*  --    POTASSIUM mmol/L 4.3 3.4* 3.7 3.8 3.7 3.6 4.5   CHLORIDE mmol/L  --  94* 93* 96* 95* 95*  --    CO2 mmol/L  --  26.0 26.0 25.0 " 27.0 28.0  --    BUN mg/dL  --  23 24* 21 12 10  --    CREATININE mg/dL  --  0.97 1.05* 1.04* 0.76 0.72  --    GLUCOSE mg/dL  --  95 95 103* 132* 127*  --    CALCIUM mg/dL  --  8.9 9.1 9.4 9.5 9.1  --      Results from last 7 days   Lab Units 12/31/20  0949   BILIRUBIN mg/dL 0.4   ALK PHOS U/L 93   ALT (SGPT) U/L 11   AST (SGOT) U/L 16     Results for JAMIE GONZALEZ (MRN [id number]) as of 1/5/2021 08:16   Ref. Range 1/4/2021 08:11   Color, UA Latest Ref Range: Yellow, Straw  Yellow   Appearance, UA Latest Ref Range: Clear  Cloudy (A)   Specific Browning, UA Latest Ref Range: 1.001 - 1.030  1.020   pH, UA Latest Ref Range: 5.0 - 8.0  6.0   Glucose Latest Ref Range: Negative  Negative   Ketones, UA Latest Ref Range: Negative  Trace (A)   Blood, UA Latest Ref Range: Negative  Negative   Nitrite, UA Latest Ref Range: Negative  Negative   Leukocytes, UA Latest Ref Range: Negative  Small (1+) (A)   Protein, UA Latest Ref Range: Negative  Negative   Bilirubin, UA Latest Ref Range: Negative  Negative   Urobilinogen, UA Latest Ref Range: 0.2 - 1.0 E.U./dL  1.0 E.U./dL   RBC, UA Latest Ref Range: None Seen, 0-2 /HPF 7-12 (A)   WBC, UA Latest Ref Range: None Seen, 0-2 /HPF 13-20 (A)   Bacteria, UA Latest Ref Range: None Seen, Trace /HPF 4+ (A)   Squamous Epithelial Cells, UA Latest Ref Range: None Seen, 0-2 /HPF 7-12 (A)     Microbiology Results Abnormal     Procedure Component Value - Date/Time    COVID PRE-OP / PRE-PROCEDURE SCREENING ORDER (NO ISOLATION) - Swab, Nasopharynx [[id number]]  (Normal) Collected: 01/04/21 3336    Lab Status: Final result Specimen: Swab from Nasopharynx Updated: 01/04/21 1940    Narrative:      The following orders were created for panel order COVID PRE-OP / PRE-PROCEDURE SCREENING ORDER (NO ISOLATION) - Swab, Nasopharynx.  Procedure                               Abnormality         Status                     ---------                               -----------         ------                      COVID-19 and FLU A/B PCR...[[id number]]  Normal              Final result                 Please view results for these tests on the individual orders.    COVID-19 and FLU A/B PCR - Swab, Nasopharynx [[id number]]  (Normal) Collected: 01/04/21 1559    Lab Status: Final result Specimen: Swab from Nasopharynx Updated: 01/04/21 1940     COVID19 Not Detected     Influenza A PCR Not Detected     Influenza B PCR Not Detected    Narrative:      Fact sheet for providers: https://www.fda.gov/media/142382/download    Fact sheet for patients: https://www.fda.gov/media/099747/download    Test performed by PCR.    COVID PRE-OP / PRE-PROCEDURE SCREENING ORDER (NO ISOLATION) - Swab, Nasopharynx [[id number]] Collected: 12/29/20 0019    Lab Status: Final result Specimen: Swab from Nasopharynx Updated: 12/29/20 1150    Narrative:      The following orders were created for panel order COVID PRE-OP / PRE-PROCEDURE SCREENING ORDER (NO ISOLATION) - Swab, Nasopharynx.  Procedure                               Abnormality         Status                     ---------                               -----------         ------                     COVID-19 PCR, [email or web address]...[[id number]]                      Final result                 Please view results for these tests on the individual orders.    COVID-19 PCR, [facility], NP SWAB IN [facility] VIRAL TRANSPORT MEDIA 24-30 HR TAT - Swab, Nasopharynx [[id number]] Collected: 12/29/20 0019    Lab Status: Final result Specimen: Swab from Nasopharynx Updated: 12/29/20 1150     SARS-CoV-2 ROMULO Not Detected    Urine Culture - Urine, Urine, Clean Catch [[id number]] Collected: 12/28/20 1335    Lab Status: Final result Specimen: Urine, Clean Catch Updated: 12/29/20 0913     Urine Culture <10,000 CFU/mL Mixed Margarita Isolated    Narrative:      Specimen contains mixed organisms of questionable pathogenicity which indicates contamination with commensal margarita.  Further identification is unlikely to provide clinically  useful information.  Suggest recollection.        Imaging Results (All)     Procedure Component Value Units Date/Time    XR Chest 1 View [[id number]] Collected: 12/28/20 2138     Updated: 12/28/20 2140    Narrative:      PROCEDURE: CR Chest 1 Vw    COMPARISON:  December 28 11:55 AM     INDICATIONS: Rapid response evaluation Hypertensive encephalopathy; Hypertensive crisis, unspecified     TECHNIQUE: Single AP  view of the chest    FINDINGS:  Cardiomediastinal silhouette is within normal limits given projection. History calcifications persist and in the aortic knob with mild tortuosity of the aorta. No acute infiltrate or lobar collapse seen. No pleural effusions noted. Osseous  structures are intact.      Impression:      No acute disease.    Signer Name: Macarena Vo MD   Signed: 12/28/2020 9:38 PM   Workstation Name: [facility]    [facility]      MRI Brain Without Contrast [[id number]] Collected: 12/28/20 1454     Updated: 12/28/20 1502    Narrative:      EXAMINATION: MRI BRAIN WO CONTRAST-      INDICATION: confusion, hypertensive crisis; I67.4-Hypertensive  encephalopathy; I16.9-Hypertensive crisis, unspecified     TECHNIQUE: Multiplanar multisequence MRI of the brain performed without  contrast     COMPARISON: 10/31/2020     FINDINGS: No acute infarct noted on diffusion weighted sequences.  Midline structures are unremarkable and the craniocervical junction is  satisfactory in appearance. Redemonstrated advanced T2 hyperintense  periventricular white matter changes likely related to chronic small  vessel ischemia. Unchanged degree of diffuse volume loss, without lobar  predilection. There is associated prominence of the ventricles and sulci  which are unchanged. There is otherwise no evidence of intracranial  hemorrhage, mass or mass effect. The orbits are normal and the paranasal  sinuses are grossly clear.       Impression:      Stable appearance of the brain with fairly advanced  volume  loss and periventricular white matter sequela chronic small vessel  ischemia. There is otherwise no evidence of infarct, hemorrhage, mass or  mass effect.    This report was finalized on 12/28/2020 2:58 PM by Chuy Jiame.       XR Chest 1 View [[id number]] Collected: 12/28/20 1220     Updated: 12/28/20 1224    Narrative:      EXAMINATION: XR CHEST 1 VW-      INDICATION: Weak/Dizzy/AMS triage protocol      COMPARISON: 11/12/2013     FINDINGS: No focal airspace opacity. No pleural effusion or  pneumothorax. Normal heart and mediastinal contours.       Impression:      No evidence of acute disease in the chest.      This report was finalized on 12/28/2020 12:21 PM by Chuy Jaime.        Results for orders placed during the hospital encounter of 10/30/20   Adult Transthoracic Echo Complete W/ Cont if Necessary Per Protocol    Narrative · Calculated left ventricular EF = 55%  · Left ventricular systolic function is normal.  · Left ventricular diastolic function is consistent with (grade Ia w/high   LAP) impaired relaxation.  · The left atrial cavity is mild to moderately dilated.  · No significant structural or functional valvular disease.        Discharge Details        Discharge Medications      New Medications      Instructions Start Date   acetaminophen 325 MG tablet  Commonly known as: TYLENOL  Replaces: acetaminophen 650 MG 8 hr tablet   650 mg, Oral, Every 4 Hours PRN      aluminum-magnesium hydroxide-simethicone 400-400-40 MG/5ML suspension  Commonly known as: MAALOX MAX   15 mL, Oral, Every 6 Hours PRN      cefdinir 300 MG capsule  Commonly known as: OMNICEF   300 mg, Oral, Every 12 Hours Scheduled, X 7 doses      docusate sodium 100 MG capsule   200 mg, Oral, Daily PRN      ondansetron 4 MG tablet  Commonly known as: ZOFRAN   4 mg, Oral, Every 6 Hours PRN         Changes to Medications      Instructions Start Date   enalapril 20 MG tablet  Commonly known as: VASOTEC  What changed: how much to  take   40 mg, Oral, Daily      primidone 250 MG tablet  Commonly known as: MYSOLINE  What changed: Another medication with the same name was changed. Make sure you understand how and when to take each.   125 mg, Oral, Nightly      primidone 50 MG tablet  Commonly known as: MYSOLINE  What changed: when to take this   50 mg, Oral, Every Morning         Continue These Medications      Instructions Start Date   alendronate 70 MG tablet  Commonly known as: FOSAMAX   TAKE 1 TABLET BY MOUTH  EVERY 7 DAYS      amLODIPine 10 MG tablet  Commonly known as: NORVASC   10 mg, Oral, Daily      atorvastatin 40 MG tablet  Commonly known as: LIPITOR   40 mg, Oral, Daily      DULoxetine 30 MG capsule  Commonly known as: CYMBALTA   30 mg, Oral, Daily      famotidine 20 MG tablet  Commonly known as: PEPCID   20 mg, Oral, 2 Times Daily Before Meals      levothyroxine 125 MCG tablet  Commonly known as: SYNTHROID, LEVOTHROID   125 mcg, Oral, Daily         Stop These Medications    acetaminophen 650 MG 8 hr tablet  Commonly known as: TYLENOL  Replaced by: acetaminophen 325 MG tablet     atenolol 50 MG tablet  Commonly known as: TENORMIN     terazosin 1 MG capsule  Commonly known as: HYTRIN          Allergies   Allergen Reactions   • Dilaudid [Hydromorphone Hcl] Hallucinations     TABS   • Beta Adrenergic Blockers Other (See Comments)     Hypotension / bradycadia   • Lactose Intolerance (Gi) Diarrhea     Discharge Disposition:  Skilled Nursing Facility (DC - External)    Diet:  Diet Order   Procedures   • Diet Regular; Low Sodium; 2,000 mg Na     Activity:  Activity Instructions     Activity as Tolerated      Up WIth Assist          CODE STATUS:    Code Status and Medical Interventions:   Ordered at: 12/28/20 1502     Level Of Support Discussed With:    Patient     Code Status:    No CPR     Medical Interventions (Level of Support Prior to Arrest):    Full     Future Appointments   Date Time Provider Department Center   1/11/2021  2:15 PM  Aleah Regan MD [facility]   1/25/2021 10:15 AM Angy Gallagher MD [facility]   6/23/2021 10:45 AM Aleah Regan MD [facility]     Lauren Stone PA-C  01/05/21    Time Spent on Discharge:  I spent 35 minutes on this discharge activity which included: face-to-face encounter with the patient, reviewing the data in the system, coordination of the care with the nursing staff as well as consultants, documentation, and entering orders.

## 2021-01-05 NOTE — THERAPY TREATMENT NOTE
"Patient Name: Zohra Hall  : 1940    MRN: [id number]                              Today's Date: 2021       Admit Date: 2020    Visit Dx:     ICD-10-CM ICD-9-CM   1. Hypertensive encephalopathy  I67.4 437.2   2. Hypertensive crisis  I16.9 401.9   3. Uncontrolled hypertension  I10 401.9     Patient Active Problem List   Diagnosis   • Generalized osteoarthritis   • Iron deficiency   • Vitamin D deficiency   • Skin neoplasm   • Sialadenitis   • Restless leg syndrome   • Reaction to chronic stress   • Piriformis syndrome   • Papillary adenocarcinoma of thyroid (CMS/HCC)   • Hypothyroid   • Hypertension   • GERD without esophagitis   • Dyslipidemia   • Cervical lymphadenopathy   • Cellulitis   • Atherosclerosis of aorta (CMS/HCC)   • Breast mass   • Aortic valve stenosis   • Incontinence of bowel   • Chronic diastolic heart failure (CMS/HCC)   • Acute right-sided low back pain without sciatica   • Benign essential tremor   • Pain of right hip joint   • Thyroid cancer (CMS/HCC)   • Lacunar stroke (CMS/HCC)   • Nontraumatic rupture of extensor tendons of right hand and wrist   • Hyponatremia   • Actinic keratosis   • Transient ischemic attack   • Ataxia   • PMR (polymyalgia rheumatica) (CMS/HCC)   • Osteoporosis   • Hypertensive encephalopathy   • Post-surgical hypothyroidism   • Uncontrolled hypertension     Past Medical History:   Diagnosis Date   • Breast cancer (CMS/HCC) 1987    left- pt states \"in situ\", no radiation, Tamoxifen for 5 years   • Cellulitis    • Cervical lymphadenopathy    • Chest pain    • Convulsions (CMS/HCC)    • GERD (gastroesophageal reflux disease)    • Glossitis    • Grief reaction     · Last Impression: 2015  counselled, given ativan for prn use.  Aleah Regan   • Hypertension    • Lower back pain    • Oral thrush    • Papillary adenocarcinoma (CMS/HCC)     Papillary adenocarcinoma of thyroid   • Papillary adenocarcinoma of thyroid (CMS/HCC)     · resection " Ramirez- 1/13,  2 x 2 x 1.5 cm  T3 N1 MXpost op low calcium and diminished  voiceablation Ain- 3/7 metastatic nodes and invasion to strap muscles · Last Impression: 29 Oct 2014  s/p Eval by berry Méndez.  Aleah Regan (Internal   • Piriformis syndrome    • Tingling    • Xerostomia      Past Surgical History:   Procedure Laterality Date   • BREAST BIOPSY Right 10/10/2013    stereo bx   • BREAST BIOPSY Left 10/19/2015    stereo bx   • BREAST CYST EXCISION      right   • BREAST EXCISIONAL BIOPSY Right     affirm bx and loc 2016.   • BREAST LUMPECTOMY Left 1987   • HYSTERECTOMY  1979   • OTHER SURGICAL HISTORY Right     right arm surgery-steel roger in place   • TOTAL HIP ARTHROPLASTY     • TOTAL THYROIDECTOMY      Thyroid Surgery Total Thyroidectomy     General Information     [facility] Name 01/05/21 1027          Physical Therapy Time and Intention    Document Type  therapy note (daily note)  -     Mode of Treatment  physical therapy  -     Row Name 01/05/21 1027          General Information    Patient Profile Reviewed  yes  -     Existing Precautions/Restrictions  fall  -     Row Name 01/05/21 1027          Cognition    Orientation Status (Cognition)  oriented to;person;situation  -     Row Name 01/05/21 1027          Safety Issues, Functional Mobility    Safety Issues Affecting Function (Mobility)  ability to follow commands;awareness of need for assistance;impulsivity;at risk behavior observed;insight into deficits/self-awareness;judgment;sequencing abilities;safety precautions follow-through/compliance;problem-solving;safety precaution awareness  -     Impairments Affecting Function (Mobility)  balance;cognition;endurance/activity tolerance;strength  -     Cognitive Impairments, Mobility Safety/Performance  attention;awareness, need for assistance;insight into deficits/self-awareness;judgment;problem-solving/reasoning;safety precaution awareness;safety precaution follow-through;sequencing abilities   -       User Key  (r) = Recorded By, (t) = Taken By, (c) = Cosigned By    Initials Name Provider Type     Gama Fernandez, PT Physical Therapist        Mobility     Row Name 01/05/21 1029          Bed Mobility    Comment (Bed Mobility)  Pt received [facility]  -     Row Name 01/05/21 1029          Transfers    Comment (Transfers)  verbal cues for hand placement and correct sequencing, assisted to [facility] and shows good balance sitting  -     Row Name 01/05/21 1029          Sit-Stand Transfer    Sit-Stand [place] (Transfers)  contact guard;1 person assist;verbal cues  -     Assistive Device (Sit-Stand Transfers)  walker, front-wheeled  -     Row Name 01/05/21 1029          Gait/Stairs (Locomotion)    [place] Level (Gait)  verbal cues;contact guard;1 person assist  -     Assistive Device (Gait)  walker, front-wheeled  -     Distance in Feet (Gait)  50 ft  -     Deviations/Abnormal Patterns (Gait)  bilateral deviations;radha decreased;stride length decreased  -     Bilateral Gait Deviations  forward flexed posture  -     Comment (Gait/Stairs)  Pt ambulated in room with small steps and decreased speed limited by fatigue. Requires RW for stability.  -       User Key  (r) = Recorded By, (t) = Taken By, (c) = Cosigned By    Initials Name Provider Type     Gama Fernandez PT Physical Therapist        Obj/Interventions     Row Name 01/05/21 1031          Motor Skills    Therapeutic Exercise  ankle;knee;hip  -     Row Name 01/05/21 1031          Hip (Therapeutic Exercise)    Hip (Therapeutic Exercise)  AROM (active range of motion)  -     Hip AROM (Therapeutic Exercise)  bilateral;aBduction;10 repetitions  -     Row Name 01/05/21 1031          Knee (Therapeutic Exercise)    Knee (Therapeutic Exercise)  strengthening exercise  -     Knee Strengthening (Therapeutic Exercise)  SLR (straight leg raise);LAQ (long arc quad);heel slides;10 repetitions  -     Row Name 01/05/21 1031          Ankle  (Therapeutic Exercise)    Ankle (Therapeutic Exercise)  AROM (active range of motion)  -     Ankle AROM (Therapeutic Exercise)  bilateral;dorsiflexion;plantarflexion;10 repetitions;2 sets  -[facility] Name 01/05/21 1031          Balance    Balance Assessment  sitting static balance;sitting dynamic balance;standing static balance;standing dynamic balance  -     Static Sitting Balance  WFL;sitting in chair  -     Dynamic Sitting Balance  WFL;sitting in chair  -     Static Standing Balance  mild impairment;supported;standing  -     Dynamic Standing Balance  mild impairment;supported;standing  -     Balance Interventions  sitting;standing;sit to stand;supported;static;dynamic  -     Comment, Balance  improved midline orientation in sitting and standing compared to last session  -       User Key  (r) = Recorded By, (t) = Taken By, (c) = Cosigned By    Initials Name Provider Type     Gama Fernandez, PT Physical Therapist        Goals/Plan    No documentation.       Clinical Impression     [facility] Name 01/05/21 1032          Pain    Additional Documentation  Pain Scale: Numbers Pre/Post-Treatment (Group)  -[facility] Name 01/05/21 1032          Pain Scale: Numbers Pre/Post-Treatment    Pretreatment Pain Rating  0/10 - no pain  -     Posttreatment Pain Rating  0/10 - no pain  -     Pre/Posttreatment Pain Comment  tolerated  -     Pain Intervention(s)  Repositioned;Ambulation/increased activity  -[facility] Name 01/05/21 1032          Plan of Care Review    Plan of Care Reviewed With  patient  -     Progress  improving  -     Outcome Summary  Pt requires CGA for sit <> stand, transfer to [facility], and for gait. Ambulated 50 feet with use of RW and limited by fatigue. Improved BLE ther ex participation with ability to perform without therapist assist.  -[facility] Name 01/05/21 1032          Therapy Assessment/Plan (PT)    Rehab Potential (PT)  good, to achieve stated therapy goals  -     Criteria for  Skilled Interventions Met (PT)  yes;skilled treatment is necessary;meets criteria  -     Row Name 01/05/21 1032          Vital Signs    O2 Delivery Pre Treatment  room air  -     O2 Delivery Intra Treatment  room air  -     O2 Delivery Post Treatment  room air  -     Pre Patient Position  Sitting  -     Intra Patient Position  Standing  -     Post Patient Position  Sitting  -     Row Name 01/05/21 1032          Positioning and Restraints    Pre-Treatment Position  sitting in chair/recliner  -     Post Treatment Position  chair  -     In Chair  reclined;call light within reach;encouraged to call for assist;exit alarm on;legs elevated  -       User Key  (r) = Recorded By, (t) = Taken By, (c) = Cosigned By    Initials Name Provider Type    Gama Felder, PRAVEEN Physical Therapist        Outcome Measures     Row Name 01/05/21 1034          How much help from another person do you currently need...    Turning from your back to your side while in flat bed without using bedrails?  3  -JH     Moving from lying on back to sitting on the side of a flat bed without bedrails?  3  -JH     Moving to and from a bed to a chair (including a wheelchair)?  3  -JH     Standing up from a chair using your arms (e.g., wheelchair, bedside chair)?  3  -JH     Climbing 3-5 steps with a railing?  2  -     To walk in hospital room?  3  -     AM-PAC 6 Clicks Score (PT)  17  -     Row Name 01/05/21 1034          Functional Assessment    Outcome Measure Options  AM-PAC 6 Clicks Basic Mobility (PT)  -       User Key  (r) = Recorded By, (t) = Taken By, (c) = Cosigned By    Initials Name Provider Type    Gama Felder, PT Physical Therapist        Physical Therapy Education                 Title: PT OT SLP Therapies (In Progress)     Topic: Physical Therapy (Done)     Point: Mobility training (Done)     Learning Progress Summary           Patient AcceptanceALAN VU by  at 1/5/2021 1035    Comment: edu on safety  during mobility    Acceptance, E, VU,NR by  at 1/1/2021 1355    Comment: edu on safety during mobility and HEP    Acceptance, E,TB, VU by  at 12/31/2020 1519    Acceptance, E,D, NR by AA at 12/30/2020 0943                   Point: Home exercise program (Done)     Learning Progress Summary           Patient Acceptance, E, VU by  at 1/5/2021 1035    Comment: edu on safety during mobility    Acceptance, E, VU,NR by  at 1/1/2021 1355    Comment: edu on safety during mobility and HEP    Acceptance, E,TB, VU by  at 12/31/2020 1519                   Point: Body mechanics (Done)     Learning Progress Summary           Patient Acceptance, E, VU by  at 1/5/2021 1035    Comment: edu on safety during mobility    Acceptance, E, VU,NR by  at 1/1/2021 1355    Comment: edu on safety during mobility and HEP    Acceptance, E,TB, VU by  at 12/31/2020 1519    Acceptance, E,D, NR by AA at 12/30/2020 0943                   Point: Precautions (Done)     Learning Progress Summary           Patient Acceptance, E, VU by  at 1/5/2021 1035    Comment: edu on safety during mobility    Acceptance, E, VU,NR by  at 1/1/2021 1355    Comment: edu on safety during mobility and HEP    Acceptance, E,TB, VU by  at 12/31/2020 1519    Acceptance, E,D, NR by AA at 12/30/2020 0943                               User Key     Initials Effective Dates Name Provider Type Discipline     04/02/18 -  Diana Myrick PT Physical Therapist PT     08/28/19 -  Raiza Pedraza Physical Therapist PT     09/22/20 -  Gama Fernandez PT Physical Therapist PT              PT Recommendation and Plan     Plan of Care Reviewed With: patient  Progress: improving  Outcome Summary: Pt requires CGA for sit <> stand, transfer to [facility], and for gait. Ambulated 50 feet with use of RW and limited by fatigue. Improved BLE ther ex participation with ability to perform without therapist assist.     Time Calculation:   PT Charges     Row Name 01/05/21 1032              Time Calculation    Start Time  0842  -      PT Received On  01/05/21  -      PT Goal Re-Cert Due Date  01/09/21  -         Time Calculation- PT    Total Timed Code Minutes- PT  25 minute(s)  -         Timed Charges    77572 - PT Therapeutic Exercise Minutes  10  -      82690 - PT Therapeutic Activity Minutes  15  -        User Key  (r) = Recorded By, (t) = Taken By, (c) = Cosigned By    Initials Name Provider Type     Gama Fernandez, PT Physical Therapist        Therapy Charges for Today     Code Description Service Date Service Provider Modifiers Qty    94923937632 HC PT THER PROC EA 15 MIN 1/5/2021 Gama Fernandez, PT GP 1    80192132204 HC PT THERAPEUTIC ACT EA 15 MIN 1/5/2021 Gama Fernandez, PT GP 1          PT G-Codes  Outcome Measure Options: AM-PAC 6 Clicks Basic Mobility (PT)  AM-PAC 6 Clicks Score (PT): 17  AM-PAC 6 Clicks Score (OT): 16    Gama Fernandez PT  1/5/2021

## 2021-01-11 ENCOUNTER — LAB (OUTPATIENT)
Dept: LAB | Facility: HOSPITAL | Age: 81
End: 2021-01-11

## 2021-01-11 ENCOUNTER — OFFICE VISIT (OUTPATIENT)
Dept: INTERNAL MEDICINE | Facility: CLINIC | Age: 81
End: 2021-01-11

## 2021-01-11 VITALS
HEART RATE: 88 BPM | WEIGHT: 135.4 LBS | SYSTOLIC BLOOD PRESSURE: 120 MMHG | TEMPERATURE: 97.8 F | OXYGEN SATURATION: 99 % | DIASTOLIC BLOOD PRESSURE: 72 MMHG | BODY MASS INDEX: 22.56 KG/M2 | HEIGHT: 65 IN

## 2021-01-11 DIAGNOSIS — E87.1 HYPONATREMIA: ICD-10-CM

## 2021-01-11 DIAGNOSIS — I10 ESSENTIAL HYPERTENSION: Primary | ICD-10-CM

## 2021-01-11 DIAGNOSIS — I10 ESSENTIAL HYPERTENSION: ICD-10-CM

## 2021-01-11 PROCEDURE — 81003 URINALYSIS AUTO W/O SCOPE: CPT | Performed by: INTERNAL MEDICINE

## 2021-01-11 PROCEDURE — 85025 COMPLETE CBC W/AUTO DIFF WBC: CPT | Performed by: INTERNAL MEDICINE

## 2021-01-11 PROCEDURE — 84439 ASSAY OF FREE THYROXINE: CPT | Performed by: INTERNAL MEDICINE

## 2021-01-11 PROCEDURE — 99214 OFFICE O/P EST MOD 30 MIN: CPT | Performed by: INTERNAL MEDICINE

## 2021-01-11 PROCEDURE — 80053 COMPREHEN METABOLIC PANEL: CPT | Performed by: INTERNAL MEDICINE

## 2021-01-11 PROCEDURE — 84443 ASSAY THYROID STIM HORMONE: CPT | Performed by: INTERNAL MEDICINE

## 2021-01-11 NOTE — PROGRESS NOTES
"Memory Loss (states has been getting better every day), Hypertension, and Fatigue (has been getting better)    Subjective   Zohra Hall is a 80 y.o. female is here today for follow-up.    History of Present Illness   Here for [facility], accompanied by a staff member.  She was admitted for short term rehab. Her son is not at home with her.  Grandson is now her trusty in her wheel.  Tremor is stable.  Thinking about moving into an apartment  Zohra Hall is an 80 y.o. female with PMH significant for HTN, HLD, chronic diastolic CHF, aortic valve stenosis, polymyalgia rheumatica, post-surgical hypothyroidism, prior lacunar strokes, benign essential tremor, RLS and GERD. She was admitted to [facility] 10/30-11/5/20 for accelerated hypertension with associated mental status change and gait ataxia. She was evaluated by neurology. Her TSH was 18. She received 4 doses of Cytomel and her synthroid was increased from 112 mcg to 125 mcg. She discharged to rehab. It is not clear when she was discharged to home from rehab.       Patient had reportedly been confused for \"a while.\" She also developed headache, blurred vision, left-sided weakness and tremors of her jaw. She had run out of her medications and had not been taking her BP or thyroid medications. She tried to call her PCP but the office was closed. She talked to a nurse who recommended she go to urgent care. She presented to urgent care on 12/28/20. Her BP was 205/105. She was instructed to go to an ER and she presented to [facility] ED. She was admitted to the hospital medicine service. Later that evening, she appeared to have had a vagal response, followed by extreme hypotension and bradycardia. She was given a dose of atropine, which improved hemodynamics. She was transferred to the ICU for epinephrine gtt. She returned to telemetry floor on 12/30.       Hypertensive encephalopathy  Chronic " diastolic CHF  - MRI brain without acute ischemia or hemorrhage  - Home meds (amlodipine, atenolol, enalapril, terazosin) restarted on admission. Patient then had an episode of vasovagal syncope with severe hypotension and bradycardia. Home meds held and have slowly been re-introduced  - Avoid beta blockers (atenolol) due to bradycardia / vagal episode  - Continue Amlodipine 10mg  - Stopped chlorthalidone on 1/3 due to hyponatremia and rising BUN/creatinine. Enalapril increased 40mg daily. Good BP control   - PT/OT following   - Attend scheduled neurology follow up on 1/25/21. Her POA (Joey arizmendi) is aware of this      Post-surgical hypothyroidism  History of papillary thyroid adenocarcinoma s/p thyroidectomy   - TSH 34 on admission, free T4 0.74   - Likely in the setting of medication non-compliance  - Continue Levothyroxine 125 mcg - will need repeat TSH / free T4 in 4-6 weeks     Hyponatremia  - Likely secondary to medications. Improved      Renal dysfunction / probable CKD   - Baseline creatinine appears to be 0.8-1.0   - Creatinine 0.97 today today, monitor      Urinary frequency  UTI   - 12/28 UA WNL   - 1/4 UA with 4+ bacteria. No IV access. StartedOmnicef 300mg BID  - Culture results still pending. Will notify facility if antibioticis inappropriate based on culture / susceptibility are no     Anxiety/depression, continue Cymbalta   GERD, Pepcid      Discharge Follow Up Recommendations for outpatient labs/diagnostics:  - PT/OT recommend rehab. [facility] has accepted. She will transfer in stable condition on 1/5/20  - Please repeat BMP in 1 week to follow up on creatinine / sodium with recent medication changes  - Needs repeat TSH in 4-6 weeks  - Follow up with PCP 1 week after DC from rehab     Current Outpatient Medications:   •  acetaminophen (TYLENOL) 325 MG tablet, Take 2 tablets by mouth Every 4 (Four) Hours As Needed for Mild Pain ., Disp:  , Rfl:   •  alendronate (FOSAMAX) 70 MG tablet, TAKE 1  TABLET BY MOUTH  EVERY 7 DAYS, Disp: 12 tablet, Rfl: 3  •  aluminum-magnesium hydroxide-simethicone (MAALOX MAX) 400-400-40 MG/5ML suspension, Take 15 mL by mouth Every 6 (Six) Hours As Needed for Indigestion or Heartburn., Disp:  , Rfl:   •  amLODIPine (NORVASC) 10 MG tablet, Take 1 tablet by mouth Daily., Disp:  , Rfl:   •  atorvastatin (LIPITOR) 40 MG tablet, TAKE 1 TABLET BY MOUTH  DAILY, Disp: 90 tablet, Rfl: 3  •  cefdinir (OMNICEF) 300 MG capsule, Take 1 capsule by mouth Every 12 (Twelve) Hours. X 7 doses  Indications: Urinary Tract Infection, Disp: 7 capsule, Rfl: 0  •  docusate sodium 100 MG capsule, Take 2 capsules by mouth Daily As Needed for Constipation., Disp:  , Rfl:   •  DULoxetine (CYMBALTA) 30 MG capsule, Take 1 capsule by mouth Daily., Disp:  , Rfl:   •  enalapril (VASOTEC) 20 MG tablet, Take 2 tablets by mouth Daily., Disp: 90 tablet, Rfl: 1  •  famotidine (PEPCID) 20 MG tablet, Take 1 tablet by mouth 2 (Two) Times a Day Before Meals., Disp:  , Rfl:   •  levothyroxine (SYNTHROID, LEVOTHROID) 125 MCG tablet, Take 1 tablet by mouth Daily., Disp: , Rfl:   •  ondansetron (ZOFRAN) 4 MG tablet, Take 1 tablet by mouth Every 6 (Six) Hours As Needed for Nausea or Vomiting., Disp:  , Rfl:   •  primidone (MYSOLINE) 250 MG tablet, Take 0.5 tablets by mouth Every Night., Disp:  , Rfl:   •  primidone (MYSOLINE) 50 MG tablet, Take 1 tablet by mouth Every Morning., Disp: , Rfl:       The following portions of the patient's history were reviewed and updated as appropriate: allergies, current medications, past family history, past medical history, past social history, past surgical history and problem list.    Review of Systems   Constitutional: Negative for chills and fatigue.   HENT: Negative for congestion, ear pain and sore throat.    Eyes: Negative for pain, redness and visual disturbance.   Respiratory: Negative for cough and shortness of breath.    Cardiovascular: Negative for chest pain, palpitations and  "leg swelling.   Gastrointestinal: Negative for abdominal pain, diarrhea and nausea.   Endocrine: Negative for cold intolerance and heat intolerance.   Genitourinary: Negative for flank pain and urgency.   Musculoskeletal: Positive for gait problem and myalgias. Negative for arthralgias.   Skin: Negative for pallor and rash.   Neurological: Positive for tremors and weakness. Negative for dizziness and headaches.   Psychiatric/Behavioral: Negative for dysphoric mood and sleep disturbance. The patient is not nervous/anxious.        Objective   /72   Pulse 88   Temp 97.8 °F (36.6 °C)   Ht 165.1 cm (65\")   Wt 61.4 kg (135 lb 6.4 oz)   SpO2 99% Comment: ra  BMI 22.53 kg/m²   Physical Exam  Vitals signs and nursing note reviewed.   Constitutional:       Appearance: She is well-developed. She is ill-appearing.   HENT:      Head: Normocephalic and atraumatic.      Right Ear: Tympanic membrane and external ear normal.      Left Ear: Tympanic membrane and external ear normal.      Mouth/Throat:      Pharynx: No oropharyngeal exudate.   Eyes:      Conjunctiva/sclera: Conjunctivae normal.      Pupils: Pupils are equal, round, and reactive to light.   Neck:      Musculoskeletal: Neck supple.      Thyroid: No thyromegaly.   Cardiovascular:      Rate and Rhythm: Normal rate and regular rhythm.   Pulmonary:      Effort: Pulmonary effort is normal.      Breath sounds: Normal breath sounds. No rhonchi or rales.   Abdominal:      General: Bowel sounds are normal. There is no distension.      Palpations: Abdomen is soft.      Tenderness: There is no abdominal tenderness.   Musculoskeletal:         General: Tenderness present.      Right lower leg: No edema.      Left lower leg: No edema.   Skin:     General: Skin is warm and dry.   Neurological:      Mental Status: She is alert and oriented to person, place, and time.      Cranial Nerves: No cranial nerve deficit.      Motor: Weakness present.      Gait: Gait abnormal.      " Comments: tremors   Psychiatric:         Judgment: Judgment normal.           Results for orders placed or performed during the hospital encounter of 12/28/20   Urine Culture - Urine, Urine, Clean Catch    Specimen: Urine, Clean Catch   Result Value Ref Range    Urine Culture <10,000 CFU/mL Mixed Margarita Isolated    COVID-19 PCR, [facility], NP SWAB IN [facility] VIRAL TRANSPORT MEDIA 24-30 HR TAT - Swab, Nasopharynx    Specimen: Nasopharynx; Swab   Result Value Ref Range    SARS-CoV-2 ROMULO Not Detected Not Detected   Urine Culture - Urine, Urine, Clean Catch    Specimen: Urine, Clean Catch   Result Value Ref Range    Urine Culture >100,000 CFU/mL Mixed Margarita Isolated    COVID-19 and FLU A/B PCR - Swab, Nasopharynx    Specimen: Nasopharynx; Swab   Result Value Ref Range    COVID19 Not Detected Not Detected - Ref. Range    Influenza A PCR Not Detected Not Detected    Influenza B PCR Not Detected Not Detected   Comprehensive Metabolic Panel    Specimen: Blood   Result Value Ref Range    Glucose 90 65 - 99 mg/dL    BUN 9 8 - 23 mg/dL    Creatinine 0.92 0.57 - 1.00 mg/dL    Sodium 137 136 - 145 mmol/L    Potassium 3.8 3.5 - 5.2 mmol/L    Chloride 95 (L) 98 - 107 mmol/L    CO2 28.0 22.0 - 29.0 mmol/L    Calcium 9.8 8.6 - 10.5 mg/dL    Total Protein 7.5 6.0 - 8.5 g/dL    Albumin 4.40 3.50 - 5.20 g/dL    ALT (SGPT) 7 1 - 33 U/L    AST (SGOT) 18 1 - 32 U/L    Alkaline Phosphatase 97 39 - 117 U/L    Total Bilirubin 0.3 0.0 - 1.2 mg/dL    eGFR Non African Amer 58 (L) >60 mL/min/1.73    Globulin 3.1 gm/dL    A/G Ratio 1.4 g/dL    BUN/Creatinine Ratio 9.8 7.0 - 25.0    Anion Gap 14.0 5.0 - 15.0 mmol/L   Troponin    Specimen: Blood   Result Value Ref Range    Troponin T <0.010 0.000 - 0.030 ng/mL   Magnesium    Specimen: Blood   Result Value Ref Range    Magnesium 1.8 1.6 - 2.4 mg/dL   CBC Auto Differential    Specimen: Blood   Result Value Ref Range    WBC 4.76 3.40 - 10.80 10*3/mm3    RBC 4.65 3.77 - 5.28 10*6/mm3    Hemoglobin 13.7  12.0 - 15.9 g/dL    Hematocrit 42.8 34.0 - 46.6 %    MCV 92.0 79.0 - 97.0 fL    MCH 29.5 26.6 - 33.0 pg    MCHC 32.0 31.5 - 35.7 g/dL    RDW 13.0 12.3 - 15.4 %    RDW-SD 44.0 37.0 - 54.0 fl    MPV 9.7 6.0 - 12.0 fL    Platelets 280 140 - 450 10*3/mm3    Neutrophil % 69.5 42.7 - 76.0 %    Lymphocyte % 18.7 (L) 19.6 - 45.3 %    Monocyte % 8.8 5.0 - 12.0 %    Eosinophil % 1.5 0.3 - 6.2 %    Basophil % 1.1 0.0 - 1.5 %    Immature Grans % 0.4 0.0 - 0.5 %    Neutrophils, Absolute 3.31 1.70 - 7.00 10*3/mm3    Lymphocytes, Absolute 0.89 0.70 - 3.10 10*3/mm3    Monocytes, Absolute 0.42 0.10 - 0.90 10*3/mm3    Eosinophils, Absolute 0.07 0.00 - 0.40 10*3/mm3    Basophils, Absolute 0.05 0.00 - 0.20 10*3/mm3    Immature Grans, Absolute 0.02 0.00 - 0.05 10*3/mm3    nRBC 0.0 0.0 - 0.2 /100 WBC   TSH    Specimen: Blood   Result Value Ref Range    TSH 34.000 (H) 0.270 - 4.200 uIU/mL   T4, Free    Specimen: Blood   Result Value Ref Range    Free T4 0.74 (L) 0.93 - 1.70 ng/dL   Urinalysis With Culture If Indicated - Urine, Clean Catch    Specimen: Urine, Clean Catch   Result Value Ref Range    Color, UA Yellow Yellow, Straw    Appearance, UA Clear Clear    pH, UA 7.5 5.0 - 8.0    Specific Gravity, UA 1.009 1.001 - 1.030    Glucose, UA Negative Negative    Ketones, UA Trace (A) Negative    Bilirubin, UA Negative Negative    Blood, UA Negative Negative    Protein, UA 30 mg/dL (1+) (A) Negative    Leuk Esterase, UA Negative Negative    Nitrite, UA Negative Negative    Urobilinogen, UA 0.2 E.U./dL 0.2 - 1.0 E.U./dL   Comprehensive Metabolic Panel    Specimen: Blood   Result Value Ref Range    Glucose 171 (H) 65 - 99 mg/dL    BUN 10 8 - 23 mg/dL    Creatinine 0.87 0.57 - 1.00 mg/dL    Sodium 135 (L) 136 - 145 mmol/L    Potassium 3.4 (L) 3.5 - 5.2 mmol/L    Chloride 98 98 - 107 mmol/L    CO2 22.0 22.0 - 29.0 mmol/L    Calcium 9.4 8.6 - 10.5 mg/dL    Total Protein 6.9 6.0 - 8.5 g/dL    Albumin 3.80 3.50 - 5.20 g/dL    ALT (SGPT) 7 1 - 33 U/L     AST (SGOT) 15 1 - 32 U/L    Alkaline Phosphatase 91 39 - 117 U/L    Total Bilirubin 0.4 0.0 - 1.2 mg/dL    eGFR Non African Amer 62 >60 mL/min/1.73    Globulin 3.1 gm/dL    A/G Ratio 1.2 g/dL    BUN/Creatinine Ratio 11.5 7.0 - 25.0    Anion Gap 15.0 5.0 - 15.0 mmol/L   BNP    Specimen: Blood   Result Value Ref Range    proBNP 1,234.0 0.0-1,800.0 pg/mL   Troponin    Specimen: Blood   Result Value Ref Range    Troponin T <0.010 0.000 - 0.030 ng/mL   Lactic Acid, Plasma    Specimen: Blood   Result Value Ref Range    Lactate 1.4 0.5 - 2.0 mmol/L   Ammonia    Specimen: Blood   Result Value Ref Range    Ammonia 13 11 - 51 umol/L   CBC Auto Differential    Specimen: Blood   Result Value Ref Range    WBC 7.03 3.40 - 10.80 10*3/mm3    RBC 4.25 3.77 - 5.28 10*6/mm3    Hemoglobin 12.3 12.0 - 15.9 g/dL    Hematocrit 39.7 34.0 - 46.6 %    MCV 93.4 79.0 - 97.0 fL    MCH 28.9 26.6 - 33.0 pg    MCHC 31.0 (L) 31.5 - 35.7 g/dL    RDW 13.1 12.3 - 15.4 %    RDW-SD 44.5 37.0 - 54.0 fl    MPV 9.9 6.0 - 12.0 fL    Platelets 259 140 - 450 10*3/mm3    Neutrophil % 77.3 (H) 42.7 - 76.0 %    Lymphocyte % 11.8 (L) 19.6 - 45.3 %    Monocyte % 8.0 5.0 - 12.0 %    Eosinophil % 1.6 0.3 - 6.2 %    Basophil % 0.7 0.0 - 1.5 %    Immature Grans % 0.6 (H) 0.0 - 0.5 %    Neutrophils, Absolute 5.44 1.70 - 7.00 10*3/mm3    Lymphocytes, Absolute 0.83 0.70 - 3.10 10*3/mm3    Monocytes, Absolute 0.56 0.10 - 0.90 10*3/mm3    Eosinophils, Absolute 0.11 0.00 - 0.40 10*3/mm3    Basophils, Absolute 0.05 0.00 - 0.20 10*3/mm3    Immature Grans, Absolute 0.04 0.00 - 0.05 10*3/mm3    nRBC 0.0 0.0 - 0.2 /100 WBC   Blood Gas, Arterial With Co-Ox    Specimen: Arterial Blood   Result Value Ref Range    Site Right Brachial     Melchor's Test N/A     pH, Arterial 7.459 (H) 7.350 - 7.450 pH units    pCO2, Arterial 36.6 35.0 - 45.0 mm Hg    pO2, Arterial 70.9 (L) 83.0 - 108.0 mm Hg    HCO3, Arterial 25.9 20.0 - 26.0 mmol/L    Base Excess, Arterial 2.2 (H) 0.0 - 2.0 mmol/L     Hemoglobin, Blood Gas 14.2 14 - 18 g/dL    Hematocrit, Blood Gas 43.4 %    Oxyhemoglobin 94.2 94 - 99 %    Methemoglobin 0.30 0.00 - 1.50 %    Carboxyhemoglobin 1.0 0 - 2 %    CO2 Content 27.1 22 - 33 mmol/L    Temperature 37.0 C    Barometric Pressure for Blood Gas      Modality Room Air     FIO2 21 %    Rate 0 Breaths/minute    PIP 0 cmH2O    IPAP 0     EPAP 0     Note      pH, Temp Corrected 7.459 pH Units    pCO2, Temperature Corrected 36.6 35 - 45 mm Hg    pO2, Temperature Corrected 70.9 (L) 83 - 108 mm Hg   Comprehensive Metabolic Panel    Specimen: Blood   Result Value Ref Range    Glucose 119 (H) 65 - 99 mg/dL    BUN 11 8 - 23 mg/dL    Creatinine 0.94 0.57 - 1.00 mg/dL    Sodium 137 136 - 145 mmol/L    Potassium 3.6 3.5 - 5.2 mmol/L    Chloride 100 98 - 107 mmol/L    CO2 27.0 22.0 - 29.0 mmol/L    Calcium 8.8 8.6 - 10.5 mg/dL    Total Protein 6.3 6.0 - 8.5 g/dL    Albumin 3.50 3.50 - 5.20 g/dL    ALT (SGPT) 6 1 - 33 U/L    AST (SGOT) 13 1 - 32 U/L    Alkaline Phosphatase 80 39 - 117 U/L    Total Bilirubin 0.2 0.0 - 1.2 mg/dL    eGFR Non African Amer 57 (L) >60 mL/min/1.73    Globulin 2.8 gm/dL    A/G Ratio 1.3 g/dL    BUN/Creatinine Ratio 11.7 7.0 - 25.0    Anion Gap 10.0 5.0 - 15.0 mmol/L   Magnesium    Specimen: Blood   Result Value Ref Range    Magnesium 1.9 1.6 - 2.4 mg/dL   Manual Differential    Specimen: Blood   Result Value Ref Range    Neutrophil % 63.0 42.7 - 76.0 %    Lymphocyte % 23.0 19.6 - 45.3 %    Monocyte % 11.0 5.0 - 12.0 %    Eosinophil % 0.0 (L) 0.3 - 6.2 %    Basophil % 0.0 0.0 - 1.5 %    Atypical Lymphocyte % 3.0 0.0 - 5.0 %    Neutrophils Absolute 3.35 1.70 - 7.00 10*3/mm3    Lymphocytes Absolute 1.22 0.70 - 3.10 10*3/mm3    Monocytes Absolute 0.58 0.10 - 0.90 10*3/mm3    Eosinophils Absolute 0.00 0.00 - 0.40 10*3/mm3    Basophils Absolute 0.00 0.00 - 0.20 10*3/mm3    RBC Morphology Normal Normal    WBC Morphology Normal Normal    Platelet Morphology Normal Normal   CBC Auto  Differential    Specimen: Blood   Result Value Ref Range    WBC 5.31 3.40 - 10.80 10*3/mm3    RBC 4.26 3.77 - 5.28 10*6/mm3    Hemoglobin 12.3 12.0 - 15.9 g/dL    Hematocrit 39.5 34.0 - 46.6 %    MCV 92.7 79.0 - 97.0 fL    MCH 28.9 26.6 - 33.0 pg    MCHC 31.1 (L) 31.5 - 35.7 g/dL    RDW 13.1 12.3 - 15.4 %    RDW-SD 44.4 37.0 - 54.0 fl    MPV 9.8 6.0 - 12.0 fL    Platelets 257 140 - 450 10*3/mm3   Potassium    Specimen: Blood   Result Value Ref Range    Potassium 4.5 3.5 - 5.2 mmol/L   Basic Metabolic Panel    Specimen: Blood   Result Value Ref Range    Glucose 127 (H) 65 - 99 mg/dL    BUN 10 8 - 23 mg/dL    Creatinine 0.72 0.57 - 1.00 mg/dL    Sodium 134 (L) 136 - 145 mmol/L    Potassium 3.6 3.5 - 5.2 mmol/L    Chloride 95 (L) 98 - 107 mmol/L    CO2 28.0 22.0 - 29.0 mmol/L    Calcium 9.1 8.6 - 10.5 mg/dL    eGFR Non African Amer 78 >60 mL/min/1.73    BUN/Creatinine Ratio 13.9 7.0 - 25.0    Anion Gap 11.0 5.0 - 15.0 mmol/L   CBC (No Diff)    Specimen: Blood   Result Value Ref Range    WBC 5.67 3.40 - 10.80 10*3/mm3    RBC 4.82 3.77 - 5.28 10*6/mm3    Hemoglobin 14.4 12.0 - 15.9 g/dL    Hematocrit 44.5 34.0 - 46.6 %    MCV 92.3 79.0 - 97.0 fL    MCH 29.9 26.6 - 33.0 pg    MCHC 32.4 31.5 - 35.7 g/dL    RDW 12.8 12.3 - 15.4 %    RDW-SD 43.5 37.0 - 54.0 fl    MPV 9.9 6.0 - 12.0 fL    Platelets 287 140 - 450 10*3/mm3   Magnesium    Specimen: Blood   Result Value Ref Range    Magnesium 2.0 1.6 - 2.4 mg/dL   Phosphorus    Specimen: Blood   Result Value Ref Range    Phosphorus 3.0 2.5 - 4.5 mg/dL   Comprehensive Metabolic Panel    Specimen: Blood   Result Value Ref Range    Glucose 132 (H) 65 - 99 mg/dL    BUN 12 8 - 23 mg/dL    Creatinine 0.76 0.57 - 1.00 mg/dL    Sodium 135 (L) 136 - 145 mmol/L    Potassium 3.7 3.5 - 5.2 mmol/L    Chloride 95 (L) 98 - 107 mmol/L    CO2 27.0 22.0 - 29.0 mmol/L    Calcium 9.5 8.6 - 10.5 mg/dL    Total Protein 7.5 6.0 - 8.5 g/dL    Albumin 4.30 3.50 - 5.20 g/dL    ALT (SGPT) 11 1 - 33 U/L     AST (SGOT) 16 1 - 32 U/L    Alkaline Phosphatase 93 39 - 117 U/L    Total Bilirubin 0.4 0.0 - 1.2 mg/dL    eGFR Non African Amer 73 >60 mL/min/1.73    Globulin 3.2 gm/dL    A/G Ratio 1.3 g/dL    BUN/Creatinine Ratio 15.8 7.0 - 25.0    Anion Gap 13.0 5.0 - 15.0 mmol/L   Basic Metabolic Panel    Specimen: Blood   Result Value Ref Range    Glucose 103 (H) 65 - 99 mg/dL    BUN 21 8 - 23 mg/dL    Creatinine 1.04 (H) 0.57 - 1.00 mg/dL    Sodium 133 (L) 136 - 145 mmol/L    Potassium 3.8 3.5 - 5.2 mmol/L    Chloride 96 (L) 98 - 107 mmol/L    CO2 25.0 22.0 - 29.0 mmol/L    Calcium 9.4 8.6 - 10.5 mg/dL    eGFR Non African Amer 51 (L) >60 mL/min/1.73    BUN/Creatinine Ratio 20.2 7.0 - 25.0    Anion Gap 12.0 5.0 - 15.0 mmol/L   Basic Metabolic Panel    Specimen: Blood   Result Value Ref Range    Glucose 95 65 - 99 mg/dL    BUN 24 (H) 8 - 23 mg/dL    Creatinine 1.05 (H) 0.57 - 1.00 mg/dL    Sodium 129 (L) 136 - 145 mmol/L    Potassium 3.7 3.5 - 5.2 mmol/L    Chloride 93 (L) 98 - 107 mmol/L    CO2 26.0 22.0 - 29.0 mmol/L    Calcium 9.1 8.6 - 10.5 mg/dL    eGFR Non African Amer 50 (L) >60 mL/min/1.73    BUN/Creatinine Ratio 22.9 7.0 - 25.0    Anion Gap 10.0 5.0 - 15.0 mmol/L   Basic Metabolic Panel    Specimen: Blood   Result Value Ref Range    Glucose 95 65 - 99 mg/dL    BUN 23 8 - 23 mg/dL    Creatinine 0.97 0.57 - 1.00 mg/dL    Sodium 131 (L) 136 - 145 mmol/L    Potassium 3.4 (L) 3.5 - 5.2 mmol/L    Chloride 94 (L) 98 - 107 mmol/L    CO2 26.0 22.0 - 29.0 mmol/L    Calcium 8.9 8.6 - 10.5 mg/dL    eGFR Non African Amer 55 (L) >60 mL/min/1.73    BUN/Creatinine Ratio 23.7 7.0 - 25.0    Anion Gap 11.0 5.0 - 15.0 mmol/L   Urinalysis With Culture If Indicated - Urine, Clean Catch    Specimen: Urine, Clean Catch   Result Value Ref Range    Color, UA Yellow Yellow, Straw    Appearance, UA Cloudy (A) Clear    pH, UA 6.0 5.0 - 8.0    Specific Gravity, UA 1.020 1.001 - 1.030    Glucose, UA Negative Negative    Ketones, UA Trace (A)  Negative    Bilirubin, UA Negative Negative    Blood, UA Negative Negative    Protein, UA Negative Negative    Leuk Esterase, UA Small (1+) (A) Negative    Nitrite, UA Negative Negative    Urobilinogen, UA 1.0 E.U./dL 0.2 - 1.0 E.U./dL   Urinalysis, Microscopic Only - Urine, Clean Catch    Specimen: Urine, Clean Catch   Result Value Ref Range    RBC, UA 7-12 (A) None Seen, 0-2 /HPF    WBC, UA 13-20 (A) None Seen, 0-2 /HPF    Bacteria, UA 4+ (A) None Seen, Trace /HPF    Squamous Epithelial Cells, UA 7-12 (A) None Seen, 0-2 /HPF    Hyaline Casts, UA None Seen 0 - 6 /LPF    Methodology Manual Light Microscopy    Potassium    Specimen: Blood   Result Value Ref Range    Potassium 4.3 3.5 - 5.2 mmol/L   POC Glucose Once    Specimen: Blood   Result Value Ref Range    Glucose 359 (H) 70 - 130 mg/dL   ECG 12 Lead   Result Value Ref Range    QT Interval 422 ms    QTC Interval 490 ms   Light Blue Top   Result Value Ref Range    Extra Tube hold for add-on    Green Top (Gel)   Result Value Ref Range    Extra Tube Hold for add-ons.    Lavender Top   Result Value Ref Range    Extra Tube hold for add-on    Gold Top - SST   Result Value Ref Range    Extra Tube Hold for add-ons.              Assessment/Plan   Diagnoses and all orders for this visit:    Essential hypertension  -     Basic Metabolic Panel; Future  -     CBC (No Diff); Future    Hyponatremia  -     Basic Metabolic Panel; Future  -     CBC (No Diff); Future    Concern for when she gets released from seeing [place] if she would be able to live at home by herself?     Adv. To have social work call me in order for help with discharge planning.  Advised plenty of senior help for meals and other benefits, will need home health at discharge as well.    Return in about 4 weeks (around 2/8/2021) for Next scheduled follow up.

## 2021-01-12 ENCOUNTER — PATIENT OUTREACH (OUTPATIENT)
Dept: CASE MANAGEMENT | Facility: OTHER | Age: 81
End: 2021-01-12

## 2021-01-12 LAB
ALBUMIN SERPL-MCNC: 4.7 G/DL (ref 3.5–5.2)
ALBUMIN/GLOB SERPL: 1.6 G/DL
ALP SERPL-CCNC: 82 U/L (ref 39–117)
ALT SERPL W P-5'-P-CCNC: 11 U/L (ref 1–33)
ANION GAP SERPL CALCULATED.3IONS-SCNC: 13.2 MMOL/L (ref 5–15)
AST SERPL-CCNC: 18 U/L (ref 1–32)
BASOPHILS # BLD AUTO: 0.09 10*3/MM3 (ref 0–0.2)
BASOPHILS NFR BLD AUTO: 1 % (ref 0–1.5)
BILIRUB SERPL-MCNC: <0.2 MG/DL (ref 0–1.2)
BILIRUB UR QL STRIP: NEGATIVE
BUN SERPL-MCNC: 22 MG/DL (ref 8–23)
BUN/CREAT SERPL: 22.4 (ref 7–25)
CALCIUM SPEC-SCNC: 9.9 MG/DL (ref 8.6–10.5)
CHLORIDE SERPL-SCNC: 97 MMOL/L (ref 98–107)
CLARITY UR: ABNORMAL
CO2 SERPL-SCNC: 25.8 MMOL/L (ref 22–29)
COLOR UR: YELLOW
CREAT SERPL-MCNC: 0.98 MG/DL (ref 0.57–1)
DEPRECATED RDW RBC AUTO: 41.5 FL (ref 37–54)
EOSINOPHIL # BLD AUTO: 0.08 10*3/MM3 (ref 0–0.4)
EOSINOPHIL NFR BLD AUTO: 0.8 % (ref 0.3–6.2)
ERYTHROCYTE [DISTWIDTH] IN BLOOD BY AUTOMATED COUNT: 12.7 % (ref 12.3–15.4)
GFR SERPL CREATININE-BSD FRML MDRD: 54 ML/MIN/1.73
GLOBULIN UR ELPH-MCNC: 2.9 GM/DL
GLUCOSE SERPL-MCNC: 127 MG/DL (ref 65–99)
GLUCOSE UR STRIP-MCNC: NEGATIVE MG/DL
HCT VFR BLD AUTO: 43.7 % (ref 34–46.6)
HGB BLD-MCNC: 14.9 G/DL (ref 12–15.9)
HGB UR QL STRIP.AUTO: NEGATIVE
IMM GRANULOCYTES # BLD AUTO: 0.03 10*3/MM3 (ref 0–0.05)
IMM GRANULOCYTES NFR BLD AUTO: 0.3 % (ref 0–0.5)
KETONES UR QL STRIP: ABNORMAL
LEUKOCYTE ESTERASE UR QL STRIP.AUTO: NEGATIVE
LYMPHOCYTES # BLD AUTO: 2.1 10*3/MM3 (ref 0.7–3.1)
LYMPHOCYTES NFR BLD AUTO: 22.2 % (ref 19.6–45.3)
MCH RBC QN AUTO: 30.8 PG (ref 26.6–33)
MCHC RBC AUTO-ENTMCNC: 34.1 G/DL (ref 31.5–35.7)
MCV RBC AUTO: 90.3 FL (ref 79–97)
MONOCYTES # BLD AUTO: 0.87 10*3/MM3 (ref 0.1–0.9)
MONOCYTES NFR BLD AUTO: 9.2 % (ref 5–12)
NEUTROPHILS NFR BLD AUTO: 6.3 10*3/MM3 (ref 1.7–7)
NEUTROPHILS NFR BLD AUTO: 66.5 % (ref 42.7–76)
NITRITE UR QL STRIP: NEGATIVE
NRBC BLD AUTO-RTO: 0 /100 WBC (ref 0–0.2)
PH UR STRIP.AUTO: 6 [PH] (ref 5–8)
PLATELET # BLD AUTO: 295 10*3/MM3 (ref 140–450)
PMV BLD AUTO: 10.8 FL (ref 6–12)
POTASSIUM SERPL-SCNC: 4.2 MMOL/L (ref 3.5–5.2)
PROT SERPL-MCNC: 7.6 G/DL (ref 6–8.5)
PROT UR QL STRIP: ABNORMAL
RBC # BLD AUTO: 4.84 10*6/MM3 (ref 3.77–5.28)
SODIUM SERPL-SCNC: 136 MMOL/L (ref 136–145)
SP GR UR STRIP: 1.02 (ref 1–1.03)
T4 FREE SERPL-MCNC: 1.45 NG/DL (ref 0.93–1.7)
TSH SERPL DL<=0.05 MIU/L-ACNC: 46.1 UIU/ML (ref 0.27–4.2)
UROBILINOGEN UR QL STRIP: ABNORMAL
WBC # BLD AUTO: 9.47 10*3/MM3 (ref 3.4–10.8)

## 2021-01-12 NOTE — OUTREACH NOTE
"Patient Outreach Note    LAURYN received a call from pt's son Simón. Simón stated, \" I wanted to update you on mom. She is out of the hospital and is now at [facility]\". LAURYN told Simón that was a good well known place and was glad she was there. LAURYN asked Simón if there was any resources that he needed at this time for her. Simón stated, \" my nephew is my mom's POA and he is wanting her to be seen by a Neurologist but I think she needs to see a psychiatrist\". LAURYN asked Simón why he thought she needed to see a psychiatrist. Simón stated, \" Because she doesn't know me and she has changed so much from the person she was last year and I just think she needs to have a psychiatrist help her get her mind right\". LAURYN explained to Simón that memory loss is part of dementia and those symptoms unfortunately do not get better but often times get worse. Simón stated, \" Oh really? I didn't know that. Do you think there is something to help her get back to who she used to be?\". LAURYN explained to Simón that unfortunately not, but if you and your nephew do decide to schedule her an appt with a Neurologist then he/she can talk with you all about dementia more in detail and there are some medications that can be taken for dementia. Simón stated, \" Ok, thank you for talking with me\".     JONATHAN Fajardo  Ambulatory     1/12/2021, 15:52 EST      "

## 2021-01-17 RX ORDER — LEVOTHYROXINE SODIUM 137 UG/1
137 TABLET ORAL DAILY
Qty: 90 TABLET | Refills: 1 | Status: SHIPPED | OUTPATIENT
Start: 2021-01-17 | End: 2021-03-03

## 2021-01-20 ENCOUNTER — TELEPHONE (OUTPATIENT)
Dept: INTERNAL MEDICINE | Facility: CLINIC | Age: 81
End: 2021-01-20

## 2021-01-20 NOTE — TELEPHONE ENCOUNTER
Jonathan calling to make aware that patient will be discharged from [facility] [place] on Friday 1/22. Wanting to know if Dr. Regan will follow  orders. Please advise.

## 2021-01-24 ENCOUNTER — READMISSION MANAGEMENT (OUTPATIENT)
Dept: CALL CENTER | Facility: HOSPITAL | Age: 81
End: 2021-01-24

## 2021-01-24 NOTE — OUTREACH NOTE
Prep Survey      Responses   [facility] facility patient discharged from?  Non-BH   Is LACE score < 7 ?  Non-BH Discharge   Emergency Room discharge w/ pulse ox?  No   Eligibility  [facility]   Date of Admission  01/05/21   Date of Discharge  01/23/21   Discharge diagnosis  Bradycardia, Hypertensive encephalopathy    Does the patient have one of the following disease processes/diagnoses(primary or secondary)?  Other   Prep survey completed?  Yes          Gissel Ervin RN

## 2021-01-25 ENCOUNTER — TRANSITIONAL CARE MANAGEMENT TELEPHONE ENCOUNTER (OUTPATIENT)
Dept: CALL CENTER | Facility: HOSPITAL | Age: 81
End: 2021-01-25

## 2021-01-25 NOTE — OUTREACH NOTE
Call Center TCM Note      Responses   [facility] patient discharged from?  Non-   Does the patient have one of the following disease processes/diagnoses(primary or secondary)?  Other   TCM attempt successful?  Yes   Call start time  1211   Call end time  1214   Discharge diagnosis  Bradycardia, Hypertensive encephalopathy    Is the patient taking all medications as directed (includes completed medication regime)?  Yes   Does the patient have a primary care provider?   Yes   Does the patient have an appointment with their PCP within 7 days of discharge?  Greater than 7 days   Comments regarding PCP  appt with PCP for 2/15/21   Nursing Interventions  Verified appointment date/time/provider   Has the patient kept scheduled appointments due by today?  N/A   Psychosocial issues?  No   Did the patient receive a copy of their discharge instructions?  Yes   What is the patient's perception of their health status since discharge?  Improving   Is the patient/caregiver able to teach back the hierarchy of who to call/visit for symptoms/problems? PCP, Specialist, Home health nurse, Urgent Care, ED, 911  Yes   TCM call completed?  Yes   Wrap up additional comments  States she is doing well, no questions or concerns at this time.          Jigna Ashby RN    1/25/2021, 12:14 EST

## 2021-01-26 ENCOUNTER — TELEPHONE (OUTPATIENT)
Dept: INTERNAL MEDICINE | Facility: CLINIC | Age: 81
End: 2021-01-26

## 2021-01-28 ENCOUNTER — TELEPHONE (OUTPATIENT)
Dept: INTERNAL MEDICINE | Facility: CLINIC | Age: 81
End: 2021-01-28

## 2021-01-28 NOTE — TELEPHONE ENCOUNTER
BERTO A PHYSICAL THERAPIST FROM [facility] CALLED AND NEEDS A VERBAL THERAPY ORDER FOR 1 X FOR 6 WEEKS, AND THEN EVERY OTHER WEEK FOR ONE VISIT;     BERTO: [phone]

## 2021-01-29 ENCOUNTER — TELEPHONE (OUTPATIENT)
Dept: INTERNAL MEDICINE | Facility: CLINIC | Age: 81
End: 2021-01-29

## 2021-01-29 NOTE — TELEPHONE ENCOUNTER
Patients son, Joey (not on [facility] verbal) called and stated that he would like a call back from a clinical staff member. No other information was provided at this time. Please advise.     Joey call back [phone]

## 2021-01-29 NOTE — TELEPHONE ENCOUNTER
ugo Cook is now POA and will email POA forms to me.  After I receive I can discuss pt's medications with him.

## 2021-01-29 NOTE — TELEPHONE ENCOUNTER
Med list reviewed with Joey, he will check at pt's home to see if any refills are needed and let us know.

## 2021-02-01 ENCOUNTER — OUTSIDE FACILITY SERVICE (OUTPATIENT)
Dept: INTERNAL MEDICINE | Facility: CLINIC | Age: 81
End: 2021-02-01

## 2021-02-01 PROCEDURE — G0180 MD CERTIFICATION HHA PATIENT: HCPCS | Performed by: INTERNAL MEDICINE

## 2021-02-03 ENCOUNTER — TELEPHONE (OUTPATIENT)
Dept: INTERNAL MEDICINE | Facility: CLINIC | Age: 81
End: 2021-02-03

## 2021-02-03 NOTE — TELEPHONE ENCOUNTER
FANI CALLED FROM HOME HEALTH, SHE HAS BEEN TRYING TO SEE PATIENT AND HAS BEEN UNABLE TO.     YESTERDAY SHE TOLD HER SHE COULD COME TODAY  AND WHEN FANI CALLED PATIENT TOLD HER TO COME TOMORROW SO TODAY WILL BE PUT DOWN AS A MISSED VISIT. PATIENT TOLD HER TO COME TOMORROW HOWEVER FANI ISN'T IN AREA TOMORROW.    JUST WANTED TO UPDATE. FANI WILL TRY TO SEE PATIENT AGAIN ON Friday.    FANI     [phone]

## 2021-02-05 ENCOUNTER — TELEPHONE (OUTPATIENT)
Dept: INTERNAL MEDICINE | Facility: CLINIC | Age: 81
End: 2021-02-05

## 2021-02-05 NOTE — TELEPHONE ENCOUNTER
Julito with [facility] called and stated that the patient will not answer her calls and she will have to list today as another missed appointment.    Julito Callback: [phone]    Please advise

## 2021-02-08 ENCOUNTER — TELEPHONE (OUTPATIENT)
Dept: INTERNAL MEDICINE | Facility: CLINIC | Age: 81
End: 2021-02-08

## 2021-02-08 NOTE — TELEPHONE ENCOUNTER
PATIENT CALLED TO REQUEST A CALL BACK.  PATIENT HAS SOME QUESTIONS ABOUT HER MEDICATION.      PATIENT'S CONTACT [phone]     PLEASE ADVISE

## 2021-02-12 ENCOUNTER — TELEPHONE (OUTPATIENT)
Dept: INTERNAL MEDICINE | Facility: CLINIC | Age: 81
End: 2021-02-12

## 2021-02-12 NOTE — TELEPHONE ENCOUNTER
VINCE FROM [employer] IS CALLING TO GET A VERBAL ORDER FOR THE PATIENT.  HE WASN'T ABLE TO SEE HER THIS WEEK DUE TO THE INCLEMENT WEATHER SO HE WANTS TO MOVE THAT VISIT TO NEXT WEEK.    PLEASE CONTACT VINCE TO ADVISE.    CALLBACK:  [phone]

## 2021-02-15 ENCOUNTER — OFFICE VISIT (OUTPATIENT)
Dept: INTERNAL MEDICINE | Facility: CLINIC | Age: 81
End: 2021-02-15

## 2021-02-15 VITALS — SYSTOLIC BLOOD PRESSURE: 145 MMHG | DIASTOLIC BLOOD PRESSURE: 96 MMHG

## 2021-02-15 DIAGNOSIS — C73 PAPILLARY ADENOCARCINOMA OF THYROID (HCC): ICD-10-CM

## 2021-02-15 DIAGNOSIS — G25.0 BENIGN ESSENTIAL TREMOR: Primary | ICD-10-CM

## 2021-02-15 DIAGNOSIS — I10 ESSENTIAL HYPERTENSION: ICD-10-CM

## 2021-02-15 PROCEDURE — 99443 PR PHYS/QHP TELEPHONE EVALUATION 21-30 MIN: CPT | Performed by: INTERNAL MEDICINE

## 2021-02-15 RX ORDER — PRIMIDONE 50 MG/1
100 TABLET ORAL EVERY MORNING
Qty: 180 TABLET | Refills: 1 | Status: SHIPPED | OUTPATIENT
Start: 2021-02-15 | End: 2021-03-03 | Stop reason: SDUPTHER

## 2021-02-15 NOTE — PROGRESS NOTES
Hypertension and Tremors (jaw, and voice)    Subjective   Zohra Hall is a 80 y.o. female is here today for follow-up.    History of Present Illness   Here for a telephone visit follow up , got out of [facility] and is at home, and is receiving home health. Her Grandson, and son have taken over her care.  On primidone 50mg now, previously on 250mg.  Feels meds are mixed up.  BP was 145/96      Current Outpatient Medications:   •  acetaminophen (TYLENOL) 325 MG tablet, Take 2 tablets by mouth Every 4 (Four) Hours As Needed for Mild Pain ., Disp:  , Rfl:   •  alendronate (FOSAMAX) 70 MG tablet, TAKE 1 TABLET BY MOUTH  EVERY 7 DAYS, Disp: 12 tablet, Rfl: 3  •  aluminum-magnesium hydroxide-simethicone (MAALOX MAX) 400-400-40 MG/5ML suspension, Take 15 mL by mouth Every 6 (Six) Hours As Needed for Indigestion or Heartburn., Disp:  , Rfl:   •  amLODIPine (NORVASC) 10 MG tablet, Take 1 tablet by mouth Daily., Disp:  , Rfl:   •  atorvastatin (LIPITOR) 40 MG tablet, TAKE 1 TABLET BY MOUTH  DAILY, Disp: 90 tablet, Rfl: 3  •  docusate sodium 100 MG capsule, Take 2 capsules by mouth Daily As Needed for Constipation., Disp:  , Rfl:   •  DULoxetine (CYMBALTA) 30 MG capsule, Take 1 capsule by mouth Daily., Disp:  , Rfl:   •  enalapril (VASOTEC) 20 MG tablet, Take 2 tablets by mouth Daily., Disp: 90 tablet, Rfl: 1  •  famotidine (PEPCID) 20 MG tablet, Take 1 tablet by mouth 2 (Two) Times a Day Before Meals., Disp:  , Rfl:   •  levothyroxine (SYNTHROID, LEVOTHROID) 137 MCG tablet, Take 1 tablet by mouth Daily. Note dose change., Disp: 90 tablet, Rfl: 1  •  ondansetron (ZOFRAN) 4 MG tablet, Take 1 tablet by mouth Every 6 (Six) Hours As Needed for Nausea or Vomiting., Disp:  , Rfl:   •  primidone (MYSOLINE) 50 MG tablet, Take 2 tablets by mouth Every Morning., Disp: 180 tablet, Rfl: 1      The following portions of the patient's history were reviewed and updated as appropriate: allergies, current medications, past family  history, past medical history, past social history, past surgical history and problem list.    Review of Systems   Constitutional: Positive for activity change and fatigue. Negative for chills and fever.   HENT: Negative for ear discharge, ear pain, sinus pressure and sore throat.    Respiratory: Negative for cough, chest tightness and shortness of breath.    Cardiovascular: Negative for chest pain, palpitations and leg swelling.   Gastrointestinal: Negative for diarrhea, nausea and vomiting.   Musculoskeletal: Negative for arthralgias, back pain and myalgias.   Neurological: Positive for tremors and weakness. Negative for dizziness, syncope and headaches.   Psychiatric/Behavioral: Negative for confusion and sleep disturbance.       Objective   /96   Physical Exam            Assessment/Plan   Diagnoses and all orders for this visit:    Benign essential tremor  -     primidone (MYSOLINE) 50 MG tablet; Take 2 tablets by mouth Every Morning.  -     CBC (No Diff); Future    Papillary adenocarcinoma of thyroid (CMS/HCC)  -     TSH; Future  -     T4, Free; Future  -     CBC (No Diff); Future    Essential hypertension  -     Basic Metabolic Panel; Future      Currently on 50 mg primidone, increase to 100 mg.  Advised to stop by to get her blood work done to monitor her thyroid levels as they were too low last check.       Reviewed med list. Not on lipitor or fosamax, and adv. To start back.    This visit has been rescheduled as a phone visit to comply with patient safety concerns in accordance with CDC recommendations. Total time of discussion was 23 minutes.      Return in about 2 weeks (around 3/1/2021) for Recheck.

## 2021-02-17 NOTE — TELEPHONE ENCOUNTER
PTS SON, JERRELL, IS CALLING TO FIND OUT WHAT HAS CHANGED WITH THE PT'S MEDICATION    SON IS TRYING TO COMPARE HER PREVIOUS MEDS TO CURRENT    BILL  [phone]

## 2021-02-19 ENCOUNTER — TELEPHONE (OUTPATIENT)
Dept: INTERNAL MEDICINE | Facility: CLINIC | Age: 81
End: 2021-02-19

## 2021-02-19 ENCOUNTER — TELEPHONE (OUTPATIENT)
Dept: CASE MANAGEMENT | Facility: OTHER | Age: 81
End: 2021-02-19

## 2021-02-19 ENCOUNTER — PATIENT OUTREACH (OUTPATIENT)
Dept: CASE MANAGEMENT | Facility: OTHER | Age: 81
End: 2021-02-19

## 2021-02-19 NOTE — OUTREACH NOTE
"Patient Outreach Note    SW received a call back from Joey. Joey stated, \" We are waiting for the HH to come out and hopefully they can get here next week. They had mentioned [facility] has a program where they come out and fill patients pill boxes and they said they work with doctors offices. I will find out more information on that when they come out\". LAURYN asked Joey if Zohra needed any resources right now. Joey stated, \" No, she is ok so far, but thank you for calling\".    JONATHAN Fajardo  Ambulatory     2/19/2021, 14:47 EST      "

## 2021-02-19 NOTE — TELEPHONE ENCOUNTER
Left a detailed message for Marquis that it's OK to see pt next wee.  Office number given if any additional questions.

## 2021-02-19 NOTE — TELEPHONE ENCOUNTER
Marquis, an occupational therapist with [facility], wanted to let Dr. Regan know the patient was unable to be seen this week due to the weather. Marquis would like to know if he could be okayed to see her next week instead.    Please call and advise. Marquis's call back [phone]

## 2021-03-02 ENCOUNTER — PATIENT OUTREACH (OUTPATIENT)
Dept: CASE MANAGEMENT | Facility: OTHER | Age: 81
End: 2021-03-02

## 2021-03-02 NOTE — OUTREACH NOTE
"Patient Outreach Note    SW reached out to pt's grandson, Joey to follow up. Joey stated, \" Grandma is doing pretty well. But HH never showed up, not sure what happened there\". LAURYN told Joey to discuss this at the appt tomorrow. Joey stated, \" I didn't realize that she had an appt tomorrow, but we will be there\". LAURYN asked Joey if Zohra needed any other resources and he stated, \" No, not right now, but thanks for calling\".     JONATHAN Fajardo  Ambulatory     3/2/2021, 16:15 EST      "

## 2021-03-03 ENCOUNTER — LAB (OUTPATIENT)
Dept: LAB | Facility: HOSPITAL | Age: 81
End: 2021-03-03

## 2021-03-03 ENCOUNTER — OFFICE VISIT (OUTPATIENT)
Dept: INTERNAL MEDICINE | Facility: CLINIC | Age: 81
End: 2021-03-03

## 2021-03-03 VITALS
HEIGHT: 65 IN | TEMPERATURE: 98.2 F | HEART RATE: 86 BPM | SYSTOLIC BLOOD PRESSURE: 164 MMHG | WEIGHT: 131 LBS | DIASTOLIC BLOOD PRESSURE: 80 MMHG | OXYGEN SATURATION: 98 % | BODY MASS INDEX: 21.83 KG/M2

## 2021-03-03 DIAGNOSIS — R26.89 BALANCE PROBLEM: ICD-10-CM

## 2021-03-03 DIAGNOSIS — C73 PAPILLARY ADENOCARCINOMA OF THYROID (HCC): ICD-10-CM

## 2021-03-03 DIAGNOSIS — I10 UNCONTROLLED HYPERTENSION: ICD-10-CM

## 2021-03-03 DIAGNOSIS — G25.81 RLS (RESTLESS LEGS SYNDROME): Primary | ICD-10-CM

## 2021-03-03 DIAGNOSIS — G25.0 BENIGN ESSENTIAL TREMOR: ICD-10-CM

## 2021-03-03 DIAGNOSIS — I63.81 LACUNAR STROKE (HCC): ICD-10-CM

## 2021-03-03 DIAGNOSIS — G45.9 TRANSIENT ISCHEMIC ATTACK: ICD-10-CM

## 2021-03-03 DIAGNOSIS — I10 ESSENTIAL HYPERTENSION: ICD-10-CM

## 2021-03-03 LAB
ANION GAP SERPL CALCULATED.3IONS-SCNC: 10 MMOL/L (ref 5–15)
BUN SERPL-MCNC: 19 MG/DL (ref 8–23)
BUN/CREAT SERPL: 19.6 (ref 7–25)
CALCIUM SPEC-SCNC: 9.4 MG/DL (ref 8.6–10.5)
CHLORIDE SERPL-SCNC: 98 MMOL/L (ref 98–107)
CO2 SERPL-SCNC: 28 MMOL/L (ref 22–29)
CREAT SERPL-MCNC: 0.97 MG/DL (ref 0.57–1)
DEPRECATED RDW RBC AUTO: 41.8 FL (ref 37–54)
ERYTHROCYTE [DISTWIDTH] IN BLOOD BY AUTOMATED COUNT: 13 % (ref 12.3–15.4)
GFR SERPL CREATININE-BSD FRML MDRD: 55 ML/MIN/1.73
GLUCOSE SERPL-MCNC: 91 MG/DL (ref 65–99)
HCT VFR BLD AUTO: 42.4 % (ref 34–46.6)
HGB BLD-MCNC: 14.3 G/DL (ref 12–15.9)
MCH RBC QN AUTO: 29.9 PG (ref 26.6–33)
MCHC RBC AUTO-ENTMCNC: 33.7 G/DL (ref 31.5–35.7)
MCV RBC AUTO: 88.5 FL (ref 79–97)
PLATELET # BLD AUTO: 245 10*3/MM3 (ref 140–450)
PMV BLD AUTO: 10.8 FL (ref 6–12)
POTASSIUM SERPL-SCNC: 4.4 MMOL/L (ref 3.5–5.2)
RBC # BLD AUTO: 4.79 10*6/MM3 (ref 3.77–5.28)
SODIUM SERPL-SCNC: 136 MMOL/L (ref 136–145)
T4 FREE SERPL-MCNC: 1.65 NG/DL (ref 0.93–1.7)
TSH SERPL DL<=0.05 MIU/L-ACNC: 1.96 UIU/ML (ref 0.27–4.2)
WBC # BLD AUTO: 6.57 10*3/MM3 (ref 3.4–10.8)

## 2021-03-03 PROCEDURE — 99214 OFFICE O/P EST MOD 30 MIN: CPT | Performed by: INTERNAL MEDICINE

## 2021-03-03 PROCEDURE — 85027 COMPLETE CBC AUTOMATED: CPT

## 2021-03-03 PROCEDURE — 84443 ASSAY THYROID STIM HORMONE: CPT

## 2021-03-03 PROCEDURE — 84439 ASSAY OF FREE THYROXINE: CPT

## 2021-03-03 PROCEDURE — 80048 BASIC METABOLIC PNL TOTAL CA: CPT

## 2021-03-03 RX ORDER — ATENOLOL 50 MG/1
TABLET ORAL
COMMUNITY
Start: 2021-02-15 | End: 2021-03-03 | Stop reason: SDUPTHER

## 2021-03-03 RX ORDER — PAROXETINE HYDROCHLORIDE 20 MG/1
20 TABLET, FILM COATED ORAL EVERY MORNING
COMMUNITY
End: 2021-03-03 | Stop reason: SDUPTHER

## 2021-03-03 RX ORDER — ENALAPRIL MALEATE 20 MG/1
40 TABLET ORAL DAILY
Qty: 90 TABLET | Refills: 1
Start: 2021-03-03 | End: 2021-03-10 | Stop reason: SDUPTHER

## 2021-03-03 RX ORDER — PAROXETINE HYDROCHLORIDE 20 MG/1
20 TABLET, FILM COATED ORAL EVERY MORNING
Qty: 90 TABLET | Refills: 1 | Status: SHIPPED | OUTPATIENT
Start: 2021-03-03 | End: 2021-05-28 | Stop reason: HOSPADM

## 2021-03-03 RX ORDER — DULOXETIN HYDROCHLORIDE 30 MG/1
30 CAPSULE, DELAYED RELEASE ORAL DAILY
Qty: 90 CAPSULE | Refills: 1 | Status: SHIPPED | OUTPATIENT
Start: 2021-03-03 | End: 2021-05-28 | Stop reason: HOSPADM

## 2021-03-03 RX ORDER — LEVOTHYROXINE SODIUM 0.15 MG/1
TABLET ORAL
COMMUNITY
Start: 2021-01-28 | End: 2021-03-10 | Stop reason: SDUPTHER

## 2021-03-03 RX ORDER — AMLODIPINE BESYLATE 10 MG/1
10 TABLET ORAL DAILY
Qty: 90 TABLET | Refills: 1 | Status: SHIPPED | OUTPATIENT
Start: 2021-03-03 | End: 2021-07-15

## 2021-03-03 RX ORDER — ATENOLOL 100 MG/1
100 TABLET ORAL DAILY
Qty: 90 TABLET | Refills: 1 | Status: SHIPPED | OUTPATIENT
Start: 2021-03-03 | End: 2021-05-28 | Stop reason: HOSPADM

## 2021-03-03 RX ORDER — ATORVASTATIN CALCIUM 40 MG/1
40 TABLET, FILM COATED ORAL DAILY
Qty: 90 TABLET | Refills: 3 | Status: SHIPPED | OUTPATIENT
Start: 2021-03-03

## 2021-03-03 RX ORDER — PRIMIDONE 50 MG/1
TABLET ORAL
Qty: 270 TABLET | Refills: 1 | Status: SHIPPED | OUTPATIENT
Start: 2021-03-03 | End: 2022-04-15

## 2021-03-03 NOTE — PROGRESS NOTES
Tremors    Subjective   Zohra Hall is a 80 y.o. female is here today for follow-up.    History of Present Illness   Accompanied by Grandson. HAs been doing okay overall.  Has not seen Dr. Elizabeth or ELVIS in some time.  Has been on the 150mcg levothyroxine.  HH PT has been coming     Current Outpatient Medications:   •  acetaminophen (TYLENOL) 325 MG tablet, Take 2 tablets by mouth Every 4 (Four) Hours As Needed for Mild Pain ., Disp:  , Rfl:   •  alendronate (FOSAMAX) 70 MG tablet, TAKE 1 TABLET BY MOUTH  EVERY 7 DAYS, Disp: 12 tablet, Rfl: 3  •  aluminum-magnesium hydroxide-simethicone (MAALOX MAX) 400-400-40 MG/5ML suspension, Take 15 mL by mouth Every 6 (Six) Hours As Needed for Indigestion or Heartburn., Disp:  , Rfl:   •  amLODIPine (NORVASC) 10 MG tablet, Take 1 tablet by mouth Daily., Disp: 90 tablet, Rfl: 1  •  atenolol (TENORMIN) 100 MG tablet, Take 1 tablet by mouth Daily., Disp: 90 tablet, Rfl: 1  •  atorvastatin (LIPITOR) 40 MG tablet, Take 1 tablet by mouth Daily., Disp: 90 tablet, Rfl: 3  •  docusate sodium 100 MG capsule, Take 2 capsules by mouth Daily As Needed for Constipation., Disp:  , Rfl:   •  DULoxetine (CYMBALTA) 30 MG capsule, Take 1 capsule by mouth Daily., Disp: 90 capsule, Rfl: 1  •  enalapril (VASOTEC) 20 MG tablet, Take 2 tablets by mouth Daily., Disp: 90 tablet, Rfl: 1  •  famotidine (PEPCID) 20 MG tablet, Take 1 tablet by mouth 2 (Two) Times a Day Before Meals., Disp:  , Rfl:   •  levothyroxine (SYNTHROID, LEVOTHROID) 150 MCG tablet, , Disp: , Rfl:   •  ondansetron (ZOFRAN) 4 MG tablet, Take 1 tablet by mouth Every 6 (Six) Hours As Needed for Nausea or Vomiting., Disp:  , Rfl:   •  PARoxetine (PAXIL) 20 MG tablet, Take 1 tablet by mouth Every Morning., Disp: 90 tablet, Rfl: 1  •  primidone (MYSOLINE) 50 MG tablet, 2 PO qam and 1 PO QPM, Disp: 270 tablet, Rfl: 1      The following portions of the patient's history were reviewed and updated as appropriate: allergies, current  "medications, past family history, past medical history, past social history, past surgical history and problem list.    Review of Systems   Constitutional: Positive for activity change and appetite change. Negative for chills and fever.   HENT: Negative for ear discharge, ear pain, sinus pressure and sore throat.    Respiratory: Negative for cough, chest tightness and shortness of breath.    Cardiovascular: Negative for chest pain, palpitations and leg swelling.   Gastrointestinal: Negative for diarrhea, nausea and vomiting.   Musculoskeletal: Positive for arthralgias and gait problem. Negative for back pain and myalgias.   Neurological: Positive for tremors. Negative for dizziness, syncope and headaches.   Psychiatric/Behavioral: Negative for confusion and sleep disturbance.       Objective   /80   Pulse 86   Temp 98.2 °F (36.8 °C)   Ht 165.1 cm (65\")   Wt 59.4 kg (131 lb)   SpO2 98%   BMI 21.80 kg/m²   Physical Exam  Vitals and nursing note reviewed.   Constitutional:       Appearance: She is well-developed.   HENT:      Head: Normocephalic and atraumatic.      Right Ear: External ear normal.      Left Ear: External ear normal.      Mouth/Throat:      Pharynx: No oropharyngeal exudate.   Eyes:      Conjunctiva/sclera: Conjunctivae normal.      Pupils: Pupils are equal, round, and reactive to light.   Neck:      Thyroid: No thyromegaly.   Cardiovascular:      Rate and Rhythm: Normal rate and regular rhythm.   Pulmonary:      Effort: Pulmonary effort is normal.      Breath sounds: Normal breath sounds.   Abdominal:      General: Bowel sounds are normal. There is no distension.      Palpations: Abdomen is soft.      Tenderness: There is no abdominal tenderness.   Musculoskeletal:      Cervical back: Neck supple.   Skin:     General: Skin is warm and dry.   Neurological:      Mental Status: She is alert and oriented to person, place, and time.      Cranial Nerves: No cranial nerve deficit.      Motor: " Weakness present.      Gait: Gait abnormal.      Comments: tremors   Psychiatric:         Judgment: Judgment normal.             Assessment/Plan   Diagnoses and all orders for this visit:    RLS (restless legs syndrome)  -     DULoxetine (CYMBALTA) 30 MG capsule; Take 1 capsule by mouth Daily.  -     PARoxetine (PAXIL) 20 MG tablet; Take 1 tablet by mouth Every Morning.    Transient ischemic attack  -     Ambulatory Referral to Home Health    Balance problem  -     Ambulatory Referral to Home Health    Lacunar stroke (CMS/HCC)  -     Ambulatory Referral to Home Health  -     atorvastatin (LIPITOR) 40 MG tablet; Take 1 tablet by mouth Daily.    Benign essential tremor  -     primidone (MYSOLINE) 50 MG tablet; 2 PO qam and 1 PO QPM    Uncontrolled hypertension  Comments:  better on  vasotec.check bmp.  Orders:  -     enalapril (VASOTEC) 20 MG tablet; Take 2 tablets by mouth Daily.  -     atenolol (TENORMIN) 100 MG tablet; Take 1 tablet by mouth Daily.  -     amLODIPine (NORVASC) 10 MG tablet; Take 1 tablet by mouth Daily.    Other orders  -     Discontinue: atenolol (TENORMIN) 50 MG tablet  -     levothyroxine (SYNTHROID, LEVOTHROID) 150 MCG tablet  -     Discontinue: PARoxetine (PAXIL) 20 MG tablet; Take 20 mg by mouth Every Morning.    Blood pressure elevated, clear causing her dizziness.  Advised to increase atenolol for now as it can help with her tremors as well.  Increase primidone.       Long discussion with patient's grandson Radhames.  He is her POA.  Updated her medication list and given copies to them.  Will try to have home health come to help with her medications and advised to check with [facility] pharmacy if they are able to do it.  Their number/contact information was given.      Return in about 6 weeks (around 4/14/2021) for Next scheduled follow up.

## 2021-03-09 ENCOUNTER — TELEPHONE (OUTPATIENT)
Dept: INTERNAL MEDICINE | Facility: CLINIC | Age: 81
End: 2021-03-09

## 2021-03-09 NOTE — TELEPHONE ENCOUNTER
Patient's grandson, Joey, has a question about the Analopril.  Is she supposed to be taking 20 mg twice a day?  He can be reached at [phone]

## 2021-03-10 DIAGNOSIS — I10 UNCONTROLLED HYPERTENSION: ICD-10-CM

## 2021-03-10 RX ORDER — LEVOTHYROXINE SODIUM 0.15 MG/1
150 TABLET ORAL DAILY
Qty: 90 TABLET | Refills: 1 | Status: ON HOLD | OUTPATIENT
Start: 2021-03-10 | End: 2021-05-23

## 2021-03-10 RX ORDER — ENALAPRIL MALEATE 20 MG/1
40 TABLET ORAL DAILY
Qty: 180 TABLET | Refills: 1 | Status: SHIPPED | OUTPATIENT
Start: 2021-03-10 | End: 2021-08-02

## 2021-03-10 NOTE — TELEPHONE ENCOUNTER
Last Office Visit: 3/3/21  Next Office Visit: 4/14/21    Labs completed in past 6 months? yes  Labs completed in past year? yes    Last Refill Date:3/3/21  Quantity:90  Refills:1      Sent for 45 days, and a no print       Thyroid  You have never rx'ed

## 2021-03-12 ENCOUNTER — TELEPHONE (OUTPATIENT)
Dept: INTERNAL MEDICINE | Facility: CLINIC | Age: 81
End: 2021-03-12

## 2021-03-12 NOTE — TELEPHONE ENCOUNTER
FANI WITH [facility] IS REPORTING   SHE HAS TRIED TO CALL THE PATIENT SEVERAL TIMES THIS WEEK   AND IS REPORTING A MISS VISIT FOR THE WEEK     FANI CALL BACK [phone]

## 2021-03-15 NOTE — TELEPHONE ENCOUNTER
Spoke to granddavid Cook (poa), states that pt os well, he saw her this weekend.  Pt had some trouble with her phone last week, Joey will check with pt and make sure she knows to sched back with HH.

## 2021-04-05 ENCOUNTER — OUTSIDE FACILITY SERVICE (OUTPATIENT)
Dept: INTERNAL MEDICINE | Facility: CLINIC | Age: 81
End: 2021-04-05

## 2021-04-05 PROCEDURE — G0179 MD RECERTIFICATION HHA PT: HCPCS | Performed by: INTERNAL MEDICINE

## 2021-04-14 ENCOUNTER — OFFICE VISIT (OUTPATIENT)
Dept: INTERNAL MEDICINE | Facility: CLINIC | Age: 81
End: 2021-04-14

## 2021-04-14 VITALS
WEIGHT: 144 LBS | SYSTOLIC BLOOD PRESSURE: 134 MMHG | HEART RATE: 61 BPM | OXYGEN SATURATION: 97 % | TEMPERATURE: 96.9 F | RESPIRATION RATE: 16 BRPM | HEIGHT: 65 IN | DIASTOLIC BLOOD PRESSURE: 80 MMHG | BODY MASS INDEX: 23.99 KG/M2

## 2021-04-14 DIAGNOSIS — K21.9 GASTROESOPHAGEAL REFLUX DISEASE WITHOUT ESOPHAGITIS: Primary | ICD-10-CM

## 2021-04-14 DIAGNOSIS — G25.0 BENIGN ESSENTIAL TREMOR: ICD-10-CM

## 2021-04-14 PROCEDURE — 99213 OFFICE O/P EST LOW 20 MIN: CPT | Performed by: INTERNAL MEDICINE

## 2021-04-14 RX ORDER — FAMOTIDINE 20 MG/1
20 TABLET, FILM COATED ORAL
Qty: 90 TABLET | Refills: 1 | Status: SHIPPED | OUTPATIENT
Start: 2021-04-14 | End: 2021-06-15

## 2021-04-14 NOTE — PROGRESS NOTES
Restless Legs Syndrome (6 week follow up)    Subjective   Zohra Hall is a 80 y.o. female is here today for follow-up.    History of Present Illness   Taking each day as it comes. Has good days and bad days.accompanied by grandson today.  Needs pepcid, which helps he cramps.  HH is almost done for PT.    Current Outpatient Medications:   •  acetaminophen (TYLENOL) 325 MG tablet, Take 2 tablets by mouth Every 4 (Four) Hours As Needed for Mild Pain ., Disp:  , Rfl:   •  alendronate (FOSAMAX) 70 MG tablet, TAKE 1 TABLET BY MOUTH  EVERY 7 DAYS, Disp: 12 tablet, Rfl: 3  •  aluminum-magnesium hydroxide-simethicone (MAALOX MAX) 400-400-40 MG/5ML suspension, Take 15 mL by mouth Every 6 (Six) Hours As Needed for Indigestion or Heartburn., Disp:  , Rfl:   •  amLODIPine (NORVASC) 10 MG tablet, Take 1 tablet by mouth Daily., Disp: 90 tablet, Rfl: 1  •  atenolol (TENORMIN) 100 MG tablet, Take 1 tablet by mouth Daily., Disp: 90 tablet, Rfl: 1  •  atorvastatin (LIPITOR) 40 MG tablet, Take 1 tablet by mouth Daily., Disp: 90 tablet, Rfl: 3  •  docusate sodium 100 MG capsule, Take 2 capsules by mouth Daily As Needed for Constipation., Disp:  , Rfl:   •  DULoxetine (CYMBALTA) 30 MG capsule, Take 1 capsule by mouth Daily., Disp: 90 capsule, Rfl: 1  •  enalapril (VASOTEC) 20 MG tablet, Take 2 tablets by mouth Daily., Disp: 180 tablet, Rfl: 1  •  famotidine (PEPCID) 20 MG tablet, Take 1 tablet by mouth 2 (Two) Times a Day Before Meals., Disp: 90 tablet, Rfl: 1  •  levothyroxine (SYNTHROID, LEVOTHROID) 150 MCG tablet, Take 1 tablet by mouth Daily., Disp: 90 tablet, Rfl: 1  •  ondansetron (ZOFRAN) 4 MG tablet, Take 1 tablet by mouth Every 6 (Six) Hours As Needed for Nausea or Vomiting., Disp:  , Rfl:   •  PARoxetine (PAXIL) 20 MG tablet, Take 1 tablet by mouth Every Morning., Disp: 90 tablet, Rfl: 1  •  primidone (MYSOLINE) 50 MG tablet, 2 PO qam and 1 PO QPM, Disp: 270 tablet, Rfl: 1      The following portions of the patient's  "history were reviewed and updated as appropriate: allergies, current medications, past family history, past medical history, past social history, past surgical history and problem list.    Review of Systems   Constitutional: Negative for chills and fever.   HENT: Negative for ear discharge, ear pain, sinus pressure and sore throat.    Respiratory: Negative for cough, chest tightness and shortness of breath.    Cardiovascular: Negative for chest pain, palpitations and leg swelling.   Gastrointestinal: Negative for diarrhea, nausea and vomiting.   Musculoskeletal: Negative for arthralgias, back pain and myalgias.   Neurological: Positive for tremors and weakness. Negative for dizziness, syncope and headaches.   Psychiatric/Behavioral: Negative for confusion and sleep disturbance.       Objective   /80   Pulse 61   Temp 96.9 °F (36.1 °C)   Resp 16   Ht 165.1 cm (65.01\")   Wt 65.3 kg (144 lb)   SpO2 97%   BMI 23.96 kg/m²   Physical Exam  Vitals and nursing note reviewed.   Constitutional:       Appearance: She is well-developed.   HENT:      Head: Normocephalic and atraumatic.      Right Ear: External ear normal.      Left Ear: External ear normal.      Mouth/Throat:      Pharynx: No oropharyngeal exudate.   Eyes:      Conjunctiva/sclera: Conjunctivae normal.      Pupils: Pupils are equal, round, and reactive to light.   Neck:      Thyroid: No thyromegaly.   Cardiovascular:      Rate and Rhythm: Normal rate and regular rhythm.   Pulmonary:      Effort: Pulmonary effort is normal.      Breath sounds: Normal breath sounds.   Abdominal:      General: Bowel sounds are normal. There is no distension.      Palpations: Abdomen is soft.      Tenderness: There is no abdominal tenderness.   Musculoskeletal:      Cervical back: Neck supple.   Skin:     General: Skin is warm and dry.   Neurological:      Mental Status: She is alert and oriented to person, place, and time.      Cranial Nerves: No cranial nerve deficit. "   Psychiatric:         Judgment: Judgment normal.           Results for orders placed or performed in visit on 03/03/21   TSH    Specimen: Blood   Result Value Ref Range    TSH 1.960 0.270 - 4.200 uIU/mL   T4, Free    Specimen: Blood   Result Value Ref Range    Free T4 1.65 0.93 - 1.70 ng/dL   Basic Metabolic Panel    Specimen: Blood   Result Value Ref Range    Glucose 91 65 - 99 mg/dL    BUN 19 8 - 23 mg/dL    Creatinine 0.97 0.57 - 1.00 mg/dL    Sodium 136 136 - 145 mmol/L    Potassium 4.4 3.5 - 5.2 mmol/L    Chloride 98 98 - 107 mmol/L    CO2 28.0 22.0 - 29.0 mmol/L    Calcium 9.4 8.6 - 10.5 mg/dL    eGFR Non African Amer 55 (L) >60 mL/min/1.73    BUN/Creatinine Ratio 19.6 7.0 - 25.0    Anion Gap 10.0 5.0 - 15.0 mmol/L   CBC (No Diff)    Specimen: Blood   Result Value Ref Range    WBC 6.57 3.40 - 10.80 10*3/mm3    RBC 4.79 3.77 - 5.28 10*6/mm3    Hemoglobin 14.3 12.0 - 15.9 g/dL    Hematocrit 42.4 34.0 - 46.6 %    MCV 88.5 79.0 - 97.0 fL    MCH 29.9 26.6 - 33.0 pg    MCHC 33.7 31.5 - 35.7 g/dL    RDW 13.0 12.3 - 15.4 %    RDW-SD 41.8 37.0 - 54.0 fl    MPV 10.8 6.0 - 12.0 fL    Platelets 245 140 - 450 10*3/mm3             Assessment/Plan   Diagnoses and all orders for this visit:    Gastroesophageal reflux disease without esophagitis  -     famotidine (PEPCID) 20 MG tablet; Take 1 tablet by mouth 2 (Two) Times a Day Before Meals.    Benign essential tremor  Comments:  continue primidone at current dose. f/u with neurology.    weakness better. Will plan on outpatient PT.    Keep primidone at current dose.             No follow-ups on file.

## 2021-04-15 ENCOUNTER — TELEPHONE (OUTPATIENT)
Dept: INTERNAL MEDICINE | Facility: CLINIC | Age: 81
End: 2021-04-15

## 2021-04-15 NOTE — TELEPHONE ENCOUNTER
STACEY NAZARIO FROM "[employer] IS CALLING, PATIENT IS WANTING TO BE DISCHARGED FROM [facility] TODAY, SO SHE IS NEEDING A VERBAL ORDER TO DISCHARGE HER. STACEY CAN BE REACHED -[phone]

## 2021-04-15 NOTE — TELEPHONE ENCOUNTER
PT CALLED TO GIVE THE NUMBER OF SHOTS THAT SHE HAS HAD:  PFIZER AKANKSHA 1/20/2021 DONE BY SHELLEY.  PFIZER [id number] 3/9/2021

## 2021-05-23 ENCOUNTER — APPOINTMENT (OUTPATIENT)
Dept: GENERAL RADIOLOGY | Facility: HOSPITAL | Age: 81
End: 2021-05-23

## 2021-05-23 ENCOUNTER — HOSPITAL ENCOUNTER (INPATIENT)
Facility: HOSPITAL | Age: 81
LOS: 5 days | Discharge: NURSING, REHAB OR LONG-TERM CARE | End: 2021-05-28
Attending: EMERGENCY MEDICINE | Admitting: FAMILY MEDICINE

## 2021-05-23 ENCOUNTER — APPOINTMENT (OUTPATIENT)
Dept: CT IMAGING | Facility: HOSPITAL | Age: 81
End: 2021-05-23

## 2021-05-23 DIAGNOSIS — R07.9 ACUTE CHEST PAIN: ICD-10-CM

## 2021-05-23 DIAGNOSIS — R79.89 ABNORMAL TSH: ICD-10-CM

## 2021-05-23 DIAGNOSIS — R27.0 ATAXIA: ICD-10-CM

## 2021-05-23 DIAGNOSIS — E87.1 HYPONATREMIA: ICD-10-CM

## 2021-05-23 DIAGNOSIS — I16.1 HYPERTENSIVE EMERGENCY: ICD-10-CM

## 2021-05-23 DIAGNOSIS — M15.9 GENERALIZED OSTEOARTHRITIS: ICD-10-CM

## 2021-05-23 DIAGNOSIS — I63.81 LACUNAR STROKE (HCC): ICD-10-CM

## 2021-05-23 DIAGNOSIS — I21.4 NSTEMI (NON-ST ELEVATED MYOCARDIAL INFARCTION) (HCC): Primary | ICD-10-CM

## 2021-05-23 DIAGNOSIS — I50.32 CHRONIC DIASTOLIC HEART FAILURE (HCC): ICD-10-CM

## 2021-05-23 PROBLEM — I35.0 AORTIC VALVE STENOSIS: Status: RESOLVED | Noted: 2017-05-11 | Resolved: 2021-05-23

## 2021-05-23 PROBLEM — I51.81 TAKOTSUBO CARDIOMYOPATHY: Status: ACTIVE | Noted: 2021-05-23

## 2021-05-23 PROBLEM — I25.119 CORONARY ARTERY DISEASE INVOLVING NATIVE CORONARY ARTERY OF NATIVE HEART WITH ANGINA PECTORIS: Chronic | Status: ACTIVE | Noted: 2021-05-23

## 2021-05-23 PROBLEM — I51.81 TAKOTSUBO CARDIOMYOPATHY: Status: RESOLVED | Noted: 2021-05-23 | Resolved: 2021-05-23

## 2021-05-23 PROBLEM — I25.119 CORONARY ARTERY DISEASE INVOLVING NATIVE CORONARY ARTERY OF NATIVE HEART WITH ANGINA PECTORIS: Status: ACTIVE | Noted: 2021-05-23

## 2021-05-23 PROBLEM — E03.9 SEVERE HYPOTHYROIDISM: Status: ACTIVE | Noted: 2021-05-23

## 2021-05-23 PROBLEM — I10 UNCONTROLLED HYPERTENSION: Status: RESOLVED | Noted: 2021-01-04 | Resolved: 2021-05-23

## 2021-05-23 PROBLEM — I67.4 HYPERTENSIVE ENCEPHALOPATHY: Status: RESOLVED | Noted: 2020-12-28 | Resolved: 2021-05-23

## 2021-05-23 LAB
ALBUMIN SERPL-MCNC: 4 G/DL (ref 3.5–5.2)
ALBUMIN/GLOB SERPL: 1.5 G/DL
ALP SERPL-CCNC: 70 U/L (ref 39–117)
ALT SERPL W P-5'-P-CCNC: 16 U/L (ref 1–33)
ANION GAP SERPL CALCULATED.3IONS-SCNC: 10 MMOL/L (ref 5–15)
APTT PPP: 32.6 SECONDS (ref 50–95)
AST SERPL-CCNC: 18 U/L (ref 1–32)
BACTERIA UR QL AUTO: NORMAL /HPF
BASOPHILS # BLD AUTO: 0.03 10*3/MM3 (ref 0–0.2)
BASOPHILS # BLD AUTO: 0.04 10*3/MM3 (ref 0–0.2)
BASOPHILS NFR BLD AUTO: 0.6 % (ref 0–1.5)
BASOPHILS NFR BLD AUTO: 0.7 % (ref 0–1.5)
BILIRUB SERPL-MCNC: 0.3 MG/DL (ref 0–1.2)
BILIRUB UR QL STRIP: NEGATIVE
BUN SERPL-MCNC: 11 MG/DL (ref 8–23)
BUN/CREAT SERPL: 11.1 (ref 7–25)
CALCIUM SPEC-SCNC: 10.3 MG/DL (ref 8.6–10.5)
CATH EF ESTIMATED: 45 %
CHLORIDE SERPL-SCNC: 92 MMOL/L (ref 98–107)
CHOLEST SERPL-MCNC: 319 MG/DL (ref 0–200)
CLARITY UR: ABNORMAL
CO2 SERPL-SCNC: 28 MMOL/L (ref 22–29)
COLOR UR: YELLOW
CREAT SERPL-MCNC: 0.99 MG/DL (ref 0.57–1)
DEPRECATED RDW RBC AUTO: 45.4 FL (ref 37–54)
DEPRECATED RDW RBC AUTO: 46.4 FL (ref 37–54)
EOSINOPHIL # BLD AUTO: 0.03 10*3/MM3 (ref 0–0.4)
EOSINOPHIL # BLD AUTO: 0.07 10*3/MM3 (ref 0–0.4)
EOSINOPHIL NFR BLD AUTO: 0.5 % (ref 0.3–6.2)
EOSINOPHIL NFR BLD AUTO: 1.4 % (ref 0.3–6.2)
ERYTHROCYTE [DISTWIDTH] IN BLOOD BY AUTOMATED COUNT: 14.2 % (ref 12.3–15.4)
ERYTHROCYTE [DISTWIDTH] IN BLOOD BY AUTOMATED COUNT: 14.2 % (ref 12.3–15.4)
ERYTHROCYTE [SEDIMENTATION RATE] IN BLOOD: 15 MM/HR (ref 0–30)
FLUAV RNA RESP QL NAA+PROBE: NOT DETECTED
FLUBV RNA RESP QL NAA+PROBE: NOT DETECTED
GFR SERPL CREATININE-BSD FRML MDRD: 54 ML/MIN/1.73
GLOBULIN UR ELPH-MCNC: 2.7 GM/DL
GLUCOSE SERPL-MCNC: 149 MG/DL (ref 65–99)
GLUCOSE UR STRIP-MCNC: NEGATIVE MG/DL
HCT VFR BLD AUTO: 38.2 % (ref 34–46.6)
HCT VFR BLD AUTO: 41.2 % (ref 34–46.6)
HDLC SERPL-MCNC: 88 MG/DL (ref 40–60)
HGB BLD-MCNC: 12.7 G/DL (ref 12–15.9)
HGB BLD-MCNC: 13.4 G/DL (ref 12–15.9)
HGB UR QL STRIP.AUTO: NEGATIVE
HOLD SPECIMEN: NORMAL
HYALINE CASTS UR QL AUTO: NORMAL /LPF
IMM GRANULOCYTES # BLD AUTO: 0.03 10*3/MM3 (ref 0–0.05)
IMM GRANULOCYTES # BLD AUTO: 0.03 10*3/MM3 (ref 0–0.05)
IMM GRANULOCYTES NFR BLD AUTO: 0.5 % (ref 0–0.5)
IMM GRANULOCYTES NFR BLD AUTO: 0.6 % (ref 0–0.5)
INR PPP: 0.99 (ref 0.85–1.16)
KETONES UR QL STRIP: NEGATIVE
LDLC SERPL CALC-MCNC: 210 MG/DL (ref 0–100)
LDLC/HDLC SERPL: 2.34 {RATIO}
LEUKOCYTE ESTERASE UR QL STRIP.AUTO: NEGATIVE
LIPASE SERPL-CCNC: 28 U/L (ref 13–60)
LYMPHOCYTES # BLD AUTO: 0.74 10*3/MM3 (ref 0.7–3.1)
LYMPHOCYTES # BLD AUTO: 1.14 10*3/MM3 (ref 0.7–3.1)
LYMPHOCYTES NFR BLD AUTO: 12.1 % (ref 19.6–45.3)
LYMPHOCYTES NFR BLD AUTO: 22.4 % (ref 19.6–45.3)
MCH RBC QN AUTO: 29.1 PG (ref 26.6–33)
MCH RBC QN AUTO: 29.1 PG (ref 26.6–33)
MCHC RBC AUTO-ENTMCNC: 32.5 G/DL (ref 31.5–35.7)
MCHC RBC AUTO-ENTMCNC: 33.2 G/DL (ref 31.5–35.7)
MCV RBC AUTO: 87.6 FL (ref 79–97)
MCV RBC AUTO: 89.4 FL (ref 79–97)
MONOCYTES # BLD AUTO: 0.39 10*3/MM3 (ref 0.1–0.9)
MONOCYTES # BLD AUTO: 0.4 10*3/MM3 (ref 0.1–0.9)
MONOCYTES NFR BLD AUTO: 6.5 % (ref 5–12)
MONOCYTES NFR BLD AUTO: 7.6 % (ref 5–12)
NEUTROPHILS NFR BLD AUTO: 3.44 10*3/MM3 (ref 1.7–7)
NEUTROPHILS NFR BLD AUTO: 4.89 10*3/MM3 (ref 1.7–7)
NEUTROPHILS NFR BLD AUTO: 67.4 % (ref 42.7–76)
NEUTROPHILS NFR BLD AUTO: 79.7 % (ref 42.7–76)
NITRITE UR QL STRIP: NEGATIVE
NRBC BLD AUTO-RTO: 0 /100 WBC (ref 0–0.2)
NRBC BLD AUTO-RTO: 0 /100 WBC (ref 0–0.2)
NT-PROBNP SERPL-MCNC: 365.4 PG/ML (ref 0–1800)
OSMOLALITY UR: 367 MOSM/KG (ref 300–1100)
PH UR STRIP.AUTO: 8 [PH] (ref 5–8)
PLATELET # BLD AUTO: 234 10*3/MM3 (ref 140–450)
PLATELET # BLD AUTO: 239 10*3/MM3 (ref 140–450)
PMV BLD AUTO: 9.3 FL (ref 6–12)
PMV BLD AUTO: 9.5 FL (ref 6–12)
POTASSIUM SERPL-SCNC: 3.4 MMOL/L (ref 3.5–5.2)
POTASSIUM SERPL-SCNC: 3.6 MMOL/L (ref 3.5–5.2)
PROT SERPL-MCNC: 6.7 G/DL (ref 6–8.5)
PROT UR QL STRIP: ABNORMAL
PROTHROMBIN TIME: 12.8 SECONDS (ref 11.4–14.4)
QT INTERVAL: 446 MS
QT INTERVAL: 460 MS
QTC INTERVAL: 430 MS
QTC INTERVAL: 440 MS
RBC # BLD AUTO: 4.36 10*6/MM3 (ref 3.77–5.28)
RBC # BLD AUTO: 4.61 10*6/MM3 (ref 3.77–5.28)
RBC # UR: NORMAL /HPF
REF LAB TEST METHOD: NORMAL
SARS-COV-2 RNA RESP QL NAA+PROBE: NOT DETECTED
SODIUM SERPL-SCNC: 130 MMOL/L (ref 136–145)
SODIUM UR-SCNC: 158 MMOL/L
SP GR UR STRIP: 1.01 (ref 1–1.03)
SQUAMOUS #/AREA URNS HPF: NORMAL /HPF
T4 FREE SERPL-MCNC: 0.37 NG/DL (ref 0.93–1.7)
TRIGL SERPL-MCNC: 124 MG/DL (ref 0–150)
TROPONIN T SERPL-MCNC: 0.13 NG/ML (ref 0–0.03)
TROPONIN T SERPL-MCNC: 0.33 NG/ML (ref 0–0.03)
TROPONIN T SERPL-MCNC: 0.62 NG/ML (ref 0–0.03)
TSH SERPL DL<=0.05 MIU/L-ACNC: 118.8 UIU/ML (ref 0.27–4.2)
UFH PPP CHRO-ACNC: 0.1 IU/ML (ref 0.3–0.7)
UFH PPP CHRO-ACNC: 0.65 IU/ML (ref 0.3–0.7)
UROBILINOGEN UR QL STRIP: ABNORMAL
VLDLC SERPL-MCNC: 21 MG/DL (ref 5–40)
WBC # BLD AUTO: 5.1 10*3/MM3 (ref 3.4–10.8)
WBC # BLD AUTO: 6.13 10*3/MM3 (ref 3.4–10.8)
WBC UR QL AUTO: NORMAL /HPF
WHOLE BLOOD HOLD SPECIMEN: NORMAL
WHOLE BLOOD HOLD SPECIMEN: NORMAL

## 2021-05-23 PROCEDURE — 25010000002 MIDAZOLAM PER 1 MG: Performed by: INTERNAL MEDICINE

## 2021-05-23 PROCEDURE — 99222 1ST HOSP IP/OBS MODERATE 55: CPT | Performed by: INTERNAL MEDICINE

## 2021-05-23 PROCEDURE — 99223 1ST HOSP IP/OBS HIGH 75: CPT | Performed by: INTERNAL MEDICINE

## 2021-05-23 PROCEDURE — 83880 ASSAY OF NATRIURETIC PEPTIDE: CPT | Performed by: EMERGENCY MEDICINE

## 2021-05-23 PROCEDURE — C1894 INTRO/SHEATH, NON-LASER: HCPCS | Performed by: INTERNAL MEDICINE

## 2021-05-23 PROCEDURE — 84484 ASSAY OF TROPONIN QUANT: CPT | Performed by: PHYSICIAN ASSISTANT

## 2021-05-23 PROCEDURE — 84484 ASSAY OF TROPONIN QUANT: CPT | Performed by: EMERGENCY MEDICINE

## 2021-05-23 PROCEDURE — 83935 ASSAY OF URINE OSMOLALITY: CPT | Performed by: PHYSICIAN ASSISTANT

## 2021-05-23 PROCEDURE — 25010000002 FENTANYL CITRATE (PF) 50 MCG/ML SOLUTION: Performed by: INTERNAL MEDICINE

## 2021-05-23 PROCEDURE — 93005 ELECTROCARDIOGRAM TRACING: CPT | Performed by: PHYSICIAN ASSISTANT

## 2021-05-23 PROCEDURE — 84439 ASSAY OF FREE THYROXINE: CPT | Performed by: INTERNAL MEDICINE

## 2021-05-23 PROCEDURE — 84132 ASSAY OF SERUM POTASSIUM: CPT | Performed by: HOSPITALIST

## 2021-05-23 PROCEDURE — 80061 LIPID PANEL: CPT | Performed by: PHYSICIAN ASSISTANT

## 2021-05-23 PROCEDURE — 25010000002 HEPARIN (PORCINE) PER 1000 UNITS: Performed by: INTERNAL MEDICINE

## 2021-05-23 PROCEDURE — 80053 COMPREHEN METABOLIC PANEL: CPT | Performed by: EMERGENCY MEDICINE

## 2021-05-23 PROCEDURE — 83690 ASSAY OF LIPASE: CPT | Performed by: EMERGENCY MEDICINE

## 2021-05-23 PROCEDURE — 4A023N7 MEASUREMENT OF CARDIAC SAMPLING AND PRESSURE, LEFT HEART, PERCUTANEOUS APPROACH: ICD-10-PCS | Performed by: INTERNAL MEDICINE

## 2021-05-23 PROCEDURE — 85610 PROTHROMBIN TIME: CPT | Performed by: EMERGENCY MEDICINE

## 2021-05-23 PROCEDURE — 85520 HEPARIN ASSAY: CPT | Performed by: EMERGENCY MEDICINE

## 2021-05-23 PROCEDURE — 84443 ASSAY THYROID STIM HORMONE: CPT | Performed by: INTERNAL MEDICINE

## 2021-05-23 PROCEDURE — B2111ZZ FLUOROSCOPY OF MULTIPLE CORONARY ARTERIES USING LOW OSMOLAR CONTRAST: ICD-10-PCS | Performed by: INTERNAL MEDICINE

## 2021-05-23 PROCEDURE — 85730 THROMBOPLASTIN TIME PARTIAL: CPT | Performed by: EMERGENCY MEDICINE

## 2021-05-23 PROCEDURE — 85652 RBC SED RATE AUTOMATED: CPT | Performed by: PHYSICIAN ASSISTANT

## 2021-05-23 PROCEDURE — 93458 L HRT ARTERY/VENTRICLE ANGIO: CPT | Performed by: INTERNAL MEDICINE

## 2021-05-23 PROCEDURE — 25010000002 HEPARIN (PORCINE) PER 1000 UNITS: Performed by: EMERGENCY MEDICINE

## 2021-05-23 PROCEDURE — 87636 SARSCOV2 & INF A&B AMP PRB: CPT | Performed by: INTERNAL MEDICINE

## 2021-05-23 PROCEDURE — 70450 CT HEAD/BRAIN W/O DYE: CPT

## 2021-05-23 PROCEDURE — 81001 URINALYSIS AUTO W/SCOPE: CPT | Performed by: PHYSICIAN ASSISTANT

## 2021-05-23 PROCEDURE — 93005 ELECTROCARDIOGRAM TRACING: CPT | Performed by: EMERGENCY MEDICINE

## 2021-05-23 PROCEDURE — C1769 GUIDE WIRE: HCPCS | Performed by: INTERNAL MEDICINE

## 2021-05-23 PROCEDURE — 25010000002 PHENYLEPHRINE 10 MG/ML SOLUTION: Performed by: INTERNAL MEDICINE

## 2021-05-23 PROCEDURE — 85025 COMPLETE CBC W/AUTO DIFF WBC: CPT | Performed by: EMERGENCY MEDICINE

## 2021-05-23 PROCEDURE — 85520 HEPARIN ASSAY: CPT

## 2021-05-23 PROCEDURE — 0 IOPAMIDOL PER 1 ML: Performed by: INTERNAL MEDICINE

## 2021-05-23 PROCEDURE — 99284 EMERGENCY DEPT VISIT MOD MDM: CPT

## 2021-05-23 PROCEDURE — 71045 X-RAY EXAM CHEST 1 VIEW: CPT

## 2021-05-23 PROCEDURE — 93010 ELECTROCARDIOGRAM REPORT: CPT | Performed by: INTERNAL MEDICINE

## 2021-05-23 PROCEDURE — 84300 ASSAY OF URINE SODIUM: CPT | Performed by: PHYSICIAN ASSISTANT

## 2021-05-23 PROCEDURE — 25010000002 HEPARIN (PORCINE) 25000-0.45 UT/250ML-% SOLUTION: Performed by: EMERGENCY MEDICINE

## 2021-05-23 RX ORDER — HEPARIN SODIUM 1000 [USP'U]/ML
60 INJECTION, SOLUTION INTRAVENOUS; SUBCUTANEOUS ONCE
Status: COMPLETED | OUTPATIENT
Start: 2021-05-23 | End: 2021-05-23

## 2021-05-23 RX ORDER — POTASSIUM CHLORIDE 1.5 G/1.77G
40 POWDER, FOR SOLUTION ORAL AS NEEDED
Status: DISCONTINUED | OUTPATIENT
Start: 2021-05-23 | End: 2021-05-28 | Stop reason: HOSPADM

## 2021-05-23 RX ORDER — BISACODYL 5 MG/1
5 TABLET, DELAYED RELEASE ORAL DAILY PRN
Status: DISCONTINUED | OUTPATIENT
Start: 2021-05-23 | End: 2021-05-28 | Stop reason: HOSPADM

## 2021-05-23 RX ORDER — SODIUM CHLORIDE 9 MG/ML
3 INJECTION, SOLUTION INTRAVENOUS CONTINUOUS
Status: CANCELLED | OUTPATIENT
Start: 2021-05-24 | End: 2021-05-24

## 2021-05-23 RX ORDER — HEPARIN SODIUM 1000 [USP'U]/ML
60 INJECTION, SOLUTION INTRAVENOUS; SUBCUTANEOUS AS NEEDED
Status: DISCONTINUED | OUTPATIENT
Start: 2021-05-23 | End: 2021-05-23

## 2021-05-23 RX ORDER — DOCUSATE SODIUM 100 MG/1
200 CAPSULE, LIQUID FILLED ORAL DAILY PRN
Status: DISCONTINUED | OUTPATIENT
Start: 2021-05-23 | End: 2021-05-28 | Stop reason: HOSPADM

## 2021-05-23 RX ORDER — ONDANSETRON 2 MG/ML
4 INJECTION INTRAMUSCULAR; INTRAVENOUS EVERY 6 HOURS PRN
Status: DISCONTINUED | OUTPATIENT
Start: 2021-05-23 | End: 2021-05-28 | Stop reason: HOSPADM

## 2021-05-23 RX ORDER — ASPIRIN 81 MG/1
324 TABLET, CHEWABLE ORAL ONCE
Status: DISCONTINUED | OUTPATIENT
Start: 2021-05-23 | End: 2021-05-24

## 2021-05-23 RX ORDER — LIDOCAINE HYDROCHLORIDE 10 MG/ML
INJECTION, SOLUTION EPIDURAL; INFILTRATION; INTRACAUDAL; PERINEURAL AS NEEDED
Status: DISCONTINUED | OUTPATIENT
Start: 2021-05-23 | End: 2021-05-23 | Stop reason: HOSPADM

## 2021-05-23 RX ORDER — FENTANYL CITRATE 50 UG/ML
INJECTION, SOLUTION INTRAMUSCULAR; INTRAVENOUS AS NEEDED
Status: DISCONTINUED | OUTPATIENT
Start: 2021-05-23 | End: 2021-05-23 | Stop reason: HOSPADM

## 2021-05-23 RX ORDER — FAMOTIDINE 20 MG/1
20 TABLET, FILM COATED ORAL
Status: DISCONTINUED | OUTPATIENT
Start: 2021-05-23 | End: 2021-05-28 | Stop reason: HOSPADM

## 2021-05-23 RX ORDER — AMLODIPINE BESYLATE 10 MG/1
10 TABLET ORAL DAILY
Status: DISCONTINUED | OUTPATIENT
Start: 2021-05-23 | End: 2021-05-28 | Stop reason: HOSPADM

## 2021-05-23 RX ORDER — METOPROLOL SUCCINATE 25 MG/1
25 TABLET, EXTENDED RELEASE ORAL
Status: DISCONTINUED | OUTPATIENT
Start: 2021-05-23 | End: 2021-05-28 | Stop reason: HOSPADM

## 2021-05-23 RX ORDER — HYDRALAZINE HYDROCHLORIDE 10 MG/1
10 TABLET, FILM COATED ORAL EVERY 8 HOURS SCHEDULED
Status: DISCONTINUED | OUTPATIENT
Start: 2021-05-23 | End: 2021-05-23

## 2021-05-23 RX ORDER — DULOXETIN HYDROCHLORIDE 30 MG/1
30 CAPSULE, DELAYED RELEASE ORAL DAILY
Status: DISCONTINUED | OUTPATIENT
Start: 2021-05-23 | End: 2021-05-25

## 2021-05-23 RX ORDER — PAROXETINE HYDROCHLORIDE 20 MG/1
20 TABLET, FILM COATED ORAL EVERY MORNING
Status: DISCONTINUED | OUTPATIENT
Start: 2021-05-23 | End: 2021-05-25

## 2021-05-23 RX ORDER — POTASSIUM CHLORIDE 750 MG/1
40 CAPSULE, EXTENDED RELEASE ORAL AS NEEDED
Status: DISCONTINUED | OUTPATIENT
Start: 2021-05-23 | End: 2021-05-28 | Stop reason: HOSPADM

## 2021-05-23 RX ORDER — ASPIRIN 81 MG/1
81 TABLET ORAL DAILY
Status: CANCELLED | OUTPATIENT
Start: 2021-05-24

## 2021-05-23 RX ORDER — ASPIRIN 81 MG/1
324 TABLET, CHEWABLE ORAL ONCE
Status: CANCELLED | OUTPATIENT
Start: 2021-05-24

## 2021-05-23 RX ORDER — LEVOTHYROXINE SODIUM 137 UG/1
137 TABLET ORAL DAILY
COMMUNITY
End: 2021-05-28 | Stop reason: HOSPADM

## 2021-05-23 RX ORDER — SODIUM CHLORIDE 0.9 % (FLUSH) 0.9 %
10 SYRINGE (ML) INJECTION AS NEEDED
Status: DISCONTINUED | OUTPATIENT
Start: 2021-05-23 | End: 2021-05-28

## 2021-05-23 RX ORDER — MIDAZOLAM HYDROCHLORIDE 1 MG/ML
INJECTION INTRAMUSCULAR; INTRAVENOUS AS NEEDED
Status: DISCONTINUED | OUTPATIENT
Start: 2021-05-23 | End: 2021-05-23 | Stop reason: HOSPADM

## 2021-05-23 RX ORDER — POLYETHYLENE GLYCOL 3350 17 G/17G
17 POWDER, FOR SOLUTION ORAL DAILY PRN
Status: DISCONTINUED | OUTPATIENT
Start: 2021-05-23 | End: 2021-05-28 | Stop reason: HOSPADM

## 2021-05-23 RX ORDER — ATORVASTATIN CALCIUM 40 MG/1
40 TABLET, FILM COATED ORAL DAILY
Status: DISCONTINUED | OUTPATIENT
Start: 2021-05-23 | End: 2021-05-28 | Stop reason: HOSPADM

## 2021-05-23 RX ORDER — HEPARIN SODIUM 10000 [USP'U]/100ML
12 INJECTION, SOLUTION INTRAVENOUS
Status: DISCONTINUED | OUTPATIENT
Start: 2021-05-23 | End: 2021-05-23

## 2021-05-23 RX ORDER — PRIMIDONE 50 MG/1
25 TABLET ORAL NIGHTLY
Status: DISCONTINUED | OUTPATIENT
Start: 2021-05-23 | End: 2021-05-28 | Stop reason: HOSPADM

## 2021-05-23 RX ORDER — POTASSIUM CHLORIDE 7.45 MG/ML
10 INJECTION INTRAVENOUS
Status: DISCONTINUED | OUTPATIENT
Start: 2021-05-23 | End: 2021-05-28 | Stop reason: HOSPADM

## 2021-05-23 RX ORDER — BISACODYL 10 MG
10 SUPPOSITORY, RECTAL RECTAL DAILY PRN
Status: DISCONTINUED | OUTPATIENT
Start: 2021-05-23 | End: 2021-05-28 | Stop reason: HOSPADM

## 2021-05-23 RX ORDER — ENALAPRIL MALEATE 10 MG/1
40 TABLET ORAL DAILY
Status: DISCONTINUED | OUTPATIENT
Start: 2021-05-23 | End: 2021-05-28 | Stop reason: HOSPADM

## 2021-05-23 RX ORDER — PRIMIDONE 50 MG/1
50 TABLET ORAL EVERY MORNING
Status: DISCONTINUED | OUTPATIENT
Start: 2021-05-23 | End: 2021-05-28 | Stop reason: HOSPADM

## 2021-05-23 RX ORDER — CLOPIDOGREL BISULFATE 75 MG/1
300 TABLET ORAL ONCE
Status: COMPLETED | OUTPATIENT
Start: 2021-05-23 | End: 2021-05-23

## 2021-05-23 RX ORDER — SODIUM CHLORIDE 0.9 % (FLUSH) 0.9 %
10 SYRINGE (ML) INJECTION AS NEEDED
Status: DISCONTINUED | OUTPATIENT
Start: 2021-05-23 | End: 2021-05-28 | Stop reason: HOSPADM

## 2021-05-23 RX ORDER — HEPARIN SODIUM 1000 [USP'U]/ML
30 INJECTION, SOLUTION INTRAVENOUS; SUBCUTANEOUS AS NEEDED
Status: DISCONTINUED | OUTPATIENT
Start: 2021-05-23 | End: 2021-05-23

## 2021-05-23 RX ORDER — AMOXICILLIN 250 MG
2 CAPSULE ORAL 2 TIMES DAILY
Status: DISCONTINUED | OUTPATIENT
Start: 2021-05-23 | End: 2021-05-28 | Stop reason: HOSPADM

## 2021-05-23 RX ORDER — SODIUM CHLORIDE 0.9 % (FLUSH) 0.9 %
10 SYRINGE (ML) INJECTION EVERY 12 HOURS SCHEDULED
Status: DISCONTINUED | OUTPATIENT
Start: 2021-05-23 | End: 2021-05-28 | Stop reason: HOSPADM

## 2021-05-23 RX ORDER — LEVOTHYROXINE SODIUM 0.15 MG/1
150 TABLET ORAL
Status: DISCONTINUED | OUTPATIENT
Start: 2021-05-23 | End: 2021-05-28 | Stop reason: HOSPADM

## 2021-05-23 RX ORDER — CLOPIDOGREL BISULFATE 75 MG/1
75 TABLET ORAL DAILY
Status: DISCONTINUED | OUTPATIENT
Start: 2021-05-24 | End: 2021-05-28 | Stop reason: HOSPADM

## 2021-05-23 RX ORDER — PHENYLEPHRINE HYDROCHLORIDE 10 MG/ML
INJECTION INTRAVENOUS AS NEEDED
Status: DISCONTINUED | OUTPATIENT
Start: 2021-05-23 | End: 2021-05-23 | Stop reason: HOSPADM

## 2021-05-23 RX ORDER — NITROGLYCERIN 20 MG/100ML
5-200 INJECTION INTRAVENOUS
Status: DISCONTINUED | OUTPATIENT
Start: 2021-05-23 | End: 2021-05-28

## 2021-05-23 RX ADMIN — AMLODIPINE BESYLATE 10 MG: 10 TABLET ORAL at 08:29

## 2021-05-23 RX ADMIN — FAMOTIDINE 20 MG: 20 TABLET, FILM COATED ORAL at 16:10

## 2021-05-23 RX ADMIN — ATORVASTATIN CALCIUM 40 MG: 40 TABLET, FILM COATED ORAL at 08:30

## 2021-05-23 RX ADMIN — SODIUM CHLORIDE, PRESERVATIVE FREE 10 ML: 5 INJECTION INTRAVENOUS at 20:31

## 2021-05-23 RX ADMIN — NITROGLYCERIN 5 MCG/MIN: 20 INJECTION INTRAVENOUS at 03:48

## 2021-05-23 RX ADMIN — DOCUSATE SODIUM 50MG AND SENNOSIDES 8.6MG 2 TABLET: 8.6; 5 TABLET, FILM COATED ORAL at 08:29

## 2021-05-23 RX ADMIN — PRIMIDONE 50 MG: 50 TABLET ORAL at 08:30

## 2021-05-23 RX ADMIN — DULOXETINE HYDROCHLORIDE 30 MG: 30 CAPSULE, DELAYED RELEASE ORAL at 08:30

## 2021-05-23 RX ADMIN — HEPARIN SODIUM 3920 UNITS: 1000 INJECTION, SOLUTION INTRAVENOUS; SUBCUTANEOUS at 04:18

## 2021-05-23 RX ADMIN — PRIMIDONE 25 MG: 50 TABLET ORAL at 20:29

## 2021-05-23 RX ADMIN — FAMOTIDINE 20 MG: 20 TABLET, FILM COATED ORAL at 08:30

## 2021-05-23 RX ADMIN — DOCUSATE SODIUM 50MG AND SENNOSIDES 8.6MG 2 TABLET: 8.6; 5 TABLET, FILM COATED ORAL at 20:29

## 2021-05-23 RX ADMIN — ENALAPRIL MALEATE 40 MG: 10 TABLET ORAL at 08:35

## 2021-05-23 RX ADMIN — PAROXETINE HYDROCHLORIDE 20 MG: 20 TABLET, FILM COATED ORAL at 08:29

## 2021-05-23 RX ADMIN — METOPROLOL SUCCINATE 25 MG: 25 TABLET, EXTENDED RELEASE ORAL at 16:11

## 2021-05-23 RX ADMIN — CLOPIDOGREL BISULFATE 300 MG: 75 TABLET ORAL at 04:13

## 2021-05-23 RX ADMIN — LEVOTHYROXINE SODIUM 150 MCG: 150 TABLET ORAL at 08:30

## 2021-05-23 RX ADMIN — HEPARIN SODIUM 12 UNITS/KG/HR: 10000 INJECTION, SOLUTION INTRAVENOUS at 04:18

## 2021-05-24 ENCOUNTER — TELEPHONE (OUTPATIENT)
Dept: INTERNAL MEDICINE | Facility: CLINIC | Age: 81
End: 2021-05-24

## 2021-05-24 LAB
ANION GAP SERPL CALCULATED.3IONS-SCNC: 12 MMOL/L (ref 5–15)
BUN SERPL-MCNC: 12 MG/DL (ref 8–23)
BUN/CREAT SERPL: 13.5 (ref 7–25)
CALCIUM SPEC-SCNC: 9.4 MG/DL (ref 8.6–10.5)
CHLORIDE SERPL-SCNC: 91 MMOL/L (ref 98–107)
CO2 SERPL-SCNC: 26 MMOL/L (ref 22–29)
CREAT SERPL-MCNC: 0.89 MG/DL (ref 0.57–1)
GFR SERPL CREATININE-BSD FRML MDRD: 61 ML/MIN/1.73
GLUCOSE SERPL-MCNC: 91 MG/DL (ref 65–99)
POTASSIUM SERPL-SCNC: 3.6 MMOL/L (ref 3.5–5.2)
QT INTERVAL: 532 MS
QTC INTERVAL: 494 MS
SODIUM SERPL-SCNC: 129 MMOL/L (ref 136–145)
T3FREE SERPL-MCNC: 0.55 PG/ML (ref 2–4.4)
T4 FREE SERPL-MCNC: 0.52 NG/DL (ref 0.93–1.7)
TROPONIN T SERPL-MCNC: 0.49 NG/ML (ref 0–0.03)
TSH SERPL DL<=0.05 MIU/L-ACNC: 95.63 UIU/ML (ref 0.27–4.2)

## 2021-05-24 PROCEDURE — 84443 ASSAY THYROID STIM HORMONE: CPT | Performed by: HOSPITALIST

## 2021-05-24 PROCEDURE — 99232 SBSQ HOSP IP/OBS MODERATE 35: CPT | Performed by: HOSPITALIST

## 2021-05-24 PROCEDURE — 97161 PT EVAL LOW COMPLEX 20 MIN: CPT

## 2021-05-24 PROCEDURE — 84481 FREE ASSAY (FT-3): CPT | Performed by: HOSPITALIST

## 2021-05-24 PROCEDURE — 97535 SELF CARE MNGMENT TRAINING: CPT

## 2021-05-24 PROCEDURE — 25010000002 HEPARIN (PORCINE) PER 1000 UNITS: Performed by: HOSPITALIST

## 2021-05-24 PROCEDURE — 93005 ELECTROCARDIOGRAM TRACING: CPT | Performed by: NURSE PRACTITIONER

## 2021-05-24 PROCEDURE — 99232 SBSQ HOSP IP/OBS MODERATE 35: CPT | Performed by: INTERNAL MEDICINE

## 2021-05-24 PROCEDURE — 84439 ASSAY OF FREE THYROXINE: CPT | Performed by: HOSPITALIST

## 2021-05-24 PROCEDURE — 97110 THERAPEUTIC EXERCISES: CPT

## 2021-05-24 PROCEDURE — 93010 ELECTROCARDIOGRAM REPORT: CPT | Performed by: INTERNAL MEDICINE

## 2021-05-24 PROCEDURE — 84484 ASSAY OF TROPONIN QUANT: CPT | Performed by: NURSE PRACTITIONER

## 2021-05-24 PROCEDURE — 80048 BASIC METABOLIC PNL TOTAL CA: CPT | Performed by: NURSE PRACTITIONER

## 2021-05-24 PROCEDURE — 97166 OT EVAL MOD COMPLEX 45 MIN: CPT

## 2021-05-24 RX ORDER — ASPIRIN 81 MG/1
81 TABLET ORAL DAILY
Status: DISCONTINUED | OUTPATIENT
Start: 2021-05-24 | End: 2021-05-28 | Stop reason: HOSPADM

## 2021-05-24 RX ORDER — HEPARIN SODIUM 5000 [USP'U]/ML
5000 INJECTION, SOLUTION INTRAVENOUS; SUBCUTANEOUS EVERY 8 HOURS SCHEDULED
Status: DISCONTINUED | OUTPATIENT
Start: 2021-05-24 | End: 2021-05-28 | Stop reason: HOSPADM

## 2021-05-24 RX ORDER — ACETAMINOPHEN 325 MG/1
650 TABLET ORAL EVERY 4 HOURS PRN
Status: DISCONTINUED | OUTPATIENT
Start: 2021-05-24 | End: 2021-05-28 | Stop reason: HOSPADM

## 2021-05-24 RX ADMIN — SODIUM CHLORIDE, PRESERVATIVE FREE 10 ML: 5 INJECTION INTRAVENOUS at 21:42

## 2021-05-24 RX ADMIN — ATORVASTATIN CALCIUM 40 MG: 40 TABLET, FILM COATED ORAL at 09:40

## 2021-05-24 RX ADMIN — AMLODIPINE BESYLATE 10 MG: 10 TABLET ORAL at 09:40

## 2021-05-24 RX ADMIN — LEVOTHYROXINE SODIUM 150 MCG: 150 TABLET ORAL at 06:56

## 2021-05-24 RX ADMIN — SODIUM CHLORIDE, PRESERVATIVE FREE 10 ML: 5 INJECTION INTRAVENOUS at 09:41

## 2021-05-24 RX ADMIN — PRIMIDONE 50 MG: 50 TABLET ORAL at 09:40

## 2021-05-24 RX ADMIN — DOCUSATE SODIUM 50MG AND SENNOSIDES 8.6MG 2 TABLET: 8.6; 5 TABLET, FILM COATED ORAL at 21:22

## 2021-05-24 RX ADMIN — FAMOTIDINE 20 MG: 20 TABLET, FILM COATED ORAL at 06:56

## 2021-05-24 RX ADMIN — HEPARIN SODIUM 5000 UNITS: 5000 INJECTION INTRAVENOUS; SUBCUTANEOUS at 21:22

## 2021-05-24 RX ADMIN — ASPIRIN 81 MG: 81 TABLET, COATED ORAL at 09:40

## 2021-05-24 RX ADMIN — DULOXETINE HYDROCHLORIDE 30 MG: 30 CAPSULE, DELAYED RELEASE ORAL at 09:40

## 2021-05-24 RX ADMIN — DOCUSATE SODIUM 50MG AND SENNOSIDES 8.6MG 2 TABLET: 8.6; 5 TABLET, FILM COATED ORAL at 09:40

## 2021-05-24 RX ADMIN — CLOPIDOGREL BISULFATE 75 MG: 75 TABLET ORAL at 09:40

## 2021-05-24 RX ADMIN — PAROXETINE HYDROCHLORIDE 20 MG: 20 TABLET, FILM COATED ORAL at 09:40

## 2021-05-24 RX ADMIN — FAMOTIDINE 20 MG: 20 TABLET, FILM COATED ORAL at 18:14

## 2021-05-24 RX ADMIN — PRIMIDONE 25 MG: 50 TABLET ORAL at 21:22

## 2021-05-24 RX ADMIN — METOPROLOL SUCCINATE 25 MG: 25 TABLET, EXTENDED RELEASE ORAL at 09:40

## 2021-05-24 RX ADMIN — ENALAPRIL MALEATE 40 MG: 10 TABLET ORAL at 09:44

## 2021-05-25 LAB
ANION GAP SERPL CALCULATED.3IONS-SCNC: 13 MMOL/L (ref 5–15)
BUN SERPL-MCNC: 16 MG/DL (ref 8–23)
BUN/CREAT SERPL: 20.3 (ref 7–25)
CALCIUM SPEC-SCNC: 9 MG/DL (ref 8.6–10.5)
CHLORIDE SERPL-SCNC: 90 MMOL/L (ref 98–107)
CO2 SERPL-SCNC: 24 MMOL/L (ref 22–29)
CORTIS SERPL-MCNC: 13.82 MCG/DL
CREAT SERPL-MCNC: 0.79 MG/DL (ref 0.57–1)
GFR SERPL CREATININE-BSD FRML MDRD: 70 ML/MIN/1.73
GLUCOSE SERPL-MCNC: 81 MG/DL (ref 65–99)
OSMOLALITY SERPL: 265 MOSM/KG (ref 275–295)
POTASSIUM SERPL-SCNC: 3.9 MMOL/L (ref 3.5–5.2)
SODIUM SERPL-SCNC: 127 MMOL/L (ref 136–145)

## 2021-05-25 PROCEDURE — 82533 TOTAL CORTISOL: CPT | Performed by: NURSE PRACTITIONER

## 2021-05-25 PROCEDURE — 97116 GAIT TRAINING THERAPY: CPT

## 2021-05-25 PROCEDURE — 97110 THERAPEUTIC EXERCISES: CPT

## 2021-05-25 PROCEDURE — 84588 ASSAY OF VASOPRESSIN: CPT | Performed by: NURSE PRACTITIONER

## 2021-05-25 PROCEDURE — 25010000002 HEPARIN (PORCINE) PER 1000 UNITS: Performed by: HOSPITALIST

## 2021-05-25 PROCEDURE — 99232 SBSQ HOSP IP/OBS MODERATE 35: CPT | Performed by: NURSE PRACTITIONER

## 2021-05-25 PROCEDURE — 80048 BASIC METABOLIC PNL TOTAL CA: CPT | Performed by: NURSE PRACTITIONER

## 2021-05-25 PROCEDURE — 83930 ASSAY OF BLOOD OSMOLALITY: CPT | Performed by: NURSE PRACTITIONER

## 2021-05-25 RX ORDER — DULOXETIN HYDROCHLORIDE 20 MG/1
20 CAPSULE, DELAYED RELEASE ORAL DAILY
Status: DISCONTINUED | OUTPATIENT
Start: 2021-05-26 | End: 2021-05-26

## 2021-05-25 RX ORDER — PAROXETINE HYDROCHLORIDE 20 MG/1
10 TABLET, FILM COATED ORAL EVERY MORNING
Status: DISCONTINUED | OUTPATIENT
Start: 2021-05-26 | End: 2021-05-26

## 2021-05-25 RX ADMIN — AMLODIPINE BESYLATE 10 MG: 10 TABLET ORAL at 09:16

## 2021-05-25 RX ADMIN — PRIMIDONE 25 MG: 50 TABLET ORAL at 21:21

## 2021-05-25 RX ADMIN — FAMOTIDINE 20 MG: 20 TABLET, FILM COATED ORAL at 19:09

## 2021-05-25 RX ADMIN — HEPARIN SODIUM 5000 UNITS: 5000 INJECTION INTRAVENOUS; SUBCUTANEOUS at 14:07

## 2021-05-25 RX ADMIN — SODIUM CHLORIDE, PRESERVATIVE FREE 10 ML: 5 INJECTION INTRAVENOUS at 21:21

## 2021-05-25 RX ADMIN — LEVOTHYROXINE SODIUM 150 MCG: 150 TABLET ORAL at 05:30

## 2021-05-25 RX ADMIN — HEPARIN SODIUM 5000 UNITS: 5000 INJECTION INTRAVENOUS; SUBCUTANEOUS at 05:30

## 2021-05-25 RX ADMIN — FAMOTIDINE 20 MG: 20 TABLET, FILM COATED ORAL at 09:16

## 2021-05-25 RX ADMIN — DOCUSATE SODIUM 50MG AND SENNOSIDES 8.6MG 2 TABLET: 8.6; 5 TABLET, FILM COATED ORAL at 09:16

## 2021-05-25 RX ADMIN — DULOXETINE HYDROCHLORIDE 30 MG: 30 CAPSULE, DELAYED RELEASE ORAL at 09:16

## 2021-05-25 RX ADMIN — CLOPIDOGREL BISULFATE 75 MG: 75 TABLET ORAL at 09:16

## 2021-05-25 RX ADMIN — HEPARIN SODIUM 5000 UNITS: 5000 INJECTION INTRAVENOUS; SUBCUTANEOUS at 21:21

## 2021-05-25 RX ADMIN — SODIUM CHLORIDE, PRESERVATIVE FREE 10 ML: 5 INJECTION INTRAVENOUS at 09:15

## 2021-05-25 RX ADMIN — ENALAPRIL MALEATE 40 MG: 10 TABLET ORAL at 09:16

## 2021-05-25 RX ADMIN — ASPIRIN 81 MG: 81 TABLET, COATED ORAL at 09:16

## 2021-05-25 RX ADMIN — ATORVASTATIN CALCIUM 40 MG: 40 TABLET, FILM COATED ORAL at 09:16

## 2021-05-25 RX ADMIN — PAROXETINE HYDROCHLORIDE 20 MG: 20 TABLET, FILM COATED ORAL at 09:16

## 2021-05-25 RX ADMIN — PRIMIDONE 50 MG: 50 TABLET ORAL at 09:16

## 2021-05-25 RX ADMIN — METOPROLOL SUCCINATE 25 MG: 25 TABLET, EXTENDED RELEASE ORAL at 09:16

## 2021-05-26 LAB
25(OH)D3 SERPL-MCNC: 27.2 NG/ML (ref 30–100)
ALBUMIN SERPL-MCNC: 3.5 G/DL (ref 3.5–5.2)
ALBUMIN/GLOB SERPL: 1.2 G/DL
ALP SERPL-CCNC: 75 U/L (ref 39–117)
ALT SERPL W P-5'-P-CCNC: 13 U/L (ref 1–33)
ANION GAP SERPL CALCULATED.3IONS-SCNC: 12 MMOL/L (ref 5–15)
AST SERPL-CCNC: 20 U/L (ref 1–32)
BASOPHILS # BLD AUTO: 0.04 10*3/MM3 (ref 0–0.2)
BASOPHILS NFR BLD AUTO: 0.7 % (ref 0–1.5)
BILIRUB SERPL-MCNC: 0.3 MG/DL (ref 0–1.2)
BUN SERPL-MCNC: 17 MG/DL (ref 8–23)
BUN/CREAT SERPL: 18.9 (ref 7–25)
CALCIUM SPEC-SCNC: 8.8 MG/DL (ref 8.6–10.5)
CHLORIDE SERPL-SCNC: 90 MMOL/L (ref 98–107)
CO2 SERPL-SCNC: 23 MMOL/L (ref 22–29)
CREAT SERPL-MCNC: 0.9 MG/DL (ref 0.57–1)
DEPRECATED RDW RBC AUTO: 48.7 FL (ref 37–54)
EOSINOPHIL # BLD AUTO: 0.06 10*3/MM3 (ref 0–0.4)
EOSINOPHIL NFR BLD AUTO: 1.1 % (ref 0.3–6.2)
ERYTHROCYTE [DISTWIDTH] IN BLOOD BY AUTOMATED COUNT: 14.4 % (ref 12.3–15.4)
GFR SERPL CREATININE-BSD FRML MDRD: 60 ML/MIN/1.73
GLOBULIN UR ELPH-MCNC: 2.9 GM/DL
GLUCOSE SERPL-MCNC: 86 MG/DL (ref 65–99)
HCT VFR BLD AUTO: 41.6 % (ref 34–46.6)
HGB BLD-MCNC: 13.1 G/DL (ref 12–15.9)
IMM GRANULOCYTES # BLD AUTO: 0.04 10*3/MM3 (ref 0–0.05)
IMM GRANULOCYTES NFR BLD AUTO: 0.7 % (ref 0–0.5)
LYMPHOCYTES # BLD AUTO: 1.05 10*3/MM3 (ref 0.7–3.1)
LYMPHOCYTES NFR BLD AUTO: 18.5 % (ref 19.6–45.3)
MCH RBC QN AUTO: 28.8 PG (ref 26.6–33)
MCHC RBC AUTO-ENTMCNC: 31.5 G/DL (ref 31.5–35.7)
MCV RBC AUTO: 91.4 FL (ref 79–97)
MONOCYTES # BLD AUTO: 0.39 10*3/MM3 (ref 0.1–0.9)
MONOCYTES NFR BLD AUTO: 6.9 % (ref 5–12)
NEUTROPHILS NFR BLD AUTO: 4.09 10*3/MM3 (ref 1.7–7)
NEUTROPHILS NFR BLD AUTO: 72.1 % (ref 42.7–76)
NRBC BLD AUTO-RTO: 0 /100 WBC (ref 0–0.2)
PLATELET # BLD AUTO: 226 10*3/MM3 (ref 140–450)
PMV BLD AUTO: 9.4 FL (ref 6–12)
POTASSIUM SERPL-SCNC: 4 MMOL/L (ref 3.5–5.2)
PROT SERPL-MCNC: 6.4 G/DL (ref 6–8.5)
RBC # BLD AUTO: 4.55 10*6/MM3 (ref 3.77–5.28)
SODIUM SERPL-SCNC: 125 MMOL/L (ref 136–145)
WBC # BLD AUTO: 5.67 10*3/MM3 (ref 3.4–10.8)

## 2021-05-26 PROCEDURE — 25010000002 HEPARIN (PORCINE) PER 1000 UNITS: Performed by: HOSPITALIST

## 2021-05-26 PROCEDURE — 99233 SBSQ HOSP IP/OBS HIGH 50: CPT | Performed by: FAMILY MEDICINE

## 2021-05-26 PROCEDURE — 82306 VITAMIN D 25 HYDROXY: CPT | Performed by: HOSPITALIST

## 2021-05-26 PROCEDURE — 97116 GAIT TRAINING THERAPY: CPT

## 2021-05-26 PROCEDURE — 85025 COMPLETE CBC W/AUTO DIFF WBC: CPT | Performed by: PHYSICIAN ASSISTANT

## 2021-05-26 PROCEDURE — 97530 THERAPEUTIC ACTIVITIES: CPT

## 2021-05-26 PROCEDURE — 80053 COMPREHEN METABOLIC PANEL: CPT | Performed by: HOSPITALIST

## 2021-05-26 PROCEDURE — 97535 SELF CARE MNGMENT TRAINING: CPT

## 2021-05-26 PROCEDURE — 97110 THERAPEUTIC EXERCISES: CPT

## 2021-05-26 RX ADMIN — ATORVASTATIN CALCIUM 40 MG: 40 TABLET, FILM COATED ORAL at 10:17

## 2021-05-26 RX ADMIN — LEVOTHYROXINE SODIUM 150 MCG: 150 TABLET ORAL at 05:19

## 2021-05-26 RX ADMIN — DOCUSATE SODIUM 50MG AND SENNOSIDES 8.6MG 2 TABLET: 8.6; 5 TABLET, FILM COATED ORAL at 21:11

## 2021-05-26 RX ADMIN — CLOPIDOGREL BISULFATE 75 MG: 75 TABLET ORAL at 10:16

## 2021-05-26 RX ADMIN — HEPARIN SODIUM 5000 UNITS: 5000 INJECTION INTRAVENOUS; SUBCUTANEOUS at 21:11

## 2021-05-26 RX ADMIN — FAMOTIDINE 20 MG: 20 TABLET, FILM COATED ORAL at 10:16

## 2021-05-26 RX ADMIN — ASPIRIN 81 MG: 81 TABLET, COATED ORAL at 10:16

## 2021-05-26 RX ADMIN — HEPARIN SODIUM 5000 UNITS: 5000 INJECTION INTRAVENOUS; SUBCUTANEOUS at 15:23

## 2021-05-26 RX ADMIN — AMLODIPINE BESYLATE 10 MG: 10 TABLET ORAL at 10:16

## 2021-05-26 RX ADMIN — PAROXETINE HYDROCHLORIDE 10 MG: 20 TABLET, FILM COATED ORAL at 10:17

## 2021-05-26 RX ADMIN — PRIMIDONE 25 MG: 50 TABLET ORAL at 21:11

## 2021-05-26 RX ADMIN — FAMOTIDINE 20 MG: 20 TABLET, FILM COATED ORAL at 17:37

## 2021-05-26 RX ADMIN — DULOXETINE HYDROCHLORIDE 20 MG: 20 CAPSULE, DELAYED RELEASE ORAL at 10:16

## 2021-05-26 RX ADMIN — SODIUM CHLORIDE, PRESERVATIVE FREE 10 ML: 5 INJECTION INTRAVENOUS at 10:15

## 2021-05-26 RX ADMIN — SODIUM CHLORIDE, PRESERVATIVE FREE 10 ML: 5 INJECTION INTRAVENOUS at 21:12

## 2021-05-26 RX ADMIN — ENALAPRIL MALEATE 40 MG: 10 TABLET ORAL at 10:17

## 2021-05-26 RX ADMIN — PRIMIDONE 50 MG: 50 TABLET ORAL at 10:17

## 2021-05-26 RX ADMIN — METOPROLOL SUCCINATE 25 MG: 25 TABLET, EXTENDED RELEASE ORAL at 10:16

## 2021-05-26 RX ADMIN — HEPARIN SODIUM 5000 UNITS: 5000 INJECTION INTRAVENOUS; SUBCUTANEOUS at 05:19

## 2021-05-27 LAB
ANION GAP SERPL CALCULATED.3IONS-SCNC: 8 MMOL/L (ref 5–15)
BUN SERPL-MCNC: 29 MG/DL (ref 8–23)
BUN/CREAT SERPL: 31.5 (ref 7–25)
CALCIUM SPEC-SCNC: 8.9 MG/DL (ref 8.6–10.5)
CHLORIDE SERPL-SCNC: 93 MMOL/L (ref 98–107)
CO2 SERPL-SCNC: 25 MMOL/L (ref 22–29)
CREAT SERPL-MCNC: 0.92 MG/DL (ref 0.57–1)
GFR SERPL CREATININE-BSD FRML MDRD: 58 ML/MIN/1.73
GLUCOSE SERPL-MCNC: 102 MG/DL (ref 65–99)
POTASSIUM SERPL-SCNC: 4.2 MMOL/L (ref 3.5–5.2)
SODIUM SERPL-SCNC: 126 MMOL/L (ref 136–145)

## 2021-05-27 PROCEDURE — 99232 SBSQ HOSP IP/OBS MODERATE 35: CPT | Performed by: FAMILY MEDICINE

## 2021-05-27 PROCEDURE — 80048 BASIC METABOLIC PNL TOTAL CA: CPT | Performed by: FAMILY MEDICINE

## 2021-05-27 PROCEDURE — 25010000002 HEPARIN (PORCINE) PER 1000 UNITS: Performed by: HOSPITALIST

## 2021-05-27 RX ADMIN — DOCUSATE SODIUM 50MG AND SENNOSIDES 8.6MG 2 TABLET: 8.6; 5 TABLET, FILM COATED ORAL at 22:44

## 2021-05-27 RX ADMIN — ASPIRIN 81 MG: 81 TABLET, COATED ORAL at 09:16

## 2021-05-27 RX ADMIN — METOPROLOL SUCCINATE 25 MG: 25 TABLET, EXTENDED RELEASE ORAL at 09:16

## 2021-05-27 RX ADMIN — ATORVASTATIN CALCIUM 40 MG: 40 TABLET, FILM COATED ORAL at 09:16

## 2021-05-27 RX ADMIN — LEVOTHYROXINE SODIUM 150 MCG: 150 TABLET ORAL at 05:56

## 2021-05-27 RX ADMIN — HEPARIN SODIUM 5000 UNITS: 5000 INJECTION INTRAVENOUS; SUBCUTANEOUS at 05:56

## 2021-05-27 RX ADMIN — FAMOTIDINE 20 MG: 20 TABLET, FILM COATED ORAL at 09:16

## 2021-05-27 RX ADMIN — ENALAPRIL MALEATE 40 MG: 10 TABLET ORAL at 09:16

## 2021-05-27 RX ADMIN — AMLODIPINE BESYLATE 10 MG: 10 TABLET ORAL at 09:16

## 2021-05-27 RX ADMIN — PRIMIDONE 50 MG: 50 TABLET ORAL at 09:16

## 2021-05-27 RX ADMIN — HEPARIN SODIUM 5000 UNITS: 5000 INJECTION INTRAVENOUS; SUBCUTANEOUS at 22:45

## 2021-05-27 RX ADMIN — PRIMIDONE 25 MG: 50 TABLET ORAL at 22:45

## 2021-05-27 RX ADMIN — SODIUM CHLORIDE, PRESERVATIVE FREE 10 ML: 5 INJECTION INTRAVENOUS at 09:15

## 2021-05-27 RX ADMIN — CLOPIDOGREL BISULFATE 75 MG: 75 TABLET ORAL at 09:16

## 2021-05-27 RX ADMIN — FAMOTIDINE 20 MG: 20 TABLET, FILM COATED ORAL at 16:28

## 2021-05-27 RX ADMIN — HEPARIN SODIUM 5000 UNITS: 5000 INJECTION INTRAVENOUS; SUBCUTANEOUS at 14:58

## 2021-05-28 VITALS
OXYGEN SATURATION: 95 % | HEIGHT: 66 IN | RESPIRATION RATE: 18 BRPM | HEART RATE: 64 BPM | SYSTOLIC BLOOD PRESSURE: 153 MMHG | BODY MASS INDEX: 20.79 KG/M2 | WEIGHT: 129.4 LBS | TEMPERATURE: 98 F | DIASTOLIC BLOOD PRESSURE: 79 MMHG

## 2021-05-28 LAB
ANION GAP SERPL CALCULATED.3IONS-SCNC: 10 MMOL/L (ref 5–15)
BUN SERPL-MCNC: 21 MG/DL (ref 8–23)
BUN/CREAT SERPL: 24.4 (ref 7–25)
CALCIUM SPEC-SCNC: 9.4 MG/DL (ref 8.6–10.5)
CHLORIDE SERPL-SCNC: 94 MMOL/L (ref 98–107)
CO2 SERPL-SCNC: 25 MMOL/L (ref 22–29)
CREAT SERPL-MCNC: 0.86 MG/DL (ref 0.57–1)
DEPRECATED RDW RBC AUTO: 48.1 FL (ref 37–54)
ERYTHROCYTE [DISTWIDTH] IN BLOOD BY AUTOMATED COUNT: 14.6 % (ref 12.3–15.4)
GFR SERPL CREATININE-BSD FRML MDRD: 63 ML/MIN/1.73
GLUCOSE SERPL-MCNC: 90 MG/DL (ref 65–99)
HCT VFR BLD AUTO: 37.5 % (ref 34–46.6)
HGB BLD-MCNC: 12.3 G/DL (ref 12–15.9)
MCH RBC QN AUTO: 29.6 PG (ref 26.6–33)
MCHC RBC AUTO-ENTMCNC: 32.8 G/DL (ref 31.5–35.7)
MCV RBC AUTO: 90.1 FL (ref 79–97)
PLATELET # BLD AUTO: 202 10*3/MM3 (ref 140–450)
PMV BLD AUTO: 10 FL (ref 6–12)
POTASSIUM SERPL-SCNC: 4 MMOL/L (ref 3.5–5.2)
RBC # BLD AUTO: 4.16 10*6/MM3 (ref 3.77–5.28)
SARS-COV-2 RDRP RESP QL NAA+PROBE: NORMAL
SODIUM SERPL-SCNC: 129 MMOL/L (ref 136–145)
WBC # BLD AUTO: 4.4 10*3/MM3 (ref 3.4–10.8)

## 2021-05-28 PROCEDURE — 85027 COMPLETE CBC AUTOMATED: CPT | Performed by: INTERNAL MEDICINE

## 2021-05-28 PROCEDURE — 99239 HOSP IP/OBS DSCHRG MGMT >30: CPT | Performed by: NURSE PRACTITIONER

## 2021-05-28 PROCEDURE — 25010000002 HEPARIN (PORCINE) PER 1000 UNITS: Performed by: HOSPITALIST

## 2021-05-28 PROCEDURE — 87635 SARS-COV-2 COVID-19 AMP PRB: CPT | Performed by: NURSE PRACTITIONER

## 2021-05-28 PROCEDURE — 80048 BASIC METABOLIC PNL TOTAL CA: CPT | Performed by: FAMILY MEDICINE

## 2021-05-28 RX ORDER — CLOPIDOGREL BISULFATE 75 MG/1
75 TABLET ORAL DAILY
Qty: 30 TABLET | Refills: 0 | Status: ON HOLD | OUTPATIENT
Start: 2021-05-29 | End: 2021-08-13 | Stop reason: SDUPTHER

## 2021-05-28 RX ORDER — METOPROLOL SUCCINATE 25 MG/1
25 TABLET, EXTENDED RELEASE ORAL
Qty: 30 TABLET | Refills: 0 | Status: ON HOLD | OUTPATIENT
Start: 2021-05-29 | End: 2022-08-11 | Stop reason: SDUPTHER

## 2021-05-28 RX ORDER — SODIUM CHLORIDE 9 MG/ML
5 INJECTION, SOLUTION INTRAVENOUS CONTINUOUS
Status: ACTIVE | OUTPATIENT
Start: 2021-05-28 | End: 2021-05-28

## 2021-05-28 RX ORDER — ASPIRIN 81 MG/1
81 TABLET ORAL DAILY
Qty: 30 TABLET | Refills: 0 | Status: ON HOLD | OUTPATIENT
Start: 2021-05-29 | End: 2021-08-13 | Stop reason: SDUPTHER

## 2021-05-28 RX ORDER — LEVOTHYROXINE SODIUM 0.15 MG/1
150 TABLET ORAL DAILY
Qty: 30 TABLET | Refills: 0 | Status: SHIPPED | OUTPATIENT
Start: 2021-05-28 | End: 2021-07-06

## 2021-05-28 RX ADMIN — METOPROLOL SUCCINATE 25 MG: 25 TABLET, EXTENDED RELEASE ORAL at 08:50

## 2021-05-28 RX ADMIN — ATORVASTATIN CALCIUM 40 MG: 40 TABLET, FILM COATED ORAL at 08:51

## 2021-05-28 RX ADMIN — ASPIRIN 81 MG: 81 TABLET, COATED ORAL at 08:50

## 2021-05-28 RX ADMIN — LEVOTHYROXINE SODIUM 150 MCG: 150 TABLET ORAL at 06:23

## 2021-05-28 RX ADMIN — ENALAPRIL MALEATE 40 MG: 10 TABLET ORAL at 08:47

## 2021-05-28 RX ADMIN — HEPARIN SODIUM 5000 UNITS: 5000 INJECTION INTRAVENOUS; SUBCUTANEOUS at 06:23

## 2021-05-28 RX ADMIN — AMLODIPINE BESYLATE 10 MG: 10 TABLET ORAL at 08:52

## 2021-05-28 RX ADMIN — FAMOTIDINE 20 MG: 20 TABLET, FILM COATED ORAL at 06:23

## 2021-05-28 RX ADMIN — CLOPIDOGREL BISULFATE 75 MG: 75 TABLET ORAL at 08:52

## 2021-05-28 RX ADMIN — SODIUM CHLORIDE, PRESERVATIVE FREE 10 ML: 5 INJECTION INTRAVENOUS at 08:54

## 2021-05-28 RX ADMIN — PRIMIDONE 50 MG: 50 TABLET ORAL at 08:52

## 2021-05-28 RX ADMIN — DOCUSATE SODIUM 50MG AND SENNOSIDES 8.6MG 2 TABLET: 8.6; 5 TABLET, FILM COATED ORAL at 08:50

## 2021-06-02 LAB — VASOPRESSIN SERPL-MCNC: 2.4 PG/ML (ref 0–4.7)

## 2021-06-14 DIAGNOSIS — M81.0 AGE-RELATED OSTEOPOROSIS WITHOUT CURRENT PATHOLOGICAL FRACTURE: ICD-10-CM

## 2021-06-14 RX ORDER — ALENDRONATE SODIUM 70 MG/1
70 TABLET ORAL
Qty: 12 TABLET | Refills: 1 | Status: SHIPPED | OUTPATIENT
Start: 2021-06-14 | End: 2021-10-14

## 2021-06-14 NOTE — TELEPHONE ENCOUNTER
Last Office Visit: 4/14/21  Next Office Visit: 6*/23/21    Labs completed in past 6 months? yes  Labs completed in past year? yes    Last Refill Date: 8/3/20  Quantity:12  Refills:3    Pharmacy:     Please review pended refill request for any changes needed on refills or quantities. Thank you!

## 2021-06-15 DIAGNOSIS — K21.9 GASTROESOPHAGEAL REFLUX DISEASE WITHOUT ESOPHAGITIS: ICD-10-CM

## 2021-06-15 RX ORDER — FAMOTIDINE 20 MG/1
TABLET, FILM COATED ORAL
Qty: 180 TABLET | Refills: 1 | Status: SHIPPED | OUTPATIENT
Start: 2021-06-15 | End: 2022-07-18

## 2021-06-15 NOTE — TELEPHONE ENCOUNTER
Last Office Visit:  4/14/21  Next Office Visit:6/23/21    Labs completed in past 6 months? yes  Labs completed in past year? yes    Last Refill Date: 4/14/21  Quantity:90  Refills:1    Pharmacy:     Please review pended refill request for any changes needed on refills or quantities. Thank you!

## 2021-06-27 DIAGNOSIS — G25.81 RLS (RESTLESS LEGS SYNDROME): ICD-10-CM

## 2021-06-28 RX ORDER — DULOXETIN HYDROCHLORIDE 30 MG/1
30 CAPSULE, DELAYED RELEASE ORAL DAILY
Qty: 90 CAPSULE | Refills: 3 | OUTPATIENT
Start: 2021-06-28

## 2021-06-28 RX ORDER — PAROXETINE HYDROCHLORIDE 20 MG/1
TABLET, FILM COATED ORAL
Qty: 90 TABLET | Refills: 3 | OUTPATIENT
Start: 2021-06-28

## 2021-06-30 LAB
QT INTERVAL: 440 MS
QTC INTERVAL: 446 MS

## 2021-07-06 RX ORDER — LEVOTHYROXINE SODIUM 0.15 MG/1
150 TABLET ORAL DAILY
Qty: 90 TABLET | Refills: 0 | Status: SHIPPED | OUTPATIENT
Start: 2021-07-06 | End: 2021-09-23

## 2021-07-06 NOTE — TELEPHONE ENCOUNTER
Last Office Visit: 4/14/21  Next Office Visit: none    Labs completed in past 6 months? yes  Labs completed in past year? yes    Last Refill Date: 5/28/21  Quantity:30  Refills: 0    Pharmacy:     Please review pended refill request for any changes needed on refills or quantities. Thank you!

## 2021-07-07 ENCOUNTER — TELEPHONE (OUTPATIENT)
Dept: INTERNAL MEDICINE | Facility: CLINIC | Age: 81
End: 2021-07-07

## 2021-07-07 NOTE — TELEPHONE ENCOUNTER
Christi with home health called and wanted to let Dr Regan know the patient was discharged from [facility] and her home health started today. She is doing skilled nursing and PT and will see the patient at least 2-3 times a week. She stated the patient is stable and if you have any further questions you can give her a call.     Christi [phone]

## 2021-07-13 DIAGNOSIS — G25.81 RLS (RESTLESS LEGS SYNDROME): ICD-10-CM

## 2021-07-13 DIAGNOSIS — I10 UNCONTROLLED HYPERTENSION: ICD-10-CM

## 2021-07-14 NOTE — TELEPHONE ENCOUNTER
Last Office Visit: 4/14/21  Next Office Visit: none    Labs completed in past 6 months? yes  Labs completed in past year? yes    Last Refill Date: 3/3/21 amlodipine  Quantity: 90  Refills:1    Rest are no longer on med list, please adivse    Pharmacy:     Please review pended refill request for any changes needed on refills or quantities. Thank you!

## 2021-07-15 RX ORDER — ATENOLOL 100 MG/1
100 TABLET ORAL DAILY
Qty: 90 TABLET | Refills: 0 | OUTPATIENT
Start: 2021-07-15

## 2021-07-15 RX ORDER — DULOXETIN HYDROCHLORIDE 30 MG/1
30 CAPSULE, DELAYED RELEASE ORAL DAILY
Qty: 90 CAPSULE | Refills: 0 | OUTPATIENT
Start: 2021-07-15

## 2021-07-15 RX ORDER — PAROXETINE HYDROCHLORIDE 20 MG/1
TABLET, FILM COATED ORAL
Qty: 90 TABLET | Refills: 3 | OUTPATIENT
Start: 2021-07-15

## 2021-07-15 RX ORDER — AMLODIPINE BESYLATE 10 MG/1
10 TABLET ORAL DAILY
Qty: 90 TABLET | Refills: 0 | Status: SHIPPED | OUTPATIENT
Start: 2021-07-15 | End: 2021-10-03

## 2021-07-16 NOTE — TELEPHONE ENCOUNTER
Refilled 90 days amlodipine.   , needs appointment, as she should not be on the other meds- cymbalta, paxil and atenolol d/verónica at last hospital visit.

## 2021-07-29 DIAGNOSIS — I10 UNCONTROLLED HYPERTENSION: ICD-10-CM

## 2021-07-30 DIAGNOSIS — I10 UNCONTROLLED HYPERTENSION: ICD-10-CM

## 2021-07-30 RX ORDER — ATENOLOL 100 MG/1
100 TABLET ORAL DAILY
Qty: 90 TABLET | Refills: 3 | OUTPATIENT
Start: 2021-07-30

## 2021-08-02 RX ORDER — ENALAPRIL MALEATE 20 MG/1
40 TABLET ORAL DAILY
Qty: 180 TABLET | Refills: 1 | Status: ON HOLD | OUTPATIENT
Start: 2021-08-02 | End: 2021-08-13 | Stop reason: SDUPTHER

## 2021-08-02 NOTE — TELEPHONE ENCOUNTER
Last Office Visit: 4/14/21  Next Office Visit: 9/1/21    Labs completed in past 6 months? yes  Labs completed in past year? yes    Last Refill Date: 3/10/21  Quantity:180  Refills:1    Pharmacy:     Please review pended refill request for any changes needed on refills or quantities. Thank you!

## 2021-08-09 ENCOUNTER — HOSPITAL ENCOUNTER (INPATIENT)
Facility: HOSPITAL | Age: 81
LOS: 4 days | Discharge: NURSING, REHAB OR LONG-TERM CARE | End: 2021-08-13
Attending: EMERGENCY MEDICINE | Admitting: INTERNAL MEDICINE

## 2021-08-09 ENCOUNTER — APPOINTMENT (OUTPATIENT)
Dept: GENERAL RADIOLOGY | Facility: HOSPITAL | Age: 81
End: 2021-08-09

## 2021-08-09 ENCOUNTER — APPOINTMENT (OUTPATIENT)
Dept: CT IMAGING | Facility: HOSPITAL | Age: 81
End: 2021-08-09

## 2021-08-09 DIAGNOSIS — I10 UNCONTROLLED HYPERTENSION: ICD-10-CM

## 2021-08-09 DIAGNOSIS — S06.6X0A SUBARACHNOID HEMORRHAGE FOLLOWING INJURY, NO LOSS OF CONSCIOUSNESS, INITIAL ENCOUNTER (HCC): ICD-10-CM

## 2021-08-09 DIAGNOSIS — R13.10 DYSPHAGIA, UNSPECIFIED TYPE: ICD-10-CM
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
## 2021-08-09 DIAGNOSIS — R41.841 COGNITIVE COMMUNICATION DEFICIT: ICD-10-CM

## 2021-08-09 DIAGNOSIS — S06.340A TRAUMATIC HEMORRHAGE OF RIGHT CEREBRUM WITHOUT LOSS OF CONSCIOUSNESS, INITIAL ENCOUNTER (HCC): ICD-10-CM

## 2021-08-09 PROBLEM — I61.9 ICH (INTRACEREBRAL HEMORRHAGE) (HCC): Status: ACTIVE | Noted: 2021-08-09

## 2021-08-09 LAB
ABO GROUP BLD: NORMAL
ALBUMIN SERPL-MCNC: 3.9 G/DL (ref 3.5–5.2)
ALBUMIN/GLOB SERPL: 1.4 G/DL
ALP SERPL-CCNC: 94 U/L (ref 39–117)
ALT SERPL W P-5'-P-CCNC: 7 U/L (ref 1–33)
ANION GAP SERPL CALCULATED.3IONS-SCNC: 13 MMOL/L (ref 5–15)
APTT PPP: 26.2 SECONDS (ref 22–39)
AST SERPL-CCNC: 14 U/L (ref 1–32)
BASOPHILS # BLD AUTO: 0.04 10*3/MM3 (ref 0–0.2)
BASOPHILS NFR BLD AUTO: 0.5 % (ref 0–1.5)
BILIRUB SERPL-MCNC: 0.3 MG/DL (ref 0–1.2)
BLD GP AB SCN SERPL QL: NEGATIVE
BUN SERPL-MCNC: 14 MG/DL (ref 8–23)
BUN/CREAT SERPL: 16.7 (ref 7–25)
CALCIUM SPEC-SCNC: 9.3 MG/DL (ref 8.6–10.5)
CHLORIDE SERPL-SCNC: 101 MMOL/L (ref 98–107)
CO2 SERPL-SCNC: 26 MMOL/L (ref 22–29)
CREAT SERPL-MCNC: 0.84 MG/DL (ref 0.57–1)
DEPRECATED RDW RBC AUTO: 43.2 FL (ref 37–54)
EOSINOPHIL # BLD AUTO: 0.13 10*3/MM3 (ref 0–0.4)
EOSINOPHIL NFR BLD AUTO: 1.7 % (ref 0.3–6.2)
ERYTHROCYTE [DISTWIDTH] IN BLOOD BY AUTOMATED COUNT: 13.2 % (ref 12.3–15.4)
FLUAV RNA RESP QL NAA+PROBE: NOT DETECTED
FLUBV RNA RESP QL NAA+PROBE: NOT DETECTED
GFR SERPL CREATININE-BSD FRML MDRD: 65 ML/MIN/1.73
GLOBULIN UR ELPH-MCNC: 2.8 GM/DL
GLUCOSE BLDC GLUCOMTR-MCNC: 118 MG/DL (ref 70–130)
GLUCOSE SERPL-MCNC: 111 MG/DL (ref 65–99)
HCT VFR BLD AUTO: 38.9 % (ref 34–46.6)
HGB BLD-MCNC: 12.9 G/DL (ref 12–15.9)
IMM GRANULOCYTES # BLD AUTO: 0.03 10*3/MM3 (ref 0–0.05)
IMM GRANULOCYTES NFR BLD AUTO: 0.4 % (ref 0–0.5)
INR PPP: 1 (ref 0.85–1.16)
LYMPHOCYTES # BLD AUTO: 0.96 10*3/MM3 (ref 0.7–3.1)
LYMPHOCYTES NFR BLD AUTO: 12.5 % (ref 19.6–45.3)
MCH RBC QN AUTO: 29.4 PG (ref 26.6–33)
MCHC RBC AUTO-ENTMCNC: 33.2 G/DL (ref 31.5–35.7)
MCV RBC AUTO: 88.6 FL (ref 79–97)
MONOCYTES # BLD AUTO: 0.62 10*3/MM3 (ref 0.1–0.9)
MONOCYTES NFR BLD AUTO: 8.1 % (ref 5–12)
NEUTROPHILS NFR BLD AUTO: 5.88 10*3/MM3 (ref 1.7–7)
NEUTROPHILS NFR BLD AUTO: 76.8 % (ref 42.7–76)
NRBC BLD AUTO-RTO: 0 /100 WBC (ref 0–0.2)
PLATELET # BLD AUTO: 258 10*3/MM3 (ref 140–450)
PMV BLD AUTO: 10.1 FL (ref 6–12)
POTASSIUM SERPL-SCNC: 3.7 MMOL/L (ref 3.5–5.2)
PROT SERPL-MCNC: 6.7 G/DL (ref 6–8.5)
PROTHROMBIN TIME: 12.9 SECONDS (ref 11.4–14.4)
RBC # BLD AUTO: 4.39 10*6/MM3 (ref 3.77–5.28)
RH BLD: POSITIVE
SARS-COV-2 RNA RESP QL NAA+PROBE: NOT DETECTED
SODIUM SERPL-SCNC: 140 MMOL/L (ref 136–145)
T&S EXPIRATION DATE: NORMAL
T4 FREE SERPL-MCNC: 2.15 NG/DL (ref 0.93–1.7)
TROPONIN T SERPL-MCNC: <0.01 NG/ML (ref 0–0.03)
TSH SERPL DL<=0.05 MIU/L-ACNC: 0.04 UIU/ML (ref 0.27–4.2)
WBC # BLD AUTO: 7.66 10*3/MM3 (ref 3.4–10.8)

## 2021-08-09 PROCEDURE — 84439 ASSAY OF FREE THYROXINE: CPT | Performed by: INTERNAL MEDICINE

## 2021-08-09 PROCEDURE — 99223 1ST HOSP IP/OBS HIGH 75: CPT | Performed by: INTERNAL MEDICINE

## 2021-08-09 PROCEDURE — 84484 ASSAY OF TROPONIN QUANT: CPT | Performed by: INTERNAL MEDICINE

## 2021-08-09 PROCEDURE — 87186 SC STD MICRODIL/AGAR DIL: CPT | Performed by: NURSE PRACTITIONER

## 2021-08-09 PROCEDURE — 85025 COMPLETE CBC W/AUTO DIFF WBC: CPT | Performed by: EMERGENCY MEDICINE

## 2021-08-09 PROCEDURE — 86901 BLOOD TYPING SEROLOGIC RH(D): CPT | Performed by: EMERGENCY MEDICINE

## 2021-08-09 PROCEDURE — 93010 ELECTROCARDIOGRAM REPORT: CPT | Performed by: INTERNAL MEDICINE

## 2021-08-09 PROCEDURE — 86850 RBC ANTIBODY SCREEN: CPT | Performed by: EMERGENCY MEDICINE

## 2021-08-09 PROCEDURE — 87636 SARSCOV2 & INF A&B AMP PRB: CPT | Performed by: INTERNAL MEDICINE

## 2021-08-09 PROCEDURE — 36430 TRANSFUSION BLD/BLD COMPNT: CPT

## 2021-08-09 PROCEDURE — 85610 PROTHROMBIN TIME: CPT | Performed by: EMERGENCY MEDICINE

## 2021-08-09 PROCEDURE — 84443 ASSAY THYROID STIM HORMONE: CPT | Performed by: INTERNAL MEDICINE

## 2021-08-09 PROCEDURE — 93005 ELECTROCARDIOGRAM TRACING: CPT | Performed by: INTERNAL MEDICINE

## 2021-08-09 PROCEDURE — 72100 X-RAY EXAM L-S SPINE 2/3 VWS: CPT

## 2021-08-09 PROCEDURE — 87086 URINE CULTURE/COLONY COUNT: CPT | Performed by: NURSE PRACTITIONER

## 2021-08-09 PROCEDURE — 87077 CULTURE AEROBIC IDENTIFY: CPT | Performed by: NURSE PRACTITIONER

## 2021-08-09 PROCEDURE — 99284 EMERGENCY DEPT VISIT MOD MDM: CPT

## 2021-08-09 PROCEDURE — 86900 BLOOD TYPING SEROLOGIC ABO: CPT | Performed by: EMERGENCY MEDICINE

## 2021-08-09 PROCEDURE — 80053 COMPREHEN METABOLIC PANEL: CPT | Performed by: EMERGENCY MEDICINE

## 2021-08-09 PROCEDURE — 71045 X-RAY EXAM CHEST 1 VIEW: CPT

## 2021-08-09 PROCEDURE — 70450 CT HEAD/BRAIN W/O DYE: CPT

## 2021-08-09 PROCEDURE — P9037 PLATE PHERES LEUKOREDU IRRAD: HCPCS

## 2021-08-09 PROCEDURE — 82962 GLUCOSE BLOOD TEST: CPT

## 2021-08-09 PROCEDURE — 81001 URINALYSIS AUTO W/SCOPE: CPT | Performed by: NURSE PRACTITIONER

## 2021-08-09 PROCEDURE — 85730 THROMBOPLASTIN TIME PARTIAL: CPT | Performed by: EMERGENCY MEDICINE

## 2021-08-09 RX ORDER — DEXTROSE MONOHYDRATE 25 G/50ML
25 INJECTION, SOLUTION INTRAVENOUS
Status: DISCONTINUED | OUTPATIENT
Start: 2021-08-09 | End: 2021-08-13 | Stop reason: HOSPADM

## 2021-08-09 RX ORDER — MAGNESIUM SULFATE HEPTAHYDRATE 40 MG/ML
4 INJECTION, SOLUTION INTRAVENOUS AS NEEDED
Status: DISCONTINUED | OUTPATIENT
Start: 2021-08-09 | End: 2021-08-13 | Stop reason: HOSPADM

## 2021-08-09 RX ORDER — ONDANSETRON 2 MG/ML
4 INJECTION INTRAMUSCULAR; INTRAVENOUS EVERY 6 HOURS PRN
Status: DISCONTINUED | OUTPATIENT
Start: 2021-08-09 | End: 2021-08-13 | Stop reason: HOSPADM

## 2021-08-09 RX ORDER — MAGNESIUM SULFATE HEPTAHYDRATE 40 MG/ML
2 INJECTION, SOLUTION INTRAVENOUS AS NEEDED
Status: DISCONTINUED | OUTPATIENT
Start: 2021-08-09 | End: 2021-08-13 | Stop reason: HOSPADM

## 2021-08-09 RX ORDER — ACETAMINOPHEN 650 MG/1
650 SUPPOSITORY RECTAL EVERY 4 HOURS PRN
Status: DISCONTINUED | OUTPATIENT
Start: 2021-08-09 | End: 2021-08-13 | Stop reason: HOSPADM

## 2021-08-09 RX ORDER — SODIUM CHLORIDE 0.9 % (FLUSH) 0.9 %
10 SYRINGE (ML) INJECTION AS NEEDED
Status: DISCONTINUED | OUTPATIENT
Start: 2021-08-09 | End: 2021-08-13 | Stop reason: HOSPADM

## 2021-08-09 RX ORDER — AMOXICILLIN 250 MG
2 CAPSULE ORAL 2 TIMES DAILY
Status: DISCONTINUED | OUTPATIENT
Start: 2021-08-10 | End: 2021-08-13 | Stop reason: HOSPADM

## 2021-08-09 RX ORDER — POTASSIUM CHLORIDE 7.45 MG/ML
10 INJECTION INTRAVENOUS
Status: DISCONTINUED | OUTPATIENT
Start: 2021-08-09 | End: 2021-08-13 | Stop reason: HOSPADM

## 2021-08-09 RX ORDER — SODIUM CHLORIDE 0.9 % (FLUSH) 0.9 %
10 SYRINGE (ML) INJECTION EVERY 12 HOURS SCHEDULED
Status: DISCONTINUED | OUTPATIENT
Start: 2021-08-09 | End: 2021-08-13 | Stop reason: HOSPADM

## 2021-08-09 RX ORDER — POLYETHYLENE GLYCOL 3350 17 G/17G
17 POWDER, FOR SOLUTION ORAL DAILY PRN
Status: DISCONTINUED | OUTPATIENT
Start: 2021-08-09 | End: 2021-08-13 | Stop reason: HOSPADM

## 2021-08-09 RX ORDER — BISACODYL 5 MG/1
5 TABLET, DELAYED RELEASE ORAL DAILY PRN
Status: DISCONTINUED | OUTPATIENT
Start: 2021-08-09 | End: 2021-08-13 | Stop reason: HOSPADM

## 2021-08-09 RX ORDER — SODIUM CHLORIDE 9 MG/ML
75 INJECTION, SOLUTION INTRAVENOUS CONTINUOUS
Status: DISCONTINUED | OUTPATIENT
Start: 2021-08-09 | End: 2021-08-11

## 2021-08-09 RX ORDER — BISACODYL 10 MG
10 SUPPOSITORY, RECTAL RECTAL DAILY PRN
Status: DISCONTINUED | OUTPATIENT
Start: 2021-08-09 | End: 2021-08-13 | Stop reason: HOSPADM

## 2021-08-09 RX ORDER — NICOTINE POLACRILEX 4 MG
15 LOZENGE BUCCAL
Status: DISCONTINUED | OUTPATIENT
Start: 2021-08-09 | End: 2021-08-13 | Stop reason: HOSPADM

## 2021-08-09 RX ORDER — PANTOPRAZOLE SODIUM 40 MG/10ML
40 INJECTION, POWDER, LYOPHILIZED, FOR SOLUTION INTRAVENOUS
Status: DISCONTINUED | OUTPATIENT
Start: 2021-08-10 | End: 2021-08-11

## 2021-08-09 RX ADMIN — NICARDIPINE HYDROCHLORIDE 5 MG/HR: 0.1 INJECTION, SOLUTION INTRAVENOUS at 22:25

## 2021-08-09 NOTE — ED PROVIDER NOTES
"Subjective   Patient is a pleasant 80-year-old female who presents following a fall at home.  She is in assisted living facility and when the upper occupational therapist came today the patient was found on the floor.  Patient was conscious and stated she had fallen approximately 2 hours prior to that.  Patient noted headache and significant weakness, generalized, but does deny other acute complaints.      Fall  Mechanism of injury: fall    Injury location:  Head/neck  Head/neck injury location:  Head and scalp  Incident location:  Home  Arrived directly from scene: yes    Fall:     Fall occurred:  Standing    Height of fall:  Standing    Impact surface:  Hard floor    Point of impact:  Head    Entrapped after fall: no    Associated symptoms: headaches    Associated symptoms: no abdominal pain, no back pain, no difficulty breathing, no seizures and no vomiting    Risk factors: anticoagulation therapy (Plavix, ASA)    Head Injury  Location:  L parietal  Time since incident:  2 hours  Mechanism of injury comment:  Fall at home  Associated symptoms: headache    Associated symptoms: no difficulty breathing, no seizures and no vomiting    Altered Mental Status  Associated symptoms: headaches    Associated symptoms: no abdominal pain, no difficulty breathing, no seizures and no vomiting        Review of Systems   Gastrointestinal: Negative for abdominal pain and vomiting.   Musculoskeletal: Negative for back pain.   Neurological: Positive for headaches. Negative for seizures.   All other systems reviewed and are negative.      Past Medical History:   Diagnosis Date   • Breast cancer (CMS/HCC) 1987    left- pt states \"in situ\", no radiation, Tamoxifen for 5 years   • Cellulitis    • Cervical lymphadenopathy    • Chest pain    • Convulsions (CMS/HCC)    • GERD (gastroesophageal reflux disease)    • Glossitis    • Grief reaction     · Last Impression: 29 Jan 2015  counselled, given ativan for prn use.  Aleah Regan   • " Hypertension    • Lower back pain    • Oral thrush    • Papillary adenocarcinoma (CMS/HCC)     Papillary adenocarcinoma of thyroid   • Papillary adenocarcinoma of thyroid (CMS/HCC)     · resection Ramirez- 1/13,  2 x 2 x 1.5 cm  T3 N1 MXpost op low calcium and diminished  voiceablation Ain- 3/7 metastatic nodes and invasion to strap muscles · Last Impression: 29 Oct 2014  s/p Eval by berry Méndez.  Aleah Regan (Internal   • Piriformis syndrome    • Tingling    • Xerostomia        Allergies   Allergen Reactions   • Dilaudid [Hydromorphone Hcl] Hallucinations     TABS   • Beta Adrenergic Blockers Other (See Comments)     Hypotension / bradycadia   • Dilaudid [Hydromorphone] Unknown - High Severity   • Lactose Intolerance (Gi) Diarrhea       Past Surgical History:   Procedure Laterality Date   • BREAST BIOPSY Right 10/10/2013    stereo bx   • BREAST BIOPSY Left 10/19/2015    stereo bx   • BREAST CYST EXCISION      right   • BREAST EXCISIONAL BIOPSY Right     affirm bx and loc 2016.   • BREAST LUMPECTOMY Left 1987   • CARDIAC CATHETERIZATION N/A 5/23/2021    Procedure: Left Heart Cath;  Surgeon: Alonso Ross IV, MD;  Location:  [facility];  Service: Cardiology;  Laterality: N/A;   • HYSTERECTOMY  1979   • OTHER SURGICAL HISTORY Right     right arm surgery-steel roger in place   • TOTAL HIP ARTHROPLASTY     • TOTAL THYROIDECTOMY      Thyroid Surgery Total Thyroidectomy       Family History   Problem Relation Age of Onset   • Breast cancer Mother 84   • Cancer Mother    • BRCA 1/2 Neg Hx    • Colon cancer Neg Hx    • Endometrial cancer Neg Hx    • Ovarian cancer Neg Hx        Social History     Socioeconomic History   • Marital status:      Spouse name: Not on file   • Number of children: Not on file   • Years of education: Not on file   • Highest education level: Not on file   Tobacco Use   • Smoking status: Former Smoker     Packs/day: 1.00     Years: 30.00     Pack years:  30.00     Types: Cigarettes     Quit date: 1992     Years since quittin.9   • Smokeless tobacco: Never Used   Vaping Use   • Vaping Use: Never used   Substance and Sexual Activity   • Alcohol use: No   • Drug use: No   • Sexual activity: Not Currently           Objective   Physical Exam  Vitals and nursing note reviewed.   Constitutional:       Appearance: She is ill-appearing.   HENT:      Head: Normocephalic.      Comments: Erythema to left scalp, consistent with recent injury     Nose: Nose normal.   Eyes:      Extraocular Movements: Extraocular movements intact.      Conjunctiva/sclera: Conjunctivae normal.      Pupils: Pupils are equal, round, and reactive to light.   Cardiovascular:      Rate and Rhythm: Normal rate and regular rhythm.      Pulses: Normal pulses.      Heart sounds: Normal heart sounds. No murmur heard.     Pulmonary:      Effort: Pulmonary effort is normal. No respiratory distress.      Breath sounds: Normal breath sounds.   Abdominal:      General: Abdomen is flat. Bowel sounds are normal. There is no distension.      Palpations: Abdomen is soft.      Tenderness: There is no abdominal tenderness.   Musculoskeletal:         General: No swelling or tenderness. Normal range of motion.      Cervical back: Normal range of motion.   Skin:     General: Skin is warm and dry.      Capillary Refill: Capillary refill takes less than 2 seconds.      Findings: No rash.   Neurological:      General: No focal deficit present.      Mental Status: She is alert and oriented to person, place, and time.      Motor: Weakness (Generalized) present.      Comments: Slow, but appropriate speech   Psychiatric:         Mood and Affect: Mood normal.         Behavior: Behavior normal.         Thought Content: Thought content normal.         Critical Care  Performed by: Juanito Van DO  Authorized by: Juanito Van DO     Critical care provider statement:     Critical care time (minutes):  35    Critical care  time was exclusive of:  Separately billable procedures and treating other patients    Critical care was necessary to treat or prevent imminent or life-threatening deterioration of the following conditions:  CNS failure or compromise    Critical care was time spent personally by me on the following activities:  Development of treatment plan with patient or surrogate, discussions with consultants, evaluation of patient's response to treatment, examination of patient, obtaining history from patient or surrogate, ordering and performing treatments and interventions, ordering and review of laboratory studies, ordering and review of radiographic studies, pulse oximetry and re-evaluation of patient's condition    I assumed direction of critical care for this patient from another provider in my specialty: no                 ED Course  ED Course as of Aug 09 1957   Mon Aug 09, 2021   1933 Rdiology called    [FC]      ED Course User Index  [FC] Colleen Lindquist PA-C      I discussed the case with radiology.  See report.  I discused the case with neurosurgery and imaging reviewed.  Repeat CT head at 0100.  Admit to ICU and administer one unit of platelets.    Pt remained stable while in the ED.  I discussed the case with Dr. Navdeep Anderson who will admit for further evaluation and management.         XR Spine Lumbar 2 or 3 View    Result Date: 8/9/2021  CR Spine Lumbar 2 or 3 Vws INDICATION: Acute low back pain. Trauma COMPARISON: CT of the abdomen and pelvis from 2/28/2018 FINDINGS: 3 views of the lumbar spine. There are 5 lumbar-type vertebral bodies. There are changes of posterior fusion extending from L3 through L5. Fusion hardware appears grossly intact as does the patient's hip arthroplasty hardware. Loss of disc height is again noted at L1-2. It is uncertain if there may be some potential increased compression at L1 when compared to the prior CT.     It is uncertain if there may be mildly increased compression deformity  at L1 when compared to the prior CT. Consider further evaluation for tenderness at this level. Signer Name: Verna Roche MD  Signed: 8/9/2021 8:18 PM  Workstation Name: [id number]  [facility]    CT Head Without Contrast    Result Date: 8/11/2021  EXAMINATION: CT HEAD WO CONTRAST-  INDICATION: f/u ICH  TECHNIQUE: Axial noncontrast CT of the head with multiplanar reconstruction  The radiation dose reduction device was turned on for each scan per the ALARA (As Low as Reasonably Achievable) protocol.  COMPARISON: One day prior  FINDINGS: No significant interval change in previously noted multifocal multicompartment intracranial hemorrhage including prominent areas of subarachnoid hemorrhage within the sylvian fissures and adjacent frontal lobes bilaterally, in addition to presumed subdural hemorrhage along the frontal lobes and probable trace areas of intraparenchymal hemorrhage most notable in the left anterior temporal lobe. There is associated mild diffuse cerebral edema, without worsening global mass effect or basilar cistern effacement. Age-related changes of the brain are again present, similar to prior. The ventricles are normal in size and configuration. The orbits are unremarkable and the paranasal sinuses are grossly clear.      No significant interval change in previously noted multifocal multicompartment intracranial hemorrhage including prominent areas of subarachnoid hemorrhage within the sylvian fissures and adjacent frontal lobes bilaterally, in addition to presumed subdural hemorrhage along the frontal lobes and probable trace areas of intraparenchymal hemorrhage most notable in the left anterior temporal lobe. There is associated mild diffuse cerebral edema, without worsening global mass effect or basilar cistern effacement.   This report was finalized on 8/11/2021 8:46 AM by Chuy Jaime.      CT Head Without Contrast    Result Date: 8/10/2021  CT Head WO HISTORY: Follow-up  of intracranial hemorrhage. TECHNIQUE: Axial unenhanced head CT with multiplanar reformats. Radiation dose reduction techniques included automated exposure control or exposure modulation based on body size. Count of known CT and cardiac nuc med studies performed in previous 12 months: 3. COMPARISON: 8/9/2021 at 1921 hours. FINDINGS: Ventricular size and configuration remain normal. There is generalized atrophy with chronic small vessel ischemic disease in the white matter. The intraparenchymal hematoma in the anterior left temporal lobe is stable in size measuring about 1.3 x 2.5 cm in size. Subarachnoid hemorrhage on both sides of the brain in the frontal and temporal regions extending into the sylvian fissures does not appear significantly changed. This measures up to 8 mm in thickness on the left side. Trace right frontal subdural hemorrhage is stable. Small amount of left posterior frontal subdural hemorrhage is now noted measuring about 5 mm in thickness.  No intraventricular hemorrhage is identified. No skull fracture is identified.     1. There is a new small amount of left posterior frontal subdural hemorrhage measuring about 5 mm in thickness. 2. Remaining acute intracranial hemorrhage described previously is not significantly changed. Signer Name: Zacarias Sanchez MD  Signed: 8/10/2021 1:32 AM  Workstation Name: [email or web address]  [facility]    CT Head Without Contrast    Result Date: 8/9/2021  CT Head WO: 8/9/2021 8:25 PM HISTORY: Fall, trauma TECHNIQUE: Axial unenhanced head CT. Radiation dose reduction techniques included automated exposure control or exposure modulation based on body size. Radiation audit for number of CT and nuclear cardiology exams performed in the last year: 0.  COMPARISON: None. FINDINGS: There is subarachnoid blood predominantly involving the bilateral frontotemporal regions and temporal poles. There is an intraparenchymal hematoma involving the patient's anterior  left temporal lobe. There is no definite intraventricular bleed. There is a tiny subdural blood in the lateral right frontal lobe measuring about 5.5 mm in thickness without significant mass effect. No evidence of herniation or gross mass effect. No definite skull fracture identified.     There is bilateral posttraumatic subarachnoid blood predominantly involving the anterior frontotemporal regions, a small subdural overlying the lateral right frontal lobe and an intraparenchymal hematoma involving the patient's anterior left temporal lobe. NOTIFICATION: Critical Value/emergent results were called by telephone at the time of interpretation on 8/9/2021 7:35 PM to Colleen Lindquist MD who verbally acknowledged these results. Signer Name: Verna Roche MD  Signed: 8/9/2021 7:35 PM  Workstation Name: [id number]  [facility]    SLP FEES - Fiberoptic Endo Eval Swallow    Result Date: 8/11/2021  This procedure was auto-finalized with no dictation required.    XR Chest 1 View    Result Date: 8/9/2021  CR Chest 1 Vw INDICATION: Fall, limited history COMPARISON:  Chest x-ray from 5/23/2021 FINDINGS: Single portable AP view(s) of the chest. The heart and mediastinal contours are normal. The lungs are clear apart from chronic changes. No pneumothorax or pleural effusion.     No definite acute cardiopulmonary findings. Signer Name: Verna Roche MD  Signed: 8/9/2021 10:41 PM  Workstation Name: [id number]  [facility]                        Aguero and Johnson: 2  ICH score: 1        MDM    Final diagnoses:   Fall, initial encounter   Subarachnoid hemorrhage following injury, no loss of consciousness, initial encounter (CMS/HCC)   Traumatic hemorrhage of right cerebrum without loss of consciousness, initial encounter (CMS/[facility])       ED Disposition  ED Disposition     ED Disposition Condition Comment    Decision to Admit  Level of Care: Critical Care [6]   Admitting Physician: PRABHAKAR WILSON  JORGE [[id number]]            No follow-up provider specified.       Medication List      No changes were made to your prescriptions during this visit.          Juanito Van,   08/11/21 [id number]

## 2021-08-09 NOTE — TELEPHONE ENCOUNTER
Call from Kim pt's home health nurse.  States she came to see pt and pt was on the floor. Pt stated that she fell due to shoes she does not usually wear and not using her walker.  Vitals were all WNL and pt was a&o x3.  Kim noted that pt did not have any pain and was able to get her in her chair, however she has a knot on her head and some blood.  Advised to have evaluated at ER due to head injury and fall.  Kim is calling pt's POA also.

## 2021-08-09 NOTE — TELEPHONE ENCOUNTER
Last Office Visit: 4/14/21  Next Office Visit: 9/1/21    Labs completed in past 6 months? yes  Labs completed in past year? yes    Last Refill Date: 7/6/21 levothyroxine  Quantity:90  Refills:0    Metoprolol XL 25mg you have not rx'ed        Pharmacy:     Please review pended refill request for any changes needed on refills or quantities. Thank you!

## 2021-08-09 NOTE — TELEPHONE ENCOUNTER
Caller: BETSY  Relationship: [facility]     Best call back number: [phone] EX [phone]     Medication needed:   Requested Prescriptions     Pending Prescriptions Disp Refills   • metoprolol succinate XL (TOPROL-XL) 25 MG 24 hr tablet 30 tablet 0     Sig: Take 1 tablet by mouth Daily.   • levothyroxine (SYNTHROID, LEVOTHROID) 150 MCG tablet 90 tablet 0     Sig: Take 1 tablet by mouth Daily. Please schedule appointment       When do you need the refill by:     What additional details did the patient provide when requesting the medication:     Does the patient have less than a 3 day supply:  [x] Yes  [] No    What is the patient's preferred pharmacy: [facility]. - [place], [place] - Critical access hospital [address]. - [phone] [facility] [phone] FX

## 2021-08-10 ENCOUNTER — ANCILLARY PROCEDURE (OUTPATIENT)
Dept: SPEECH THERAPY | Facility: HOSPITAL | Age: 81
End: 2021-08-10

## 2021-08-10 ENCOUNTER — APPOINTMENT (OUTPATIENT)
Dept: CT IMAGING | Facility: HOSPITAL | Age: 81
End: 2021-08-10

## 2021-08-10 LAB
ALBUMIN SERPL-MCNC: 3.8 G/DL (ref 3.5–5.2)
ALBUMIN/GLOB SERPL: 1.4 G/DL
ALP SERPL-CCNC: 95 U/L (ref 39–117)
ALT SERPL W P-5'-P-CCNC: 9 U/L (ref 1–33)
ANION GAP SERPL CALCULATED.3IONS-SCNC: 13 MMOL/L (ref 5–15)
AST SERPL-CCNC: 15 U/L (ref 1–32)
BACTERIA UR QL AUTO: ABNORMAL /HPF
BASOPHILS # BLD AUTO: 0.04 10*3/MM3 (ref 0–0.2)
BASOPHILS NFR BLD AUTO: 0.6 % (ref 0–1.5)
BH BB BLOOD EXPIRATION DATE: NORMAL
BH BB BLOOD TYPE BARCODE: 6200
BH BB DISPENSE STATUS: NORMAL
BH BB PRODUCT CODE: NORMAL
BH BB UNIT NUMBER: NORMAL
BILIRUB SERPL-MCNC: 0.3 MG/DL (ref 0–1.2)
BILIRUB UR QL STRIP: NEGATIVE
BUN SERPL-MCNC: 10 MG/DL (ref 8–23)
BUN/CREAT SERPL: 15.2 (ref 7–25)
CALCIUM SPEC-SCNC: 8.9 MG/DL (ref 8.6–10.5)
CHLORIDE SERPL-SCNC: 102 MMOL/L (ref 98–107)
CHOLEST SERPL-MCNC: 189 MG/DL (ref 0–200)
CLARITY UR: CLEAR
CO2 SERPL-SCNC: 25 MMOL/L (ref 22–29)
COLOR UR: YELLOW
CREAT SERPL-MCNC: 0.66 MG/DL (ref 0.57–1)
DEPRECATED RDW RBC AUTO: 41.1 FL (ref 37–54)
EOSINOPHIL # BLD AUTO: 0.13 10*3/MM3 (ref 0–0.4)
EOSINOPHIL NFR BLD AUTO: 1.8 % (ref 0.3–6.2)
ERYTHROCYTE [DISTWIDTH] IN BLOOD BY AUTOMATED COUNT: 13.1 % (ref 12.3–15.4)
GFR SERPL CREATININE-BSD FRML MDRD: 86 ML/MIN/1.73
GLOBULIN UR ELPH-MCNC: 2.8 GM/DL
GLUCOSE BLDC GLUCOMTR-MCNC: 103 MG/DL (ref 70–130)
GLUCOSE BLDC GLUCOMTR-MCNC: 112 MG/DL (ref 70–130)
GLUCOSE BLDC GLUCOMTR-MCNC: 127 MG/DL (ref 70–130)
GLUCOSE BLDC GLUCOMTR-MCNC: 88 MG/DL (ref 70–130)
GLUCOSE BLDC GLUCOMTR-MCNC: 97 MG/DL (ref 70–130)
GLUCOSE SERPL-MCNC: 107 MG/DL (ref 65–99)
GLUCOSE UR STRIP-MCNC: NEGATIVE MG/DL
HBA1C MFR BLD: 5.1 % (ref 4.8–5.6)
HCT VFR BLD AUTO: 35.8 % (ref 34–46.6)
HDLC SERPL-MCNC: 72 MG/DL (ref 40–60)
HGB BLD-MCNC: 12.2 G/DL (ref 12–15.9)
HGB UR QL STRIP.AUTO: NEGATIVE
HYALINE CASTS UR QL AUTO: ABNORMAL /LPF
IMM GRANULOCYTES # BLD AUTO: 0.04 10*3/MM3 (ref 0–0.05)
IMM GRANULOCYTES NFR BLD AUTO: 0.6 % (ref 0–0.5)
KETONES UR QL STRIP: NEGATIVE
LDLC SERPL CALC-MCNC: 101 MG/DL (ref 0–100)
LDLC/HDLC SERPL: 1.38 {RATIO}
LEUKOCYTE ESTERASE UR QL STRIP.AUTO: ABNORMAL
LYMPHOCYTES # BLD AUTO: 1.25 10*3/MM3 (ref 0.7–3.1)
LYMPHOCYTES NFR BLD AUTO: 17.3 % (ref 19.6–45.3)
MAGNESIUM SERPL-MCNC: 1.5 MG/DL (ref 1.6–2.4)
MCH RBC QN AUTO: 29.6 PG (ref 26.6–33)
MCHC RBC AUTO-ENTMCNC: 34.1 G/DL (ref 31.5–35.7)
MCV RBC AUTO: 86.9 FL (ref 79–97)
MONOCYTES # BLD AUTO: 0.72 10*3/MM3 (ref 0.1–0.9)
MONOCYTES NFR BLD AUTO: 10 % (ref 5–12)
NEUTROPHILS NFR BLD AUTO: 5.05 10*3/MM3 (ref 1.7–7)
NEUTROPHILS NFR BLD AUTO: 69.7 % (ref 42.7–76)
NITRITE UR QL STRIP: POSITIVE
NRBC BLD AUTO-RTO: 0 /100 WBC (ref 0–0.2)
PH UR STRIP.AUTO: 8 [PH] (ref 5–8)
PHOSPHATE SERPL-MCNC: 3.2 MG/DL (ref 2.5–4.5)
PLATELET # BLD AUTO: 306 10*3/MM3 (ref 140–450)
PMV BLD AUTO: 10.2 FL (ref 6–12)
POTASSIUM SERPL-SCNC: 3 MMOL/L (ref 3.5–5.2)
PROT SERPL-MCNC: 6.6 G/DL (ref 6–8.5)
PROT UR QL STRIP: NEGATIVE
QT INTERVAL: 382 MS
QTC INTERVAL: 462 MS
RBC # BLD AUTO: 4.12 10*6/MM3 (ref 3.77–5.28)
RBC # UR: ABNORMAL /HPF
REF LAB TEST METHOD: ABNORMAL
SODIUM SERPL-SCNC: 140 MMOL/L (ref 136–145)
SP GR UR STRIP: 1.01 (ref 1–1.03)
SQUAMOUS #/AREA URNS HPF: ABNORMAL /HPF
TRIGL SERPL-MCNC: 87 MG/DL (ref 0–150)
TROPONIN T SERPL-MCNC: <0.01 NG/ML (ref 0–0.03)
UNIT  ABO: NORMAL
UNIT  RH: NORMAL
UROBILINOGEN UR QL STRIP: ABNORMAL
VLDLC SERPL-MCNC: 16 MG/DL (ref 5–40)
WBC # BLD AUTO: 7.23 10*3/MM3 (ref 3.4–10.8)
WBC UR QL AUTO: ABNORMAL /HPF

## 2021-08-10 PROCEDURE — 85025 COMPLETE CBC W/AUTO DIFF WBC: CPT | Performed by: INTERNAL MEDICINE

## 2021-08-10 PROCEDURE — 92610 EVALUATE SWALLOWING FUNCTION: CPT | Performed by: SPEECH-LANGUAGE PATHOLOGIST

## 2021-08-10 PROCEDURE — 80061 LIPID PANEL: CPT | Performed by: INTERNAL MEDICINE

## 2021-08-10 PROCEDURE — 84484 ASSAY OF TROPONIN QUANT: CPT | Performed by: INTERNAL MEDICINE

## 2021-08-10 PROCEDURE — 83735 ASSAY OF MAGNESIUM: CPT | Performed by: INTERNAL MEDICINE

## 2021-08-10 PROCEDURE — 25010000003 POTASSIUM CHLORIDE 10 MEQ/100ML SOLUTION: Performed by: INTERNAL MEDICINE

## 2021-08-10 PROCEDURE — 82962 GLUCOSE BLOOD TEST: CPT

## 2021-08-10 PROCEDURE — 84100 ASSAY OF PHOSPHORUS: CPT | Performed by: INTERNAL MEDICINE

## 2021-08-10 PROCEDURE — 99221 1ST HOSP IP/OBS SF/LOW 40: CPT | Performed by: PHYSICIAN ASSISTANT

## 2021-08-10 PROCEDURE — 92612 ENDOSCOPY SWALLOW (FEES) VID: CPT | Performed by: SPEECH-LANGUAGE PATHOLOGIST

## 2021-08-10 PROCEDURE — 97116 GAIT TRAINING THERAPY: CPT

## 2021-08-10 PROCEDURE — 97162 PT EVAL MOD COMPLEX 30 MIN: CPT

## 2021-08-10 PROCEDURE — 83036 HEMOGLOBIN GLYCOSYLATED A1C: CPT | Performed by: INTERNAL MEDICINE

## 2021-08-10 PROCEDURE — 80053 COMPREHEN METABOLIC PANEL: CPT | Performed by: INTERNAL MEDICINE

## 2021-08-10 PROCEDURE — 25010000002 MAGNESIUM SULFATE 2 GM/50ML SOLUTION: Performed by: INTERNAL MEDICINE

## 2021-08-10 PROCEDURE — 70450 CT HEAD/BRAIN W/O DYE: CPT

## 2021-08-10 PROCEDURE — 99232 SBSQ HOSP IP/OBS MODERATE 35: CPT | Performed by: INTERNAL MEDICINE

## 2021-08-10 PROCEDURE — 97165 OT EVAL LOW COMPLEX 30 MIN: CPT

## 2021-08-10 PROCEDURE — 92523 SPEECH SOUND LANG COMPREHEN: CPT | Performed by: SPEECH-LANGUAGE PATHOLOGIST

## 2021-08-10 RX ORDER — METOPROLOL SUCCINATE 25 MG/1
25 TABLET, EXTENDED RELEASE ORAL
Qty: 90 TABLET | Refills: 1 | OUTPATIENT
Start: 2021-08-10

## 2021-08-10 RX ORDER — LEVOTHYROXINE SODIUM 0.15 MG/1
150 TABLET ORAL DAILY
Qty: 90 TABLET | Refills: 1 | OUTPATIENT
Start: 2021-08-10

## 2021-08-10 RX ORDER — LEVOTHYROXINE SODIUM 0.15 MG/1
150 TABLET ORAL
Status: DISCONTINUED | OUTPATIENT
Start: 2021-08-11 | End: 2021-08-13 | Stop reason: HOSPADM

## 2021-08-10 RX ADMIN — SODIUM CHLORIDE 75 ML/HR: 9 INJECTION, SOLUTION INTRAVENOUS at 00:00

## 2021-08-10 RX ADMIN — DOCUSATE SODIUM 50 MG AND SENNOSIDES 8.6 MG 2 TABLET: 8.6; 5 TABLET, FILM COATED ORAL at 21:14

## 2021-08-10 RX ADMIN — MAGNESIUM SULFATE HEPTAHYDRATE 2 G: 2 INJECTION, SOLUTION INTRAVENOUS at 06:00

## 2021-08-10 RX ADMIN — SODIUM CHLORIDE, PRESERVATIVE FREE 10 ML: 5 INJECTION INTRAVENOUS at 00:00

## 2021-08-10 RX ADMIN — POTASSIUM CHLORIDE 10 MEQ: 7.46 INJECTION, SOLUTION INTRAVENOUS at 06:43

## 2021-08-10 RX ADMIN — NICARDIPINE HYDROCHLORIDE 5 MG/HR: 0.1 INJECTION, SOLUTION INTRAVENOUS at 01:59

## 2021-08-10 RX ADMIN — SODIUM CHLORIDE, PRESERVATIVE FREE 10 ML: 5 INJECTION INTRAVENOUS at 21:14

## 2021-08-10 RX ADMIN — PANTOPRAZOLE SODIUM 40 MG: 40 INJECTION, POWDER, FOR SOLUTION INTRAVENOUS at 05:27

## 2021-08-10 RX ADMIN — POTASSIUM CHLORIDE 10 MEQ: 7.46 INJECTION, SOLUTION INTRAVENOUS at 07:59

## 2021-08-10 RX ADMIN — SODIUM CHLORIDE 75 ML/HR: 9 INJECTION, SOLUTION INTRAVENOUS at 13:28

## 2021-08-10 RX ADMIN — POTASSIUM CHLORIDE 10 MEQ: 7.46 INJECTION, SOLUTION INTRAVENOUS at 11:01

## 2021-08-10 RX ADMIN — MAGNESIUM SULFATE HEPTAHYDRATE 2 G: 2 INJECTION, SOLUTION INTRAVENOUS at 09:32

## 2021-08-10 RX ADMIN — POTASSIUM CHLORIDE 10 MEQ: 7.46 INJECTION, SOLUTION INTRAVENOUS at 12:18

## 2021-08-10 RX ADMIN — POTASSIUM CHLORIDE 10 MEQ: 7.46 INJECTION, SOLUTION INTRAVENOUS at 09:32

## 2021-08-10 RX ADMIN — MAGNESIUM SULFATE HEPTAHYDRATE 2 G: 2 INJECTION, SOLUTION INTRAVENOUS at 07:23

## 2021-08-10 RX ADMIN — POTASSIUM CHLORIDE 10 MEQ: 7.46 INJECTION, SOLUTION INTRAVENOUS at 05:59

## 2021-08-10 NOTE — THERAPY EVALUATION
"Patient Name: Zohra Hall  : 1940    MRN: [id number]                              Today's Date: 8/10/2021       Admit Date: 2021    Visit Dx: No diagnosis found.  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CMS/HCC)   • Hypothyroid   • Essential hypertension   • GERD without esophagitis   • Hyperlipidemia LDL goal <100   • Atherosclerosis of aorta (CMS/HCC)   • Incontinence of bowel   • Acute right-sided low back pain without sciatica   • Benign essential tremor   • Pain of right hip joint   • Thyroid cancer (CMS/HCC)   • Lacunar stroke (CMS/HCC)   • Nontraumatic rupture of extensor tendons of right hand and wrist   • Hyponatremia   • Actinic keratosis   • Transient ischemic attack   • Ataxia   • PMR (polymyalgia rheumatica) (CMS/HCC)   • Osteoporosis   • Post-surgical hypothyroidism   • NSTEMI (non-ST elevated myocardial infarction) (CMS/HCC)   • Coronary artery disease involving native coronary artery of native heart with angina pectoris (CMS/HCC)   • Abnormal TSH   • Severe hypothyroidism   • ICH (intracerebral hemorrhage) (CMS/HCC)     Past Medical History:   Diagnosis Date   • Breast cancer (CMS/HCC)     left- pt states \"in situ\", no radiation, Tamoxifen for 5 years   • Cellulitis    • Cervical lymphadenopathy    • Chest pain    • Convulsions (CMS/HCC)    • GERD (gastroesophageal reflux disease)    • Glossitis    • Grief reaction     · Last Impression: 2015  counselled, given ativan for prn use.  Aleah Regan   • Hypertension    • Lower back pain    • Oral thrush    • Papillary adenocarcinoma (CMS/HCC)     Papillary adenocarcinoma of thyroid   • Papillary adenocarcinoma of thyroid (CMS/HCC)     · resection Ramirez- ,  2 x 2 x 1.5 cm  T3 N1 MXpost op low calcium and diminished  " voiceablation Clara- 3/7 metastatic nodes and invasion to strap muscles · Last Impression: 29 Oct 2014  s/p Eval by berry Méndez.  Aleah Regan (Internal   • Piriformis syndrome    • Tingling    • Xerostomia      Past Surgical History:   Procedure Laterality Date   • BREAST BIOPSY Right 10/10/2013    stereo bx   • BREAST BIOPSY Left 10/19/2015    stereo bx   • BREAST CYST EXCISION      right   • BREAST EXCISIONAL BIOPSY Right     affirm bx and loc 2016.   • BREAST LUMPECTOMY Left 1987   • CARDIAC CATHETERIZATION N/A 5/23/2021    Procedure: Left Heart Cath;  Surgeon: Alonso Ross IV, MD;  Location:  [facility] CATH [facility]VASIVE [facility];  Service: Cardiology;  Laterality: N/A;   • HYSTERECTOMY  1979   • OTHER SURGICAL HISTORY Right     right arm surgery-steel roger in place   • TOTAL HIP ARTHROPLASTY     • TOTAL THYROIDECTOMY      Thyroid Surgery Total Thyroidectomy     General Information     Row Name 08/10/21 1143          Physical Therapy Time and Intention    Document Type  evaluation  (Pended)   -     Mode of Treatment  physical therapy  (Pended)   -     Row Name 08/10/21 1143          General Information    Patient Profile Reviewed  yes  (Pended)   -     Prior Level of Function  independent:;all household mobility;gait;transfer;bed mobility  (Pended)  Pt reports she is independent with mobility at baseline with occasional use of RW as needed. Pt has RW, wheelchair, and cane at home.  -     Existing Precautions/Restrictions  fall  (Pended)   -     Barriers to Rehab  previous functional deficit  (Pended)   -     Row Name 08/10/21 1143          Living Environment    Lives With  facility resident  (Pended)  Pt lives at an assisted living facility  -     Row Name 08/10/21 1143          Home Main Entrance    Number of Stairs, Main Entrance  none  (Pended)   -     Row Name 08/10/21 1143          Stairs Within Home, Primary    Number of Stairs, Within Home, Primary  none  (Pended)   -     Row  Name 08/10/21 1143          Cognition    Orientation Status (Cognition)  oriented to;person;place  (Pended)   -     Row Name 08/10/21 1143          Safety Issues, Functional Mobility    Safety Issues Affecting Function (Mobility)  insight into deficits/self-awareness;positioning of assistive device;safety precaution awareness  (Pended)   -     Impairments Affecting Function (Mobility)  balance;cognition;coordination;endurance/activity tolerance;strength  (Pended)   -       User Key  (r) = Recorded By, (t) = Taken By, (c) = Cosigned By    Initials Name Provider Type     Reji Schreiber, PT Student PT Student        Mobility     Row Name 08/10/21 1146          Bed Mobility    Comment (Bed Mobility)  Pt received sitting on [facility] with OT.  (Pended)   -     Row Name 08/10/21 1146          Transfers    Comment (Transfers)  Pt performed STS from [facility], requiring VCs for proper hand placement and teachique.  (Pended)   -     Row Name 08/10/21 1146          Sit-Stand Transfer    Sit-Stand [place] (Transfers)  minimum assist (75% patient effort);1 person assist;verbal cues  (Pended)   -     Assistive Device (Sit-Stand Transfers)  walker, front-wheeled  (Pended)   -     Row Name 08/10/21 1146          Gait/Stairs (Locomotion)    [place] Level (Gait)  minimum assist (75% patient effort);1 person assist;1 person to manage equipment;verbal cues  (Pended)   -     Assistive Device (Gait)  walker, front-wheeled  (Pended)   -     Distance in Feet (Gait)  20  (Pended)   -     Deviations/Abnormal Patterns (Gait)  base of support, narrow;radha decreased;festinating/shuffling;gait speed decreased;stride length decreased;weight shifting decreased  (Pended)   -     Bilateral Gait Deviations  forward flexed posture;heel strike decreased  (Pended)   -     Comment (Gait/Stairs)  Pt demonstrated forwared flexed posture with decreased gait speed and step length. Pt also demonstrated significantly narrowed MAGNUS  with occasional scissoring during ambulation. Pt required VCs to widen MAGNUS and increase step length. Pt with instability throughout requiring MinAx1 to correct. Ambulation distance limited this session d/t fatigue.  (Pended)   -       User Key  (r) = Recorded By, (t) = Taken By, (c) = Cosigned By    Initials Name Provider Type     Reji Schreiber, PT Student PT Student        Obj/Interventions     [facility] Name 08/10/21 1152          Range of Motion Comprehensive    General Range of Motion  bilateral lower extremity ROM WFL  (Pended)   -LH     Row Name 08/10/21 1152          Strength Comprehensive (MMT)    General Manual Muscle Testing (MMT) Assessment  lower extremity strength deficits identified  (Pended)   -     Comment, General Manual Muscle Testing (MMT) Assessment  RLE strength grossly 4/5, LLE strength grossly 4-/5  (Pended)   -LH     Row Name 08/10/21 1152          Motor Skills    Motor Skills  coordination  (Pended)   -     Coordination  bilateral;lower extremity;gross motor deficit;minimal impairment  (Pended)  Pt with variable foot placement throughout ambulation.  -LH     Row Name 08/10/21 1152          Balance    Balance Assessment  sitting static balance;sitting dynamic balance;standing static balance;standing dynamic balance  (Pended)   -     Static Sitting Balance  WFL;sitting, edge of bed  (Pended)   -     Dynamic Sitting Balance  WFL;sitting, edge of bed  (Pended)   -     Static Standing Balance  mild impairment;supported;standing  (Pended)   -     Dynamic Standing Balance  moderate impairment;supported;standing  (Pended)   -LH     Row Name 08/10/21 1152          Sensory Assessment (Somatosensory)    Sensory Assessment (Somatosensory)  LE sensation intact  (Pended)   -       User Key  (r) = Recorded By, (t) = Taken By, (c) = Cosigned By    Initials Name Provider Type     Reji Schreiber, PT Student PT Student        Goals/Plan     Row Name 08/10/21 1157          Bed Mobility Goal 1  (PT)    Activity/Assistive Device (Bed Mobility Goal 1, PT)  sit to supine/supine to sit  (Pended)   -     [place] Level/Cues Needed (Bed Mobility Goal 1, PT)  modified independence  (Pended)   -     Time Frame (Bed Mobility Goal 1, PT)  long term goal (LTG);2 weeks  (Pended)   -     Progress/Outcomes (Bed Mobility Goal 1, PT)  goal ongoing  (Pended)   -[facility] Name 08/10/21 6954          Transfer Goal 1 (PT)    Activity/Assistive Device (Transfer Goal 1, PT)  sit-to-stand/stand-to-sit;walker, rolling  (Pended)   -     [place] Level/Cues Needed (Transfer Goal 1, PT)  standby assist  (Pended)   -     Time Frame (Transfer Goal 1, PT)  long term goal (LTG);2 weeks  (Pended)   -     Progress/Outcome (Transfer Goal 1, PT)  goal ongoing  (Pended)   -[facility] Name 08/10/21 3739          Gait Training Goal 1 (PT)    Activity/Assistive Device (Gait Training Goal 1, PT)  gait (walking locomotion);assistive device use;walker, rolling  (Pended)   -     [place] Level (Gait Training Goal 1, PT)  contact guard assist  (Pended)   -     Distance (Gait Training Goal 1, PT)  150  (Pended)   -     Time Frame (Gait Training Goal 1, PT)  long term goal (LTG);2 weeks  (Pended)   -     Progress/Outcome (Gait Training Goal 1, PT)  goal ongoing  (Pended)   -       User Key  (r) = Recorded By, (t) = Taken By, (c) = Cosigned By    Initials Name Provider Type     Reji Schreiber, PT Student PT Student        Clinical Impression     Row Name 08/10/21 4538          Pain    Additional Documentation  Pain Scale: FACES Pre/Post-Treatment (Group)  (Pended)   -[facility] Name 08/10/21 1463          Pain Scale: FACES Pre/Post-Treatment    Pain: FACES Scale, Pretreatment  0-->no hurt  (Pended)   -     Posttreatment Pain Rating  0-->no hurt  (Pended)   -[facility] Name 08/10/21 8210          Plan of Care Review    Plan of Care Reviewed With  patient  (Pended)   -     Progress  no change  (Pended)   -      Outcome Summary  PT initial evaluation complete. Pt demonstrates generalized weakness with LLE weakness > RLE, decreased balance and coordination, and confusion limiting pt's independence with functional mobility. Pt ambulated 20 feet in room with RW and MinAx1 +1, demonstrating scissoring gait pattern and variable foot placement. Pt would benefit from continued skilled acute PT services to promote PLOF. PT recommending inpatient rehab at [place].  (Pended)   -[facility] Name 08/10/21 6084          Therapy Assessment/Plan (PT)    Patient/Family Therapy Goals Statement (PT)  Return to PLOF.  (Pended)   -     Rehab Potential (PT)  good, to achieve stated therapy goals  (Pended)   -     Criteria for Skilled Interventions Met (PT)  yes;meets criteria;skilled treatment is necessary  (Pended)   -     Predicted Duration of Therapy Intervention (PT)  2 weeks  (Pended)   -[facility] Name 08/10/21 6255          Vital Signs    Pre Systolic BP Rehab  130  (Pended)   -     Pre Treatment Diastolic BP  64  (Pended)   -     Post Systolic BP Rehab  126  (Pended)   -     Post Treatment Diastolic BP  69  (Pended)   -     Pretreatment Heart Rate (beats/min)  82  (Pended)   -     Posttreatment Heart Rate (beats/min)  90  (Pended)   -     Pre SpO2 (%)  99  (Pended)   -     O2 Delivery Pre Treatment  nasal cannula  (Pended)   -     Post SpO2 (%)  99  (Pended)   -     O2 Delivery Post Treatment  nasal cannula  (Pended)   -     Pre Patient Position  Sitting  (Pended)   -     Intra Patient Position  Standing  (Pended)   -     Post Patient Position  Sitting  (Pended)   -LH     Row Name 08/10/21 2784          Positioning and Restraints    Pre-Treatment Position  bedside commode  (Pended)   -     Post Treatment Position  chair  (Pended)   -     In Chair  notified nsg;reclined;call light within reach;encouraged to call for assist;exit alarm on;LUE elevated;waffle cushion;legs elevated;heels elevated  (Pended)   -        User Key  (r) = Recorded By, (t) = Taken By, (c) = Cosigned By    Initials Name Provider Type     Reji Schreiber, PT Student PT Student        Outcome Measures     Row Name 08/10/21 1158          How much help from another person do you currently need...    Turning from your back to your side while in flat bed without using bedrails?  3  (Pended)   -LH     Moving from lying on back to sitting on the side of a flat bed without bedrails?  3  (Pended)   -LH     Moving to and from a bed to a chair (including a wheelchair)?  3  (Pended)   -LH     Standing up from a chair using your arms (e.g., wheelchair, bedside chair)?  3  (Pended)   -LH     Climbing 3-5 steps with a railing?  2  (Pended)   -LH     To walk in hospital room?  3  (Pended)   -     AM-PAC 6 Clicks Score (PT)  17  (Pended)   -     Row Name 08/10/21 1158          Modified [place] Scale    Pre-Stroke Modified [place] Scale  6 - Unable to determine (UTD) from the medical record documentation  (Pended)   -     Modified Meng Scale  3 - Moderate disability.  Requiring some help, but able to walk without assistance.  (Pended)   -     Row Name 08/10/21 1158 08/10/21 1154       Functional Assessment    Outcome Measure Options  AM-PAC 6 Clicks Basic Mobility (PT);Modified [place]  (Pended)   -  AM-PAC 6 Clicks Daily Activity (OT)  -CL      User Key  (r) = Recorded By, (t) = Taken By, (c) = Cosigned By    Initials Name Provider Type    CL Lesa Jeffries, OT Occupational Therapist     Reji Schreiber, PT Student PT Student                       Physical Therapy Education                 Title: PT OT SLP Therapies (In Progress)     Topic: Physical Therapy (In Progress)     Point: Mobility training (Done)     Learning Progress Summary           Patient Acceptance, E, VU,DU,NR by  at 8/10/2021 1159                   Point: Home exercise program (Not Started)     Learner Progress:  Not documented in this visit.          Point: Body mechanics (Done)      Learning Progress Summary           Patient Acceptance, E, VU,DU,NR by  at 8/10/2021 1159                   Point: Precautions (Done)     Learning Progress Summary           Patient Acceptance, E, VU,DU,NR by  at 8/10/2021 1159                               User Key     Initials Effective Dates Name Provider Type Discipline     05/17/21 -  Reji Schreiber, PT Student PT Student PT              PT Recommendation and Plan  Planned Therapy Interventions (PT): (P) balance training, bed mobility training, gait training, home exercise program, patient/family education, strengthening, transfer training  Plan of Care Reviewed With: (P) patient  Progress: (P) no change  Outcome Summary: (P) PT initial evaluation complete. Pt demonstrates generalized weakness with LLE weakness > RLE, decreased balance and coordination, and confusion limiting pt's independence with functional mobility. Pt ambulated 20 feet in room with RW and MinAx1 +1, demonstrating scissoring gait pattern and variable foot placement. Pt would benefit from continued skilled acute PT services to promote PLOF. PT recommending inpatient rehab at [place].     Time Calculation:   PT Charges     Row Name 08/10/21 1200             Time Calculation    Start Time  1035  (Pended)   -      PT Received On  08/10/21  (Pended)   -      PT Goal Re-Cert Due Date  08/20/21  (Pended)   -         Timed Charges    27852 - Gait Training Minutes   8  (Pended)   -         Untimed Charges    PT Eval/Re-eval Minutes  36  (Pended)   -         Total Minutes    Timed Charges Total Minutes  8  (Pended)   -      Untimed Charges Total Minutes  36  (Pended)   -       Total Minutes  44  (Pended)   -        User Key  (r) = Recorded By, (t) = Taken By, (c) = Cosigned By    Initials Name Provider Type     Reji Schreiber, PT Student PT Student        Therapy Charges for Today     Code Description Service Date Service Provider Modifiers Qty    83567588934 HC GAIT TRAINING EA  15 MIN 8/10/2021 Isma Schreiber, PT Student GP 1    86515281937 HC PT EVAL MOD COMPLEXITY 3 8/10/2021 Isma Schreiber, PT Student GP 1          PT G-Codes  Outcome Measure Options: (P) AM-PAC 6 Clicks Basic Mobility (PT), Modified [place]  AM-PAC 6 Clicks Score (PT): (P) 17  AM-PAC 6 Clicks Score (OT): 11  Modified [place] Scale: (P) 3 - Moderate disability.  Requiring some help, but able to walk without assistance.    ISMA SCHREIBER, PT Student  8/10/2021

## 2021-08-10 NOTE — PLAN OF CARE
Goal Outcome Evaluation:  Plan of Care Reviewed With: patient        Progress:  (initial eval)   SLP evaluation completed. Will continue to address dysphagia and slc deficits. FEES completed, initiated dysphagia level IV w/ nectar thick liquids. Please see note for further details and recommendations.

## 2021-08-10 NOTE — CONSULTS
"NEUROSURGERY CONSULTATION    Referring Provider: No ref. provider found    Patient Care Team:  Aleah Regan MD as PCP - General  Aleah Regan MD as PCP - Family Medicine  Wicho Elizabeth MD as Consulting Physician (Endocrinology)  Simeon Burch MD as Consulting Physician (Gastroenterology)    Chief Complaint: Fall    History of Present Illness: Ms. Hall is an 80-year-old woman with a PMHx significant for CAD (on asa/plavix), htn, hld, remote breast and thyroid cancer. Yesterday she was found down by a physical therapist at her assisted living facility. Upon arrival to our facility she noted headache and generalized weakness. Per patient's family she harbors some baseline confusion.    Imaging upon arrival demonstrated bilateral traumatic subarachnoid hemorrhage as well as a small right frontal subdural hematoma and anterior left temporal ICH. Neurosurgical consultation was requested.      Review of Systems:  Musculoskeletal and Neurological systems were reviewed and are negative except for:  Musculoskeletal: positive for muscle weakness  Neurological: positive for weakness    History:  Past Medical History:   Diagnosis Date   • Breast cancer (CMS/HCC) 1987    left- pt states \"in situ\", no radiation, Tamoxifen for 5 years   • Cellulitis    • Cervical lymphadenopathy    • Chest pain    • Convulsions (CMS/HCC)    • GERD (gastroesophageal reflux disease)    • Glossitis    • Grief reaction     · Last Impression: 29 Jan 2015  counselled, given ativan for prn use.  Aleah Regan   • Hypertension    • Lower back pain    • Oral thrush    • Papillary adenocarcinoma (CMS/HCC)     Papillary adenocarcinoma of thyroid   • Papillary adenocarcinoma of thyroid (CMS/HCC)     · resection Ramirez- 1/13,  2 x 2 x 1.5 cm  T3 N1 MXpost op low calcium and diminished  voiceablation Ain- 3/7 metastatic nodes and invasion to strap muscles · Last Impression: 29 Oct 2014  s/p Eval by berry Méndez.  " Aleah Regan (Internal   • Piriformis syndrome    • Tingling    • Xerostomia    ,   Past Surgical History:   Procedure Laterality Date   • BREAST BIOPSY Right 10/10/2013    stereo bx   • BREAST BIOPSY Left 10/19/2015    stereo bx   • BREAST CYST EXCISION      right   • BREAST EXCISIONAL BIOPSY Right     affirm bx and loc .   • BREAST LUMPECTOMY Left    • CARDIAC CATHETERIZATION N/A 2021    Procedure: Left Heart Cath;  Surgeon: Alonso Ross IV, MD;  Location: [facility] CATH INVASIVE LOCATION;  Service: Cardiology;  Laterality: N/A;   • HYSTERECTOMY     • OTHER SURGICAL HISTORY Right     right arm surgery-steel roger in place   • TOTAL HIP ARTHROPLASTY     • TOTAL THYROIDECTOMY      Thyroid Surgery Total Thyroidectomy   ,   Family History   Problem Relation Age of Onset   • Breast cancer Mother 84   • Cancer Mother    • BRCA 1/2 Neg Hx    • Colon cancer Neg Hx    • Endometrial cancer Neg Hx    • Ovarian cancer Neg Hx    ,   Social History     Tobacco Use   • Smoking status: Former Smoker     Packs/day: 1.00     Years: 30.00     Pack years: 30.00     Types: Cigarettes     Quit date: 1992     Years since quittin.9   • Smokeless tobacco: Never Used   Vaping Use   • Vaping Use: Never used   Substance Use Topics   • Alcohol use: No   • Drug use: No   ,   Medications Prior to Admission   Medication Sig Dispense Refill Last Dose   • acetaminophen (TYLENOL) 325 MG tablet Take 2 tablets by mouth Every 4 (Four) Hours As Needed for Mild Pain .      • alendronate (FOSAMAX) 70 MG tablet Take 1 tablet by mouth Every 7 (Seven) Days. 12 tablet 1    • aluminum-magnesium hydroxide-simethicone (MAALOX MAX) 400-400-40 MG/5ML suspension Take 15 mL by mouth Every 6 (Six) Hours As Needed for Indigestion or Heartburn.      • amLODIPine (NORVASC) 10 MG tablet Take 1 tablet by mouth Daily. Appointment needed for refills 90 tablet 0    • aspirin 81 MG EC tablet Take 1 tablet by mouth Daily. 30 tablet 0   "  • atorvastatin (LIPITOR) 40 MG tablet Take 1 tablet by mouth Daily. 90 tablet 3    • clopidogrel (PLAVIX) 75 MG tablet Take 1 tablet by mouth Daily. 30 tablet 0    • docusate sodium 100 MG capsule Take 2 capsules by mouth Daily As Needed for Constipation.      • enalapril (VASOTEC) 20 MG tablet TAKE 2 TABLETS BY MOUTH  DAILY 180 tablet 1    • famotidine (PEPCID) 20 MG tablet TAKE 1 TABLET BY MOUTH  TWICE DAILY BEFORE MEALS 180 tablet 1    • levothyroxine (SYNTHROID, LEVOTHROID) 150 MCG tablet Take 1 tablet by mouth Daily. Please schedule appointment 90 tablet 0    • metoprolol succinate XL (TOPROL-XL) 25 MG 24 hr tablet Take 1 tablet by mouth Daily. 30 tablet 0    • ondansetron (ZOFRAN) 4 MG tablet Take 1 tablet by mouth Every 6 (Six) Hours As Needed for Nausea or Vomiting.      • primidone (MYSOLINE) 50 MG tablet 2 PO qam and 1 PO  tablet 1     and Allergies:  Dilaudid [hydromorphone hcl], Beta adrenergic blockers, Dilaudid [hydromorphone], and Lactose intolerance (gi)      Physical Exam:  Vital Signs: Blood pressure 113/62, pulse 78, temperature 98.1 °F (36.7 °C), temperature source Oral, resp. rate 16, height 170.2 cm (67\"), weight 61.2 kg (135 lb), SpO2 97 %, not currently breastfeeding.  Physical Exam  HENT:      Head: Normocephalic and atraumatic.   Musculoskeletal:         General: Normal range of motion.      Cervical back: Normal range of motion and neck supple.   Skin:     General: Skin is warm and dry.   Neurological:      Mental Status: She is oriented to person, place, and time.      Motor: Motor function is intact.      Comments: Patient is lying in bed in no apparent distress. She appears drowsy.  She is oriented to person only. Intermittently she will state location.  Speech is intact.  She follows commands in upper and lower extremities  No pronator drift observed  CRANIAL NERVES:  Cranial nerve II:  Visual fields are full to confrontation.  Cranial nerves III, IV and VI:  PERRLADC.  " Extraocular movements are intact.  Nystagmus is not present.  Cranial nerve V:  Facial sensation is intact to light touch.  Cranial nerve VII:  Muscles of facial expression reveal no asymmetry.  Cranial nerve VIII:  Hearing is intact to finger rub bilaterally.  Cranial nerves IX and X:  Palate elevates symmetrically.  Cranial nerve XI:  Shoulder shrug is intact.  Cranial nerve XII:  Tongue is midline without evidence of atrophy or fasciculation.   Psychiatric:         Mood and Affect: Mood normal.         Behavior: Behavior normal.         Data Review:  CT head repeated at 0132 redemonstrates bilateral traumatic subarachnoid hemorrhage as well as a small right frontal subdural hematoma and anterior left temporal ICH with a new small amount of left posterior frontal subdural hemorrhage.   (ICH score =1)    Diagnosis:  1. S/p fall  2. Traumatic subarachnoid hemorrhage    Treatment Recommendations:  This is an 80-year-old woman (on asa/plavix for CAD) who presented to our facility yesterday evening after being found down by a therapist in her assisted living facility. Imaging reviewed with Dr. Espana as detailed above. Patient is neurologically intact. There is no role for neurosurgical intervention at this time. She has received platelets. Recommend continued ICU observation at this time.      Heather Lu PA-C  08/10/21  06:55 EDT

## 2021-08-10 NOTE — THERAPY EVALUATION
"Patient Name: Zohra Hall  : 1940    MRN: [id number]                              Today's Date: 8/10/2021       Admit Date: 2021    Visit Dx: No diagnosis found.  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CMS/HCC)   • Hypothyroid   • Essential hypertension   • GERD without esophagitis   • Hyperlipidemia LDL goal <100   • Atherosclerosis of aorta (CMS/HCC)   • Incontinence of bowel   • Acute right-sided low back pain without sciatica   • Benign essential tremor   • Pain of right hip joint   • Thyroid cancer (CMS/HCC)   • Lacunar stroke (CMS/HCC)   • Nontraumatic rupture of extensor tendons of right hand and wrist   • Hyponatremia   • Actinic keratosis   • Transient ischemic attack   • Ataxia   • PMR (polymyalgia rheumatica) (CMS/HCC)   • Osteoporosis   • Post-surgical hypothyroidism   • NSTEMI (non-ST elevated myocardial infarction) (CMS/HCC)   • Coronary artery disease involving native coronary artery of native heart with angina pectoris (CMS/HCC)   • Abnormal TSH   • Severe hypothyroidism   • ICH (intracerebral hemorrhage) (CMS/HCC)     Past Medical History:   Diagnosis Date   • Breast cancer (CMS/HCC)     left- pt states \"in situ\", no radiation, Tamoxifen for 5 years   • Cellulitis    • Cervical lymphadenopathy    • Chest pain    • Convulsions (CMS/HCC)    • GERD (gastroesophageal reflux disease)    • Glossitis    • Grief reaction     · Last Impression: 2015  counselled, given ativan for prn use.  Aleah Regan   • Hypertension    • Lower back pain    • Oral thrush    • Papillary adenocarcinoma (CMS/HCC)     Papillary adenocarcinoma of thyroid   • Papillary adenocarcinoma of thyroid (CMS/HCC)     · resection Ramirez- ,  2 x 2 x 1.5 cm  T3 N1 MXpost op low calcium and diminished  " voiceablation Clara- 3/7 metastatic nodes and invasion to strap muscles · Last Impression: 29 Oct 2014  s/p Eval by berry Méndez.  Aleah Regan (Internal   • Piriformis syndrome    • Tingling    • Xerostomia      Past Surgical History:   Procedure Laterality Date   • BREAST BIOPSY Right 10/10/2013    stereo bx   • BREAST BIOPSY Left 10/19/2015    stereo bx   • BREAST CYST EXCISION      right   • BREAST EXCISIONAL BIOPSY Right     affirm bx and loc 2016.   • BREAST LUMPECTOMY Left 1987   • CARDIAC CATHETERIZATION N/A 5/23/2021    Procedure: Left Heart Cath;  Surgeon: Alonso Ross IV, MD;  Location:  [facility];  Service: Cardiology;  Laterality: N/A;   • HYSTERECTOMY  1979   • OTHER SURGICAL HISTORY Right     right arm surgery-steel roger in place   • TOTAL HIP ARTHROPLASTY     • TOTAL THYROIDECTOMY      Thyroid Surgery Total Thyroidectomy     General Information     Row Name 08/10/21 1146          OT Time and Intention    Document Type  evaluation  -CL     Mode of Treatment  occupational therapy  -CL     Row Name 08/10/21 1146          General Information    Patient Profile Reviewed  yes  -CL     Prior Level of Function  independent:;all household mobility;ADL's Pt questionnable historian, TBA further  -CL     Existing Precautions/Restrictions  fall;oxygen therapy device and L/min  -CL     Barriers to Rehab  cognitive status;medically complex  -CL     Row Name 08/10/21 1146          Living Environment    Lives With  facility resident [facility]  -     Row Name 08/10/21 1146          Cognition    Orientation Status (Cognition)  oriented to;person;place;disoriented to;time  -CL     Row Name 08/10/21 1146          Safety Issues, Functional Mobility    Impairments Affecting Function (Mobility)  balance;cognition;endurance/activity tolerance;coordination;postural/trunk control  -CL     Cognitive Impairments, Mobility Safety/Performance  attention;insight into deficits/self-awareness  -CL        User Key  (r) = Recorded By, (t) = Taken By, (c) = Cosigned By    Initials Name Provider Type    CL Lesa Jeffries OT Occupational Therapist          Mobility/ADL's     Row Name 08/10/21 1150          Bed Mobility    Bed Mobility  supine-sit  -CL     Supine-Sit [place] (Bed Mobility)  moderate assist (50% patient effort);verbal cues  -CL     Assistive Device (Bed Mobility)  bed rails;head of bed elevated;draw sheet  -CL     Row Name 08/10/21 1150          Transfers    Transfers  sit-stand transfer;toilet transfer  -CL     Comment (Transfers)  BUE support  -CL     Sit-Stand [place] (Transfers)  minimum assist (75% patient effort);verbal cues  -CL     [place] Level (Toilet Transfer)  moderate assist (50% patient effort);verbal cues  -CL     Assistive Device (Toilet Transfer)  commode, bedside without drop arms  -CL     Row Name 08/10/21 1150          Toilet Transfer    Type (Toilet Transfer)  stand pivot/stand step  -CL     Row Name 08/10/21 1150          Activities of Daily Living    BADL Assessment/Intervention  lower body dressing;toileting  -CL     Row Name 08/10/21 1150          Lower Body Dressing Assessment/Training    [place] Level (Lower Body Dressing)  don;socks;dependent (less than 25% patient effort)  -CL     Position (Lower Body Dressing)  supine  -CL     Row Name 08/10/21 1150          Toileting Assessment/Training    [place] Level (Toileting)  adjust/manage clothing;perform perineal hygiene;dependent (less than 25% patient effort)  -CL     Assistive Devices (Toileting)  commode, bedside without drop arms  -CL     Position (Toileting)  supported sitting;supported standing  -CL       User Key  (r) = Recorded By, (t) = Taken By, (c) = Cosigned By    Initials Name Provider Type    Lesa Logan OT Occupational Therapist        Obj/Interventions     Row Name 08/10/21 1150          Sensory Assessment (Somatosensory)    Sensory Assessment (Somatosensory)  UE sensation intact   -CL     Row Name 08/10/21 1150          Range of Motion Comprehensive    General Range of Motion  bilateral upper extremity ROM WFL  -CL     Row Name 08/10/21 1150          Strength Comprehensive (MMT)    Comment, General Manual Muscle Testing (MMT) Assessment  BUE grossly 3+/5  -CL     Row Name 08/10/21 1150          Motor Skills    Motor Skills  coordination  -CL     Coordination  bilateral;finger to nose;minimal impairment  -CL     Row Name 08/10/21 1150          Balance    Balance Assessment  sitting static balance;sitting dynamic balance;standing static balance;standing dynamic balance  -CL     Static Sitting Balance  WFL;sitting, edge of bed  -CL     Dynamic Sitting Balance  WFL;sitting, edge of bed  -CL     Static Standing Balance  mild impairment;supported  -CL     Dynamic Standing Balance  moderate impairment;supported  -CL       User Key  (r) = Recorded By, (t) = Taken By, (c) = Cosigned By    Initials Name Provider Type    CL Lesa Jeffries OT Occupational Therapist        Goals/Plan     Row Name 08/10/21 1153          Transfer Goal 1 (OT)    Activity/Assistive Device (Transfer Goal 1, OT)  sit-to-stand/stand-to-sit;toilet  -CL     [place] Level/Cues Needed (Transfer Goal 1, OT)  minimum assist (75% or more patient effort)  -CL     Time Frame (Transfer Goal 1, OT)  long term goal (LTG);10 days  -CL     Progress/Outcome (Transfer Goal 1, OT)  goal ongoing  -CL     Row Name 08/10/21 1153          Dressing Goal 1 (OT)    Activity/Device (Dressing Goal 1, OT)  lower body dressing don/doff socks w/ AAD  -CL     [place]/Cues Needed (Dressing Goal 1, OT)  moderate assist (50-74% patient effort)  -CL     Time Frame (Dressing Goal 1, OT)  long term goal (LTG);10 days  -CL     Progress/Outcome (Dressing Goal 1, OT)  goal ongoing  -CL     Row Name 08/10/21 1153          Toileting Goal 1 (OT)    Activity/Device (Toileting Goal 1, OT)  adjust/manage clothing;perform perineal hygiene  -CL     [place]  Level/Cues Needed (Toileting Goal 1, OT)  minimum assist (75% or more patient effort)  -CL     Time Frame (Toileting Goal 1, OT)  long term goal (LTG);10 days  -CL     Progress/Outcome (Toileting Goal 1, OT)  goal ongoing  -CL       User Key  (r) = Recorded By, (t) = Taken By, (c) = Cosigned By    Initials Name Provider Type    CL Lesa Jeffries, ANA MARÍA Occupational Therapist        Clinical Impression     Row Name 08/10/21 1151          Pain Assessment    Additional Documentation  Pain Scale: FACES Pre/Post-Treatment (Group)  -CL     Row Name 08/10/21 1151          Pain Scale: FACES Pre/Post-Treatment    Pain: FACES Scale, Pretreatment  0-->no hurt  -CL     Posttreatment Pain Rating  0-->no hurt  -CL     Row Name 08/10/21 1151          Plan of Care Review    Plan of Care Reviewed With  patient  -CL     Progress  improving  -CL     Outcome Summary  OT eval complete. Pt is Mod A for BSC t/f, Dep for LBD, and Dep for toileting ADLs. Pt session limited d/t confusion and coordination deficits. Recommend cont skilled IPOT POC. Recommend pt DC to IP rehab based on current level of performance.  -CL     Row Name 08/10/21 1151          Therapy Assessment/Plan (OT)    Patient/Family Therapy Goal Statement (OT)  Return to PLOF  -CL     Rehab Potential (OT)  good, to achieve stated therapy goals  -CL     Criteria for Skilled Therapeutic Interventions Met (OT)  yes;skilled treatment is necessary  -CL     Therapy Frequency (OT)  daily  -CL     Row Name 08/10/21 1151          Therapy Plan Review/Discharge Plan (OT)    Anticipated Discharge Disposition (OT)  inpatient rehabilitation facility  -CL     Row Name 08/10/21 1151          Vital Signs    Pre Systolic BP Rehab  130  -CL     Pre Treatment Diastolic BP  64  -CL     Post Systolic BP Rehab  -- w/ PT  -CL     Pretreatment Heart Rate (beats/min)  81  -CL     Pre SpO2 (%)  100  -CL     O2 Delivery Pre Treatment  nasal cannula  -CL     Pre Patient Position  Supine  -CL     Intra Patient  Position  Standing  -CL     Post Patient Position  Sitting  -CL     Row Name 08/10/21 1151          Positioning and Restraints    Pre-Treatment Position  in bed  -CL     Post Treatment Position  bsc  -CL     On BS commode  notified nsg;sitting;call light within reach;encouraged to call for assist;with PT  -CL       User Key  (r) = Recorded By, (t) = Taken By, (c) = Cosigned By    Initials Name Provider Type    CL Lesa Jeffries OT Occupational Therapist        Outcome Measures     Row Name 08/10/21 1154          How much help from another is currently needed...    Putting on and taking off regular lower body clothing?  1  -CL     Bathing (including washing, rinsing, and drying)  2  -CL     Toileting (which includes using toilet bed pan or urinal)  1  -CL     Putting on and taking off regular upper body clothing  2  -CL     Taking care of personal grooming (such as brushing teeth)  2  -CL     Eating meals  3  -CL     AM-PAC 6 Clicks Score (OT)  11  -CL     Row Name 08/10/21 1154          Functional Assessment    Outcome Measure Options  AM-PAC 6 Clicks Daily Activity (OT)  -CL       User Key  (r) = Recorded By, (t) = Taken By, (c) = Cosigned By    Initials Name Provider Type    CL Lesa Jeffries OT Occupational Therapist          Occupational Therapy Education                 Title: PT OT SLP Therapies (In Progress)     Topic: Occupational Therapy (In Progress)     Point: ADL training (In Progress)     Description:   Instruct learner(s) on proper safety adaptation and remediation techniques during self care or transfers.   Instruct in proper use of assistive devices.              Learning Progress Summary           Patient Acceptance, E, NR by CL at 8/10/2021 1155                   Point: Home exercise program (Not Started)     Description:   Instruct learner(s) on appropriate technique for monitoring, assisting and/or progressing therapeutic exercises/activities.              Learner Progress:  Not documented in this  visit.          Point: Precautions (In Progress)     Description:   Instruct learner(s) on prescribed precautions during self-care and functional transfers.              Learning Progress Summary           Patient Acceptance, E, NR by CL at 8/10/2021 1155                   Point: Body mechanics (In Progress)     Description:   Instruct learner(s) on proper positioning and spine alignment during self-care, functional mobility activities and/or exercises.              Learning Progress Summary           Patient Acceptance, E, NR by CL at 8/10/2021 1155                               User Key     Initials Effective Dates Name Provider Type Discipline     04/03/18 -  Lesa Jeffries OT Occupational Therapist OT              OT Recommendation and Plan  Therapy Frequency (OT): daily  Plan of Care Review  Plan of Care Reviewed With: patient  Progress: improving  Outcome Summary: OT eval complete. Pt is Mod A for BSC t/f, Dep for LBD, and Dep for toileting ADLs. Pt session limited d/t confusion and coordination deficits. Recommend cont skilled IPOT POC. Recommend pt DC to IP rehab based on current level of performance.     Time Calculation:   Time Calculation- OT     Row Name 08/10/21 1155             Time Calculation- OT    OT Start Time  1020  -CL      OT Received On  08/10/21  -CL      OT Goal Re-Cert Due Date  08/20/21  -CL         Untimed Charges    OT Eval/Re-eval Minutes  46  -CL         Total Minutes    Untimed Charges Total Minutes  46  -CL       Total Minutes  46  -CL        User Key  (r) = Recorded By, (t) = Taken By, (c) = Cosigned By    Initials Name Provider Type    CL Lesa Jeffries OT Occupational Therapist        Therapy Charges for Today     Code Description Service Date Service Provider Modifiers Qty    [id number]  OT EVAL LOW COMPLEXITY 4 8/10/2021 Lesa Jeffries OT GO 1               Lesa Jeffries OT  8/10/2021

## 2021-08-10 NOTE — PLAN OF CARE
Goal Outcome Evaluation:  Plan of Care Reviewed With: patient        Progress: improving  Outcome Summary: OT eval complete. Pt is Mod A for BSC t/f, Dep for LBD, and Dep for toileting ADLs. Pt session limited d/t confusion and coordination deficits. Recommend cont skilled IPOT POC. Recommend pt DC to IP rehab based on current level of performance.

## 2021-08-10 NOTE — PROGRESS NOTES
Intensive Care Follow-up     Hospital:  LOS: 1 day   Ms. Zohra Hall, 80 y.o. female is followed for:   ICH (intracerebral hemorrhage) (CMS/HCC)            History of present illness:   79 y/o WF w/ h/o 30 py smoking quit 1992, HTN, HLD, CAD w/ NSTEMI in 5/2021 requiring Cath - severe 1v disease in terminal RPDA not amenable to intervention, PMR, breast CA 1987, Thyroid CA 2014, GERD, medical non-compliance due to forgetting to take medications, who is a resident of an assisted living facility and was found down but conscious by a physical therapist.  Patient had been down approximately 2 hours.  She was found to have bilateral posttraumatic subarachnoid blood predominantly involving the anterior frontotemporal regions, a small subdural overlying the lateral right frontal lobe and an intraparenchymal hematoma involving the patient's anterior left temporal lobe on CT.  Patient is on Aspirin and Plavix.      Subjective   Interval History:  Patient stable this morning.  Blood pressure is overall well controlled.  Patient will wake up and answer questions appropriately.  Moving all extremities.  No other acute issues.             The patient's past medical, surgical and social history were reviewed and updated in Epic as appropriate.       Objective     Infusions:  niCARdipine, 5-15 mg/hr, Last Rate: 2.5 mg/hr (08/10/21 0417)  sodium chloride, 75 mL/hr, Last Rate: 75 mL/hr (08/10/21 0000)      Medications:  insulin lispro, 0-7 Units, Subcutaneous, TID With Meals  pantoprazole, 40 mg, Intravenous, Q AM  senna-docusate sodium, 2 tablet, Oral, BID  sodium chloride, 10 mL, Intravenous, Q12H      I reviewed the patient's medications.    Vital Sign Min/Max for last 24 hours  Temp  Min: 97.7 °F (36.5 °C)  Max: 99.4 °F (37.4 °C)   BP  Min: 105/50  Max: 177/89   Pulse  Min: 72  Max: 109   Resp  Min: 16  Max: 18   SpO2  Min: 87 %  Max: 100 %   No data recorded       Input/Output for last 24 hour shift  08/09 0701 - 08/10  0700  In: 1109.1 [I.V.:669.1]  Out: 500 [Urine:500]      GENERAL : NAD, conversant  RESPIRATORY/THORAX : normal respiratory effort and no intercostal retractions, CTAB  CARDIOVASCULAR : Normal S1/S2, RRR.  No lower ext edema.  GASTROINTESTINAL : Soft, NT/ND. BS x 4 normoactive. No hepatosplenomegaly.  MUSCULOSKELETAL : No cyanosis, clubbing, or ischemia  NEUROLOGICAL: alert and oriented to person, place and time  PSYCHOLOGICAL : Appropriate affect    Results from last 7 days   Lab Units 08/10/21  0451 08/09/21  2055   WBC 10*3/mm3 7.23 7.66   HEMOGLOBIN g/dL 12.2 12.9   PLATELETS 10*3/mm3 306 258     Results from last 7 days   Lab Units 08/10/21  0451 08/09/21  2054   SODIUM mmol/L 140 140   POTASSIUM mmol/L 3.0* 3.7   CO2 mmol/L 25.0 26.0   BUN mg/dL 10 14   CREATININE mg/dL 0.66 0.84   MAGNESIUM mg/dL 1.5*  --    PHOSPHORUS mg/dL 3.2  --    GLUCOSE mg/dL 107* 111*     Estimated Creatinine Clearance: 52.4 mL/min (by C-G formula based on SCr of 0.66 mg/dL).          I reviewed the patient's new clinical results.  I reviewed the patient's new imaging results/reports including actual images and agree with reports.              Imaging Results (Last 24 Hours)     Procedure Component Value Units Date/Time    SLP FEES - Fiberoptic Endo Eval Swallow [[id number]] Resulted: 08/10/21 1044     Updated: 08/10/21 1044    CT Head Without Contrast [[id number]] Collected: 08/10/21 0132     Updated: 08/10/21 0134    Narrative:      CT Head WO    HISTORY:   Follow-up of intracranial hemorrhage.    TECHNIQUE:   Axial unenhanced head CT with multiplanar reformats. Radiation dose reduction techniques included automated exposure control or exposure modulation based on body size. Count of known CT and cardiac nuc med studies performed in previous 12 months: 3.     COMPARISON:   8/9/2021 at 1921 hours.    FINDINGS:   Ventricular size and configuration remain normal. There is generalized atrophy with chronic small vessel ischemic disease in  the white matter. The intraparenchymal hematoma in the anterior left temporal lobe is stable in size measuring about 1.3 x 2.5 cm  in size. Subarachnoid hemorrhage on both sides of the brain in the frontal and temporal regions extending into the sylvian fissures does not appear significantly changed. This measures up to 8 mm in thickness on the left side. Trace right frontal  subdural hemorrhage is stable. Small amount of left posterior frontal subdural hemorrhage is now noted measuring about 5 mm in thickness.  No intraventricular hemorrhage is identified. No skull fracture is identified.      Impression:        1. There is a new small amount of left posterior frontal subdural hemorrhage measuring about 5 mm in thickness.  2. Remaining acute intracranial hemorrhage described previously is not significantly changed.    Signer Name: Zacarias Sanchez MD   Signed: 8/10/2021 1:32 AM   Workstation Name: [id number]    [facility]    XR Chest 1 View [[id number]] Collected: 08/09/21 2241     Updated: 08/09/21 2243    Narrative:      CR Chest 1 Vw    INDICATION:   Fall, limited history     COMPARISON:    Chest x-ray from 5/23/2021    FINDINGS:  Single portable AP view(s) of the chest.    The heart and mediastinal contours are normal. The lungs are clear apart from chronic changes. No pneumothorax or pleural effusion.       Impression:      No definite acute cardiopulmonary findings.    Signer Name: Verna Roche MD   Signed: 8/9/2021 10:41 PM   Workstation Name: [id number]    [facility]    XR Spine Lumbar 2 or 3 View [[id number]] Collected: 08/09/21 2018     Updated: 08/09/21 2020    Narrative:      CR Spine Lumbar 2 or 3 Vws    INDICATION:   Acute low back pain. Trauma    COMPARISON:   CT of the abdomen and pelvis from 2/28/2018    FINDINGS:  3 views of the lumbar spine. There are 5 lumbar-type vertebral bodies. There are changes of posterior fusion extending from L3 through L5.  Fusion hardware appears grossly intact as does the patient's hip arthroplasty hardware. Loss of disc height is  again noted at L1-2. It is uncertain if there may be some potential increased compression at L1 when compared to the prior CT.      Impression:      It is uncertain if there may be mildly increased compression deformity at L1 when compared to the prior CT. Consider further evaluation for tenderness at this level.    Signer Name: Verna Roche MD   Signed: 8/9/2021 8:18 PM   Workstation Name: [id number]    [facility]    CT Head Without Contrast [[id number]] Collected: 08/09/21 1935     Updated: 08/09/21 1937    Narrative:      CT Head WO: 8/9/2021 8:25 PM    HISTORY:   Fall, trauma    TECHNIQUE:   Axial unenhanced head CT. Radiation dose reduction techniques included automated exposure control or exposure modulation based on body size. Radiation audit for number of CT and nuclear cardiology exams performed in the last year: 0.      COMPARISON:   None.    FINDINGS:   There is subarachnoid blood predominantly involving the bilateral frontotemporal regions and temporal poles. There is an intraparenchymal hematoma involving the patient's anterior left temporal lobe. There is no definite intraventricular bleed. There is  a tiny subdural blood in the lateral right frontal lobe measuring about 5.5 mm in thickness without significant mass effect. No evidence of herniation or gross mass effect. No definite skull fracture identified.      Impression:      There is bilateral posttraumatic subarachnoid blood predominantly involving the anterior frontotemporal regions, a small subdural overlying the lateral right frontal lobe and an intraparenchymal hematoma involving the patient's anterior left temporal  lobe. NOTIFICATION: Critical Value/emergent results were called by telephone at the time of interpretation on 8/9/2021 7:35 PM to Colleen Lindquist MD who verbally acknowledged these  results.              Signer Name: Verna Roche MD   Signed: 8/9/2021 7:35 PM   Workstation Name: [id number]    [facility]          Assessment/Plan   Impression        ICH (intracerebral hemorrhage) (CMS/HCC)    Hypothyroid    Benign essential tremor    Coronary artery disease involving native coronary artery of native heart with angina pectoris (CMS/HCC)       Plan        80-year-old female with a past medical history of hypertension, hyperlipidemia, coronary disease, NSTEMI in 2021, thyroid cancer in 2014, breast cancer 89, GERD, and polymyalgia rheumatica.  Who was admitted to the ICU on 8/9/2021 with a traumatic subarachnoid hemorrhage as well as a small frontal subdural hematoma and anterior left temporal intracranial hemorrhage.  Neurosurgery evaluated no surgical intervention needed.    · Continue nicardipine for blood pressure control  · Continue neurochecks per protocol  · PT/OT  · Speech evaluation  · Levothyroxine for hypothyroidism  · Holding antiplatelet agents with intracranial hemorrhage    Plan of care and goals reviewed with multidisciplinary/antibiotic stewardship team during rounds.   I discussed the patient's findings and my recommendations with patient and nursing staff       Geena Newton DO  Pulmonary, Critical care and Sleep Medicine

## 2021-08-10 NOTE — PLAN OF CARE
Goal Outcome Evaluation:  Plan of Care Reviewed With: patient        Progress: no change  Outcome Summary: patient arrived to unit at 2335. NIH 2. Stable neuro assessment. Repeat CT was done at 0100 and NS PA was notified of result as directed. VSS. Cardene infusing at low rate. Adequate UOP. WCTM.

## 2021-08-10 NOTE — H&P
"INTENSIVIST / PULMONARY INITIAL VISIT (CONSULT / H&P) NOTE     Hospital:  LOS: 0 days   Ms. Zohra Hall, 80 y.o. 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  Patient had been down approximately 2 hours.  She was found to have bilateral posttraumatic subarachnoid blood predominantly involving the anterior frontotemporal regions, a small subdural overlying the lateral right frontal lobe and an intraparenchymal hematoma involving the patient's anterior left temporal lobe on CT.  Patient is on Aspirin and Plavix.    Past Medical History:   Diagnosis Date   • Breast cancer (CMS/HCC) 1987    left- pt states \"in situ\", no radiation, Tamoxifen for 5 years   • Cellulitis    • Cervical lymphadenopathy    • Chest pain    • Convulsions (CMS/HCC)    • GERD (gastroesophageal reflux disease)    • Glossitis    • Grief reaction     · Last Impression: 29 Jan 2015  counselled, given ativan for prn use.  Aleah Regan   • Hypertension    • Lower back pain    • Oral thrush    • Papillary adenocarcinoma (CMS/HCC)     Papillary adenocarcinoma of thyroid   • Papillary adenocarcinoma of thyroid (CMS/HCC)     · resection Ramirez- 1/13,  2 x 2 x 1.5 cm  T3 N1 MXpost op low calcium and diminished  voiceablation Ain- 3/7 metastatic nodes and invasion to strap muscles · Last Impression: 29 Oct 2014  s/p Eval by berry Méndez.  Aleah Regan (Internal   • Piriformis syndrome    • Tingling    • Xerostomia      Past Surgical History:   Procedure Laterality " Date   • BREAST BIOPSY Right 10/10/2013    stereo bx   • BREAST BIOPSY Left 10/19/2015    stereo bx   • BREAST CYST EXCISION      right   • BREAST EXCISIONAL BIOPSY Right     affirm bx and loc .   • BREAST LUMPECTOMY Left    • CARDIAC CATHETERIZATION N/A 2021    Procedure: Left Heart Cath;  Surgeon: Alonso Ross IV, MD;  Location:  [facility];  Service: Cardiology;  Laterality: N/A;   • HYSTERECTOMY     • OTHER SURGICAL HISTORY Right     right arm surgery-steel roger in place   • TOTAL HIP ARTHROPLASTY     • TOTAL THYROIDECTOMY      Thyroid Surgery Total Thyroidectomy     Family History   Problem Relation Age of Onset   • Breast cancer Mother 84   • Cancer Mother    • BRCA 1/2 Neg Hx    • Colon cancer Neg Hx    • Endometrial cancer Neg Hx    • Ovarian cancer Neg Hx      Social History     Socioeconomic History   • Marital status:      Spouse name: Not on file   • Number of children: Not on file   • Years of education: Not on file   • Highest education level: Not on file   Tobacco Use   • Smoking status: Former Smoker     Packs/day: 1.00     Years: 30.00     Pack years: 30.00     Types: Cigarettes     Quit date: 1992     Years since quittin.9   • Smokeless tobacco: Never Used   Vaping Use   • Vaping Use: Never used   Substance and Sexual Activity   • Alcohol use: No   • Drug use: No   • Sexual activity: Not Currently     Allergies   Allergen Reactions   • Dilaudid [Hydromorphone Hcl] Hallucinations     TABS   • Beta Adrenergic Blockers Other (See Comments)     Hypotension / bradycadia   • Dilaudid [Hydromorphone] Unknown - High Severity   • Lactose Intolerance (Gi) Diarrhea     No current facility-administered medications on file prior to encounter.     Current Outpatient Medications on File Prior to Encounter   Medication Sig Dispense Refill   • acetaminophen (TYLENOL) 325 MG tablet Take 2 tablets by mouth Every 4 (Four) Hours As Needed for Mild Pain .      • alendronate (FOSAMAX) 70 MG tablet Take 1 tablet by mouth Every 7 (Seven) Days. 12 tablet 1   • aluminum-magnesium hydroxide-simethicone (MAALOX MAX) 400-400-40 MG/5ML suspension Take 15 mL by mouth Every 6 (Six) Hours As Needed for Indigestion or Heartburn.     • amLODIPine (NORVASC) 10 MG tablet Take 1 tablet by mouth Daily. Appointment needed for refills 90 tablet 0   • aspirin 81 MG EC tablet Take 1 tablet by mouth Daily. 30 tablet 0   • atorvastatin (LIPITOR) 40 MG tablet Take 1 tablet by mouth Daily. 90 tablet 3   • clopidogrel (PLAVIX) 75 MG tablet Take 1 tablet by mouth Daily. 30 tablet 0   • docusate sodium 100 MG capsule Take 2 capsules by mouth Daily As Needed for Constipation.     • enalapril (VASOTEC) 20 MG tablet TAKE 2 TABLETS BY MOUTH  DAILY 180 tablet 1   • famotidine (PEPCID) 20 MG tablet TAKE 1 TABLET BY MOUTH  TWICE DAILY BEFORE MEALS 180 tablet 1   • levothyroxine (SYNTHROID, LEVOTHROID) 150 MCG tablet Take 1 tablet by mouth Daily. Please schedule appointment 90 tablet 0   • metoprolol succinate XL (TOPROL-XL) 25 MG 24 hr tablet Take 1 tablet by mouth Daily. 30 tablet 0   • ondansetron (ZOFRAN) 4 MG tablet Take 1 tablet by mouth Every 6 (Six) Hours As Needed for Nausea or Vomiting.     • primidone (MYSOLINE) 50 MG tablet 2 PO qam and 1 PO  tablet 1       ROS:  Per HPI, all other systems were reviewed and were negative        OBJECTIVE     Vital Sign Min/Max for last 24 hours  Temp  Min: 97.7 °F (36.5 °C)  Max: 99.4 °F (37.4 °C)   BP  Min: 142/82  Max: 159/81   Pulse  Min: 83  Max: 94   Resp  Min: 16  Max: 18   SpO2  Min: 96 %  Max: 99 %   No data recorded     Telemetry:              General Appearance:  Conversant, in no acute distress  Eyes:  No scleral icterus or pallor, pupils normal  Ears, Nose, Mouth, Throat:  Atraumatic, oropharynx clear  Neck:  Trachea midline, thyroid normal  Respiratory:  Clear to auscultation bilaterally, normal effort, no tenderness to  palpation  Cardiovascular:  Regular rate and rhythm, no murmurs, no peripheral edema, no thrill  Gastrointestinal:  Soft, nontender, nondistended, no hepatosplenomegaly  Skin:  Normal temperature, no rash  Psychiatric:  Alert but mildly confused  Neuro:  No new focal neurologic deficits observed    SpO2: 96 % SpO2  Min: 96 %  Max: 99 %   Device:      Flow Rate:   No data recorded     Mechanical Ventilator Settings:                                         Intake/Ouptut 24 hrs (7:00AM - 6:59 AM)  Intake & Output (last 3 days)     None            Lines, Drains & Airways    Active LDAs     Name:   Placement date:   Placement time:   Site:   Days:    Peripheral IV 08/09/21 2016 Right Antecubital   08/09/21 2016    Antecubital   less than 1                Hematology:  Results from last 7 days   Lab Units 08/09/21 2055   WBC 10*3/mm3 7.66   HEMOGLOBIN g/dL 12.9   HEMATOCRIT % 38.9   PLATELETS 10*3/mm3 258     Electrolytes, Magnesium and Phosphorus:  Results from last 7 days   Lab Units 08/09/21 2054   SODIUM mmol/L 140   POTASSIUM mmol/L 3.7   CO2 mmol/L 26.0     Renal:  Results from last 7 days   Lab Units 08/09/21 2054   CREATININE mg/dL 0.84   BUN mg/dL 14     Estimated Creatinine Clearance: 49.9 mL/min (by C-G formula based on SCr of 0.84 mg/dL).  Estimated Creatinine Clearance: 49.9 mL/min (by C-G formula based on SCr of 0.84 mg/dL).  Hepatic:  Results from last 7 days   Lab Units 08/09/21 2054   ALK PHOS U/L 94   BILIRUBIN mg/dL 0.3   ALT (SGPT) U/L 7   AST (SGOT) U/L 14     Arterial Blood Gases:              Lab Results   Component Value Date    LACTATE 1.4 12/28/2020       XR Spine Lumbar 2 or 3 View    Result Date: 8/9/2021  Narrative: CR Spine Lumbar 2 or 3 Vws INDICATION: Acute low back pain. Trauma COMPARISON: CT of the abdomen and pelvis from 2/28/2018 FINDINGS: 3 views of the lumbar spine. There are 5 lumbar-type vertebral bodies. There are changes of posterior fusion extending from L3 through L5.  Fusion hardware appears grossly intact as does the patient's hip arthroplasty hardware. Loss of disc height is again noted at L1-2. It is uncertain if there may be some potential increased compression at L1 when compared to the prior CT.     Impression: It is uncertain if there may be mildly increased compression deformity at L1 when compared to the prior CT. Consider further evaluation for tenderness at this level. Signer Name: Verna Roche MD  Signed: 8/9/2021 8:18 PM  Workstation Name: [id number]  Radiology [facility]    CT Head Without Contrast    Result Date: 8/9/2021  Narrative: CT Head WO: 8/9/2021 8:25 PM HISTORY: Fall, trauma TECHNIQUE: Axial unenhanced head CT. Radiation dose reduction techniques included automated exposure control or exposure modulation based on body size. Radiation audit for number of CT and nuclear cardiology exams performed in the last year: 0.  COMPARISON: None. FINDINGS: There is subarachnoid blood predominantly involving the bilateral frontotemporal regions and temporal poles. There is an intraparenchymal hematoma involving the patient's anterior left temporal lobe. There is no definite intraventricular bleed. There is a tiny subdural blood in the lateral right frontal lobe measuring about 5.5 mm in thickness without significant mass effect. No evidence of herniation or gross mass effect. No definite skull fracture identified.     Impression: There is bilateral posttraumatic subarachnoid blood predominantly involving the anterior frontotemporal regions, a small subdural overlying the lateral right frontal lobe and an intraparenchymal hematoma involving the patient's anterior left temporal lobe. NOTIFICATION: Critical Value/emergent results were called by telephone at the time of interpretation on 8/9/2021 7:35 PM to Colleen Lindquist MD who verbally acknowledged these results. Signer Name: Verna Roche MD  Signed: 8/9/2021 7:35 PM  Workstation Name: [id number]  [facility]      Relevant imaging studies and labs from 08/09/21 were reviewed and interpreted by me    Medications (drips):          Assessment/Plan   IMPRESSION / PLAN     Inpatient Problem List:  80 y.o.female:  Active Hospital Problems    Diagnosis    • ICH (intracerebral hemorrhage) (CMS/HCC)         Impression:  80 y.o.female with relevant PMH of 30 py smoking quit 1992, HTN, HLD, CAD w/ NSTEMI in 5/2021 requiring Cath - severe 1v disease in terminal RPDA not amenable to intervention (on ASA+Plavix), PMR, breast CA 1987, Thyroid CA 2014, GERD, medical non-compliance due to forgetting to take medications, admitted 8/9/2021 after being found down from presumed mechanical fall.  Found to have bilateral posttraumatic subarachnoid blood predominantly involving the anterior frontotemporal regions, a small subdural overlying the lateral right frontal lobe and an intraparenchymal hematoma involving the patient's anterior left temporal lobe on CT.    Plan:  Traumatic ICH - ER discussed with Neurosurgery who ordered 1 unit SDP and plans for repeat head CT at 1 am.  ASA + Plavix at home - hold for now.    Fall - unclear if mechanical or preceded by medical event, trend troponin's, check ECG    Possible L1 - compression fracture, may need CT at some point    HTN - cardene to keep SBP < 140    Hypothyroidism - check TSH, continue home dose for  Now    Gentle hydration    SCDs    NPO    High risk secondary to ICH on ASA+Plavix, frail state at baseline, multiple co-morbidities, age, high risk for decompensation.       Navdeep Anderson MD  Intensive Care Medicine  08/09/21 21:31 EDT

## 2021-08-10 NOTE — MBS/VFSS/FEES
"Acute Care - Speech Language Pathology Initial Evaluation  [facility]   Cognitive-Communication Evaluation+  Clinical Swallow Evaluation+  Fiberoptic Endoscopic Evaluation of Swallowing (FEES)       Patient Name: Zohra Hall  : 1940  MRN: [id number]  Today's Date: 8/10/2021               Admit Date: 2021     Visit Dx:  No diagnosis found.  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CMS/HCC)   • Hypothyroid   • Essential hypertension   • GERD without esophagitis   • Hyperlipidemia LDL goal <100   • Atherosclerosis of aorta (CMS/HCC)   • Incontinence of bowel   • Acute right-sided low back pain without sciatica   • Benign essential tremor   • Pain of right hip joint   • Thyroid cancer (CMS/HCC)   • Lacunar stroke (CMS/HCC)   • Nontraumatic rupture of extensor tendons of right hand and wrist   • Hyponatremia   • Actinic keratosis   • Transient ischemic attack   • Ataxia   • PMR (polymyalgia rheumatica) (CMS/HCC)   • Osteoporosis   • Post-surgical hypothyroidism   • NSTEMI (non-ST elevated myocardial infarction) (CMS/HCC)   • Coronary artery disease involving native coronary artery of native heart with angina pectoris (CMS/HCC)   • Abnormal TSH   • Severe hypothyroidism   • ICH (intracerebral hemorrhage) (CMS/HCC)     Past Medical History:   Diagnosis Date   • Breast cancer (CMS/HCC)     left- pt states \"in situ\", no radiation, Tamoxifen for 5 years   • Cellulitis    • Cervical lymphadenopathy    • Chest pain    • Convulsions (CMS/HCC)    • GERD (gastroesophageal reflux disease)    • Glossitis    • Grief reaction     · Last Impression: 2015  counselled, given ativan for prn use.  Aleah Regan   • Hypertension    • Lower back pain    • Oral thrush    • Papillary adenocarcinoma (CMS/HCC)  "    Papillary adenocarcinoma of thyroid   • Papillary adenocarcinoma of thyroid (CMS/HCC)     · resection Ramirez- 1/13,  2 x 2 x 1.5 cm  T3 N1 MXpost op low calcium and diminished  voiceablation Ain- 3/7 metastatic nodes and invasion to strap muscles · Last Impression: 29 Oct 2014  s/p Eval by berry Méndez.  Aleah Regan (Internal   • Piriformis syndrome    • Tingling    • Xerostomia      Past Surgical History:   Procedure Laterality Date   • BREAST BIOPSY Right 10/10/2013    stereo bx   • BREAST BIOPSY Left 10/19/2015    stereo bx   • BREAST CYST EXCISION      right   • BREAST EXCISIONAL BIOPSY Right     affirm bx and loc 2016.   • BREAST LUMPECTOMY Left 1987   • CARDIAC CATHETERIZATION N/A 5/23/2021    Procedure: Left Heart Cath;  Surgeon: Alonso Ross IV, MD;  Location: [facility] CATH INVASIVE LOCATION;  Service: Cardiology;  Laterality: N/A;   • HYSTERECTOMY  1979   • OTHER SURGICAL HISTORY Right     right arm surgery-steel roger in place   • TOTAL HIP ARTHROPLASTY     • TOTAL THYROIDECTOMY      Thyroid Surgery Total Thyroidectomy       SLP Recommendation and Plan  SLP Diagnosis: Per this evaluation Mrs. Hall presents w/ at least moderate cog-communication impairment w/ noted difficulties w/ expressive/receptive language. Unable to fully assess cognitive function 2/2 severity of language impairment. Will continue to f/u to address deficits in cog-comm dx tx        Swallow Criteria for Skilled Therapeutic Interventions Met: demonstrates skilled criteria  SLC Criteria for Skilled Therapy Interventions Met: yes  Anticipated Discharge Disposition (SLP): unknown, anticipate therapy at next level of care     Therapy Frequency (Swallow): 5 days per week  Predicted Duration Therapy Intervention (Days): until discharge     Plan for Continued Treatment (SLP): FEES completed, initiate on modified po diet of dysphagia level IV w/ nectar thick liquids       Plan of Care Reviewed With: patient  Progress:   (initial eval)         SLP EVALUATION (last 72 hours)      SLP SLC Evaluation     Row Name 08/10/21 0900       Communication Assessment/Intervention    Document Type  evaluation  -CJ    Subjective Information  no complaints  -CJ    Patient Observations  alert;cooperative  -CJ    Patient/Family/Caregiver Comments/Observations  no family present  -CJ    Care Plan Review  evaluation/treatment results reviewed;patient/other agree to care plan  -CJ    Patient Effort  good  -CJ    Symptoms Noted During/After Treatment  none  -CJ       General Information    Patient Profile Reviewed  yes  -CJ    Pertinent History Of Current Problem  Pt adm w/ ICH; Has sig h/o hypothyroid, CAD, osteoarthritis, RLS, HTN, GERD, HLD, thyroid cancer, lacunar stroke in 2019, TIA, ataxia, NSTEMI  -CJ    Precautions/Limitations, Vision  WFL with corrective lenses;for purposes of eval  -CJ    Precautions/Limitations, Hearing  WFL;for purposes of eval  -CJ    Patient Level of Education  unknown  -CJ    Prior Level of Function-Communication  unknown;other (see comments) prior CVA  -CJ    Plans/Goals Discussed with  patient;agreed upon  -    Barriers to Rehab  none identified  -    Patient's Goals for Discharge  patient did not state  -       Pain    Additional Documentation  Pain Scale: FACES Pre/Post-Treatment (Group)  -       Pain Scale: FACES Pre/Post-Treatment    Pain: FACES Scale, Pretreatment  0-->no hurt  -CJ    Posttreatment Pain Rating  0-->no hurt  -CJ       Comprehension Assessment/Intervention    Comprehension Assessment/Intervention  Auditory Comprehension  -       Auditory Comprehension Assessment/Intervention    Auditory Comprehension (Communication)  mild impairment  -CJ    Able to Identify Objects/Pictures (Communication)  body part;familiar objects;WFL  -    Answers Questions (Communication)  yes/no;wh questions;personal;simple;WFL  -CJ    Able to Follow Commands (Communication)  2-step;3-step;multi-step;body  part;moderate impairment  -CJ    Narrative Discourse  conversational level;mild impairment  -CJ       Expression Assessment/Intervention    Expression Assessment/Intervention  verbal expression  -CJ       Verbal Expression Assessment/Intervention    Verbal Expression  moderate impairment  -CJ    Automatic Speech (Communication)  response to greeting;alphabet;counting 1-20;days of week;months of year;WFL  -CJ    Repetition  words;phrases;moderate impairment  -CJ    Phrase Completion  unpredictable;automatic/predictable;moderate impairment  -CJ    Responsive Naming  simple;moderate impairment  -CJ    Confrontational Naming  high frequency;moderate impairment  -CJ    Spontaneous/Functional Words  simple;complex;moderate impairment  -CJ    Sentence Formulation  simple;complex;moderate impairment  -CJ    Conversational Discourse/Fluency  moderate impairment  -CJ       Oral Motor Structure and Function    Oral Motor Structure and Function  WFL  -CJ    Dentition Assessment  natural, present and adequate  -CJ    Mucosal Quality  moist, healthy  -CJ       Oral Musculature and Cranial Nerve Assessment    Oral Motor General Assessment  generalized oral motor weakness  -CJ       Motor Speech Assessment/Intervention    Motor Speech Function  WFL  -CJ       Cognitive Assessment Intervention- SLP    Cognitive Function (Cognition)  unable/difficult to assess  -CJ    Cognition, Comment  Unable to fully asses, will continue to assess in cog-comm dx tx  -CJ       SLP Clinical Impressions    SLP Diagnosis  Per this evaluation Mrs. Hall presents w/ at least moderate cog-communication impairment w/ noted difficulties w/ expressive/receptive language. Unable to fully assess cognitive function 2/2 severity of language impairment. Will continue to f/u to address deficits in cog-comm dx tx  -CJ    Rehab Potential/Prognosis  good  -CJ    SLC Criteria for Skilled Therapy Interventions Met  yes  -CJ    Functional Impact  functional impact in  ADLs;difficulty communicating wants, needs;difficulty communicating in an emergency  -    Plan for Continued Treatment (SLP)  CSE completed, recommend FEES today. SLC completed, will f/u to address deficits  -       Recommendations    Therapy Frequency (SLP SLC)  5 days per week  -CJ    Predicted Duration Therapy Intervention (Days)  until discharge  -CJ    Anticipated Discharge Disposition (SLP)  unknown;anticipate therapy at next level of care  -      User Key  (r) = Recorded By, (t) = Taken By, (c) = Cosigned By    Initials Name Effective Dates    Elsa Cruz, MS CCC-SLP 06/16/21 -              EDUCATION  The patient has been educated in the following areas:     Cognitive Impairment Communication Impairment.          SLP GOALS     Row Name 08/10/21 1100             Oral Nutrition/Hydration Goal 1 (SLP)    Oral Nutrition/Hydration Goal 1, SLP  LTG: Pt will return to regular diet consistency w/ thin liquids w/o overt s/s of aspiration/distress given no cues w/ 100% acc  -CJ      Time Frame (Oral Nutrition/Hydration Goal 1, SLP)  by discharge  -         Oral Nutrition/Hydration Goal 2 (SLP)    Oral Nutrition/Hydration Goal 2, SLP  STG: Pt will tolerate modified po diet of soft solids w/ nectar thick liquids w/o overt s/s of aspiration/distress given no cues w/ 100% acc  -CJ      Time Frame (Oral Nutrition/Hydration Goal 2, SLP)  short term goal (STG)  -         Oral Nutrition/Hydration Goal (SLP)    Oral Nutrition/Hydration Goal, SLP  STG: Pt will accept therapeutic trials of thin liquids and solids w/o overt s/s of aspiration/distress given no cues w/ 80% acc to determine pt readiness for repeat instrumental dysphagia evaluation  -      Time Frame (Oral Nutrition/Hydration Goal, SLP)  short term goal (STG)  -         Lingual Strengthening Goal 1 (SLP)    Activity (Lingual Strengthening Goal 1, SLP)  increase lingual tone/sensation/control/coordination/movement;increase tongue back strength   -CJ      Increase Lingual Tone/Sensation/Control/Coordination/Movement  swallow trials;lingual resistance exercises  -CJ      Increase Tongue Back Strength  swallow trials;lingual resistance exercises  -CJ      [place]/Accuracy (Lingual Strengthening Goal 1, SLP)  with minimal cues (75-90% accuracy)  -CJ      Time Frame (Lingual Strengthening Goal 1, SLP)  short term goal (STG)  -CJ         Pharyngeal Strengthening Exercise Goal 1 (SLP)    Activity (Pharyngeal Strengthening Goal 1, SLP)  increase timing;increase superior movement of the hyolaryngeal complex;increase anterior movement of the hyolaryngeal complex;increase closure at entrance to airway/closure of airway at glottis;increase squeeze/positive pressure generation;increase tongue base retraction  -CJ      Increase Timing  prepping - 3 second prep or suck swallow or 3-step swallow  -CJ      Increase Superior Movement of the Hyolaryngeal Complex  effortful pitch glide (falsetto + pharyngeal squeeze)  -CJ      Increase Anterior Movement of the Hyolaryngeal Complex  chin tuck against resistance (CTAR)  -CJ      Increase Closure at Entrance to Airway/Closure of Airway at Glottis  supraglottic swallow  -CJ      Increase Squeeze/Positive Pressure Generation  hard effortful swallow  -CJ      Increase Tongue Base Retraction  melida  -CJ      [place]/Accuracy (Pharyngeal Strengthening Goal 1, SLP)  with minimal cues (75-90% accuracy)  -CJ      Time Frame (Pharyngeal Strengthening Goal 1, SLP)  short term goal (STG)  -CJ         Follow Directions Goal 2 (SLP)    Improve Ability to Follow Directions Goal 1 (SLP)  3 step commands;multistep commands;80%;with moderate cues (50-74%)  -CJ      Time Frame (Follow Directions Goal 1, SLP)  short term goal (STG)  -CJ         Word Retrieval Skills Goal 1 (SLP)    Improve Word Retrieval Skills By Goal 1 (SLP)  confrontational naming task;high frequency;low frequency;responsive naming task;simple;complex;repeating  phrases;repeating sentences;completing open ended structured sentence;completing open ended unstructured sentence;80%;with moderate cues (50-74%)  -CJ      Time Frame (Word Retrieval Goal 1, SLP)  short term goal (STG)  -CJ         Ability to Construct Phrase and Sentence Level Response Goal 1 (SLP)    Improve Ability to Construct Phrase and Sentence Level Responses By Goal 1 (SLP)  constructing a sentence with a key word;80%;with moderate cues (50-74%)  -CJ      Time Frame (Phrase and Sentence Level Response Goal 1, SLP)  short term goal (STG)  -CJ         Additional Goal 1 (SLP)    Additional Goal 1, SLP  LTG: Pt will improve overall cognitive-communication skills to be able to functionally communicate wants and needs to participate in her care to complete adls  -CJ      Time Frame (Additional Goal 1, SLP)  by discharge  -        User Key  (r) = Recorded By, (t) = Taken By, (c) = Cosigned By    Initials Name Provider Type    Elsa Cruz MS CCC-SLP Speech and Language Pathologist                  Time Calculation:     Time Calculation- SLP     Row Name 08/10/21 1552             Time Calculation- SLP    SLP Start Time  1100  -      SLP Received On  08/10/21  -         Untimed Charges    SLP Eval/Re-eval   ST Eval Speech and Production w/ Language - 37747;ST Eval Oral Pharyng Swallow - 87079;ST Fiberoptic Endo Eval Swallow - 64004  -CJ      72040-EQ Eval Speech and Production w/ Language Minutes  24  -CJ      90339-MY Eval Oral Pharyng Swallow Minutes  25  -CJ      19411-NB Fiberoptic Endo Eval Swallow Minutes  83  -         Total Minutes    Untimed Charges Total Minutes  132  -       Total Minutes  132  -CJ        User Key  (r) = Recorded By, (t) = Taken By, (c) = Cosigned By    Initials Name Provider Type    Elsa Cruz MS CCC-SLP Speech and Language Pathologist          Therapy Charges for Today     Code Description Service Date Service Provider Modifiers Qty    74670023972  ST EVAL  "ORAL PHARYNG SWALLOW 2 8/10/2021 Elsa Ashby, MS CCC-SLP GN 1    57934097513 HC ST EVAL SPEECH AND PROD W LANG  2 8/10/2021 Elsa Ashby, MS CCC-SLP GN 1    [id number] HC ST FIBEROPTIC ENDO EVAL SWALL 6 8/10/2021 Elsa Ashby, MS CCC-SLP GN 1                     Elsatashi Ashby MS CCC-SLP  8/10/2021 and Acute Care - Speech Language Pathology   Swallow Initial Evaluation [facility]     Patient Name: Zohra Hall  : 1940  MRN: [id number]  Today's Date: 8/10/2021               Admit Date: 2021    Visit Dx:   No diagnosis found.  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CMS/HCC)   • Hypothyroid   • Essential hypertension   • GERD without esophagitis   • Hyperlipidemia LDL goal <100   • Atherosclerosis of aorta (CMS/HCC)   • Incontinence of bowel   • Acute right-sided low back pain without sciatica   • Benign essential tremor   • Pain of right hip joint   • Thyroid cancer (CMS/HCC)   • Lacunar stroke (CMS/HCC)   • Nontraumatic rupture of extensor tendons of right hand and wrist   • Hyponatremia   • Actinic keratosis   • Transient ischemic attack   • Ataxia   • PMR (polymyalgia rheumatica) (CMS/HCC)   • Osteoporosis   • Post-surgical hypothyroidism   • NSTEMI (non-ST elevated myocardial infarction) (CMS/HCC)   • Coronary artery disease involving native coronary artery of native heart with angina pectoris (CMS/HCC)   • Abnormal TSH   • Severe hypothyroidism   • ICH (intracerebral hemorrhage) (CMS/HCC)     Past Medical History:   Diagnosis Date   • Breast cancer (CMS/HCC)     left- pt states \"in situ\", no radiation, Tamoxifen for 5 years   • Cellulitis    • Cervical lymphadenopathy    • Chest pain    • Convulsions (CMS/HCC)    • GERD (gastroesophageal reflux disease)    • Glossitis    • " Grief reaction     · Last Impression: 29 Jan 2015  counselled, given ativan for prn use.  Aleah Regan   • Hypertension    • Lower back pain    • Oral thrush    • Papillary adenocarcinoma (CMS/HCC)     Papillary adenocarcinoma of thyroid   • Papillary adenocarcinoma of thyroid (CMS/HCC)     · resection Ramirez- 1/13,  2 x 2 x 1.5 cm  T3 N1 MXpost op low calcium and diminished  voiceablation Ain- 3/7 metastatic nodes and invasion to strap muscles · Last Impression: 29 Oct 2014  s/p Eval by berry Méndez.  Aleah Regan (Internal   • Piriformis syndrome    • Tingling    • Xerostomia      Past Surgical History:   Procedure Laterality Date   • BREAST BIOPSY Right 10/10/2013    stereo bx   • BREAST BIOPSY Left 10/19/2015    stereo bx   • BREAST CYST EXCISION      right   • BREAST EXCISIONAL BIOPSY Right     affirm bx and loc 2016.   • BREAST LUMPECTOMY Left 1987   • CARDIAC CATHETERIZATION N/A 5/23/2021    Procedure: Left Heart Cath;  Surgeon: Alonso Ross IV, MD;  Location: [facility] CATH INVASIVE LOCATION;  Service: Cardiology;  Laterality: N/A;   • HYSTERECTOMY  1979   • OTHER SURGICAL HISTORY Right     right arm surgery-steel roger in place   • TOTAL HIP ARTHROPLASTY     • TOTAL THYROIDECTOMY      Thyroid Surgery Total Thyroidectomy       SLP Recommendation and Plan  SLP Swallowing Diagnosis: moderate, oral dysphagia, mild-moderate, pharyngeal dysphagia  SLP Diet Recommendation: mechanical soft with no mixed consistencies, nectar thick liquids  Recommended Precautions and Strategies: upright posture during/after eating, small bites of food and sips of liquid, general aspiration precautions, reflux precautions  SLP Rec. for Method of Medication Administration: meds whole, with pudding or applesauce, as tolerated     Monitor for Signs of Aspiration: yes, notify SLP if any concerns  Recommended Diagnostics: reassess via FEES  Swallow Criteria for Skilled Therapeutic Interventions Met: demonstrates  skilled criteria  Anticipated Discharge Disposition (SLP): unknown, anticipate therapy at next level of care  Rehab Potential/Prognosis, Swallowing: good, to achieve stated therapy goals  Therapy Frequency (Swallow): 5 days per week  Predicted Duration Therapy Intervention (Days): until discharge          Plan for Continued Treatment (SLP): FEES completed, initiate on modified po diet of dysphagia level IV w/ nectar thick liquids              Plan of Care Reviewed With: patient  Progress:  (initial eval)         SWALLOW EVALUATION (last 72 hours)      SLP Adult Swallow Evaluation     Row Name 08/10/21 1100 08/10/21 0900       Rehab Evaluation    Document Type  evaluation  -CJ  --    Subjective Information  no complaints  -CJ  --    Patient Observations  alert;cooperative  -CJ  --    Patient/Family/Caregiver Comments/Observations  no family present  -CJ  --    Care Plan Review  evaluation/treatment results reviewed;patient/other agree to care plan  -CJ  --    Patient Effort  good  -CJ  --    Symptoms Noted During/After Treatment  none  -CJ  --       General Information    Patient Profile Reviewed  yes  -CJ  --    Pertinent History Of Current Problem  see am eval; referred for FEES  -CJ  --    Current Method of Nutrition  NPO  -CJ  NPO  -CJ    Precautions/Limitations, Vision  WFL with corrective lenses;for purposes of eval  -CJ  --    Precautions/Limitations, Hearing  WFL;for purposes of eval  -CJ  --    Prior Level of Function-Communication  unknown  -CJ  unknown  -CJ    Prior Level of Function-Swallowing  no diet consistency restrictions;safe, efficient swallowing in all situations  -CJ  no diet consistency restrictions;safe, efficient swallowing in all situations  -CJ    Plans/Goals Discussed with  patient;agreed upon  -CJ  --    Barriers to Rehab  none identified  -CJ  --    Patient's Goals for Discharge  patient did not state  -CJ  return to PO diet  -CJ       Pain    Additional Documentation  Pain Scale: FACES  Pre/Post-Treatment (Group)  -CJ  --       Pain Scale: FACES Pre/Post-Treatment    Pain: FACES Scale, Pretreatment  0-->no hurt  -CJ  --    Posttreatment Pain Rating  0-->no hurt  -CJ  --       Oral Motor Structure and Function    Secretion Management  --  WNL/WFL  -CJ       General Eating/Swallowing Observations    Respiratory Support Currently in Use  --  room air  -    Eating/Swallowing Skills  --  self-fed  -CJ    Positioning During Eating  --  upright 90 degree;upright in bed  -    Utensils Used  --  spoon;cup;straw  -    Consistencies Trialed  --  regular textures;ice chips;thin liquids;nectar/syrup-thick liquids;pureed  -CJ       Clinical Swallow Eval    Pharyngeal Phase  --  suspected pharyngeal impairment  -    Clinical Swallow Evaluation Summary  --  CSE completed this am. Mrs. Hall is positioned upright and centered in bed to accept po presentations of puree, ice chips, thin and nectar thick liquids. Overt s/s of aspiration w/ thin liquids w/ evidenced cough response. No other overt s/s of aspiration w/ puree/nectar. Will plan for instrumental FEES to further asses swallowing fnx. RN made aware  -       Pharyngeal Phase Concerns    Pharyngeal Phase Concerns  --  cough  -CJ    Cough  --  thin  -CJ       Fiberoptic Endoscopic Evaluation of Swallowing (FEES)    Risks/Benefits Reviewed  risks/benefits explained;patient;agreed to eval  -CJ  --    Nasal Entry  left:  -CJ  --    Scope serial number/identification  [id number]  -  --       Anatomy and Physiology    Anatomic Considerations  no anatomic structural deviation  -CJ  --    Velopharyngeal  WFL  -CJ  --    Base of Tongue  symmetrical  -CJ  --    Epiglottis  WFL  -CJ  --    Laryngeal Function Breathing  symmetrical  -CJ  --    Laryngeal Function Phonation  symmetrical  -CJ  --    Laryngeal Function to Breath Hold  TVF contact;sustained closure  -CJ  --    Secretion Rating Scale (Mark et al. 1996)  2- secretions initially outside the vestibule but  later entered the vestibule  -CJ  --    Secretion Description  thin;clear;white  -CJ  --    Ice Chips  DNA  -CJ  --    Spontaneous Swallow  frequency reduced;did not clear secretions  -CJ  --    Sensory  sensed scope  -CJ  --    Utensils Used  Spoon;Cup;Straw  -CJ  --    Consistencies Trialed  thin liquids;nectar-thick liquids;pudding/puree;regular textures;spoon;cup;straw  -CJ  --       FEES Interpretation    Oral Phase  prolonged manipulation;increased A-P transit time;prespill of liquids into pharynx  -CJ  --       Initiation of Pharyngeal Swallow    Initiation of Pharyngeal Swallow  bolus in pyriform sinuses  -CJ  --    Pharyngeal Phase  impaired pharyngeal phase of swallowing  -CJ  --    Penetration During the Swallow  thin liquids;secondary to delayed swallow initiation or mistiming;secondary to reduced laryngeal elevation;secondary to reduced vestibular closure  -CJ  --    Aspiration After the Swallow  thin liquids;secondary to residue;in pyriform sinuses;in laryngeal vestibule  -CJ  --    Response to Penetration  deep;no response;response with cue only  -CJ  --    Response to Aspiration  no response, silent aspiration;response with cue only;could not clear subglottic material;weak cough/throat clear  -CJ  --    Rosenbek's Scale  thin:;8-->Level 8;nectar:;pudding/puree:;regular textures:;1-->Level 1  -CJ  --    Residue  thin liquids;pyriform sinuses;laryngeal vestibule;secondary to reduced posterior pharyngeal wall stripping;secondary to reduced laryngeal elevation;secondary to reduced hyolaryngeal excursion;secondary to reduced base of tongue retraction  -CJ  --    Response to Residue  unable to clear residue;with cued swallow  -CJ  --    Attempted Compensatory Maneuvers  bolus size;bolus presentation style;additional subsequent swallow;multiple swallows;throat clear after swallow  -CJ  --    Response to Attempted Compensatory Maneuvers  did not prevent penetration;did not prevent aspiration;reduced residue   -CJ  --    FEES Summary  FEES completed this am. Mrs. Hall is positioned upright and centered in bedside chair to accept po presentations of puree, solid cracker, thin and nectar thick liquids via spoon, cup and straw. Oral phase is moderately impaired w/ prolonged manipulation w/ solids/pudding. Delayed initiation w/ all consistencies. Premature spillage of thin liquids to the pyriforms. Penetration w/ thin liquids occurring during the swallow. Moderate amount of residue in pyriforms that spills over the posterior commissure after the swallow. Cued cough ineffective to clear aspirated matieral. No penetration or aspiration evidenced w/ puree, solid cracker or nectar thick liquids. No significant residue w/ any of those consistencies. Per this evaluation Mrs. Hall presents w/a moderate oral phase, mild-moderate pharyngeal dysphagia. She is felt to most benefit from modified po diet of dysphagia level IV w/ nectar thick liquids. Meds whole in puree. Upright for all po intake.   -CJ  --       Clinical Impression    SLP Swallowing Diagnosis  moderate;oral dysphagia;mild-moderate;pharyngeal dysphagia  -CJ  suspected pharyngeal dysphagia  -CJ    Functional Impact  risk of aspiration/pneumonia;risk of malnutrition;risk of dehydration  -CJ  risk of aspiration/pneumonia;risk of malnutrition;risk of dehydration  -CJ    Rehab Potential/Prognosis, Swallowing  good, to achieve stated therapy goals  -CJ  good, to achieve stated therapy goals  -CJ    Swallow Criteria for Skilled Therapeutic Interventions Met  demonstrates skilled criteria  -CJ  demonstrates skilled criteria  -CJ    Plan for Continued Treatment (SLP)  FEES completed, initiate on modified po diet of dysphagia level IV w/ nectar thick liquids  -CJ  --       Recommendations    Therapy Frequency (Swallow)  5 days per week  -CJ  --    Predicted Duration Therapy Intervention (Days)  until discharge  -CJ  --    SLP Diet Recommendation  mechanical soft with no mixed  consistencies;nectar thick liquids  -CJ  NPO  -    Recommended Diagnostics  --  reassess via FEES  -    Recommended Precautions and Strategies  upright posture during/after eating;small bites of food and sips of liquid;general aspiration precautions;reflux precautions  -  general aspiration precautions  -    Oral Care Recommendations  Oral Care BID/PRN  -CJ  Oral Care BID/PRN  -    SLP Rec. for Method of Medication Administration  meds whole;with pudding or applesauce;as tolerated  -  meds whole;with pudding or applesauce;as tolerated  -CJ    Monitor for Signs of Aspiration  yes;notify SLP if any concerns  -CJ  yes;notify SLP if any concerns  -CJ    Anticipated Discharge Disposition (SLP)  unknown;anticipate therapy at next level of care  -  --      User Key  (r) = Recorded By, (t) = Taken By, (c) = Cosigned By    Initials Name Effective Dates    Elsa Cruz, MS CCC-SLP 06/16/21 -           EDUCATION  The patient has been educated in the following areas:   Dysphagia (Swallowing Impairment) Oral Care/Hydration Modified Diet Instruction.       SLP GOALS     Row Name 08/10/21 1100       Oral Nutrition/Hydration Goal 1 (SLP)    Oral Nutrition/Hydration Goal 1, SLP  LTG: Pt will return to regular diet consistency w/ thin liquids w/o overt s/s of aspiration/distress given no cues w/ 100% acc  -CJ    Time Frame (Oral Nutrition/Hydration Goal 1, SLP)  by discharge  -       Oral Nutrition/Hydration Goal 2 (SLP)    Oral Nutrition/Hydration Goal 2, SLP  STG: Pt will tolerate modified po diet of soft solids w/ nectar thick liquids w/o overt s/s of aspiration/distress given no cues w/ 100% acc  -CJ    Time Frame (Oral Nutrition/Hydration Goal 2, SLP)  short term goal (STG)  -       Oral Nutrition/Hydration Goal (SLP)    Oral Nutrition/Hydration Goal, SLP  STG: Pt will accept therapeutic trials of thin liquids and solids w/o overt s/s of aspiration/distress given no cues w/ 80% acc to determine pt  readiness for repeat instrumental dysphagia evaluation  -CJ    Time Frame (Oral Nutrition/Hydration Goal, SLP)  short term goal (STG)  -CJ       Lingual Strengthening Goal 1 (SLP)    Activity (Lingual Strengthening Goal 1, SLP)  increase lingual tone/sensation/control/coordination/movement;increase tongue back strength  -CJ    Increase Lingual Tone/Sensation/Control/Coordination/Movement  swallow trials;lingual resistance exercises  -CJ    Increase Tongue Back Strength  swallow trials;lingual resistance exercises  -CJ    Graves/Accuracy (Lingual Strengthening Goal 1, SLP)  with minimal cues (75-90% accuracy)  -CJ    Time Frame (Lingual Strengthening Goal 1, SLP)  short term goal (STG)  -CJ       Pharyngeal Strengthening Exercise Goal 1 (SLP)    Activity (Pharyngeal Strengthening Goal 1, SLP)  increase timing;increase superior movement of the hyolaryngeal complex;increase anterior movement of the hyolaryngeal complex;increase closure at entrance to airway/closure of airway at glottis;increase squeeze/positive pressure generation;increase tongue base retraction  -CJ    Increase Timing  prepping - 3 second prep or suck swallow or 3-step swallow  -CJ    Increase Superior Movement of the Hyolaryngeal Complex  effortful pitch glide (falsetto + pharyngeal squeeze)  -CJ    Increase Anterior Movement of the Hyolaryngeal Complex  chin tuck against resistance (CTAR)  -CJ    Increase Closure at Entrance to Airway/Closure of Airway at Glottis  supraglottic swallow  -CJ    Increase Squeeze/Positive Pressure Generation  hard effortful swallow  -CJ    Increase Tongue Base Retraction  melida  -CJ    Graves/Accuracy (Pharyngeal Strengthening Goal 1, SLP)  with minimal cues (75-90% accuracy)  -CJ    Time Frame (Pharyngeal Strengthening Goal 1, SLP)  short term goal (STG)  -CJ       Follow Directions Goal 2 (SLP)    Improve Ability to Follow Directions Goal 1 (SLP)  3 step commands;multistep commands;80%;with moderate cues  (50-74%)  -CJ    Time Frame (Follow Directions Goal 1, SLP)  short term goal (STG)  -CJ       Word Retrieval Skills Goal 1 (SLP)    Improve Word Retrieval Skills By Goal 1 (SLP)  confrontational naming task;high frequency;low frequency;responsive naming task;simple;complex;repeating phrases;repeating sentences;completing open ended structured sentence;completing open ended unstructured sentence;80%;with moderate cues (50-74%)  -CJ    Time Frame (Word Retrieval Goal 1, SLP)  short term goal (STG)  -CJ       Ability to Construct Phrase and Sentence Level Response Goal 1 (SLP)    Improve Ability to Construct Phrase and Sentence Level Responses By Goal 1 (SLP)  constructing a sentence with a key word;80%;with moderate cues (50-74%)  -CJ    Time Frame (Phrase and Sentence Level Response Goal 1, SLP)  short term goal (STG)  -CJ       Additional Goal 1 (SLP)    Additional Goal 1, SLP  LTG: Pt will improve overall cognitive-communication skills to be able to functionally communicate wants and needs to participate in her care to complete adls  -CJ    Time Frame (Additional Goal 1, SLP)  by discharge  -      User Key  (r) = Recorded By, (t) = Taken By, (c) = Cosigned By    Initials Name Provider Type    Elsa Cruz MS CCC-SLP Speech and Language Pathologist             Time Calculation:   Time Calculation- SLP     Row Name 08/10/21 1552             Time Calculation- SLP    SLP Start Time  1100  -CJ      SLP Received On  08/10/21  -         Untimed Charges    SLP Eval/Re-eval   ST Eval Speech and Production w/ Language - 28152;ST Eval Oral Pharyng Swallow - 90508;ST Fiberoptic Endo Eval Swallow - 30883  -      71134-CM Eval Speech and Production w/ Language Minutes  24  -CJ      76644-DM Eval Oral Pharyng Swallow Minutes  25  -CJ      89900-ZW Fiberoptic Endo Eval Swallow Minutes  83  -CJ         Total Minutes    Untimed Charges Total Minutes  132  -CJ       Total Minutes  132  -CJ        User Key  (r) =  Recorded By, (t) = Taken By, (c) = Cosigned By    Initials Name Provider Type    Elsa Cruz MS CCC-SLP Speech and Language Pathologist          Therapy Charges for Today     Code Description Service Date Service Provider Modifiers Qty    08692554397 HC ST EVAL ORAL PHARYNG SWALLOW 2 8/10/2021 Elsa Ashby MS CCC-SLP GN 1    33040233947 HC ST EVAL SPEECH AND PROD W LANG  2 8/10/2021 Elsa Ashby MS CCC-SLP GN 1    [id number]  ST FIBEROPTIC ENDO EVAL SWALL 6 8/10/2021 Elsa Ashby MS CCC-JUANCHO GN 1        Patient was not wearing a face mask and did exhibit coughing during this therapy encounter.  Procedure performed was aerosolizing, involved close contact (within 6 feet for at least 15 minutes or longer), and did not involve contact with infectious secretions or specimens.  Therapist used appropriate personal protective equipment including gloves, standard procedure mask and eye protection.  Appropriate PPE was worn during the entire therapy session.  Hand hygiene was completed before and after therapy session.          MS IRENA Conley  8/10/2021

## 2021-08-10 NOTE — CASE MANAGEMENT/SOCIAL WORK
Discharge Planning Assessment  [facility]     Patient Name: Zohra Hall  MRN: [id number]  Today's Date: 8/10/2021    Admit Date: 8/9/2021    Discharge Needs Assessment     Row Name 08/10/21 1222       Living Environment    Lives With  facility resident;alone    Current Living Arrangements  independent/assisted living facility [facility]    Primary Care Provided by  self;child(corona);homecare agency    Provides Primary Care For  no one, unable/limited ability to care for self    Family Caregiver if Needed  grandchild(corona), adult    Family Caregiver Names  Joey Joe is POA and assists    Quality of Family Relationships  helpful;involved;supportive    Able to Return to Prior Arrangements  other (see comments)       Resource/Environmental Concerns    Resource/Environmental Concerns  none    Transportation Concerns  car, none       Transition Planning    Patient/Family Anticipates Transition to  other (see comments) TBD    Patient/Family Anticipated Services at Transition  ;skilled nursing    Transportation Anticipated  family or friend will provide       Discharge Needs Assessment    Current Outpatient/Agency/Support Group  skilled nursing facility;homecare agency    Equipment Currently Used at Home  walker, rolling;grab bar;shower chair    Concerns to be Addressed  discharge planning    Anticipated Changes Related to Illness  other (see comments) TBD    Equipment Needed After Discharge  none    Discharge Facility/Level of Care Needs  home with home health;nursing facility, skilled    Current Discharge Risk  lives alone;dependent with mobility/activities of daily living        Discharge Plan     Row Name 08/10/21 1226       Plan    Plan  Back to AL with resumption of HH vs Skilled rehab    Patient/Family in Agreement with Plan  yes    Plan Comments  Pt was found down at home and sent to [facility] where per notes,CT showed a bilateral posttraumatic subarachnoid blood  involving the anterior frontotemporal regions, a small subdural overlying the lateral right frontal lobe and an intraparenchymal hematoma involving the patient's anterior left temporal lobe.     Spoke to pt at  and she seems a little confused but did give permission for CM to speak to her grandson Joey. Called Joey for IDP. Pt lives at [facility]. She uses a RW but is primarily independent. Family shops for her and she does her own laundry. She fixes her own meals but does not use the oven. She is current with [facility] for SN/PT/OT for heart disease, and hypertension. Pt's grandson unsure if pt will need rehab at d/c. She recently was d/c'd from [facility] in May 2021 and went to rehab at [facility] and back to her independent living apartment. CM will cont to follow for d/c needs. Gia ext. [phone]        Continued Care and Services - Admitted Since 8/9/2021     Home Medical Care     Service Provider Request Status Selected Services Address Phone Fax Patient Preferred    [facility]  Pending - No Request Sent N/A [address], [facility] [address] [phone] --            Selected Continued Care - Prior Encounters Includes selections from prior encounters from 5/11/2021 to 8/10/2021    Discharged on 5/28/2021 Admission date: 5/23/2021 - Discharge disposition: Skilled Nursing Facility (DC - External)    Destination     Service Provider Selected Services Address Phone Fax Patient Preferred    [facility]  Skilled Nursing [address], [facility] [address] [phone] --          Home Medical Care     Service Provider Selected Services Address Phone Fax Patient Preferred    [facility]  Home Health Services [address], [facility] [id number] [phone] --                    Expected Discharge Date and Time     Expected Discharge Date Expected Discharge Time    Aug 13, 2021          Demographic Summary     Row Name 08/10/21 1217       General Information    Referral Source  admission list    Preferred Language  English     Used During This Interaction  no    General Information Comments  POA is ugo Diaz. PCP is Aleah Regan.       Contact Information    Permission Granted to Share Info With  ;family/designee Baron        Functional Status     Row Name 08/10/21 1220       Functional Status    Usual Activity Tolerance  fair    Current Activity Tolerance  fair       Functional Status, IADL    Medications  assistive equipment and person;other (see comments) Uses [employer] delivery service that is packaged for use and Tavon gives her reminders for meds.    Meal Preparation  independent;other (see comments) Pt fixes sandwich and prepackaged meals. She does not use oven.    Housekeeping  completely dependent Tavon cleans for pt.    Laundry  independent    Shopping  assistive person family shops       Employment/    Employment/ Comments  Has Medicare A&B and [employer]. No issues affording meds.        Psychosocial    No documentation.       Abuse/Neglect    No documentation.       Legal    No documentation.       Substance Abuse    No documentation.       Patient Forms    No documentation.           Gia Tierney RN

## 2021-08-10 NOTE — PLAN OF CARE
Goal Outcome Evaluation:  Plan of Care Reviewed With: (P) patient        Progress: (P) no change  Outcome Summary: (P) PT initial evaluation complete. Pt demonstrates generalized weakness with LLE weakness > RLE, decreased balance and coordination, and confusion limiting pt's independence with functional mobility. Pt ambulated 20 feet in room with RW and MinAx1 +1, demonstrating scissoring gait pattern and variable foot placement. Pt would benefit from continued skilled acute PT services to promote PLOF. PT recommending inpatient rehab at [place].

## 2021-08-11 ENCOUNTER — APPOINTMENT (OUTPATIENT)
Dept: CT IMAGING | Facility: HOSPITAL | Age: 81
End: 2021-08-11

## 2021-08-11 ENCOUNTER — APPOINTMENT (OUTPATIENT)
Dept: CARDIOLOGY | Facility: HOSPITAL | Age: 81
End: 2021-08-11

## 2021-08-11 PROBLEM — Z86.73 HISTORY OF STROKE: Status: ACTIVE | Noted: 2021-08-11

## 2021-08-11 PROBLEM — I42.8 NICM (NONISCHEMIC CARDIOMYOPATHY) (HCC): Status: ACTIVE | Noted: 2021-05-23

## 2021-08-11 LAB
ANION GAP SERPL CALCULATED.3IONS-SCNC: 10 MMOL/L (ref 5–15)
BACTERIA SPEC AEROBE CULT: ABNORMAL
BASOPHILS # BLD AUTO: 0.03 10*3/MM3 (ref 0–0.2)
BASOPHILS NFR BLD AUTO: 0.4 % (ref 0–1.5)
BUN SERPL-MCNC: 12 MG/DL (ref 8–23)
BUN/CREAT SERPL: 16.7 (ref 7–25)
CALCIUM SPEC-SCNC: 8.1 MG/DL (ref 8.6–10.5)
CHLORIDE SERPL-SCNC: 105 MMOL/L (ref 98–107)
CO2 SERPL-SCNC: 22 MMOL/L (ref 22–29)
CREAT SERPL-MCNC: 0.72 MG/DL (ref 0.57–1)
DEPRECATED RDW RBC AUTO: 43.9 FL (ref 37–54)
EOSINOPHIL # BLD AUTO: 0.19 10*3/MM3 (ref 0–0.4)
EOSINOPHIL NFR BLD AUTO: 2.4 % (ref 0.3–6.2)
ERYTHROCYTE [DISTWIDTH] IN BLOOD BY AUTOMATED COUNT: 13.2 % (ref 12.3–15.4)
GFR SERPL CREATININE-BSD FRML MDRD: 78 ML/MIN/1.73
GLUCOSE BLDC GLUCOMTR-MCNC: 126 MG/DL (ref 70–130)
GLUCOSE BLDC GLUCOMTR-MCNC: 140 MG/DL (ref 70–130)
GLUCOSE BLDC GLUCOMTR-MCNC: 96 MG/DL (ref 70–130)
GLUCOSE SERPL-MCNC: 102 MG/DL (ref 65–99)
HCT VFR BLD AUTO: 34.1 % (ref 34–46.6)
HGB BLD-MCNC: 11.1 G/DL (ref 12–15.9)
IMM GRANULOCYTES # BLD AUTO: 0.04 10*3/MM3 (ref 0–0.05)
IMM GRANULOCYTES NFR BLD AUTO: 0.5 % (ref 0–0.5)
LYMPHOCYTES # BLD AUTO: 0.85 10*3/MM3 (ref 0.7–3.1)
LYMPHOCYTES NFR BLD AUTO: 10.6 % (ref 19.6–45.3)
MAGNESIUM SERPL-MCNC: 2.1 MG/DL (ref 1.6–2.4)
MCH RBC QN AUTO: 29.4 PG (ref 26.6–33)
MCHC RBC AUTO-ENTMCNC: 32.6 G/DL (ref 31.5–35.7)
MCV RBC AUTO: 90.2 FL (ref 79–97)
MONOCYTES # BLD AUTO: 0.69 10*3/MM3 (ref 0.1–0.9)
MONOCYTES NFR BLD AUTO: 8.6 % (ref 5–12)
NEUTROPHILS NFR BLD AUTO: 6.22 10*3/MM3 (ref 1.7–7)
NEUTROPHILS NFR BLD AUTO: 77.5 % (ref 42.7–76)
NRBC BLD AUTO-RTO: 0 /100 WBC (ref 0–0.2)
PHOSPHATE SERPL-MCNC: 2.8 MG/DL (ref 2.5–4.5)
PLATELET # BLD AUTO: 254 10*3/MM3 (ref 140–450)
PMV BLD AUTO: 10.4 FL (ref 6–12)
POTASSIUM SERPL-SCNC: 3.8 MMOL/L (ref 3.5–5.2)
RBC # BLD AUTO: 3.78 10*6/MM3 (ref 3.77–5.28)
SODIUM SERPL-SCNC: 137 MMOL/L (ref 136–145)
WBC # BLD AUTO: 8.02 10*3/MM3 (ref 3.4–10.8)

## 2021-08-11 PROCEDURE — 70450 CT HEAD/BRAIN W/O DYE: CPT

## 2021-08-11 PROCEDURE — 99232 SBSQ HOSP IP/OBS MODERATE 35: CPT | Performed by: INTERNAL MEDICINE

## 2021-08-11 PROCEDURE — 92526 ORAL FUNCTION THERAPY: CPT

## 2021-08-11 PROCEDURE — 99231 SBSQ HOSP IP/OBS SF/LOW 25: CPT | Performed by: PHYSICIAN ASSISTANT

## 2021-08-11 PROCEDURE — 97530 THERAPEUTIC ACTIVITIES: CPT

## 2021-08-11 PROCEDURE — 92507 TX SP LANG VOICE COMM INDIV: CPT

## 2021-08-11 PROCEDURE — 85025 COMPLETE CBC W/AUTO DIFF WBC: CPT | Performed by: INTERNAL MEDICINE

## 2021-08-11 PROCEDURE — 82962 GLUCOSE BLOOD TEST: CPT

## 2021-08-11 PROCEDURE — 84100 ASSAY OF PHOSPHORUS: CPT | Performed by: INTERNAL MEDICINE

## 2021-08-11 PROCEDURE — 25010000002 HYDRALAZINE PER 20 MG: Performed by: INTERNAL MEDICINE

## 2021-08-11 PROCEDURE — 97116 GAIT TRAINING THERAPY: CPT

## 2021-08-11 PROCEDURE — 83735 ASSAY OF MAGNESIUM: CPT | Performed by: INTERNAL MEDICINE

## 2021-08-11 PROCEDURE — 93306 TTE W/DOPPLER COMPLETE: CPT

## 2021-08-11 PROCEDURE — 93306 TTE W/DOPPLER COMPLETE: CPT | Performed by: INTERNAL MEDICINE

## 2021-08-11 PROCEDURE — 80048 BASIC METABOLIC PNL TOTAL CA: CPT | Performed by: INTERNAL MEDICINE

## 2021-08-11 RX ORDER — METOPROLOL SUCCINATE 25 MG/1
25 TABLET, EXTENDED RELEASE ORAL
Status: DISCONTINUED | OUTPATIENT
Start: 2021-08-11 | End: 2021-08-13 | Stop reason: HOSPADM

## 2021-08-11 RX ORDER — AMLODIPINE BESYLATE 10 MG/1
10 TABLET ORAL
Status: DISCONTINUED | OUTPATIENT
Start: 2021-08-11 | End: 2021-08-13 | Stop reason: HOSPADM

## 2021-08-11 RX ORDER — ATORVASTATIN CALCIUM 40 MG/1
40 TABLET, FILM COATED ORAL NIGHTLY
Status: DISCONTINUED | OUTPATIENT
Start: 2021-08-11 | End: 2021-08-13 | Stop reason: HOSPADM

## 2021-08-11 RX ORDER — HYDRALAZINE HYDROCHLORIDE 20 MG/ML
10 INJECTION INTRAMUSCULAR; INTRAVENOUS EVERY 6 HOURS PRN
Status: DISCONTINUED | OUTPATIENT
Start: 2021-08-11 | End: 2021-08-13 | Stop reason: HOSPADM

## 2021-08-11 RX ORDER — PANTOPRAZOLE SODIUM 40 MG/1
40 TABLET, DELAYED RELEASE ORAL
Status: DISCONTINUED | OUTPATIENT
Start: 2021-08-12 | End: 2021-08-13 | Stop reason: HOSPADM

## 2021-08-11 RX ADMIN — AMLODIPINE BESYLATE 10 MG: 10 TABLET ORAL at 12:20

## 2021-08-11 RX ADMIN — HYDRALAZINE HYDROCHLORIDE 10 MG: 20 INJECTION INTRAMUSCULAR; INTRAVENOUS at 14:30

## 2021-08-11 RX ADMIN — LEVOTHYROXINE SODIUM 150 MCG: 150 TABLET ORAL at 05:41

## 2021-08-11 RX ADMIN — SODIUM CHLORIDE 75 ML/HR: 9 INJECTION, SOLUTION INTRAVENOUS at 03:53

## 2021-08-11 RX ADMIN — SODIUM CHLORIDE, PRESERVATIVE FREE 10 ML: 5 INJECTION INTRAVENOUS at 23:46

## 2021-08-11 RX ADMIN — ATORVASTATIN CALCIUM 40 MG: 40 TABLET, FILM COATED ORAL at 20:58

## 2021-08-11 RX ADMIN — METOPROLOL SUCCINATE 25 MG: 25 TABLET, EXTENDED RELEASE ORAL at 12:21

## 2021-08-11 RX ADMIN — PANTOPRAZOLE SODIUM 40 MG: 40 INJECTION, POWDER, FOR SOLUTION INTRAVENOUS at 05:41

## 2021-08-11 RX ADMIN — DOCUSATE SODIUM 50 MG AND SENNOSIDES 8.6 MG 2 TABLET: 8.6; 5 TABLET, FILM COATED ORAL at 20:58

## 2021-08-11 NOTE — PLAN OF CARE
Goal Outcome Evaluation:  Plan of Care Reviewed With: patient            SLP treatment completed. Will continue to address dysphagia & communication. Please see note for further details and recommendations.

## 2021-08-11 NOTE — PROGRESS NOTES
Intensivist Note     8/11/2021  Hospital Day: 2  * No surgery found *  ICU Stays Timeline            Hospital Admission: 08/09/21 1924 - Current  ICU stays: 1      In Date/Time Event Department ICU Stay Duration     08/09/21 1924 Admission  [facility]      08/09/21 2329 Transfer In  [facility] 2B ICU 1 day 13 hours 27 minutes             Ms. Zohra Hall, 80 y.o. 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  She has a history of CAD with NSTEMI 5/2021 requiring catheterization (single-vessel disease terminal RPDA not amenable to intervention), hypertension, hyperlipidemia, breast cancer 1987, thyroid cancer 2014, PMR, GERD, and medical noncompliance (forgets to take medications).  Patient is in assisted living facility and was found down but conscious by physical therapist.  Apparently had been down for about 2 hours and had bilateral posttraumatic subarachnoid blood predominantly involving the anterior frontal temporal regions, small subdural overlying the lateral right frontal lobe, and intraparenchymal hematoma involving anterior left temporal lobe.  Patient was taking aspirin and Plavix but no full dose anticoagulants.  Neurosurgery evaluated the patient and found no need for surgical intervention.  Blood pressure was controlled and patient closely follow with neuro checks.  Antiplatelet agents have been held.    Interval history: No new complaints.  She is pleasant and cooperative and can feed herself (mechanical soft with nectar thick).  Does  "have some difficulty with word finding and is confused to place but otherwise doing reasonably well (NIH 3 per nursing).  Hemodynamically doing well with a blood pressure of 137/74 and is not receiving her home antihypertensives (per record she normally takes metoprolol XL 25 mg daily, amlodipine 10 mg daily, and enalapril 20 mg daily.  Despite that is no longer requiring Cardene.  Heart rate is controlled without any evidence of arrhythmias.  Remains afebrile with normal WBC.  Urine outputs are not being measured and no weights documented since 8/9/2021 so have no idea of fluid balance.    ROS: Per subjective, all other systems reviewed and were negative.    The patient's relevant PMH, PSH, FH, and SH were reviewed and updated in Epic as appropriate. Allergies and Medications reviewed.    OBJECTIVE     /81   Pulse 82   Temp 98 °F (36.7 °C) (Oral)   Resp 16   Ht 170.2 cm (67\")   Wt 61.2 kg (135 lb)   SpO2 98%   BMI 21.14 kg/m²           Flowsheet Rows      First Filed Value   Admission Height  170.2 cm (67\") Documented at 08/09/2021 1731   Admission Weight  61.2 kg (135 lb) Documented at 08/09/2021 1731        Intake & Output (last day)       08/10 0701 - 08/11 0700 08/11 0701 - 08/12 0700    P.O.  360    I.V. (mL/kg) 1667 (27.2)     Blood      IV Piggyback 558     Total Intake(mL/kg) 2225 (36.4) 360 (5.9)    Urine (mL/kg/hr)      Total Output      Net +2225 +360          Urine Unmeasured Occurrence 4 x     Stool Unmeasured Occurrence 4 x           Exam:  General Exam:  Pleasant thin elderly white female propped up in bed in NAD  HEENT: Pupils equal and reactive. Nose and throat clear.  Neck:                          Supple, no JVD, thyromegaly, or adenopathy  Lungs: Clear anteriorly and laterally as well as in the bases  Cardiovascular: Regular rate and rhythm without murmurs or gallops.  HR 84 bpm  Abdomen: Soft nontender without organomegaly or masses.   and rectal: Deferred.  Extremities: No " cyanosis clubbing edema.  Neurologic:                 Symmetric strength. No focal deficits.  Confused to place but equal motor strength and no sensory deficits or cranial nerve abnormalities.  Does have some problems with word finding    Chest X-Ray: No film today but x-ray from 8/9/2021 was normal      Results from last 7 days   Lab Units 08/11/21  0601 08/10/21  0451 08/09/21  2055   WBC 10*3/mm3 8.02 7.23 7.66   HEMOGLOBIN g/dL 11.1* 12.2 12.9   HEMATOCRIT % 34.1 35.8 38.9   PLATELETS 10*3/mm3 254 306 258     Results from last 7 days   Lab Units 08/11/21  0601 08/10/21  0451   SODIUM mmol/L 137 140   POTASSIUM mmol/L 3.8 3.0*   CHLORIDE mmol/L 105 102   CO2 mmol/L 22.0 25.0   BUN mg/dL 12 10   CREATININE mg/dL 0.72 0.66   GLUCOSE mg/dL 102* 107*   CALCIUM mg/dL 8.1* 8.9     Results from last 7 days   Lab Units 08/11/21  0601 08/10/21  0451   MAGNESIUM mg/dL 2.1 1.5*   PHOSPHORUS mg/dL 2.8 3.2     Results from last 7 days   Lab Units 08/10/21  0451 08/09/21  2054   ALK PHOS U/L 95 94   BILIRUBIN mg/dL 0.3 0.3   ALT (SGPT) U/L 9 7   AST (SGOT) U/L 15 14       Lab Results   Component Value Date    SEDRATE 15 05/23/2021     No results found for: BNP  Lab Results   Component Value Date    CKTOTAL 122 12/14/2018    TROPONINT <0.010 08/10/2021     Lab Results   Component Value Date    TSH 0.045 (L) 08/09/2021     Lab Results   Component Value Date    LACTATE 1.4 12/28/2020     Lab Results   Component Value Date    CORTISOL 13.82 05/25/2021         I reviewed the patient's results, images and medication.    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  Remains off aspirin and Plavix and neurosurgery recommends holding them for 2 weeks.  At that point can be resumed.    PLAN     Transfer to telemetry and will asked the hospitalists to assume attending role  Resume home metoprolol and Norvasc  Would continue to hold home enalapril till we see how she does on the above 2 medications.  Decide tomorrow on any addition  PT/OT to assess safety upon discharge and any needs  Will have IV hydralazine needed for any sudden increase in blood pressure    Plan of care and goals reviewed with multidisciplinary team at daily rounds.    I discussed the patient's findings and my recommendations with patient and nursing staff    Time spent Critical care 25 min (It does not include procedure time).    Electronically signed by Andrea Maciel MD, 08/11/21, 12:56 PM EDT.   Pulmonary / Critical care medicine     Electronically signed by Andrea Maciel MD, 08/11/21, 12:56 PM EDT.

## 2021-08-11 NOTE — PLAN OF CARE
Goal Outcome Evaluation:  Plan of Care Reviewed With: patient        Progress: no change  Outcome Summary: Neuro exams stable. NIH 2-3. Patient was drowsy all shift but did wake up after CT and answered more questions correctly. Good UOP. Several liquid stools this shift. VSS. Cardene off all shift. CT result pending.

## 2021-08-11 NOTE — PROGRESS NOTES
"NEUROSURGERY PROGRESS NOTE    Interval History:   Hospital day #2: Traumatic subarachnoid hemorrhage, subdural hematoma, intraparenchymal hemorrhage  No answer or not.  Patient remains mildly confused which is her baseline.  Denies new weakness.    Vital Signs  Blood pressure 156/83, pulse 80, temperature 98 °F (36.7 °C), temperature source Oral, resp. rate 16, height 170.2 cm (67\"), weight 61.2 kg (135 lb), SpO2 99 %, not currently breastfeeding.    Physical Exam:  Patient is lying in bed in no apparent distress  Moves all extremities spontaneously  No facial asymmetry  No pronator drift  PERRLA     Results Review:    I/O last 3 completed shifts:  In: 3334.1 [I.V.:2336.1; Blood:440; IV Piggyback:558]  Out: 500 [Urine:500]  No intake/output data recorded.      Assessment/Plan:   1. Traumatic subarachnoid hemorrhage    Follow-up CT scan reviewed with Dr. Espana appears stable. No neurologic changes.  Stable for transfer to floor when medically ready. We will discontinue daily visits. No formal follow-up necessary unless she declines. Recommend holding aspirin and plavix for 2 weeks. Please call with any concerns.    Heather Lu PA-C  08/11/21  09:40 EDT  "

## 2021-08-11 NOTE — PLAN OF CARE
Goal Outcome Evaluation:  Plan of Care Reviewed With: (P) patient        Progress: (P) improving  Outcome Summary: (P) Pt demonstrated increased independence with bed mobility, transfers, and ambulation this session, progressing ambulation distance to 45 feet with RW and MinAx1. Pt slightly impulsive and requires frequent VCs for safety awareness. Pt limited this session d/t minor complaints of fatigue and HR elevation to 123bpm during gait. PT plans to continue progressing PT POC as tolerated.

## 2021-08-11 NOTE — PLAN OF CARE
Goal Outcome Evaluation:           Progress: improving  Outcome Summary: pt on transfer to tele. NIHSS 3 this am.  confused. up to chair, and ambulated in hallway.  RA. NSR, increase BP, home meds restarted, gave PRN BP meds.  adequate PO intake, Sm BM.  freq incont UOP.  Purewick placed.  no family at bedside

## 2021-08-11 NOTE — THERAPY TREATMENT NOTE
HISTORY OF PRESENT ILLNESS     Chief Complaint   Patient presents with   • Transitional Care Management     TCP/Post hospital follow up. Admit on 4/30/21. Discharged on 5/2/21. Acute TIA. Patient says her pain is still the same.    • Medication Management     Patient has a question on her medication Lisinopril.     Right Knee total replacement 4/29.  Discharged 4/30.   Readmitted 4/30 with symptoms of stroke - left arm weakness and numbness.   5/2 - transferred to rehab.  Pt did very well there and made good progress.    Home 5/5 from rehab.    Pt had residual mild weakness left arm and decreased coordination left arm. Leg is fine.   Has to concentrated to do some tasks like tying shoes.  Occasional trouble with word finding - much better now.     She is frustrated because she can't do as much as she did in rehab and next week there is no PT scheduled because there are now openings.    It is is also very difficult because she has 14 + 5 stairs to go down to get out of home.      Home BPs - 130-150s/ 80.    Taking lisinopril 20 mg bid - to hold if BP < 110.    Using nicotine patch.        Health Maintenance Due   Topic Date Due   • Pneumococcal Vaccine 0-64 (1 of 4 - PCV13) Never done   • COVID-19 Vaccine (1) Never done   • DTaP/Tdap/Td Vaccine (1 - Tdap) Never done   • Shingles Vaccine (1 of 2) Never done   • Lung Cancer Screening  Never done   • Breast Cancer Screening  07/05/2020   • Colonoscopy Risk  11/21/2020   • DM/CKD Microalbumin  01/06/2021       Patient is due for topics as listed above but is not proceeding with anything at this time      PAST MEDICAL, FAMILY AND SOCIAL HISTORY       The following histories were personally reviewed and updated.  Current medications, Allergies, Problem list, Past Medical History and Past Surgical History    REVIEW OF SYSTEMS     Review of Systems   Constitutional: Positive for fatigue. Negative for activity change, appetite change, fever and unexpected weight change.  "Patient Name: Zohra Hall  : 1940    MRN: [id number]                              Today's Date: 2021       Admit Date: 2021    Visit Dx:     ICD-10-CM ICD-9-CM   1. Fall, initial encounter  W19.XXXA E888.9   2. Subarachnoid hemorrhage following injury, no loss of consciousness, initial encounter (CMS/HCC)  S06.6X0A 852.01   3. 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[facility])   • Hypothyroid   • Essential hypertension   • GERD without esophagitis   • Hyperlipidemia LDL goal <100   • Atherosclerosis of aorta (CMS/[facility])   • Incontinence of bowel   • Acute right-sided low back pain without sciatica   • Benign essential tremor   • Pain of right hip joint   • Thyroid cancer (CMS/[facility])   • Lacunar stroke (CMS/[facility])   • Nontraumatic rupture of extensor tendons of right hand and wrist   • Hyponatremia   • Actinic keratosis   • Transient ischemic attack   • Ataxia   • PMR (polymyalgia rheumatica) (CMS/[facility])   • Osteoporosis   • Post-surgical hypothyroidism   • NSTEMI (non-ST elevated myocardial infarction) (CMS/[facility])   • Coronary artery disease involving native coronary artery of native heart with angina pectoris (CMS/[facility])   • Abnormal TSH   • Severe hypothyroidism   • ICH (intracerebral hemorrhage) (CMS/[facility])     Past Medical History:   Diagnosis Date   • Breast cancer (CMS/[facility])     left- pt states \"in situ\", no radiation, Tamoxifen for 5 years   • Cellulitis    • Cervical lymphadenopathy    • Chest pain    • Convulsions (CMS/[facility])    • GERD (gastroesophageal reflux disease)    • Glossitis    • Grief reaction     · Last Impression: 2015  counselled, given ativan for prn use.  "   HENT: Negative.  Negative for congestion, ear pain and sore throat.    Eyes: Negative for pain and visual disturbance.   Respiratory: Negative for cough and shortness of breath.    Cardiovascular: Negative for chest pain and leg swelling.   Gastrointestinal: Negative for abdominal pain, constipation, diarrhea and vomiting.   Genitourinary: Negative for difficulty urinating and dysuria.   Musculoskeletal: Positive for arthralgias (different pain than preop, improving). Negative for joint swelling.   Skin: Negative for rash and wound.   Neurological: Negative for light-headedness and headaches.   Psychiatric/Behavioral: Negative for dysphoric mood and sleep disturbance. The patient is not nervous/anxious.        PHYSICAL EXAM   Blood pressure 138/78, pulse 88, weight 86.9 kg, SpO2 97 %.    Physical Exam  Constitutional:       General: She is not in acute distress.     Appearance: She is well-developed. She is not diaphoretic.   HENT:      Nose: Nose normal.   Eyes:      General:         Right eye: No discharge.         Left eye: No discharge.      Conjunctiva/sclera: Conjunctivae normal.   Cardiovascular:      Rate and Rhythm: Normal rate and regular rhythm.      Heart sounds: Normal heart sounds. No murmur.   Pulmonary:      Effort: Pulmonary effort is normal. No respiratory distress.      Breath sounds: Normal breath sounds. No wheezing or rales.   Neurological:      Mental Status: She is alert and oriented to person, place, and time.      Cranial Nerves: No cranial nerve deficit.      Gait: Gait abnormal (using walker).      Comments: LUE, 4+/5 strength.  RUE 5/5.    Psychiatric:         Behavior: Behavior normal.         Thought Content: Thought content normal.         Judgment: Judgment normal.      Comments: Slightly dysphoric mood related to limitations.  Pt does have a positive outlook.          ASSESSMENT/PLAN     1. Hospital discharge follow-up  Reviewed hospitalization, tests, medications.   Discussed  Aleah Regan   • Hypertension    • Lower back pain    • Oral thrush    • Papillary adenocarcinoma (CMS/HCC)     Papillary adenocarcinoma of thyroid   • Papillary adenocarcinoma of thyroid (CMS/HCC)     · resection Ramirez- 1/13,  2 x 2 x 1.5 cm  T3 N1 MXpost op low calcium and diminished  voiceablation Ain- 3/7 metastatic nodes and invasion to strap muscles · Last Impression: 29 Oct 2014  s/p Eval by berry Méndez.  Aleah Regan (Internal   • Piriformis syndrome    • Tingling    • Xerostomia      Past Surgical History:   Procedure Laterality Date   • BREAST BIOPSY Right 10/10/2013    stereo bx   • BREAST BIOPSY Left 10/19/2015    stereo bx   • BREAST CYST EXCISION      right   • BREAST EXCISIONAL BIOPSY Right     affirm bx and loc 2016.   • BREAST LUMPECTOMY Left 1987   • CARDIAC CATHETERIZATION N/A 5/23/2021    Procedure: Left Heart Cath;  Surgeon: Alonso Ross IV, MD;  Location:  [facility];  Service: Cardiology;  Laterality: N/A;   • HYSTERECTOMY  1979   • OTHER SURGICAL HISTORY Right     right arm surgery-steel roger in place   • TOTAL HIP ARTHROPLASTY     • TOTAL THYROIDECTOMY      Thyroid Surgery Total Thyroidectomy     General Information     [facility] Name 08/11/21 1125          Physical Therapy Time and Intention    Document Type  therapy note (daily note)  (Pended)   -     Mode of Treatment  physical therapy  (Pended)   -     Row Name 08/11/21 1125          General Information    Existing Precautions/Restrictions  fall;oxygen therapy device and L/min  (Pended)   -     Barriers to Rehab  medically complex;cognitive status  (Pended)   -     Row Name 08/11/21 1125          Cognition    Orientation Status (Cognition)  oriented to;person;verbal cues/prompts needed for orientation;place  (Pended)   -     Row Name 08/11/21 1125          Safety Issues, Functional Mobility    Impairments Affecting Function (Mobility)  balance;cognition;endurance/activity  recommended follow up .      2. Status post total right knee replacement  Making progress.  Discussed PT.  PT is to check with orthopedist on possible home PT.      3. Acute ischemic stroke (CMS/HCC)  Pt is on plavix, asa, high dose lipitor, and BP control discussed.    Pt is to schedule follow up with neurology.      4. Essential hypertension  CPM. Pt has follow up with cardiology. Discussed.     Return in about 12 weeks (around 8/5/2021), or if symptoms worsen or fail to improve, for follow up .    Luzmaria San MD     tolerance;coordination;postural/trunk control;strength  (Pended)   -       User Key  (r) = Recorded By, (t) = Taken By, (c) = Cosigned By    Initials Name Provider Type     Reji Schreiber, PT Student PT Student        Mobility     Row Name 08/11/21 1127          Bed Mobility    Bed Mobility  supine-sit  (Pended)   -     Supine-Sit [place] (Bed Mobility)  contact guard;1 person assist;verbal cues  (Pended)   -     Assistive Device (Bed Mobility)  bed rails;head of bed elevated  (Pended)   -     Comment (Bed Mobility)  Pt required VCs for proper techniqe.  (Pended)   -     Row Name 08/11/21 1127          Transfers    Comment (Transfers)  Pt performed STS x1 from EOB and x1 from BSC. Pt required VCs for proper hand placement with RW and for technique. During bed to BSC transfer, pt required assist with positioning of RW and frequent cues for safety awareness.  (Pended)   -     Row Name 08/11/21 1127          Bed-Chair Transfer    Bed-Chair [place] (Transfers)  minimum assist (75% patient effort);1 person assist;verbal cues  (Pended)   -     Assistive Device (Bed-Chair Transfers)  walker, front-wheeled  (Pended)   -     Row Name 08/11/21 1127          Sit-Stand Transfer    Sit-Stand [place] (Transfers)  contact guard;1 person assist;verbal cues  (Pended)   -     Assistive Device (Sit-Stand Transfers)  walker, front-wheeled  (Pended)   -[facility] Name 08/11/21 1127          Gait/Stairs (Locomotion)    [place] Level (Gait)  minimum assist (75% patient effort);1 person assist;verbal cues  (Pended)   -     Assistive Device (Gait)  walker, front-wheeled  (Pended)   -     Distance in Feet (Gait)  45  (Pended)   -     Deviations/Abnormal Patterns (Gait)  base of support, narrow;radha decreased;festinating/shuffling;gait speed decreased;stride length decreased;weight shifting decreased  (Pended)   -     Bilateral Gait Deviations  forward flexed posture;heel strike decreased   (Pended)   -     Comment (Gait/Stairs)  Pt demonstrated step through gait pattern with narrowed MAGNUS. Pt with decreased safety awareness and obstacle navigation, requiring frequent VCs for safety. Pt demonstrates forward flexed posture throughout. Pt's HR elevated to 123bpm during ambluation, although pt asymptomatic and with rapid recovery upon returning to sitting. Pt demonstrated BUE tremor during ambulation.  (Pended)   -       User Key  (r) = Recorded By, (t) = Taken By, (c) = Cosigned By    Initials Name Provider Type     Reji Schreiber, PT Student PT Student        Obj/Interventions     Row Name 08/11/21 1131          Motor Skills    Therapeutic Exercise  hip;knee;ankle  (Pended)   -[facility] Name 08/11/21 1131          Hip (Therapeutic Exercise)    Hip (Therapeutic Exercise)  strengthening exercise  (Pended)   -     Hip Strengthening (Therapeutic Exercise)  bilateral;marching while seated;aBduction;aDduction;sitting;10 repetitions  (Pended)   -[facility] Name 08/11/21 1131          Knee (Therapeutic Exercise)    Knee (Therapeutic Exercise)  strengthening exercise  (Pended)   -     Knee Strengthening (Therapeutic Exercise)  bilateral;LAQ (long arc quad);sitting;10 repetitions  (Pended)   -[facility] Name 08/11/21 1131          Ankle (Therapeutic Exercise)    Ankle (Therapeutic Exercise)  AROM (active range of motion)  (Pended)   -     Ankle AROM (Therapeutic Exercise)  bilateral;dorsiflexion;plantarflexion;sitting;10 repetitions  (Pended)   -[facility] Name 08/11/21 1131          Balance    Balance Assessment  sitting static balance;sitting dynamic balance;standing static balance;standing dynamic balance  (Pended)   -     Static Sitting Balance  WFL;sitting, edge of bed  (Pended)   -     Dynamic Sitting Balance  WFL;sitting, edge of bed  (Pended)   -     Static Standing Balance  WFL;supported;standing  (Pended)   -     Dynamic Standing Balance  mild impairment;supported;standing   (Pended)   -     Comment, Balance  Pt stood at [facility] while performing UE tasks following BM, with no gross LOB. Pt requires CGA during standing for safety.  (Pended)   -       User Key  (r) = Recorded By, (t) = Taken By, (c) = Cosigned By    Initials Name Provider Type     Reji Schreiber, PT Student PT Student        Goals/Plan    No documentation.       Clinical Impression     [facility] Name 08/11/21 1133          Pain    Additional Documentation  Pain Scale: Numbers Pre/Post-Treatment (Group)  (Pended)   -LH     Row Name 08/11/21 1133          Pain Scale: Numbers Pre/Post-Treatment    Pretreatment Pain Rating  0/10 - no pain  (Pended)   -     Posttreatment Pain Rating  0/10 - no pain  (Pended)   -     Pain Intervention(s)  Repositioned;Ambulation/increased activity  (Pended)   -LH     Row Name 08/11/21 1133          Plan of Care Review    Plan of Care Reviewed With  patient  (Pended)   -     Progress  improving  (Pended)   -     Outcome Summary  Pt demonstrated increased independence with bed mobility, transfers, and ambulation this session, progressing ambulation distance to 45 feet with RW and MinAx1. Pt slightly impulsive and requires frequent VCs for safety awareness. Pt limited this session d/t minor complaints of fatigue and HR elevation to 123bpm during gait. PT plans to continue progressing PT POC as tolerated.  (Pended)   -[facility] Name 08/11/21 1133          Vital Signs    Pre Systolic BP Rehab  137  (Pended)   -     Pre Treatment Diastolic BP  74  (Pended)   -     Post Systolic BP Rehab  167  (Pended)   -     Post Treatment Diastolic BP  95  (Pended)   -     Pretreatment Heart Rate (beats/min)  87  (Pended)   -     Intratreatment Heart Rate (beats/min)  123  (Pended)   -     Posttreatment Heart Rate (beats/min)  99  (Pended)   -     Pre SpO2 (%)  98  (Pended)   -     O2 Delivery Pre Treatment  room air  (Pended)   -     Post SpO2 (%)  94  (Pended)   -     O2 Delivery Post  Treatment  room air  (Pended)   -     Pre Patient Position  Supine  (Pended)   -     Intra Patient Position  Standing  (Pended)   -     Post Patient Position  Sitting  (Pended)   -     Row Name 08/11/21 1133          Positioning and Restraints    Pre-Treatment Position  in bed  (Pended)   -     Post Treatment Position  chair  (Pended)   -     In Chair  notified nsg;reclined;call light within reach;encouraged to call for assist;exit alarm on;waffle cushion;legs elevated;heels elevated  (Pended)   -       User Key  (r) = Recorded By, (t) = Taken By, (c) = Cosigned By    Initials Name Provider Type    Reji Frias, PT Student PT Student        Outcome Measures     Row Name 08/11/21 1136 08/11/21 0800       How much help from another person do you currently need...    Turning from your back to your side while in flat bed without using bedrails?  3  (Pended)   -  3  -BS    Moving from lying on back to sitting on the side of a flat bed without bedrails?  3  (Pended)   -  3  -BS    Moving to and from a bed to a chair (including a wheelchair)?  3  (Pended)   -  3  -BS    Standing up from a chair using your arms (e.g., wheelchair, bedside chair)?  3  (Pended)   -  3  -BS    Climbing 3-5 steps with a railing?  2  (Pended)   -  3  -BS    To walk in hospital room?  3  (Pended)   -  3  -BS    AM-PAC 6 Clicks Score (PT)  17  (Pended)   -  18  -BS    Row Name 08/11/21 1136          Functional Assessment    Outcome Measure Options  AM-PAC 6 Clicks Basic Mobility (PT)  (Pended)   -       User Key  (r) = Recorded By, (t) = Taken By, (c) = Cosigned By    Initials Name Provider Type    Karrie Phan RN Registered Nurse    Reji Frias, PT Student PT Student                       Physical Therapy Education                 Title: PT OT SLP Therapies (In Progress)     Topic: Physical Therapy (Done)     Point: Mobility training (Done)     Learning Progress Summary           Patient  Acceptance, E, VU,DU,NR by  at 8/11/2021 1137    Acceptance, E, VU,DU,NR by  at 8/10/2021 1159                   Point: Home exercise program (Done)     Learning Progress Summary           Patient Acceptance, E, VU,DU,NR by  at 8/11/2021 1137                   Point: Body mechanics (Done)     Learning Progress Summary           Patient Acceptance, E, VU,DU,NR by  at 8/11/2021 1137    Acceptance, E, VU,DU,NR by  at 8/10/2021 1159                   Point: Precautions (Done)     Learning Progress Summary           Patient Acceptance, E, VU,DU,NR by  at 8/11/2021 1137    Acceptance, E, VU,DU,NR by  at 8/10/2021 1159                               User Key     Initials Effective Dates Name Provider Type Discipline     05/17/21 -  Reji Schreiber, PT Student PT Student PT              PT Recommendation and Plan  Planned Therapy Interventions (PT): balance training, bed mobility training, gait training, home exercise program, patient/family education, strengthening, transfer training  Plan of Care Reviewed With: (P) patient  Progress: (P) improving  Outcome Summary: (P) Pt demonstrated increased independence with bed mobility, transfers, and ambulation this session, progressing ambulation distance to 45 feet with RW and MinAx1. Pt slightly impulsive and requires frequent VCs for safety awareness. Pt limited this session d/t minor complaints of fatigue and HR elevation to 123bpm during gait. PT plans to continue progressing PT POC as tolerated.     Time Calculation:   PT Charges     Row Name 08/11/21 1137             Time Calculation    Start Time  1041  (Pended)   -      PT Received On  08/11/21  (Pended)   -         Timed Charges    40663 - PT Therapeutic Exercise Minutes  8  (Pended)   -      86527 - Gait Training Minutes   10  (Pended)   -      76686 - PT Therapeutic Activity Minutes  9  (Pended)   -         Total Minutes    Timed Charges Total Minutes  27  (Pended)   -       Total Minutes   27  (Pended)   -        User Key  (r) = Recorded By, (t) = Taken By, (c) = Cosigned By    Initials Name Provider Type     Isma Schreiber, PT Student PT Student        Therapy Charges for Today     Code Description Service Date Service Provider Modifiers Qty    03290028807 HC GAIT TRAINING EA 15 MIN 8/10/2021 Isma Schreiber, PT Student GP 1    79041240988 HC PT EVAL MOD COMPLEXITY 3 8/10/2021 Isma Schreiber, PT Student GP 1    63010896159 HC GAIT TRAINING EA 15 MIN 8/11/2021 Isma Schreiber, PT Student GP 1    49902710721 HC PT THERAPEUTIC ACT EA 15 MIN 8/11/2021 Isma Schreiber, PT Student GP 1          PT G-Codes  Outcome Measure Options: (P) AM-PAC 6 Clicks Basic Mobility (PT)  AM-PAC 6 Clicks Score (PT): (P) 17  AM-PAC 6 Clicks Score (OT): 11  Modified [place] Scale: 3 - Moderate disability.  Requiring some help, but able to walk without assistance.    ISMA SCHREIBER PT Student  8/11/2021

## 2021-08-11 NOTE — THERAPY TREATMENT NOTE
"Acute Care - Speech Language Pathology   Swallow Treatment Note [facility]     Patient Name: Zohra Hall  : 1940  MRN: [id number]  Today's Date: 2021               Admit Date: 2021    Visit Dx:     ICD-10-CM ICD-9-CM   1. Fall, initial encounter  W19.XXXA E888.9   2. Subarachnoid hemorrhage following injury, no loss of consciousness, initial encounter (CMS/HCC)  S06.6X0A 852.01   3. 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[facility])   • Hypothyroid post remote resection papillary adenocarcinoma thyroid   • Hypertension   • GERD without esophagitis   • Hyperlipidemia LDL goal <100   • Atherosclerosis of aorta (CMS/[facility])   • Incontinence of bowel   • Acute right-sided low back pain without sciatica   • Benign essential tremor   • Pain of right hip joint   • Thyroid cancer (CMS/[facility])   • Lacunar stroke (CMS/[facility])   • Nontraumatic rupture of extensor tendons of right hand and wrist   • Hyponatremia   • Actinic keratosis   • Transient ischemic attack   • Ataxia   • History of PMR (CMS/[facility])   • Osteoporosis   • Post-surgical hypothyroidism   • NSTEMI (non-ST elevated myocardial infarction) (CMS/[facility])   • CAD with angina pectoris (CMS/[facility])   • NICM presumably due to Takotsubo post NSTEMI 2021 (CMS/[facility])   • Abnormal TSH   • Severe hypothyroidism   • Traumatic SAH, SDH, and intraparenchymal bleed (CMS/[facility])   • History of lacunar stroke 2019     Past Medical History:   Diagnosis Date   • Breast cancer (CMS/[facility])     left- pt states \"in situ\", no radiation, Tamoxifen for 5 years   • Cellulitis    • Cervical lymphadenopathy    • Chest pain    • Convulsions (CMS/[facility])    • GERD " (gastroesophageal reflux disease)    • Glossitis    • Grief reaction     · Last Impression: 29 Jan 2015  counselled, given ativan for prn use.  Aleah Regan   • Hypertension    • Lower back pain    • Oral thrush    • Papillary adenocarcinoma (CMS/HCC)     Papillary adenocarcinoma of thyroid   • Papillary adenocarcinoma of thyroid (CMS/HCC)     · resection Ramirez- 1/13,  2 x 2 x 1.5 cm  T3 N1 MXpost op low calcium and diminished  voiceablation Ain- 3/7 metastatic nodes and invasion to strap muscles · Last Impression: 29 Oct 2014  s/p Eval by berry Méndez.  Aleah Regan (Internal   • Piriformis syndrome    • Tingling    • Xerostomia      Past Surgical History:   Procedure Laterality Date   • BREAST BIOPSY Right 10/10/2013    stereo bx   • BREAST BIOPSY Left 10/19/2015    stereo bx   • BREAST CYST EXCISION      right   • BREAST EXCISIONAL BIOPSY Right     affirm bx and loc 2016.   • BREAST LUMPECTOMY Left 1987   • CARDIAC CATHETERIZATION N/A 5/23/2021    Procedure: Left Heart Cath;  Surgeon: Alonso Ross IV, MD;  Location: [facility] CATH INVASIVE LOCATION;  Service: Cardiology;  Laterality: N/A;   • HYSTERECTOMY  1979   • OTHER SURGICAL HISTORY Right     right arm surgery-steel roger in place   • TOTAL HIP ARTHROPLASTY     • TOTAL THYROIDECTOMY      Thyroid Surgery Total Thyroidectomy       SLP Recommendation and Plan  SLP Swallowing Diagnosis: other (see comments) (suspected continued dysphagia)  SLP Diet Recommendation: mechanical soft with no mixed consistencies, nectar thick liquids  Recommended Precautions and Strategies: upright posture during/after eating, small bites of food and sips of liquid, general aspiration precautions, reflux precautions  SLP Rec. for Method of Medication Administration: meds whole, with pudding or applesauce, as tolerated     Monitor for Signs of Aspiration: yes, notify SLP if any concerns     Swallow Criteria for Skilled Therapeutic Interventions Met: demonstrates  skilled criteria  Anticipated Discharge Disposition (SLP): unknown, anticipate therapy at next level of care  Rehab Potential/Prognosis, Swallowing: good, to achieve stated therapy goals  Therapy Frequency (Swallow): 5 days per week  Predicted Duration Therapy Intervention (Days): until discharge     Daily Summary of Progress (SLP): progress toward functional goals as expected    Plan for Continued Treatment (SLP): Pt participated in dysphagia & communication tx. Cont'd expressive language deficits though unsure of pt's baseline. SLP will continue to follow for tx.              Plan of Care Reviewed With: patient         SWALLOW EVALUATION (last 72 hours)      SLP Adult Swallow Evaluation     Row Name 08/11/21 1410 08/10/21 1100 08/10/21 0900             Rehab Evaluation    Document Type  therapy note (daily note)  -MP  evaluation  -CJ  --      Subjective Information  no complaints  -MP  no complaints  -CJ  --      Patient Observations  alert;cooperative  -MP  alert;cooperative  -CJ  --      Patient/Family/Caregiver Comments/Observations  No family present  -MP  no family present  -CJ  --      Care Plan Review  care plan/treatment goals reviewed;patient/other agree to care plan  -MP  evaluation/treatment results reviewed;patient/other agree to care plan  -CJ  --      Patient Effort  good  -MP  good  -CJ  --      Symptoms Noted During/After Treatment  --  none  -CJ  --         General Information    Patient Profile Reviewed  --  yes  -CJ  --      Pertinent History Of Current Problem  --  see am kellie; referred for FEES  -CJ  --      Current Method of Nutrition  --  NPO  -CJ  NPO  -CJ      Precautions/Limitations, Vision  --  WFL with corrective lenses;for purposes of eval  -CJ  --      Precautions/Limitations, Hearing  --  WFL;for purposes of eval  -CJ  --      Prior Level of Function-Communication  --  unknown  -CJ  unknown  -CJ      Prior Level of Function-Swallowing  --  no diet consistency restrictions;safe,  efficient swallowing in all situations  -  no diet consistency restrictions;safe, efficient swallowing in all situations  -      Plans/Goals Discussed with  --  patient;agreed upon  -  --      Barriers to Rehab  --  none identified  -  --      Patient's Goals for Discharge  --  patient did not state  -  return to PO diet  -         Pain    Additional Documentation  Pain Scale: FACES Pre/Post-Treatment (Group)  -MP  Pain Scale: FACES Pre/Post-Treatment (Group)  -  --         Pain Scale: FACES Pre/Post-Treatment    Pain: FACES Scale, Pretreatment  0-->no hurt  -MP  0-->no hurt  -CJ  --      Posttreatment Pain Rating  0-->no hurt  -MP  0-->no hurt  -CJ  --         Oral Motor Structure and Function    Secretion Management  --  --  WNL/WFL  -         General Eating/Swallowing Observations    Respiratory Support Currently in Use  --  --  room air  -      Eating/Swallowing Skills  --  --  self-fed  -      Positioning During Eating  --  --  upright 90 degree;upright in bed  -      Utensils Used  --  --  spoon;cup;straw  -      Consistencies Trialed  --  --  regular textures;ice chips;thin liquids;nectar/syrup-thick liquids;pureed  -         Clinical Swallow Eval    Pharyngeal Phase  --  --  suspected pharyngeal impairment  -      Clinical Swallow Evaluation Summary  --  --  CSE completed this am. Mrs. Hall is positioned upright and centered in bed to accept po presentations of puree, ice chips, thin and nectar thick liquids. Overt s/s of aspiration w/ thin liquids w/ evidenced cough response. No other overt s/s of aspiration w/ puree/nectar. Will plan for instrumental FEES to further asses swallowing fnx. RN made aware  -         Pharyngeal Phase Concerns    Pharyngeal Phase Concerns  --  --  cough  -CJ      Cough  --  --  thin  -CJ         Fiberoptic Endoscopic Evaluation of Swallowing (FEES)    Risks/Benefits Reviewed  --  risks/benefits explained;patient;agreed to eval  -  --      Nasal  Entry  --  left:  -CJ  --      Scope serial number/identification  --  [id number]  -CJ  --         Anatomy and Physiology    Anatomic Considerations  --  no anatomic structural deviation  -CJ  --      Velopharyngeal  --  WFL  -CJ  --      Base of Tongue  --  symmetrical  -CJ  --      Epiglottis  --  WFL  -CJ  --      Laryngeal Function Breathing  --  symmetrical  -CJ  --      Laryngeal Function Phonation  --  symmetrical  -CJ  --      Laryngeal Function to Breath Hold  --  TVF contact;sustained closure  -CJ  --      Secretion Rating Scale (Mark et alDada 1996)  --  2- secretions initially outside the vestibule but later entered the vestibule  -CJ  --      Secretion Description  --  thin;clear;white  -CJ  --      Ice Chips  --  DNA  -CJ  --      Spontaneous Swallow  --  frequency reduced;did not clear secretions  -CJ  --      Sensory  --  sensed scope  -CJ  --      Utensils Used  --  Spoon;Cup;Straw  -CJ  --      Consistencies Trialed  --  thin liquids;nectar-thick liquids;pudding/puree;regular textures;spoon;cup;straw  -CJ  --         FEES Interpretation    Oral Phase  --  prolonged manipulation;increased A-P transit time;prespill of liquids into pharynx  -CJ  --         Initiation of Pharyngeal Swallow    Initiation of Pharyngeal Swallow  --  bolus in pyriform sinuses  -CJ  --      Pharyngeal Phase  --  impaired pharyngeal phase of swallowing  -CJ  --      Penetration During the Swallow  --  thin liquids;secondary to delayed swallow initiation or mistiming;secondary to reduced laryngeal elevation;secondary to reduced vestibular closure  -CJ  --      Aspiration After the Swallow  --  thin liquids;secondary to residue;in pyriform sinuses;in laryngeal vestibule  -CJ  --      Response to Penetration  --  deep;no response;response with cue only  -CJ  --      Response to Aspiration  --  no response, silent aspiration;response with cue only;could not clear subglottic material;weak cough/throat clear  -CJ  --      Rosenbek's Scale   --  thin:;8-->Level 8;nectar:;pudding/puree:;regular textures:;1-->Level 1  -CJ  --      Residue  --  thin liquids;pyriform sinuses;laryngeal vestibule;secondary to reduced posterior pharyngeal wall stripping;secondary to reduced laryngeal elevation;secondary to reduced hyolaryngeal excursion;secondary to reduced base of tongue retraction  -CJ  --      Response to Residue  --  unable to clear residue;with cued swallow  -CJ  --      Attempted Compensatory Maneuvers  --  bolus size;bolus presentation style;additional subsequent swallow;multiple swallows;throat clear after swallow  -CJ  --      Response to Attempted Compensatory Maneuvers  --  did not prevent penetration;did not prevent aspiration;reduced residue  -CJ  --      FEES Summary  --  FEES completed this am. Mrs. Hall is positioned upright and centered in bedside chair to accept po presentations of puree, solid cracker, thin and nectar thick liquids via spoon, cup and straw. Oral phase is moderately impaired w/ prolonged manipulation w/ solids/pudding. Delayed initiation w/ all consistencies. Premature spillage of thin liquids to the pyriforms. Penetration w/ thin liquids occurring during the swallow. Moderate amount of residue in pyriforms that spills over the posterior commissure after the swallow. Cued cough ineffective to clear aspirated matieral. No penetration or aspiration evidenced w/ puree, solid cracker or nectar thick liquids. No significant residue w/ any of those consistencies. Per this evaluation Mrs. Hall presents w/a moderate oral phase, mild-moderate pharyngeal dysphagia. She is felt to most benefit from modified po diet of dysphagia level IV w/ nectar thick liquids. Meds whole in puree. Upright for all po intake.   -CJ  --         Clinical Impression    Daily Summary of Progress (SLP)  progress toward functional goals as expected  -MP  --  --      SLP Swallowing Diagnosis  other (see comments) suspected continued dysphagia  -MP   moderate;oral dysphagia;mild-moderate;pharyngeal dysphagia  -CJ  suspected pharyngeal dysphagia  -CJ      Functional Impact  risk of aspiration/pneumonia;risk of malnutrition;risk of dehydration  -  risk of aspiration/pneumonia;risk of malnutrition;risk of dehydration  -CJ  risk of aspiration/pneumonia;risk of malnutrition;risk of dehydration  -CJ      Rehab Potential/Prognosis, Swallowing  good, to achieve stated therapy goals  -MP  good, to achieve stated therapy goals  -CJ  good, to achieve stated therapy goals  -      Swallow Criteria for Skilled Therapeutic Interventions Met  demonstrates skilled criteria  -MP  demonstrates skilled criteria  -CJ  demonstrates skilled criteria  -      Plan for Continued Treatment (SLP)  Pt participated in dysphagia & communication tx. Cont'd expressive language deficits though unsure of pt's baseline. SLP will continue to follow for tx.  -MP  FEES completed, initiate on modified po diet of dysphagia level IV w/ nectar thick liquids  -  --         Recommendations    Therapy Frequency (Swallow)  5 days per week  -MP  5 days per week  -CJ  --      Predicted Duration Therapy Intervention (Days)  until discharge  -MP  until discharge  -  --      SLP Diet Recommendation  mechanical soft with no mixed consistencies;nectar thick liquids  -  mechanical soft with no mixed consistencies;nectar thick liquids  -  NPO  -      Recommended Diagnostics  --  --  reassess via FEES  -      Recommended Precautions and Strategies  upright posture during/after eating;small bites of food and sips of liquid;general aspiration precautions;reflux precautions  -  upright posture during/after eating;small bites of food and sips of liquid;general aspiration precautions;reflux precautions  -  general aspiration precautions  -      Oral Care Recommendations  Oral Care BID/PRN  -MP  Oral Care BID/PRN  -  Oral Care BID/PRN  -      SLP Rec. for Method of Medication Administration  meds  whole;with pudding or applesauce;as tolerated  -MP  meds whole;with pudding or applesauce;as tolerated  -CJ  meds whole;with pudding or applesauce;as tolerated  -CJ      Monitor for Signs of Aspiration  yes;notify SLP if any concerns  -MP  yes;notify SLP if any concerns  -CJ  yes;notify SLP if any concerns  -CJ      Anticipated Discharge Disposition (SLP)  unknown;anticipate therapy at next level of care  -MP  unknown;anticipate therapy at next level of care  -CJ  --        User Key  (r) = Recorded By, (t) = Taken By, (c) = Cosigned By    Initials Name Effective Dates    CJ Elsa Ashby, MS CCC-SLP 06/16/21 -     MP Erick Richardson MS CCC-SLP 06/16/21 -           EDUCATION  The patient has been educated in the following areas:   Cognitive Impairment Communication Impairment Dysphagia (Swallowing Impairment).       SLP GOALS     Row Name 08/11/21 1410 08/10/21 1100          Oral Nutrition/Hydration Goal 1 (SLP)    Oral Nutrition/Hydration Goal 1, SLP  LTG: Pt will return to regular diet consistency w/ thin liquids w/o overt s/s of aspiration/distress given no cues w/ 100% acc  -MP  LTG: Pt will return to regular diet consistency w/ thin liquids w/o overt s/s of aspiration/distress given no cues w/ 100% acc  -CJ     Time Frame (Oral Nutrition/Hydration Goal 1, SLP)  by discharge  -MP  by discharge  -CJ     Progress/Outcomes (Oral Nutrition/Hydration Goal 1, SLP)  continuing progress toward goal  -MP  --        Oral Nutrition/Hydration Goal 2 (SLP)    Oral Nutrition/Hydration Goal 2, SLP  STG: Pt will tolerate modified po diet of soft solids w/ nectar thick liquids w/o overt s/s of aspiration/distress given no cues w/ 100% acc  -MP  STG: Pt will tolerate modified po diet of soft solids w/ nectar thick liquids w/o overt s/s of aspiration/distress given no cues w/ 100% acc  -CJ     Time Frame (Oral Nutrition/Hydration Goal 2, SLP)  short term goal (STG)  -MP  short term goal (STG)  -CJ     Progress/Outcomes (Oral  Nutrition/Hydration Goal 2, SLP)  continuing progress toward goal  -MP  --        Oral Nutrition/Hydration Goal (SLP)    Oral Nutrition/Hydration Goal, SLP  STG: Pt will accept therapeutic trials of thin liquids and solids w/o overt s/s of aspiration/distress given no cues w/ 80% acc to determine pt readiness for repeat instrumental dysphagia evaluation  -MP  STG: Pt will accept therapeutic trials of thin liquids and solids w/o overt s/s of aspiration/distress given no cues w/ 80% acc to determine pt readiness for repeat instrumental dysphagia evaluation  -CJ     Time Frame (Oral Nutrition/Hydration Goal, SLP)  short term goal (STG)  -MP  short term goal (STG)  -CJ     Progress/Outcomes (Oral Nutrition/Hydration Goal, SLP)  goal ongoing  -MP  --        Lingual Strengthening Goal 1 (SLP)    Activity (Lingual Strengthening Goal 1, SLP)  increase lingual tone/sensation/control/coordination/movement;increase tongue back strength  -MP  increase lingual tone/sensation/control/coordination/movement;increase tongue back strength  -CJ     Increase Lingual Tone/Sensation/Control/Coordination/Movement  swallow trials;lingual resistance exercises  -MP  swallow trials;lingual resistance exercises  -CJ     Increase Tongue Back Strength  swallow trials;lingual resistance exercises  -MP  swallow trials;lingual resistance exercises  -CJ     [place]/Accuracy (Lingual Strengthening Goal 1, SLP)  with minimal cues (75-90% accuracy)  -MP  with minimal cues (75-90% accuracy)  -CJ     Time Frame (Lingual Strengthening Goal 1, SLP)  short term goal (STG)  -MP  short term goal (STG)  -CJ     Progress/Outcomes (Lingual Strengthening Goal 1, SLP)  continuing progress toward goal  -MP  --        Pharyngeal Strengthening Exercise Goal 1 (SLP)    Activity (Pharyngeal Strengthening Goal 1, SLP)  increase timing;increase superior movement of the hyolaryngeal complex;increase anterior movement of the hyolaryngeal complex;increase closure at  entrance to airway/closure of airway at glottis;increase squeeze/positive pressure generation;increase tongue base retraction  -MP  increase timing;increase superior movement of the hyolaryngeal complex;increase anterior movement of the hyolaryngeal complex;increase closure at entrance to airway/closure of airway at glottis;increase squeeze/positive pressure generation;increase tongue base retraction  -CJ     Increase Timing  prepping - 3 second prep or suck swallow or 3-step swallow  -MP  prepping - 3 second prep or suck swallow or 3-step swallow  -CJ     Increase Superior Movement of the Hyolaryngeal Complex  effortful pitch glide (falsetto + pharyngeal squeeze)  -MP  effortful pitch glide (falsetto + pharyngeal squeeze)  -CJ     Increase Anterior Movement of the Hyolaryngeal Complex  chin tuck against resistance (CTAR)  -MP  chin tuck against resistance (CTAR)  -CJ     Increase Closure at Entrance to Airway/Closure of Airway at Glottis  supraglottic swallow  -MP  supraglottic swallow  -CJ     Increase Squeeze/Positive Pressure Generation  hard effortful swallow  -MP  hard effortful swallow  -CJ     Increase Tongue Base Retraction  melida  -MP  melida  -CJ     [place]/Accuracy (Pharyngeal Strengthening Goal 1, SLP)  with minimal cues (75-90% accuracy)  -MP  with minimal cues (75-90% accuracy)  -CJ     Time Frame (Pharyngeal Strengthening Goal 1, SLP)  short term goal (STG)  -MP  short term goal (STG)  -CJ     Barriers (Pharyngeal Strengthening Goal 1, SLP)  Focused on CTAR, hard effortful, and effortful pitch  -MP  --     Progress/Outcomes (Pharyngeal Strengthening Goal 1, SLP)  continuing progress toward goal  -MP  --        Follow Directions Goal 2 (SLP)    Improve Ability to Follow Directions Goal 1 (SLP)  3 step commands;multistep commands;80%;with moderate cues (50-74%)  -MP  3 step commands;multistep commands;80%;with moderate cues (50-74%)  -CJ     Time Frame (Follow Directions Goal 1, SLP)  short term  goal (STG)  -MP  short term goal (STG)  -CJ     Progress/Outcomes (Follow Directions Goal 1, SLP)  goal ongoing  -MP  --        Word Retrieval Skills Goal 1 (SLP)    Improve Word Retrieval Skills By Goal 1 (SLP)  confrontational naming task;high frequency;low frequency;responsive naming task;simple;complex;repeating phrases;repeating sentences;completing open ended structured sentence;completing open ended unstructured sentence;80%;with moderate cues (50-74%)  -MP  confrontational naming task;high frequency;low frequency;responsive naming task;simple;complex;repeating phrases;repeating sentences;completing open ended structured sentence;completing open ended unstructured sentence;80%;with moderate cues (50-74%)  -CJ     Time Frame (Word Retrieval Goal 1, SLP)  short term goal (STG)  -MP  short term goal (STG)  -CJ     Progress (Word Retrieval Skills Goal 1, SLP)  60%;with minimal cues (75-90%)  -MP  --     Progress/Outcomes (Word Retrieval Goal 1, SLP)  continuing progress toward goal  -MP  --        Ability to Construct Phrase and Sentence Level Response Goal 1 (SLP)    Improve Ability to Construct Phrase and Sentence Level Responses By Goal 1 (SLP)  constructing a sentence with a key word;80%;with moderate cues (50-74%)  -MP  constructing a sentence with a key word;80%;with moderate cues (50-74%)  -CJ     Time Frame (Phrase and Sentence Level Response Goal 1, SLP)  short term goal (STG)  -MP  short term goal (STG)  -CJ     Progress/Outcomes (Phrase and Sentence Level Response Goal 1, SLP)  goal ongoing  -MP  --        Additional Goal 1 (SLP)    Additional Goal 1, SLP  LTG: Pt will improve overall cognitive-communication skills to be able to functionally communicate wants and needs to participate in her care to complete adls  -MP  LTG: Pt will improve overall cognitive-communication skills to be able to functionally communicate wants and needs to participate in her care to complete adls  -CJ     Time Frame  (Additional Goal 1, SLP)  by discharge  -MP  by discharge  -CJ     Progress/Outcomes (Additional Goal 1, SLP)  continuing progress toward goal  -MP  --       User Key  (r) = Recorded By, (t) = Taken By, (c) = Cosigned By    Initials Name Provider Type    Elsa Cruz MS CCC-SLP Speech and Language Pathologist    Erick Levine MS CCC-SLP Speech and Language Pathologist             Time Calculation:   Time Calculation- SLP     Row Name 08/11/21 1459             Time Calculation- SLP    SLP Start Time  1410  -MP      SLP Received On  08/11/21  -MP        User Key  (r) = Recorded By, (t) = Taken By, (c) = Cosigned By    Initials Name Provider Type    Erick Levine MS CCC-SLP Speech and Language Pathologist          Therapy Charges for Today     Code Description Service Date Service Provider Modifiers Qty    39533568204 HC ST TREATMENT SWALLOW 2 8/11/2021 Erick Richardson MS CCC-SLP GN 1    [id number] HC ST TREATMENT SPEECH 1 8/11/2021 Erick Richardson MS CCC-SLP GN 1               Erick Richardson MS CCC-JUANCHO  8/11/2021

## 2021-08-11 NOTE — PROGRESS NOTES
"Clinical Nutrition Note      Patient Name: Zohra Hall  MRN: [id number]  Admission date: 8/9/2021      Multidisciplinary Rounds    Additional information obtained during MDR:  RN reports pt scored a NIHSS of 3 this morning, she remains a little confused able to feed herself breakfast. BP needs better control home meds have not been restarted still on cardene. Neurology okay w/ pt transfer to floor.      Current diet: Diet Dysphagia; IV - Mechanical Soft No Mixed Consistencies; Nectar / Syrup Thick; Cardiac    Oral Nutrition Supplement:     Pertinent medical data reviewed:  No nutrition risk identified on nursing screen; MST score \"2\" unsure    Average oral intake per documentation:              Intervention:  Plan of care and goals of care reviewed    Monitor:  RD to follow per protocol      Geovanna Gusman MS,RD,LD  08/11/21 16:06 EDT  Time: 15  mins       "

## 2021-08-11 NOTE — CASE MANAGEMENT/SOCIAL WORK
Continued Stay Note  [facility]     Patient Name: Zohra Hall  MRN: [id number]  Today's Date: 8/11/2021    Admit Date: 8/9/2021    Discharge Plan     Row Name 08/11/21 1438       Plan    Plan  Rehab and then transition back to her assisted living apartment.    Patient/Family in Agreement with Plan  yes    Plan Comments  Pt is mildly confused. Called her grandson Joey to discuss d/c planning. Pt has used 6 weeks of skilled days from May-July this year and would be in her copay days. He is agreeable to a referral to [facility] and if pt doesn't qualify for acute, he would like a referral to [facility] skilled rehab.Referral to April at [facility]. Gia ext. [phone]        Discharge Codes    No documentation.       Expected Discharge Date and Time     Expected Discharge Date Expected Discharge Time    Aug 16, 2021             Gia Tierney RN

## 2021-08-12 LAB
ALBUMIN SERPL-MCNC: 3.3 G/DL (ref 3.5–5.2)
ANION GAP SERPL CALCULATED.3IONS-SCNC: 9 MMOL/L (ref 5–15)
ASCENDING AORTA: 3.1 CM
BASOPHILS # BLD AUTO: 0.03 10*3/MM3 (ref 0–0.2)
BASOPHILS NFR BLD AUTO: 0.4 % (ref 0–1.5)
BH CV ECHO MEAS - AO MAX PG (FULL): 1.5 MMHG
BH CV ECHO MEAS - AO MAX PG: 5.7 MMHG
BH CV ECHO MEAS - AO MEAN PG (FULL): 0.34 MMHG
BH CV ECHO MEAS - AO MEAN PG: 3.3 MMHG
BH CV ECHO MEAS - AO ROOT AREA (BSA CORRECTED): 1.5
BH CV ECHO MEAS - AO ROOT AREA: 5.5 CM^2
BH CV ECHO MEAS - AO ROOT DIAM: 2.6 CM
BH CV ECHO MEAS - AO V2 MAX: 119 CM/SEC
BH CV ECHO MEAS - AO V2 MEAN: 82.5 CM/SEC
BH CV ECHO MEAS - AO V2 VTI: 21.8 CM
BH CV ECHO MEAS - ASC AORTA: 3.1 CM
BH CV ECHO MEAS - AVA(I,A): 1.6 CM^2
BH CV ECHO MEAS - AVA(I,D): 1.6 CM^2
BH CV ECHO MEAS - AVA(V,A): 1.3 CM^2
BH CV ECHO MEAS - AVA(V,D): 1.3 CM^2
BH CV ECHO MEAS - BSA(HAYCOCK): 1.7 M^2
BH CV ECHO MEAS - BSA: 1.7 M^2
BH CV ECHO MEAS - BZI_BMI: 21.1 KILOGRAMS/M^2
BH CV ECHO MEAS - BZI_METRIC_HEIGHT: 170.2 CM
BH CV ECHO MEAS - BZI_METRIC_WEIGHT: 61.2 KG
BH CV ECHO MEAS - EDV(CUBED): 52.5 ML
BH CV ECHO MEAS - EDV(MOD-SP2): 84 ML
BH CV ECHO MEAS - EDV(MOD-SP4): 99 ML
BH CV ECHO MEAS - EDV(TEICH): 59.8 ML
BH CV ECHO MEAS - EF(CUBED): 82.8 %
BH CV ECHO MEAS - EF(MOD-BP): 73 %
BH CV ECHO MEAS - EF(MOD-SP2): 73.8 %
BH CV ECHO MEAS - EF(MOD-SP4): 69.7 %
BH CV ECHO MEAS - EF(TEICH): 76.4 %
BH CV ECHO MEAS - ESV(CUBED): 9 ML
BH CV ECHO MEAS - ESV(MOD-SP2): 22 ML
BH CV ECHO MEAS - ESV(MOD-SP4): 30 ML
BH CV ECHO MEAS - ESV(TEICH): 14.1 ML
BH CV ECHO MEAS - FS: 44.4 %
BH CV ECHO MEAS - IVS/LVPW: 0.87
BH CV ECHO MEAS - IVSD: 0.84 CM
BH CV ECHO MEAS - LA DIMENSION: 2.9 CM
BH CV ECHO MEAS - LA/AO: 1.1
BH CV ECHO MEAS - LAD MAJOR: 5 CM
BH CV ECHO MEAS - LAT PEAK E' VEL: 8.2 CM/SEC
BH CV ECHO MEAS - LATERAL E/E' RATIO: 8.1
BH CV ECHO MEAS - LV DIASTOLIC VOL/BSA (35-75): 57.9 ML/M^2
BH CV ECHO MEAS - LV IVRT: 0.08 SEC
BH CV ECHO MEAS - LV MASS(C)D: 99.3 GRAMS
BH CV ECHO MEAS - LV MASS(C)DI: 58 GRAMS/M^2
BH CV ECHO MEAS - LV MAX PG: 4.1 MMHG
BH CV ECHO MEAS - LV MEAN PG: 3 MMHG
BH CV ECHO MEAS - LV SYSTOLIC VOL/BSA (12-30): 17.5 ML/M^2
BH CV ECHO MEAS - LV V1 MAX: 101.5 CM/SEC
BH CV ECHO MEAS - LV V1 MEAN: 82.2 CM/SEC
BH CV ECHO MEAS - LV V1 VTI: 22.9 CM
BH CV ECHO MEAS - LVIDD: 3.7 CM
BH CV ECHO MEAS - LVIDS: 2.1 CM
BH CV ECHO MEAS - LVLD AP2: 8.2 CM
BH CV ECHO MEAS - LVLD AP4: 7.8 CM
BH CV ECHO MEAS - LVLS AP2: 6.7 CM
BH CV ECHO MEAS - LVLS AP4: 6.6 CM
BH CV ECHO MEAS - LVOT AREA (M): 1.5 CM^2
BH CV ECHO MEAS - LVOT AREA: 1.5 CM^2
BH CV ECHO MEAS - LVOT DIAM: 1.4 CM
BH CV ECHO MEAS - LVPWD: 0.97 CM
BH CV ECHO MEAS - MED PEAK E' VEL: 6.3 CM/SEC
BH CV ECHO MEAS - MEDIAL E/E' RATIO: 10.6
BH CV ECHO MEAS - MV A MAX VEL: 93.2 CM/SEC
BH CV ECHO MEAS - MV DEC TIME: 0.3 SEC
BH CV ECHO MEAS - MV E MAX VEL: 68 CM/SEC
BH CV ECHO MEAS - MV E/A: 0.73
BH CV ECHO MEAS - PA ACC SLOPE: 1054 CM/SEC^2
BH CV ECHO MEAS - PA ACC TIME: 0.11 SEC
BH CV ECHO MEAS - PA MAX PG: 8.8 MMHG
BH CV ECHO MEAS - PA PR(ACCEL): 31.5 MMHG
BH CV ECHO MEAS - PA V2 MAX: 148 CM/SEC
BH CV ECHO MEAS - RAP SYSTOLE: 3 MMHG
BH CV ECHO MEAS - RVSP: 21 MMHG
BH CV ECHO MEAS - SI(AO): 70 ML/M^2
BH CV ECHO MEAS - SI(CUBED): 25.4 ML/M^2
BH CV ECHO MEAS - SI(LVOT): 19.9 ML/M^2
BH CV ECHO MEAS - SI(MOD-SP2): 36.2 ML/M^2
BH CV ECHO MEAS - SI(MOD-SP4): 40.3 ML/M^2
BH CV ECHO MEAS - SI(TEICH): 26.7 ML/M^2
BH CV ECHO MEAS - SV(AO): 119.8 ML
BH CV ECHO MEAS - SV(CUBED): 43.5 ML
BH CV ECHO MEAS - SV(LVOT): 34 ML
BH CV ECHO MEAS - SV(MOD-SP2): 62 ML
BH CV ECHO MEAS - SV(MOD-SP4): 69 ML
BH CV ECHO MEAS - SV(TEICH): 45.7 ML
BH CV ECHO MEAS - TAPSE (>1.6): 1.7 CM
BH CV ECHO MEAS - TR MAX PG: 18 MMHG
BH CV ECHO MEAS - TR MAX VEL: 213 CM/SEC
BH CV ECHO MEASUREMENTS AVERAGE E/E' RATIO: 9.38
BH CV VAS BP RIGHT ARM: NORMAL MMHG
BH CV XLRA - RV BASE: 3 CM
BH CV XLRA - RV LENGTH: 5.5 CM
BH CV XLRA - RV MID: 2.4 CM
BH CV XLRA - TDI S': 20.5 CM/SEC
BUN SERPL-MCNC: 11 MG/DL (ref 8–23)
BUN/CREAT SERPL: 16.4 (ref 7–25)
CALCIUM SPEC-SCNC: 8.6 MG/DL (ref 8.6–10.5)
CHLORIDE SERPL-SCNC: 101 MMOL/L (ref 98–107)
CO2 SERPL-SCNC: 24 MMOL/L (ref 22–29)
CREAT SERPL-MCNC: 0.67 MG/DL (ref 0.57–1)
DEPRECATED RDW RBC AUTO: 43 FL (ref 37–54)
EOSINOPHIL # BLD AUTO: 0.15 10*3/MM3 (ref 0–0.4)
EOSINOPHIL NFR BLD AUTO: 2.1 % (ref 0.3–6.2)
ERYTHROCYTE [DISTWIDTH] IN BLOOD BY AUTOMATED COUNT: 13.2 % (ref 12.3–15.4)
GFR SERPL CREATININE-BSD FRML MDRD: 84 ML/MIN/1.73
GLUCOSE SERPL-MCNC: 103 MG/DL (ref 65–99)
HCT VFR BLD AUTO: 34.9 % (ref 34–46.6)
HGB BLD-MCNC: 11.7 G/DL (ref 12–15.9)
IMM GRANULOCYTES # BLD AUTO: 0.04 10*3/MM3 (ref 0–0.05)
IMM GRANULOCYTES NFR BLD AUTO: 0.6 % (ref 0–0.5)
IVRT: 83 MSEC
LEFT ATRIUM VOLUME INDEX: 27.5 ML/M^2
LEFT ATRIUM VOLUME: 47 ML
LV EF 2D ECHO EST: 60 %
LYMPHOCYTES # BLD AUTO: 0.7 10*3/MM3 (ref 0.7–3.1)
LYMPHOCYTES NFR BLD AUTO: 9.9 % (ref 19.6–45.3)
MAGNESIUM SERPL-MCNC: 1.7 MG/DL (ref 1.6–2.4)
MCH RBC QN AUTO: 29.9 PG (ref 26.6–33)
MCHC RBC AUTO-ENTMCNC: 33.5 G/DL (ref 31.5–35.7)
MCV RBC AUTO: 89.3 FL (ref 79–97)
MONOCYTES # BLD AUTO: 0.58 10*3/MM3 (ref 0.1–0.9)
MONOCYTES NFR BLD AUTO: 8.2 % (ref 5–12)
NEUTROPHILS NFR BLD AUTO: 5.6 10*3/MM3 (ref 1.7–7)
NEUTROPHILS NFR BLD AUTO: 78.8 % (ref 42.7–76)
NRBC BLD AUTO-RTO: 0 /100 WBC (ref 0–0.2)
PHOSPHATE SERPL-MCNC: 3.5 MG/DL (ref 2.5–4.5)
PLATELET # BLD AUTO: 232 10*3/MM3 (ref 140–450)
PMV BLD AUTO: 10.5 FL (ref 6–12)
POTASSIUM SERPL-SCNC: 3.6 MMOL/L (ref 3.5–5.2)
RBC # BLD AUTO: 3.91 10*6/MM3 (ref 3.77–5.28)
SODIUM SERPL-SCNC: 134 MMOL/L (ref 136–145)
WBC # BLD AUTO: 7.1 10*3/MM3 (ref 3.4–10.8)

## 2021-08-12 PROCEDURE — 92507 TX SP LANG VOICE COMM INDIV: CPT

## 2021-08-12 PROCEDURE — 99233 SBSQ HOSP IP/OBS HIGH 50: CPT | Performed by: INTERNAL MEDICINE

## 2021-08-12 PROCEDURE — 85025 COMPLETE CBC W/AUTO DIFF WBC: CPT | Performed by: INTERNAL MEDICINE

## 2021-08-12 PROCEDURE — 92526 ORAL FUNCTION THERAPY: CPT

## 2021-08-12 PROCEDURE — 83735 ASSAY OF MAGNESIUM: CPT | Performed by: INTERNAL MEDICINE

## 2021-08-12 PROCEDURE — 80069 RENAL FUNCTION PANEL: CPT | Performed by: INTERNAL MEDICINE

## 2021-08-12 RX ADMIN — METOPROLOL SUCCINATE 25 MG: 25 TABLET, EXTENDED RELEASE ORAL at 10:09

## 2021-08-12 RX ADMIN — PANTOPRAZOLE SODIUM 40 MG: 40 TABLET, DELAYED RELEASE ORAL at 06:36

## 2021-08-12 RX ADMIN — AMLODIPINE BESYLATE 10 MG: 10 TABLET ORAL at 10:09

## 2021-08-12 RX ADMIN — DOCUSATE SODIUM 50 MG AND SENNOSIDES 8.6 MG 2 TABLET: 8.6; 5 TABLET, FILM COATED ORAL at 10:09

## 2021-08-12 RX ADMIN — LEVOTHYROXINE SODIUM 150 MCG: 150 TABLET ORAL at 06:36

## 2021-08-12 RX ADMIN — SODIUM CHLORIDE, PRESERVATIVE FREE 10 ML: 5 INJECTION INTRAVENOUS at 10:09

## 2021-08-12 RX ADMIN — ATORVASTATIN CALCIUM 40 MG: 40 TABLET, FILM COATED ORAL at 20:55

## 2021-08-12 NOTE — PROGRESS NOTES
[facility] Medicine Services  PROGRESS NOTE    Patient Name: Zohra Hall  : 1940  MRN: [id number]    Date of Admission: 2021  Primary Care Physician: Aleah Regan MD    Subjective   Subjective     CC:  Follow-up ICU stepdown    HPI:  Feels a little tender in her perineal area, thinks that it is the external urinary catheter.  Denies other major complaint.  BP control improved with readdition of home meds in the ICU yesterday    ROS:  Gen- No fevers, chills  CV- No chest pain, palpitations  Resp- No cough, dyspnea  GI- No N/V/D, abd pain    Objective   Objective     Vital Signs:   Temp:  [97.8 °F (36.6 °C)-99.3 °F (37.4 °C)] 98.1 °F (36.7 °C)  Heart Rate:  [] 79  Resp:  [16-18] 16  BP: (107-167)/(62-95) 120/62     Physical Exam:  Constitutional: Awake, alert, frail-appearing elderly female sitting up in bed  HENT: NCAT, mucous membranes moist  Respiratory: Clear to auscultation bilaterally, respiratory effort normal   Cardiovascular: RRR, no murmurs, rubs, or gallops, palpable radial pulses  Gastrointestinal: Positive bowel sounds, soft, nontender, nondistended  Musculoskeletal: No bilateral ankle edema  Psychiatric: Appropriate affect, cooperative  Neurologic: Speech clear, moving all extremities symmetrically      Results Reviewed:  LAB RESULTS:      Lab 21  0546 21  0601 08/10/21  0451 21   WBC 7.10 8.02 7.23 7.66  --    HEMOGLOBIN 11.7* 11.1* 12.2 12.9  --    HEMATOCRIT 34.9 34.1 35.8 38.9  --    PLATELETS 232 254 306 258  --    NEUTROS ABS 5.60 6.22 5.05 5.88  --    IMMATURE GRANS (ABS) 0.04 0.04 0.04 0.03  --    LYMPHS ABS 0.70 0.85 1.25 0.96  --    MONOS ABS 0.58 0.69 0.72 0.62  --    EOS ABS 0.15 0.19 0.13 0.13  --    MCV 89.3 90.2 86.9 88.6  --    PROTIME  --   --   --   --  12.9   APTT  --   --   --   --  26.2         Lab 21  0546 21  0601 08/10/21  0451 21  9808   SODIUM 134* 137 140 140    POTASSIUM 3.6 3.8 3.0* 3.7   CHLORIDE 101 105 102 101   CO2 24.0 22.0 25.0 26.0   ANION GAP 9.0 10.0 13.0 13.0   BUN 11 12 10 14   CREATININE 0.67 0.72 0.66 0.84   GLUCOSE 103* 102* 107* 111*   CALCIUM 8.6 8.1* 8.9 9.3   MAGNESIUM 1.7 2.1 1.5*  --    PHOSPHORUS 3.5 2.8 3.2  --    HEMOGLOBIN A1C  --   --  5.10  --    TSH  --   --   --  0.045*         Lab 08/12/21  0546 08/10/21  0451 08/09/21 2054   TOTAL PROTEIN  --  6.6 6.7   ALBUMIN 3.30* 3.80 3.90   GLOBULIN  --  2.8 2.8   ALT (SGPT)  --  9 7   AST (SGOT)  --  15 14   BILIRUBIN  --  0.3 0.3   ALK PHOS  --  95 94         Lab 08/10/21  0451 08/09/21 2054   TROPONIN T <0.010 <0.010   PROTIME  --  12.9   INR  --  1.00         Lab 08/10/21  0451   CHOLESTEROL 189   LDL CHOL 101*   HDL CHOL 72*   TRIGLYCERIDES 87         Lab 08/09/21 2023   ABO TYPING A   RH TYPING Positive   ANTIBODY SCREEN Negative         Brief Urine Lab Results  (Last result in the past 365 days)      Color   Clarity   Blood   Leuk Est   Nitrite   Protein   CREAT   Urine HCG        08/09/21 2300 Yellow Clear Negative Trace Positive Negative               Microbiology Results Abnormal     Procedure Component Value - Date/Time    COVID PRE-OP / PRE-PROCEDURE SCREENING ORDER (NO ISOLATION) - Swab, Nasopharynx [[id number]]  (Normal) Collected: 08/09/21 2215    Lab Status: Final result Specimen: Swab from Nasopharynx Updated: 08/09/21 2319    Narrative:      The following orders were created for panel order COVID PRE-OP / PRE-PROCEDURE SCREENING ORDER (NO ISOLATION) - Swab, Nasopharynx.  Procedure                               Abnormality         Status                     ---------                               -----------         ------                     COVID-19 and FLU A/B PCR...[[id number]]  Normal              Final result                 Please view results for these tests on the individual orders.    COVID-19 and FLU A/B PCR - Swab, Nasopharynx [[id number]]  (Normal) Collected: 08/09/21 6413     Lab Status: Final result Specimen: Swab from Nasopharynx Updated: 08/09/21 2319     COVID19 Not Detected     Influenza A PCR Not Detected     Influenza B PCR Not Detected    Narrative:      Fact sheet for providers: https://www.fda.gov/media/071310/download    Fact sheet for patients: https://www.fda.gov/media/783093/download    Test performed by PCR.          CT Head Without Contrast    Result Date: 8/11/2021  EXAMINATION: CT HEAD WO CONTRAST-  INDICATION: f/u ICH  TECHNIQUE: Axial noncontrast CT of the head with multiplanar reconstruction  The radiation dose reduction device was turned on for each scan per the ALARA (As Low as Reasonably Achievable) protocol.  COMPARISON: One day prior  FINDINGS: No significant interval change in previously noted multifocal multicompartment intracranial hemorrhage including prominent areas of subarachnoid hemorrhage within the sylvian fissures and adjacent frontal lobes bilaterally, in addition to presumed subdural hemorrhage along the frontal lobes and probable trace areas of intraparenchymal hemorrhage most notable in the left anterior temporal lobe. There is associated mild diffuse cerebral edema, without worsening global mass effect or basilar cistern effacement. Age-related changes of the brain are again present, similar to prior. The ventricles are normal in size and configuration. The orbits are unremarkable and the paranasal sinuses are grossly clear.      Impression: No significant interval change in previously noted multifocal multicompartment intracranial hemorrhage including prominent areas of subarachnoid hemorrhage within the sylvian fissures and adjacent frontal lobes bilaterally, in addition to presumed subdural hemorrhage along the frontal lobes and probable trace areas of intraparenchymal hemorrhage most notable in the left anterior temporal lobe. There is associated mild diffuse cerebral edema, without worsening global mass effect or basilar cistern  effacement.   This report was finalized on 8/11/2021 8:46 AM by Chuy Jaime.      SLP FEES - Fiberoptic Endo Eval Swallow    Result Date: 8/11/2021  This procedure was auto-finalized with no dictation required.      Results for orders placed during the hospital encounter of 10/30/20    Adult Transthoracic Echo Complete W/ Cont if Necessary Per Protocol    Interpretation Summary  · Calculated left ventricular EF = 55%  · Left ventricular systolic function is normal.  · Left ventricular diastolic function is consistent with (grade Ia w/high LAP) impaired relaxation.  · The left atrial cavity is mild to moderately dilated.  · No significant structural or functional valvular disease.      I have reviewed the medications:  Scheduled Meds:amLODIPine, 10 mg, Oral, Q24H  atorvastatin, 40 mg, Oral, Nightly  levothyroxine, 150 mcg, Oral, Q AM  metoprolol succinate XL, 25 mg, Oral, Q24H  pantoprazole, 40 mg, Oral, Q AM  senna-docusate sodium, 2 tablet, Oral, BID  sodium chloride, 10 mL, Intravenous, Q12H      Continuous Infusions:   PRN Meds:.•  acetaminophen  •  senna-docusate sodium **AND** polyethylene glycol **AND** bisacodyl **AND** bisacodyl  •  dextrose  •  dextrose  •  glucagon (human recombinant)  •  hydrALAZINE  •  magnesium sulfate **OR** magnesium sulfate **OR** magnesium sulfate  •  ondansetron  •  potassium chloride  •  potassium phosphate infusion greater than 15 mMoles **OR** potassium phosphate infusion greater than 15 mMoles **OR** potassium phosphate **OR** sodium phosphate IVPB **OR** sodium phosphate IVPB **OR** sodium phosphate IVPB  •  sodium chloride    Assessment/Plan   Assessment & Plan     Active Hospital Problems    Diagnosis  POA   • **Traumatic SAH, SDH, and intraparenchymal bleed (CMS/HCC) [I61.9]  Yes   • History of lacunar stroke 1/2/2019 [Z86.73]  Not Applicable   • CAD with angina pectoris (CMS/[facility]) [I25.119]  Yes   • NICM presumably due to Takotsubo post NSTEMI 5/23/2021 (CMS/[facility])  [I42.8]  Yes   • History of PMR (CMS/HCC) [M35.3]  Yes   • Benign essential tremor [G25.0]  Yes   • 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        Brief Hospital Course to date:  Zohra Hall is a 80 y.o. female with prior substantial history tobacco abuse, CAD/NSTEMI 5/2021 with small disease not amenable to stenting, reported medical noncompliance due to forgetfulness found down at her assisted living facility; admitted to the ICU for subarachnoid hemorrhage, ASA/Plavix held, monitored in the ICU with neurosurgery who did not feel intervention was warranted, with gradual improvement in stepdown to telemetry for further care    The following problems are new to me today    Assessment/plan    Bilateral posttraumatic subarachnoid hemorrhage  Hx lacunar stroke  Hypertension  -Seen by NSGY, signed off in consult note  -Continue good BP to control; amlodipine, Toprol-XL  -PT/OT, plan for rehab    CAD s/p recent NSTEMI  Hyperlipidemia  -[facility] 5/2021 with distal disease, too small for intervention  -Imaging c/w Takotsubo cardiomyopathy in May, echo this hospitalization demonstrates improvement in EF  -Continue statin  -NSGY recommending holding ASA/Plavix x2 weeks before resumption    GERD  Hypothyroidism  -Continue levothyroxine, PPI    Hx of breast cancer 1987  Hx thyroid cancer 2014  Hx PMR    DVT prophylaxis:  Mechanical DVT prophylaxis orders are present.       AM-PAC 6 Clicks Score (PT): 17 (08/11/21 2005)    Disposition: I expect the patient to be discharged to rehab services once available, if BP remains stable.    CODE STATUS:   Code Status and Medical Interventions:   Ordered at: 08/10/21 1357     Limited Support to NOT Include:    Intubation     Level Of Support Discussed With:    Health Care Surrogate     Code Status:    No CPR      Medical Interventions (Level of Support Prior to Arrest):    Limited       Wesley Wheeler,   08/12/21

## 2021-08-12 NOTE — CASE MANAGEMENT/SOCIAL WORK
Case Management Discharge Note      Final Note: Patient's plan is an acute rehab bed on the Brain Injury unit tomorrow, 8/13.  [facility] will transport at 1430.  Patient needs to be at the Maternity entrance by 1420.  Nurse to call report to [phone].  CM will fax transfer summary when available to [phone].  Reina Gan RN [phone]         Selected Continued Care - Admitted Since 8/9/2021     Destination Coordination complete.    Service Provider Selected Services Address Phone Fax Patient Preferred    [facility]  Inpatient Rehabilitation [address] [facility] [address] [phone] --          Durable Medical Equipment    No services have been selected for the patient.              Dialysis/Infusion    No services have been selected for the patient.              Home Medical Care    No services have been selected for the patient.              Therapy    No services have been selected for the patient.              Community Resources    No services have been selected for the patient.              Community & DME    No services have been selected for the patient.                Selected Continued Care - Prior Encounters Includes selections from prior encounters from 5/11/2021 to 8/12/2021    Discharged on 5/28/2021 Admission date: 5/23/2021 - Discharge disposition: Skilled Nursing Facility (DC - External)    Destination     Service Provider Selected Services Address Phone Fax Patient Preferred    [facility]  [facility] [address] [id number] [phone] --          Home Medical Care     Service Provider Selected Services Address Phone Fax Patient Preferred    [facility]  [facility] [address] [facility] [address] [phone] --                         Final Discharge Disposition Code: 62 - inpatient rehab facility

## 2021-08-12 NOTE — THERAPY TREATMENT NOTE
"Acute Care - Speech Language Pathology Treatment Note  [facility]     Patient Name: Zohra Hall  : 1940  MRN: [id number]  Today's Date: 2021               Admit Date: 2021     Visit Dx:    ICD-10-CM ICD-9-CM   1. Fall, initial encounter  W19.XXXA E888.9   2. Subarachnoid hemorrhage following injury, no loss of consciousness, initial encounter (CMS/HCC)  S06.6X0A 852.01   3. Traumatic hemorrhage of right cerebrum without loss of consciousness, initial encounter (CMS/HCC)  S06.340A 853.01   4. Dysphagia, unspecified type  R13.10 787.20   5. Cognitive communication deficit  R41.841 799.52     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CMS/Formerly [facility])   • Hypothyroid post remote resection papillary adenocarcinoma thyroid   • Hypertension   • GERD without esophagitis   • Hyperlipidemia LDL goal <100   • Atherosclerosis of aorta (CMS/[facility]ly [facility])   • Incontinence of bowel   • Acute right-sided low back pain without sciatica   • Benign essential tremor   • Pain of right hip joint   • Thyroid cancer (CMS/Formerly [facility])   • Lacunar stroke (CMS/[facility]ly [facility])   • Nontraumatic rupture of extensor tendons of right hand and wrist   • Hyponatremia   • Actinic keratosis   • Transient ischemic attack   • Ataxia   • History of PMR (CMS/[facility]ly [facility])   • Osteoporosis   • Post-surgical hypothyroidism   • NSTEMI (non-ST elevated myocardial infarction) (CMS/[facility]ly [facility])   • CAD with angina pectoris (CMS/[facility]ly [facility])   • NICM presumably due to Takotsubo post NSTEMI 2021 (CMS/[facility]ly [facility])   • Abnormal TSH   • Severe hypothyroidism   • Traumatic SAH, SDH, and intraparenchymal bleed (CMS/[facility])   • History of lacunar stroke 2019     Past Medical History:   Diagnosis Date   • Breast cancer (CMS/[facility]ly [facility])     left- pt states \"in situ\", no radiation, Tamoxifen for 5 years   • " Cellulitis    • Cervical lymphadenopathy    • Chest pain    • Convulsions (CMS/HCC)    • GERD (gastroesophageal reflux disease)    • Glossitis    • Grief reaction     · Last Impression: 29 Jan 2015  counselled, given ativan for prn use.  Aleah Regan   • Hypertension    • Lower back pain    • Oral thrush    • Papillary adenocarcinoma (CMS/HCC)     Papillary adenocarcinoma of thyroid   • Papillary adenocarcinoma of thyroid (CMS/HCC)     · resection Ramirez- 1/13,  2 x 2 x 1.5 cm  T3 N1 MXpost op low calcium and diminished  voiceablation Ain- 3/7 metastatic nodes and invasion to strap muscles · Last Impression: 29 Oct 2014  s/p Eval by berry Méndez.  Aleah Regan (Internal   • Piriformis syndrome    • Tingling    • Xerostomia      Past Surgical History:   Procedure Laterality Date   • BREAST BIOPSY Right 10/10/2013    stereo bx   • BREAST BIOPSY Left 10/19/2015    stereo bx   • BREAST CYST EXCISION      right   • BREAST EXCISIONAL BIOPSY Right     affirm bx and loc 2016.   • BREAST LUMPECTOMY Left 1987   • CARDIAC CATHETERIZATION N/A 5/23/2021    Procedure: Left Heart Cath;  Surgeon: Alonso Ross IV, MD;  Location:  [facility];  Service: Cardiology;  Laterality: N/A;   • HYSTERECTOMY  1979   • OTHER SURGICAL HISTORY Right     right arm surgery-steel roger in place   • TOTAL HIP ARTHROPLASTY     • TOTAL THYROIDECTOMY      Thyroid Surgery Total Thyroidectomy       SLP Recommendation and Plan  SLP Diagnosis: Mrs. Hall presents w/ at least moderate cog-communication impairment w/ noted difficulties w/ expressive/receptive language. Unable to fully assess cognitive function 2/2 severity of language impairment. Will continue to f/u to address deficits in cog-comm dx tx        SLC Criteria for Skilled Therapy Interventions Met: yes  Anticipated Discharge Disposition (SLP): inpatient rehabilitation facility, anticipate therapy at next level of care        Predicted Duration Therapy  Intervention (Days): until discharge  Daily Summary of Progress (SLP): progress toward functional goals as expected  Plan for Continued Treatment (SLP): Pt participated in dysphagia & communication tx. Cont'd expressive language deficits though unsure of pt's baseline. SLP will continue to follow for dysphagia and communication  tx.                 SLP EVALUATION (last 72 hours)      SLP SLC Evaluation     Row Name 08/12/21 1500 08/10/21 0900                Communication Assessment/Intervention    Document Type  therapy note (daily note)  -  evaluation  -       Subjective Information  no complaints  -  no complaints  -CJ       Patient Observations  alert;cooperative;agree to therapy  -  alert;cooperative  -       Patient/Family/Caregiver Comments/Observations  No family present  -  no family present  -       Care Plan Review  evaluation/treatment results reviewed;care plan/treatment goals reviewed;risks/benefits reviewed;current/potential barriers reviewed;patient/other agree to care plan  -  evaluation/treatment results reviewed;patient/other agree to care plan  -CJ       Patient Effort  good  -CH  good  -CJ       Symptoms Noted During/After Treatment  none  -CH  none  -CJ          General Information    Patient Profile Reviewed  yes  -CH  yes  -CJ       Pertinent History Of Current Problem  --  Pt adm w/ [facility]; Has sig h/o hypothyroid, CAD, osteoarthritis, RLS, HTN, GERD, HLD, thyroid cancer, lacunar stroke in 2019, TIA, ataxia, NSTEMI  -CJ       Precautions/Limitations, Vision  --  WFL with corrective lenses;for purposes of eval  -CJ       Precautions/Limitations, Hearing  --  WFL;for purposes of eval  -CJ       Patient Level of Education  --  unknown  -CJ       Prior Level of Function-Communication  --  unknown;other (see comments) prior CVA  -CJ       Plans/Goals Discussed with  --  patient;agreed upon  -       Barriers to Rehab  --  none identified  -CJ       Patient's Goals for Discharge  --   patient did not state  -          Pain    Additional Documentation  Pain Scale: Numbers Pre/Post-Treatment (Group)  -  Pain Scale: FACES Pre/Post-Treatment (Group)  -          Pain Scale: Numbers Pre/Post-Treatment    Pretreatment Pain Rating  0/10 - no pain  -  --       Posttreatment Pain Rating  0/10 - no pain  -  --          Pain Scale: FACES Pre/Post-Treatment    Pain: FACES Scale, Pretreatment  --  0-->no hurt  -       Posttreatment Pain Rating  --  0-->no hurt  -CJ          Comprehension Assessment/Intervention    Comprehension Assessment/Intervention  --  Auditory Comprehension  -          Auditory Comprehension Assessment/Intervention    Auditory Comprehension (Communication)  --  mild impairment  -       Able to Identify Objects/Pictures (Communication)  --  body part;familiar objects;[facility]       Answers Questions (Communication)  --  yes/no;wh questions;personal;simple;[facility]       Able to Follow Commands (Communication)  --  2-step;3-step;multi-step;body part;moderate impairment  -       Narrative Discourse  --  conversational level;mild impairment  -          Expression Assessment/Intervention    Expression Assessment/Intervention  --  verbal expression  -          Verbal Expression Assessment/Intervention    Verbal Expression  --  moderate impairment  -       Automatic Speech (Communication)  --  response to greeting;alphabet;counting 1-20;days of week;months of year;[facility]       Repetition  --  words;phrases;moderate impairment  -       Phrase Completion  --  unpredictable;automatic/predictable;moderate impairment  -       Responsive Naming  --  simple;moderate impairment  -       Confrontational Naming  --  high frequency;moderate impairment  -       Spontaneous/Functional Words  --  simple;complex;moderate impairment  -       Sentence Formulation  --  simple;complex;moderate impairment  -       Conversational Discourse/Fluency  --  moderate impairment  -CJ           Oral Motor Structure and Function    Oral Motor Structure and Function  --  WF  -CJ       Dentition Assessment  --  natural, present and adequate  -CJ       Mucosal Quality  --  moist, healthy  -CJ          Oral Musculature and Cranial Nerve Assessment    Oral Motor General Assessment  --  generalized oral motor weakness  -CJ          Motor Speech Assessment/Intervention    Motor Speech Function  --  WFL  -CJ          Cognitive Assessment Intervention- SLP    Cognitive Function (Cognition)  --  unable/difficult to assess  -CJ       Cognition, Comment  --  Unable to fully asses, will continue to assess in cog-comm dx tx  -CJ          SLP Clinical Impressions    Daily Summary of Progress (SLP)  progress toward functional goals as expected  -CH  --       SLP Diagnosis  Mrs. Hall presents w/ at least moderate cog-communication impairment w/ noted difficulties w/ expressive/receptive language. Unable to fully assess cognitive function 2/2 severity of language impairment. Will continue to f/u to address deficits in cog-comm dx tx  -CH  Per this evaluation Mrs. Hall presents w/ at least moderate cog-communication impairment w/ noted difficulties w/ expressive/receptive language. Unable to fully assess cognitive function 2/2 severity of language impairment. Will continue to f/u to address deficits in cog-comm dx tx  -CJ       Rehab Potential/Prognosis  good  -CH  good  -CJ       SLC Criteria for Skilled Therapy Interventions Met  yes  -CH  yes  -CJ       Functional Impact  functional impact in ADLs;difficulty communicating wants, needs;difficulty communicating in an emergency  -CH  functional impact in ADLs;difficulty communicating wants, needs;difficulty communicating in an emergency  -CJ       Plan for Continued Treatment (SLP)  Pt participated in dysphagia & communication tx. Cont'd expressive language deficits though unsure of pt's baseline. SLP will continue to follow for dysphagia and communication  tx.   -CH  CSE completed, recommend FEES today. SLC completed, will f/u to address deficits  -CJ          Recommendations    Therapy Frequency (SLP SLC)  5 days per week  -CH  5 days per week  -CJ       Predicted Duration Therapy Intervention (Days)  until discharge  -CH  until discharge  -CJ       Anticipated Discharge Disposition (SLP)  inpatient rehabilitation facility;anticipate therapy at next level of care  -CH  unknown;anticipate therapy at next level of care  -CJ         User Key  (r) = Recorded By, (t) = Taken By, (c) = Cosigned By    Initials Name Effective Dates    CJ Elsa Ashby, MS CCC-SLP 06/16/21 -     CH Dory Fink, MS CCC-SLP 06/16/21 -              EDUCATION  The patient has been educated in the following areas:     Cognitive Impairment Communication Impairment Home Exercise Program (HEP) Dysphagia (Swallowing Impairment) Oral Care/Hydration Modified Diet Instruction.          SLP GOALS     Row Name 08/12/21 1500 08/11/21 1410 08/10/21 1100       Oral Nutrition/Hydration Goal 1 (SLP)    Oral Nutrition/Hydration Goal 1, SLP  LTG: Pt will return to regular diet consistency w/ thin liquids w/o overt s/s of aspiration/distress given no cues w/ 100% acc  -CH  LTG: Pt will return to regular diet consistency w/ thin liquids w/o overt s/s of aspiration/distress given no cues w/ 100% acc  -MP  LTG: Pt will return to regular diet consistency w/ thin liquids w/o overt s/s of aspiration/distress given no cues w/ 100% acc  -CJ    Time Frame (Oral Nutrition/Hydration Goal 1, SLP)  by discharge  -CH  by discharge  -MP  by discharge  -CJ    Progress/Outcomes (Oral Nutrition/Hydration Goal 1, SLP)  continuing progress toward goal  -CH  continuing progress toward goal  -MP  --       Oral Nutrition/Hydration Goal 2 (SLP)    Oral Nutrition/Hydration Goal 2, SLP  STG: Pt will tolerate modified po diet of soft solids w/ nectar thick liquids w/o overt s/s of aspiration/distress given no cues w/ 100% acc  -CH  STG:  Pt will tolerate modified po diet of soft solids w/ nectar thick liquids w/o overt s/s of aspiration/distress given no cues w/ 100% acc  -MP  STG: Pt will tolerate modified po diet of soft solids w/ nectar thick liquids w/o overt s/s of aspiration/distress given no cues w/ 100% acc  -CJ    Time Frame (Oral Nutrition/Hydration Goal 2, SLP)  short term goal (STG)  -CH  short term goal (STG)  -MP  short term goal (STG)  -CJ    Barriers (Oral Nutrition/Hydration Goal 2, SLP)  Patient tolerated soft solid and NT liquid trials without s/s of aspiration  -CH  --  --    Progress/Outcomes (Oral Nutrition/Hydration Goal 2, SLP)  continuing progress toward goal  -CH  continuing progress toward goal  -MP  --       Oral Nutrition/Hydration Goal (SLP)    Oral Nutrition/Hydration Goal, SLP  STG: Pt will accept therapeutic trials of thin liquids and solids w/o overt s/s of aspiration/distress given no cues w/ 80% acc to determine pt readiness for repeat instrumental dysphagia evaluation  -CH  STG: Pt will accept therapeutic trials of thin liquids and solids w/o overt s/s of aspiration/distress given no cues w/ 80% acc to determine pt readiness for repeat instrumental dysphagia evaluation  -MP  STG: Pt will accept therapeutic trials of thin liquids and solids w/o overt s/s of aspiration/distress given no cues w/ 80% acc to determine pt readiness for repeat instrumental dysphagia evaluation  -CJ    Time Frame (Oral Nutrition/Hydration Goal, SLP)  short term goal (STG)  -CH  short term goal (STG)  -MP  short term goal (STG)  -CJ    Barriers (Oral Nutrition/Hydration Goal, SLP)  Patient tolerated thin liquid trials via spoon and cup sips w/o s/s of aspiration  -CH  --  --    Progress/Outcomes (Oral Nutrition/Hydration Goal, SLP)  continuing progress toward goal  -CH  goal ongoing  -MP  --       Lingual Strengthening Goal 1 (SLP)    Activity (Lingual Strengthening Goal 1, SLP)  increase lingual  tone/sensation/control/coordination/movement;increase tongue back strength  -CH  increase lingual tone/sensation/control/coordination/movement;increase tongue back strength  -MP  increase lingual tone/sensation/control/coordination/movement;increase tongue back strength  -CJ    Increase Lingual Tone/Sensation/Control/Coordination/Movement  swallow trials;lingual resistance exercises  -CH  swallow trials;lingual resistance exercises  -MP  swallow trials;lingual resistance exercises  -CJ    Increase Tongue Back Strength  swallow trials;lingual resistance exercises  -CH  swallow trials;lingual resistance exercises  -MP  swallow trials;lingual resistance exercises  -CJ    [place]/Accuracy (Lingual Strengthening Goal 1, SLP)  with minimal cues (75-90% accuracy)  -CH  with minimal cues (75-90% accuracy)  -MP  with minimal cues (75-90% accuracy)  -CJ    Time Frame (Lingual Strengthening Goal 1, SLP)  short term goal (STG)  -CH  short term goal (STG)  -MP  short term goal (STG)  -CJ    Barriers (Lingual Strengthening Goal 1, SLP)  Completed each x 5 with min cues  -CH  --  --    Progress/Outcomes (Lingual Strengthening Goal 1, SLP)  continuing progress toward goal  -CH  continuing progress toward goal  -MP  --       Pharyngeal Strengthening Exercise Goal 1 (SLP)    Activity (Pharyngeal Strengthening Goal 1, SLP)  increase timing;increase superior movement of the hyolaryngeal complex;increase anterior movement of the hyolaryngeal complex;increase closure at entrance to airway/closure of airway at glottis;increase squeeze/positive pressure generation;increase tongue base retraction  -CH  increase timing;increase superior movement of the hyolaryngeal complex;increase anterior movement of the hyolaryngeal complex;increase closure at entrance to airway/closure of airway at glottis;increase squeeze/positive pressure generation;increase tongue base retraction  -MP  increase timing;increase superior movement of the  hyolaryngeal complex;increase anterior movement of the hyolaryngeal complex;increase closure at entrance to airway/closure of airway at glottis;increase squeeze/positive pressure generation;increase tongue base retraction  -CJ    Increase Timing  prepping - 3 second prep or suck swallow or 3-step swallow  -CH  prepping - 3 second prep or suck swallow or 3-step swallow  -MP  prepping - 3 second prep or suck swallow or 3-step swallow  -CJ    Increase Superior Movement of the Hyolaryngeal Complex  effortful pitch glide (falsetto + pharyngeal squeeze)  -CH  effortful pitch glide (falsetto + pharyngeal squeeze)  -MP  effortful pitch glide (falsetto + pharyngeal squeeze)  -CJ    Increase Anterior Movement of the Hyolaryngeal Complex  chin tuck against resistance (CTAR)  -CH  chin tuck against resistance (CTAR)  -MP  chin tuck against resistance (CTAR)  -CJ    Increase Closure at Entrance to Airway/Closure of Airway at Glottis  supraglottic swallow  -CH  supraglottic swallow  -MP  supraglottic swallow  -CJ    Increase Squeeze/Positive Pressure Generation  hard effortful swallow  -CH  hard effortful swallow  -MP  hard effortful swallow  -CJ    Increase Tongue Base Retraction  melida  -CH  melida  -MP  melida  -CJ    [place]/Accuracy (Pharyngeal Strengthening Goal 1, SLP)  with minimal cues (75-90% accuracy)  -CH  with minimal cues (75-90% accuracy)  -MP  with minimal cues (75-90% accuracy)  -CJ    Time Frame (Pharyngeal Strengthening Goal 1, SLP)  short term goal (STG)  -CH  short term goal (STG)  -MP  short term goal (STG)  -CJ    Barriers (Pharyngeal Strengthening Goal 1, SLP)  Focused on 123 prep, CTAR, hard effortful, and effortful pitch  -CH  Focused on CTAR, hard effortful, and effortful pitch  -MP  --    Progress/Outcomes (Pharyngeal Strengthening Goal 1, SLP)  continuing progress toward goal  -CH  continuing progress toward goal  -MP  --       Follow Directions Goal 2 (SLP)    Improve Ability to Follow  Directions Goal 1 (SLP)  3 step commands;multistep commands;80%;with moderate cues (50-74%)  -CH  3 step commands;multistep commands;80%;with moderate cues (50-74%)  -MP  3 step commands;multistep commands;80%;with moderate cues (50-74%)  -CJ    Time Frame (Follow Directions Goal 1, SLP)  short term goal (STG)  -CH  short term goal (STG)  -MP  short term goal (STG)  -CJ    Progress (Ability to Follow Directions Goal 1, SLP)  50%;with moderate cues (50-74%)  -CH  --  --    Progress/Outcomes (Follow Directions Goal 1, SLP)  continuing progress toward goal  -CH  goal ongoing  -MP  --    Comment (Follow Directions Goal 1, SLP)  3 step  -CH  --  --       Word Retrieval Skills Goal 1 (SLP)    Improve Word Retrieval Skills By Goal 1 (SLP)  confrontational naming task;high frequency;low frequency;responsive naming task;simple;complex;repeating phrases;repeating sentences;completing open ended structured sentence;completing open ended unstructured sentence;80%;with moderate cues (50-74%)  -CH  confrontational naming task;high frequency;low frequency;responsive naming task;simple;complex;repeating phrases;repeating sentences;completing open ended structured sentence;completing open ended unstructured sentence;80%;with moderate cues (50-74%)  -MP  confrontational naming task;high frequency;low frequency;responsive naming task;simple;complex;repeating phrases;repeating sentences;completing open ended structured sentence;completing open ended unstructured sentence;80%;with moderate cues (50-74%)  -CJ    Time Frame (Word Retrieval Goal 1, SLP)  short term goal (STG)  -CH  short term goal (STG)  -MP  short term goal (STG)  -CJ    Progress (Word Retrieval Skills Goal 1, SLP)  60%;with minimal cues (75-90%)  -CH  60%;with minimal cues (75-90%)  -MP  --    Progress/Outcomes (Word Retrieval Goal 1, SLP)  continuing progress toward goal  -CH  continuing progress toward goal  -MP  --    Comment (Word Retrieval Goal 1, SLP)   confrontational and responsive naming  -CH  --  --       Ability to Construct Phrase and Sentence Level Response Goal 1 (SLP)    Improve Ability to Construct Phrase and Sentence Level Responses By Goal 1 (SLP)  constructing a sentence with a key word;80%;with moderate cues (50-74%)  -CH  constructing a sentence with a key word;80%;with moderate cues (50-74%)  -MP  constructing a sentence with a key word;80%;with moderate cues (50-74%)  -CJ    Time Frame (Phrase and Sentence Level Response Goal 1, SLP)  short term goal (STG)  -CH  short term goal (STG)  -MP  short term goal (STG)  -CJ    Progress (Construct Phrase and Sentence Level Response Goal 1, SLP)  60%;with minimal cues (75-90%)  -CH  --  --    Progress/Outcomes (Phrase and Sentence Level Response Goal 1, SLP)  continuing progress toward goal  -CH  goal ongoing  -MP  --       Additional Goal 1 (SLP)    Additional Goal 1, SLP  LTG: Pt will improve overall cognitive-communication skills to be able to functionally communicate wants and needs to participate in her care to complete adls  -CH  LTG: Pt will improve overall cognitive-communication skills to be able to functionally communicate wants and needs to participate in her care to complete adls  -MP  LTG: Pt will improve overall cognitive-communication skills to be able to functionally communicate wants and needs to participate in her care to complete adls  -CJ    Time Frame (Additional Goal 1, SLP)  by discharge  -CH  by discharge  -MP  by discharge  -CJ    Progress/Outcomes (Additional Goal 1, SLP)  continuing progress toward goal  -CH  continuing progress toward goal  -MP  --      User Key  (r) = Recorded By, (t) = Taken By, (c) = Cosigned By    Initials Name Provider Type    Elsa Cruz, MS CCC-SLP Speech and Language Pathologist    Erick Levine, MS CCC-SLP Speech and Language Pathologist    Dory Oliveira, MS CCC-SLP Speech and Language Pathologist                  Time Calculation:      Time Calculation- SLP     Row Name 08/12/21 1548             Time Calculation- SLP    SLP Start Time  1500  -CH      SLP Received On  08/12/21  -         Untimed Charges    16144-GU Treatment/ST Modification Prosth Aug Alter   43  -CH      14661-OE Treatment Swallow Minutes  40  -CH         Total Minutes    Untimed Charges Total Minutes  83  -CH       Total Minutes  83  -CH        User Key  (r) = Recorded By, (t) = Taken By, (c) = Cosigned By    Initials Name Provider Type    Dory Oliveira MS CCC-SLP Speech and Language Pathologist          Therapy Charges for Today     Code Description Service Date Service Provider Modifiers Qty    [id number]  ST TREATMENT SWALLOW 3 8/12/2021 Dory Fink MS CCC-SLP GN 1    [id number]  ST TREATMENT SPEECH 3 8/12/2021 Dory Fink MS CCC-JUANCHO GN 1                     MS IRENA Morrissey  8/12/2021     Patient was not wearing a face mask and did not exhibit coughing during this therapy encounter.  Procedure performed was aerosolizing, involved close contact (within 6 feet for at least 15 minutes or longer), and did not involve contact with infectious secretions or specimens.  Therapist used appropriate personal protective equipment including gloves, standard procedure mask and eye protection.  Appropriate PPE was worn during the entire therapy session.  Hand hygiene was completed before and after therapy session.

## 2021-08-12 NOTE — PLAN OF CARE
Goal Outcome Evaluation:           Progress: no change  Outcome Summary: VSS. Disoriented to situation, but alert. Pt still aphasic. On rm air. Pt rested well during shift.

## 2021-08-13 VITALS
SYSTOLIC BLOOD PRESSURE: 148 MMHG | HEIGHT: 67 IN | DIASTOLIC BLOOD PRESSURE: 73 MMHG | HEART RATE: 88 BPM | RESPIRATION RATE: 18 BRPM | TEMPERATURE: 98 F | WEIGHT: 121.41 LBS | BODY MASS INDEX: 19.06 KG/M2 | OXYGEN SATURATION: 95 %

## 2021-08-13 LAB — GLUCOSE BLDC GLUCOMTR-MCNC: 145 MG/DL (ref 70–130)

## 2021-08-13 PROCEDURE — 82962 GLUCOSE BLOOD TEST: CPT

## 2021-08-13 PROCEDURE — 99239 HOSP IP/OBS DSCHRG MGMT >30: CPT | Performed by: INTERNAL MEDICINE

## 2021-08-13 RX ORDER — CLOPIDOGREL BISULFATE 75 MG/1
75 TABLET ORAL DAILY
Qty: 30 TABLET | Refills: 0
Start: 2021-08-27 | End: 2022-08-11 | Stop reason: HOSPADM

## 2021-08-13 RX ORDER — ENALAPRIL MALEATE 20 MG/1
10 TABLET ORAL DAILY
Qty: 180 TABLET | Refills: 1
Start: 2021-08-13 | End: 2022-07-18

## 2021-08-13 RX ORDER — ASPIRIN 81 MG/1
81 TABLET ORAL DAILY
Qty: 30 TABLET | Refills: 0
Start: 2021-08-27 | End: 2023-03-04 | Stop reason: HOSPADM

## 2021-08-13 RX ADMIN — METOPROLOL SUCCINATE 25 MG: 25 TABLET, EXTENDED RELEASE ORAL at 10:13

## 2021-08-13 RX ADMIN — AMLODIPINE BESYLATE 10 MG: 10 TABLET ORAL at 10:13

## 2021-08-13 RX ADMIN — PANTOPRAZOLE SODIUM 40 MG: 40 TABLET, DELAYED RELEASE ORAL at 05:43

## 2021-08-13 RX ADMIN — LEVOTHYROXINE SODIUM 150 MCG: 150 TABLET ORAL at 05:43

## 2021-08-13 RX ADMIN — DOCUSATE SODIUM 50 MG AND SENNOSIDES 8.6 MG 2 TABLET: 8.6; 5 TABLET, FILM COATED ORAL at 10:13

## 2021-08-13 NOTE — PLAN OF CARE
Goal Outcome Evaluation:           Progress: improving  Outcome Summary: VSS. Pt rested well during shift. Aphasia still present, but nearly gone at this time. Pt seems to be doing much better than yesterday. Discharged planned for today to rehab. No complaints at this time.

## 2021-08-13 NOTE — DISCHARGE SUMMARY
[facility] Medicine Services  DISCHARGE SUMMARY    Patient Name: Zohra Hall  : 1940  MRN: [id number]    Date of Admission: 2021  7:24 PM  Date of Discharge: 2021  Primary Care Physician: Aleah Regan MD    Consults     Date and Time Order Name Status Description    8/10/2021 12:35 AM Inpatient Neurosurgery Consult Completed           Hospital Course     Presenting Problem:   ICH (intracerebral hemorrhage) (CMS/HCC) [I61.9]    Active Hospital Problems    Diagnosis  POA   • **Traumatic SAH, SDH, and intraparenchymal bleed (CMS/HCC) [I61.9]  Yes   • History of lacunar stroke 2019 [Z86.73]  Not Applicable   • CAD with angina pectoris (CMS/[facility]) [I25.119]  Yes   • NICM presumably due to Takotsubo post NSTEMI 2021 (CMS/[facility]) [I42.8]  Yes   • History of PMR (CMS/[facility]) [M35.3]  Yes   • Benign essential tremor [G25.0]  Yes   • 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          Hospital Course:  Zohra Hall is a 80 y.o. female with a history of prior substantial tobacco abuse, CAD/NSTEMI 2021 with small vessel disease not amenable to intervention/stenting, and concerns for adherence to home medications due to forgetfulness who was found down at her assisted living facility.  She was admitted to the ICU for management of post traumatic subarachnoid hemorrhage, her aspirin and Plavix were held, and neurosurgical consultation was pursued.  Per NSGY no surgical intervention was required, they recommended holding aspirin/Plavix x2 weeks prior to resumption.  Her home blood pressure medicines have been gradually added back, at discharge she is on her home regimen with the exception of reduced dose enalapril (10 mg instead of 40 mg).  She has done well after stepdown out of the ICU and is  anticipated discharge to rehab this afternoon.    Bilateral posttraumatic subarachnoid hemorrhage  Hx lacunar stroke  Hypertension  -Seen by NSGY, signed off in consult note, recommending holding aspirin/Plavix x2 weeks  -Continue amlodipine, Toprol-XL, restart enalapril at 10 mg  -Discharging to rehab     CAD s/p recent NSTEMI  Hyperlipidemia  -[facility] 5/2021 with distal disease, too small for intervention  -Imaging c/w Takotsubo cardiomyopathy in May, echo this hospitalization demonstrates improvement in EF  -Continue statin  -NSGY recommending holding ASA/Plavix x2 weeks before resumption     GERD  Hypothyroidism  -Continue levothyroxine, H2 blocker     Hx of breast cancer 1987  Hx thyroid cancer 2014  Hx PMR      Discharge Follow Up Recommendations for outpatient labs/diagnostics:  PCP 1 week post rehab discharge  Neurosurgical services 4-8 weeks    Day of Discharge     HPI:   No complaints this morning, feels ready to go to rehab.  No events overnight.  Eager to get back her functional status is much as possible    Review of Systems  Gen- No fevers, chills  CV- No chest pain, palpitations  Resp- No cough, dyspnea  GI- No N/V/D, abd pain    Vital Signs:   Temp:  [97.2 °F (36.2 °C)-98.2 °F (36.8 °C)] 98.2 °F (36.8 °C)  Heart Rate:  [71-99] 71  Resp:  [18] 18  BP: (110-149)/(58-74) 145/69     Physical Exam:  Constitutional: Awake, alert, no acute distress, frail-appearing elderly female  HENT: NCAT, mucous membranes moist  Respiratory: Clear to auscultation bilaterally, respiratory effort normal   Cardiovascular: RRR, no murmurs, rubs, or gallops, palpable radial pulses  Gastrointestinal: Positive bowel sounds, soft, nontender, nondistended  Musculoskeletal: No bilateral ankle edema  Psychiatric: Appropriate affect, cooperative  Neurologic: Speech clear, moving all extremity symmetrically    Pertinent  and/or Most Recent Results     LAB RESULTS:      Lab 08/12/21  0546 08/11/21  0601 08/10/21  0451 08/09/21 2055  08/09/21 2054   WBC 7.10 8.02 7.23 7.66  --    HEMOGLOBIN 11.7* 11.1* 12.2 12.9  --    HEMATOCRIT 34.9 34.1 35.8 38.9  --    PLATELETS 232 254 306 258  --    NEUTROS ABS 5.60 6.22 5.05 5.88  --    IMMATURE GRANS (ABS) 0.04 0.04 0.04 0.03  --    LYMPHS ABS 0.70 0.85 1.25 0.96  --    MONOS ABS 0.58 0.69 0.72 0.62  --    EOS ABS 0.15 0.19 0.13 0.13  --    MCV 89.3 90.2 86.9 88.6  --    PROTIME  --   --   --   --  12.9   APTT  --   --   --   --  26.2         Lab 08/12/21  0546 08/11/21  0601 08/10/21  0451 08/09/21  2054   SODIUM 134* 137 140 140   POTASSIUM 3.6 3.8 3.0* 3.7   CHLORIDE 101 105 102 101   CO2 24.0 22.0 25.0 26.0   ANION GAP 9.0 10.0 13.0 13.0   BUN 11 12 10 14   CREATININE 0.67 0.72 0.66 0.84   GLUCOSE 103* 102* 107* 111*   CALCIUM 8.6 8.1* 8.9 9.3   MAGNESIUM 1.7 2.1 1.5*  --    PHOSPHORUS 3.5 2.8 3.2  --    HEMOGLOBIN A1C  --   --  5.10  --    TSH  --   --   --  0.045*         Lab 08/12/21  0546 08/10/21  0451 08/09/21  2054   TOTAL PROTEIN  --  6.6 6.7   ALBUMIN 3.30* 3.80 3.90   GLOBULIN  --  2.8 2.8   ALT (SGPT)  --  9 7   AST (SGOT)  --  15 14   BILIRUBIN  --  0.3 0.3   ALK PHOS  --  95 94         Lab 08/10/21  0451 08/09/21  2054   TROPONIN T <0.010 <0.010   PROTIME  --  12.9   INR  --  1.00         Lab 08/10/21  0451   CHOLESTEROL 189   LDL CHOL 101*   HDL CHOL 72*   TRIGLYCERIDES 87         Lab 08/09/21 2023   ABO TYPING A   RH TYPING Positive   ANTIBODY SCREEN Negative         Brief Urine Lab Results  (Last result in the past 365 days)      Color   Clarity   Blood   Leuk Est   Nitrite   Protein   CREAT   Urine HCG        08/09/21 2300 Yellow Clear Negative Trace Positive Negative             Microbiology Results (last 10 days)     Procedure Component Value - Date/Time    Urine Culture - Urine, Urine, Clean Catch [[id number]]  (Abnormal)  (Susceptibility) Collected: 08/09/21 2300    Lab Status: Final result Specimen: Urine, Clean Catch Updated: 08/11/21 1118     Urine Culture >100,000 CFU/mL  Klebsiella pneumoniae ssp pneumoniae    Susceptibility      Klebsiella pneumoniae ssp pneumoniae      ROSALINDA      Ampicillin Resistant      Ampicillin + Sulbactam Susceptible      Cefazolin Susceptible      Cefepime Susceptible      Ceftazidime Susceptible      Ceftriaxone Susceptible      Gentamicin Susceptible      Levofloxacin Susceptible      Nitrofurantoin Susceptible      Piperacillin + Tazobactam Susceptible      Tetracycline Susceptible      Trimethoprim + Sulfamethoxazole Susceptible               Linear View                   COVID PRE-OP / PRE-PROCEDURE SCREENING ORDER (NO ISOLATION) - Swab, Nasopharynx [[id number]]  (Normal) Collected: 08/09/21 2215    Lab Status: Final result Specimen: Swab from Nasopharynx Updated: 08/09/21 2319    Narrative:      The following orders were created for panel order COVID PRE-OP / PRE-PROCEDURE SCREENING ORDER (NO ISOLATION) - Swab, Nasopharynx.  Procedure                               Abnormality         Status                     ---------                               -----------         ------                     COVID-19 and FLU A/B PCR...[[id number]]  Normal              Final result                 Please view results for these tests on the individual orders.    COVID-19 and FLU A/B PCR - Swab, Nasopharynx [[id number]]  (Normal) Collected: 08/09/21 2215    Lab Status: Final result Specimen: Swab from Nasopharynx Updated: 08/09/21 2319     COVID19 Not Detected     Influenza A PCR Not Detected     Influenza B PCR Not Detected    Narrative:      Fact sheet for providers: https://www.fda.gov/media/727870/download    Fact sheet for patients: https://www.fda.gov/media/858578/download    Test performed by PCR.          Adult Transthoracic Echo Complete W/ Cont if Necessary Per Protocol    Result Date: 8/12/2021  · Estimated left ventricular EF = 60% Left ventricular systolic function is normal. · Left ventricular diastolic function was normal. · Trace mitral and tricuspid  regurgitation. · Mild tachycardia noted throughout the study.      XR Spine Lumbar 2 or 3 View    Result Date: 8/9/2021  CR Spine Lumbar 2 or 3 Vws INDICATION: Acute low back pain. Trauma COMPARISON: CT of the abdomen and pelvis from 2/28/2018 FINDINGS: 3 views of the lumbar spine. There are 5 lumbar-type vertebral bodies. There are changes of posterior fusion extending from L3 through L5. Fusion hardware appears grossly intact as does the patient's hip arthroplasty hardware. Loss of disc height is again noted at L1-2. It is uncertain if there may be some potential increased compression at L1 when compared to the prior CT.     It is uncertain if there may be mildly increased compression deformity at L1 when compared to the prior CT. Consider further evaluation for tenderness at this level. Signer Name: Verna Roche MD  Signed: 8/9/2021 8:18 PM  Workstation Name: [id number]  [facility]    CT Head Without Contrast    Result Date: 8/11/2021  EXAMINATION: CT HEAD WO CONTRAST-  INDICATION: f/u ICH  TECHNIQUE: Axial noncontrast CT of the head with multiplanar reconstruction  The radiation dose reduction device was turned on for each scan per the ALARA (As Low as Reasonably Achievable) protocol.  COMPARISON: One day prior  FINDINGS: No significant interval change in previously noted multifocal multicompartment intracranial hemorrhage including prominent areas of subarachnoid hemorrhage within the sylvian fissures and adjacent frontal lobes bilaterally, in addition to presumed subdural hemorrhage along the frontal lobes and probable trace areas of intraparenchymal hemorrhage most notable in the left anterior temporal lobe. There is associated mild diffuse cerebral edema, without worsening global mass effect or basilar cistern effacement. Age-related changes of the brain are again present, similar to prior. The ventricles are normal in size and configuration. The orbits are unremarkable and the  paranasal sinuses are grossly clear.      No significant interval change in previously noted multifocal multicompartment intracranial hemorrhage including prominent areas of subarachnoid hemorrhage within the sylvian fissures and adjacent frontal lobes bilaterally, in addition to presumed subdural hemorrhage along the frontal lobes and probable trace areas of intraparenchymal hemorrhage most notable in the left anterior temporal lobe. There is associated mild diffuse cerebral edema, without worsening global mass effect or basilar cistern effacement.   This report was finalized on 8/11/2021 8:46 AM by Chuy Jaime.      CT Head Without Contrast    Result Date: 8/10/2021  CT Head WO HISTORY: Follow-up of intracranial hemorrhage. TECHNIQUE: Axial unenhanced head CT with multiplanar reformats. Radiation dose reduction techniques included automated exposure control or exposure modulation based on body size. Count of known CT and cardiac nuc med studies performed in previous 12 months: 3. COMPARISON: 8/9/2021 at 1921 hours. FINDINGS: Ventricular size and configuration remain normal. There is generalized atrophy with chronic small vessel ischemic disease in the white matter. The intraparenchymal hematoma in the anterior left temporal lobe is stable in size measuring about 1.3 x 2.5 cm in size. Subarachnoid hemorrhage on both sides of the brain in the frontal and temporal regions extending into the sylvian fissures does not appear significantly changed. This measures up to 8 mm in thickness on the left side. Trace right frontal subdural hemorrhage is stable. Small amount of left posterior frontal subdural hemorrhage is now noted measuring about 5 mm in thickness.  No intraventricular hemorrhage is identified. No skull fracture is identified.     1. There is a new small amount of left posterior frontal subdural hemorrhage measuring about 5 mm in thickness. 2. Remaining acute intracranial hemorrhage described previously  is not significantly changed. Signer Name: Zacarias aSnchez MD  Signed: 8/10/2021 1:32 AM  Workstation Name: [id number]  [facility]    CT Head Without Contrast    Result Date: 8/9/2021  CT Head WO: 8/9/2021 8:25 PM HISTORY: Fall, trauma TECHNIQUE: Axial unenhanced head CT. Radiation dose reduction techniques included automated exposure control or exposure modulation based on body size. Radiation audit for number of CT and nuclear cardiology exams performed in the last year: 0.  COMPARISON: None. FINDINGS: There is subarachnoid blood predominantly involving the bilateral frontotemporal regions and temporal poles. There is an intraparenchymal hematoma involving the patient's anterior left temporal lobe. There is no definite intraventricular bleed. There is a tiny subdural blood in the lateral right frontal lobe measuring about 5.5 mm in thickness without significant mass effect. No evidence of herniation or gross mass effect. No definite skull fracture identified.     There is bilateral posttraumatic subarachnoid blood predominantly involving the anterior frontotemporal regions, a small subdural overlying the lateral right frontal lobe and an intraparenchymal hematoma involving the patient's anterior left temporal lobe. NOTIFICATION: Critical Value/emergent results were called by telephone at the time of interpretation on 8/9/2021 7:35 PM to Colleen Lindquist MD who verbally acknowledged these results. Signer Name: Verna Roche MD  Signed: 8/9/2021 7:35 PM  Workstation Name: [id number]  [facility]    SLP FEES - Fiberoptic Endo Eval Swallow    Result Date: 8/11/2021  This procedure was auto-finalized with no dictation required.    XR Chest 1 View    Result Date: 8/9/2021  CR Chest 1 Vw INDICATION: Fall, limited history COMPARISON:  Chest x-ray from 5/23/2021 FINDINGS: Single portable AP view(s) of the chest. The heart and mediastinal contours are normal. The lungs are clear  apart from chronic changes. No pneumothorax or pleural effusion.     No definite acute cardiopulmonary findings. Signer Name: Verna Roche MD  Signed: 8/9/2021 10:41 PM  Workstation Name: [id number]  [facility] [place]              Results for orders placed during the hospital encounter of 08/09/21    Adult Transthoracic Echo Complete W/ Cont if Necessary Per Protocol    Interpretation Summary  · Estimated left ventricular EF = 60% Left ventricular systolic function is normal.  · Left ventricular diastolic function was normal.  · Trace mitral and tricuspid regurgitation.  · Mild tachycardia noted throughout the study.      Discharge Details        Discharge Medications      Changes to Medications      Instructions Start Date   aspirin 81 MG EC tablet  What changed: These instructions start on August 27, 2021. If you are unsure what to do until then, ask your doctor or other care provider.   81 mg, Oral, Daily   Start Date: August 27, 2021     clopidogrel 75 MG tablet  Commonly known as: PLAVIX  What changed: These instructions start on August 27, 2021. If you are unsure what to do until then, ask your doctor or other care provider.   75 mg, Oral, Daily   Start Date: August 27, 2021     enalapril 20 MG tablet  Commonly known as: VASOTEC  What changed: how much to take   10 mg, Oral, Daily         Continue These Medications      Instructions Start Date   acetaminophen 325 MG tablet  Commonly known as: TYLENOL   650 mg, Oral, Every 4 Hours PRN      alendronate 70 MG tablet  Commonly known as: FOSAMAX   70 mg, Oral, Every 7 Days      aluminum-magnesium hydroxide-simethicone 400-400-40 MG/5ML suspension  Commonly known as: MAALOX MAX   15 mL, Oral, Every 6 Hours PRN      amLODIPine 10 MG tablet  Commonly known as: NORVASC   10 mg, Oral, Daily, Appointment needed for refills      atorvastatin 40 MG tablet  Commonly known as: LIPITOR   40 mg, Oral, Daily      docusate sodium 100 MG capsule   200 mg, Oral,  Daily PRN      famotidine 20 MG tablet  Commonly known as: PEPCID   TAKE 1 TABLET BY MOUTH  TWICE DAILY BEFORE MEALS      levothyroxine 150 MCG tablet  Commonly known as: SYNTHROID, LEVOTHROID   150 mcg, Oral, Daily, Please schedule appointment      metoprolol succinate XL 25 MG 24 hr tablet  Commonly known as: TOPROL-XL   25 mg, Oral, Every 24 Hours Scheduled      ondansetron 4 MG tablet  Commonly known as: ZOFRAN   4 mg, Oral, Every 6 Hours PRN      primidone 50 MG tablet  Commonly known as: MYSOLINE   2 PO qam and 1 PO QPM             Allergies   Allergen Reactions   • Dilaudid [Hydromorphone Hcl] Hallucinations     TABS   • Beta Adrenergic Blockers Other (See Comments)     Hypotension / bradycadia   • Dilaudid [Hydromorphone] Unknown - High Severity   • Lactose Intolerance (Gi) Diarrhea         Discharge Disposition:  Rehab Facility or Unit (DC - External)    Diet:  Hospital:  Diet Order   Procedures   • Diet Dysphagia; IV - Mechanical Soft No Mixed Consistencies; Nectar / Syrup Thick; Cardiac          CODE STATUS:    Code Status and Medical Interventions:   Ordered at: 08/10/21 1357     Limited Support to NOT Include:    Intubation     Level Of Support Discussed With:    Health Care Surrogate     Code Status:    No CPR     Medical Interventions (Level of Support Prior to Arrest):    Limited       Future Appointments   Date Time Provider Department Center   9/1/2021  2:30 PM Aleah Regan MD [facility]   12/1/2021 11:45 AM Kelly Simpson MD [facility]       Additional Instructions for the Follow-ups that You Need to Schedule     Discharge Follow-up with PCP   As directed       Currently Documented PCP:    Aleah Regan MD    PCP Phone Number:    [phone]     Follow Up Details: 1 week post rehab discharge         Discharge Follow-up with Specialty: Neurosurgery 4-8 weeks   As directed      Specialty: Neurosurgery 4-8 weeks                     Wesley Wheeler,  DO  08/13/21      Time Spent on Discharge:  I spent  33  minutes on this discharge activity which included: face-to-face encounter with the patient, reviewing the data in the system, coordination of the care with the nursing staff as well as consultants, documentation, and entering orders.

## 2021-09-07 ENCOUNTER — READMISSION MANAGEMENT (OUTPATIENT)
Dept: CALL CENTER | Facility: HOSPITAL | Age: 81
End: 2021-09-07

## 2021-09-07 NOTE — OUTREACH NOTE
Prep Survey      Responses   [facility] facility patient discharged from?  Non-BH   Is LACE score < 7 ?  Non-BH Discharge   Emergency Room discharge w/ pulse ox?  No   Eligibility  [facility]   Date of Admission  08/13/21   Date of Discharge  09/07/21   Discharge diagnosis  ICH (intracerebral hemorrhage)    Does the patient have one of the following disease processes/diagnoses(primary or secondary)?  Stroke (TIA)   Prep survey completed?  Yes          Gissel Ervin RN

## 2021-09-08 ENCOUNTER — TRANSITIONAL CARE MANAGEMENT TELEPHONE ENCOUNTER (OUTPATIENT)
Dept: CALL CENTER | Facility: HOSPITAL | Age: 81
End: 2021-09-08

## 2021-09-08 NOTE — OUTREACH NOTE
Call Center TCM Note      Responses   [facility] patient discharged from?  Non-   Does the patient have one of the following disease processes/diagnoses(primary or secondary)?  Stroke (TIA)   TCM attempt successful?  Yes   Call start time  1409   Change in Health Status  Moved to LTC/SNF/Hospice   Call Status Comments  admitted to [facility]   Call end time  1411   Discharge diagnosis  ICH (intracerebral hemorrhage)           Jigna Ashby RN    9/8/2021, 14:12 EDT

## 2021-09-08 NOTE — OUTREACH NOTE
Call Center TCM Note      Responses   [facility] patient discharged from?  Non-BH   Does the patient have one of the following disease processes/diagnoses(primary or secondary)?  Stroke (TIA)   TCM attempt successful?  No   Unsuccessful attempts  Attempt 1          Jigna Ashby RN    9/8/2021, 10:23 EDT

## 2021-09-23 RX ORDER — LEVOTHYROXINE SODIUM 0.15 MG/1
TABLET ORAL
Qty: 90 TABLET | Refills: 3 | Status: SHIPPED | OUTPATIENT
Start: 2021-09-23 | End: 2022-07-18

## 2021-10-02 DIAGNOSIS — I10 UNCONTROLLED HYPERTENSION: ICD-10-CM

## 2021-10-03 RX ORDER — AMLODIPINE BESYLATE 10 MG/1
TABLET ORAL
Qty: 90 TABLET | Refills: 3 | Status: SHIPPED | OUTPATIENT
Start: 2021-10-03 | End: 2023-03-04 | Stop reason: HOSPADM

## 2021-10-11 ENCOUNTER — TELEPHONE (OUTPATIENT)
Dept: INTERNAL MEDICINE | Facility: CLINIC | Age: 81
End: 2021-10-11

## 2021-10-11 NOTE — TELEPHONE ENCOUNTER
Caller: Joey Diaz (POA)    Relationship: Emergency Contact    Best call back number: [phone]    What is the medical concern/diagnosis: NA    What specialty or service is being requested: NEUROLOGIST    What is the provider, practice or medical service name: NA    What is the office location: NA    What is the office phone number: NA    Any additional details:     PATIENTS SON CALLED TO REQUEST NEUROLOGY REFERRAL PER PATIENTS REHAB FACILITY REQUEST.      MR. DIAZ REQUESTS CALL BACK

## 2021-10-12 NOTE — TELEPHONE ENCOUNTER
I need more details.  Is the patient / rehab place able to do a telephone visit with me next week? I see several openings for Monday.  If none available, please schedule her at the 11.45 am slot on Monday.    thanks

## 2021-10-13 DIAGNOSIS — M81.0 AGE-RELATED OSTEOPOROSIS WITHOUT CURRENT PATHOLOGICAL FRACTURE: ICD-10-CM

## 2021-10-14 RX ORDER — ALENDRONATE SODIUM 70 MG/1
TABLET ORAL
Qty: 12 TABLET | Refills: 3 | Status: SHIPPED | OUTPATIENT
Start: 2021-10-14 | End: 2022-07-18

## 2021-10-20 ENCOUNTER — TELEPHONE (OUTPATIENT)
Dept: INTERNAL MEDICINE | Facility: CLINIC | Age: 81
End: 2021-10-20

## 2021-10-25 ENCOUNTER — TELEPHONE (OUTPATIENT)
Dept: INTERNAL MEDICINE | Facility: CLINIC | Age: 81
End: 2021-10-25

## 2021-10-25 NOTE — TELEPHONE ENCOUNTER
Caller: GISELE    Relationship: OCCUPATIONAL THERAPIST     Best call back number: [phone]    What orders are you requesting (i.e. lab or imaging): VERBAL ORDERS OCCUPATIONAL THERAPY     In what timeframe would the patient need to come in: ASAP     Where will you receive your lab/imaging services: IN HOME     Additional notes: [facility] WILL NEED VERBAL ORDERS TO PROVIDE THE PATIENT WITH OCCUPATIONAL THERAPY 2 TIMES FOR ONE WEEK AND 1 TIME FOR 8 WEEKS. OCCUPATIONAL THERAPY WILL BE FOR STRENGTH, BALANCE AND ENDURANCE AND INCREASE THE SAFETY AND INDEPENDENCE OF DAILY ACTIVITIES.

## 2021-10-26 ENCOUNTER — TELEPHONE (OUTPATIENT)
Dept: INTERNAL MEDICINE | Facility: CLINIC | Age: 81
End: 2021-10-26

## 2021-10-26 NOTE — TELEPHONE ENCOUNTER
April CALLED AND WANTED TO LET DANIA KNOW THAT PATIENT FELL BUT VITALS EVERYTHING WAS OKAY AND NO INJURY

## 2021-10-29 ENCOUNTER — OUTSIDE FACILITY SERVICE (OUTPATIENT)
Dept: INTERNAL MEDICINE | Facility: CLINIC | Age: 81
End: 2021-10-29

## 2021-10-29 PROCEDURE — G0179 MD RECERTIFICATION HHA PT: HCPCS | Performed by: INTERNAL MEDICINE

## 2022-01-01 ENCOUNTER — APPOINTMENT (OUTPATIENT)
Dept: CT IMAGING | Facility: HOSPITAL | Age: 82
End: 2022-01-01

## 2022-01-01 ENCOUNTER — APPOINTMENT (OUTPATIENT)
Dept: GENERAL RADIOLOGY | Facility: HOSPITAL | Age: 82
End: 2022-01-01

## 2022-01-01 ENCOUNTER — HOSPITAL ENCOUNTER (EMERGENCY)
Facility: HOSPITAL | Age: 82
Discharge: HOME | End: 2022-01-01
Attending: EMERGENCY MEDICINE | Admitting: EMERGENCY MEDICINE

## 2022-01-01 VITALS
TEMPERATURE: 98.7 F | HEART RATE: 70 BPM | OXYGEN SATURATION: 98 % | BODY MASS INDEX: 21.69 KG/M2 | HEIGHT: 66 IN | DIASTOLIC BLOOD PRESSURE: 71 MMHG | WEIGHT: 135 LBS | RESPIRATION RATE: 20 BRPM | SYSTOLIC BLOOD PRESSURE: 135 MMHG

## 2022-01-01 DIAGNOSIS — K52.9 GASTROENTERITIS: Primary | ICD-10-CM

## 2022-01-01 DIAGNOSIS — R10.10 UPPER ABDOMINAL PAIN: ICD-10-CM

## 2022-01-01 LAB
ALBUMIN SERPL-MCNC: 3.9 G/DL (ref 3.5–5.2)
ALBUMIN/GLOB SERPL: 1.6 G/DL
ALP SERPL-CCNC: 85 U/L (ref 39–117)
ALT SERPL W P-5'-P-CCNC: 17 U/L (ref 1–33)
ANION GAP SERPL CALCULATED.3IONS-SCNC: 10 MMOL/L (ref 5–15)
AST SERPL-CCNC: 20 U/L (ref 1–32)
BASOPHILS # BLD AUTO: 0.03 10*3/MM3 (ref 0–0.2)
BASOPHILS NFR BLD AUTO: 0.5 % (ref 0–1.5)
BILIRUB SERPL-MCNC: 0.2 MG/DL (ref 0–1.2)
BUN SERPL-MCNC: 23 MG/DL (ref 8–23)
BUN/CREAT SERPL: 23.7 (ref 7–25)
CALCIUM SPEC-SCNC: 9.3 MG/DL (ref 8.6–10.5)
CHLORIDE SERPL-SCNC: 103 MMOL/L (ref 98–107)
CO2 SERPL-SCNC: 27 MMOL/L (ref 22–29)
CREAT SERPL-MCNC: 0.97 MG/DL (ref 0.57–1)
DEPRECATED RDW RBC AUTO: 50.6 FL (ref 37–54)
EOSINOPHIL # BLD AUTO: 0.17 10*3/MM3 (ref 0–0.4)
EOSINOPHIL NFR BLD AUTO: 2.8 % (ref 0.3–6.2)
ERYTHROCYTE [DISTWIDTH] IN BLOOD BY AUTOMATED COUNT: 15.7 % (ref 12.3–15.4)
FLUAV RNA RESP QL NAA+PROBE: NOT DETECTED
FLUBV RNA RESP QL NAA+PROBE: NOT DETECTED
GFR SERPL CREATININE-BSD FRML MDRD: 55 ML/MIN/1.73
GLOBULIN UR ELPH-MCNC: 2.5 GM/DL
GLUCOSE SERPL-MCNC: 113 MG/DL (ref 65–99)
HCT VFR BLD AUTO: 36.3 % (ref 34–46.6)
HGB BLD-MCNC: 11.7 G/DL (ref 12–15.9)
HOLD SPECIMEN: NORMAL
IMM GRANULOCYTES # BLD AUTO: 0.02 10*3/MM3 (ref 0–0.05)
IMM GRANULOCYTES NFR BLD AUTO: 0.3 % (ref 0–0.5)
LIPASE SERPL-CCNC: 27 U/L (ref 13–60)
LYMPHOCYTES # BLD AUTO: 1.36 10*3/MM3 (ref 0.7–3.1)
LYMPHOCYTES NFR BLD AUTO: 22.6 % (ref 19.6–45.3)
MCH RBC QN AUTO: 28.3 PG (ref 26.6–33)
MCHC RBC AUTO-ENTMCNC: 32.2 G/DL (ref 31.5–35.7)
MCV RBC AUTO: 87.9 FL (ref 79–97)
MONOCYTES # BLD AUTO: 0.56 10*3/MM3 (ref 0.1–0.9)
MONOCYTES NFR BLD AUTO: 9.3 % (ref 5–12)
NEUTROPHILS NFR BLD AUTO: 3.89 10*3/MM3 (ref 1.7–7)
NEUTROPHILS NFR BLD AUTO: 64.5 % (ref 42.7–76)
NRBC BLD AUTO-RTO: 0 /100 WBC (ref 0–0.2)
NT-PROBNP SERPL-MCNC: 342.6 PG/ML (ref 0–1800)
PLATELET # BLD AUTO: 305 10*3/MM3 (ref 140–450)
PMV BLD AUTO: 10.2 FL (ref 6–12)
POTASSIUM SERPL-SCNC: 3.8 MMOL/L (ref 3.5–5.2)
PROT SERPL-MCNC: 6.4 G/DL (ref 6–8.5)
RBC # BLD AUTO: 4.13 10*6/MM3 (ref 3.77–5.28)
SARS-COV-2 RNA RESP QL NAA+PROBE: NOT DETECTED
SODIUM SERPL-SCNC: 140 MMOL/L (ref 136–145)
TROPONIN T SERPL-MCNC: <0.01 NG/ML (ref 0–0.03)
TROPONIN T SERPL-MCNC: <0.01 NG/ML (ref 0–0.03)
WBC NRBC COR # BLD: 6.03 10*3/MM3 (ref 3.4–10.8)
WHOLE BLOOD HOLD SPECIMEN: NORMAL
WHOLE BLOOD HOLD SPECIMEN: NORMAL

## 2022-01-01 PROCEDURE — 74176 CT ABD & PELVIS W/O CONTRAST: CPT

## 2022-01-01 PROCEDURE — 99283 EMERGENCY DEPT VISIT LOW MDM: CPT

## 2022-01-01 PROCEDURE — 93005 ELECTROCARDIOGRAM TRACING: CPT | Performed by: EMERGENCY MEDICINE

## 2022-01-01 PROCEDURE — 80053 COMPREHEN METABOLIC PANEL: CPT | Performed by: EMERGENCY MEDICINE

## 2022-01-01 PROCEDURE — 85025 COMPLETE CBC W/AUTO DIFF WBC: CPT | Performed by: EMERGENCY MEDICINE

## 2022-01-01 PROCEDURE — 83690 ASSAY OF LIPASE: CPT | Performed by: EMERGENCY MEDICINE

## 2022-01-01 PROCEDURE — 83880 ASSAY OF NATRIURETIC PEPTIDE: CPT | Performed by: EMERGENCY MEDICINE

## 2022-01-01 PROCEDURE — 36415 COLL VENOUS BLD VENIPUNCTURE: CPT

## 2022-01-01 PROCEDURE — 84484 ASSAY OF TROPONIN QUANT: CPT | Performed by: EMERGENCY MEDICINE

## 2022-01-01 PROCEDURE — 87636 SARSCOV2 & INF A&B AMP PRB: CPT | Performed by: EMERGENCY MEDICINE

## 2022-01-01 PROCEDURE — 71045 X-RAY EXAM CHEST 1 VIEW: CPT

## 2022-01-01 RX ORDER — SODIUM CHLORIDE 0.9 % (FLUSH) 0.9 %
10 SYRINGE (ML) INJECTION AS NEEDED
Status: DISCONTINUED | OUTPATIENT
Start: 2022-01-01 | End: 2022-01-02 | Stop reason: HOSPADM

## 2022-01-02 LAB
QT INTERVAL: 382 MS
QTC INTERVAL: 438 MS

## 2022-01-02 NOTE — DISCHARGE INSTRUCTIONS
Rest,drink plenty of fluids, and slowly transition from a bland diet back to normal diet.    Return to the ER as needed.

## 2022-01-02 NOTE — ED PROVIDER NOTES
"Subjective   81-year-old female who is brought in with complaint of upper abdominal pain/lower chest pain.  He has noticed it throughout all day today.  Ports mildly loose stool but denies nausea or vomiting.  She currently resides at [facility] and was transferred there to our ER due to continued symptoms.  She denies fever, bodies, or chills.  No reported cough or shortness of breath.  She has not been prevously diagnosed with COVID-19.  She has been vaccinated against COVID-19.  No pain in the lower abdomen.  No acute urinary symptoms include no dysuria, frequency, urgency.  She does have a baseline essential tremor which is unchanged relative to baseline.  No other acute complaints.          Review of Systems   Constitutional: Negative for chills, fatigue and fever.   HENT: Negative for congestion, ear pain, postnasal drip, sinus pressure and sore throat.    Eyes: Negative for pain, redness and visual disturbance.   Respiratory: Negative for cough, chest tightness and shortness of breath.    Cardiovascular: Positive for chest pain. Negative for palpitations and leg swelling.   Gastrointestinal: Positive for abdominal pain and diarrhea. Negative for anal bleeding, blood in stool, nausea and vomiting.   Endocrine: Negative for polydipsia and polyuria.   Genitourinary: Negative for difficulty urinating, dysuria, frequency and urgency.   Musculoskeletal: Negative for arthralgias, back pain and neck pain.   Skin: Negative for pallor and rash.   Allergic/Immunologic: Negative for environmental allergies and immunocompromised state.   Neurological: Negative for dizziness, weakness and headaches.   Hematological: Negative for adenopathy.   Psychiatric/Behavioral: Negative for confusion, self-injury and suicidal ideas. The patient is not nervous/anxious.    All other systems reviewed and are negative.      Past Medical History:   Diagnosis Date   • Breast cancer (CMS/[facility]) 1987    left- pt states \"in situ\", no " radiation, Tamoxifen for 5 years   • Cellulitis    • Cervical lymphadenopathy    • Chest pain    • Convulsions (CMS/HCC)    • GERD (gastroesophageal reflux disease)    • Glossitis    • Grief reaction     · Last Impression: 29 Jan 2015  counselled, given ativan for prn use.  Aleah Regan   • Hypertension    • Lower back pain    • Oral thrush    • Papillary adenocarcinoma (CMS/HCC)     Papillary adenocarcinoma of thyroid   • Papillary adenocarcinoma of thyroid (CMS/HCC)     · resection Ramirez- 1/13,  2 x 2 x 1.5 cm  T3 N1 MXpost op low calcium and diminished  voiceablation Ain- 3/7 metastatic nodes and invasion to strap muscles · Last Impression: 29 Oct 2014  s/p Eval by berry Méndez.  Aleah Regan (Internal   • Piriformis syndrome    • Tingling    • Xerostomia        Allergies   Allergen Reactions   • Dilaudid [Hydromorphone Hcl] Hallucinations     TABS   • Beta Adrenergic Blockers Other (See Comments)     Hypotension / bradycadia   • Dilaudid [Hydromorphone] Unknown - High Severity   • Lactose Intolerance (Gi) Diarrhea       Past Surgical History:   Procedure Laterality Date   • BREAST BIOPSY Right 10/10/2013    stereo bx   • BREAST BIOPSY Left 10/19/2015    stereo bx   • BREAST CYST EXCISION      right   • BREAST EXCISIONAL BIOPSY Right     affirm bx and loc 2016.   • BREAST LUMPECTOMY Left 1987   • CARDIAC CATHETERIZATION N/A 5/23/2021    Procedure: Left Heart Cath;  Surgeon: Alonso Ross IV, MD;  Location:  [facility] CATH INVASIVE LOCATION;  Service: Cardiology;  Laterality: N/A;   • HYSTERECTOMY  1979   • OTHER SURGICAL HISTORY Right     right arm surgery-steel roger in place   • TOTAL HIP ARTHROPLASTY     • TOTAL THYROIDECTOMY      Thyroid Surgery Total Thyroidectomy       Family History   Problem Relation Age of Onset   • Breast cancer Mother 84   • Cancer Mother    • BRCA 1/2 Neg Hx    • Colon cancer Neg Hx    • Endometrial cancer Neg Hx    • Ovarian cancer Neg Hx        Social History      Socioeconomic History   • Marital status:    Tobacco Use   • Smoking status: Former Smoker     Packs/day: 1.00     Years: 30.00     Pack years: 30.00     Types: Cigarettes     Quit date: 1992     Years since quittin.3   • Smokeless tobacco: Never Used   Vaping Use   • Vaping Use: Never used   Substance and Sexual Activity   • Alcohol use: No   • Drug use: No   • Sexual activity: Not Currently           Objective   Physical Exam  Vitals and nursing note reviewed.   Constitutional:       General: She is not in acute distress.     Appearance: Normal appearance. She is well-developed. She is not toxic-appearing or diaphoretic.   HENT:      Head: Normocephalic and atraumatic.      Right Ear: External ear normal.      Left Ear: External ear normal.      Nose: Nose normal.   Eyes:      General: Lids are normal.      Pupils: Pupils are equal, round, and reactive to light.   Neck:      Trachea: No tracheal deviation.   Cardiovascular:      Rate and Rhythm: Normal rate and regular rhythm.      Pulses: No decreased pulses.      Heart sounds: Normal heart sounds. No murmur heard.  No friction rub. No gallop.    Pulmonary:      Effort: Pulmonary effort is normal. No respiratory distress.      Breath sounds: Normal breath sounds. No decreased breath sounds, wheezing, rhonchi or rales.   Abdominal:      General: Bowel sounds are normal.      Palpations: Abdomen is soft.      Tenderness: There is abdominal tenderness in the epigastric area. There is no guarding or rebound.       Musculoskeletal:         General: No deformity. Normal range of motion.      Cervical back: Normal range of motion and neck supple.   Lymphadenopathy:      Cervical: No cervical adenopathy.   Skin:     General: Skin is warm and dry.      Findings: No rash.   Neurological:      Mental Status: She is alert and oriented to person, place, and time.      Cranial Nerves: No cranial nerve deficit.      Sensory: No sensory deficit.    Psychiatric:         Speech: Speech normal.         Behavior: Behavior normal.         Thought Content: Thought content normal.         Judgment: Judgment normal.         Procedures           ED Course                                                 MDM  Number of Diagnoses or Management Options  Gastroenteritis: new and requires workup  Upper abdominal pain: new and requires workup  Diagnosis management comments: Patient has remained well-appearing while here in ER.  IV fluids administered.    CT scan of the abdomen pelvis shows no acute abnormalities.  Serial cardiac enzymes and troponins do not show any ischemic changes.  Chest x-ray shows no acute disease.    Covid and influenza test are negative.      Patient denies any urinary symptoms.    The patient will be discharged with advised to drink plenty of fluids, and slowly transition diet from bland to normal.       Amount and/or Complexity of Data Reviewed  Clinical lab tests: ordered and reviewed  Tests in the radiology section of CPT®: ordered and reviewed  Decide to obtain previous medical records or to obtain history from someone other than the patient: yes  Review and summarize past medical records: yes  Independent visualization of images, tracings, or specimens: yes        Final diagnoses:   Gastroenteritis   Upper abdominal pain       ED Disposition  ED Disposition     ED Disposition Condition Comment    Discharge Stable           Aleah Regan MD  [address] [place], [place] [id number] [phone]    In 1 week           Medication List      No changes were made to your prescriptions during this visit.          Elie Reynoso MD  01/01/22 [id number]

## 2022-01-03 LAB
QT INTERVAL: 396 MS
QTC INTERVAL: 448 MS

## 2022-01-04 ENCOUNTER — TELEPHONE (OUTPATIENT)
Dept: INTERNAL MEDICINE | Facility: CLINIC | Age: 82
End: 2022-01-04

## 2022-01-04 NOTE — TELEPHONE ENCOUNTER
[facility] CALLED STATING PT LEFT ASSISTED LIVING AND WENT TO [facility] ER ON THE 1ST, COMPLAINING OF CHEST PAINS.     THEY ENDED UP DIAGNOSING HER WITH INFLAMATION OF THE STOMACH AND INTESTINES.     SHE STATED IT DOES NOT LOOK LIKE THEY CHANGED ANY OF HER MEDICATIONS, BUT WANTED DR. NAJERA TO BE AWARE.        [facility]# [phone]

## 2022-01-28 ENCOUNTER — TELEPHONE (OUTPATIENT)
Dept: INTERNAL MEDICINE | Facility: CLINIC | Age: 82
End: 2022-01-28

## 2022-01-28 RX ORDER — DOCUSATE SODIUM 250 MG
250 CAPSULE ORAL DAILY
Qty: 90 CAPSULE | Refills: 1 | Status: SHIPPED | OUTPATIENT
Start: 2022-01-28 | End: 2022-01-28 | Stop reason: SDUPTHER

## 2022-01-28 RX ORDER — DOCUSATE SODIUM 250 MG
250 CAPSULE ORAL DAILY
Qty: 90 CAPSULE | Refills: 1 | Status: SHIPPED | OUTPATIENT
Start: 2022-01-28 | End: 2022-08-11 | Stop reason: HOSPADM

## 2022-01-28 NOTE — TELEPHONE ENCOUNTER
Caller: [facility]    [facility] - NURSE:     Best call back number: [phone]    What orders are you requesting (i.e. lab or imaging): MEDICATION FOR CONSTIPATION    In what timeframe would the patient need to come in: ASAP    Where will you receive your lab/imaging services: FAX ORDER TO [facility]  AT  [facility] [phone]    Additional notes:

## 2022-03-18 ENCOUNTER — OFFICE VISIT (OUTPATIENT)
Dept: INTERNAL MEDICINE | Facility: CLINIC | Age: 82
End: 2022-03-18

## 2022-03-18 VITALS
OXYGEN SATURATION: 100 % | DIASTOLIC BLOOD PRESSURE: 70 MMHG | TEMPERATURE: 98 F | HEART RATE: 82 BPM | SYSTOLIC BLOOD PRESSURE: 112 MMHG

## 2022-03-18 DIAGNOSIS — R06.00 DYSPNEA, UNSPECIFIED TYPE: Primary | ICD-10-CM

## 2022-03-18 PROCEDURE — 99213 OFFICE O/P EST LOW 20 MIN: CPT | Performed by: NURSE PRACTITIONER

## 2022-03-18 NOTE — PROGRESS NOTES
"  Patient does not have any complaints and is not sure why appointment was made.  Notation on appointment was for shortness of breath.    History of Present Illness    81 y.o.female who lives at [facility] and is accompanied today by her son. Ms. Hall is not sure why she is here today. She has chronic memory issues and she nor her son made appt. Was just told needed to come in to be seen. When asked about shortness of breath, she does state yes she has had some shortness of breath with some activities but not all the time. Denies cough fever. Eating drinking ok. No difficulty with bowel bladder.    Review of Systems   Constitutional: Negative for fever.   Respiratory: Positive for shortness of breath. Negative for cough, chest tightness and wheezing.    Cardiovascular: Negative for chest pain and palpitations.         [facility]  The following portions of the patient's history were reviewed and updated as appropriate: allergies, current medications, past family history, past medical history, past social history, past surgical history and problem list.     Past Medical History:   Diagnosis Date   • Breast cancer (HCC) 1987    left- pt states \"in situ\", no radiation, Tamoxifen for 5 years   • Cellulitis    • Cervical lymphadenopathy    • Chest pain    • Convulsions (HCC)    • GERD (gastroesophageal reflux disease)    • Glossitis    • Grief reaction     · Last Impression: 29 Jan 2015  counselled, given ativan for prn use.  Aleah Regan   • Hypertension    • Lower back pain    • Oral thrush    • Papillary adenocarcinoma (HCC)     Papillary adenocarcinoma of thyroid   • Papillary adenocarcinoma of thyroid (HCC)     · resection Ramirez- 1/13,  2 x 2 x 1.5 cm  T3 N1 MXpost op low calcium and diminished  voiceablation Ain- 3/7 metastatic nodes and invasion to strap muscles · Last Impression: 29 Oct 2014  s/p Eval by berry Méndez.  Aleah Regan (Internal   • Piriformis syndrome    • Tingling    • Xerostomia     "   Allergies   Allergen Reactions   • Dilaudid [Hydromorphone Hcl] Hallucinations     TABS   • Beta Adrenergic Blockers Other (See Comments)     Hypotension / bradycadia   • Dilaudid [Hydromorphone] Unknown - High Severity   • Lactose Intolerance (Gi) Diarrhea      Social History     Tobacco Use   • Smoking status: Former Smoker     Packs/day: 1.00     Years: 30.00     Pack years: 30.00     Types: Cigarettes     Quit date: 1992     Years since quittin.5   • Smokeless tobacco: Never Used   Vaping Use   • Vaping Use: Never used   Substance Use Topics   • Alcohol use: No   • Drug use: No     Past Surgical History:   Procedure Laterality Date   • BREAST BIOPSY Right 10/10/2013    stereo bx   • BREAST BIOPSY Left 10/19/2015    stereo bx   • BREAST CYST EXCISION      right   • BREAST EXCISIONAL BIOPSY Right     affirm bx and loc .   • BREAST LUMPECTOMY Left    • CARDIAC CATHETERIZATION N/A 2021    Procedure: Left Heart Cath;  Surgeon: Alonso Ross IV, MD;  Location: [facility] CATH INVASIVE LOCATION;  Service: Cardiology;  Laterality: N/A;   • HYSTERECTOMY     • OTHER SURGICAL HISTORY Right     right arm surgery-steel roger in place   • TOTAL HIP ARTHROPLASTY     • TOTAL THYROIDECTOMY      Thyroid Surgery Total Thyroidectomy      Family History   Problem Relation Age of Onset   • Breast cancer Mother 84   • Cancer Mother    • BRCA 1/2 Neg Hx    • Colon cancer Neg Hx    • Endometrial cancer Neg Hx    • Ovarian cancer Neg Hx            Current Outpatient Medications:   •  acetaminophen (TYLENOL) 325 MG tablet, Take 2 tablets by mouth Every 4 (Four) Hours As Needed for Mild Pain ., Disp:  , Rfl:   •  alendronate (FOSAMAX) 70 MG tablet, TAKE 1 TABLET BY MOUTH  EVERY 7 DAYS, Disp: 12 tablet, Rfl: 3  •  aluminum-magnesium hydroxide-simethicone (MAALOX MAX) 400-400-40 MG/5ML suspension, Take 15 mL by mouth Every 6 (Six) Hours As Needed for Indigestion or Heartburn., Disp:  , Rfl:   •  amLODIPine  (NORVASC) 10 MG tablet, TAKE 1 TABLET BY MOUTH  DAILY, Disp: 90 tablet, Rfl: 3  •  aspirin 81 MG EC tablet, Take 1 tablet by mouth Daily., Disp: 30 tablet, Rfl: 0  •  atorvastatin (LIPITOR) 40 MG tablet, Take 1 tablet by mouth Daily., Disp: 90 tablet, Rfl: 3  •  clopidogrel (PLAVIX) 75 MG tablet, Take 1 tablet by mouth Daily., Disp: 30 tablet, Rfl: 0  •  docusate sodium (COLACE) 250 MG capsule, Take 1 capsule by mouth Daily. For constipation, Disp: 90 capsule, Rfl: 1  •  enalapril (VASOTEC) 20 MG tablet, Take 0.5 tablets by mouth Daily., Disp: 180 tablet, Rfl: 1  •  famotidine (PEPCID) 20 MG tablet, TAKE 1 TABLET BY MOUTH  TWICE DAILY BEFORE MEALS, Disp: 180 tablet, Rfl: 1  •  levothyroxine (SYNTHROID, LEVOTHROID) 150 MCG tablet, TAKE 1 TABLET BY MOUTH  DAILY, Disp: 90 tablet, Rfl: 3  •  metoprolol succinate XL (TOPROL-XL) 25 MG 24 hr tablet, Take 1 tablet by mouth Daily., Disp: 30 tablet, Rfl: 0  •  ondansetron (ZOFRAN) 4 MG tablet, Take 1 tablet by mouth Every 6 (Six) Hours As Needed for Nausea or Vomiting., Disp:  , Rfl:   •  primidone (MYSOLINE) 50 MG tablet, 2 PO qam and 1 PO QPM, Disp: 270 tablet, Rfl: 1    VITALS:  /70   Pulse 82   Temp 98 °F (36.7 °C)   SpO2 100%     Physical Exam  Vitals reviewed.   Constitutional:       General: She is not in acute distress.  HENT:      Head: Normocephalic.   Eyes:      Pupils: Pupils are equal, round, and reactive to light.   Cardiovascular:      Rate and Rhythm: Normal rate and regular rhythm.      Heart sounds: Normal heart sounds.   Pulmonary:      Effort: Pulmonary effort is normal. No respiratory distress.      Breath sounds: Normal breath sounds.   Neurological:      Mental Status: She is alert.   Psychiatric:         Mood and Affect: Mood normal.         Result Review :            Assessment and Plan    Diagnoses and all orders for this visit:    1. Dyspnea, unspecified type (Primary)    pt is a poor historian, as well as her family. Appt lists was made  for shortness of breath, but pt does not have any shortness of breath today. Her oxygent sat is 100%. We also did a walking O2 sat with her today and lowest she gets is 96%. No acute distress. No chest pain or cough. Would not make any changes at this time. If caregiver at [facility] have other concern, please give me a call and happy to assist.    I discussed the patients findings and my recommendations with patient.  Patient was encouraged to keep me informed of any acute changes, lack of improvement, or any new concerning symptoms.  Patient voiced understanding of all instructions and denied further questions.      Follow Up   Return in about 4 weeks (around 4/15/2022), or if symptoms worsen or fail to improve, for Medicare Wellness.      Electronically signed by:    ELVIS Hernandez  03/18/2022

## 2022-04-15 ENCOUNTER — OFFICE VISIT (OUTPATIENT)
Dept: INTERNAL MEDICINE | Facility: CLINIC | Age: 82
End: 2022-04-15

## 2022-04-15 VITALS
HEART RATE: 57 BPM | HEIGHT: 66 IN | SYSTOLIC BLOOD PRESSURE: 134 MMHG | OXYGEN SATURATION: 99 % | BODY MASS INDEX: 18.93 KG/M2 | WEIGHT: 117.8 LBS | DIASTOLIC BLOOD PRESSURE: 70 MMHG

## 2022-04-15 DIAGNOSIS — R41.3 MEMORY LOSS: Primary | ICD-10-CM

## 2022-04-15 DIAGNOSIS — L30.9 ECZEMA OF FACE: ICD-10-CM

## 2022-04-15 DIAGNOSIS — G25.0 BENIGN ESSENTIAL TREMOR: ICD-10-CM

## 2022-04-15 PROCEDURE — 99214 OFFICE O/P EST MOD 30 MIN: CPT | Performed by: INTERNAL MEDICINE

## 2022-04-15 RX ORDER — BETAMETHASONE DIPROPIONATE 0.5 MG/G
LOTION TOPICAL 2 TIMES DAILY
Qty: 60 ML | Refills: 2 | Status: SHIPPED | OUTPATIENT
Start: 2022-04-15

## 2022-04-15 RX ORDER — PRIMIDONE 50 MG/1
TABLET ORAL
Qty: 270 TABLET | Refills: 1 | Status: ON HOLD | OUTPATIENT
Start: 2022-04-15 | End: 2022-08-11 | Stop reason: SDUPTHER

## 2022-04-15 NOTE — PROGRESS NOTES
Hypertension, Eczema (Around mouth), Medication Problem (Pt is not sure what meds she takes), and Tremors    Subjective   Zohra Hall is a 81 y.o. female is here today for follow-up.    History of Present Illness   Zohra presents after a long hiatus, accompanied by her son.she is now at [facility] since her fall, head injury and traumatic SAH,SDH and cerebral bleed..  Reports she has been having more tremors.  Son feels like she is having increased memory issues.  Here for a follow up on her htn, hlp and recent NSTEMI last year.      Current Outpatient Medications:   •  primidone (MYSOLINE) 50 MG tablet, 3 PO qam and 2 PO QPM, Disp: 270 tablet, Rfl: 1  •  acetaminophen (TYLENOL) 325 MG tablet, Take 2 tablets by mouth Every 4 (Four) Hours As Needed for Mild Pain ., Disp:  , Rfl:   •  alendronate (FOSAMAX) 70 MG tablet, TAKE 1 TABLET BY MOUTH  EVERY 7 DAYS, Disp: 12 tablet, Rfl: 3  •  aluminum-magnesium hydroxide-simethicone (MAALOX MAX) 400-400-40 MG/5ML suspension, Take 15 mL by mouth Every 6 (Six) Hours As Needed for Indigestion or Heartburn., Disp:  , Rfl:   •  amLODIPine (NORVASC) 10 MG tablet, TAKE 1 TABLET BY MOUTH  DAILY, Disp: 90 tablet, Rfl: 3  •  aspirin 81 MG EC tablet, Take 1 tablet by mouth Daily., Disp: 30 tablet, Rfl: 0  •  atorvastatin (LIPITOR) 40 MG tablet, Take 1 tablet by mouth Daily., Disp: 90 tablet, Rfl: 3  •  betamethasone dipropionate (DIPROLENE) 0.05 % lotion, Apply  topically to the appropriate area as directed 2 (Two) Times a Day., Disp: 60 mL, Rfl: 2  •  clopidogrel (PLAVIX) 75 MG tablet, Take 1 tablet by mouth Daily., Disp: 30 tablet, Rfl: 0  •  docusate sodium (COLACE) 250 MG capsule, Take 1 capsule by mouth Daily. For constipation, Disp: 90 capsule, Rfl: 1  •  enalapril (VASOTEC) 20 MG tablet, Take 0.5 tablets by mouth Daily., Disp: 180 tablet, Rfl: 1  •  famotidine (PEPCID) 20 MG tablet, TAKE 1 TABLET BY MOUTH  TWICE DAILY BEFORE MEALS, Disp: 180 tablet, Rfl: 1  •   "levothyroxine (SYNTHROID, LEVOTHROID) 150 MCG tablet, TAKE 1 TABLET BY MOUTH  DAILY, Disp: 90 tablet, Rfl: 3  •  metoprolol succinate XL (TOPROL-XL) 25 MG 24 hr tablet, Take 1 tablet by mouth Daily., Disp: 30 tablet, Rfl: 0  •  ondansetron (ZOFRAN) 4 MG tablet, Take 1 tablet by mouth Every 6 (Six) Hours As Needed for Nausea or Vomiting., Disp:  , Rfl:       The following portions of the patient's history were reviewed and updated as appropriate: allergies, current medications, past family history, past medical history, past social history, past surgical history and problem list.    Review of Systems   Constitutional: Positive for activity change, appetite change, fatigue and unexpected weight change. Negative for chills and fever.   HENT: Negative for ear discharge, ear pain, sinus pressure and sore throat.    Respiratory: Negative for cough, chest tightness and shortness of breath.    Cardiovascular: Negative for chest pain, palpitations and leg swelling.   Gastrointestinal: Negative for diarrhea, nausea and vomiting.   Musculoskeletal: Positive for arthralgias and myalgias. Negative for back pain.   Neurological: Positive for tremors, speech difficulty and weakness. Negative for dizziness, syncope and headaches.   Psychiatric/Behavioral: Positive for dysphoric mood. Negative for confusion and sleep disturbance.       Objective   /70   Pulse 57   Ht 167.6 cm (66\")   Wt 53.4 kg (117 lb 12.8 oz)   SpO2 99% Comment: ra  BMI 19.01 kg/m²   Physical Exam  Vitals and nursing note reviewed.   Constitutional:       Appearance: She is well-developed. She is ill-appearing.   HENT:      Head: Normocephalic and atraumatic.      Right Ear: External ear normal.      Left Ear: External ear normal.      Mouth/Throat:      Pharynx: No oropharyngeal exudate.   Eyes:      Conjunctiva/sclera: Conjunctivae normal.      Pupils: Pupils are equal, round, and reactive to light.   Neck:      Thyroid: No thyromegaly. "   Cardiovascular:      Rate and Rhythm: Normal rate and regular rhythm.      Pulses: Normal pulses.      Heart sounds: Normal heart sounds. No murmur heard.    No friction rub. No gallop.   Pulmonary:      Effort: Pulmonary effort is normal.      Breath sounds: Normal breath sounds.   Abdominal:      General: Bowel sounds are normal. There is no distension.      Palpations: Abdomen is soft.      Tenderness: There is no abdominal tenderness.   Musculoskeletal:         General: No swelling or tenderness.      Cervical back: Neck supple.   Skin:     General: Skin is warm and dry.      Findings: Rash (peeling skin around mouth) present.   Neurological:      Mental Status: She is alert and oriented to person, place, and time.      Cranial Nerves: No cranial nerve deficit.      Gait: Gait abnormal.      Comments: + tremor   Psychiatric:         Judgment: Judgment normal.           Results for orders placed or performed during the hospital encounter of 01/01/22   COVID-19 and FLU A/B PCR - Swab, Nasopharynx    Specimen: Nasopharynx; Swab   Result Value Ref Range    COVID19 Not Detected Not Detected - Ref. Range    Influenza A PCR Not Detected Not Detected    Influenza B PCR Not Detected Not Detected   Troponin    Specimen: Blood   Result Value Ref Range    Troponin T <0.010 0.000 - 0.030 ng/mL   Troponin    Specimen: Blood   Result Value Ref Range    Troponin T <0.010 0.000 - 0.030 ng/mL   Comprehensive Metabolic Panel    Specimen: Blood   Result Value Ref Range    Glucose 113 (H) 65 - 99 mg/dL    BUN 23 8 - 23 mg/dL    Creatinine 0.97 0.57 - 1.00 mg/dL    Sodium 140 136 - 145 mmol/L    Potassium 3.8 3.5 - 5.2 mmol/L    Chloride 103 98 - 107 mmol/L    CO2 27.0 22.0 - 29.0 mmol/L    Calcium 9.3 8.6 - 10.5 mg/dL    Total Protein 6.4 6.0 - 8.5 g/dL    Albumin 3.90 3.50 - 5.20 g/dL    ALT (SGPT) 17 1 - 33 U/L    AST (SGOT) 20 1 - 32 U/L    Alkaline Phosphatase 85 39 - 117 U/L    Total Bilirubin 0.2 0.0 - 1.2 mg/dL    eGFR Non   Amer 55 (L) >60 mL/min/1.73    Globulin 2.5 gm/dL    A/G Ratio 1.6 g/dL    BUN/Creatinine Ratio 23.7 7.0 - 25.0    Anion Gap 10.0 5.0 - 15.0 mmol/L   Lipase    Specimen: Blood   Result Value Ref Range    Lipase 27 13 - 60 U/L   BNP    Specimen: Blood   Result Value Ref Range    proBNP 342.6 0.0-1,800.0 pg/mL   CBC Auto Differential    Specimen: Blood   Result Value Ref Range    WBC 6.03 3.40 - 10.80 10*3/mm3    RBC 4.13 3.77 - 5.28 10*6/mm3    Hemoglobin 11.7 (L) 12.0 - 15.9 g/dL    Hematocrit 36.3 34.0 - 46.6 %    MCV 87.9 79.0 - 97.0 fL    MCH 28.3 26.6 - 33.0 pg    MCHC 32.2 31.5 - 35.7 g/dL    RDW 15.7 (H) 12.3 - 15.4 %    RDW-SD 50.6 37.0 - 54.0 fl    MPV 10.2 6.0 - 12.0 fL    Platelets 305 140 - 450 10*3/mm3    Neutrophil % 64.5 42.7 - 76.0 %    Lymphocyte % 22.6 19.6 - 45.3 %    Monocyte % 9.3 5.0 - 12.0 %    Eosinophil % 2.8 0.3 - 6.2 %    Basophil % 0.5 0.0 - 1.5 %    Immature Grans % 0.3 0.0 - 0.5 %    Neutrophils, Absolute 3.89 1.70 - 7.00 10*3/mm3    Lymphocytes, Absolute 1.36 0.70 - 3.10 10*3/mm3    Monocytes, Absolute 0.56 0.10 - 0.90 10*3/mm3    Eosinophils, Absolute 0.17 0.00 - 0.40 10*3/mm3    Basophils, Absolute 0.03 0.00 - 0.20 10*3/mm3    Immature Grans, Absolute 0.02 0.00 - 0.05 10*3/mm3    nRBC 0.0 0.0 - 0.2 /100 WBC   ECG 12 Lead   Result Value Ref Range    QT Interval 382 ms    QTC Interval 438 ms   ECG 12 Lead   Result Value Ref Range    QT Interval 396 ms    QTC Interval 448 ms   Green Top (Gel)   Result Value Ref Range    Extra Tube Hold for add-ons.    Lavender Top   Result Value Ref Range    Extra Tube hold for add-on    Gold Top - SST   Result Value Ref Range    Extra Tube Hold for add-ons.    Gray Top   Result Value Ref Range    Extra Tube Hold for add-ons.    Light Blue Top   Result Value Ref Range    Extra Tube hold for add-on              Assessment/Plan   Diagnoses and all orders for this visit:    Memory loss  Comments:  check CT and if negative, proceed with aricept and  Neurology referral.  Orders:  -     CT Head Without Contrast; Future    Benign essential tremor  -     primidone (MYSOLINE) 50 MG tablet; 3 PO qam and 2 PO QPM    Eczema of face  -     betamethasone dipropionate (DIPROLENE) 0.05 % lotion; Apply  topically to the appropriate area as directed 2 (Two) Times a Day.                 Return in about 3 months (around 7/15/2022) for Next scheduled follow up.    Electronically signed by:    Aleah Regan MD

## 2022-04-19 ENCOUNTER — TELEPHONE (OUTPATIENT)
Dept: INTERNAL MEDICINE | Facility: CLINIC | Age: 82
End: 2022-04-19

## 2022-04-19 NOTE — TELEPHONE ENCOUNTER
Pt is now living [facility], room 2113  The last prescription requested, betamethasone dipropionate 0.05% lotion, was sent to [facility] mail order, so Ms. Hall's son doesn't think that she has gotten that medicine.  He isn't sure what pharmacy needed to be used in order to get the medicine to his mom at [facility] but asked if we could call and ask the nurse there.

## 2022-05-10 ENCOUNTER — TELEPHONE (OUTPATIENT)
Dept: INTERNAL MEDICINE | Facility: CLINIC | Age: 82
End: 2022-05-10

## 2022-05-10 NOTE — TELEPHONE ENCOUNTER
Caller: Joey Diaz (POA)    Relationship: Emergency Contact    Best call back number: [phone]    What is the medical concern/diagnosis: HEAD INJURY 1 YEAR AGO    What specialty or service is being requested: NEUROLOGY    What is the provider, practice or medical service name: PCP DISCRETION     What is the office location: N A    What is the office phone number: N A    Any additional details: PATIENT WAS ADMITTED TO [facility] 1 YEAR AGO AND SAW A NEUROLOGIST  WHILE IN THE HOSPITAL

## 2022-05-20 ENCOUNTER — HOSPITAL ENCOUNTER (EMERGENCY)
Facility: HOSPITAL | Age: 82
Discharge: NURSING, REHAB OR LONG-TERM CARE | End: 2022-05-20
Attending: EMERGENCY MEDICINE | Admitting: EMERGENCY MEDICINE

## 2022-05-20 ENCOUNTER — APPOINTMENT (OUTPATIENT)
Dept: CT IMAGING | Facility: HOSPITAL | Age: 82
End: 2022-05-20

## 2022-05-20 VITALS
RESPIRATION RATE: 16 BRPM | BODY MASS INDEX: 19.66 KG/M2 | TEMPERATURE: 98.3 F | HEIGHT: 65 IN | OXYGEN SATURATION: 98 % | HEART RATE: 70 BPM | DIASTOLIC BLOOD PRESSURE: 83 MMHG | WEIGHT: 118 LBS | SYSTOLIC BLOOD PRESSURE: 177 MMHG

## 2022-05-20 DIAGNOSIS — G89.29 CHRONIC LOW BACK PAIN WITHOUT SCIATICA, UNSPECIFIED BACK PAIN LATERALITY: Primary | ICD-10-CM

## 2022-05-20 DIAGNOSIS — M54.50 CHRONIC LOW BACK PAIN WITHOUT SCIATICA, UNSPECIFIED BACK PAIN LATERALITY: Primary | ICD-10-CM

## 2022-05-20 LAB
ALBUMIN SERPL-MCNC: 3.8 G/DL (ref 3.5–5.2)
ALBUMIN/GLOB SERPL: 1.4 G/DL
ALP SERPL-CCNC: 83 U/L (ref 39–117)
ALT SERPL W P-5'-P-CCNC: 14 U/L (ref 1–33)
ANION GAP SERPL CALCULATED.3IONS-SCNC: 10 MMOL/L (ref 5–15)
AST SERPL-CCNC: 17 U/L (ref 1–32)
BASOPHILS # BLD AUTO: 0.03 10*3/MM3 (ref 0–0.2)
BASOPHILS NFR BLD AUTO: 0.6 % (ref 0–1.5)
BILIRUB SERPL-MCNC: 0.2 MG/DL (ref 0–1.2)
BILIRUB UR QL STRIP: NEGATIVE
BUN SERPL-MCNC: 14 MG/DL (ref 8–23)
BUN/CREAT SERPL: 17.3 (ref 7–25)
CALCIUM SPEC-SCNC: 9.1 MG/DL (ref 8.6–10.5)
CHLORIDE SERPL-SCNC: 103 MMOL/L (ref 98–107)
CLARITY UR: CLEAR
CO2 SERPL-SCNC: 25 MMOL/L (ref 22–29)
COLOR UR: YELLOW
CREAT BLDA-MCNC: 0.7 MG/DL (ref 0.6–1.3)
CREAT SERPL-MCNC: 0.81 MG/DL (ref 0.57–1)
DEPRECATED RDW RBC AUTO: 48.5 FL (ref 37–54)
EGFRCR SERPLBLD CKD-EPI 2021: 72.1 ML/MIN/1.73
EOSINOPHIL # BLD AUTO: 0.06 10*3/MM3 (ref 0–0.4)
EOSINOPHIL NFR BLD AUTO: 1.1 % (ref 0.3–6.2)
ERYTHROCYTE [DISTWIDTH] IN BLOOD BY AUTOMATED COUNT: 15.2 % (ref 12.3–15.4)
GLOBULIN UR ELPH-MCNC: 2.7 GM/DL
GLUCOSE SERPL-MCNC: 100 MG/DL (ref 65–99)
GLUCOSE UR STRIP-MCNC: NEGATIVE MG/DL
HCT VFR BLD AUTO: 37.5 % (ref 34–46.6)
HGB BLD-MCNC: 11.9 G/DL (ref 12–15.9)
HGB UR QL STRIP.AUTO: NEGATIVE
IMM GRANULOCYTES # BLD AUTO: 0.03 10*3/MM3 (ref 0–0.05)
IMM GRANULOCYTES NFR BLD AUTO: 0.6 % (ref 0–0.5)
INR PPP: 1 (ref 0.84–1.13)
KETONES UR QL STRIP: NEGATIVE
LEUKOCYTE ESTERASE UR QL STRIP.AUTO: NEGATIVE
LIPASE SERPL-CCNC: 28 U/L (ref 13–60)
LYMPHOCYTES # BLD AUTO: 1.08 10*3/MM3 (ref 0.7–3.1)
LYMPHOCYTES NFR BLD AUTO: 20.7 % (ref 19.6–45.3)
MAGNESIUM SERPL-MCNC: 1.6 MG/DL (ref 1.6–2.4)
MCH RBC QN AUTO: 27.7 PG (ref 26.6–33)
MCHC RBC AUTO-ENTMCNC: 31.7 G/DL (ref 31.5–35.7)
MCV RBC AUTO: 87.2 FL (ref 79–97)
MONOCYTES # BLD AUTO: 0.4 10*3/MM3 (ref 0.1–0.9)
MONOCYTES NFR BLD AUTO: 7.6 % (ref 5–12)
NEUTROPHILS NFR BLD AUTO: 3.63 10*3/MM3 (ref 1.7–7)
NEUTROPHILS NFR BLD AUTO: 69.4 % (ref 42.7–76)
NITRITE UR QL STRIP: NEGATIVE
NRBC BLD AUTO-RTO: 0 /100 WBC (ref 0–0.2)
PH UR STRIP.AUTO: 6.5 [PH] (ref 5–8)
PLATELET # BLD AUTO: 250 10*3/MM3 (ref 140–450)
PMV BLD AUTO: 10.6 FL (ref 6–12)
POTASSIUM SERPL-SCNC: 3.8 MMOL/L (ref 3.5–5.2)
PROT SERPL-MCNC: 6.5 G/DL (ref 6–8.5)
PROT UR QL STRIP: NEGATIVE
PROTHROMBIN TIME: 13.2 SECONDS (ref 11.4–14.4)
RBC # BLD AUTO: 4.3 10*6/MM3 (ref 3.77–5.28)
SODIUM SERPL-SCNC: 138 MMOL/L (ref 136–145)
SP GR UR STRIP: 1.01 (ref 1–1.03)
UROBILINOGEN UR QL STRIP: NORMAL
WBC NRBC COR # BLD: 5.23 10*3/MM3 (ref 3.4–10.8)

## 2022-05-20 PROCEDURE — 80053 COMPREHEN METABOLIC PANEL: CPT | Performed by: EMERGENCY MEDICINE

## 2022-05-20 PROCEDURE — 25010000002 KETOROLAC TROMETHAMINE PER 15 MG: Performed by: EMERGENCY MEDICINE

## 2022-05-20 PROCEDURE — 99283 EMERGENCY DEPT VISIT LOW MDM: CPT

## 2022-05-20 PROCEDURE — 81003 URINALYSIS AUTO W/O SCOPE: CPT | Performed by: EMERGENCY MEDICINE

## 2022-05-20 PROCEDURE — 85610 PROTHROMBIN TIME: CPT | Performed by: EMERGENCY MEDICINE

## 2022-05-20 PROCEDURE — 83735 ASSAY OF MAGNESIUM: CPT | Performed by: EMERGENCY MEDICINE

## 2022-05-20 PROCEDURE — 25010000002 IOPAMIDOL 61 % SOLUTION: Performed by: EMERGENCY MEDICINE

## 2022-05-20 PROCEDURE — 83690 ASSAY OF LIPASE: CPT | Performed by: EMERGENCY MEDICINE

## 2022-05-20 PROCEDURE — 74177 CT ABD & PELVIS W/CONTRAST: CPT

## 2022-05-20 PROCEDURE — 96374 THER/PROPH/DIAG INJ IV PUSH: CPT

## 2022-05-20 PROCEDURE — 82565 ASSAY OF CREATININE: CPT

## 2022-05-20 PROCEDURE — 96375 TX/PRO/DX INJ NEW DRUG ADDON: CPT

## 2022-05-20 PROCEDURE — 25010000002 ONDANSETRON PER 1 MG: Performed by: EMERGENCY MEDICINE

## 2022-05-20 PROCEDURE — 85025 COMPLETE CBC W/AUTO DIFF WBC: CPT | Performed by: EMERGENCY MEDICINE

## 2022-05-20 RX ORDER — FENTANYL CITRATE 50 UG/ML
50 INJECTION, SOLUTION INTRAMUSCULAR; INTRAVENOUS ONCE
Status: DISCONTINUED | OUTPATIENT
Start: 2022-05-20 | End: 2022-05-20 | Stop reason: HOSPADM

## 2022-05-20 RX ORDER — SODIUM CHLORIDE 0.9 % (FLUSH) 0.9 %
10 SYRINGE (ML) INJECTION AS NEEDED
Status: DISCONTINUED | OUTPATIENT
Start: 2022-05-20 | End: 2022-05-20 | Stop reason: HOSPADM

## 2022-05-20 RX ORDER — KETOROLAC TROMETHAMINE 15 MG/ML
15 INJECTION, SOLUTION INTRAMUSCULAR; INTRAVENOUS ONCE
Status: COMPLETED | OUTPATIENT
Start: 2022-05-20 | End: 2022-05-20

## 2022-05-20 RX ORDER — ONDANSETRON 2 MG/ML
4 INJECTION INTRAMUSCULAR; INTRAVENOUS ONCE
Status: COMPLETED | OUTPATIENT
Start: 2022-05-20 | End: 2022-05-20

## 2022-05-20 RX ADMIN — IOPAMIDOL 100 ML: 612 INJECTION, SOLUTION INTRAVENOUS at 12:51

## 2022-05-20 RX ADMIN — KETOROLAC TROMETHAMINE 15 MG: 15 INJECTION, SOLUTION INTRAMUSCULAR; INTRAVENOUS at 14:13

## 2022-05-20 RX ADMIN — SODIUM CHLORIDE 500 ML: 9 INJECTION, SOLUTION INTRAVENOUS at 11:46

## 2022-05-20 RX ADMIN — ONDANSETRON 4 MG: 2 INJECTION INTRAMUSCULAR; INTRAVENOUS at 11:51

## 2022-05-20 NOTE — ED PROVIDER NOTES
"Subjective   81-year-old female presents via EMS from the nursing home to be evaluated for acute on chronic atraumatic low back pain.  The patient states that she has been suffering from low back pain for the past 3 to 4 years.  Over the past few days, she notes increased pain in her low back despite no preceding injury or trauma.  She is unsure as to what may have triggered the exacerbation in her pain.  The pain is limited to her low back and nonradiating in nature.  She states that walking is difficult secondary to the pain.  No radicular symptoms.  No IV drug use.  No fevers, chills, or unintentional weight loss.  She denies any urinary symptoms.  Per EMS, the patient's room had a \"fell urine odor smell\" to it and they were concerned that she could potentially have a \"kidney infection.\"  The patient currently rates her pain at 7 out of 10 in severity.          Review of Systems   Musculoskeletal: Positive for back pain.   All other systems reviewed and are negative.      Past Medical History:   Diagnosis Date   • Breast cancer (HCC) 1987    left- pt states \"in situ\", no radiation, Tamoxifen for 5 years   • Cellulitis    • Cervical lymphadenopathy    • Chest pain    • Convulsions (HCC)    • GERD (gastroesophageal reflux disease)    • Glossitis    • Grief reaction     · Last Impression: 29 Jan 2015  counselled, given ativan for prn use.  Aleah Regan   • Hypertension    • Lower back pain    • Oral thrush    • Papillary adenocarcinoma (HCC)     Papillary adenocarcinoma of thyroid   • Papillary adenocarcinoma of thyroid (HCC)     · resection Ramirez- 1/13,  2 x 2 x 1.5 cm  T3 N1 MXpost op low calcium and diminished  voiceablation Ain- 3/7 metastatic nodes and invasion to strap muscles · Last Impression: 29 Oct 2014  s/p Eval by berry Méndez.  Aleah Regan (Internal   • Piriformis syndrome    • Tingling    • Xerostomia        Allergies   Allergen Reactions   • Dilaudid [Hydromorphone Hcl] Hallucinations "     TABS   • Beta Adrenergic Blockers Other (See Comments)     Hypotension / bradycadia   • Dilaudid [Hydromorphone] Unknown - High Severity   • Lactose Intolerance (Gi) Diarrhea       Past Surgical History:   Procedure Laterality Date   • BREAST BIOPSY Right 10/10/2013    stereo bx   • BREAST BIOPSY Left 10/19/2015    stereo bx   • BREAST CYST EXCISION      right   • BREAST EXCISIONAL BIOPSY Right     affirm bx and loc 2016.   • BREAST LUMPECTOMY Left    • CARDIAC CATHETERIZATION N/A 2021    Procedure: Left Heart Cath;  Surgeon: Alonso Ross IV, MD;  Location:  [facility];  Service: Cardiology;  Laterality: N/A;   • HYSTERECTOMY     • OTHER SURGICAL HISTORY Right     right arm surgery-steel roger in place   • TOTAL HIP ARTHROPLASTY     • TOTAL THYROIDECTOMY      Thyroid Surgery Total Thyroidectomy       Family History   Problem Relation Age of Onset   • Breast cancer Mother 84   • Cancer Mother    • BRCA 1/2 Neg Hx    • Colon cancer Neg Hx    • Endometrial cancer Neg Hx    • Ovarian cancer Neg Hx        Social History     Socioeconomic History   • Marital status:    Tobacco Use   • Smoking status: Former Smoker     Packs/day: 1.00     Years: 30.00     Pack years: 30.00     Types: Cigarettes     Quit date: 1992     Years since quittin.7   • Smokeless tobacco: Never Used   Vaping Use   • Vaping Use: Never used   Substance and Sexual Activity   • Alcohol use: No   • Drug use: No   • Sexual activity: Not Currently           Objective   Physical Exam  Vitals and nursing note reviewed.   Constitutional:       General: She is not in acute distress.     Appearance: She is well-developed. She is not diaphoretic.      Comments: Nontoxic-appearing elderly female   HENT:      Head: Normocephalic and atraumatic.   Eyes:      Pupils: Pupils are equal, round, and reactive to light.   Cardiovascular:      Rate and Rhythm: Normal rate and regular rhythm.      Heart sounds:  Normal heart sounds. No murmur heard.    No friction rub. No gallop.   Pulmonary:      Effort: Pulmonary effort is normal. No respiratory distress.      Breath sounds: Normal breath sounds. No wheezing or rales.   Abdominal:      General: Bowel sounds are normal. There is no distension.      Palpations: Abdomen is soft. There is no mass.      Tenderness: There is no abdominal tenderness. There is no guarding or rebound.      Comments: No focal abdominal tenderness, no peritoneal signs, no pain out of proportion to exam   Musculoskeletal:         General: Normal range of motion.      Cervical back: Neck supple.      Comments: No midline lumbar tenderness noted, no step-off or deformity present   Skin:     General: Skin is warm and dry.      Findings: No erythema or rash.      Comments: No dermatomal rash noted to back or flank   Neurological:      General: No focal deficit present.      Mental Status: She is alert and oriented to person, place, and time.      Comments: Awake, alert, and oriented x3, clear and fluent speech, no ataxia or dysmetria, neurovascularly intact distally in all fours with bounding distal pulses and normal sensation, 5 out of 5 strength in all fours, no cranial nerve deficits noted with cranial nerves II through XII grossly intact, no saddle anesthesia present   Psychiatric:         Mood and Affect: Mood normal.         Thought Content: Thought content normal.         Judgment: Judgment normal.         Procedures           ED Course  ED Course as of 05/20/22 1549   Fri May 20, 2022   1047 81-year-old female presents for evaluation of atraumatic low back pain.  The patient states that she has been experiencing pain in her low back now for the past 3 to 4 years but notes that it is worse over the past few days.  No preceding injury or trauma.  On arrival to the ED, the patient is nontoxic-appearing.  Benign exam.  No neurological deficits noted.  No saddle anesthesia present.  No midline lumbar  spine tenderness noted and no step-off or deformity present.  We will obtain labs and imaging, and we will reassess following initial interventions. [DD]   1216 Labs are unrevealing. [DD]   1343 CT of abdomen/pelvis is unremarkable.  Symptoms appear consistent with musculoskeletal pain.  Doubt emergent central process at this time.  No indication for emergent neuroimaging at this time.  Reassured and counseled regarding symptomatic management.  Encouraged NSAIDs and Tylenol as needed.  The patient will follow up with her primary care physician within the next week.  Agreeable with plan and given appropriate strict return precautions. [DD]      ED Course User Index  [DD] Marquis Lewis MD                                         Recent Results (from the past 24 hour(s))   Comprehensive Metabolic Panel    Collection Time: 05/20/22 11:08 AM    Specimen: Blood   Result Value Ref Range    Glucose 100 (H) 65 - 99 mg/dL    BUN 14 8 - 23 mg/dL    Creatinine 0.81 0.57 - 1.00 mg/dL    Sodium 138 136 - 145 mmol/L    Potassium 3.8 3.5 - 5.2 mmol/L    Chloride 103 98 - 107 mmol/L    CO2 25.0 22.0 - 29.0 mmol/L    Calcium 9.1 8.6 - 10.5 mg/dL    Total Protein 6.5 6.0 - 8.5 g/dL    Albumin 3.80 3.50 - 5.20 g/dL    ALT (SGPT) 14 1 - 33 U/L    AST (SGOT) 17 1 - 32 U/L    Alkaline Phosphatase 83 39 - 117 U/L    Total Bilirubin 0.2 0.0 - 1.2 mg/dL    Globulin 2.7 gm/dL    A/G Ratio 1.4 g/dL    BUN/Creatinine Ratio 17.3 7.0 - 25.0    Anion Gap 10.0 5.0 - 15.0 mmol/L    eGFR 72.1 >60.0 mL/min/1.73   Protime-INR    Collection Time: 05/20/22 11:08 AM    Specimen: Blood   Result Value Ref Range    Protime 13.2 11.4 - 14.4 Seconds    INR 1.00 0.84 - 1.13   Lipase    Collection Time: 05/20/22 11:08 AM    Specimen: Blood   Result Value Ref Range    Lipase 28 13 - 60 U/L   Magnesium    Collection Time: 05/20/22 11:08 AM    Specimen: Blood   Result Value Ref Range    Magnesium 1.6 1.6 - 2.4 mg/dL   CBC Auto Differential    Collection Time:  05/20/22 11:08 AM    Specimen: Blood   Result Value Ref Range    WBC 5.23 3.40 - 10.80 10*3/mm3    RBC 4.30 3.77 - 5.28 10*6/mm3    Hemoglobin 11.9 (L) 12.0 - 15.9 g/dL    Hematocrit 37.5 34.0 - 46.6 %    MCV 87.2 79.0 - 97.0 fL    MCH 27.7 26.6 - 33.0 pg    MCHC 31.7 31.5 - 35.7 g/dL    RDW 15.2 12.3 - 15.4 %    RDW-SD 48.5 37.0 - 54.0 fl    MPV 10.6 6.0 - 12.0 fL    Platelets 250 140 - 450 10*3/mm3    Neutrophil % 69.4 42.7 - 76.0 %    Lymphocyte % 20.7 19.6 - 45.3 %    Monocyte % 7.6 5.0 - 12.0 %    Eosinophil % 1.1 0.3 - 6.2 %    Basophil % 0.6 0.0 - 1.5 %    Immature Grans % 0.6 (H) 0.0 - 0.5 %    Neutrophils, Absolute 3.63 1.70 - 7.00 10*3/mm3    Lymphocytes, Absolute 1.08 0.70 - 3.10 10*3/mm3    Monocytes, Absolute 0.40 0.10 - 0.90 10*3/mm3    Eosinophils, Absolute 0.06 0.00 - 0.40 10*3/mm3    Basophils, Absolute 0.03 0.00 - 0.20 10*3/mm3    Immature Grans, Absolute 0.03 0.00 - 0.05 10*3/mm3    nRBC 0.0 0.0 - 0.2 /100 WBC   POC Creatinine    Collection Time: 05/20/22 11:13 AM    Specimen: Blood   Result Value Ref Range    Creatinine 0.70 0.60 - 1.30 mg/dL   Urinalysis With Culture If Indicated - Urine, Clean Catch    Collection Time: 05/20/22 12:00 PM    Specimen: Urine, Clean Catch   Result Value Ref Range    Color, UA Yellow Yellow, Straw    Appearance, UA Clear Clear    pH, UA 6.5 5.0 - 8.0    Specific Gravity, UA 1.011 1.001 - 1.030    Glucose, UA Negative Negative    Ketones, UA Negative Negative    Bilirubin, UA Negative Negative    Blood, UA Negative Negative    Protein, UA Negative Negative    Leuk Esterase, UA Negative Negative    Nitrite, UA Negative Negative    Urobilinogen, UA 1.0 E.U./dL 0.2 - 1.0 E.U./dL     Note: In addition to lab results from this visit, the labs listed above may include labs taken at another facility or during a different encounter within the last 24 hours. Please correlate lab times with ED admission and discharge times for further clarification of the services performed  "during this visit.    CT Abdomen Pelvis With Contrast   Final Result       1.  No acute process seen within the abdomen or pelvis.   2.  Status post bilateral total hip arthroplasty as well as fusion from   the L3-L5 levels.       This report was finalized on 5/20/2022 1:33 PM by Malik Dietrich MD.            Vitals:    05/20/22 1034 05/20/22 1041 05/20/22 1042   BP:  177/83 177/83   Pulse:  71 70   Resp:   16   Temp:   98.3 °F (36.8 °C)   TempSrc:   Oral   SpO2:  99% 98%   Weight: 53.5 kg (118 lb)     Height: 165.1 cm (65\")       Medications   sodium chloride 0.9 % flush 10 mL (has no administration in time range)   fentaNYL citrate (PF) (SUBLIMAZE) injection 50 mcg (50 mcg Intravenous Not Given 5/20/22 1348)   sodium chloride 0.9 % bolus 500 mL (0 mL Intravenous Stopped 5/20/22 1349)   ondansetron (ZOFRAN) injection 4 mg (4 mg Intravenous Given 5/20/22 1151)   iopamidol (ISOVUE-300) 61 % injection 100 mL (100 mL Intravenous Given 5/20/22 1251)   ketorolac (TORADOL) injection 15 mg (15 mg Intravenous Given 5/20/22 1413)     ECG/EMG Results (last 24 hours)     ** No results found for the last 24 hours. **        No orders to display               MDM    Final diagnoses:   Chronic low back pain without sciatica, unspecified back pain laterality       ED Disposition  ED Disposition     ED Disposition   Discharge    Condition   Stable    Comment   --             Aleah Regan MD  [address] [place], [place] [id number] [phone]    In 1 week           Medication List      No changes were made to your prescriptions during this visit.          Marquis Lewis MD  05/20/22 8489    "

## 2022-05-23 ENCOUNTER — TELEPHONE (OUTPATIENT)
Dept: INTERNAL MEDICINE | Facility: CLINIC | Age: 82
End: 2022-05-23

## 2022-05-23 NOTE — TELEPHONE ENCOUNTER
It was started in [facility] for her low sodium.we did not have a record of her taking it.    They can cut it down to 1/2 tablet daily , and follow up with me in 6-8 weeks.  If her sodium is okay at that time, can stop.

## 2022-05-23 NOTE — TELEPHONE ENCOUNTER
Spoke to Joey, states that pt has been taking demeclocycline for months and is getting charged $350 a month.  Wants to know if ok to dc.  Advised Joey that we don't have this listed on her med list and have not been rx'ing this.  Joey states that Dr Regan's name is on script.  Please advise.

## 2022-05-23 NOTE — TELEPHONE ENCOUNTER
Pt grandson called stating that the pt has been on an antibiotic (unable to tell me name) for a while; pt grandson states it is very expensive & wanted to know if pt still needs to be on it?

## 2022-05-24 NOTE — TELEPHONE ENCOUNTER
Please double book at 2.30 pm on July 18th/ Monday) for a 30 min appointment.  ( the pateint currently in that spot will not be able to come)

## 2022-05-31 ENCOUNTER — HOSPITAL ENCOUNTER (EMERGENCY)
Facility: HOSPITAL | Age: 82
Discharge: NURSING, REHAB OR LONG-TERM CARE | End: 2022-06-01
Attending: EMERGENCY MEDICINE | Admitting: EMERGENCY MEDICINE

## 2022-05-31 ENCOUNTER — APPOINTMENT (OUTPATIENT)
Dept: GENERAL RADIOLOGY | Facility: HOSPITAL | Age: 82
End: 2022-05-31

## 2022-05-31 ENCOUNTER — APPOINTMENT (OUTPATIENT)
Dept: CT IMAGING | Facility: HOSPITAL | Age: 82
End: 2022-05-31

## 2022-05-31 VITALS
HEIGHT: 65 IN | HEART RATE: 65 BPM | BODY MASS INDEX: 19.66 KG/M2 | RESPIRATION RATE: 20 BRPM | OXYGEN SATURATION: 96 % | WEIGHT: 118 LBS | TEMPERATURE: 98.1 F | SYSTOLIC BLOOD PRESSURE: 153 MMHG | DIASTOLIC BLOOD PRESSURE: 85 MMHG

## 2022-05-31 DIAGNOSIS — R41.0 CONFUSION: ICD-10-CM

## 2022-05-31 DIAGNOSIS — M54.50 CHRONIC BILATERAL LOW BACK PAIN WITHOUT SCIATICA: Primary | ICD-10-CM

## 2022-05-31 DIAGNOSIS — G89.29 CHRONIC BILATERAL LOW BACK PAIN WITHOUT SCIATICA: Primary | ICD-10-CM

## 2022-05-31 LAB
ALBUMIN SERPL-MCNC: 3.4 G/DL (ref 3.5–5.2)
ALBUMIN/GLOB SERPL: 1.5 G/DL
ALP SERPL-CCNC: 69 U/L (ref 39–117)
ALT SERPL W P-5'-P-CCNC: 13 U/L (ref 1–33)
AMPHET+METHAMPHET UR QL: NEGATIVE
AMPHETAMINES UR QL: NEGATIVE
ANION GAP SERPL CALCULATED.3IONS-SCNC: 10 MMOL/L (ref 5–15)
APAP SERPL-MCNC: 7 MCG/ML (ref 0–30)
AST SERPL-CCNC: 18 U/L (ref 1–32)
BACTERIA UR QL AUTO: ABNORMAL /HPF
BARBITURATES UR QL SCN: POSITIVE
BASOPHILS # BLD AUTO: 0.02 10*3/MM3 (ref 0–0.2)
BASOPHILS NFR BLD AUTO: 0.4 % (ref 0–1.5)
BENZODIAZ UR QL SCN: NEGATIVE
BILIRUB SERPL-MCNC: <0.2 MG/DL (ref 0–1.2)
BILIRUB UR QL STRIP: NEGATIVE
BUN SERPL-MCNC: 17 MG/DL (ref 8–23)
BUN/CREAT SERPL: 20.2 (ref 7–25)
BUPRENORPHINE SERPL-MCNC: NEGATIVE NG/ML
CALCIUM SPEC-SCNC: 8.9 MG/DL (ref 8.6–10.5)
CANNABINOIDS SERPL QL: NEGATIVE
CHLORIDE SERPL-SCNC: 104 MMOL/L (ref 98–107)
CK SERPL-CCNC: 40 U/L (ref 20–180)
CLARITY UR: CLEAR
CO2 SERPL-SCNC: 23 MMOL/L (ref 22–29)
COCAINE UR QL: NEGATIVE
COLOR UR: YELLOW
CREAT SERPL-MCNC: 0.84 MG/DL (ref 0.57–1)
D-LACTATE SERPL-SCNC: 1.1 MMOL/L (ref 0.5–2)
DEPRECATED RDW RBC AUTO: 46.2 FL (ref 37–54)
EGFRCR SERPLBLD CKD-EPI 2021: 69 ML/MIN/1.73
EOSINOPHIL # BLD AUTO: 0.05 10*3/MM3 (ref 0–0.4)
EOSINOPHIL NFR BLD AUTO: 1 % (ref 0.3–6.2)
ERYTHROCYTE [DISTWIDTH] IN BLOOD BY AUTOMATED COUNT: 15.2 % (ref 12.3–15.4)
ETHANOL BLD-MCNC: <10 MG/DL (ref 0–10)
FLUAV RNA RESP QL NAA+PROBE: NOT DETECTED
FLUBV RNA RESP QL NAA+PROBE: NOT DETECTED
GLOBULIN UR ELPH-MCNC: 2.3 GM/DL
GLUCOSE SERPL-MCNC: 100 MG/DL (ref 65–99)
GLUCOSE UR STRIP-MCNC: NEGATIVE MG/DL
HCT VFR BLD AUTO: 33.3 % (ref 34–46.6)
HGB BLD-MCNC: 10.7 G/DL (ref 12–15.9)
HGB UR QL STRIP.AUTO: NEGATIVE
HOLD SPECIMEN: NORMAL
HYALINE CASTS UR QL AUTO: ABNORMAL /LPF
IMM GRANULOCYTES # BLD AUTO: 0.01 10*3/MM3 (ref 0–0.05)
IMM GRANULOCYTES NFR BLD AUTO: 0.2 % (ref 0–0.5)
INR PPP: 0.99 (ref 0.84–1.13)
KETONES UR QL STRIP: NEGATIVE
LEUKOCYTE ESTERASE UR QL STRIP.AUTO: NEGATIVE
LIPASE SERPL-CCNC: 32 U/L (ref 13–60)
LYMPHOCYTES # BLD AUTO: 1.28 10*3/MM3 (ref 0.7–3.1)
LYMPHOCYTES NFR BLD AUTO: 24.5 % (ref 19.6–45.3)
MAGNESIUM SERPL-MCNC: 1.6 MG/DL (ref 1.6–2.4)
MCH RBC QN AUTO: 27 PG (ref 26.6–33)
MCHC RBC AUTO-ENTMCNC: 32.1 G/DL (ref 31.5–35.7)
MCV RBC AUTO: 83.9 FL (ref 79–97)
METHADONE UR QL SCN: NEGATIVE
MONOCYTES # BLD AUTO: 0.54 10*3/MM3 (ref 0.1–0.9)
MONOCYTES NFR BLD AUTO: 10.3 % (ref 5–12)
NEUTROPHILS NFR BLD AUTO: 3.33 10*3/MM3 (ref 1.7–7)
NEUTROPHILS NFR BLD AUTO: 63.6 % (ref 42.7–76)
NITRITE UR QL STRIP: POSITIVE
NRBC BLD AUTO-RTO: 0 /100 WBC (ref 0–0.2)
OPIATES UR QL: NEGATIVE
OXYCODONE UR QL SCN: NEGATIVE
PCP UR QL SCN: NEGATIVE
PH UR STRIP.AUTO: 7 [PH] (ref 5–8)
PLATELET # BLD AUTO: 251 10*3/MM3 (ref 140–450)
PMV BLD AUTO: 10.3 FL (ref 6–12)
POTASSIUM SERPL-SCNC: 3.7 MMOL/L (ref 3.5–5.2)
PROCALCITONIN SERPL-MCNC: 0.07 NG/ML (ref 0–0.25)
PROPOXYPH UR QL: NEGATIVE
PROT SERPL-MCNC: 5.7 G/DL (ref 6–8.5)
PROT UR QL STRIP: NEGATIVE
PROTHROMBIN TIME: 13 SECONDS (ref 11.4–14.4)
RBC # BLD AUTO: 3.97 10*6/MM3 (ref 3.77–5.28)
RBC # UR STRIP: ABNORMAL /HPF
REF LAB TEST METHOD: ABNORMAL
SALICYLATES SERPL-MCNC: <0.3 MG/DL
SARS-COV-2 RNA RESP QL NAA+PROBE: NOT DETECTED
SODIUM SERPL-SCNC: 137 MMOL/L (ref 136–145)
SP GR UR STRIP: 1.02 (ref 1–1.03)
SQUAMOUS #/AREA URNS HPF: ABNORMAL /HPF
TRICYCLICS UR QL SCN: NEGATIVE
TROPONIN T SERPL-MCNC: <0.01 NG/ML (ref 0–0.03)
UROBILINOGEN UR QL STRIP: ABNORMAL
WBC # UR STRIP: ABNORMAL /HPF
WBC NRBC COR # BLD: 5.23 10*3/MM3 (ref 3.4–10.8)
WHOLE BLOOD HOLD COAG: NORMAL
WHOLE BLOOD HOLD SPECIMEN: NORMAL

## 2022-05-31 PROCEDURE — 85610 PROTHROMBIN TIME: CPT | Performed by: EMERGENCY MEDICINE

## 2022-05-31 PROCEDURE — 84484 ASSAY OF TROPONIN QUANT: CPT | Performed by: EMERGENCY MEDICINE

## 2022-05-31 PROCEDURE — 71045 X-RAY EXAM CHEST 1 VIEW: CPT

## 2022-05-31 PROCEDURE — 99284 EMERGENCY DEPT VISIT MOD MDM: CPT

## 2022-05-31 PROCEDURE — 80143 DRUG ASSAY ACETAMINOPHEN: CPT | Performed by: EMERGENCY MEDICINE

## 2022-05-31 PROCEDURE — 96374 THER/PROPH/DIAG INJ IV PUSH: CPT

## 2022-05-31 PROCEDURE — 36415 COLL VENOUS BLD VENIPUNCTURE: CPT

## 2022-05-31 PROCEDURE — 87636 SARSCOV2 & INF A&B AMP PRB: CPT | Performed by: EMERGENCY MEDICINE

## 2022-05-31 PROCEDURE — 93005 ELECTROCARDIOGRAM TRACING: CPT | Performed by: EMERGENCY MEDICINE

## 2022-05-31 PROCEDURE — 83735 ASSAY OF MAGNESIUM: CPT | Performed by: EMERGENCY MEDICINE

## 2022-05-31 PROCEDURE — 84145 PROCALCITONIN (PCT): CPT | Performed by: EMERGENCY MEDICINE

## 2022-05-31 PROCEDURE — P9612 CATHETERIZE FOR URINE SPEC: HCPCS

## 2022-05-31 PROCEDURE — 74177 CT ABD & PELVIS W/CONTRAST: CPT

## 2022-05-31 PROCEDURE — 87040 BLOOD CULTURE FOR BACTERIA: CPT | Performed by: EMERGENCY MEDICINE

## 2022-05-31 PROCEDURE — 80053 COMPREHEN METABOLIC PANEL: CPT | Performed by: EMERGENCY MEDICINE

## 2022-05-31 PROCEDURE — 82550 ASSAY OF CK (CPK): CPT | Performed by: EMERGENCY MEDICINE

## 2022-05-31 PROCEDURE — 85025 COMPLETE CBC W/AUTO DIFF WBC: CPT | Performed by: EMERGENCY MEDICINE

## 2022-05-31 PROCEDURE — 83690 ASSAY OF LIPASE: CPT | Performed by: EMERGENCY MEDICINE

## 2022-05-31 PROCEDURE — 72131 CT LUMBAR SPINE W/O DYE: CPT

## 2022-05-31 PROCEDURE — 80306 DRUG TEST PRSMV INSTRMNT: CPT | Performed by: EMERGENCY MEDICINE

## 2022-05-31 PROCEDURE — 25010000002 IOPAMIDOL 61 % SOLUTION: Performed by: EMERGENCY MEDICINE

## 2022-05-31 PROCEDURE — 81001 URINALYSIS AUTO W/SCOPE: CPT | Performed by: EMERGENCY MEDICINE

## 2022-05-31 PROCEDURE — 82077 ASSAY SPEC XCP UR&BREATH IA: CPT | Performed by: EMERGENCY MEDICINE

## 2022-05-31 PROCEDURE — 80179 DRUG ASSAY SALICYLATE: CPT | Performed by: EMERGENCY MEDICINE

## 2022-05-31 PROCEDURE — 25010000002 FENTANYL CITRATE (PF) 50 MCG/ML SOLUTION: Performed by: EMERGENCY MEDICINE

## 2022-05-31 PROCEDURE — 70450 CT HEAD/BRAIN W/O DYE: CPT

## 2022-05-31 PROCEDURE — 83605 ASSAY OF LACTIC ACID: CPT | Performed by: EMERGENCY MEDICINE

## 2022-05-31 RX ORDER — FENTANYL CITRATE 50 UG/ML
25 INJECTION, SOLUTION INTRAMUSCULAR; INTRAVENOUS ONCE
Status: COMPLETED | OUTPATIENT
Start: 2022-05-31 | End: 2022-05-31

## 2022-05-31 RX ORDER — SODIUM CHLORIDE 0.9 % (FLUSH) 0.9 %
10 SYRINGE (ML) INJECTION AS NEEDED
Status: DISCONTINUED | OUTPATIENT
Start: 2022-05-31 | End: 2022-06-01 | Stop reason: HOSPADM

## 2022-05-31 RX ADMIN — IOPAMIDOL 100 ML: 612 INJECTION, SOLUTION INTRAVENOUS at 19:34

## 2022-05-31 RX ADMIN — FENTANYL CITRATE 25 MCG: 50 INJECTION, SOLUTION INTRAMUSCULAR; INTRAVENOUS at 19:57

## 2022-05-31 RX ADMIN — SODIUM CHLORIDE, POTASSIUM CHLORIDE, SODIUM LACTATE AND CALCIUM CHLORIDE 500 ML: 600; 310; 30; 20 INJECTION, SOLUTION INTRAVENOUS at 19:57

## 2022-06-01 LAB
QT INTERVAL: 410 MS
QTC INTERVAL: 416 MS

## 2022-06-01 NOTE — ED PROVIDER NOTES
"Subjective   81-year-old female sent from her nursing home to be evaluated for chronic atraumatic low back pain and confusion.  Of note, the patient was seen here in the emergency department for chronic low back pain nearly 2 weeks ago.  Her work-up was negative at the time.  She presents today complaining of persistent pain in her low back.  According to nursing home staff, she also seemed a bit confused today when compared to her baseline so EMS was called and she was brought here.  No known fevers.  The patient is currently alert and denies feeling confused.  She denies any urinary symptoms.  She denies any bowel or bladder incontinence or retention.  She currently rates her pain at 7 out of 10 in severity.  The pain is limited to her low back.          Review of Systems   Musculoskeletal: Positive for back pain.   Psychiatric/Behavioral: Positive for confusion.   All other systems reviewed and are negative.      Past Medical History:   Diagnosis Date   • Breast cancer (HCC) 1987    left- pt states \"in situ\", no radiation, Tamoxifen for 5 years   • Cellulitis    • Cervical lymphadenopathy    • Chest pain    • Convulsions (HCC)    • GERD (gastroesophageal reflux disease)    • Glossitis    • Grief reaction     · Last Impression: 29 Jan 2015  counselled, given ativan for prn use.  Aleah Regan   • Hypertension    • Lower back pain    • Oral thrush    • Papillary adenocarcinoma (HCC)     Papillary adenocarcinoma of thyroid   • Papillary adenocarcinoma of thyroid (HCC)     · resection Ramirez- 1/13,  2 x 2 x 1.5 cm  T3 N1 MXpost op low calcium and diminished  voiceablation Ain- 3/7 metastatic nodes and invasion to strap muscles · Last Impression: 29 Oct 2014  s/p Eval by berry Méndez.  Aleah Regan (Internal   • Piriformis syndrome    • Tingling    • Xerostomia        Allergies   Allergen Reactions   • Dilaudid [Hydromorphone Hcl] Hallucinations     TABS   • Beta Adrenergic Blockers Other (See Comments) "     Hypotension / bradycadia   • Dilaudid [Hydromorphone] Unknown - High Severity   • Lactose Intolerance (Gi) Diarrhea       Past Surgical History:   Procedure Laterality Date   • BREAST BIOPSY Right 10/10/2013    stereo bx   • BREAST BIOPSY Left 10/19/2015    stereo bx   • BREAST CYST EXCISION      right   • BREAST EXCISIONAL BIOPSY Right     affirm bx and loc 2016.   • BREAST LUMPECTOMY Left    • CARDIAC CATHETERIZATION N/A 2021    Procedure: Left Heart Cath;  Surgeon: Alonso Ross IV, MD;  Location: [facility] CATH INVASIVE LOCATION;  Service: Cardiology;  Laterality: N/A;   • HYSTERECTOMY     • OTHER SURGICAL HISTORY Right     right arm surgery-steel roger in place   • TOTAL HIP ARTHROPLASTY     • TOTAL THYROIDECTOMY      Thyroid Surgery Total Thyroidectomy       Family History   Problem Relation Age of Onset   • Breast cancer Mother 84   • Cancer Mother    • BRCA 1/2 Neg Hx    • Colon cancer Neg Hx    • Endometrial cancer Neg Hx    • Ovarian cancer Neg Hx        Social History     Socioeconomic History   • Marital status:    Tobacco Use   • Smoking status: Former Smoker     Packs/day: 1.00     Years: 30.00     Pack years: 30.00     Types: Cigarettes     Quit date: 1992     Years since quittin.7   • Smokeless tobacco: Never Used   Vaping Use   • Vaping Use: Never used   Substance and Sexual Activity   • Alcohol use: No   • Drug use: No   • Sexual activity: Not Currently           Objective   Physical Exam  Vitals and nursing note reviewed.   Constitutional:       General: She is not in acute distress.     Appearance: She is well-developed. She is not diaphoretic.      Comments: Nontoxic-appearing elderly female   HENT:      Head: Normocephalic and atraumatic.   Eyes:      Pupils: Pupils are equal, round, and reactive to light.   Cardiovascular:      Rate and Rhythm: Normal rate and regular rhythm.      Heart sounds: Normal heart sounds. No murmur heard.    No friction rub.  No gallop.   Pulmonary:      Effort: Pulmonary effort is normal. No respiratory distress.      Breath sounds: Normal breath sounds. No wheezing or rales.   Abdominal:      General: Bowel sounds are normal. There is no distension.      Palpations: Abdomen is soft. There is no mass.      Tenderness: There is no abdominal tenderness. There is no guarding or rebound.   Musculoskeletal:         General: Normal range of motion.   Skin:     General: Skin is warm and dry.      Findings: No erythema or rash.   Neurological:      Mental Status: She is alert and oriented to person, place, and time.      Comments: Awake, alert, and oriented x3, answering questions appropriately, 5 out of 5 strength in both lower extremities and both upper extremities, neurovascularly intact distally in all 4 extremities with bounding distal pulses and normal sensation, normoreflexic, no saddle anesthesia present   Psychiatric:         Mood and Affect: Mood normal.         Thought Content: Thought content normal.         Judgment: Judgment normal.         Procedures           ED Course  ED Course as of 06/01/22 0117   Tue May 31, 2022   1842 81-year-old female sent from her nursing home to be evaluated for confusion and chronic atraumatic low back pain.  Of note, patient was seen here in the emergency department nearly 2 weeks ago for chronic low back pain.  On arrival to the ED, the patient appears mildly confused but is answering questions appropriately.  No focal neurological deficits noted.  No saddle anesthesia present.  Nonsurgical abdomen.  Given the bounce back nature of the patient's visit, we will escalate work-up, and we will reassess following initial interventions. [DD]   1948 Labs are bland. [DD]   2104 I personally viewed the patient's x-ray images myself, and I am in agreement with the radiologist's reading for final interpretation.     [DD]   2105 Advanced imaging is negative. [DD]   2105 Doubt emergent central process at this  time.  The patient's symptoms appear chronic in nature.  There is no indication for admission at this point based on her reassuring work-up.  No indication for lumbar puncture.  She will follow-up with her primary care physician within the next week.  Agreeable with plan and given appropriate strict return precautions. [DD]      ED Course User Index  [DD] Marquis Lewis MD                                         Recent Results (from the past 24 hour(s))   Comprehensive Metabolic Panel    Collection Time: 05/31/22  7:02 PM    Specimen: Blood   Result Value Ref Range    Glucose 100 (H) 65 - 99 mg/dL    BUN 17 8 - 23 mg/dL    Creatinine 0.84 0.57 - 1.00 mg/dL    Sodium 137 136 - 145 mmol/L    Potassium 3.7 3.5 - 5.2 mmol/L    Chloride 104 98 - 107 mmol/L    CO2 23.0 22.0 - 29.0 mmol/L    Calcium 8.9 8.6 - 10.5 mg/dL    Total Protein 5.7 (L) 6.0 - 8.5 g/dL    Albumin 3.40 (L) 3.50 - 5.20 g/dL    ALT (SGPT) 13 1 - 33 U/L    AST (SGOT) 18 1 - 32 U/L    Alkaline Phosphatase 69 39 - 117 U/L    Total Bilirubin <0.2 0.0 - 1.2 mg/dL    Globulin 2.3 gm/dL    A/G Ratio 1.5 g/dL    BUN/Creatinine Ratio 20.2 7.0 - 25.0    Anion Gap 10.0 5.0 - 15.0 mmol/L    eGFR 69.0 >60.0 mL/min/1.73   Troponin    Collection Time: 05/31/22  7:02 PM    Specimen: Blood   Result Value Ref Range    Troponin T <0.010 0.000 - 0.030 ng/mL   Magnesium    Collection Time: 05/31/22  7:02 PM    Specimen: Blood   Result Value Ref Range    Magnesium 1.6 1.6 - 2.4 mg/dL   Green Top (Gel)    Collection Time: 05/31/22  7:02 PM   Result Value Ref Range    Extra Tube Hold for add-ons.    Lavender Top    Collection Time: 05/31/22  7:02 PM   Result Value Ref Range    Extra Tube hold for add-on    Gold Top - SST    Collection Time: 05/31/22  7:02 PM   Result Value Ref Range    Extra Tube Hold for add-ons.    Gray Top    Collection Time: 05/31/22  7:02 PM   Result Value Ref Range    Extra Tube Hold for add-ons.    Light Blue Top    Collection Time: 05/31/22   7:02 PM   Result Value Ref Range    Extra Tube Hold for add-ons.    CBC Auto Differential    Collection Time: 05/31/22  7:02 PM    Specimen: Blood   Result Value Ref Range    WBC 5.23 3.40 - 10.80 10*3/mm3    RBC 3.97 3.77 - 5.28 10*6/mm3    Hemoglobin 10.7 (L) 12.0 - 15.9 g/dL    Hematocrit 33.3 (L) 34.0 - 46.6 %    MCV 83.9 79.0 - 97.0 fL    MCH 27.0 26.6 - 33.0 pg    MCHC 32.1 31.5 - 35.7 g/dL    RDW 15.2 12.3 - 15.4 %    RDW-SD 46.2 37.0 - 54.0 fl    MPV 10.3 6.0 - 12.0 fL    Platelets 251 140 - 450 10*3/mm3    Neutrophil % 63.6 42.7 - 76.0 %    Lymphocyte % 24.5 19.6 - 45.3 %    Monocyte % 10.3 5.0 - 12.0 %    Eosinophil % 1.0 0.3 - 6.2 %    Basophil % 0.4 0.0 - 1.5 %    Immature Grans % 0.2 0.0 - 0.5 %    Neutrophils, Absolute 3.33 1.70 - 7.00 10*3/mm3    Lymphocytes, Absolute 1.28 0.70 - 3.10 10*3/mm3    Monocytes, Absolute 0.54 0.10 - 0.90 10*3/mm3    Eosinophils, Absolute 0.05 0.00 - 0.40 10*3/mm3    Basophils, Absolute 0.02 0.00 - 0.20 10*3/mm3    Immature Grans, Absolute 0.01 0.00 - 0.05 10*3/mm3    nRBC 0.0 0.0 - 0.2 /100 WBC   Protime-INR    Collection Time: 05/31/22  7:02 PM    Specimen: Blood   Result Value Ref Range    Protime 13.0 11.4 - 14.4 Seconds    INR 0.99 0.84 - 1.13   Lipase    Collection Time: 05/31/22  7:02 PM    Specimen: Blood   Result Value Ref Range    Lipase 32 13 - 60 U/L   Lactic Acid, Plasma    Collection Time: 05/31/22  7:02 PM    Specimen: Blood   Result Value Ref Range    Lactate 1.1 0.5 - 2.0 mmol/L   Procalcitonin    Collection Time: 05/31/22  7:02 PM    Specimen: Blood   Result Value Ref Range    Procalcitonin 0.07 0.00 - 0.25 ng/mL   CK    Collection Time: 05/31/22  7:02 PM    Specimen: Blood   Result Value Ref Range    Creatine Kinase 40 20 - 180 U/L   Acetaminophen Level    Collection Time: 05/31/22  7:02 PM    Specimen: Blood   Result Value Ref Range    Acetaminophen 7.0 0.0 - 30.0 mcg/mL   Ethanol    Collection Time: 05/31/22  7:02 PM    Specimen: Blood   Result Value  Ref Range    Ethanol <10 0 - 10 mg/dL   Salicylate Level    Collection Time: 05/31/22  7:02 PM    Specimen: Blood   Result Value Ref Range    Salicylate <0.3 <=30.0 mg/dL   COVID-19 and FLU A/B PCR - Swab, Nasopharynx    Collection Time: 05/31/22  7:03 PM    Specimen: Nasopharynx; Swab   Result Value Ref Range    COVID19 Not Detected Not Detected - Ref. Range    Influenza A PCR Not Detected Not Detected    Influenza B PCR Not Detected Not Detected   Urinalysis With Culture If Indicated - Urine, Catheter    Collection Time: 05/31/22  8:07 PM    Specimen: Urine, Catheter   Result Value Ref Range    Color, UA Yellow Yellow, Straw    Appearance, UA Clear Clear    pH, UA 7.0 5.0 - 8.0    Specific Gravity, UA 1.016 1.001 - 1.030    Glucose, UA Negative Negative    Ketones, UA Negative Negative    Bilirubin, UA Negative Negative    Blood, UA Negative Negative    Protein, UA Negative Negative    Leuk Esterase, UA Negative Negative    Nitrite, UA Positive (A) Negative    Urobilinogen, UA 0.2 E.U./dL 0.2 - 1.0 E.U./dL   Urine Drug Screen - Urine, Catheter    Collection Time: 05/31/22  8:07 PM    Specimen: Urine, Catheter   Result Value Ref Range    THC, Screen, Urine Negative Negative    Phencyclidine (PCP), Urine Negative Negative    Cocaine Screen, Urine Negative Negative    Methamphetamine, Ur Negative Negative    Opiate Screen Negative Negative    Amphetamine Screen, Urine Negative Negative    Benzodiazepine Screen, Urine Negative Negative    Tricyclic Antidepressants Screen Negative Negative    Methadone Screen, Urine Negative Negative    Barbiturates Screen, Urine Positive (A) Negative    Oxycodone Screen, Urine Negative Negative    Propoxyphene Screen Negative Negative    Buprenorphine, Screen, Urine Negative Negative   Urinalysis, Microscopic Only - Urine, Catheter    Collection Time: 05/31/22  8:07 PM    Specimen: Urine, Catheter   Result Value Ref Range    RBC, UA None Seen None Seen, 0-2 /HPF    WBC, UA 0-2 None  Seen, 0-2 /HPF    Bacteria, UA 4+ (A) None Seen, Trace /HPF    Squamous Epithelial Cells, UA None Seen None Seen, 0-2 /HPF    Hyaline Casts, UA None Seen 0 - 6 /LPF    Methodology Automated Microscopy      Note: In addition to lab results from this visit, the labs listed above may include labs taken at another facility or during a different encounter within the last 24 hours. Please correlate lab times with ED admission and discharge times for further clarification of the services performed during this visit.    CT Head Without Contrast   Final Result   Extensive white matter changes and atrophy similar to previous exam is   noted.       No obvious acute intracranial hemorrhage noted. Study limited by   residual contrast from recent CTs.       This report was finalized on 5/31/2022 10:55 PM by Antwon Joe.          XR Chest 1 View   Final Result   IMPRESSION :    No acute process.[       This report was finalized on 5/31/2022 9:53 PM by Antwon Joe.          CT Abdomen Pelvis With Contrast   Final Result   No acute abnormality noted of the CT of the abdomen or pelvis       Lumbar spine demonstrates advanced degenerative changes most significant   at the L2-L3 level centrally and at the L1-L2 level involving the neural   foramina. Findings are similar to progressed from the comparison.       No definite acute fracture identified given the limitations from   osteopenia and degenerative change               This report was finalized on 5/31/2022 8:51 PM by Antwon Joe.          CT Lumbar Spine Without Contrast   Final Result   No acute abnormality noted of the CT of the abdomen or pelvis       Lumbar spine demonstrates advanced degenerative changes most significant   at the L2-L3 level centrally and at the L1-L2 level involving the neural   foramina. Findings are similar to progressed from the comparison.       No definite acute fracture identified given the limitations from   osteopenia and degenerative  "change               This report was finalized on 5/31/2022 8:51 PM by Antwon Joe.            Vitals:    05/31/22 1840 05/31/22 1859 05/31/22 1959   BP: (!) 181/90 167/88 153/85   BP Location: Left arm     Patient Position: Lying     Pulse: 59 61 65   Resp: 20     Temp: 98.1 °F (36.7 °C)     TempSrc: Oral     SpO2: 95% 96% 96%   Weight: 53.5 kg (118 lb)     Height: 165.1 cm (65\")       Medications   sodium chloride 0.9 % flush 10 mL (has no administration in time range)   lactated ringers bolus 500 mL (0 mL Intravenous Stopped 5/31/22 2235)   fentaNYL citrate (PF) (SUBLIMAZE) injection 25 mcg (25 mcg Intravenous Given 5/31/22 1957)   iopamidol (ISOVUE-300) 61 % injection 100 mL (100 mL Intravenous Given 5/31/22 1934)     ECG/EMG Results (last 24 hours)     Procedure Component Value Units Date/Time    ECG 12 Lead [[id number]] Collected: 05/31/22 1843     Updated: 05/31/22 1843        ECG 12 Lead   Preliminary Result                     MDM    Final diagnoses:   Chronic bilateral low back pain without sciatica   Confusion       ED Disposition  ED Disposition     ED Disposition   Discharge    Condition   Stable    Comment   --             Aleah Regan MD  [address] [place] [id number] [phone]    In 1 week           Medication List      No changes were made to your prescriptions during this visit.          Marquis Lewis MD  06/01/22 0120    "

## 2022-06-05 LAB — BACTERIA SPEC AEROBE CULT: NORMAL

## 2022-07-18 ENCOUNTER — OFFICE VISIT (OUTPATIENT)
Dept: INTERNAL MEDICINE | Facility: CLINIC | Age: 82
End: 2022-07-18

## 2022-07-18 ENCOUNTER — LAB (OUTPATIENT)
Dept: LAB | Facility: HOSPITAL | Age: 82
End: 2022-07-18

## 2022-07-18 VITALS
DIASTOLIC BLOOD PRESSURE: 86 MMHG | HEART RATE: 68 BPM | HEIGHT: 65 IN | OXYGEN SATURATION: 99 % | BODY MASS INDEX: 19.64 KG/M2 | SYSTOLIC BLOOD PRESSURE: 124 MMHG | TEMPERATURE: 97.8 F

## 2022-07-18 DIAGNOSIS — N39.0 RECURRENT UTI: ICD-10-CM

## 2022-07-18 DIAGNOSIS — G30.9 ALZHEIMER'S DEMENTIA WITHOUT BEHAVIORAL DISTURBANCE, UNSPECIFIED TIMING OF DEMENTIA ONSET: ICD-10-CM

## 2022-07-18 DIAGNOSIS — E03.9 ACQUIRED HYPOTHYROIDISM: Primary | ICD-10-CM

## 2022-07-18 DIAGNOSIS — M54.50 CHRONIC MIDLINE LOW BACK PAIN WITHOUT SCIATICA: ICD-10-CM

## 2022-07-18 DIAGNOSIS — F02.80 ALZHEIMER'S DEMENTIA WITHOUT BEHAVIORAL DISTURBANCE, UNSPECIFIED TIMING OF DEMENTIA ONSET: ICD-10-CM

## 2022-07-18 DIAGNOSIS — F32.A DEPRESSION, UNSPECIFIED DEPRESSION TYPE: ICD-10-CM

## 2022-07-18 DIAGNOSIS — E87.1 HYPONATREMIA: ICD-10-CM

## 2022-07-18 DIAGNOSIS — G89.29 CHRONIC MIDLINE LOW BACK PAIN WITHOUT SCIATICA: ICD-10-CM

## 2022-07-18 DIAGNOSIS — E03.9 ACQUIRED HYPOTHYROIDISM: ICD-10-CM

## 2022-07-18 LAB
ALBUMIN SERPL-MCNC: 4.1 G/DL (ref 3.5–5.2)
ALBUMIN/GLOB SERPL: 1.6 G/DL
ALP SERPL-CCNC: 73 U/L (ref 39–117)
ALT SERPL W P-5'-P-CCNC: 13 U/L (ref 1–33)
ANION GAP SERPL CALCULATED.3IONS-SCNC: 14 MMOL/L (ref 5–15)
AST SERPL-CCNC: 24 U/L (ref 1–32)
BILIRUB SERPL-MCNC: 0.2 MG/DL (ref 0–1.2)
BUN SERPL-MCNC: 16 MG/DL (ref 8–23)
BUN/CREAT SERPL: 16.2 (ref 7–25)
CALCIUM SPEC-SCNC: 9 MG/DL (ref 8.6–10.5)
CHLORIDE SERPL-SCNC: 101 MMOL/L (ref 98–107)
CO2 SERPL-SCNC: 22 MMOL/L (ref 22–29)
CREAT SERPL-MCNC: 0.99 MG/DL (ref 0.57–1)
EGFRCR SERPLBLD CKD-EPI 2021: 56.7 ML/MIN/1.73
GLOBULIN UR ELPH-MCNC: 2.5 GM/DL
GLUCOSE SERPL-MCNC: 97 MG/DL (ref 65–99)
POTASSIUM SERPL-SCNC: 4.1 MMOL/L (ref 3.5–5.2)
PROT SERPL-MCNC: 6.6 G/DL (ref 6–8.5)
SODIUM SERPL-SCNC: 137 MMOL/L (ref 136–145)
T4 FREE SERPL-MCNC: 1.17 NG/DL (ref 0.93–1.7)
TSH SERPL DL<=0.05 MIU/L-ACNC: 31.1 UIU/ML (ref 0.27–4.2)

## 2022-07-18 PROCEDURE — 99215 OFFICE O/P EST HI 40 MIN: CPT | Performed by: INTERNAL MEDICINE

## 2022-07-18 PROCEDURE — 84439 ASSAY OF FREE THYROXINE: CPT

## 2022-07-18 PROCEDURE — 84443 ASSAY THYROID STIM HORMONE: CPT

## 2022-07-18 PROCEDURE — 80053 COMPREHEN METABOLIC PANEL: CPT

## 2022-07-18 RX ORDER — PAROXETINE HYDROCHLORIDE 40 MG/1
40 TABLET, FILM COATED ORAL EVERY MORNING
Qty: 30 TABLET | Refills: 6 | Status: SHIPPED | OUTPATIENT
Start: 2022-07-18

## 2022-07-18 RX ORDER — LEVOTHYROXINE SODIUM 175 UG/1
175 TABLET ORAL
COMMUNITY

## 2022-07-18 RX ORDER — CELECOXIB 200 MG/1
200 CAPSULE ORAL DAILY
Qty: 30 CAPSULE | Refills: 3 | Status: SHIPPED | OUTPATIENT
Start: 2022-07-18 | End: 2023-03-04 | Stop reason: HOSPADM

## 2022-07-18 RX ORDER — DEMECLOCYCLINE HYDROCHLORIDE 150 MG/1
150 TABLET, FILM COATED ORAL 2 TIMES DAILY
Qty: 60 TABLET | Refills: 5 | Status: SHIPPED | OUTPATIENT
Start: 2022-07-18 | End: 2022-08-11 | Stop reason: HOSPADM

## 2022-07-18 RX ORDER — DEMECLOCYCLINE HYDROCHLORIDE 300 MG/1
TABLET, FILM COATED ORAL 2 TIMES DAILY
COMMUNITY
Start: 2022-05-25 | End: 2022-07-18 | Stop reason: SDUPTHER

## 2022-07-18 RX ORDER — FAMOTIDINE 40 MG/1
40 TABLET, FILM COATED ORAL DAILY
COMMUNITY

## 2022-07-18 RX ORDER — DONEPEZIL HYDROCHLORIDE 10 MG/1
10 TABLET, FILM COATED ORAL NIGHTLY
Qty: 30 TABLET | Refills: 5 | Status: SHIPPED | OUTPATIENT
Start: 2022-07-18

## 2022-07-18 NOTE — PROGRESS NOTES
FU on abnormal labs    Subjective   Zohra Hall is a 81 y.o. female is here today for follow-up.    History of Present Illness   Zohra is accompanied by her son.Here for a follow up on her hyponatremia, was on demecloycline, plan was to cut down to 1/2 dose, and if sodium remains okay, can stop altogether.  She is currently at the skilled NH in [facility].    Called and discussed her issues with Nurse at [facility] who reports that Pt is currently close to baseline, that she has been in for the last 2 months.  On discussing with Arabella , she notes that Zohra has been confused x > 2 months and has been in out of the ER and hospital. She has been on multiple abx for uti, last  checked on 7/11/22 and was s/p doxy at that time.  Called by granddavid Cook, she has been having back pain, dementia.is worse.  Was very confused last month, and thyroid med was lowered.. Asking if she may need more care than assisted living offers.    Current Outpatient Medications:   •  acetaminophen (TYLENOL) 325 MG tablet, Take 2 tablets by mouth Every 4 (Four) Hours As Needed for Mild Pain ., Disp:  , Rfl:   •  amLODIPine (NORVASC) 10 MG tablet, TAKE 1 TABLET BY MOUTH  DAILY, Disp: 90 tablet, Rfl: 3  •  aspirin 81 MG EC tablet, Take 1 tablet by mouth Daily., Disp: 30 tablet, Rfl: 0  •  atorvastatin (LIPITOR) 40 MG tablet, Take 1 tablet by mouth Daily., Disp: 90 tablet, Rfl: 3  •  betamethasone dipropionate (DIPROLENE) 0.05 % lotion, Apply  topically to the appropriate area as directed 2 (Two) Times a Day., Disp: 60 mL, Rfl: 2  •  clopidogrel (PLAVIX) 75 MG tablet, Take 1 tablet by mouth Daily., Disp: 30 tablet, Rfl: 0  •  demeclocycline (DECLOMYCIN) 150 MG tablet, Take 1 tablet by mouth 2 (Two) Times a Day., Disp: 60 tablet, Rfl: 5  •  docusate sodium (COLACE) 250 MG capsule, Take 1 capsule by mouth Daily. For constipation, Disp: 90 capsule, Rfl: 1  •  famotidine (PEPCID) 40 MG tablet, Take 40 mg by mouth Daily., Disp: , Rfl:  "  •  levothyroxine (SYNTHROID, LEVOTHROID) 125 MCG tablet, Take 125 mcg by mouth Daily., Disp: , Rfl:   •  metoprolol succinate XL (TOPROL-XL) 25 MG 24 hr tablet, Take 1 tablet by mouth Daily., Disp: 30 tablet, Rfl: 0  •  ondansetron (ZOFRAN) 4 MG tablet, Take 1 tablet by mouth Every 6 (Six) Hours As Needed for Nausea or Vomiting., Disp:  , Rfl:   •  primidone (MYSOLINE) 50 MG tablet, 3 PO qam and 2 PO QPM, Disp: 270 tablet, Rfl: 1  •  celecoxib (CeleBREX) 200 MG capsule, Take 1 capsule by mouth Daily., Disp: 30 capsule, Rfl: 3  •  donepezil (Aricept) 10 MG tablet, Take 1 tablet by mouth Every Night., Disp: 30 tablet, Rfl: 5  •  PARoxetine (Paxil) 40 MG tablet, Take 1 tablet by mouth Every Morning., Disp: 30 tablet, Rfl: 6      The following portions of the patient's history were reviewed and updated as appropriate: allergies, current medications, past family history, past medical history, past social history, past surgical history and problem list.    Review of Systems   Constitutional: Positive for activity change, appetite change and fatigue. Negative for chills and fever.   HENT: Negative for ear discharge, ear pain, sinus pressure and sore throat.    Respiratory: Negative for cough, chest tightness and shortness of breath.    Cardiovascular: Negative for chest pain, palpitations and leg swelling.   Gastrointestinal: Negative for diarrhea, nausea and vomiting.   Musculoskeletal: Positive for arthralgias, gait problem and myalgias. Negative for back pain.   Neurological: Positive for tremors (better) and weakness. Negative for dizziness, syncope and headaches.   Psychiatric/Behavioral: Positive for confusion. Negative for sleep disturbance.       Objective   /86   Pulse 68   Temp 97.8 °F (36.6 °C)   Ht 165.1 cm (65\")   SpO2 99% Comment: ra  BMI 19.64 kg/m²   Physical Exam  Vitals and nursing note reviewed.   Constitutional:       Appearance: She is well-developed. She is ill-appearing.   HENT:      " Head: Normocephalic and atraumatic.      Right Ear: External ear normal.      Left Ear: External ear normal.      Mouth/Throat:      Pharynx: No oropharyngeal exudate.   Eyes:      Conjunctiva/sclera: Conjunctivae normal.      Pupils: Pupils are equal, round, and reactive to light.   Neck:      Thyroid: No thyromegaly.   Cardiovascular:      Rate and Rhythm: Normal rate and regular rhythm.      Pulses: Normal pulses.      Heart sounds: Normal heart sounds. No murmur heard.    No friction rub. No gallop.   Pulmonary:      Effort: Pulmonary effort is normal.      Breath sounds: Normal breath sounds.   Abdominal:      General: Bowel sounds are normal. There is no distension.      Palpations: Abdomen is soft.      Tenderness: There is no abdominal tenderness.   Musculoskeletal:      Cervical back: Neck supple.      Right lower leg: Edema present.      Left lower leg: Edema present.   Skin:     General: Skin is warm and dry.   Neurological:      Mental Status: She is alert.      Cranial Nerves: No cranial nerve deficit.      Motor: Weakness present.      Gait: Gait abnormal.   Psychiatric:         Judgment: Judgment normal.           Results for orders placed or performed in visit on 07/10/22   Urinalysis without microscopic (no culture) - Urine, Clean Catch    Specimen: Urine, Clean Catch   Result Value Ref Range    Color, UA Yellow Yellow, Straw    Appearance, UA Clear Clear    pH, UA 6.0 5.0 - 8.0    Specific Gravity, UA 1.015 1.001 - 1.030    Glucose, UA Negative Negative    Ketones, UA Negative Negative    Bilirubin, UA Negative Negative    Blood, UA Negative Negative    Protein, UA Negative Negative    Leuk Esterase, UA Negative Negative    Nitrite, UA Negative Negative    Urobilinogen, UA 0.2 E.U./dL 0.2 - 1.0 E.U./dL   Urinalysis, Microscopic Only - Urine, Clean Catch    Specimen: Urine, Clean Catch   Result Value Ref Range    RBC, UA 3-6 (A) None Seen, 0-2 /HPF    WBC, UA 0-2 None Seen, 0-2 /HPF    Bacteria, UA  2+ (A) None Seen, Trace /HPF    Squamous Epithelial Cells, UA 3-6 (A) None Seen, 0-2 /HPF    Hyaline Casts, UA 0-6 0 - 6 /LPF    Methodology Manual Light Microscopy              Assessment & Plan   Diagnoses and all orders for this visit:    Acquired hypothyroidism  -     TSH; Future  -     T4, Free; Future    Alzheimer's dementia without behavioral disturbance, unspecified timing of dementia onset (HCC)  Comments:  Start Aricept , has appt with Neurology next month.  Orders:  -     donepezil (Aricept) 10 MG tablet; Take 1 tablet by mouth Every Night.    Chronic midline low back pain without sciatica  Comments:  ? on celebrex, improved. rx sent  Orders:  -     celecoxib (CeleBREX) 200 MG capsule; Take 1 capsule by mouth Daily.    Hyponatremia  -     demeclocycline (DECLOMYCIN) 150 MG tablet; Take 1 tablet by mouth 2 (Two) Times a Day.  -     Comprehensive Metabolic Panel; Future  -     Basic Metabolic Panel; Future    Recurrent UTI  Comments:  order for UA+ C& S faxed to Yanira at [facility] [place].  Orders:  -     Urinalysis With Culture If Indicated - Urine, Clean Catch; Future    Depression, unspecified depression type  -     PARoxetine (Paxil) 40 MG tablet; Take 1 tablet by mouth Every Morning.    Other orders  -     Discontinue: demeclocycline (DECLOMYCIN) 300 MG tablet; 2 (Two) Times a Day.  -     famotidine (PEPCID) 40 MG tablet; Take 40 mg by mouth Daily.  -     levothyroxine (SYNTHROID, LEVOTHROID) 125 MCG tablet; Take 125 mcg by mouth Daily.      Patient is needing a higher level of care and is recommended to move to a skilled nursing care facility.         Change demeclocycline to 150 mg bid, check labs in 1 month and re-evaluate if med can be d/verónica.  Start Celebrex to help with low back pain.  Start aricept for her dementia. She has an appointment with neurology in 2-3 weeks.  Check labs- thyroid, sodium    Her paxil was no longer in the med list, will restart at 40mg.    Discussed plan with son , and  grandson along with patient. Will fax OV if needing for a H& P.    Total time spent today with Zohra Hall  was 50 minutes .  Of this time,35 was spent face-to-face time coordinating care, and in the following activities:preparing for the visit, reviewing tests, obtaining and/or reviewing a separately obtained history, performing a medically appropriate examination and/or evaluation , counseling and educating the patient/family/caregiver, ordering medications, tests, or procedures, referring and communicating with other health care professionals , documenting information in the medical record and independently interpreting results and communicating that information with the patient/family/caregiver      Return in about 3 months (around 10/18/2022) for Next scheduled follow up.    Electronically signed by:    Aleah Regan MD

## 2022-07-26 ENCOUNTER — TELEPHONE (OUTPATIENT)
Dept: INTERNAL MEDICINE | Facility: CLINIC | Age: 82
End: 2022-07-26

## 2022-07-26 NOTE — TELEPHONE ENCOUNTER
CALLED TO RESCHEDULE PATIENT'S WELLNESS VISIT WITH DR. NAJERA ON 02/13/2022 10:45AM.    RESCHEDULED FOR 02/22/2022 AT 9:45AM.    RESCHEDULE AS NEEDED.    THANKS

## 2022-08-05 ENCOUNTER — APPOINTMENT (OUTPATIENT)
Dept: GENERAL RADIOLOGY | Facility: HOSPITAL | Age: 82
End: 2022-08-05

## 2022-08-05 ENCOUNTER — HOSPITAL ENCOUNTER (INPATIENT)
Facility: HOSPITAL | Age: 82
LOS: 3 days | Discharge: NURSING, REHAB OR LONG-TERM CARE | End: 2022-08-11
Attending: EMERGENCY MEDICINE | Admitting: FAMILY MEDICINE

## 2022-08-05 ENCOUNTER — APPOINTMENT (OUTPATIENT)
Dept: CT IMAGING | Facility: HOSPITAL | Age: 82
End: 2022-08-05

## 2022-08-05 DIAGNOSIS — S09.90XA INJURY OF HEAD, INITIAL ENCOUNTER: ICD-10-CM

## 2022-08-05 DIAGNOSIS — N39.0 ACUTE UTI: ICD-10-CM

## 2022-08-05 DIAGNOSIS — G25.0 BENIGN ESSENTIAL TREMOR: ICD-10-CM

## 2022-08-05 DIAGNOSIS — M25.551 BILATERAL HIP PAIN: Primary | ICD-10-CM
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
## 2022-08-05 DIAGNOSIS — M25.552 BILATERAL HIP PAIN: Primary | ICD-10-CM

## 2022-08-05 PROBLEM — F02.80 ALZHEIMER DISEASE (HCC): Status: ACTIVE | Noted: 2022-08-05

## 2022-08-05 PROBLEM — G30.9 ALZHEIMER DISEASE (HCC): Status: ACTIVE | Noted: 2022-08-05

## 2022-08-05 LAB
ALBUMIN SERPL-MCNC: 3.5 G/DL (ref 3.5–5.2)
ALBUMIN/GLOB SERPL: 1.3 G/DL
ALP SERPL-CCNC: 85 U/L (ref 39–117)
ALT SERPL W P-5'-P-CCNC: 12 U/L (ref 1–33)
ANION GAP SERPL CALCULATED.3IONS-SCNC: 8 MMOL/L (ref 5–15)
AST SERPL-CCNC: 19 U/L (ref 1–32)
BACTERIA UR QL AUTO: ABNORMAL /HPF
BASOPHILS # BLD AUTO: 0.03 10*3/MM3 (ref 0–0.2)
BASOPHILS NFR BLD AUTO: 0.4 % (ref 0–1.5)
BILIRUB SERPL-MCNC: 0.2 MG/DL (ref 0–1.2)
BILIRUB UR QL STRIP: NEGATIVE
BUN SERPL-MCNC: 14 MG/DL (ref 8–23)
BUN/CREAT SERPL: 16.7 (ref 7–25)
CALCIUM SPEC-SCNC: 8.9 MG/DL (ref 8.6–10.5)
CHLORIDE SERPL-SCNC: 97 MMOL/L (ref 98–107)
CLARITY UR: CLEAR
CO2 SERPL-SCNC: 28 MMOL/L (ref 22–29)
COLOR UR: YELLOW
CREAT SERPL-MCNC: 0.84 MG/DL (ref 0.57–1)
DEPRECATED RDW RBC AUTO: 52.3 FL (ref 37–54)
EGFRCR SERPLBLD CKD-EPI 2021: 69 ML/MIN/1.73
EOSINOPHIL # BLD AUTO: 0.06 10*3/MM3 (ref 0–0.4)
EOSINOPHIL NFR BLD AUTO: 0.7 % (ref 0.3–6.2)
ERYTHROCYTE [DISTWIDTH] IN BLOOD BY AUTOMATED COUNT: 16.8 % (ref 12.3–15.4)
FLUAV RNA RESP QL NAA+PROBE: NOT DETECTED
FLUBV RNA RESP QL NAA+PROBE: NOT DETECTED
GLOBULIN UR ELPH-MCNC: 2.7 GM/DL
GLUCOSE SERPL-MCNC: 81 MG/DL (ref 65–99)
GLUCOSE UR STRIP-MCNC: NEGATIVE MG/DL
HCT VFR BLD AUTO: 36.3 % (ref 34–46.6)
HGB BLD-MCNC: 11.9 G/DL (ref 12–15.9)
HGB UR QL STRIP.AUTO: ABNORMAL
HYALINE CASTS UR QL AUTO: ABNORMAL /LPF
IMM GRANULOCYTES # BLD AUTO: 0.11 10*3/MM3 (ref 0–0.05)
IMM GRANULOCYTES NFR BLD AUTO: 1.3 % (ref 0–0.5)
KETONES UR QL STRIP: NEGATIVE
LEUKOCYTE ESTERASE UR QL STRIP.AUTO: ABNORMAL
LYMPHOCYTES # BLD AUTO: 0.93 10*3/MM3 (ref 0.7–3.1)
LYMPHOCYTES NFR BLD AUTO: 11.3 % (ref 19.6–45.3)
MCH RBC QN AUTO: 28.1 PG (ref 26.6–33)
MCHC RBC AUTO-ENTMCNC: 32.8 G/DL (ref 31.5–35.7)
MCV RBC AUTO: 85.8 FL (ref 79–97)
MONOCYTES # BLD AUTO: 0.49 10*3/MM3 (ref 0.1–0.9)
MONOCYTES NFR BLD AUTO: 6 % (ref 5–12)
NEUTROPHILS NFR BLD AUTO: 6.59 10*3/MM3 (ref 1.7–7)
NEUTROPHILS NFR BLD AUTO: 80.3 % (ref 42.7–76)
NITRITE UR QL STRIP: POSITIVE
NRBC BLD AUTO-RTO: 0 /100 WBC (ref 0–0.2)
PH UR STRIP.AUTO: 6.5 [PH] (ref 5–8)
PLATELET # BLD AUTO: 350 10*3/MM3 (ref 140–450)
PMV BLD AUTO: 9.7 FL (ref 6–12)
POTASSIUM SERPL-SCNC: 4.2 MMOL/L (ref 3.5–5.2)
PROT SERPL-MCNC: 6.2 G/DL (ref 6–8.5)
PROT UR QL STRIP: NEGATIVE
RBC # BLD AUTO: 4.23 10*6/MM3 (ref 3.77–5.28)
RBC # UR STRIP: ABNORMAL /HPF
REF LAB TEST METHOD: ABNORMAL
SARS-COV-2 RNA RESP QL NAA+PROBE: NOT DETECTED
SODIUM SERPL-SCNC: 133 MMOL/L (ref 136–145)
SP GR UR STRIP: 1.01 (ref 1–1.03)
SQUAMOUS #/AREA URNS HPF: ABNORMAL /HPF
UROBILINOGEN UR QL STRIP: ABNORMAL
WBC # UR STRIP: ABNORMAL /HPF
WBC NRBC COR # BLD: 8.21 10*3/MM3 (ref 3.4–10.8)

## 2022-08-05 PROCEDURE — G0378 HOSPITAL OBSERVATION PER HR: HCPCS

## 2022-08-05 PROCEDURE — 81001 URINALYSIS AUTO W/SCOPE: CPT | Performed by: EMERGENCY MEDICINE

## 2022-08-05 PROCEDURE — 99285 EMERGENCY DEPT VISIT HI MDM: CPT

## 2022-08-05 PROCEDURE — 70450 CT HEAD/BRAIN W/O DYE: CPT

## 2022-08-05 PROCEDURE — 99223 1ST HOSP IP/OBS HIGH 75: CPT | Performed by: INTERNAL MEDICINE

## 2022-08-05 PROCEDURE — 72125 CT NECK SPINE W/O DYE: CPT

## 2022-08-05 PROCEDURE — 80053 COMPREHEN METABOLIC PANEL: CPT | Performed by: EMERGENCY MEDICINE

## 2022-08-05 PROCEDURE — 25010000002 CEFTRIAXONE PER 250 MG

## 2022-08-05 PROCEDURE — 85025 COMPLETE CBC W/AUTO DIFF WBC: CPT | Performed by: EMERGENCY MEDICINE

## 2022-08-05 PROCEDURE — 87636 SARSCOV2 & INF A&B AMP PRB: CPT | Performed by: EMERGENCY MEDICINE

## 2022-08-05 PROCEDURE — 72192 CT PELVIS W/O DYE: CPT

## 2022-08-05 RX ORDER — CLOPIDOGREL BISULFATE 75 MG/1
75 TABLET ORAL DAILY
Status: DISCONTINUED | OUTPATIENT
Start: 2022-08-06 | End: 2022-08-11 | Stop reason: HOSPADM

## 2022-08-05 RX ORDER — ACETAMINOPHEN 325 MG/1
650 TABLET ORAL EVERY 4 HOURS PRN
Status: DISCONTINUED | OUTPATIENT
Start: 2022-08-05 | End: 2022-08-11 | Stop reason: HOSPADM

## 2022-08-05 RX ORDER — ATORVASTATIN CALCIUM 40 MG/1
40 TABLET, FILM COATED ORAL NIGHTLY
Status: DISCONTINUED | OUTPATIENT
Start: 2022-08-05 | End: 2022-08-11 | Stop reason: HOSPADM

## 2022-08-05 RX ORDER — LEVOTHYROXINE SODIUM 0.12 MG/1
125 TABLET ORAL DAILY
Status: DISCONTINUED | OUTPATIENT
Start: 2022-08-06 | End: 2022-08-11 | Stop reason: HOSPADM

## 2022-08-05 RX ORDER — ACETAMINOPHEN 160 MG/5ML
650 SOLUTION ORAL EVERY 4 HOURS PRN
Status: DISCONTINUED | OUTPATIENT
Start: 2022-08-05 | End: 2022-08-11 | Stop reason: HOSPADM

## 2022-08-05 RX ORDER — PAROXETINE HYDROCHLORIDE 20 MG/1
40 TABLET, FILM COATED ORAL EVERY MORNING
Status: DISCONTINUED | OUTPATIENT
Start: 2022-08-06 | End: 2022-08-11 | Stop reason: HOSPADM

## 2022-08-05 RX ORDER — METOPROLOL SUCCINATE 25 MG/1
25 TABLET, EXTENDED RELEASE ORAL
Status: DISCONTINUED | OUTPATIENT
Start: 2022-08-06 | End: 2022-08-11 | Stop reason: HOSPADM

## 2022-08-05 RX ORDER — DONEPEZIL HYDROCHLORIDE 10 MG/1
10 TABLET, FILM COATED ORAL NIGHTLY
Status: DISCONTINUED | OUTPATIENT
Start: 2022-08-05 | End: 2022-08-11 | Stop reason: HOSPADM

## 2022-08-05 RX ORDER — SODIUM CHLORIDE 9 MG/ML
50 INJECTION, SOLUTION INTRAVENOUS CONTINUOUS
Status: ACTIVE | OUTPATIENT
Start: 2022-08-05 | End: 2022-08-06

## 2022-08-05 RX ORDER — CELECOXIB 200 MG/1
200 CAPSULE ORAL DAILY
Status: DISCONTINUED | OUTPATIENT
Start: 2022-08-06 | End: 2022-08-11 | Stop reason: HOSPADM

## 2022-08-05 RX ORDER — CEFDINIR 300 MG/1
300 CAPSULE ORAL 2 TIMES DAILY
Qty: 14 CAPSULE | Refills: 0 | Status: SHIPPED | OUTPATIENT
Start: 2022-08-05 | End: 2022-08-11 | Stop reason: HOSPADM

## 2022-08-05 RX ORDER — PRIMIDONE 50 MG/1
100 TABLET ORAL EVERY 8 HOURS SCHEDULED
Status: DISCONTINUED | OUTPATIENT
Start: 2022-08-05 | End: 2022-08-11 | Stop reason: HOSPADM

## 2022-08-05 RX ORDER — ACETAMINOPHEN 650 MG/1
650 SUPPOSITORY RECTAL EVERY 4 HOURS PRN
Status: DISCONTINUED | OUTPATIENT
Start: 2022-08-05 | End: 2022-08-11 | Stop reason: HOSPADM

## 2022-08-05 RX ORDER — ASPIRIN 81 MG/1
81 TABLET ORAL DAILY
Status: DISCONTINUED | OUTPATIENT
Start: 2022-08-06 | End: 2022-08-11 | Stop reason: HOSPADM

## 2022-08-05 RX ORDER — SODIUM CHLORIDE 0.9 % (FLUSH) 0.9 %
10 SYRINGE (ML) INJECTION EVERY 12 HOURS SCHEDULED
Status: DISCONTINUED | OUTPATIENT
Start: 2022-08-05 | End: 2022-08-11 | Stop reason: HOSPADM

## 2022-08-05 RX ORDER — FAMOTIDINE 20 MG/1
40 TABLET, FILM COATED ORAL DAILY
Status: DISCONTINUED | OUTPATIENT
Start: 2022-08-06 | End: 2022-08-07

## 2022-08-05 RX ADMIN — PRIMIDONE 100 MG: 50 TABLET ORAL at 21:58

## 2022-08-05 RX ADMIN — DONEPEZIL HYDROCHLORIDE 10 MG: 10 TABLET, FILM COATED ORAL at 21:57

## 2022-08-05 RX ADMIN — Medication 10 ML: at 21:58

## 2022-08-05 RX ADMIN — SODIUM CHLORIDE 1 G: 900 INJECTION INTRAVENOUS at 22:05

## 2022-08-05 RX ADMIN — SODIUM CHLORIDE 50 ML/HR: 9 INJECTION, SOLUTION INTRAVENOUS at 22:13

## 2022-08-05 RX ADMIN — ATORVASTATIN CALCIUM 40 MG: 40 TABLET, FILM COATED ORAL at 21:57

## 2022-08-05 NOTE — ED PROVIDER NOTES
" EMERGENCY DEPARTMENT ENCOUNTER    Pt Name: Zohra Hall  MRN: [id number]  Pt :   1940  Room Number:    Date of encounter:  2022  PCP: Aleah Regan MD  ED Provider: Marcelo Kang MD    Historian: Patient's daughter, EMS, facility      HPI:  Chief Complaint: Fall        Context: Zohra Hall is a 81 y.o. female who presents to the ED c/o left hip pain, head pain, neck pain status post fall.  This was an unwitnessed fall at her assisted care facility.  She has been unsteady on her feet and uses a walker at baseline.  She has dementia and is unable to provide any meaningful information as far as the chain of events leading to her presentation in the emergency department.      PAST MEDICAL HISTORY  Past Medical History:   Diagnosis Date   • Breast cancer (HCC)     left- pt states \"in situ\", no radiation, Tamoxifen for 5 years   • Cellulitis    • Cervical lymphadenopathy    • Chest pain    • Convulsions (HCC)    • GERD (gastroesophageal reflux disease)    • Glossitis    • Grief reaction     · Last Impression: 2015  counselled, given ativan for prn use.  Aleah Regan   • Hypertension    • Lower back pain    • Oral thrush    • Papillary adenocarcinoma (HCC)     Papillary adenocarcinoma of thyroid   • Papillary adenocarcinoma of thyroid (HCC)     · resection Ramirez- ,  2 x 2 x 1.5 cm  T3 N1 MXpost op low calcium and diminished  voiceablation Ain- 3/7 metastatic nodes and invasion to strap muscles · Last Impression: 29 Oct 2014  s/p Eval by berry Méndez.  Aleah Regan (Internal   • Piriformis syndrome    • Tingling    • Xerostomia          PAST SURGICAL HISTORY  Past Surgical History:   Procedure Laterality Date   • BREAST BIOPSY Right 10/10/2013    stereo bx   • BREAST BIOPSY Left 10/19/2015    stereo bx   • BREAST CYST EXCISION      right   • BREAST EXCISIONAL BIOPSY Right     affirm bx and loc .   • BREAST LUMPECTOMY Left    • CARDIAC CATHETERIZATION " N/A 2021    Procedure: Left Heart Cath;  Surgeon: Alonso Ross IV, MD;  Location: [facility] CATH INVASIVE LOCATION;  Service: Cardiology;  Laterality: N/A;   • HYSTERECTOMY     • OTHER SURGICAL HISTORY Right     right arm surgery-steel roger in place   • TOTAL HIP ARTHROPLASTY     • TOTAL THYROIDECTOMY      Thyroid Surgery Total Thyroidectomy         FAMILY HISTORY  Family History   Problem Relation Age of Onset   • Breast cancer Mother 84   • Cancer Mother    • BRCA 1/2 Neg Hx    • Colon cancer Neg Hx    • Endometrial cancer Neg Hx    • Ovarian cancer Neg Hx          SOCIAL HISTORY  Social History     Socioeconomic History   • Marital status:    Tobacco Use   • Smoking status: Former Smoker     Packs/day: 1.00     Years: 30.00     Pack years: 30.00     Types: Cigarettes     Quit date: 1992     Years since quittin.9   • Smokeless tobacco: Never Used   Vaping Use   • Vaping Use: Never used   Substance and Sexual Activity   • Alcohol use: No   • Drug use: No   • Sexual activity: Not Currently         ALLERGIES  Dilaudid [hydromorphone hcl], Beta adrenergic blockers, Dilaudid [hydromorphone], and Lactose intolerance (gi)        REVIEW OF SYSTEMS  Review of Systems       Unable secondary to dementia      PHYSICAL EXAM    I have reviewed the triage vital signs and nursing notes.    ED Triage Vitals [22 1232]   Temp Heart Rate Resp BP SpO2   98.1 °F (36.7 °C) 57 20 159/66 93 %      Temp src Heart Rate Source Patient Position BP Location FiO2 (%)   -- -- -- -- --       Physical Exam  GENERAL:   Appears nontoxic, lying supine with cervical collar in place.  Patient does not answer many questions and defers to her daughter to answer almost everything.  HENT: Nares patent.  No definite signs of craniofacial trauma.  No raccoon eyes or carias signs.  EYES: No scleral icterus.  CV: Regular rhythm, regular rate.  No murmurs gallops rubs  RESPIRATORY: Normal effort.  No audible wheezes,  rales or rhonchi.  Clear to auscultation  ABDOMEN: Soft, nontender  MUSCULOSKELETAL: No deformities.  Lower extremities are symmetric without shortening.  Both lower extremities are somewhat internally rotated.  Range of motion of the right lower extremity causes no discomfort but that of left lower extremity causes significant discomfort in the hip region.  No crepitus or deformity is noted.  NEURO: Alert, moves all extremities, follows commands to open eyes, wiggle hands and feet.  She does not raise any extremities when asked to do so.  SKIN: Warm, dry, no rash visualized.  Intact throughout        LAB RESULTS  Recent Results (from the past 24 hour(s))   Comprehensive Metabolic Panel    Collection Time: 08/05/22  3:05 PM    Specimen: Blood   Result Value Ref Range    Glucose 81 65 - 99 mg/dL    BUN 14 8 - 23 mg/dL    Creatinine 0.84 0.57 - 1.00 mg/dL    Sodium 133 (L) 136 - 145 mmol/L    Potassium 4.2 3.5 - 5.2 mmol/L    Chloride 97 (L) 98 - 107 mmol/L    CO2 28.0 22.0 - 29.0 mmol/L    Calcium 8.9 8.6 - 10.5 mg/dL    Total Protein 6.2 6.0 - 8.5 g/dL    Albumin 3.50 3.50 - 5.20 g/dL    ALT (SGPT) 12 1 - 33 U/L    AST (SGOT) 19 1 - 32 U/L    Alkaline Phosphatase 85 39 - 117 U/L    Total Bilirubin 0.2 0.0 - 1.2 mg/dL    Globulin 2.7 gm/dL    A/G Ratio 1.3 g/dL    BUN/Creatinine Ratio 16.7 7.0 - 25.0    Anion Gap 8.0 5.0 - 15.0 mmol/L    eGFR 69.0 >60.0 mL/min/1.73   CBC Auto Differential    Collection Time: 08/05/22  3:05 PM    Specimen: Blood   Result Value Ref Range    WBC 8.21 3.40 - 10.80 10*3/mm3    RBC 4.23 3.77 - 5.28 10*6/mm3    Hemoglobin 11.9 (L) 12.0 - 15.9 g/dL    Hematocrit 36.3 34.0 - 46.6 %    MCV 85.8 79.0 - 97.0 fL    MCH 28.1 26.6 - 33.0 pg    MCHC 32.8 31.5 - 35.7 g/dL    RDW 16.8 (H) 12.3 - 15.4 %    RDW-SD 52.3 37.0 - 54.0 fl    MPV 9.7 6.0 - 12.0 fL    Platelets 350 140 - 450 10*3/mm3    Neutrophil % 80.3 (H) 42.7 - 76.0 %    Lymphocyte % 11.3 (L) 19.6 - 45.3 %    Monocyte % 6.0 5.0 - 12.0 %     Eosinophil % 0.7 0.3 - 6.2 %    Basophil % 0.4 0.0 - 1.5 %    Immature Grans % 1.3 (H) 0.0 - 0.5 %    Neutrophils, Absolute 6.59 1.70 - 7.00 10*3/mm3    Lymphocytes, Absolute 0.93 0.70 - 3.10 10*3/mm3    Monocytes, Absolute 0.49 0.10 - 0.90 10*3/mm3    Eosinophils, Absolute 0.06 0.00 - 0.40 10*3/mm3    Basophils, Absolute 0.03 0.00 - 0.20 10*3/mm3    Immature Grans, Absolute 0.11 (H) 0.00 - 0.05 10*3/mm3    nRBC 0.0 0.0 - 0.2 /100 WBC   COVID-19 and FLU A/B PCR - Swab, Nasopharynx    Collection Time: 08/05/22  3:06 PM    Specimen: Nasopharynx; Swab   Result Value Ref Range    COVID19 Not Detected Not Detected - Ref. Range    Influenza A PCR Not Detected Not Detected    Influenza B PCR Not Detected Not Detected   Urinalysis With Culture If Indicated - Urine, Catheter    Collection Time: 08/05/22  3:34 PM    Specimen: Urine, Catheter   Result Value Ref Range    Color, UA Yellow Yellow, Straw    Appearance, UA Clear Clear    pH, UA 6.5 5.0 - 8.0    Specific Gravity, UA 1.008 1.001 - 1.030    Glucose, UA Negative Negative    Ketones, UA Negative Negative    Bilirubin, UA Negative Negative    Blood, UA Small (1+) (A) Negative    Protein, UA Negative Negative    Leuk Esterase, UA Trace (A) Negative    Nitrite, UA Positive (A) Negative    Urobilinogen, UA 0.2 E.U./dL 0.2 - 1.0 E.U./dL   Urinalysis, Microscopic Only - Urine, Catheter    Collection Time: 08/05/22  3:34 PM    Specimen: Urine, Catheter   Result Value Ref Range    RBC, UA 7-12 (A) None Seen, 0-2 /HPF    WBC, UA 3-5 (A) None Seen, 0-2 /HPF    Bacteria, UA 4+ (A) None Seen, Trace /HPF    Squamous Epithelial Cells, UA 0-2 None Seen, 0-2 /HPF    Hyaline Casts, UA None Seen 0 - 6 /LPF    Methodology Automated Microscopy        If labs were ordered, I independently reviewed the results.        RADIOLOGY  CT Head Without Contrast, CT Cervical Spine Without Contrast    Result Date: 8/5/2022  DATE OF EXAM: 8/5/2022 1:37 PM  PROCEDURE: CT HEAD WO CONTRAST-, CT CERVICAL  SPINE WO CONTRAST-  INDICATIONS: FALL  COMPARISON: Head CT 5/31/2022  TECHNIQUE: Routine transaxial images of the head and cervical spine were obtained without the administration of contrast. Automated exposure control and iterative reconstruction methods were used.  The radiation dose reduction device was turned on for each scan per the ALARA (As Low as Reasonably Achievable) protocol.  FINDINGS: Head: No acute intracranial hemorrhage. No acute large territory infarct. There are scattered subcortical and periventricular white matter hypodensities which are nonspecific and can be seen in the setting of chronic small vessel ischemic change.  There is mild global cerebral volume loss.  No extra-axial collections.  No midline shift or herniation.  Normal size and configuration of the ventricles commensurate with degree of cerebral volume loss.  Unremarkable appearance of the orbits.  The mastoid air cells are clear.  The paranasal sinuses are grossly clear.  No acute osseous findings. There is a small right parietal scalp contusion.  Cervical spine: The bones are demineralized limiting assessment for occult nondisplaced fractures. There is mild degenerative straightening of the cervical spine with trace degenerative anterolisthesis of C7 on T1, without definite traumatic subluxation or significant spondylolisthesis. The facets appear normally aligned allowing for multilevel facet arthropathy. The atlantodental interval is normal. No acute fracture. The vertebral body heights are maintained. There is presumed degenerative fusion of the C3 and C4 vertebral bodies and left facets. There is presumed degenerative fusion of the C5-C6 vertebral bodies and bilateral facets. 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 There is severe multilevel degenerative disc disease throughout the cervical spine with posterior disc osteophyte complexes at C3-C4, C4-C5, and C6-C7, with at least moderate spinal canal stenosis  at these levels. There is multilevel facet arthropathy and uncovertebral hypertrophy contributing to moderate bilateral neuroforaminal narrowing at C4-C5. The paravertebral soft tissues are normal; no prevertebral soft tissue swelling. There are surgical clips prior thyroidectomy. Partially imaged lung apices demonstrate centrilobular emphysema. No evidence of pharyngeal or laryngeal mass lesion.      Small right parietal scalp hematoma without acute intracranial findings. There are chronic and senescent changes of the brain as above.  No acute fracture or traumatic malalignment in the cervical spine. Moderate to severe degenerative changes are noted as above.  This report was finalized on 8/5/2022 2:15 PM by Brayan Walton MD.      CT Pelvis Without Contrast    Result Date: 8/5/2022  DATE OF EXAM: 8/5/2022 1:37 PM  PROCEDURE: CT PELVIS WO CONTRAST-  INDICATIONS: FALL  COMPARISON: CT abdomen pelvis May 31, 2002  TECHNIQUE: Routine transaxial slices were obtained through the pelvis without the administration of intravenous contrast. Reconstructed coronal and sagittal images were also obtained. Automated exposure control and iterative construction methods were used.    FINDINGS: The patient has had bilateral hip arthroplasty. There is streak artifact from the hardware which does result in some degradation of the images. A fracture is not definitely identified involving the pelvic bones or hip areas. There is osseous demineralization. There are postsurgical changes involving the lower lumbar spine. The graft there is some free fluid within the pelvis. Vascular calcification is noted. There are some soft tissue changes in the inferior gluteal area on the right that may relate to more of a decubitus ulcer.      1.  Osseous demineralization. 2.  Changes from bilateral hip arthroplasty and postsurgical changes lumbar spine 3.  Some stranding of subcutaneous fat in the infra gluteal area on the right that might be  reflective of a decubitus ulcer. 4.  Nonspecific free fluid within the pelvis.  This report was finalized on 8/5/2022 2:18 PM by Gonzalez Escudero MD.        I ordered and reviewed the above noted radiographic studies.      I viewed images of CT head which showed no acute bleed per my independent interpretation.    See radiologist's dictation for official interpretation.        PROCEDURES    Procedures    No orders to display       MEDICATIONS GIVEN IN ER    Medications   cefTRIAXone (ROCEPHIN) 1 g/100 mL 0.9% NS (MBP) (has no administration in time range)         PROGRESS, DATA ANALYSIS, CONSULTS, AND MEDICAL DECISION MAKING    All labs have been independently reviewed by me.  All radiology studies have been reviewed by me and the radiologist dictating the report.   EKG's have been independently viewed and interpreted by me.      Differential diagnoses: Fractures versus soft tissue injuries.  Consider dehydration, occult UTI, etc.      ED Course as of 08/05/22 1743   Fri Aug 05, 2022   1740 I spoke with the patient's grandson/healthcare power of .  He feels the patient is too high of a risk to send back to her assisted living facility.  She has previously been admitted and has gone to a skilled nursing facility for rehab.  He requests that this happen.  I have cultured her urine, given her Rocephin.  Patient is in good spirits.  I have contacted the hospitalist for admission. [MS]      ED Course User Index  [MS] Marcelo Kang MD             AS OF 17:43 EDT VITALS:    BP - 155/71  HR - 59  TEMP - 98.1 °F (36.7 °C)  O2 SATS - 97%                  DIAGNOSIS  Final diagnoses:   Bilateral hip pain   Injury of head, initial encounter   Acute UTI   Fall, initial encounter         DISPOSITION  DISCHARGE    Patient discharged in stable condition.    Reviewed implications of results, diagnosis, meds, responsibility to follow up, warning signs and symptoms of possible worsening, potential complications and reasons to  return to ER.    Patient/Family voiced understanding of above instructions.    Discussed plan for discharge, as there is no emergent indication for admission.  Pt/family is agreeable and understands need for follow up and possible repeat testing.  Pt/family is aware that discharge does not mean that nothing is wrong but that it indicates no emergency is currently present that requires admission and they must continue care with follow-up as given below or with a physician of their choice.     FOLLOW-UP  No follow-up provider specified.       Medication List      New Prescriptions    cefdinir 300 MG capsule  Commonly known as: OMNICEF  Take 1 capsule by mouth 2 (Two) Times a Day.           Where to Get Your Medications      These medications were sent to [facility] - [place], [place] - [address] - [phone]  - [phone]   [address], [facility] [address]    Phone: [phone]   · cefdinir 300 MG capsule                  Marcelo Kang MD  08/11/22 8284

## 2022-08-06 LAB
ANION GAP SERPL CALCULATED.3IONS-SCNC: 13 MMOL/L (ref 5–15)
BACTERIA UR QL AUTO: ABNORMAL /HPF
BASOPHILS # BLD AUTO: 0.03 10*3/MM3 (ref 0–0.2)
BASOPHILS NFR BLD AUTO: 0.4 % (ref 0–1.5)
BILIRUB UR QL STRIP: NEGATIVE
BUN SERPL-MCNC: 17 MG/DL (ref 8–23)
BUN/CREAT SERPL: 21.8 (ref 7–25)
CALCIUM SPEC-SCNC: 8.5 MG/DL (ref 8.6–10.5)
CHLORIDE SERPL-SCNC: 95 MMOL/L (ref 98–107)
CLARITY UR: ABNORMAL
CO2 SERPL-SCNC: 22 MMOL/L (ref 22–29)
COLOR UR: YELLOW
CREAT SERPL-MCNC: 0.78 MG/DL (ref 0.57–1)
DEPRECATED RDW RBC AUTO: 51.9 FL (ref 37–54)
EGFRCR SERPLBLD CKD-EPI 2021: 75.5 ML/MIN/1.73
EOSINOPHIL # BLD AUTO: 0.05 10*3/MM3 (ref 0–0.4)
EOSINOPHIL NFR BLD AUTO: 0.6 % (ref 0.3–6.2)
ERYTHROCYTE [DISTWIDTH] IN BLOOD BY AUTOMATED COUNT: 16.5 % (ref 12.3–15.4)
GLUCOSE SERPL-MCNC: 108 MG/DL (ref 65–99)
GLUCOSE UR STRIP-MCNC: NEGATIVE MG/DL
HCT VFR BLD AUTO: 31.9 % (ref 34–46.6)
HGB BLD-MCNC: 10.5 G/DL (ref 12–15.9)
HGB UR QL STRIP.AUTO: ABNORMAL
HYALINE CASTS UR QL AUTO: ABNORMAL /LPF
IMM GRANULOCYTES # BLD AUTO: 0.08 10*3/MM3 (ref 0–0.05)
IMM GRANULOCYTES NFR BLD AUTO: 1 % (ref 0–0.5)
KETONES UR QL STRIP: NEGATIVE
LEUKOCYTE ESTERASE UR QL STRIP.AUTO: ABNORMAL
LYMPHOCYTES # BLD AUTO: 1.07 10*3/MM3 (ref 0.7–3.1)
LYMPHOCYTES NFR BLD AUTO: 13 % (ref 19.6–45.3)
MAGNESIUM SERPL-MCNC: 1.6 MG/DL (ref 1.6–2.4)
MCH RBC QN AUTO: 28.5 PG (ref 26.6–33)
MCHC RBC AUTO-ENTMCNC: 32.9 G/DL (ref 31.5–35.7)
MCV RBC AUTO: 86.7 FL (ref 79–97)
MONOCYTES # BLD AUTO: 0.55 10*3/MM3 (ref 0.1–0.9)
MONOCYTES NFR BLD AUTO: 6.7 % (ref 5–12)
NEUTROPHILS NFR BLD AUTO: 6.43 10*3/MM3 (ref 1.7–7)
NEUTROPHILS NFR BLD AUTO: 78.3 % (ref 42.7–76)
NITRITE UR QL STRIP: POSITIVE
NRBC BLD AUTO-RTO: 0 /100 WBC (ref 0–0.2)
PH UR STRIP.AUTO: 6.5 [PH] (ref 5–8)
PLATELET # BLD AUTO: 311 10*3/MM3 (ref 140–450)
PMV BLD AUTO: 10 FL (ref 6–12)
POTASSIUM SERPL-SCNC: 3.6 MMOL/L (ref 3.5–5.2)
PROT UR QL STRIP: NEGATIVE
RBC # BLD AUTO: 3.68 10*6/MM3 (ref 3.77–5.28)
RBC # UR STRIP: ABNORMAL /HPF
REF LAB TEST METHOD: ABNORMAL
SODIUM SERPL-SCNC: 130 MMOL/L (ref 136–145)
SP GR UR STRIP: 1.01 (ref 1–1.03)
SQUAMOUS #/AREA URNS HPF: ABNORMAL /HPF
UROBILINOGEN UR QL STRIP: ABNORMAL
WBC # UR STRIP: ABNORMAL /HPF
WBC NRBC COR # BLD: 8.21 10*3/MM3 (ref 3.4–10.8)

## 2022-08-06 PROCEDURE — 80048 BASIC METABOLIC PNL TOTAL CA: CPT

## 2022-08-06 PROCEDURE — 87077 CULTURE AEROBIC IDENTIFY: CPT | Performed by: FAMILY MEDICINE

## 2022-08-06 PROCEDURE — 99233 SBSQ HOSP IP/OBS HIGH 50: CPT | Performed by: FAMILY MEDICINE

## 2022-08-06 PROCEDURE — 83735 ASSAY OF MAGNESIUM: CPT

## 2022-08-06 PROCEDURE — 81001 URINALYSIS AUTO W/SCOPE: CPT | Performed by: INTERNAL MEDICINE

## 2022-08-06 PROCEDURE — G0378 HOSPITAL OBSERVATION PER HR: HCPCS

## 2022-08-06 PROCEDURE — 87086 URINE CULTURE/COLONY COUNT: CPT | Performed by: FAMILY MEDICINE

## 2022-08-06 PROCEDURE — 87186 SC STD MICRODIL/AGAR DIL: CPT | Performed by: FAMILY MEDICINE

## 2022-08-06 PROCEDURE — 85025 COMPLETE CBC W/AUTO DIFF WBC: CPT

## 2022-08-06 PROCEDURE — 25010000002 CEFTRIAXONE PER 250 MG

## 2022-08-06 RX ADMIN — DONEPEZIL HYDROCHLORIDE 10 MG: 10 TABLET, FILM COATED ORAL at 20:21

## 2022-08-06 RX ADMIN — Medication 10 ML: at 20:22

## 2022-08-06 RX ADMIN — PRIMIDONE 100 MG: 50 TABLET ORAL at 05:14

## 2022-08-06 RX ADMIN — PRIMIDONE 100 MG: 50 TABLET ORAL at 20:22

## 2022-08-06 RX ADMIN — SODIUM CHLORIDE 1 G: 900 INJECTION INTRAVENOUS at 20:21

## 2022-08-06 RX ADMIN — ATORVASTATIN CALCIUM 40 MG: 40 TABLET, FILM COATED ORAL at 20:21

## 2022-08-06 NOTE — PROGRESS NOTES
[facility] Medicine Services  PROGRESS NOTE    Patient Name: Zohra Hall  : 1940  MRN: [id number]    Date of Admission: 2022  Primary Care Physician: Aleah Regan MD    Subjective   Subjective     CC:  F/U UTI     HPI:  Patient seen and examined. Intermittent confusion. Able to tell me her name, that she's in [place] and the year. Doesn't know she's in the hospital. Eating breakfast. She denies any complaints. Pleasant.     ROS:  UTO reliable ROS     Objective   Objective     Vital Signs:   Temp:  [97.7 °F (36.5 °C)-98.1 °F (36.7 °C)] 97.8 °F (36.6 °C)  Heart Rate:  [57-73] 73  Resp:  [17-20] 17  BP: (128-178)/(59-97) 136/73     Physical Exam:  Constitutional: No acute distress, awake, alert  HENT: NCAT, mucous membranes moist  Respiratory: Clear to auscultation bilaterally, respiratory effort normal   Cardiovascular: RRR, no murmurs, rubs, or gallops  Gastrointestinal: Positive bowel sounds, soft, nontender, nondistended  Musculoskeletal: No bilateral ankle edema  Psychiatric: Appropriate affect, cooperative  Neurologic: Oriented x 2, speech clear  Skin: No rashes    Results Reviewed:  LAB RESULTS:      Lab 22  1505   WBC 8.21   HEMOGLOBIN 11.9*   HEMATOCRIT 36.3   PLATELETS 350   NEUTROS ABS 6.59   IMMATURE GRANS (ABS) 0.11*   LYMPHS ABS 0.93   MONOS ABS 0.49   EOS ABS 0.06   MCV 85.8         Lab 22  1505   SODIUM 133*   POTASSIUM 4.2   CHLORIDE 97*   CO2 28.0   ANION GAP 8.0   BUN 14   CREATININE 0.84   EGFR 69.0   GLUCOSE 81   CALCIUM 8.9         Lab 22  1505   TOTAL PROTEIN 6.2   ALBUMIN 3.50   GLOBULIN 2.7   ALT (SGPT) 12   AST (SGOT) 19   BILIRUBIN 0.2   ALK PHOS 85                     Brief Urine Lab Results  (Last result in the past 365 days)      Color   Clarity   Blood   Leuk Est   Nitrite   Protein   CREAT   Urine HCG        22 0144 Yellow   Cloudy   Moderate (2+)   Small (1+)   Positive   Negative                 Microbiology  Results Abnormal     Procedure Component Value - Date/Time    COVID PRE-OP / PRE-PROCEDURE SCREENING ORDER (NO ISOLATION) - Swab, Nasopharynx [[id number]]  (Normal) Collected: 08/05/22 1506    Lab Status: Final result Specimen: Swab from Nasopharynx Updated: 08/05/22 1552    Narrative:      The following orders were created for panel order COVID PRE-OP / PRE-PROCEDURE SCREENING ORDER (NO ISOLATION) - Swab, Nasopharynx.  Procedure                               Abnormality         Status                     ---------                               -----------         ------                     COVID-19 and FLU A/B PCR...[[id number]]  Normal              Final result                 Please view results for these tests on the individual orders.    COVID-19 and FLU A/B PCR - Swab, Nasopharynx [[id number]]  (Normal) Collected: 08/05/22 1506    Lab Status: Final result Specimen: Swab from Nasopharynx Updated: 08/05/22 1552     COVID19 Not Detected     Influenza A PCR Not Detected     Influenza B PCR Not Detected    Narrative:      Fact sheet for providers: https://www.fda.gov/media/719813/download    Fact sheet for patients: https://www.fda.gov/media/259248/download    Test performed by PCR.          CT Head Without Contrast    Result Date: 8/5/2022  DATE OF EXAM: 8/5/2022 1:37 PM  PROCEDURE: CT HEAD WO CONTRAST-, CT CERVICAL SPINE WO CONTRAST-  INDICATIONS: FALL  COMPARISON: Head CT 5/31/2022  TECHNIQUE: Routine transaxial images of the head and cervical spine were obtained without the administration of contrast. Automated exposure control and iterative reconstruction methods were used.  The radiation dose reduction device was turned on for each scan per the ALARA (As Low as Reasonably Achievable) protocol.  FINDINGS: Head: No acute intracranial hemorrhage. No acute large territory infarct. There are scattered subcortical and periventricular white matter hypodensities which are nonspecific and can be seen in the setting of  chronic small vessel ischemic change.  There is mild global cerebral volume loss.  No extra-axial collections.  No midline shift or herniation.  Normal size and configuration of the ventricles commensurate with degree of cerebral volume loss.  Unremarkable appearance of the orbits.  The mastoid air cells are clear.  The paranasal sinuses are grossly clear.  No acute osseous findings. There is a small right parietal scalp contusion.  Cervical spine: The bones are demineralized limiting assessment for occult nondisplaced fractures. There is mild degenerative straightening of the cervical spine with trace degenerative anterolisthesis of C7 on T1, without definite traumatic subluxation or significant spondylolisthesis. The facets appear normally aligned allowing for multilevel facet arthropathy. The atlantodental interval is normal. No acute fracture. The vertebral body heights are maintained. There is presumed degenerative fusion of the C3 and C4 vertebral bodies and left facets. There is presumed degenerative fusion of the C5-C6 vertebral bodies and bilateral facets. 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 There is severe multilevel degenerative disc disease throughout the cervical spine with posterior disc osteophyte complexes at C3-C4, C4-C5, and C6-C7, with at least moderate spinal canal stenosis at these levels. There is multilevel facet arthropathy and uncovertebral hypertrophy contributing to moderate bilateral neuroforaminal narrowing at C4-C5. The paravertebral soft tissues are normal; no prevertebral soft tissue swelling. There are surgical clips prior thyroidectomy. Partially imaged lung apices demonstrate centrilobular emphysema. No evidence of pharyngeal or laryngeal mass lesion.      Impression: Small right parietal scalp hematoma without acute intracranial findings. There are chronic and senescent changes of the brain as above.  No acute fracture or traumatic malalignment in the  cervical spine. Moderate to severe degenerative changes are noted as above.  This report was finalized on 8/5/2022 2:15 PM by Brayan Walton MD.      CT Cervical Spine Without Contrast    Result Date: 8/5/2022  DATE OF EXAM: 8/5/2022 1:37 PM  PROCEDURE: CT HEAD WO CONTRAST-, CT CERVICAL SPINE WO CONTRAST-  INDICATIONS: FALL  COMPARISON: Head CT 5/31/2022  TECHNIQUE: Routine transaxial images of the head and cervical spine were obtained without the administration of contrast. Automated exposure control and iterative reconstruction methods were used.  The radiation dose reduction device was turned on for each scan per the ALARA (As Low as Reasonably Achievable) protocol.  FINDINGS: Head: No acute intracranial hemorrhage. No acute large territory infarct. There are scattered subcortical and periventricular white matter hypodensities which are nonspecific and can be seen in the setting of chronic small vessel ischemic change.  There is mild global cerebral volume loss.  No extra-axial collections.  No midline shift or herniation.  Normal size and configuration of the ventricles commensurate with degree of cerebral volume loss.  Unremarkable appearance of the orbits.  The mastoid air cells are clear.  The paranasal sinuses are grossly clear.  No acute osseous findings. There is a small right parietal scalp contusion.  Cervical spine: The bones are demineralized limiting assessment for occult nondisplaced fractures. There is mild degenerative straightening of the cervical spine with trace degenerative anterolisthesis of C7 on T1, without definite traumatic subluxation or significant spondylolisthesis. The facets appear normally aligned allowing for multilevel facet arthropathy. The atlantodental interval is normal. No acute fracture. The vertebral body heights are maintained. There is presumed degenerative fusion of the C3 and C4 vertebral bodies and left facets. There is presumed degenerative fusion of the C5-C6  vertebral bodies and bilateral facets. 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 There is severe multilevel degenerative disc disease throughout the cervical spine with posterior disc osteophyte complexes at C3-C4, C4-C5, and C6-C7, with at least moderate spinal canal stenosis at these levels. There is multilevel facet arthropathy and uncovertebral hypertrophy contributing to moderate bilateral neuroforaminal narrowing at C4-C5. The paravertebral soft tissues are normal; no prevertebral soft tissue swelling. There are surgical clips prior thyroidectomy. Partially imaged lung apices demonstrate centrilobular emphysema. No evidence of pharyngeal or laryngeal mass lesion.      Impression: Small right parietal scalp hematoma without acute intracranial findings. There are chronic and senescent changes of the brain as above.  No acute fracture or traumatic malalignment in the cervical spine. Moderate to severe degenerative changes are noted as above.  This report was finalized on 8/5/2022 2:15 PM by Brayan Walton MD.      CT Pelvis Without Contrast    Result Date: 8/5/2022  DATE OF EXAM: 8/5/2022 1:37 PM  PROCEDURE: CT PELVIS WO CONTRAST-  INDICATIONS: FALL  COMPARISON: CT abdomen pelvis May 31, 2002  TECHNIQUE: Routine transaxial slices were obtained through the pelvis without the administration of intravenous contrast. Reconstructed coronal and sagittal images were also obtained. Automated exposure control and iterative construction methods were used.    FINDINGS: The patient has had bilateral hip arthroplasty. There is streak artifact from the hardware which does result in some degradation of the images. A fracture is not definitely identified involving the pelvic bones or hip areas. There is osseous demineralization. There are postsurgical changes involving the lower lumbar spine. The graft there is some free fluid within the pelvis. Vascular calcification is noted. There are some soft tissue  changes in the inferior gluteal area on the right that may relate to more of a decubitus ulcer.      Impression: 1.  Osseous demineralization. 2.  Changes from bilateral hip arthroplasty and postsurgical changes lumbar spine 3.  Some stranding of subcutaneous fat in the infra gluteal area on the right that might be reflective of a decubitus ulcer. 4.  Nonspecific free fluid within the pelvis.  This report was finalized on 8/5/2022 2:18 PM by Gonzalez Escudero MD.        Results for orders placed during the hospital encounter of 08/09/21    Adult Transthoracic Echo Complete W/ Cont if Necessary Per Protocol    Interpretation Summary  · Estimated left ventricular EF = 60% Left ventricular systolic function is normal.  · Left ventricular diastolic function was normal.  · Trace mitral and tricuspid regurgitation.  · Mild tachycardia noted throughout the study.      I have reviewed the medications:  Scheduled Meds:aspirin, 81 mg, Oral, Daily  atorvastatin, 40 mg, Oral, Nightly  cefTRIAXone, 1 g, Intravenous, Q24H  celecoxib, 200 mg, Oral, Daily  clopidogrel, 75 mg, Oral, Daily  donepezil, 10 mg, Oral, Nightly  famotidine, 40 mg, Oral, Daily  levothyroxine, 125 mcg, Oral, Daily  metoprolol succinate XL, 25 mg, Oral, Q24H  PARoxetine, 40 mg, Oral, QAM  primidone, 100 mg, Oral, Q8H  sodium chloride, 10 mL, Intravenous, Q12H      Continuous Infusions:sodium chloride, 50 mL/hr, Last Rate: 50 mL/hr (08/05/22 2213)      PRN Meds:.•  acetaminophen **OR** acetaminophen **OR** acetaminophen    Assessment & Plan   Assessment & Plan     Active Hospital Problems    Diagnosis  POA   • **UTI (urinary tract infection) [N39.0]  Yes   • Alzheimer disease (HCC) [G30.9, F02.80]  Unknown   • Fall [W19.XXXA]  Unknown   • CAD with angina pectoris (CMS/HCC) [I25.119]  Yes   • Post-surgical hypothyroidism [E89.0]  Yes   • Hyponatremia [E87.1]  Yes   • Benign essential tremor [G25.0]  Yes   • Hypertension [I10]  Yes   • GERD without esophagitis  [K21.9]  Yes   • Hyperlipidemia LDL goal <100 [E78.5]  Yes      Resolved Hospital Problems   No resolved problems to display.        Brief Hospital Course to date:  Zohra Hall is a 81 y.o. female with PMH of post surgical hypothyroid, CVA, breast cancer, Alzheimer's, HTN, HLD, CAD, and chronic back pain who presents to the ED after an unwitnessed fall at assisted living facility. Consulted PT/OT, case managment for discharge placement.     This patient's problems and plans were partially entered by my partner and updated as appropriate by me 08/06/22.  All problems are new to me today      UTI  -Called Micro lab to add on Urine Cx as it did not reflex one  -S/P IVF  -Continue Rocephin     Fall  -CT shows small right parietal scalp hematoma, no acute fracture   -Fall precautions  -Pain control  -Consult PT/OT, case management. Pt currently at [facility].      HTN  -Continue home amlodipine and metoprolol     CAD  HLD  -Continue home aspirin, statin and plavix     Tremor  -Continue home primidone     Chronic Back Pain  -Continue home celebrex     GERD  -Continue home PPI     Hypothyroid  -postsurgical due to history of papillary thyroid adenocarcinoma  -Continue synthroid     Mood Disorder  -Continue home paxil     Alzheimer's Disease  -Continue home aricept    Expected Discharge Location and Transportation: [facility] vs [facility]   Expected Discharge Date: 8/9    DVT prophylaxis:  Mechanical DVT prophylaxis orders are present.          CODE STATUS:   Code Status and Medical Interventions:   Ordered at: 08/05/22 1953     Medical Intervention Limits:    NO intubation (DNI)     Level Of Support Discussed With:    Health Care Surrogate     Code Status (Patient has no pulse and is not breathing):    No CPR (Do Not Attempt to Resuscitate)     Medical Interventions (Patient has pulse or is breathing):    Limited Support       Yocasta Travis,   08/06/22

## 2022-08-07 LAB
DEPRECATED RDW RBC AUTO: 49.8 FL (ref 37–54)
ERYTHROCYTE [DISTWIDTH] IN BLOOD BY AUTOMATED COUNT: 16.2 % (ref 12.3–15.4)
HCT VFR BLD AUTO: 28.5 % (ref 34–46.6)
HGB BLD-MCNC: 9.5 G/DL (ref 12–15.9)
MCH RBC QN AUTO: 28.1 PG (ref 26.6–33)
MCHC RBC AUTO-ENTMCNC: 33.3 G/DL (ref 31.5–35.7)
MCV RBC AUTO: 84.3 FL (ref 79–97)
PLATELET # BLD AUTO: 275 10*3/MM3 (ref 140–450)
PMV BLD AUTO: 9.7 FL (ref 6–12)
RBC # BLD AUTO: 3.38 10*6/MM3 (ref 3.77–5.28)
WBC NRBC COR # BLD: 5.95 10*3/MM3 (ref 3.4–10.8)

## 2022-08-07 PROCEDURE — 25010000002 CEFTRIAXONE PER 250 MG

## 2022-08-07 PROCEDURE — 85027 COMPLETE CBC AUTOMATED: CPT | Performed by: FAMILY MEDICINE

## 2022-08-07 PROCEDURE — 97166 OT EVAL MOD COMPLEX 45 MIN: CPT

## 2022-08-07 PROCEDURE — 99232 SBSQ HOSP IP/OBS MODERATE 35: CPT | Performed by: INTERNAL MEDICINE

## 2022-08-07 PROCEDURE — G0378 HOSPITAL OBSERVATION PER HR: HCPCS

## 2022-08-07 RX ORDER — SODIUM CHLORIDE 9 MG/ML
100 INJECTION, SOLUTION INTRAVENOUS CONTINUOUS
Status: ACTIVE | OUTPATIENT
Start: 2022-08-07 | End: 2022-08-07

## 2022-08-07 RX ORDER — FAMOTIDINE 20 MG/1
20 TABLET, FILM COATED ORAL DAILY
Status: DISCONTINUED | OUTPATIENT
Start: 2022-08-08 | End: 2022-08-11 | Stop reason: HOSPADM

## 2022-08-07 RX ORDER — AMLODIPINE BESYLATE 5 MG/1
5 TABLET ORAL
Status: DISCONTINUED | OUTPATIENT
Start: 2022-08-07 | End: 2022-08-08

## 2022-08-07 RX ADMIN — SODIUM CHLORIDE 100 ML/HR: 9 INJECTION, SOLUTION INTRAVENOUS at 08:32

## 2022-08-07 RX ADMIN — FAMOTIDINE 40 MG: 20 TABLET ORAL at 08:30

## 2022-08-07 RX ADMIN — PRIMIDONE 100 MG: 50 TABLET ORAL at 05:14

## 2022-08-07 RX ADMIN — METOPROLOL SUCCINATE 25 MG: 25 TABLET, EXTENDED RELEASE ORAL at 08:30

## 2022-08-07 RX ADMIN — ATORVASTATIN CALCIUM 40 MG: 40 TABLET, FILM COATED ORAL at 22:34

## 2022-08-07 RX ADMIN — DONEPEZIL HYDROCHLORIDE 10 MG: 10 TABLET, FILM COATED ORAL at 22:34

## 2022-08-07 RX ADMIN — LEVOTHYROXINE SODIUM 125 MCG: 125 TABLET ORAL at 08:30

## 2022-08-07 RX ADMIN — SODIUM CHLORIDE 1 G: 900 INJECTION INTRAVENOUS at 22:34

## 2022-08-07 RX ADMIN — CLOPIDOGREL BISULFATE 75 MG: 75 TABLET ORAL at 08:30

## 2022-08-07 RX ADMIN — AMLODIPINE BESYLATE 5 MG: 5 TABLET ORAL at 08:30

## 2022-08-07 RX ADMIN — Medication 10 ML: at 08:31

## 2022-08-07 RX ADMIN — PRIMIDONE 100 MG: 50 TABLET ORAL at 22:38

## 2022-08-07 RX ADMIN — CELECOXIB 200 MG: 200 CAPSULE ORAL at 08:30

## 2022-08-07 RX ADMIN — Medication 10 ML: at 22:35

## 2022-08-07 RX ADMIN — ASPIRIN 81 MG: 81 TABLET, COATED ORAL at 08:30

## 2022-08-07 RX ADMIN — PRIMIDONE 100 MG: 50 TABLET ORAL at 15:48

## 2022-08-07 RX ADMIN — PAROXETINE HYDROCHLORIDE 40 MG: 20 TABLET, FILM COATED ORAL at 08:30

## 2022-08-07 NOTE — PLAN OF CARE
Patient verbalized wish to go home, denied any pain sob or other s/s of distress. VSS although HR was low at times patient remained asymptomatic and denies discomfort.   Problem: Fall Injury Risk  Goal: Absence of Fall and Fall-Related Injury  Intervention: Identify and Manage Contributors  Recent Flowsheet Documentation  Taken 8/7/2022 0800 by Heather Espinoza RN  Medication Review/Management: medications reviewed  Intervention: Promote Injury-Free Environment  Recent Flowsheet Documentation  Taken 8/7/2022 0800 by Heather Espinoza RN  Safety Promotion/Fall Prevention:   activity supervised   gait belt   safety round/check completed     Problem: Hypertension Comorbidity  Goal: Blood Pressure in Desired Range  Intervention: Maintain Blood Pressure Management  Recent Flowsheet Documentation  Taken 8/7/2022 0800 by Heather Espinoza RN  Syncope Management: legs elevated  Medication Review/Management: medications reviewed     Problem: Skin Injury Risk Increased  Goal: Skin Health and Integrity  Intervention: Optimize Skin Protection  Recent Flowsheet Documentation  Taken 8/7/2022 0800 by Heather Espinoza, RN  Skin Protection:   adhesive use limited   incontinence pads utilized   Goal Outcome Evaluation:

## 2022-08-07 NOTE — THERAPY EVALUATION
"Patient Name: Zohra Hall  : 1940    MRN: [id number]                              Today's Date: 2022       Admit Date: 2022    Visit Dx:     ICD-10-CM ICD-9-CM   1. Bilateral hip pain  M25.551 719.45    M25.552    2. Injury of head, initial encounter  S09.90XA 959.01   3. Acute UTI  N39.0 599.0   4. Fall, initial encounter  W19.XXXA E888.9     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HCC)   • Hypothyroid post remote resection papillary adenocarcinoma thyroid   • Hypertension   • GERD without esophagitis   • Hyperlipidemia LDL goal <100   • Atherosclerosis of aorta (HCC)   • Incontinence of bowel   • Acute right-sided low back pain without sciatica   • Benign essential tremor   • Pain of right hip joint   • Thyroid cancer (HCC)   • Lacunar stroke ([facility])   • Nontraumatic rupture of extensor tendons of right hand and wrist   • Hyponatremia   • Actinic keratosis   • Transient ischemic attack   • Ataxia   • History of PMR (CMS/[facility])   • Osteoporosis   • Post-surgical hypothyroidism   • NSTEMI (non-ST elevated myocardial infarction) ([facility])   • CAD with angina pectoris (CMS/[facility])   • NICM presumably due to Takotsubo post NSTEMI 2021 (CMS/[facility])   • Abnormal TSH   • Severe hypothyroidism   • Traumatic SAH, SDH, and intraparenchymal bleed (CMS/[facility])   • History of lacunar stroke 2019   • UTI (urinary tract infection)   • Alzheimer disease (HCC)   • Fall     Past Medical History:   Diagnosis Date   • Breast cancer (HCC)     left- pt states \"in situ\", no radiation, Tamoxifen for 5 years   • Cellulitis    • Cervical lymphadenopathy    • Chest pain    • Convulsions (HCC)    • GERD (gastroesophageal reflux disease)    • Glossitis    • Grief reaction     · Last Impression: 2015  counselled, given " ativan for prn use.  Aleah Regan   • Hypertension    • Lower back pain    • Oral thrush    • Papillary adenocarcinoma (HCC)     Papillary adenocarcinoma of thyroid   • Papillary adenocarcinoma of thyroid (HCC)     · resection Ramirez- 1/13,  2 x 2 x 1.5 cm  T3 N1 MXpost op low calcium and diminished  voiceablation Ain- 3/7 metastatic nodes and invasion to strap muscles · Last Impression: 29 Oct 2014  s/p Eval by berry Méndez.  Aleah Regan (Internal   • Piriformis syndrome    • Tingling    • Xerostomia      Past Surgical History:   Procedure Laterality Date   • BREAST BIOPSY Right 10/10/2013    stereo bx   • BREAST BIOPSY Left 10/19/2015    stereo bx   • BREAST CYST EXCISION      right   • BREAST EXCISIONAL BIOPSY Right     affirm bx and loc 2016.   • BREAST LUMPECTOMY Left 1987   • CARDIAC CATHETERIZATION N/A 5/23/2021    Procedure: Left Heart Cath;  Surgeon: Alonso Ross IV, MD;  Location:  [facility];  Service: Cardiology;  Laterality: N/A;   • HYSTERECTOMY  1979   • OTHER SURGICAL HISTORY Right     right arm surgery-steel roger in place   • TOTAL HIP ARTHROPLASTY     • TOTAL THYROIDECTOMY      Thyroid Surgery Total Thyroidectomy      General Information     Row Name 08/07/22 1441          OT Time and Intention    Document Type evaluation  -AN     Mode of Treatment occupational therapy  -AN     Row Name 08/07/22 1441          General Information    Patient Profile Reviewed yes  -AN     Prior Level of Function --  pt is unable to report PLOF due to AMS; per chart ambulates using a RW  -AN     Existing Precautions/Restrictions fall;other (see comments)  AMS, Alzhiemher's  -AN     Barriers to Rehab medically complex;previous functional deficit;cognitive status  -AN     Row Name 08/07/22 1441          Living Environment    People in Home facility resident  -AN     Row Name 08/07/22 1441          Home Main Entrance    Number of Stairs, Main Entrance none  -AN     Row Name  08/07/22 1441          Cognition    Orientation Status (Cognition) oriented to;person;disoriented to;place;situation;time;verbal cues/prompts needed for orientation  -AN     Row Name 08/07/22 1441          Safety Issues, Functional Mobility    Safety Issues Affecting Function (Mobility) insight into deficits/self-awareness;safety precaution awareness;problem-solving;awareness of need for assistance;judgment;sequencing abilities;safety precautions follow-through/compliance  -AN     Impairments Affecting Function (Mobility) balance;cognition;endurance/activity tolerance;pain;strength  -AN     Cognitive Impairments, Mobility Safety/Performance awareness, need for assistance;insight into deficits/self-awareness;problem-solving/reasoning;sequencing abilities;safety precaution follow-through;safety precaution awareness  -AN           User Key  (r) = Recorded By, (t) = Taken By, (c) = Cosigned By    Initials Name Provider Type    Lillie Pacheco OT Occupational Therapist                 Mobility/ADL's     Row Name 08/07/22 1450          Bed Mobility    Bed Mobility supine-sit;sit-supine  -AN     Supine-Sit [place] (Bed Mobility) moderate assist (50% patient effort);2 person assist;verbal cues;nonverbal cues (demo/gesture)  -AN     Sit-Supine [place] (Bed Mobility) verbal cues;nonverbal cues (demo/gesture);maximum assist (25% patient effort);2 person assist  -AN     Bed Mobility, Safety Issues cognitive deficits limit understanding;decreased use of arms for pushing/pulling;decreased use of legs for bridging/pushing;impaired trunk control for bed mobility  -AN     Assistive Device (Bed Mobility) head of bed elevated;draw sheet  -AN     Comment, (Bed Mobility) cues for sequencing of steps and increased time due to generalized pain and weakness  -AN     Row Name 08/07/22 1450          Transfers    Transfers sit-stand transfer;stand-sit transfer  -AN     Comment, (Transfers) STS performed x2 from the EOB with  Mod A x 2 with cues for sequencing of steps; pt unable to progress transfers at this time due to b/l leg pain in standing  -AN     Sit-Stand [place] (Transfers) verbal cues;nonverbal cues (demo/gesture);moderate assist (50% patient effort);2 person assist  -AN     Stand-Sit [place] (Transfers) verbal cues;nonverbal cues (demo/gesture);moderate assist (50% patient effort);2 person assist  -AN     Row Name 08/07/22 1450          Functional Mobility    Functional Mobility- Ind. Level unable to perform  -AN     Row Name 08/07/22 1450          Activities of Daily Living    BADL Assessment/Intervention lower body dressing  -AN     Row Name 08/07/22 1450          Lower Body Dressing Assessment/Training    [place] Level (Lower Body Dressing) don;socks;dependent (less than 25% patient effort)  -AN     Position (Lower Body Dressing) supine  -AN           User Key  (r) = Recorded By, (t) = Taken By, (c) = Cosigned By    Initials Name Provider Type    AN Lillie Murphy OT Occupational Therapist               Obj/Interventions     Row Name 08/07/22 1452          Sensory Assessment (Somatosensory)    Sensory Assessment (Somatosensory) UE sensation intact  -AN     Row Name 08/07/22 1452          Vision Assessment/Intervention    Visual Impairment/Limitations WFL  -AN     Vision Assessment Comment pt with visual hallucinations throughout session  -AN     Row Name 08/07/22 1452          Range of Motion Comprehensive    General Range of Motion no range of motion deficits identified  -AN     Row Name 08/07/22 1452          Strength Comprehensive (MMT)    General Manual Muscle Testing (MMT) Assessment upper extremity strength deficits identified;lower extremity strength deficits identified  -AN     Comment, General Manual Muscle Testing (MMT) Assessment BUE grossly 4/5, BLE grossly 4-/5  -AN     Row Name 08/07/22 1452          Motor Skills    Motor Skills functional endurance  -AN     Functional Endurance decreased  activity tolerance  -AN     Row Name 08/07/22 1452          Balance    Balance Assessment sitting static balance;sitting dynamic balance;sit to stand dynamic balance;standing static balance  -AN     Static Sitting Balance contact guard  -AN     Dynamic Sitting Balance contact guard  -AN     Position, Sitting Balance sitting edge of bed  -AN     Sit to Stand Dynamic Balance verbal cues;moderate assist;2-person assist;non-verbal cues (demo/gesture)  -AN     Static Standing Balance moderate assist;2-person assist;verbal cues;non-verbal cues (demo/gesture)  -AN     Position/Device Used, Standing Balance supported  -AN     Balance Interventions standing;sit to stand;supported;static;moderate challenge;narrowed base of support  -AN           User Key  (r) = Recorded By, (t) = Taken By, (c) = Cosigned By    Initials Name Provider Type    AN Lillie Murphy OT Occupational Therapist               Goals/Plan     Row Name 08/07/22 1504          Bed Mobility Goal 1 (OT)    Activity/Assistive Device (Bed Mobility Goal 1, OT) sit to supine/supine to sit  -AN     [place] Level/Cues Needed (Bed Mobility Goal 1, OT) minimum assist (75% or more patient effort)  -AN     Time Frame (Bed Mobility Goal 1, OT) long term goal (LTG);10 days  -AN     Row Name 08/07/22 1504          Transfer Goal 1 (OT)    Activity/Assistive Device (Transfer Goal 1, OT) sit-to-stand/stand-to-sit;bed-to-chair/chair-to-bed  -AN     [place] Level/Cues Needed (Transfer Goal 1, OT) minimum assist (75% or more patient effort)  -AN     Time Frame (Transfer Goal 1, OT) long term goal (LTG);10 days  -AN     Row Name 08/07/22 1504          Grooming Goal 1 (OT)    Activity/Device (Grooming Goal 1, OT) wash face, hands;oral care  -AN     [place] (Grooming Goal 1, OT) minimum assist (75% or more patient effort)  -AN     Time Frame (Grooming Goal 1, OT) long term goal (LTG);10 days  -AN     Row Name 08/07/22 1504          Therapy Assessment/Plan (OT)     Planned Therapy Interventions (OT) activity tolerance training;adaptive equipment training;BADL retraining;cognitive/visual perception retraining;functional balance retraining;occupation/activity based interventions;patient/caregiver education/training;transfer/mobility retraining;strengthening exercise  -AN           User Key  (r) = Recorded By, (t) = Taken By, (c) = Cosigned By    Initials Name Provider Type    Lillie Pacheco, ANA MARÍA Occupational Therapist               Clinical Impression     Row Name 08/07/22 1454          Pain Assessment    Additional Documentation Pain Scale: FACES Pre/Post-Treatment (Group)  -AN     Row Name 08/07/22 1454          Pain Scale: FACES Pre/Post-Treatment    Pain: FACES Scale, Pretreatment 2-->hurts little bit  -AN     Posttreatment Pain Rating 4-->hurts little more  -AN     Pain Location - Side/Orientation Bilateral  -AN     Pain Location lower  -AN     Pain Location - extremity  -AN     Pre/Posttreatment Pain Comment tolerated  -AN     Row Name 08/07/22 1454          Plan of Care Review    Plan of Care Reviewed With patient  -AN     Progress no change  -AN     Outcome Evaluation OT evaluation completed. Pt presents with generalized weakness, impaired cognition, decreased activity tolerance, and LE pain limiting independence with ADLs and functional mobility. Pt performed bed mobility with Mod A x 2 and STS x 2 with Mod HHA x 2. Pt unable to further progress mobility due to pain. OT rec IP OT and SNF at [place].  -AN     Row Name 08/07/22 1456          Therapy Assessment/Plan (OT)    Patient/Family Therapy Goal Statement (OT) Return to PLOF  -AN     Rehab Potential (OT) good, to achieve stated therapy goals  -AN     Criteria for Skilled Therapeutic Interventions Met (OT) yes;skilled treatment is necessary  -AN     Therapy Frequency (OT) daily  -AN     Row Name 08/07/22 3664          Therapy Plan Review/Discharge Plan (OT)    Anticipated Discharge Disposition (OT) skilled nursing  facility  -AN     Row Name 08/07/22 1454          Vital Signs    Pre Systolic BP Rehab --  VSS  -AN     O2 Delivery Pre Treatment room air  -AN     O2 Delivery Intra Treatment room air  -AN     O2 Delivery Post Treatment room air  -AN     Pre Patient Position Supine  -AN     Intra Patient Position Standing  -AN     Post Patient Position Supine  -AN     Row Name 08/07/22 1454          Positioning and Restraints    Pre-Treatment Position in bed  -AN     Post Treatment Position bed  -AN     In Bed notified nsg;supine;exit alarm on;encouraged to call for assist;call light within reach;side rails up x3  -AN           User Key  (r) = Recorded By, (t) = Taken By, (c) = Cosigned By    Initials Name Provider Type    Lillie Pacheco OT Occupational Therapist               Outcome Measures     Row Name 08/07/22 1506          How much help from another is currently needed...    Putting on and taking off regular lower body clothing? 2  -AN     Bathing (including washing, rinsing, and drying) 2  -AN     Toileting (which includes using toilet bed pan or urinal) 2  -AN     Putting on and taking off regular upper body clothing 2  -AN     Taking care of personal grooming (such as brushing teeth) 2  -AN     Eating meals 2  -AN     AM-PAC 6 Clicks Score (OT) 12  -AN     Row Name 08/07/22 1506          Functional Assessment    Outcome Measure Options AM-PAC 6 Clicks Daily Activity (OT)  -AN           User Key  (r) = Recorded By, (t) = Taken By, (c) = Cosigned By    Initials Name Provider Type    Lillie Pacheco OT Occupational Therapist                Occupational Therapy Education                 Title: PT OT SLP Therapies (In Progress)     Topic: Occupational Therapy (In Progress)     Point: ADL training (Done)     Description:   Instruct learner(s) on proper safety adaptation and remediation techniques during self care or transfers.   Instruct in proper use of assistive devices.              Learning Progress Summary            Patient Acceptance, E, VU,NR by AN at 8/7/2022 1506                   Point: Home exercise program (Not Started)     Description:   Instruct learner(s) on appropriate technique for monitoring, assisting and/or progressing therapeutic exercises/activities.              Learner Progress:  Not documented in this visit.          Point: Precautions (Done)     Description:   Instruct learner(s) on prescribed precautions during self-care and functional transfers.              Learning Progress Summary           Patient Acceptance, E, VU,NR by AN at 8/7/2022 1506                   Point: Body mechanics (Done)     Description:   Instruct learner(s) on proper positioning and spine alignment during self-care, functional mobility activities and/or exercises.              Learning Progress Summary           Patient Acceptance, E, VU,NR by AN at 8/7/2022 1506                               User Key     Initials Effective Dates Name Provider Type Discipline     09/21/21 -  Lillie Murphy OT Occupational Therapist OT              OT Recommendation and Plan  Planned Therapy Interventions (OT): activity tolerance training, adaptive equipment training, BADL retraining, cognitive/visual perception retraining, functional balance retraining, occupation/activity based interventions, patient/caregiver education/training, transfer/mobility retraining, strengthening exercise  Therapy Frequency (OT): daily  Plan of Care Review  Plan of Care Reviewed With: patient  Progress: no change  Outcome Evaluation: OT evaluation completed. Pt presents with generalized weakness, impaired cognition, decreased activity tolerance, and LE pain limiting independence with ADLs and functional mobility. Pt performed bed mobility with Mod A x 2 and STS x 2 with Mod HHA x 2. Pt unable to further progress mobility due to pain. OT rec IP OT and SNF at [place].     Time Calculation:    Time Calculation- OT     Row Name 08/07/22 1508             Time Calculation-  OT    OT Start Time 1312  -AN      OT Received On 08/07/22  -AN      OT Goal Re-Cert Due Date 08/17/22  -AN              Untimed Charges    OT Eval/Re-eval Minutes 46  -AN              Total Minutes    Untimed Charges Total Minutes 46  -AN       Total Minutes 46  -AN            User Key  (r) = Recorded By, (t) = Taken By, (c) = Cosigned By    Initials Name Provider Type    AN Lillie Murphy OT Occupational Therapist              Therapy Charges for Today     Code Description Service Date Service Provider Modifiers Qty    40701328912  OT EVAL MOD COMPLEXITY 4 8/7/2022 Lillie Murphy OT GO 1    [id number]  OT THER SUPP EA 15 MIN 8/7/2022 Lillei Murphy OT GO 2               Lillie Murphy OT  8/7/2022

## 2022-08-07 NOTE — PLAN OF CARE
Problem: Adult Inpatient Plan of Care  Goal: Plan of Care Review  Outcome: Ongoing, Progressing  Goal: Patient-Specific Goal (Individualized)  Outcome: Ongoing, Progressing  Goal: Absence of Hospital-Acquired Illness or Injury  Outcome: Ongoing, Progressing  Intervention: Identify and Manage Fall Risk  Description: Perform standard risk assessment on admission using a validated tool or comprehensive approach appropriate to the patient; reassess fall risk frequently, with change in status or transfer to another level of care.  Communicate fall injury risk to interprofessional healthcare team.  Determine need for increased observation, equipment and environmental modification, such as low bed, signage and supportive, nonskid footwear.  Adjust safety measures to individual developmental age, stage and identified risk factors.  Reinforce the importance of safety and physical activity with patient and family.  Perform regular intentional rounding to assess need for position change, pain assessment and personal needs, including assistance with toileting.  Recent Flowsheet Documentation  Taken 8/7/2022 0400 by Winter Roman, RN  Safety Promotion/Fall Prevention:   activity supervised   assistive device/personal items within reach   clutter free environment maintained   fall prevention program maintained   lighting adjusted   mobility aid in reach   nonskid shoes/slippers when out of bed   room organization consistent   safety round/check completed  Taken 8/7/2022 0200 by Winter Roman, RN  Safety Promotion/Fall Prevention:   activity supervised   assistive device/personal items within reach   clutter free environment maintained   fall prevention program maintained   lighting adjusted   mobility aid in reach   nonskid shoes/slippers when out of bed   room organization consistent   safety round/check completed  Taken 8/7/2022 0000 by Winter Roman, RN  Safety Promotion/Fall Prevention:   activity supervised   assistive  device/personal items within reach   clutter free environment maintained   fall prevention program maintained   lighting adjusted   mobility aid in reach   nonskid shoes/slippers when out of bed   room organization consistent   safety round/check completed  Taken 8/6/2022 2200 by Winter Roman RN  Safety Promotion/Fall Prevention:   activity supervised   assistive device/personal items within reach   clutter free environment maintained   fall prevention program maintained   lighting adjusted   mobility aid in reach   nonskid shoes/slippers when out of bed   room organization consistent   safety round/check completed  Taken 8/6/2022 2000 by Winter Roamn RN  Safety Promotion/Fall Prevention:   activity supervised   assistive device/personal items within reach   clutter free environment maintained   fall prevention program maintained   lighting adjusted   mobility aid in reach   nonskid shoes/slippers when out of bed   room organization consistent   safety round/check completed  Intervention: Prevent Skin Injury  Description: Perform a screening for skin injury risk, such as pressure or moisture associated skin damage on admission and at regular intervals throughout hospital stay.  Keep all areas of skin (especially folds) clean and dry.  Maintain adequate skin hydration.  Relieve and redistribute pressure and protect bony prominences; implement measures based on patient-specific risk factors.  Match turning and repositioning schedule to clinical condition.  Encourage weight shift frequently; assist with reposition if unable to complete independently.  Float heels off bed; avoid pressure on the Achilles tendon.  Keep skin free from extended contact with medical devices.  Encourage functional activity and mobility, as early as tolerated.  Use aids (e.g., slide boards, mechanical lift) during transfer.  Recent Flowsheet Documentation  Taken 8/7/2022 0400 by Winter Roman RN  Body Position: weight shifting  Skin  Protection:   adhesive use limited   tubing/devices free from skin contact   transparent dressing maintained   skin-to-skin areas padded   skin-to-device areas padded  Taken 8/7/2022 0200 by Winetr Roman RN  Body Position: weight shifting  Skin Protection:   adhesive use limited   tubing/devices free from skin contact   transparent dressing maintained   skin-to-skin areas padded   skin-to-device areas padded  Taken 8/7/2022 0000 by Winter Roman RN  Body Position: weight shifting  Skin Protection:   adhesive use limited   tubing/devices free from skin contact   transparent dressing maintained   skin-to-skin areas padded   skin-to-device areas padded  Taken 8/6/2022 2200 by Winter Roman RN  Body Position: weight shifting  Skin Protection:   adhesive use limited   tubing/devices free from skin contact   transparent dressing maintained   skin-to-skin areas padded   skin-to-device areas padded  Taken 8/6/2022 2000 by Winter Roman RN  Body Position: weight shifting  Skin Protection:   adhesive use limited   tubing/devices free from skin contact   transparent dressing maintained   skin-to-skin areas padded   skin-to-device areas padded   skin sealant/moisture barrier applied  Intervention: Prevent and Manage VTE (Venous Thromboembolism) Risk  Description: Assess for VTE (venous thromboembolism) risk.  Encourage and assist with early ambulation.  Initiate and maintain compression or other therapy, as indicated, based on identified risk in accordance with organizational protocol and provider order.  Encourage both active and passive leg exercises while in bed, if unable to ambulate.  Recent Flowsheet Documentation  Taken 8/7/2022 0400 by Winter Roman RN  Activity Management: activity adjusted per tolerance  Taken 8/7/2022 0200 by Winter Roman RN  Activity Management: activity adjusted per tolerance  Taken 8/7/2022 0000 by Winter Roman RN  Activity Management: activity adjusted per tolerance  Taken  8/6/2022 2200 by Winter Roman, RN  Activity Management: activity adjusted per tolerance  Taken 8/6/2022 2000 by Winter Roman RN  Activity Management: activity adjusted per tolerance  Intervention: Prevent Infection  Description: Maintain skin and mucous membrane integrity; promote hand, oral and pulmonary hygiene.  Optimize fluid balance, nutrition, sleep and glycemic control to maximize infection resistance.  Identify potential sources of infection early to prevent or mitigate progression of infection (e.g., wound, lines, devices).  Evaluate ongoing need for invasive devices; remove promptly when no longer indicated.  Recent Flowsheet Documentation  Taken 8/7/2022 0400 by Winter Roman, RN  Infection Prevention:   environmental surveillance performed   equipment surfaces disinfected   hand hygiene promoted   personal protective equipment utilized   rest/sleep promoted  Taken 8/6/2022 2000 by Winter Roman, RN  Infection Prevention:   environmental surveillance performed   equipment surfaces disinfected   hand hygiene promoted   personal protective equipment utilized   rest/sleep promoted  Goal: Optimal Comfort and Wellbeing  Outcome: Ongoing, Progressing  Goal: Readiness for Transition of Care  Outcome: Ongoing, Progressing   Goal Outcome Evaluation:

## 2022-08-07 NOTE — PLAN OF CARE
Goal Outcome Evaluation:  Plan of Care Reviewed With: patient        Progress: no change  Outcome Evaluation: OT evaluation completed. Pt presents with generalized weakness, impaired cognition, decreased activity tolerance, and LE pain limiting independence with ADLs and functional mobility. Pt performed bed mobility with Mod A x 2 and STS x 2 with Mod HHA x 2. Pt unable to further progress mobility due to LE pain. OT rec IP OT and SNF at [place].

## 2022-08-07 NOTE — PROGRESS NOTES
[facility] Medicine Services  PROGRESS NOTE    Patient Name: Zohra Hall  : 1940  MRN: [id number]    Date of Admission: 2022  Primary Care Physician: Aleah Regan MD    Subjective   Subjective     CC:  UTI, AMS    HPI:  Resting comfortably, confused at baseline.  Answers yes to all my questions without even opening her eyes.  No overnight events per nursing    ROS:  Limited ROS due to confusion, baseline dementia    Objective   Objective     Vital Signs:   Temp:  [97.6 °F (36.4 °C)-98.7 °F (37.1 °C)] 98.3 °F (36.8 °C)  Heart Rate:  [67-82] 76  Resp:  [16-17] 16  BP: (123-183)/() 177/93     Physical Exam:  Constitutional: No acute distress, sleeping but opens her eyes to voice  HENT: NCAT, mucous membranes dry  Respiratory: Clear to auscultation bilaterally, respiratory effort normal   Cardiovascular: RRR, no murmurs, rubs, or gallops  Gastrointestinal: Soft, nontender, nondistended  Musculoskeletal: Trace LE edema  Psychiatric: Cooperative  Neurologic: Oriented only to self, difficult to assess due to not answering questions  Skin: No rashes      Results Reviewed:  LAB RESULTS:      Lab 22  0331 22  0823 22  1505   WBC 5.95 8.21 8.21   HEMOGLOBIN 9.5* 10.5* 11.9*   HEMATOCRIT 28.5* 31.9* 36.3   PLATELETS 275 311 350   NEUTROS ABS  --  6.43 6.59   IMMATURE GRANS (ABS)  --  0.08* 0.11*   LYMPHS ABS  --  1.07 0.93   MONOS ABS  --  0.55 0.49   EOS ABS  --  0.05 0.06   MCV 84.3 86.7 85.8         Lab 22  1353 22  1505   SODIUM 130* 133*   POTASSIUM 3.6 4.2   CHLORIDE 95* 97*   CO2 22.0 28.0   ANION GAP 13.0 8.0   BUN 17 14   CREATININE 0.78 0.84   EGFR 75.5 69.0   GLUCOSE 108* 81   CALCIUM 8.5* 8.9   MAGNESIUM 1.6  --          Lab 22  1505   TOTAL PROTEIN 6.2   ALBUMIN 3.50   GLOBULIN 2.7   ALT (SGPT) 12   AST (SGOT) 19   BILIRUBIN 0.2   ALK PHOS 85                     Brief Urine Lab Results  (Last result in the past 365 days)       Color   Clarity   Blood   Leuk Est   Nitrite   Protein   CREAT   Urine HCG        08/06/22 0144 Yellow   Cloudy   Moderate (2+)   Small (1+)   Positive   Negative                 Microbiology Results Abnormal     Procedure Component Value - Date/Time    COVID PRE-OP / PRE-PROCEDURE SCREENING ORDER (NO ISOLATION) - Swab, Nasopharynx [[id number]]  (Normal) Collected: 08/05/22 1506    Lab Status: Final result Specimen: Swab from Nasopharynx Updated: 08/05/22 1552    Narrative:      The following orders were created for panel order COVID PRE-OP / PRE-PROCEDURE SCREENING ORDER (NO ISOLATION) - Swab, Nasopharynx.  Procedure                               Abnormality         Status                     ---------                               -----------         ------                     COVID-19 and FLU A/B PCR...[[id number]]  Normal              Final result                 Please view results for these tests on the individual orders.    COVID-19 and FLU A/B PCR - Swab, Nasopharynx [[id number]]  (Normal) Collected: 08/05/22 1506    Lab Status: Final result Specimen: Swab from Nasopharynx Updated: 08/05/22 1552     COVID19 Not Detected     Influenza A PCR Not Detected     Influenza B PCR Not Detected    Narrative:      Fact sheet for providers: https://www.fda.gov/media/021813/download    Fact sheet for patients: https://www.fda.gov/media/811487/download    Test performed by PCR.          CT Head Without Contrast    Result Date: 8/5/2022  DATE OF EXAM: 8/5/2022 1:37 PM  PROCEDURE: CT HEAD WO CONTRAST-, CT CERVICAL SPINE WO CONTRAST-  INDICATIONS: FALL  COMPARISON: Head CT 5/31/2022  TECHNIQUE: Routine transaxial images of the head and cervical spine were obtained without the administration of contrast. Automated exposure control and iterative reconstruction methods were used.  The radiation dose reduction device was turned on for each scan per the ALARA (As Low as Reasonably Achievable) protocol.  FINDINGS: Head: No acute  intracranial hemorrhage. No acute large territory infarct. There are scattered subcortical and periventricular white matter hypodensities which are nonspecific and can be seen in the setting of chronic small vessel ischemic change.  There is mild global cerebral volume loss.  No extra-axial collections.  No midline shift or herniation.  Normal size and configuration of the ventricles commensurate with degree of cerebral volume loss.  Unremarkable appearance of the orbits.  The mastoid air cells are clear.  The paranasal sinuses are grossly clear.  No acute osseous findings. There is a small right parietal scalp contusion.  Cervical spine: The bones are demineralized limiting assessment for occult nondisplaced fractures. There is mild degenerative straightening of the cervical spine with trace degenerative anterolisthesis of C7 on T1, without definite traumatic subluxation or significant spondylolisthesis. The facets appear normally aligned allowing for multilevel facet arthropathy. The atlantodental interval is normal. No acute fracture. The vertebral body heights are maintained. There is presumed degenerative fusion of the C3 and C4 vertebral bodies and left facets. There is presumed degenerative fusion of the C5-C6 vertebral bodies and bilateral facets. 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 There is severe multilevel degenerative disc disease throughout the cervical spine with posterior disc osteophyte complexes at C3-C4, C4-C5, and C6-C7, with at least moderate spinal canal stenosis at these levels. There is multilevel facet arthropathy and uncovertebral hypertrophy contributing to moderate bilateral neuroforaminal narrowing at C4-C5. The paravertebral soft tissues are normal; no prevertebral soft tissue swelling. There are surgical clips prior thyroidectomy. Partially imaged lung apices demonstrate centrilobular emphysema. No evidence of pharyngeal or laryngeal mass lesion.      Impression:  Small right parietal scalp hematoma without acute intracranial findings. There are chronic and senescent changes of the brain as above.  No acute fracture or traumatic malalignment in the cervical spine. Moderate to severe degenerative changes are noted as above.  This report was finalized on 8/5/2022 2:15 PM by Brayan Walton MD.      CT Cervical Spine Without Contrast    Result Date: 8/5/2022  DATE OF EXAM: 8/5/2022 1:37 PM  PROCEDURE: CT HEAD WO CONTRAST-, CT CERVICAL SPINE WO CONTRAST-  INDICATIONS: FALL  COMPARISON: Head CT 5/31/2022  TECHNIQUE: Routine transaxial images of the head and cervical spine were obtained without the administration of contrast. Automated exposure control and iterative reconstruction methods were used.  The radiation dose reduction device was turned on for each scan per the ALARA (As Low as Reasonably Achievable) protocol.  FINDINGS: Head: No acute intracranial hemorrhage. No acute large territory infarct. There are scattered subcortical and periventricular white matter hypodensities which are nonspecific and can be seen in the setting of chronic small vessel ischemic change.  There is mild global cerebral volume loss.  No extra-axial collections.  No midline shift or herniation.  Normal size and configuration of the ventricles commensurate with degree of cerebral volume loss.  Unremarkable appearance of the orbits.  The mastoid air cells are clear.  The paranasal sinuses are grossly clear.  No acute osseous findings. There is a small right parietal scalp contusion.  Cervical spine: The bones are demineralized limiting assessment for occult nondisplaced fractures. There is mild degenerative straightening of the cervical spine with trace degenerative anterolisthesis of C7 on T1, without definite traumatic subluxation or significant spondylolisthesis. The facets appear normally aligned allowing for multilevel facet arthropathy. The atlantodental interval is normal. No acute fracture.  The vertebral body heights are maintained. There is presumed degenerative fusion of the C3 and C4 vertebral bodies and left facets. There is presumed degenerative fusion of the C5-C6 vertebral bodies and bilateral facets. 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 There is severe multilevel degenerative disc disease throughout the cervical spine with posterior disc osteophyte complexes at C3-C4, C4-C5, and C6-C7, with at least moderate spinal canal stenosis at these levels. There is multilevel facet arthropathy and uncovertebral hypertrophy contributing to moderate bilateral neuroforaminal narrowing at C4-C5. The paravertebral soft tissues are normal; no prevertebral soft tissue swelling. There are surgical clips prior thyroidectomy. Partially imaged lung apices demonstrate centrilobular emphysema. No evidence of pharyngeal or laryngeal mass lesion.      Impression: Small right parietal scalp hematoma without acute intracranial findings. There are chronic and senescent changes of the brain as above.  No acute fracture or traumatic malalignment in the cervical spine. Moderate to severe degenerative changes are noted as above.  This report was finalized on 8/5/2022 2:15 PM by Brayan Walton MD.      CT Pelvis Without Contrast    Result Date: 8/5/2022  DATE OF EXAM: 8/5/2022 1:37 PM  PROCEDURE: CT PELVIS WO CONTRAST-  INDICATIONS: FALL  COMPARISON: CT abdomen pelvis May 31, 2002  TECHNIQUE: Routine transaxial slices were obtained through the pelvis without the administration of intravenous contrast. Reconstructed coronal and sagittal images were also obtained. Automated exposure control and iterative construction methods were used.    FINDINGS: The patient has had bilateral hip arthroplasty. There is streak artifact from the hardware which does result in some degradation of the images. A fracture is not definitely identified involving the pelvic bones or hip areas. There is osseous demineralization.  There are postsurgical changes involving the lower lumbar spine. The graft there is some free fluid within the pelvis. Vascular calcification is noted. There are some soft tissue changes in the inferior gluteal area on the right that may relate to more of a decubitus ulcer.      Impression: 1.  Osseous demineralization. 2.  Changes from bilateral hip arthroplasty and postsurgical changes lumbar spine 3.  Some stranding of subcutaneous fat in the infra gluteal area on the right that might be reflective of a decubitus ulcer. 4.  Nonspecific free fluid within the pelvis.  This report was finalized on 8/5/2022 2:18 PM by Gonzalez Escudero MD.        Results for orders placed during the hospital encounter of 08/09/21    Adult Transthoracic Echo Complete W/ Cont if Necessary Per Protocol    Interpretation Summary  · Estimated left ventricular EF = 60% Left ventricular systolic function is normal.  · Left ventricular diastolic function was normal.  · Trace mitral and tricuspid regurgitation.  · Mild tachycardia noted throughout the study.      I have reviewed the medications:  Scheduled Meds:amLODIPine, 5 mg, Oral, Q24H  aspirin, 81 mg, Oral, Daily  atorvastatin, 40 mg, Oral, Nightly  cefTRIAXone, 1 g, Intravenous, Q24H  celecoxib, 200 mg, Oral, Daily  clopidogrel, 75 mg, Oral, Daily  donepezil, 10 mg, Oral, Nightly  [START ON 8/8/2022] famotidine, 20 mg, Oral, Daily  levothyroxine, 125 mcg, Oral, Daily  metoprolol succinate XL, 25 mg, Oral, Q24H  PARoxetine, 40 mg, Oral, QAM  primidone, 100 mg, Oral, Q8H  sodium chloride, 10 mL, Intravenous, Q12H      Continuous Infusions:sodium chloride, 100 mL/hr, Last Rate: 100 mL/hr (08/07/22 0832)      PRN Meds:.•  acetaminophen **OR** acetaminophen **OR** acetaminophen    Assessment & Plan   Assessment & Plan     Active Hospital Problems    Diagnosis  POA   • **UTI (urinary tract infection) [N39.0]  Yes   • Alzheimer disease (HCC) [G30.9, F02.80]  Unknown   • Fall [W19.XXXA]  Unknown   •  CAD with angina pectoris (CMS/HCC) [I25.119]  Yes   • Post-surgical hypothyroidism [E89.0]  Yes   • Hyponatremia [E87.1]  Yes   • Benign essential tremor [G25.0]  Yes   • Hypertension [I10]  Yes   • GERD without esophagitis [K21.9]  Yes   • Hyperlipidemia LDL goal <100 [E78.5]  Yes      Resolved Hospital Problems   No resolved problems to display.        Brief Hospital Course to date:  Zohra Hall is a 81 y.o. female with PMH of post surgical hypothyroid, CVA, breast cancer, Alzheimer's, HTN, HLD, CAD, and chronic back pain who presents to the ED after an unwitnessed fall at assisted living facility. Consulted PT/OT, case managment for discharge placement.      This patient's problems and plans were partially entered by my partner and updated as appropriate by me 08/07/22.     UTI  -Urine culture pending  -Continue Rocephin    Hyponatremia  -Suspect related to decreased oral intake plus ADH due to meds  -Restart IVF for 1 more liter     Fall  -CT shows small right parietal scalp hematoma, no acute fracture   -Fall precautions  -Pain control  -PT/OT following     HTN  -Continue metoprolol, add back amlodipine at half dose, 5 mg     CAD  HLD  -Continue home aspirin, statin and plavix     Tremor  -Continue home primidone     Chronic Back Pain  -Continue home celebrex     GERD  -Continue home PPI     Hypothyroid  -postsurgical due to history of papillary thyroid adenocarcinoma  -Continue synthroid     Mood Disorder  -Continue home paxil     Alzheimer's Disease  -Continue home aricept    Expected Discharge Location and Transportation: Assisted living facility  Expected Discharge Date: 8/9    DVT prophylaxis:  Mechanical DVT prophylaxis orders are present.          CODE STATUS:   Code Status and Medical Interventions:   Ordered at: 08/05/22 1953     Medical Intervention Limits:    NO intubation (DNI)     Level Of Support Discussed With:    Health Care Surrogate     Code Status (Patient has no pulse and is not  breathing):    No CPR (Do Not Attempt to Resuscitate)     Medical Interventions (Patient has pulse or is breathing):    Limited Support       Marilu Rush,   08/07/22

## 2022-08-08 PROBLEM — M25.552 BILATERAL HIP PAIN: Status: ACTIVE | Noted: 2022-08-08

## 2022-08-08 PROBLEM — M25.551 BILATERAL HIP PAIN: Status: ACTIVE | Noted: 2022-08-08

## 2022-08-08 LAB
ALBUMIN SERPL-MCNC: 3.1 G/DL (ref 3.5–5.2)
ANION GAP SERPL CALCULATED.3IONS-SCNC: 9 MMOL/L (ref 5–15)
BACTERIA SPEC AEROBE CULT: ABNORMAL
BASOPHILS # BLD AUTO: 0.02 10*3/MM3 (ref 0–0.2)
BASOPHILS NFR BLD AUTO: 0.3 % (ref 0–1.5)
BUN SERPL-MCNC: 8 MG/DL (ref 8–23)
BUN/CREAT SERPL: 13.8 (ref 7–25)
CALCIUM SPEC-SCNC: 8 MG/DL (ref 8.6–10.5)
CHLORIDE SERPL-SCNC: 96 MMOL/L (ref 98–107)
CO2 SERPL-SCNC: 27 MMOL/L (ref 22–29)
CREAT SERPL-MCNC: 0.58 MG/DL (ref 0.57–1)
DEPRECATED RDW RBC AUTO: 49.5 FL (ref 37–54)
EGFRCR SERPLBLD CKD-EPI 2021: 89.9 ML/MIN/1.73
EOSINOPHIL # BLD AUTO: 0.02 10*3/MM3 (ref 0–0.4)
EOSINOPHIL NFR BLD AUTO: 0.3 % (ref 0.3–6.2)
ERYTHROCYTE [DISTWIDTH] IN BLOOD BY AUTOMATED COUNT: 16.1 % (ref 12.3–15.4)
GLUCOSE SERPL-MCNC: 109 MG/DL (ref 65–99)
HCT VFR BLD AUTO: 30.3 % (ref 34–46.6)
HGB BLD-MCNC: 10.1 G/DL (ref 12–15.9)
IMM GRANULOCYTES # BLD AUTO: 0.03 10*3/MM3 (ref 0–0.05)
IMM GRANULOCYTES NFR BLD AUTO: 0.5 % (ref 0–0.5)
LYMPHOCYTES # BLD AUTO: 0.79 10*3/MM3 (ref 0.7–3.1)
LYMPHOCYTES NFR BLD AUTO: 13.1 % (ref 19.6–45.3)
MCH RBC QN AUTO: 27.9 PG (ref 26.6–33)
MCHC RBC AUTO-ENTMCNC: 33.3 G/DL (ref 31.5–35.7)
MCV RBC AUTO: 83.7 FL (ref 79–97)
MONOCYTES # BLD AUTO: 0.57 10*3/MM3 (ref 0.1–0.9)
MONOCYTES NFR BLD AUTO: 9.4 % (ref 5–12)
NEUTROPHILS NFR BLD AUTO: 4.61 10*3/MM3 (ref 1.7–7)
NEUTROPHILS NFR BLD AUTO: 76.4 % (ref 42.7–76)
NRBC BLD AUTO-RTO: 0 /100 WBC (ref 0–0.2)
PHOSPHATE SERPL-MCNC: 2.6 MG/DL (ref 2.5–4.5)
PLATELET # BLD AUTO: 282 10*3/MM3 (ref 140–450)
PMV BLD AUTO: 10 FL (ref 6–12)
POTASSIUM SERPL-SCNC: 3 MMOL/L (ref 3.5–5.2)
POTASSIUM SERPL-SCNC: 4.4 MMOL/L (ref 3.5–5.2)
RBC # BLD AUTO: 3.62 10*6/MM3 (ref 3.77–5.28)
SODIUM SERPL-SCNC: 132 MMOL/L (ref 136–145)
WBC NRBC COR # BLD: 6.04 10*3/MM3 (ref 3.4–10.8)

## 2022-08-08 PROCEDURE — 97530 THERAPEUTIC ACTIVITIES: CPT | Performed by: PHYSICAL THERAPIST

## 2022-08-08 PROCEDURE — 80069 RENAL FUNCTION PANEL: CPT | Performed by: INTERNAL MEDICINE

## 2022-08-08 PROCEDURE — 25010000002 CEFTRIAXONE PER 250 MG

## 2022-08-08 PROCEDURE — 85025 COMPLETE CBC W/AUTO DIFF WBC: CPT | Performed by: INTERNAL MEDICINE

## 2022-08-08 PROCEDURE — 99231 SBSQ HOSP IP/OBS SF/LOW 25: CPT | Performed by: INTERNAL MEDICINE

## 2022-08-08 PROCEDURE — 84132 ASSAY OF SERUM POTASSIUM: CPT | Performed by: INTERNAL MEDICINE

## 2022-08-08 PROCEDURE — 97162 PT EVAL MOD COMPLEX 30 MIN: CPT | Performed by: PHYSICAL THERAPIST

## 2022-08-08 RX ORDER — AMLODIPINE BESYLATE 10 MG/1
10 TABLET ORAL
Status: DISCONTINUED | OUTPATIENT
Start: 2022-08-08 | End: 2022-08-11 | Stop reason: HOSPADM

## 2022-08-08 RX ORDER — MAGNESIUM SULFATE 1 G/100ML
1 INJECTION INTRAVENOUS AS NEEDED
Status: DISCONTINUED | OUTPATIENT
Start: 2022-08-08 | End: 2022-08-11 | Stop reason: HOSPADM

## 2022-08-08 RX ORDER — POTASSIUM CHLORIDE 1.5 G/1.77G
40 POWDER, FOR SOLUTION ORAL AS NEEDED
Status: DISCONTINUED | OUTPATIENT
Start: 2022-08-08 | End: 2022-08-11 | Stop reason: HOSPADM

## 2022-08-08 RX ORDER — SODIUM CHLORIDE 9 MG/ML
100 INJECTION, SOLUTION INTRAVENOUS CONTINUOUS
Status: DISCONTINUED | OUTPATIENT
Start: 2022-08-08 | End: 2022-08-09

## 2022-08-08 RX ORDER — POTASSIUM CHLORIDE 7.45 MG/ML
10 INJECTION INTRAVENOUS
Status: DISCONTINUED | OUTPATIENT
Start: 2022-08-08 | End: 2022-08-11 | Stop reason: HOSPADM

## 2022-08-08 RX ORDER — POTASSIUM CHLORIDE 750 MG/1
40 CAPSULE, EXTENDED RELEASE ORAL AS NEEDED
Status: DISCONTINUED | OUTPATIENT
Start: 2022-08-08 | End: 2022-08-11 | Stop reason: HOSPADM

## 2022-08-08 RX ORDER — FENTANYL/ROPIVACAINE/NS/PF 2-625MCG/1
15 PLASTIC BAG, INJECTION (ML) EPIDURAL AS NEEDED
Status: DISCONTINUED | OUTPATIENT
Start: 2022-08-08 | End: 2022-08-11 | Stop reason: HOSPADM

## 2022-08-08 RX ORDER — MAGNESIUM SULFATE HEPTAHYDRATE 40 MG/ML
2 INJECTION, SOLUTION INTRAVENOUS AS NEEDED
Status: DISCONTINUED | OUTPATIENT
Start: 2022-08-08 | End: 2022-08-11 | Stop reason: HOSPADM

## 2022-08-08 RX ORDER — MAGNESIUM SULFATE HEPTAHYDRATE 40 MG/ML
4 INJECTION, SOLUTION INTRAVENOUS AS NEEDED
Status: DISCONTINUED | OUTPATIENT
Start: 2022-08-08 | End: 2022-08-11 | Stop reason: HOSPADM

## 2022-08-08 RX ADMIN — SODIUM CHLORIDE 1 G: 900 INJECTION INTRAVENOUS at 22:03

## 2022-08-08 RX ADMIN — PRIMIDONE 100 MG: 50 TABLET ORAL at 14:51

## 2022-08-08 RX ADMIN — ASPIRIN 81 MG: 81 TABLET, COATED ORAL at 08:22

## 2022-08-08 RX ADMIN — PAROXETINE HYDROCHLORIDE 40 MG: 20 TABLET, FILM COATED ORAL at 08:21

## 2022-08-08 RX ADMIN — PRIMIDONE 100 MG: 50 TABLET ORAL at 06:57

## 2022-08-08 RX ADMIN — CLOPIDOGREL BISULFATE 75 MG: 75 TABLET ORAL at 08:21

## 2022-08-08 RX ADMIN — ACETAMINOPHEN 325MG 650 MG: 325 TABLET ORAL at 12:16

## 2022-08-08 RX ADMIN — PRIMIDONE 100 MG: 50 TABLET ORAL at 20:43

## 2022-08-08 RX ADMIN — FAMOTIDINE 20 MG: 20 TABLET ORAL at 08:21

## 2022-08-08 RX ADMIN — Medication 10 ML: at 08:22

## 2022-08-08 RX ADMIN — DONEPEZIL HYDROCHLORIDE 10 MG: 10 TABLET, FILM COATED ORAL at 20:36

## 2022-08-08 RX ADMIN — AMLODIPINE BESYLATE 10 MG: 10 TABLET ORAL at 08:22

## 2022-08-08 RX ADMIN — POTASSIUM CHLORIDE 40 MEQ: 750 CAPSULE, EXTENDED RELEASE ORAL at 15:08

## 2022-08-08 RX ADMIN — POTASSIUM CHLORIDE 40 MEQ: 750 CAPSULE, EXTENDED RELEASE ORAL at 12:16

## 2022-08-08 RX ADMIN — LEVOTHYROXINE SODIUM 125 MCG: 125 TABLET ORAL at 08:21

## 2022-08-08 RX ADMIN — ATORVASTATIN CALCIUM 40 MG: 40 TABLET, FILM COATED ORAL at 20:36

## 2022-08-08 RX ADMIN — POTASSIUM CHLORIDE 40 MEQ: 750 CAPSULE, EXTENDED RELEASE ORAL at 08:22

## 2022-08-08 RX ADMIN — SODIUM CHLORIDE 100 ML/HR: 9 INJECTION, SOLUTION INTRAVENOUS at 18:48

## 2022-08-08 RX ADMIN — METOPROLOL SUCCINATE 25 MG: 25 TABLET, EXTENDED RELEASE ORAL at 08:21

## 2022-08-08 RX ADMIN — CELECOXIB 200 MG: 200 CAPSULE ORAL at 08:22

## 2022-08-08 NOTE — THERAPY EVALUATION
"Patient Name: Zohra Hall  : 1940    MRN: [id number]                              Today's Date: 2022       Admit Date: 2022    Visit Dx:     ICD-10-CM ICD-9-CM   1. Bilateral hip pain  M25.551 719.45    M25.552    2. Injury of head, initial encounter  S09.90XA 959.01   3. Acute UTI  N39.0 599.0   4. Fall, initial encounter  W19.XXXA E888.9     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HCC)   • Hypothyroid post remote resection papillary adenocarcinoma thyroid   • Hypertension   • GERD without esophagitis   • Hyperlipidemia LDL goal <100   • Atherosclerosis of aorta (HCC)   • Incontinence of bowel   • Acute right-sided low back pain without sciatica   • Benign essential tremor   • Pain of right hip joint   • Thyroid cancer (HCC)   • Lacunar stroke ([facility])   • Nontraumatic rupture of extensor tendons of right hand and wrist   • Hyponatremia   • Actinic keratosis   • Transient ischemic attack   • Ataxia   • History of PMR (CMS/[facility])   • Osteoporosis   • Post-surgical hypothyroidism   • NSTEMI (non-ST elevated myocardial infarction) ([facility])   • CAD with angina pectoris (CMS/[facility])   • NICM presumably due to Takotsubo post NSTEMI 2021 (CMS/[facility])   • Abnormal TSH   • Severe hypothyroidism   • Traumatic SAH, SDH, and intraparenchymal bleed (CMS/[facility])   • History of lacunar stroke 2019   • UTI (urinary tract infection)   • Alzheimer disease ([facility])   • Fall   • Bilateral hip pain     Past Medical History:   Diagnosis Date   • Breast cancer ([facility])     left- pt states \"in situ\", no radiation, Tamoxifen for 5 years   • Cellulitis    • Cervical lymphadenopathy    • Chest pain    • Convulsions (HCC)    • GERD (gastroesophageal reflux disease)    • Glossitis    • Grief reaction     · Last Impression: " 2015  counselled, given ativan for prn use.  Aleah Regan   • Hypertension    • Lower back pain    • Oral thrush    • Papillary adenocarcinoma (HCC)     Papillary adenocarcinoma of thyroid   • Papillary adenocarcinoma of thyroid (HCC)     · resection Ramirez- 1/13,  2 x 2 x 1.5 cm  T3 N1 MXpost op low calcium and diminished  voiceablation Ain- 3/7 metastatic nodes and invasion to strap muscles · Last Impression: 29 Oct 2014  s/p Eval by berry Méndez.  Aleah Regan (Internal   • Piriformis syndrome    • Tingling    • Xerostomia      Past Surgical History:   Procedure Laterality Date   • BREAST BIOPSY Right 10/10/2013    stereo bx   • BREAST BIOPSY Left 10/19/2015    stereo bx   • BREAST CYST EXCISION      right   • BREAST EXCISIONAL BIOPSY Right     affirm bx and loc 2016.   • BREAST LUMPECTOMY Left 1987   • CARDIAC CATHETERIZATION N/A 5/23/2021    Procedure: Left Heart Cath;  Surgeon: Alonso Ross IV, MD;  Location: [facility] CATH INVASIVE LOCATION;  Service: Cardiology;  Laterality: N/A;   • HYSTERECTOMY  1979   • OTHER SURGICAL HISTORY Right     right arm surgery-steel roger in place   • TOTAL HIP ARTHROPLASTY     • TOTAL THYROIDECTOMY      Thyroid Surgery Total Thyroidectomy      General Information     Row Name 08/08/22 1130          Physical Therapy Time and Intention    Document Type evaluation  -     Mode of Treatment individual therapy;physical therapy  -     Row Name 08/08/22 1130          General Information    Patient Profile Reviewed yes  -LM     Prior Level of Function --  Per pt - she ambulates around the [facility] using a rw and is independent with ADLs - unsure of PLOF d/t pt cognition and no family present  -LM     Existing Precautions/Restrictions fall;other (see comments)  Alzheimers - pleasantly confused; R Knee Pain  -LM     Barriers to Rehab cognitive status  -     Row Name 08/08/22 1134          Living Environment    People in Home facility resident  [facility]  -     Row Name  08/08/22 1130          Home Main Entrance    Number of Stairs, Main Entrance none  -LM     Row Name 08/08/22 1130          Stairs Within Home, Primary    Number of Stairs, Within Home, Primary none  -LM     Row Name 08/08/22 1130          Cognition    Orientation Status (Cognition) oriented to;person;disoriented to;place;situation;time  -LM     Row Name 08/08/22 1130          Safety Issues, Functional Mobility    Safety Issues Affecting Function (Mobility) awareness of need for assistance;insight into deficits/self-awareness;judgment;problem-solving;safety precaution awareness;safety precautions follow-through/compliance;sequencing abilities  -LM     Impairments Affecting Function (Mobility) balance;cognition;endurance/activity tolerance;pain;strength  -LM           User Key  (r) = Recorded By, (t) = Taken By, (c) = Cosigned By    Initials Name Provider Type    Mel Jackson, PT Physical Therapist               Mobility     Row Name 08/08/22 1133          Bed Mobility    Bed Mobility supine-sit  -LM     Supine-Sit [place] (Bed Mobility) moderate assist (50% patient effort);1 person assist;verbal cues  -LM     Assistive Device (Bed Mobility) bed rails;head of bed elevated  -LM     Comment, (Bed Mobility) Vc's for sequencing.  Pt needs redirection to stay on task.  -LM     Row Name 08/08/22 1133          Bed-Chair Transfer    Bed-Chair [place] (Transfers) maximum assist (25% patient effort);1 person assist;verbal cues  -LM     Assistive Device (Bed-Chair Transfers) walker, front-wheeled  -LM     Comment, (Bed-Chair Transfer) First attempted to transfer using rw, but pt unable to stand fully upright or pick her feet up.  Recliner placed right beside bed.  PT stood directly in front of patient and pivoted her over to chair.  -LM     Row Name 08/08/22 1133          Sit-Stand Transfer    Comment, (Sit-Stand Transfer) Stood x 3 reps from EOB using rw.  Pt unable to stand fully upright and complaining of R knee  pain.  -LM     Row Name 08/08/22 1133          Gait/Stairs (Locomotion)    [place] Level (Gait) not tested  -LM     Comment, (Gait/Stairs) Not safe to attempt at this time.  2nd person will be needed.  -LM           User Key  (r) = Recorded By, (t) = Taken By, (c) = Cosigned By    Initials Name Provider Type    LM Mel Henriquez, PT Physical Therapist               Obj/Interventions     Row Name 08/08/22 1136          Range of Motion Comprehensive    General Range of Motion lower extremity range of motion deficits identified  -LM     Comment, General Range of Motion BLEs - Decreased active hip flexion - AAROM WFL;  Knee flex/ext AROM WFL with increased time; Decreased ankle DF AROM  -LM     Row Name 08/08/22 1136          Strength Comprehensive (MMT)    General Manual Muscle Testing (MMT) Assessment lower extremity strength deficits identified  -LM     Comment, General Manual Muscle Testing (MMT) Assessment BLEs - Hip flex - 2-/5; Knee flex/ext - 3+/5; Ankle DF - 2/5  -LM     Row Name 08/08/22 1136          Balance    Balance Assessment sitting static balance;standing static balance  -LM     Static Sitting Balance contact guard  -LM     Position, Sitting Balance unsupported;sitting edge of bed  -LM     Static Standing Balance maximum assist;1-person assist;verbal cues  -LM     Position/Device Used, Standing Balance supported  -LM     Row Name 08/08/22 1136          Sensory Assessment (Somatosensory)    Sensory Assessment (Somatosensory) LE sensation intact  -LM           User Key  (r) = Recorded By, (t) = Taken By, (c) = Cosigned By    Initials Name Provider Type    LM Mel Henriquez, PT Physical Therapist               Goals/Plan     Row Name 08/08/22 1148          Bed Mobility Goal 1 (PT)    Activity/Assistive Device (Bed Mobility Goal 1, PT) sit to supine/supine to sit  -LM     Brule Level/Cues Needed (Bed Mobility Goal 1, PT) minimum assist (75% or more patient effort)  -LM     Time Frame (Bed Mobility  Goal 1, PT) long term goal (LTG);2 weeks  -LM     Row Name 08/08/22 1148          Transfer Goal 1 (PT)    Activity/Assistive Device (Transfer Goal 1, PT) bed-to-chair/chair-to-bed  -LM     Dodson Level/Cues Needed (Transfer Goal 1, PT) minimum assist (75% or more patient effort)  -LM     Time Frame (Transfer Goal 1, PT) long term goal (LTG);2 weeks  -LM     Row Name 08/08/22 1148          Gait Training Goal 1 (PT)    Activity/Assistive Device (Gait Training Goal 1, PT) gait (walking locomotion);assistive device use  -LM     Dodson Level (Gait Training Goal 1, PT) moderate assist (50-74% patient effort)  -LM     Distance (Gait Training Goal 1, PT) 20 feet  -LM     Time Frame (Gait Training Goal 1, PT) long term goal (LTG);2 weeks  -LM     Row Name 08/08/22 1148          Therapy Assessment/Plan (PT)    Planned Therapy Interventions (PT) balance training;bed mobility training;gait training;home exercise program;motor coordination training;neuromuscular re-education;patient/family education;postural re-education;ROM (range of motion);strengthening;stretching;transfer training  -LM           User Key  (r) = Recorded By, (t) = Taken By, (c) = Cosigned By    Initials Name Provider Type    LM Mel Henriquez, PT Physical Therapist               Clinical Impression     Row Name 08/08/22 1137          Pain    Pretreatment Pain Rating 0/10 - no pain  -LM     Posttreatment Pain Rating 0/10 - no pain  -LM     Row Name 08/08/22 1132          Plan of Care Review    Plan of Care Reviewed With patient  -LM     Outcome Evaluation PT evaluation completed.  Pt pleasantly confused throughout.  Pt transferred supine-->sit with ModAx1, stood x 3 reps using rw with MaxAx1 - pt unable to stand upright fully, and stand pivot completed from bed-->recliner with MaxAx1.  Pt complaining of R knee pain throughout.  Skilled PT services warranted to improve mobility and safety.  Recommend SNF at d/c.  -     Row Name 08/08/22 1131           Therapy Assessment/Plan (PT)    Rehab Potential (PT) good, to achieve stated therapy goals  -LM     Criteria for Skilled Interventions Met (PT) yes;meets criteria;skilled treatment is necessary  -LM     Therapy Frequency (PT) daily  -LM     Row Name 08/08/22 1137          Vital Signs    Pre Systolic BP Rehab 159  -LM     Pre Treatment Diastolic BP 81  -LM     Pretreatment Heart Rate (beats/min) 76  -LM     Posttreatment Heart Rate (beats/min) 71  -LM     Pre SpO2 (%) 98  -LM     O2 Delivery Pre Treatment room air  -LM     Post SpO2 (%) 100  -LM     O2 Delivery Post Treatment room air  -LM     Pre Patient Position Supine  -LM     Post Patient Position Sitting  -LM     Row Name 08/08/22 1137          Positioning and Restraints    Pre-Treatment Position in bed  -LM     Post Treatment Position chair  -LM     In Chair reclined;call light within reach;encouraged to call for assist;exit alarm on;waffle cushion;heels elevated;RUE elevated;notified nsg  -LM           User Key  (r) = Recorded By, (t) = Taken By, (c) = Cosigned By    Initials Name Provider Type    LM Mel Henriquez, PT Physical Therapist               Outcome Measures     Row Name 08/08/22 1150          How much help from another person do you currently need...    Turning from your back to your side while in flat bed without using bedrails? 2  -LM     Moving from lying on back to sitting on the side of a flat bed without bedrails? 2  -LM     Moving to and from a bed to a chair (including a wheelchair)? 2  -LM     Standing up from a chair using your arms (e.g., wheelchair, bedside chair)? 2  -LM     Climbing 3-5 steps with a railing? 1  -LM     To walk in hospital room? 1  -LM     AM-PAC 6 Clicks Score (PT) 10  -LM     Highest level of mobility 4 --> Transferred to chair/commode  -LM     Row Name 08/08/22 1150          Functional Assessment    Outcome Measure Options AM-PAC 6 Clicks Basic Mobility (PT)  -LM           User Key  (r) = Recorded By, (t) = Taken  By, (c) = Cosigned By    Initials Name Provider Type     Mel Henriquez, PT Physical Therapist                             Physical Therapy Education                 Title: PT OT SLP Therapies (In Progress)     Topic: Physical Therapy (In Progress)     Point: Mobility training (In Progress)     Learning Progress Summary           Patient Acceptance, E, NR by  at 8/8/2022 1151                   Point: Home exercise program (Not Started)     Learner Progress:  Not documented in this visit.          Point: Precautions (In Progress)     Learning Progress Summary           Patient Acceptance, E, NR by  at 8/8/2022 1151                               User Key     Initials Effective Dates Name Provider Type Discipline     06/16/21 -  Mel Henriquez PT Physical Therapist PT              PT Recommendation and Plan  Planned Therapy Interventions (PT): balance training, bed mobility training, gait training, home exercise program, motor coordination training, neuromuscular re-education, patient/family education, postural re-education, ROM (range of motion), strengthening, stretching, transfer training  Plan of Care Reviewed With: patient  Outcome Evaluation: PT evaluation completed.  Pt pleasantly confused throughout.  Pt transferred supine-->sit with ModAx1, stood x 3 reps using rw with MaxAx1 - pt unable to stand upright fully, and stand pivot completed from bed-->recliner with MaxAx1.  Pt complaining of R knee pain throughout.  Skilled PT services warranted to improve mobility and safety.  Recommend SNF at d/c.     Time Calculation:    PT Charges     Row Name 08/08/22 1151             Time Calculation    Start Time 1048  -LM      PT Received On 08/08/22  -LM      PT Goal Re-Cert Due Date 08/18/22  -LM              Timed Charges    33684 - PT Therapeutic Activity Minutes 8  -LM              Untimed Charges    PT Eval/Re-eval Minutes 48  -LM              Total Minutes    Timed Charges Total Minutes 8  -LM      Untimed  Charges Total Minutes 48  -LM       Total Minutes 56  -LM            User Key  (r) = Recorded By, (t) = Taken By, (c) = Cosigned By    Initials Name Provider Type    LM Mel Henriquez, PT Physical Therapist              Therapy Charges for Today     Code Description Service Date Service Provider Modifiers Qty    [id number]  PT THERAPEUTIC ACT EA 15 MIN 8/8/2022 Mel Henriquez, PT GP 1    [id number] HC PT EVAL MOD COMPLEXITY 4 8/8/2022 Mel Henriquez, PT GP 1          PT G-Codes  Outcome Measure Options: AM-PAC 6 Clicks Basic Mobility (PT)  AM-PAC 6 Clicks Score (PT): 10  AM-PAC 6 Clicks Score (OT): 12    Mel Henriquez PT  8/8/2022

## 2022-08-08 NOTE — PLAN OF CARE
Goal Outcome Evaluation:  VSS. Room air. Disoriented x 3. Drowsy most of the day, awake with voice. Low PO intake, encouraging fluids and snacks as often as possible. Potassium replaced. CTM.    Problem: Adult Inpatient Plan of Care  Goal: Absence of Hospital-Acquired Illness or Injury  Intervention: Identify and Manage Fall Risk  Description: Perform standard risk assessment on admission using a validated tool or comprehensive approach appropriate to the patient; reassess fall risk frequently, with change in status or transfer to another level of care.  Communicate fall injury risk to interprofessional healthcare team.  Determine need for increased observation, equipment and environmental modification, such as low bed, signage and supportive, nonskid footwear.  Adjust safety measures to individual developmental age, stage and identified risk factors.  Reinforce the importance of safety and physical activity with patient and family.  Perform regular intentional rounding to assess need for position change, pain assessment and personal needs, including assistance with toileting.  Recent Flowsheet Documentation  Taken 8/8/2022 1800 by Gissel Mitchell, RN  Safety Promotion/Fall Prevention:   activity supervised   assistive device/personal items within reach   clutter free environment maintained   fall prevention program maintained   nonskid shoes/slippers when out of bed   safety round/check completed   room organization consistent  Taken 8/8/2022 1600 by Gissel Mitchell, RN  Safety Promotion/Fall Prevention:   assistive device/personal items within reach   activity supervised   clutter free environment maintained   fall prevention program maintained   nonskid shoes/slippers when out of bed   room organization consistent   safety round/check completed  Taken 8/8/2022 1400 by Gissel Mitchell, RN  Safety Promotion/Fall Prevention:   activity supervised   assistive device/personal items within reach   clutter  free environment maintained   fall prevention program maintained   nonskid shoes/slippers when out of bed   room organization consistent   safety round/check completed  Taken 8/8/2022 1200 by Paula, Gissel, RN  Safety Promotion/Fall Prevention:   activity supervised   assistive device/personal items within reach   clutter free environment maintained   fall prevention program maintained   nonskid shoes/slippers when out of bed   room organization consistent   safety round/check completed  Taken 8/8/2022 0839 by Gissel Mitchell, RN  Safety Promotion/Fall Prevention:   assistive device/personal items within reach   activity supervised   clutter free environment maintained   fall prevention program maintained   nonskid shoes/slippers when out of bed   safety round/check completed  Intervention: Prevent Skin Injury  Description: Perform a screening for skin injury risk, such as pressure or moisture associated skin damage on admission and at regular intervals throughout hospital stay.  Keep all areas of skin (especially folds) clean and dry.  Maintain adequate skin hydration.  Relieve and redistribute pressure and protect bony prominences; implement measures based on patient-specific risk factors.  Match turning and repositioning schedule to clinical condition.  Encourage weight shift frequently; assist with reposition if unable to complete independently.  Float heels off bed; avoid pressure on the Achilles tendon.  Keep skin free from extended contact with medical devices.  Encourage functional activity and mobility, as early as tolerated.  Use aids (e.g., slide boards, mechanical lift) during transfer.  Recent Flowsheet Documentation  Taken 8/8/2022 1800 by Gissel Mitchell, RN  Body Position: weight shifting  Skin Protection:   adhesive use limited   tubing/devices free from skin contact   transparent dressing maintained  Taken 8/8/2022 1600 by Gissel Mitchell, RN  Body Position:   weight shifting    tilted   right  Skin Protection:   adhesive use limited   incontinence pads utilized   tubing/devices free from skin contact   transparent dressing maintained  Taken 8/8/2022 1400 by Gissel Mitchell RN  Body Position:   weight shifting   tilted   left  Skin Protection:   adhesive use limited   incontinence pads utilized   transparent dressing maintained   tubing/devices free from skin contact  Taken 8/8/2022 1200 by Gissel Mitchell RN  Skin Protection:   adhesive use limited   incontinence pads utilized   transparent dressing maintained   tubing/devices free from skin contact  Taken 8/8/2022 0839 by Gissel Mitchell RN  Body Position: weight shifting  Skin Protection:   adhesive use limited   incontinence pads utilized   tubing/devices free from skin contact   transparent dressing maintained  Intervention: Prevent and Manage VTE (Venous Thromboembolism) Risk  Description: Assess for VTE (venous thromboembolism) risk.  Encourage and assist with early ambulation.  Initiate and maintain compression or other therapy, as indicated, based on identified risk in accordance with organizational protocol and provider order.  Encourage both active and passive leg exercises while in bed, if unable to ambulate.  Recent Flowsheet Documentation  Taken 8/8/2022 1800 by Gissel Mitchell RN  Activity Management: activity adjusted per tolerance  Taken 8/8/2022 1600 by Gissel Mitchell RN  Activity Management: activity adjusted per tolerance  Taken 8/8/2022 1400 by Gissel Mitchell RN  Activity Management: activity adjusted per tolerance  Taken 8/8/2022 1200 by Gissel Mitchell RN  Activity Management:   up in chair   activity adjusted per tolerance  Taken 8/8/2022 0839 by Gissel Mitchell RN  Activity Management: activity adjusted per tolerance  Intervention: Prevent Infection  Description: Maintain skin and mucous membrane integrity; promote hand, oral and pulmonary hygiene.  Optimize fluid balance,  nutrition, sleep and glycemic control to maximize infection resistance.  Identify potential sources of infection early to prevent or mitigate progression of infection (e.g., wound, lines, devices).  Evaluate ongoing need for invasive devices; remove promptly when no longer indicated.  Recent Flowsheet Documentation  Taken 8/8/2022 1800 by Gissel Mitchell RN  Infection Prevention: environmental surveillance performed  Taken 8/8/2022 1600 by Gissel Mitchell RN  Infection Prevention:   rest/sleep promoted   environmental surveillance performed  Taken 8/8/2022 1400 by Gissel Mitchell RN  Infection Prevention:   rest/sleep promoted   environmental surveillance performed   cohorting utilized  Taken 8/8/2022 1200 by Gissel Mitchell RN  Infection Prevention:   rest/sleep promoted   environmental surveillance performed   cohorting utilized  Taken 8/8/2022 0839 by Gissel Mitchell RN  Infection Prevention:   environmental surveillance performed   cohorting utilized  Goal: Optimal Comfort and Wellbeing  Intervention: Provide Person-Centered Care  Description: Use a family-focused approach to care.  Develop trust and rapport by proactively providing information, encouraging questions, addressing concerns and offering reassurance.  Acknowledge emotional response to hospitalization.  Recognize and utilize personal coping strategies.  Honor spiritual and cultural preferences.  Recent Flowsheet Documentation  Taken 8/8/2022 0839 by Gissel Mitchell RN  Trust Relationship/Rapport:   care explained   choices provided   empathic listening provided   questions answered   thoughts/feelings acknowledged

## 2022-08-08 NOTE — PLAN OF CARE
Goal Outcome Evaluation:  Plan of Care Reviewed With: patient           Outcome Evaluation: PT evaluation completed.  Pt pleasantly confused throughout.  Pt transferred supine-->sit with ModAx1, stood x 3 reps using rw with MaxAx1 - pt unable to stand upright fully, and stand pivot completed from bed-->recliner with MaxAx1.  Pt complaining of R knee pain throughout.  Skilled PT services warranted to improve mobility and safety.  Recommend SNF at d/c.

## 2022-08-08 NOTE — PROGRESS NOTES
[facility] Medicine Services  PROGRESS NOTE    Patient Name: Zohra Hall  : 1940  MRN: [id number]    Date of Admission: 2022  Primary Care Physician: Aleah Regan MD    Subjective   Subjective     CC:  AMS, weakness    HPI:  More awake but confused. Working with therapy, no complaints currently, no overnight events    ROS:  Limited ROS, no family at bedside    Objective   Objective     Vital Signs:   Temp:  [97.9 °F (36.6 °C)-98.3 °F (36.8 °C)] 97.9 °F (36.6 °C)  Heart Rate:  [57-96] 57  Resp:  [14-16] 14  BP: (144-177)/(76-93) 172/84     Physical Exam:  Constitutional: No acute distress, awake, sitting up in bed  HENT: NCAT, mucous membranes moist  Respiratory: Respiratory effort normal   Cardiovascular: bradycardic  Gastrointestinal: soft, nontender, nondistended  Musculoskeletal: No bilateral ankle edema  Psychiatric: Appropriate affect, cooperative  Neurologic: Oriented only to self, pleasantly confused, simple answers to questions  Skin: No rashes      Results Reviewed:  LAB RESULTS:      Lab 22  0522 22  0331 22  0823 22  1505   WBC 6.04 5.95 8.21 8.21   HEMOGLOBIN 10.1* 9.5* 10.5* 11.9*   HEMATOCRIT 30.3* 28.5* 31.9* 36.3   PLATELETS 282 275 311 350   NEUTROS ABS 4.61  --  6.43 6.59   IMMATURE GRANS (ABS) 0.03  --  0.08* 0.11*   LYMPHS ABS 0.79  --  1.07 0.93   MONOS ABS 0.57  --  0.55 0.49   EOS ABS 0.02  --  0.05 0.06   MCV 83.7 84.3 86.7 85.8         Lab 22  0522 22  1353 22  1505   SODIUM 132* 130* 133*   POTASSIUM 3.0* 3.6 4.2   CHLORIDE 96* 95* 97*   CO2 27.0 22.0 28.0   ANION GAP 9.0 13.0 8.0   BUN 8 17 14   CREATININE 0.58 0.78 0.84   EGFR 89.9 75.5 69.0   GLUCOSE 109* 108* 81   CALCIUM 8.0* 8.5* 8.9   MAGNESIUM  --  1.6  --    PHOSPHORUS 2.6  --   --          Lab 22  0522 22  1505   TOTAL PROTEIN  --  6.2   ALBUMIN 3.10* 3.50   GLOBULIN  --  2.7   ALT (SGPT)  --  12   AST (SGOT)  --  19   BILIRUBIN   --  0.2   ALK PHOS  --  85                     Brief Urine Lab Results  (Last result in the past 365 days)      Color   Clarity   Blood   Leuk Est   Nitrite   Protein   CREAT   Urine HCG        08/06/22 0144 Yellow   Cloudy   Moderate (2+)   Small (1+)   Positive   Negative                 Microbiology Results Abnormal     Procedure Component Value - Date/Time    COVID PRE-OP / PRE-PROCEDURE SCREENING ORDER (NO ISOLATION) - Swab, Nasopharynx [[id number]]  (Normal) Collected: 08/05/22 1506    Lab Status: Final result Specimen: Swab from Nasopharynx Updated: 08/05/22 1552    Narrative:      The following orders were created for panel order COVID PRE-OP / PRE-PROCEDURE SCREENING ORDER (NO ISOLATION) - Swab, Nasopharynx.  Procedure                               Abnormality         Status                     ---------                               -----------         ------                     COVID-19 and FLU A/B PCR...[[id number]]  Normal              Final result                 Please view results for these tests on the individual orders.    COVID-19 and FLU A/B PCR - Swab, Nasopharynx [[id number]]  (Normal) Collected: 08/05/22 1506    Lab Status: Final result Specimen: Swab from Nasopharynx Updated: 08/05/22 1552     COVID19 Not Detected     Influenza A PCR Not Detected     Influenza B PCR Not Detected    Narrative:      Fact sheet for providers: https://www.fda.gov/media/485871/download    Fact sheet for patients: https://www.fda.gov/media/630086/download    Test performed by PCR.          No radiology results from the last 24 hrs    Results for orders placed during the hospital encounter of 08/09/21    Adult Transthoracic Echo Complete W/ Cont if Necessary Per Protocol    Interpretation Summary  · Estimated left ventricular EF = 60% Left ventricular systolic function is normal.  · Left ventricular diastolic function was normal.  · Trace mitral and tricuspid regurgitation.  · Mild tachycardia noted throughout the  study.      I have reviewed the medications:  Scheduled Meds:amLODIPine, 10 mg, Oral, Q24H  aspirin, 81 mg, Oral, Daily  atorvastatin, 40 mg, Oral, Nightly  cefTRIAXone, 1 g, Intravenous, Q24H  celecoxib, 200 mg, Oral, Daily  clopidogrel, 75 mg, Oral, Daily  donepezil, 10 mg, Oral, Nightly  famotidine, 20 mg, Oral, Daily  levothyroxine, 125 mcg, Oral, Daily  metoprolol succinate XL, 25 mg, Oral, Q24H  PARoxetine, 40 mg, Oral, QAM  primidone, 100 mg, Oral, Q8H  sodium chloride, 10 mL, Intravenous, Q12H      Continuous Infusions:   PRN Meds:.•  acetaminophen **OR** acetaminophen **OR** acetaminophen    Assessment & Plan   Assessment & Plan     Active Hospital Problems    Diagnosis  POA   • **UTI (urinary tract infection) [N39.0]  Yes   • Alzheimer disease (HCC) [G30.9, F02.80]  Unknown   • Fall [W19.XXXA]  Unknown   • CAD with angina pectoris (CMS/HCC) [I25.119]  Yes   • Post-surgical hypothyroidism [E89.0]  Yes   • Hyponatremia [E87.1]  Yes   • Benign essential tremor [G25.0]  Yes   • Hypertension [I10]  Yes   • GERD without esophagitis [K21.9]  Yes   • Hyperlipidemia LDL goal <100 [E78.5]  Yes      Resolved Hospital Problems   No resolved problems to display.        Brief Hospital Course to date:  Zohra Hall is a 81 y.o. female with PMH of post surgical hypothyroid, CVA, breast cancer, Alzheimer's, HTN, HLD, CAD, and chronic back pain who presents to the ED after an unwitnessed fall at assisted living facility.      This patient's problems and plans were partially entered by my partner and updated as appropriate by me 08/8/22.     UTI  -Urine culture with e coli  -Continue Rocephin     Hyponatremia  -Suspect related to decreased oral intake plus ADH due to meds  -Improved with IVF    Hypokalemia  -Replace per protocol     Fall  -CT shows small right parietal scalp hematoma, no acute fracture   -Fall precautions  -Pain control  -PT/OT     HTN  -Continue metoprolol, increase amlodipine to 10  mg     CAD  HLD  -Continue home aspirin, statin and plavix     Tremor  -Continue home primidone     Chronic Back Pain  -Continue celebrex     GERD  -Continue home PPI     Hypothyroid  -postsurgical due to history of papillary thyroid adenocarcinoma  -Continue synthroid     Mood Disorder  -Continue home paxil     Alzheimer's Disease  -Continue aricept        Expected Discharge Location and Transportation: likely SNF  Expected Discharge Date: 8/9    DVT prophylaxis:  Mechanical DVT prophylaxis orders are present.          CODE STATUS:   Code Status and Medical Interventions:   Ordered at: 08/05/22 1953     Medical Intervention Limits:    NO intubation (DNI)     Level Of Support Discussed With:    Health Care Surrogate     Code Status (Patient has no pulse and is not breathing):    No CPR (Do Not Attempt to Resuscitate)     Medical Interventions (Patient has pulse or is breathing):    Limited Support       Marilu Rush,   08/08/22

## 2022-08-08 NOTE — DISCHARGE PLACEMENT REQUEST
"Case Management  [phone]    CimarronJamie penaloza (81 y.o. Female)             Date of Birth   1940    Social Security Number       Address   [address] [address]    Home Phone   [phone]    MRN   [id number]       Restorationism   Anabaptism    Marital Status                               Admission Date   8/5/22    Admission Type   Emergency    Admitting Provider   Marilu Rush DO    Attending Provider   Marilu Rush DO    Department, Room/Bed   [facility] 5G, S565/1       Discharge Date       Discharge Disposition       Discharge Destination                               Attending Provider: Marilu Rush DO    Allergies: Dilaudid [Hydromorphone Hcl], Beta Adrenergic Blockers, Dilaudid [Hydromorphone], Lactose Intolerance (Gi)    Isolation: None   Infection: None   Code Status: No CPR   Advance Care Planning Activity    Ht: 154.9 cm (61\")   Wt: 45.4 kg (100 lb 1.6 oz)    Admission Cmt: None   Principal Problem: UTI (urinary tract infection) [N39.0]                 Active Insurance as of 8/5/2022     Primary Coverage     Payor Plan Insurance Group Employer/Plan Group    MEDICARE MEDICARE A & B      Payor Plan Address Payor Plan Phone Number Payor Plan Fax Number Effective Dates    [address] [phone]  11/1/2003 - None Entered    [facility] [id number]       Subscriber Name Subscriber Birth Date Member ID       JAMIE GONZALEZ 1940 [id number]           Secondary Coverage     Payor Plan Insurance Group Employer/Plan Group    [facility]      Payor Plan Address Payor Plan Phone Number Payor Plan Fax Number Effective Dates    [facility] [phone]  1/1/2016 - None Entered    [address]       [facility] [address]       Subscriber Name Subscriber Birth Date Member ID       JAMIE GONZALEZ 1940 [id number]                 Emergency Contacts      (Rel.) Home Phone Work " Phone Mobile Phone    Joey Diaz (POA) (Grandchild) [phone] -- [phone]    Simón Diaz (Son) [phone] -- [phone]               History & Physical      Molly Aj MD at 22 1909              [facility] Medicine Services  HISTORY AND PHYSICAL    Patient Name: Zohra Hall  : 1940  MRN: [id number]  Primary Care Physician: Aleah Regan MD  Date of admission: 2022    Subjective   Subjective     Chief Complaint:  Fall    HPI:  Zohra Hall is a 81 y.o. female with PMH of post surgical hypothyroid, CVA, breast cancer, Alzheimer's, HTN, HLD, CAD, and chronic back pain who presents to the ED after an unwitnessed fall at assisted living facility. Denies LOC. Patient states she walks with a walker. Patient is poor historian so HPI obtained from chart review. Patient denies pain. Denies dizziness, chest pain, shortness of air, N/V/D, diarrhea, dysuria. Will admit to hospital medicine for further management.     Spoke to granddavid on the phone who is patient's POA. She has had frequent falls lately due to wandering around her facility without her walker. She also has had trouble feeding herself due to tremors. He feels she is too high risk to send back to assisted living facility and has requested higher level of care.    Review of Systems   Constitutional: Negative for activity change, appetite change and fever.   HENT: Negative for congestion, sore throat and trouble swallowing.    Eyes: Negative.    Respiratory: Negative.  Negative for chest tightness and shortness of breath.    Cardiovascular: Positive for leg swelling.   Gastrointestinal: Negative for abdominal pain, diarrhea and nausea.   Endocrine: Negative.    Genitourinary: Negative for difficulty urinating and dysuria.   Musculoskeletal: Positive for back pain and gait problem (frequent falls).   Skin: Negative.    Neurological: Negative for dizziness and light-headedness.  "  Psychiatric/Behavioral: Positive for confusion. Negative for agitation.        All other systems reviewed and are negative.     Personal History     Past Medical History:   Diagnosis Date   • Breast cancer (HCC) 1987    left- pt states \"in situ\", no radiation, Tamoxifen for 5 years   • Cellulitis    • Cervical lymphadenopathy    • Chest pain    • Convulsions (HCC)    • GERD (gastroesophageal reflux disease)    • Glossitis    • Grief reaction     · Last Impression: 29 Jan 2015  counselled, given ativan for prn use.  Aleah Regan   • Hypertension    • Lower back pain    • Oral thrush    • Papillary adenocarcinoma (HCC)     Papillary adenocarcinoma of thyroid   • Papillary adenocarcinoma of thyroid (HCC)     · resection Ramirez- 1/13,  2 x 2 x 1.5 cm  T3 N1 MXpost op low calcium and diminished  voiceablation Ain- 3/7 metastatic nodes and invasion to strap muscles · Last Impression: 29 Oct 2014  s/p Eval by berry Méndez.  Aleah Regan (Internal   • Piriformis syndrome    • Tingling    • Xerostomia          Oncology Problem List:  Thyroid cancer (HCC) (08/29/2018; Status: Active)  Papillary adenocarcinoma of thyroid (HCC) (Status: Active)       Past Surgical History:   Procedure Laterality Date   • BREAST BIOPSY Right 10/10/2013    stereo bx   • BREAST BIOPSY Left 10/19/2015    stereo bx   • BREAST CYST EXCISION      right   • BREAST EXCISIONAL BIOPSY Right     affirm bx and loc 2016.   • BREAST LUMPECTOMY Left 1987   • CARDIAC CATHETERIZATION N/A 5/23/2021    Procedure: Left Heart Cath;  Surgeon: Alonso Ross IV, MD;  Location: [facility] CATH INVASIVE LOCATION;  Service: Cardiology;  Laterality: N/A;   • HYSTERECTOMY  1979   • OTHER SURGICAL HISTORY Right     right arm surgery-steel roger in place   • TOTAL HIP ARTHROPLASTY     • TOTAL THYROIDECTOMY      Thyroid Surgery Total Thyroidectomy       Family History:  family history includes Breast cancer (age of onset: 84) in her mother; Cancer in her " mother. Otherwise pertinent FHx was reviewed and unremarkable.     Social History:  reports that she quit smoking about 29 years ago. Her smoking use included cigarettes. She has a 30.00 pack-year smoking history. She has never used smokeless tobacco. She reports that she does not drink alcohol and does not use drugs.  Social History     Social History Narrative   • Not on file       Medications:  PARoxetine, acetaminophen, amLODIPine, aspirin, atorvastatin, betamethasone dipropionate, cefdinir, celecoxib, clopidogrel, demeclocycline, docusate sodium, donepezil, famotidine, levothyroxine, metoprolol succinate XL, ondansetron, and primidone    Allergies   Allergen Reactions   • Dilaudid [Hydromorphone Hcl] Hallucinations     TABS   • Beta Adrenergic Blockers Other (See Comments)     Hypotension / bradycadia   • Dilaudid [Hydromorphone] Unknown - High Severity   • Lactose Intolerance (Gi) Diarrhea       Objective   Objective     Vital Signs:   Temp:  [98.1 °F (36.7 °C)] 98.1 °F (36.7 °C)  Heart Rate:  [57-59] 58  Resp:  [18-20] 18  BP: (135-178)/(66-97) 155/72    Physical Exam  Vitals reviewed.   Constitutional:       General: She is not in acute distress.  HENT:      Head: Normocephalic.      Nose: Nose normal. No congestion.      Mouth/Throat:      Mouth: Mucous membranes are moist.   Eyes:      Extraocular Movements: Extraocular movements intact.      Pupils: Pupils are equal, round, and reactive to light.   Cardiovascular:      Rate and Rhythm: Normal rate and regular rhythm.      Pulses: Normal pulses.      Heart sounds: Normal heart sounds. No murmur heard.  Pulmonary:      Effort: Pulmonary effort is normal. No respiratory distress.      Breath sounds: Normal breath sounds. No wheezing or rhonchi.   Abdominal:      General: Abdomen is flat. There is no distension.      Tenderness: There is no abdominal tenderness.   Musculoskeletal:      Cervical back: Normal range of motion. No rigidity.      Right lower leg:  Edema (trace) present.      Left lower leg: Edema (trace) present.   Skin:     General: Skin is warm.      Capillary Refill: Capillary refill takes less than 2 seconds.   Neurological:      General: No focal deficit present.      Mental Status: She is easily aroused. Mental status is at baseline.      Motor: Weakness present.   Psychiatric:         Mood and Affect: Mood normal.         Behavior: Behavior normal. Behavior is cooperative.          Results Reviewed:  I have personally reviewed most recent indicated data and agree with findings including:  []  Laboratory  []  Radiology  []  EKG/Telemetry  []  Pathology  []  Cardiac/Vascular Studies  []  Old records  []  Other:  Most pertinent findings include:      LAB RESULTS:      Lab 08/05/22  1505   WBC 8.21   HEMOGLOBIN 11.9*   HEMATOCRIT 36.3   PLATELETS 350   NEUTROS ABS 6.59   IMMATURE GRANS (ABS) 0.11*   LYMPHS ABS 0.93   MONOS ABS 0.49   EOS ABS 0.06   MCV 85.8         Lab 08/05/22  1505   SODIUM 133*   POTASSIUM 4.2   CHLORIDE 97*   CO2 28.0   ANION GAP 8.0   BUN 14   CREATININE 0.84   EGFR 69.0   GLUCOSE 81   CALCIUM 8.9         Lab 08/05/22  1505   TOTAL PROTEIN 6.2   ALBUMIN 3.50   GLOBULIN 2.7   ALT (SGPT) 12   AST (SGOT) 19   BILIRUBIN 0.2   ALK PHOS 85                     Brief Urine Lab Results  (Last result in the past 365 days)      Color   Clarity   Blood   Leuk Est   Nitrite   Protein   CREAT   Urine HCG        08/05/22 1534 Yellow   Clear   Small (1+)   Trace   Positive   Negative               Microbiology Results (last 10 days)     Procedure Component Value - Date/Time    COVID PRE-OP / PRE-PROCEDURE SCREENING ORDER (NO ISOLATION) - Swab, Nasopharynx [[id number]]  (Normal) Collected: 08/05/22 1506    Lab Status: Final result Specimen: Swab from Nasopharynx Updated: 08/05/22 0670    Narrative:      The following orders were created for panel order COVID PRE-OP / PRE-PROCEDURE SCREENING ORDER (NO ISOLATION) - Swab, Nasopharynx.  Procedure                                Abnormality         Status                     ---------                               -----------         ------                     COVID-19 and FLU A/B PCR...[[id number]]  Normal              Final result                 Please view results for these tests on the individual orders.    COVID-19 and FLU A/B PCR - Swab, Nasopharynx [[id number]]  (Normal) Collected: 08/05/22 1506    Lab Status: Final result Specimen: Swab from Nasopharynx Updated: 08/05/22 1552     COVID19 Not Detected     Influenza A PCR Not Detected     Influenza B PCR Not Detected    Narrative:      Fact sheet for providers: https://www.fda.gov/media/536358/download    Fact sheet for patients: https://www.fda.gov/media/592185/download    Test performed by PCR.          CT Head Without Contrast    Result Date: 8/5/2022  DATE OF EXAM: 8/5/2022 1:37 PM  PROCEDURE: CT HEAD WO CONTRAST-, CT CERVICAL SPINE WO CONTRAST-  INDICATIONS: FALL  COMPARISON: Head CT 5/31/2022  TECHNIQUE: Routine transaxial images of the head and cervical spine were obtained without the administration of contrast. Automated exposure control and iterative reconstruction methods were used.  The radiation dose reduction device was turned on for each scan per the ALARA (As Low as Reasonably Achievable) protocol.  FINDINGS: Head: No acute intracranial hemorrhage. No acute large territory infarct. There are scattered subcortical and periventricular white matter hypodensities which are nonspecific and can be seen in the setting of chronic small vessel ischemic change.  There is mild global cerebral volume loss.  No extra-axial collections.  No midline shift or herniation.  Normal size and configuration of the ventricles commensurate with degree of cerebral volume loss.  Unremarkable appearance of the orbits.  The mastoid air cells are clear.  The paranasal sinuses are grossly clear.  No acute osseous findings. There is a small right parietal scalp contusion.  Cervical  spine: The bones are demineralized limiting assessment for occult nondisplaced fractures. There is mild degenerative straightening of the cervical spine with trace degenerative anterolisthesis of C7 on T1, without definite traumatic subluxation or significant spondylolisthesis. The facets appear normally aligned allowing for multilevel facet arthropathy. The atlantodental interval is normal. No acute fracture. The vertebral body heights are maintained. There is presumed degenerative fusion of the C3 and C4 vertebral bodies and left facets. There is presumed degenerative fusion of the C5-C6 vertebral bodies and bilateral facets. 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 There is severe multilevel degenerative disc disease throughout the cervical spine with posterior disc osteophyte complexes at C3-C4, C4-C5, and C6-C7, with at least moderate spinal canal stenosis at these levels. There is multilevel facet arthropathy and uncovertebral hypertrophy contributing to moderate bilateral neuroforaminal narrowing at C4-C5. The paravertebral soft tissues are normal; no prevertebral soft tissue swelling. There are surgical clips prior thyroidectomy. Partially imaged lung apices demonstrate centrilobular emphysema. No evidence of pharyngeal or laryngeal mass lesion.      Impression: Small right parietal scalp hematoma without acute intracranial findings. There are chronic and senescent changes of the brain as above.  No acute fracture or traumatic malalignment in the cervical spine. Moderate to severe degenerative changes are noted as above.  This report was finalized on 8/5/2022 2:15 PM by Brayan Walton MD.      CT Cervical Spine Without Contrast    Result Date: 8/5/2022  DATE OF EXAM: 8/5/2022 1:37 PM  PROCEDURE: CT HEAD WO CONTRAST-, CT CERVICAL SPINE WO CONTRAST-  INDICATIONS: FALL  COMPARISON: Head CT 5/31/2022  TECHNIQUE: Routine transaxial images of the head and cervical spine were obtained without the  administration of contrast. Automated exposure control and iterative reconstruction methods were used.  The radiation dose reduction device was turned on for each scan per the ALARA (As Low as Reasonably Achievable) protocol.  FINDINGS: Head: No acute intracranial hemorrhage. No acute large territory infarct. There are scattered subcortical and periventricular white matter hypodensities which are nonspecific and can be seen in the setting of chronic small vessel ischemic change.  There is mild global cerebral volume loss.  No extra-axial collections.  No midline shift or herniation.  Normal size and configuration of the ventricles commensurate with degree of cerebral volume loss.  Unremarkable appearance of the orbits.  The mastoid air cells are clear.  The paranasal sinuses are grossly clear.  No acute osseous findings. There is a small right parietal scalp contusion.  Cervical spine: The bones are demineralized limiting assessment for occult nondisplaced fractures. There is mild degenerative straightening of the cervical spine with trace degenerative anterolisthesis of C7 on T1, without definite traumatic subluxation or significant spondylolisthesis. The facets appear normally aligned allowing for multilevel facet arthropathy. The atlantodental interval is normal. No acute fracture. The vertebral body heights are maintained. There is presumed degenerative fusion of the C3 and C4 vertebral bodies and left facets. There is presumed degenerative fusion of the C5-C6 vertebral bodies and bilateral facets. 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 There is severe multilevel degenerative disc disease throughout the cervical spine with posterior disc osteophyte complexes at C3-C4, C4-C5, and C6-C7, with at least moderate spinal canal stenosis at these levels. There is multilevel facet arthropathy and uncovertebral hypertrophy contributing to moderate bilateral neuroforaminal narrowing at C4-C5. The  paravertebral soft tissues are normal; no prevertebral soft tissue swelling. There are surgical clips prior thyroidectomy. Partially imaged lung apices demonstrate centrilobular emphysema. No evidence of pharyngeal or laryngeal mass lesion.      Impression: Small right parietal scalp hematoma without acute intracranial findings. There are chronic and senescent changes of the brain as above.  No acute fracture or traumatic malalignment in the cervical spine. Moderate to severe degenerative changes are noted as above.  This report was finalized on 8/5/2022 2:15 PM by Brayan Walton MD.      CT Pelvis Without Contrast    Result Date: 8/5/2022  DATE OF EXAM: 8/5/2022 1:37 PM  PROCEDURE: CT PELVIS WO CONTRAST-  INDICATIONS: FALL  COMPARISON: CT abdomen pelvis May 31, 2002  TECHNIQUE: Routine transaxial slices were obtained through the pelvis without the administration of intravenous contrast. Reconstructed coronal and sagittal images were also obtained. Automated exposure control and iterative construction methods were used.    FINDINGS: The patient has had bilateral hip arthroplasty. There is streak artifact from the hardware which does result in some degradation of the images. A fracture is not definitely identified involving the pelvic bones or hip areas. There is osseous demineralization. There are postsurgical changes involving the lower lumbar spine. The graft there is some free fluid within the pelvis. Vascular calcification is noted. There are some soft tissue changes in the inferior gluteal area on the right that may relate to more of a decubitus ulcer.      Impression: 1.  Osseous demineralization. 2.  Changes from bilateral hip arthroplasty and postsurgical changes lumbar spine 3.  Some stranding of subcutaneous fat in the infra gluteal area on the right that might be reflective of a decubitus ulcer. 4.  Nonspecific free fluid within the pelvis.  This report was finalized on 8/5/2022 2:18 PM by Gonzalez Escudero  MD.        Results for orders placed during the hospital encounter of 08/09/21    Adult Transthoracic Echo Complete W/ Cont if Necessary Per Protocol    Interpretation Summary  · Estimated left ventricular EF = 60% Left ventricular systolic function is normal.  · Left ventricular diastolic function was normal.  · Trace mitral and tricuspid regurgitation.  · Mild tachycardia noted throughout the study.      Assessment & Plan   Assessment & Plan       UTI (urinary tract infection)    Hypertension    GERD without esophagitis    Hyperlipidemia LDL goal <100    Benign essential tremor    Hyponatremia    Post-surgical hypothyroidism    CAD with angina pectoris (CMS/HCC)    Alzheimer disease (HCC)    Fall      Zohra Hall is a 81 y.o. female with PMH of post surgical hypothyroid, CVA, breast cancer, Alzheimer's, HTN, HLD, CAD, and chronic back pain who presents to the ED after an unwitnessed fall at assisted living facility. Consulted PT/OT, case managment for discharge placement.     UTI  -Urine culture pending  -Maintenance fluids  -Rocephin    Fall  -CT shows small right parietal scalp hematoma, no acute fracture   -Fall precautions  -Pain control  -Consult PT/OT, case management    HTN  -Continue home amlodipine and metoprolol    CAD  HLD  -Continue home aspirin, statin and plavix    Tremor  -Continue home primidone    Chronic Back Pain  -Continue home celebrex    GERD  -Continue home PPI    Hypothyroid  -postsurgical due to history of papillary thyroid adenocarcinoma  -Continue synthroid    Mood Disorder  -Continue home paxil    Alzheimer's Disease  -Continue home aricept    DVT prophylaxis:  SCDs    CODE STATUS:  DNR/DNI       This note has been completed as part of a split-shared workflow.     Signature: Electronically signed by ELVIS Plascencia, 08/05/22, 7:49 PM EDT.          Attending   Admission Attestation       I have seen and examined the patient, performing an independent face-to-face diagnostic  evaluation with plan of care reviewed and developed with Ms. Packer.     Brief Summary Statement:   Zohra Hall is a 81 y.o. female  with PMH of post surgical hypothyroid, CVA, breast cancer, Alzheimer's, HTN, HLD, CAD, and chronic back pain who presents to the ED after an unwitnessed fall at assisted living facility. Pt was found to have an acute UTI upon admission here. She currently denies any complaints and no family is at bedside. Per ER report, family is interested in finding another facility.     Remainder of detailed HPI is as noted by APC and has been reviewed and/or edited by me for completeness.    Attending Physical Exam:  Constitutional: No acute distress, awake, frail appearing WF  HENT: NCAT, mucous membranes moist  Respiratory: Clear to auscultation bilaterally, respiratory effort normal   Cardiovascular: RRR, no murmurs, rubs, or gallops  Gastrointestinal: Positive bowel sounds, soft, nontender, nondistended  Musculoskeletal: No bilateral ankle edema  Psychiatric: flat affect, cooperative  Neurologic: Awake, MAEW  Skin: No rashes    Brief Assessment/Plan :  See detailed assessment and plan developed with APC which I have reviewed and/or edited for completeness.        Admission Status: I believe that this patient meets OBSERVATION status, however if further evaluation or treatment plans warrant, status may change.  Based upon current information, I predict patient's care encounter to be less than or equal to 2 midnights.        Molly Aj MD  22                        Electronically signed by Molyl Aj MD at 22 3910          Physical Therapy Notes (most recent note)      Mel Henriquez, PT at 22 1048  Version 1 of 1         Patient Name: Zohra Hall  : 1940    MRN: [id number]                              Today's Date: 2022       Admit Date: 2022    Visit Dx:     ICD-10-CM ICD-9-CM   1. Bilateral hip pain  M25.551 719.45    M25.552   "  2. Injury of head, initial encounter  S09.90XA 959.01   3. Acute UTI  N39.0 599.0   4. Fall, initial encounter  W19.XXXA E888.9     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HCC)   • Hypothyroid post remote resection papillary adenocarcinoma thyroid   • Hypertension   • GERD without esophagitis   • Hyperlipidemia LDL goal <100   • Atherosclerosis of aorta (HCC)   • Incontinence of bowel   • Acute right-sided low back pain without sciatica   • Benign essential tremor   • Pain of right hip joint   • Thyroid cancer (HCC)   • Lacunar stroke ([facility])   • Nontraumatic rupture of extensor tendons of right hand and wrist   • Hyponatremia   • Actinic keratosis   • Transient ischemic attack   • Ataxia   • History of PMR (CMS/[facility])   • Osteoporosis   • Post-surgical hypothyroidism   • NSTEMI (non-ST elevated myocardial infarction) ([facility])   • CAD with angina pectoris (CMS/[facility])   • NICM presumably due to Takotsubo post NSTEMI 5/23/2021 (CMS/[facility])   • Abnormal TSH   • Severe hypothyroidism   • Traumatic SAH, SDH, and intraparenchymal bleed (CMS/[facility])   • History of lacunar stroke 1/2/2019   • UTI (urinary tract infection)   • Alzheimer disease (HCC)   • Fall   • Bilateral hip pain     Past Medical History:   Diagnosis Date   • Breast cancer (HCC) 1987    left- pt states \"in situ\", no radiation, Tamoxifen for 5 years   • Cellulitis    • Cervical lymphadenopathy    • Chest pain    • Convulsions ([facility])    • GERD (gastroesophageal reflux disease)    • Glossitis    • Grief reaction     · Last Impression: 29 Jan 2015  counselled, given ativan for prn use.  Aleah Regan   • Hypertension    • Lower back pain    • Oral thrush    • Papillary adenocarcinoma (HCC)     Papillary adenocarcinoma of thyroid   • Papillary adenocarcinoma of thyroid " (HCC)     · resection Ramirez- 1/13,  2 x 2 x 1.5 cm  T3 N1 MXpost op low calcium and diminished  voiceablation Ain- 3/7 metastatic nodes and invasion to strap muscles · Last Impression: 29 Oct 2014  s/p Eval by berry Méndez.  Aleah Regan (Internal   • Piriformis syndrome    • Tingling    • Xerostomia      Past Surgical History:   Procedure Laterality Date   • BREAST BIOPSY Right 10/10/2013    stereo bx   • BREAST BIOPSY Left 10/19/2015    stereo bx   • BREAST CYST EXCISION      right   • BREAST EXCISIONAL BIOPSY Right     affirm bx and loc 2016.   • BREAST LUMPECTOMY Left 1987   • CARDIAC CATHETERIZATION N/A 5/23/2021    Procedure: Left Heart Cath;  Surgeon: Alonso Ross IV, MD;  Location: [facility] CATH INVASIVE LOCATION;  Service: Cardiology;  Laterality: N/A;   • HYSTERECTOMY  1979   • OTHER SURGICAL HISTORY Right     right arm surgery-steel roger in place   • TOTAL HIP ARTHROPLASTY     • TOTAL THYROIDECTOMY      Thyroid Surgery Total Thyroidectomy      General Information     Row Name 08/08/22 1130          Physical Therapy Time and Intention    Document Type evaluation  -     Mode of Treatment individual therapy;physical therapy  -     Row Name 08/08/22 1130          General Information    Patient Profile Reviewed yes  -LM     Prior Level of Function --  Per pt - she ambulates around the [facility] using a rw and is independent with ADLs - unsure of PLOF d/t pt cognition and no family present  -     Existing Precautions/Restrictions fall;other (see comments)  Alzheimers - pleasantly confused; R Knee Pain  -     Barriers to Rehab cognitive status  -     Row Name 08/08/22 1130          Living Environment    People in Home facility resident  [facility]  -     Row Name 08/08/22 1130          Home Main Entrance    Number of Stairs, Main Entrance none  -LM     Row Name 08/08/22 1130          Stairs Within Home, Primary    Number of Stairs, Within Home, Primary none  -LM     Row Name 08/08/22 1130           Cognition    Orientation Status (Cognition) oriented to;person;disoriented to;place;situation;time  -LM     Row Name 08/08/22 1130          Safety Issues, Functional Mobility    Safety Issues Affecting Function (Mobility) awareness of need for assistance;insight into deficits/self-awareness;judgment;problem-solving;safety precaution awareness;safety precautions follow-through/compliance;sequencing abilities  -LM     Impairments Affecting Function (Mobility) balance;cognition;endurance/activity tolerance;pain;strength  -LM           User Key  (r) = Recorded By, (t) = Taken By, (c) = Cosigned By    Initials Name Provider Type    LM Mel Henriquez, PT Physical Therapist               Mobility     Row Name 08/08/22 1133          Bed Mobility    Bed Mobility supine-sit  -LM     Supine-Sit [place] (Bed Mobility) moderate assist (50% patient effort);1 person assist;verbal cues  -LM     Assistive Device (Bed Mobility) bed rails;head of bed elevated  -LM     Comment, (Bed Mobility) Vc's for sequencing.  Pt needs redirection to stay on task.  -LM     Row Name 08/08/22 1133          Bed-Chair Transfer    Bed-Chair [place] (Transfers) maximum assist (25% patient effort);1 person assist;verbal cues  -LM     Assistive Device (Bed-Chair Transfers) walker, front-wheeled  -LM     Comment, (Bed-Chair Transfer) First attempted to transfer using rw, but pt unable to stand fully upright or pick her feet up.  Recliner placed right beside bed.  PT stood directly in front of patient and pivoted her over to chair.  -LM     Row Name 08/08/22 1133          Sit-Stand Transfer    Comment, (Sit-Stand Transfer) Stood x 3 reps from EOB using rw.  Pt unable to stand fully upright and complaining of R knee pain.  -LM     Row Name 08/08/22 1133          Gait/Stairs (Locomotion)    [place] Level (Gait) not tested  -LM     Comment, (Gait/Stairs) Not safe to attempt at this time.  2nd person will be needed.  -LM           User Key  (r)  = Recorded By, (t) = Taken By, (c) = Cosigned By    Initials Name Provider Type    LM Mel Henriquez PT Physical Therapist               Obj/Interventions     Row Name 08/08/22 1136          Range of Motion Comprehensive    General Range of Motion lower extremity range of motion deficits identified  -LM     Comment, General Range of Motion BLEs - Decreased active hip flexion - AAROM WFL;  Knee flex/ext AROM WFL with increased time; Decreased ankle DF AROM  -LM     Row Name 08/08/22 1136          Strength Comprehensive (MMT)    General Manual Muscle Testing (MMT) Assessment lower extremity strength deficits identified  -LM     Comment, General Manual Muscle Testing (MMT) Assessment BLEs - Hip flex - 2-/5; Knee flex/ext - 3+/5; Ankle DF - 2/5  -LM     Row Name 08/08/22 1136          Balance    Balance Assessment sitting static balance;standing static balance  -LM     Static Sitting Balance contact guard  -LM     Position, Sitting Balance unsupported;sitting edge of bed  -LM     Static Standing Balance maximum assist;1-person assist;verbal cues  -LM     Position/Device Used, Standing Balance supported  -LM     Row Name 08/08/22 1136          Sensory Assessment (Somatosensory)    Sensory Assessment (Somatosensory) LE sensation intact  -LM           User Key  (r) = Recorded By, (t) = Taken By, (c) = Cosigned By    Initials Name Provider Type    LM Mel Henriquez PT Physical Therapist               Goals/Plan     Row Name 08/08/22 1148          Bed Mobility Goal 1 (PT)    Activity/Assistive Device (Bed Mobility Goal 1, PT) sit to supine/supine to sit  -LM     [place] Level/Cues Needed (Bed Mobility Goal 1, PT) minimum assist (75% or more patient effort)  -LM     Time Frame (Bed Mobility Goal 1, PT) long term goal (LTG);2 weeks  -LM     Row Name 08/08/22 1148          Transfer Goal 1 (PT)    Activity/Assistive Device (Transfer Goal 1, PT) bed-to-chair/chair-to-bed  -LM     [place] Level/Cues Needed (Transfer Goal  1, PT) minimum assist (75% or more patient effort)  -LM     Time Frame (Transfer Goal 1, PT) long term goal (LTG);2 weeks  -LM     Row Name 08/08/22 1148          Gait Training Goal 1 (PT)    Activity/Assistive Device (Gait Training Goal 1, PT) gait (walking locomotion);assistive device use  -LM     [place] Level (Gait Training Goal 1, PT) moderate assist (50-74% patient effort)  -LM     Distance (Gait Training Goal 1, PT) 20 feet  -LM     Time Frame (Gait Training Goal 1, PT) long term goal (LTG);2 weeks  -LM     Row Name 08/08/22 1148          Therapy Assessment/Plan (PT)    Planned Therapy Interventions (PT) balance training;bed mobility training;gait training;home exercise program;motor coordination training;neuromuscular re-education;patient/family education;postural re-education;ROM (range of motion);strengthening;stretching;transfer training  -LM           User Key  (r) = Recorded By, (t) = Taken By, (c) = Cosigned By    Initials Name Provider Type    LM Mel Henriquez, PT Physical Therapist               Clinical Impression     Row Name 08/08/22 1137          Pain    Pretreatment Pain Rating 0/10 - no pain  -LM     Posttreatment Pain Rating 0/10 - no pain  -LM     Row Name 08/08/22 1137          Plan of Care Review    Plan of Care Reviewed With patient  -LM     Outcome Evaluation PT evaluation completed.  Pt pleasantly confused throughout.  Pt transferred supine-->sit with ModAx1, stood x 3 reps using rw with MaxAx1 - pt unable to stand upright fully, and stand pivot completed from bed-->recliner with MaxAx1.  Pt complaining of R knee pain throughout.  Skilled PT services warranted to improve mobility and safety.  Recommend SNF at d/c.  -LM     Row Name 08/08/22 1134          Therapy Assessment/Plan (PT)    Rehab Potential (PT) good, to achieve stated therapy goals  -LM     Criteria for Skilled Interventions Met (PT) yes;meets criteria;skilled treatment is necessary  -LM     Therapy Frequency (PT) daily   -LM     Row Name 08/08/22 1137          Vital Signs    Pre Systolic BP Rehab 159  -LM     Pre Treatment Diastolic BP 81  -LM     Pretreatment Heart Rate (beats/min) 76  -LM     Posttreatment Heart Rate (beats/min) 71  -LM     Pre SpO2 (%) 98  -LM     O2 Delivery Pre Treatment room air  -LM     Post SpO2 (%) 100  -LM     O2 Delivery Post Treatment room air  -LM     Pre Patient Position Supine  -LM     Post Patient Position Sitting  -LM     Row Name 08/08/22 1137          Positioning and Restraints    Pre-Treatment Position in bed  -LM     Post Treatment Position chair  -LM     In Chair reclined;call light within reach;encouraged to call for assist;exit alarm on;waffle cushion;heels elevated;RUE elevated;notified nsg  -LM           User Key  (r) = Recorded By, (t) = Taken By, (c) = Cosigned By    Initials Name Provider Type    Mel Jackson PT Physical Therapist               Outcome Measures     Row Name 08/08/22 1150          How much help from another person do you currently need...    Turning from your back to your side while in flat bed without using bedrails? 2  -LM     Moving from lying on back to sitting on the side of a flat bed without bedrails? 2  -LM     Moving to and from a bed to a chair (including a wheelchair)? 2  -LM     Standing up from a chair using your arms (e.g., wheelchair, bedside chair)? 2  -LM     Climbing 3-5 steps with a railing? 1  -LM     To walk in hospital room? 1  -LM     AM-PAC 6 Clicks Score (PT) 10  -LM     Highest level of mobility 4 --> Transferred to chair/commode  -LM     Row Name 08/08/22 1150          Functional Assessment    Outcome Measure Options AM-PAC 6 Clicks Basic Mobility (PT)  -LM           User Key  (r) = Recorded By, (t) = Taken By, (c) = Cosigned By    Initials Name Provider Type    Mel Jackson PT Physical Therapist                             Physical Therapy Education                 Title: PT OT SLP Therapies (In Progress)     Topic: Physical Therapy  (In Progress)     Point: Mobility training (In Progress)     Learning Progress Summary           Patient Acceptance, E, NR by LM at 8/8/2022 1151                   Point: Home exercise program (Not Started)     Learner Progress:  Not documented in this visit.          Point: Precautions (In Progress)     Learning Progress Summary           Patient Acceptance, E, NR by LM at 8/8/2022 1151                               User Key     Initials Effective Dates Name Provider Type Discipline     06/16/21 -  Mel Henriquez, PRAVEEN Physical Therapist PT              PT Recommendation and Plan  Planned Therapy Interventions (PT): balance training, bed mobility training, gait training, home exercise program, motor coordination training, neuromuscular re-education, patient/family education, postural re-education, ROM (range of motion), strengthening, stretching, transfer training  Plan of Care Reviewed With: patient  Outcome Evaluation: PT evaluation completed.  Pt pleasantly confused throughout.  Pt transferred supine-->sit with ModAx1, stood x 3 reps using rw with MaxAx1 - pt unable to stand upright fully, and stand pivot completed from bed-->recliner with MaxAx1.  Pt complaining of R knee pain throughout.  Skilled PT services warranted to improve mobility and safety.  Recommend SNF at d/c.     Time Calculation:    PT Charges     Row Name 08/08/22 1151             Time Calculation    Start Time 1048  -LM      PT Received On 08/08/22  -LM      PT Goal Re-Cert Due Date 08/18/22  -LM              Timed Charges    87064 - PT Therapeutic Activity Minutes 8  -LM              Untimed Charges    PT Eval/Re-eval Minutes 48  -LM              Total Minutes    Timed Charges Total Minutes 8  -LM      Untimed Charges Total Minutes 48  -LM       Total Minutes 56  -LM            User Key  (r) = Recorded By, (t) = Taken By, (c) = Cosigned By    Initials Name Provider Type    LM Mel Henriquez, PT Physical Therapist              Therapy Charges for  Today     Code Description Service Date Service Provider Modifiers Qty    64528211034  PT THERAPEUTIC ACT EA 15 MIN 2022 Mel Henriquez, PT GP 1    89433944512 HC PT EVAL MOD COMPLEXITY 4 2022 Mel Henriquez, PT GP 1          PT G-Codes  Outcome Measure Options: AM-PAC 6 Clicks Basic Mobility (PT)  AM-PAC 6 Clicks Score (PT): 10  AM-PAC 6 Clicks Score (OT): 12    Mel Henriquez PT  2022      Electronically signed by Mel Henriquez, PT at 22 1152          Occupational Therapy Notes (most recent note)      Lillie Murphy, OT at 22 1312          Patient Name: Zohra Hall  : 1940    MRN: [id number]                              Today's Date: 2022       Admit Date: 2022    Visit Dx:     ICD-10-CM ICD-9-CM   1. Bilateral hip pain  M25.551 719.45    M25.552    2. Injury of head, initial encounter  S09.90XA 959.01   3. Acute UTI  N39.0 599.0   4. Fall, initial encounter  W19.XXXA E888.9     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HCC)   • Hypothyroid post remote resection papillary adenocarcinoma thyroid   • Hypertension   • GERD without esophagitis   • Hyperlipidemia LDL goal <100   • Atherosclerosis of aorta (HCC)   • Incontinence of bowel   • Acute right-sided low back pain without sciatica   • Benign essential tremor   • Pain of right hip joint   • Thyroid cancer (HCC)   • Lacunar stroke (HCC)   • Nontraumatic rupture of extensor tendons of right hand and wrist   • Hyponatremia   • Actinic keratosis   • Transient ischemic attack   • Ataxia   • History of PMR (CMS/HCC)   • Osteoporosis   • Post-surgical hypothyroidism   • NSTEMI (non-ST elevated myocardial infarction) ([facility])   • CAD with angina pectoris (CMS/HCC)   • NICM presumably due to Takotsubo post NSTEMI 2021  "(CMS/HCC)   • Abnormal TSH   • Severe hypothyroidism   • Traumatic SAH, SDH, and intraparenchymal bleed (CMS/HCC)   • History of lacunar stroke 1/2/2019   • UTI (urinary tract infection)   • Alzheimer disease (HCC)   • Fall     Past Medical History:   Diagnosis Date   • Breast cancer (HCC) 1987    left- pt states \"in situ\", no radiation, Tamoxifen for 5 years   • Cellulitis    • Cervical lymphadenopathy    • Chest pain    • Convulsions (HCC)    • GERD (gastroesophageal reflux disease)    • Glossitis    • Grief reaction     · Last Impression: 29 Jan 2015  counselled, given ativan for prn use.  Aleah Regan   • Hypertension    • Lower back pain    • Oral thrush    • Papillary adenocarcinoma (HCC)     Papillary adenocarcinoma of thyroid   • Papillary adenocarcinoma of thyroid (HCC)     · resection Ramirez- 1/13,  2 x 2 x 1.5 cm  T3 N1 MXpost op low calcium and diminished  voiceablation Ain- 3/7 metastatic nodes and invasion to strap muscles · Last Impression: 29 Oct 2014  s/p Eval by berry Méndez.  Aleah Regan (Internal   • Piriformis syndrome    • Tingling    • Xerostomia      Past Surgical History:   Procedure Laterality Date   • BREAST BIOPSY Right 10/10/2013    stereo bx   • BREAST BIOPSY Left 10/19/2015    stereo bx   • BREAST CYST EXCISION      right   • BREAST EXCISIONAL BIOPSY Right     affirm bx and loc 2016.   • BREAST LUMPECTOMY Left 1987   • CARDIAC CATHETERIZATION N/A 5/23/2021    Procedure: Left Heart Cath;  Surgeon: Alonso Ross IV, MD;  Location: [facility] CATH INVASIVE LOCATION;  Service: Cardiology;  Laterality: N/A;   • HYSTERECTOMY  1979   • OTHER SURGICAL HISTORY Right     right arm surgery-steel roger in place   • TOTAL HIP ARTHROPLASTY     • TOTAL THYROIDECTOMY      Thyroid Surgery Total Thyroidectomy      General Information     Row Name 08/07/22 1441          OT Time and Intention    Document Type evaluation  -AN     Mode of Treatment occupational therapy  -AN     Row Name " 08/07/22 1441          General Information    Patient Profile Reviewed yes  -AN     Prior Level of Function --  pt is unable to report PLOF due to AMS; per chart ambulates using a RW  -AN     Existing Precautions/Restrictions fall;other (see comments)  AMS, Alzhiemher's  -AN     Barriers to Rehab medically complex;previous functional deficit;cognitive status  -AN     Row Name 08/07/22 1441          Living Environment    People in Home facility resident  -AN     Row Name 08/07/22 1441          Home Main Entrance    Number of Stairs, Main Entrance none  -AN     Row Name 08/07/22 1441          Cognition    Orientation Status (Cognition) oriented to;person;disoriented to;place;situation;time;verbal cues/prompts needed for orientation  -AN     Row Name 08/07/22 1441          Safety Issues, Functional Mobility    Safety Issues Affecting Function (Mobility) insight into deficits/self-awareness;safety precaution awareness;problem-solving;awareness of need for assistance;judgment;sequencing abilities;safety precautions follow-through/compliance  -AN     Impairments Affecting Function (Mobility) balance;cognition;endurance/activity tolerance;pain;strength  -AN     Cognitive Impairments, Mobility Safety/Performance awareness, need for assistance;insight into deficits/self-awareness;problem-solving/reasoning;sequencing abilities;safety precaution follow-through;safety precaution awareness  -AN           User Key  (r) = Recorded By, (t) = Taken By, (c) = Cosigned By    Initials Name Provider Type    Lillie Pacheco OT Occupational Therapist                 Mobility/ADL's     Row Name 08/07/22 1450          Bed Mobility    Bed Mobility supine-sit;sit-supine  -AN     Supine-Sit [place] (Bed Mobility) moderate assist (50% patient effort);2 person assist;verbal cues;nonverbal cues (demo/gesture)  -AN     Sit-Supine [place] (Bed Mobility) verbal cues;nonverbal cues (demo/gesture);maximum assist (25% patient effort);2  person assist  -AN     Bed Mobility, Safety Issues cognitive deficits limit understanding;decreased use of arms for pushing/pulling;decreased use of legs for bridging/pushing;impaired trunk control for bed mobility  -AN     Assistive Device (Bed Mobility) head of bed elevated;draw sheet  -AN     Comment, (Bed Mobility) cues for sequencing of steps and increased time due to generalized pain and weakness  -AN     Row Name 08/07/22 1450          Transfers    Transfers sit-stand transfer;stand-sit transfer  -AN     Comment, (Transfers) STS performed x2 from the EOB with Mod A x 2 with cues for sequencing of steps; pt unable to progress transfers at this time due to b/l leg pain in standing  -AN     Sit-Stand [place] (Transfers) verbal cues;nonverbal cues (demo/gesture);moderate assist (50% patient effort);2 person assist  -AN     Stand-Sit [place] (Transfers) verbal cues;nonverbal cues (demo/gesture);moderate assist (50% patient effort);2 person assist  -AN     [facility] Name 08/07/22 1450          Functional Mobility    Functional Mobility- Ind. Level unable to perform  -AN     Row Name 08/07/22 1450          Activities of Daily Living    BADL Assessment/Intervention lower body dressing  -AN     Row Name 08/07/22 1450          Lower Body Dressing Assessment/Training    [place] Level (Lower Body Dressing) don;socks;dependent (less than 25% patient effort)  -AN     Position (Lower Body Dressing) supine  -AN           User Key  (r) = Recorded By, (t) = Taken By, (c) = Cosigned By    Initials Name Provider Type    AN Lillie Murphy OT Occupational Therapist               Obj/Interventions     Row Name 08/07/22 1452          Sensory Assessment (Somatosensory)    Sensory Assessment (Somatosensory) UE sensation intact  -AN     Row Name 08/07/22 1452          Vision Assessment/Intervention    Visual Impairment/Limitations WFL  -AN     Vision Assessment Comment pt with visual hallucinations throughout session  -AN      Row Name 08/07/22 1452          Range of Motion Comprehensive    General Range of Motion no range of motion deficits identified  -AN     Row Name 08/07/22 1452          Strength Comprehensive (MMT)    General Manual Muscle Testing (MMT) Assessment upper extremity strength deficits identified;lower extremity strength deficits identified  -AN     Comment, General Manual Muscle Testing (MMT) Assessment BUE grossly 4/5, BLE grossly 4-/5  -AN     Row Name 08/07/22 1452          Motor Skills    Motor Skills functional endurance  -AN     Functional Endurance decreased activity tolerance  -AN     Row Name 08/07/22 1452          Balance    Balance Assessment sitting static balance;sitting dynamic balance;sit to stand dynamic balance;standing static balance  -AN     Static Sitting Balance contact guard  -AN     Dynamic Sitting Balance contact guard  -AN     Position, Sitting Balance sitting edge of bed  -AN     Sit to Stand Dynamic Balance verbal cues;moderate assist;2-person assist;non-verbal cues (demo/gesture)  -AN     Static Standing Balance moderate assist;2-person assist;verbal cues;non-verbal cues (demo/gesture)  -AN     Position/Device Used, Standing Balance supported  -AN     Balance Interventions standing;sit to stand;supported;static;moderate challenge;narrowed base of support  -AN           User Key  (r) = Recorded By, (t) = Taken By, (c) = Cosigned By    Initials Name Provider Type    AN Lillie Murphy OT Occupational Therapist               Goals/Plan     [facility] Name 08/07/22 1504          Bed Mobility Goal 1 (OT)    Activity/Assistive Device (Bed Mobility Goal 1, OT) sit to supine/supine to sit  -AN     [place] Level/Cues Needed (Bed Mobility Goal 1, OT) minimum assist (75% or more patient effort)  -AN     Time Frame (Bed Mobility Goal 1, OT) long term goal (LTG);10 days  -AN     [facility] Name 08/07/22 1504          Transfer Goal 1 (OT)    Activity/Assistive Device (Transfer Goal 1, OT)  sit-to-stand/stand-to-sit;bed-to-chair/chair-to-bed  -AN     [place] Level/Cues Needed (Transfer Goal 1, OT) minimum assist (75% or more patient effort)  -AN     Time Frame (Transfer Goal 1, OT) long term goal (LTG);10 days  -AN     Row Name 08/07/22 1504          Grooming Goal 1 (OT)    Activity/Device (Grooming Goal 1, OT) wash face, hands;oral care  -AN     [place] (Grooming Goal 1, OT) minimum assist (75% or more patient effort)  -AN     Time Frame (Grooming Goal 1, OT) long term goal (LTG);10 days  -AN     Row Name 08/07/22 1504          Therapy Assessment/Plan (OT)    Planned Therapy Interventions (OT) activity tolerance training;adaptive equipment training;BADL retraining;cognitive/visual perception retraining;functional balance retraining;occupation/activity based interventions;patient/caregiver education/training;transfer/mobility retraining;strengthening exercise  -AN           User Key  (r) = Recorded By, (t) = Taken By, (c) = Cosigned By    Initials Name Provider Type    AN Lillie Murphy OT Occupational Therapist               Clinical Impression     Row Name 08/07/22 1454          Pain Assessment    Additional Documentation Pain Scale: FACES Pre/Post-Treatment (Group)  -AN     Row Name 08/07/22 3960          Pain Scale: FACES Pre/Post-Treatment    Pain: FACES Scale, Pretreatment 2-->hurts little bit  -AN     Posttreatment Pain Rating 4-->hurts little more  -AN     Pain Location - Side/Orientation Bilateral  -AN     Pain Location lower  -AN     Pain Location - extremity  -AN     Pre/Posttreatment Pain Comment tolerated  -AN     Row Name 08/07/22 9784          Plan of Care Review    Plan of Care Reviewed With patient  -AN     Progress no change  -AN     Outcome Evaluation OT evaluation completed. Pt presents with generalized weakness, impaired cognition, decreased activity tolerance, and LE pain limiting independence with ADLs and functional mobility. Pt performed bed mobility with Mod A x  2 and STS x 2 with Mod HHA x 2. Pt unable to further progress mobility due to pain. OT rec IP OT and SNF at [place].  -AN     Row Name 08/07/22 1454          Therapy Assessment/Plan (OT)    Patient/Family Therapy Goal Statement (OT) Return to PLOF  -AN     Rehab Potential (OT) good, to achieve stated therapy goals  -AN     Criteria for Skilled Therapeutic Interventions Met (OT) yes;skilled treatment is necessary  -AN     Therapy Frequency (OT) daily  -AN     Row Name 08/07/22 1454          Therapy Plan Review/Discharge Plan (OT)    Anticipated Discharge Disposition (OT) skilled nursing facility  -AN     Row Name 08/07/22 1454          Vital Signs    Pre Systolic BP Rehab --  VSS  -AN     O2 Delivery Pre Treatment room air  -AN     O2 Delivery Intra Treatment room air  -AN     O2 Delivery Post Treatment room air  -AN     Pre Patient Position Supine  -AN     Intra Patient Position Standing  -AN     Post Patient Position Supine  -AN     Row Name 08/07/22 1454          Positioning and Restraints    Pre-Treatment Position in bed  -AN     Post Treatment Position bed  -AN     In Bed notified nsg;supine;exit alarm on;encouraged to call for assist;call light within reach;side rails up x3  -AN           User Key  (r) = Recorded By, (t) = Taken By, (c) = Cosigned By    Initials Name Provider Type    Lillie Pacheco OT Occupational Therapist               Outcome Measures     Row Name 08/07/22 4451          How much help from another is currently needed...    Putting on and taking off regular lower body clothing? 2  -AN     Bathing (including washing, rinsing, and drying) 2  -AN     Toileting (which includes using toilet bed pan or urinal) 2  -AN     Putting on and taking off regular upper body clothing 2  -AN     Taking care of personal grooming (such as brushing teeth) 2  -AN     Eating meals 2  -AN     AM-PAC 6 Clicks Score (OT) 12  -AN     Row Name 08/07/22 1506          Functional Assessment    Outcome Measure Options  AM-PAC 6 Clicks Daily Activity (OT)  -AN           User Key  (r) = Recorded By, (t) = Taken By, (c) = Cosigned By    Initials Name Provider Type    Lillie Pacheco OT Occupational Therapist                Occupational Therapy Education                 Title: PT OT SLP Therapies (In Progress)     Topic: Occupational Therapy (In Progress)     Point: ADL training (Done)     Description:   Instruct learner(s) on proper safety adaptation and remediation techniques during self care or transfers.   Instruct in proper use of assistive devices.              Learning Progress Summary           Patient Acceptance, E, VU,NR by AN at 8/7/2022 1506                   Point: Home exercise program (Not Started)     Description:   Instruct learner(s) on appropriate technique for monitoring, assisting and/or progressing therapeutic exercises/activities.              Learner Progress:  Not documented in this visit.          Point: Precautions (Done)     Description:   Instruct learner(s) on prescribed precautions during self-care and functional transfers.              Learning Progress Summary           Patient Acceptance, E, VU,NR by AN at 8/7/2022 1506                   Point: Body mechanics (Done)     Description:   Instruct learner(s) on proper positioning and spine alignment during self-care, functional mobility activities and/or exercises.              Learning Progress Summary           Patient Acceptance, E, VU,NR by AN at 8/7/2022 1506                               User Key     Initials Effective Dates Name Provider Type Discipline    FIGUEROA 09/21/21 -  Lillie Murphy OT Occupational Therapist OT              OT Recommendation and Plan  Planned Therapy Interventions (OT): activity tolerance training, adaptive equipment training, BADL retraining, cognitive/visual perception retraining, functional balance retraining, occupation/activity based interventions, patient/caregiver education/training, transfer/mobility  retraining, strengthening exercise  Therapy Frequency (OT): daily  Plan of Care Review  Plan of Care Reviewed With: patient  Progress: no change  Outcome Evaluation: OT evaluation completed. Pt presents with generalized weakness, impaired cognition, decreased activity tolerance, and LE pain limiting independence with ADLs and functional mobility. Pt performed bed mobility with Mod A x 2 and STS x 2 with Mod HHA x 2. Pt unable to further progress mobility due to pain. OT rec IP OT and SNF at [place].     Time Calculation:    Time Calculation- OT     Row Name 08/07/22 1507             Time Calculation- OT    OT Start Time 1312  -AN      OT Received On 08/07/22  -AN      OT Goal Re-Cert Due Date 08/17/22  -AN              Untimed Charges    OT Eval/Re-eval Minutes 46  -AN              Total Minutes    Untimed Charges Total Minutes 46  -AN       Total Minutes 46  -AN            User Key  (r) = Recorded By, (t) = Taken By, (c) = Cosigned By    Initials Name Provider Type    AN Lillie Murphy OT Occupational Therapist              Therapy Charges for Today     Code Description Service Date Service Provider Modifiers Qty    [id number]  OT EVAL MOD COMPLEXITY 4 8/7/2022 Lillie Murphy OT GO 1    [id number]  OT THER SUPP EA 15 MIN 8/7/2022 Lillie Murphy OT GO 2               Lillie Murphy OT  8/7/2022    Electronically signed by Lillie Murphy OT at 08/07/22 3539

## 2022-08-08 NOTE — CASE MANAGEMENT/SOCIAL WORK
Discharge Planning Assessment  [facility]     Patient Name: Zohra Hall  MRN: [id number]  Today's Date: 8/8/2022    Admit Date: 8/5/2022     Discharge Needs Assessment     Row Name 08/08/22 1533       Living Environment    People in Home facility resident  Lives at [facility][1]    Current Living Arrangements assisted living facility    Primary Care Provided by other (see comments)  [facility] staff    Provides Primary Care For no one, unable/limited ability to care for self    Family Caregiver if Needed grandchild(corona), adult    Family Caregiver Names Joey Stephenson    Quality of Family Relationships helpful;involved;supportive    Able to Return to Prior Arrangements other (see comments)  TBD       Transition Planning    Patient/Family Anticipates Transition to inpatient rehabilitation facility    Patient/Family Anticipated Services at Transition skilled nursing;rehabilitation services    Transportation Anticipated family or friend will provide       Discharge Needs Assessment    Readmission Within the Last 30 Days no previous admission in last 30 days    Equipment Currently Used at Home walker, rolling;wheelchair    Concerns to be Addressed discharge planning;cognitive/perceptual;home safety    Anticipated Changes Related to Illness inability to care for self    Outpatient/Agency/Support Group Needs inpatient rehabilitation facility    Discharge Facility/Level of Care Needs rehabilitation facility    Current Discharge Risk chronically ill;cognitively impaired;lives alone               Discharge Plan     Row Name 08/08/22 1536       Plan    Plan SNF    Patient/Family in Agreement with Plan yes    Plan Comments I have spoken to Mr. Joey Diaz who is Ms. Hall's grandson and POA.  Ms. Hall is presently confused.  Joey states that his grandmother lives in [facility] at [facility][1].  He reports that she requires 1 person assistance with activities of daily living.  She uses a rolling walker to  assist with mobility.  She is currently receiving PT/[facility] services from [facility].  OT has recommended inpatient rehab at discharge.  Joey has requested referrals be submitted to [facility] and [facility] for inpatient rehab.  I have faxed these referrals.  CM will cont to follow the plan of care and assist with discharge planning as recommendations become available.    Final Discharge Disposition Code 03 - skilled nursing facility (SNF)              Continued Care and Services - Admitted Since 8/5/2022     Destination     Service Provider Request Status Selected Services Address Phone Fax Patient Preferred    [facility]  Pending - Request Sent N/A [address], [place] [id number] [phone] --    [facility]  Pending - Request Sent N/A [address], [facility] [id number] [phone] --    [facility]  Pending - [facility] N[facility]A [address] [facility] [address] [phone] --          Home Medical Care     Service Provider Request Status Selected Services Address Phone Fax Patient Preferred    [facility] ,  Home Rehabilitation [address] , [place] [address] [phone] --                 Demographic Summary     Row Name 08/08/22 1530       General Information    Admission Type inpatient    Arrived From home;other (see comments)  [facility]    Referral Source admission list    Reason for Consult discharge planning    General Information Comments I have confirmed with Ms. Isabel Shaw's grandson/POA that her pcp is Aleah Regan MD and her insurance is Medicare and AARP.       Contact Information    Permission Granted to Share Info With                Functional Status     Row Name 08/08/22 1531       Functional Status, IADL     Medications assistive person    Meal Preparation assistive person    Housekeeping assistive person    Laundry assistive person    Shopping completely dependent               Psychosocial    No documentation.                Abuse/Neglect    No documentation.                Legal    No documentation.                Substance Abuse    No documentation.                Patient Forms    No documentation.                   Georgia Scales RN

## 2022-08-09 LAB
ALBUMIN SERPL-MCNC: 3.1 G/DL (ref 3.5–5.2)
ALBUMIN/GLOB SERPL: 1.2 G/DL
ALP SERPL-CCNC: 78 U/L (ref 39–117)
ALT SERPL W P-5'-P-CCNC: 8 U/L (ref 1–33)
ANION GAP SERPL CALCULATED.3IONS-SCNC: 10 MMOL/L (ref 5–15)
AST SERPL-CCNC: 15 U/L (ref 1–32)
BASOPHILS # BLD AUTO: 0.04 10*3/MM3 (ref 0–0.2)
BASOPHILS NFR BLD AUTO: 0.8 % (ref 0–1.5)
BILIRUB SERPL-MCNC: 0.2 MG/DL (ref 0–1.2)
BUN SERPL-MCNC: 9 MG/DL (ref 8–23)
BUN/CREAT SERPL: 15.5 (ref 7–25)
CALCIUM SPEC-SCNC: 8.3 MG/DL (ref 8.6–10.5)
CHLORIDE SERPL-SCNC: 107 MMOL/L (ref 98–107)
CO2 SERPL-SCNC: 24 MMOL/L (ref 22–29)
CREAT SERPL-MCNC: 0.58 MG/DL (ref 0.57–1)
DEPRECATED RDW RBC AUTO: 52.5 FL (ref 37–54)
EGFRCR SERPLBLD CKD-EPI 2021: 89.9 ML/MIN/1.73
EOSINOPHIL # BLD AUTO: 0.04 10*3/MM3 (ref 0–0.4)
EOSINOPHIL NFR BLD AUTO: 0.8 % (ref 0.3–6.2)
ERYTHROCYTE [DISTWIDTH] IN BLOOD BY AUTOMATED COUNT: 16.6 % (ref 12.3–15.4)
GLOBULIN UR ELPH-MCNC: 2.6 GM/DL
GLUCOSE SERPL-MCNC: 85 MG/DL (ref 65–99)
HCT VFR BLD AUTO: 30.3 % (ref 34–46.6)
HGB BLD-MCNC: 9.7 G/DL (ref 12–15.9)
IMM GRANULOCYTES # BLD AUTO: 0.04 10*3/MM3 (ref 0–0.05)
IMM GRANULOCYTES NFR BLD AUTO: 0.8 % (ref 0–0.5)
LYMPHOCYTES # BLD AUTO: 0.97 10*3/MM3 (ref 0.7–3.1)
LYMPHOCYTES NFR BLD AUTO: 20.4 % (ref 19.6–45.3)
MCH RBC QN AUTO: 28 PG (ref 26.6–33)
MCHC RBC AUTO-ENTMCNC: 32 G/DL (ref 31.5–35.7)
MCV RBC AUTO: 87.3 FL (ref 79–97)
MONOCYTES # BLD AUTO: 0.46 10*3/MM3 (ref 0.1–0.9)
MONOCYTES NFR BLD AUTO: 9.7 % (ref 5–12)
NEUTROPHILS NFR BLD AUTO: 3.2 10*3/MM3 (ref 1.7–7)
NEUTROPHILS NFR BLD AUTO: 67.5 % (ref 42.7–76)
NRBC BLD AUTO-RTO: 0 /100 WBC (ref 0–0.2)
PHOSPHATE SERPL-MCNC: 2.5 MG/DL (ref 2.5–4.5)
PLATELET # BLD AUTO: 274 10*3/MM3 (ref 140–450)
PMV BLD AUTO: 9.8 FL (ref 6–12)
POTASSIUM SERPL-SCNC: 4.2 MMOL/L (ref 3.5–5.2)
PROT SERPL-MCNC: 5.7 G/DL (ref 6–8.5)
RBC # BLD AUTO: 3.47 10*6/MM3 (ref 3.77–5.28)
SODIUM SERPL-SCNC: 141 MMOL/L (ref 136–145)
WBC NRBC COR # BLD: 4.75 10*3/MM3 (ref 3.4–10.8)

## 2022-08-09 PROCEDURE — 80053 COMPREHEN METABOLIC PANEL: CPT | Performed by: NURSE PRACTITIONER

## 2022-08-09 PROCEDURE — 85025 COMPLETE CBC W/AUTO DIFF WBC: CPT | Performed by: INTERNAL MEDICINE

## 2022-08-09 PROCEDURE — 84100 ASSAY OF PHOSPHORUS: CPT | Performed by: INTERNAL MEDICINE

## 2022-08-09 PROCEDURE — 99232 SBSQ HOSP IP/OBS MODERATE 35: CPT | Performed by: NURSE PRACTITIONER

## 2022-08-09 PROCEDURE — 25010000002 SODIUM CHLORIDE 0.9 % WITH KCL 20 MEQ 20-0.9 MEQ/L-% SOLUTION: Performed by: NURSE PRACTITIONER

## 2022-08-09 PROCEDURE — 25010000002 CEFTRIAXONE PER 250 MG

## 2022-08-09 RX ORDER — HYDRALAZINE HYDROCHLORIDE 25 MG/1
25 TABLET, FILM COATED ORAL EVERY 8 HOURS SCHEDULED
Status: DISCONTINUED | OUTPATIENT
Start: 2022-08-09 | End: 2022-08-10

## 2022-08-09 RX ORDER — SODIUM CHLORIDE AND POTASSIUM CHLORIDE 150; 900 MG/100ML; MG/100ML
75 INJECTION, SOLUTION INTRAVENOUS CONTINUOUS
Status: ACTIVE | OUTPATIENT
Start: 2022-08-09 | End: 2022-08-09

## 2022-08-09 RX ADMIN — SODIUM CHLORIDE 1 G: 900 INJECTION INTRAVENOUS at 20:26

## 2022-08-09 RX ADMIN — ATORVASTATIN CALCIUM 40 MG: 40 TABLET, FILM COATED ORAL at 20:26

## 2022-08-09 RX ADMIN — PAROXETINE HYDROCHLORIDE 40 MG: 20 TABLET, FILM COATED ORAL at 08:21

## 2022-08-09 RX ADMIN — PRIMIDONE 100 MG: 50 TABLET ORAL at 14:03

## 2022-08-09 RX ADMIN — CELECOXIB 200 MG: 200 CAPSULE ORAL at 08:21

## 2022-08-09 RX ADMIN — DONEPEZIL HYDROCHLORIDE 10 MG: 10 TABLET, FILM COATED ORAL at 20:26

## 2022-08-09 RX ADMIN — FAMOTIDINE 20 MG: 20 TABLET ORAL at 08:21

## 2022-08-09 RX ADMIN — HYDRALAZINE HYDROCHLORIDE 25 MG: 25 TABLET, FILM COATED ORAL at 14:03

## 2022-08-09 RX ADMIN — PRIMIDONE 100 MG: 50 TABLET ORAL at 21:59

## 2022-08-09 RX ADMIN — AMLODIPINE BESYLATE 10 MG: 10 TABLET ORAL at 05:36

## 2022-08-09 RX ADMIN — HYDRALAZINE HYDROCHLORIDE 25 MG: 25 TABLET, FILM COATED ORAL at 20:26

## 2022-08-09 RX ADMIN — SODIUM CHLORIDE 500 ML: 9 INJECTION, SOLUTION INTRAVENOUS at 02:41

## 2022-08-09 RX ADMIN — POTASSIUM CHLORIDE AND SODIUM CHLORIDE 75 ML/HR: 900; 150 INJECTION, SOLUTION INTRAVENOUS at 11:13

## 2022-08-09 RX ADMIN — Medication 10 ML: at 08:36

## 2022-08-09 RX ADMIN — CLOPIDOGREL BISULFATE 75 MG: 75 TABLET ORAL at 08:21

## 2022-08-09 RX ADMIN — ASPIRIN 81 MG: 81 TABLET, COATED ORAL at 08:21

## 2022-08-09 RX ADMIN — PRIMIDONE 100 MG: 50 TABLET ORAL at 05:15

## 2022-08-09 RX ADMIN — SODIUM CHLORIDE 500 ML: 9 INJECTION, SOLUTION INTRAVENOUS at 05:37

## 2022-08-09 RX ADMIN — Medication 10 ML: at 20:27

## 2022-08-09 RX ADMIN — LEVOTHYROXINE SODIUM 125 MCG: 125 TABLET ORAL at 08:21

## 2022-08-09 RX ADMIN — METOPROLOL SUCCINATE 25 MG: 25 TABLET, EXTENDED RELEASE ORAL at 08:21

## 2022-08-09 RX ADMIN — HYDRALAZINE HYDROCHLORIDE 25 MG: 25 TABLET, FILM COATED ORAL at 08:21

## 2022-08-09 NOTE — PLAN OF CARE
Problem: Adult Inpatient Plan of Care  Goal: Plan of Care Review  Outcome: Ongoing, Progressing  Goal: Patient-Specific Goal (Individualized)  Outcome: Ongoing, Progressing  Goal: Absence of Hospital-Acquired Illness or Injury  Outcome: Ongoing, Progressing  Intervention: Prevent Infection  Recent Flowsheet Documentation  Taken 8/8/2022 1900 by María Tristan, RN  Infection Prevention:   environmental surveillance performed   equipment surfaces disinfected   hand hygiene promoted   personal protective equipment utilized   rest/sleep promoted   single patient room provided   visitors restricted/screened  Goal: Optimal Comfort and Wellbeing  Outcome: Ongoing, Progressing  Goal: Readiness for Transition of Care  Outcome: Ongoing, Progressing     Problem: Fall Injury Risk  Goal: Absence of Fall and Fall-Related Injury  Outcome: Ongoing, Progressing     Problem: Hypertension Comorbidity  Goal: Blood Pressure in Desired Range  Outcome: Ongoing, Progressing     Problem: Osteoarthritis Comorbidity  Goal: Maintenance of Osteoarthritis Symptom Control  Outcome: Ongoing, Progressing     Problem: Skin Injury Risk Increased  Goal: Skin Health and Integrity  Outcome: Ongoing, Progressing  Intervention: Optimize Skin Protection  Recent Flowsheet Documentation  Taken 8/8/2022 1900 by María Tristan, RN  Head of Bed (HOB) Positioning: HOB at 30 degrees   Goal Outcome Evaluation:

## 2022-08-09 NOTE — PROGRESS NOTES
[facility] Medicine Services  PROGRESS NOTE    Patient Name: Zohra Hall  : 1940  MRN: [id number]    Date of Admission: 2022  Primary Care Physician: Aleah Regan MD    Subjective   Subjective     CC:  AMS, weakness    HPI:  Patient sitting up in bed. Confused conversation. Breakfast set up for her and she tells me she would like 2 sugars in her coffee and fruit on her tray. Does not answer questions or follow command otherwise    ROS:  Difficult to obtain ROS 2/2 dementia    Objective   Objective     Vital Signs:   Temp:  [97 °F (36.1 °C)-98.9 °F (37.2 °C)] 98.5 °F (36.9 °C)  Heart Rate:  [50-68] 68  Resp:  [16] 16  BP: (142-188)/(76-85) 172/76     Physical Exam:  Constitutional: No acute distress, awake, sitting up in bed eating breakfast  HENT: NCAT, mucous membranes moist  Respiratory: CTAB, Respiratory effort normal   Cardiovascular: bradycardic, but regular  Gastrointestinal: soft, nontender, nondistended  Musculoskeletal: No bilateral ankle edema  Psychiatric: Appropriate affect, cooperative  Neurologic: pleasantly confused, simple answers to questions, not following command  Skin: No rashes      Results Reviewed:  LAB RESULTS:      Lab 22  0542 22  1505   WBC 4.75 6.04 5.95 8.21 8.21   HEMOGLOBIN 9.7* 10.1* 9.5* 10.5* 11.9*   HEMATOCRIT 30.3* 30.3* 28.5* 31.9* 36.3   PLATELETS 274 282 275 311 350   NEUTROS ABS 3.20 4.61  --  6.43 6.59   IMMATURE GRANS (ABS) 0.04 0.03  --  0.08* 0.11*   LYMPHS ABS 0.97 0.79  --  1.07 0.93   MONOS ABS 0.46 0.57  --  0.55 0.49   EOS ABS 0.04 0.02  --  0.05 0.06   MCV 87.3 83.7 84.3 86.7 85.8         Lab 22  0542 2222  1353 22  1505   SODIUM 141  --  132* 130* 133*   POTASSIUM 4.2 4.4 3.0* 3.6 4.2   CHLORIDE 107  --  96* 95* 97*   CO2 24.0  --  27.0 22.0 28.0   ANION GAP 10.0  --  9.0 13.0 8.0   BUN 9  --  8 17 14   CREATININE  0.58  --  0.58 0.78 0.84   EGFR 89.9  --  89.9 75.5 69.0   GLUCOSE 85  --  109* 108* 81   CALCIUM 8.3*  --  8.0* 8.5* 8.9   MAGNESIUM  --   --   --  1.6  --    PHOSPHORUS 2.5  --  2.6  --   --          Lab 08/09/22  0542 08/08/22  0522 08/05/22  1505   TOTAL PROTEIN 5.7*  --  6.2   ALBUMIN 3.10* 3.10* 3.50   GLOBULIN 2.6  --  2.7   ALT (SGPT) 8  --  12   AST (SGOT) 15  --  19   BILIRUBIN 0.2  --  0.2   ALK PHOS 78  --  85                     Brief Urine Lab Results  (Last result in the past 365 days)      Color   Clarity   Blood   Leuk Est   Nitrite   Protein   CREAT   Urine HCG        08/06/22 0144 Yellow   Cloudy   Moderate (2+)   Small (1+)   Positive   Negative                 Microbiology Results Abnormal     Procedure Component Value - Date/Time    COVID PRE-OP / PRE-PROCEDURE SCREENING ORDER (NO ISOLATION) - Swab, Nasopharynx [[id number]]  (Normal) Collected: 08/05/22 1506    Lab Status: Final result Specimen: Swab from Nasopharynx Updated: 08/05/22 1552    Narrative:      The following orders were created for panel order COVID PRE-OP / PRE-PROCEDURE SCREENING ORDER (NO ISOLATION) - Swab, Nasopharynx.  Procedure                               Abnormality         Status                     ---------                               -----------         ------                     COVID-19 and FLU A/B PCR...[[id number]]  Normal              Final result                 Please view results for these tests on the individual orders.    COVID-19 and FLU A/B PCR - Swab, Nasopharynx [[id number]]  (Normal) Collected: 08/05/22 1506    Lab Status: Final result Specimen: Swab from Nasopharynx Updated: 08/05/22 1552     COVID19 Not Detected     Influenza A PCR Not Detected     Influenza B PCR Not Detected    Narrative:      Fact sheet for providers: https://www.fda.gov/media/208965/download    Fact sheet for patients: https://www.fda.gov/media/007011/download    Test performed by PCR.          No radiology results from the last 24  hrs    Results for orders placed during the hospital encounter of 08/09/21    Adult Transthoracic Echo Complete W/ Cont if Necessary Per Protocol    Interpretation Summary  · Estimated left ventricular EF = 60% Left ventricular systolic function is normal.  · Left ventricular diastolic function was normal.  · Trace mitral and tricuspid regurgitation.  · Mild tachycardia noted throughout the study.      I have reviewed the medications:  Scheduled Meds:amLODIPine, 10 mg, Oral, Q24H  aspirin, 81 mg, Oral, Daily  atorvastatin, 40 mg, Oral, Nightly  cefTRIAXone, 1 g, Intravenous, Q24H  celecoxib, 200 mg, Oral, Daily  clopidogrel, 75 mg, Oral, Daily  donepezil, 10 mg, Oral, Nightly  famotidine, 20 mg, Oral, Daily  hydrALAZINE, 25 mg, Oral, Q8H  levothyroxine, 125 mcg, Oral, Daily  metoprolol succinate XL, 25 mg, Oral, Q24H  PARoxetine, 40 mg, Oral, QAM  primidone, 100 mg, Oral, Q8H  sodium chloride, 10 mL, Intravenous, Q12H      Continuous Infusions:   PRN Meds:.•  acetaminophen **OR** acetaminophen **OR** acetaminophen  •  magnesium sulfate **OR** magnesium sulfate in D5W 1g/100mL (PREMIX) **OR** magnesium sulfate  •  potassium chloride **OR** potassium chloride **OR** potassium chloride  •  potassium phosphate infusion greater than 15 mMoles **OR** potassium phosphate infusion greater than 15 mMoles **OR** potassium phosphate **OR** sodium phosphate IVPB **OR** sodium phosphate IVPB **OR** sodium phosphate IVPB    Assessment & Plan   Assessment & Plan     Active Hospital Problems    Diagnosis  POA   • **UTI (urinary tract infection) [N39.0]  Yes   • Bilateral hip pain [M25.551, M25.552]  Yes   • Alzheimer disease (HCC) [G30.9, F02.80]  Unknown   • Fall [W19.XXXA]  Unknown   • CAD with angina pectoris (CMS/HCC) [I25.119]  Yes   • Post-surgical hypothyroidism [E89.0]  Yes   • Hyponatremia [E87.1]  Yes   • Benign essential tremor [G25.0]  Yes   • Hypertension [I10]  Yes   • GERD without esophagitis [K21.9]  Yes   •  Hyperlipidemia LDL goal <100 [E78.5]  Yes      Resolved Hospital Problems   No resolved problems to display.        Brief Hospital Course to date:  Zohra Hall is a 81 y.o. female with PMH of post surgical hypothyroid, CVA, breast cancer, Alzheimer's, HTN, HLD, CAD, and chronic back pain who presents to the ED after an unwitnessed fall at assisted living facility.      This patient's problems and plans were partially entered by my partner and updated as appropriate by me 08/8/22.     UTI  -Urine culture with e coli  -Continue Rocephin Day 5/5    Volume depletion  -- no urine output overnight. Poor intake in general.  -- given 1L bolus and started on MIVFs. Continue monitor  -- suspect needs assistance/ encouragement with meals due to dementia     Hyponatremia  -Suspect related to decreased oral intake plus ADH due to meds  -resolved with IVF    Hypokalemia  -Replace per protocol     Fall  -CT shows small right parietal scalp hematoma, no acute fracture   -Fall precautions  -Pain control  -PT/OT- rec SNF. Referrals placed per CM     HTN  -Continue metoprolol, increased amlodipine to 10 mg  -added hydralazine 25mg TID     CAD  HLD  -Continue home aspirin, statin and plavix     Tremor  -Continue home primidone     Chronic Back Pain  -Continue celebrex     GERD  -Continue home PPI     Hypothyroid  -postsurgical due to history of papillary thyroid adenocarcinoma  -Continue synthroid     Mood Disorder  -Continue home paxil     Alzheimer's Disease  -Continue aricept        Expected Discharge Location and Transportation: SNF  Expected Discharge Date: 8/9 or when bed available    DVT prophylaxis:  Mechanical DVT prophylaxis orders are present.     AM-PAC 6 Clicks Score (PT): 10 (08/08/22 1150)    CODE STATUS:   Code Status and Medical Interventions:   Ordered at: 08/05/22 7103     Medical Intervention Limits:    NO intubation (DNI)     Level Of Support Discussed With:    Health Care Surrogate     Code Status (Patient  has no pulse and is not breathing):    No CPR (Do Not Attempt to Resuscitate)     Medical Interventions (Patient has pulse or is breathing):    Limited Support       Renita Perales, APRN  08/09/22

## 2022-08-09 NOTE — PLAN OF CARE
Goal Outcome Evaluation:      VSS this shift. No complaints of pain. Pt voiding without issues throughout shift. Pt encouraged to eat; refused dinner this shift. Fluids encouraged.   Problem: Adult Inpatient Plan of Care  Goal: Absence of Hospital-Acquired Illness or Injury  Intervention: Prevent Skin Injury  Recent Flowsheet Documentation  Taken 8/9/2022 1800 by Jaya Reece RN  Skin Protection:   adhesive use limited   tubing/devices free from skin contact   transparent dressing maintained   incontinence pads utilized  Taken 8/9/2022 1400 by Jaya Reece RN  Body Position: weight shifting  Skin Protection:   adhesive use limited   tubing/devices free from skin contact   transparent dressing maintained  Taken 8/9/2022 1000 by Jaya Reece RN  Skin Protection:   adhesive use limited   tubing/devices free from skin contact   incontinence pads utilized   protective footwear used   skin sealant/moisture barrier applied  Taken 8/9/2022 0806 by Jaya Reece, JEANNINE  Skin Protection:   adhesive use limited   tubing/devices free from skin contact   incontinence pads utilized

## 2022-08-09 NOTE — SIGNIFICANT NOTE
Pt w/ no urine output this shift despite 500ml NS bolus earlier. Bladder scan reveals ~90-100ml urine. Pt w/ poor oral intake. Continuous IVF infusing (NS @ 100ml/hour). BP also elevated- SBP currently 179. Will given another 500ml NS bolus now and give 9am dose of Norvasc now. VS otherwise stable, exam unchanged. Will continue to monitor closely.

## 2022-08-10 PROCEDURE — 99232 SBSQ HOSP IP/OBS MODERATE 35: CPT | Performed by: PHYSICIAN ASSISTANT

## 2022-08-10 PROCEDURE — 97116 GAIT TRAINING THERAPY: CPT

## 2022-08-10 PROCEDURE — 97530 THERAPEUTIC ACTIVITIES: CPT

## 2022-08-10 PROCEDURE — 97110 THERAPEUTIC EXERCISES: CPT

## 2022-08-10 RX ORDER — TERAZOSIN 2 MG/1
2 CAPSULE ORAL NIGHTLY
Status: DISCONTINUED | OUTPATIENT
Start: 2022-08-10 | End: 2022-08-11

## 2022-08-10 RX ORDER — LISINOPRIL 10 MG/1
10 TABLET ORAL
Status: DISCONTINUED | OUTPATIENT
Start: 2022-08-11 | End: 2022-08-11

## 2022-08-10 RX ADMIN — PRIMIDONE 100 MG: 50 TABLET ORAL at 05:14

## 2022-08-10 RX ADMIN — HYDRALAZINE HYDROCHLORIDE 25 MG: 25 TABLET, FILM COATED ORAL at 05:14

## 2022-08-10 RX ADMIN — FAMOTIDINE 20 MG: 20 TABLET ORAL at 08:28

## 2022-08-10 RX ADMIN — TERAZOSIN HYDROCHLORIDE 2 MG: 2 CAPSULE ORAL at 21:31

## 2022-08-10 RX ADMIN — PAROXETINE HYDROCHLORIDE 40 MG: 20 TABLET, FILM COATED ORAL at 08:28

## 2022-08-10 RX ADMIN — ASPIRIN 81 MG: 81 TABLET, COATED ORAL at 08:28

## 2022-08-10 RX ADMIN — Medication 10 ML: at 21:31

## 2022-08-10 RX ADMIN — METOPROLOL SUCCINATE 25 MG: 25 TABLET, EXTENDED RELEASE ORAL at 08:28

## 2022-08-10 RX ADMIN — Medication 10 ML: at 08:35

## 2022-08-10 RX ADMIN — PRIMIDONE 100 MG: 50 TABLET ORAL at 13:01

## 2022-08-10 RX ADMIN — CLOPIDOGREL BISULFATE 75 MG: 75 TABLET ORAL at 08:28

## 2022-08-10 RX ADMIN — ATORVASTATIN CALCIUM 40 MG: 40 TABLET, FILM COATED ORAL at 21:31

## 2022-08-10 RX ADMIN — AMLODIPINE BESYLATE 10 MG: 10 TABLET ORAL at 08:28

## 2022-08-10 RX ADMIN — HYDRALAZINE HYDROCHLORIDE 25 MG: 25 TABLET, FILM COATED ORAL at 13:01

## 2022-08-10 RX ADMIN — LEVOTHYROXINE SODIUM 125 MCG: 125 TABLET ORAL at 08:28

## 2022-08-10 RX ADMIN — CELECOXIB 200 MG: 200 CAPSULE ORAL at 08:28

## 2022-08-10 RX ADMIN — PRIMIDONE 100 MG: 50 TABLET ORAL at 21:31

## 2022-08-10 RX ADMIN — DONEPEZIL HYDROCHLORIDE 10 MG: 10 TABLET, FILM COATED ORAL at 21:31

## 2022-08-10 NOTE — PLAN OF CARE
Goal Outcome Evaluation:  VSS this shift. No complaints of pain. Patient had BM this shift. Fluids encouraged. Shift uneventful. Patient up to chair this shift.     Problem: Skin Injury Risk Increased  Goal: Skin Health and Integrity  Outcome: Ongoing, Progressing  Intervention: Optimize Skin Protection  Recent Flowsheet Documentation  Taken 8/10/2022 0824 by Jaya Reece RN  Pressure Reduction Techniques: frequent weight shift encouraged  Pressure Reduction Devices: positioning supports utilized  Skin Protection:   adhesive use limited   tubing/devices free from skin contact   transparent dressing maintained   protective footwear used   incontinence pads utilized

## 2022-08-10 NOTE — PROGRESS NOTES
[facility] Medicine Services  PROGRESS NOTE    Patient Name: Zohra Hall  : 1940  MRN: [id number]    Date of Admission: 2022  Primary Care Physician: Aleah Regan MD    Subjective     CC: f/u UTI, falls     HPI:  Sitting up in bed. She asked me to remove her breakfast tray and water glass from her table. She complained that she is cold. Oriented to self only.     ROS:  Unable to obtain complete or reliable ROS due to dementia     Objective     Vital Signs:   Temp:  [96.9 °F (36.1 °C)-98.1 °F (36.7 °C)] 97.3 °F (36.3 °C)  Heart Rate:  [54-65] 59  Resp:  [12-18] 16  BP: (141-168)/() 168/111     Physical Exam:  Constitutional: No acute distress, awake, alert and conversant. Chronically ill appearing   HENT: NCAT, mucous membranes moist  Respiratory: Clear to auscultation bilaterally, respiratory effort normal on room air   Cardiovascular: RRR, no murmurs, rubs, or gallops  Gastrointestinal: Positive bowel sounds, soft, nontender, nondistended  Musculoskeletal: No bilateral ankle edema  Psychiatric: Flat affect, cooperative  Neurologic: Oriented x 1,. BUE tremor. Moves all extremities spontaneously without focal deficits. Speech clear and coherent.     Results Reviewed:  LAB RESULTS:      Lab 22  0542 22  0522 22  0331 22  0823 22  1505   WBC 4.75 6.04 5.95 8.21 8.21   HEMOGLOBIN 9.7* 10.1* 9.5* 10.5* 11.9*   HEMATOCRIT 30.3* 30.3* 28.5* 31.9* 36.3   PLATELETS 274 282 275 311 350   NEUTROS ABS 3.20 4.61  --  6.43 6.59   IMMATURE GRANS (ABS) 0.04 0.03  --  0.08* 0.11*   LYMPHS ABS 0.97 0.79  --  1.07 0.93   MONOS ABS 0.46 0.57  --  0.55 0.49   EOS ABS 0.04 0.02  --  0.05 0.06   MCV 87.3 83.7 84.3 86.7 85.8         Lab 22  0542 22  2008 22  0522 22  1353 22  1505   SODIUM 141  --  132* 130* 133*   POTASSIUM 4.2 4.4 3.0* 3.6 4.2   CHLORIDE 107  --  96* 95* 97*   CO2 24.0  --  27.0 22.0 28.0   ANION GAP 10.0   --  9.0 13.0 8.0   BUN 9  --  8 17 14   CREATININE 0.58  --  0.58 0.78 0.84   EGFR 89.9  --  89.9 75.5 69.0   GLUCOSE 85  --  109* 108* 81   CALCIUM 8.3*  --  8.0* 8.5* 8.9   MAGNESIUM  --   --   --  1.6  --    PHOSPHORUS 2.5  --  2.6  --   --          Lab 08/09/22  0542 08/08/22 0522 08/05/22  1505   TOTAL PROTEIN 5.7*  --  6.2   ALBUMIN 3.10* 3.10* 3.50   GLOBULIN 2.6  --  2.7   ALT (SGPT) 8  --  12   AST (SGOT) 15  --  19   BILIRUBIN 0.2  --  0.2   ALK PHOS 78  --  85     Brief Urine Lab Results  (Last result in the past 365 days)      Color   Clarity   Blood   Leuk Est   Nitrite   Protein   CREAT   Urine HCG        08/06/22 0144 Yellow   Cloudy   Moderate (2+)   Small (1+)   Positive   Negative               Microbiology Results Abnormal     Procedure Component Value - Date/Time    COVID PRE-OP / PRE-PROCEDURE SCREENING ORDER (NO ISOLATION) - Swab, Nasopharynx [[id number]]  (Normal) Collected: 08/05/22 1506    Lab Status: Final result Specimen: Swab from Nasopharynx Updated: 08/05/22 1552    Narrative:      The following orders were created for panel order COVID PRE-OP / PRE-PROCEDURE SCREENING ORDER (NO ISOLATION) - Swab, Nasopharynx.  Procedure                               Abnormality         Status                     ---------                               -----------         ------                     COVID-19 and FLU A/B PCR...[[id number]]  Normal              Final result                 Please view results for these tests on the individual orders.    COVID-19 and FLU A/B PCR - Swab, Nasopharynx [[id number]]  (Normal) Collected: 08/05/22 1506    Lab Status: Final result Specimen: Swab from Nasopharynx Updated: 08/05/22 1552     COVID19 Not Detected     Influenza A PCR Not Detected     Influenza B PCR Not Detected    Narrative:      Fact sheet for providers: https://www.fda.gov/media/003073/download    Fact sheet for patients: https://www.fda.gov/media/810362/download    Test performed by PCR.        No  radiology results from the last 24 hrs    Results for orders placed during the hospital encounter of 08/09/21    Adult Transthoracic Echo Complete W/ Cont if Necessary Per Protocol    Interpretation Summary  · Estimated left ventricular EF = 60% Left ventricular systolic function is normal.  · Left ventricular diastolic function was normal.  · Trace mitral and tricuspid regurgitation.  · Mild tachycardia noted throughout the study.      I have reviewed the medications:  Scheduled Meds:amLODIPine, 10 mg, Oral, Q24H  aspirin, 81 mg, Oral, Daily  atorvastatin, 40 mg, Oral, Nightly  celecoxib, 200 mg, Oral, Daily  clopidogrel, 75 mg, Oral, Daily  donepezil, 10 mg, Oral, Nightly  famotidine, 20 mg, Oral, Daily  hydrALAZINE, 25 mg, Oral, Q8H  levothyroxine, 125 mcg, Oral, Daily  metoprolol succinate XL, 25 mg, Oral, Q24H  PARoxetine, 40 mg, Oral, QAM  primidone, 100 mg, Oral, Q8H  sodium chloride, 10 mL, Intravenous, Q12H      Continuous Infusions:   PRN Meds:.•  acetaminophen **OR** acetaminophen **OR** acetaminophen  •  magnesium sulfate **OR** magnesium sulfate in D5W 1g/100mL (PREMIX) **OR** magnesium sulfate  •  potassium chloride **OR** potassium chloride **OR** potassium chloride  •  potassium phosphate infusion greater than 15 mMoles **OR** potassium phosphate infusion greater than 15 mMoles **OR** potassium phosphate **OR** sodium phosphate IVPB **OR** sodium phosphate IVPB **OR** sodium phosphate IVPB    Assessment & Plan   Assessment & Plan     Active Hospital Problems    Diagnosis  POA   • **UTI (urinary tract infection) [N39.0]  Yes   • Bilateral hip pain [M25.551, M25.552]  Yes   • Alzheimer disease (HCC) [G30.9, F02.80]  Unknown   • Fall [W19.XXXA]  Unknown   • CAD with angina pectoris (CMS/HCC) [I25.119]  Yes   • Post-surgical hypothyroidism [E89.0]  Yes   • Hyponatremia [E87.1]  Yes   • Benign essential tremor [G25.0]  Yes   • Hypertension [I10]  Yes   • GERD without esophagitis [K21.9]  Yes   •  Hyperlipidemia LDL goal <100 [E78.5]  Yes      Resolved Hospital Problems   No resolved problems to display.     Brief Hospital Course to date:  Zohra Hall is an 81 y.o. female with PMH significant for HTN, HLD, CAD (NSTEMI 5/2021 with small vessel disease not amenable to intervention/stenting), chronic diastolic CHF, aortic valve stenosis, post-surgical hypothyroidism, prior lacunar CVA, benign essential tremor, dementia, RLS and GERD. She resides in an assisted living facility. She presented to [facility] ED on 8/5/22 for evaluation after an unwitnessed fall. She ambulates with a walker. Per grandson (POA), patient has had frequent falls due to wandering around her facility without her walker and has had difficulty feeding herself due to tremor. He is concerned that she needs higher level of care than assisted living.     Acute E. Coli UTI, resolved  - s/p IV Rocephin x 5 days    Volume depletion  Hyponatremia   - s/p IV fluids  - Poor PO intake  - Needs assistance/encouragement with meals due to her dementia and tremor      Hypokalemia  - Replace per protocol     Frequent falls   -CT head showed a small right parietal scalp hematoma, no acute fracture   - PT/OT following - CM working on rehab referrals  - POA feels patient needs higher level of care given her frequent falls at her assisted living facility      HTN  - On Metoprolol XL 25mg daily since NSTEMI in May 2021  - Bradycardic most of the time - will decrease Metoprolol to 12.5mg daily  - Continue Amlodipine 10mg daily  - Will stop Hydralazine. Add Terazosin 2mg QHS and Lisinopril 10mg daily      CAD, history of NSTEMI 5/2021  Hyperlipidemia  - Per review of records, patient had NSTEMI in May 2021 - 5/23/21 [facility] revealed severe 1-vessel CAD involving the terminal portion of the RPDA. The vessel was too small for PCI and no intervention was performed. Cardiology recommended ASA and Plavix for at least 3 months  - It has been 15 months  since her NSTEMI. Will DC Plavix   - Continue ASA and statin      Essential tremor  -Continue primidone     Chronic back pain  - Continue Celebrex     GERD  - Continue PPI     Post-surgical hypothyroidism   - s/p thyroidectomy due to history of papillary thyroid adenocarcinoma  - Historically, there has been concern regarding medication compliance. Unclear if assisted living facility administers medications  - TSH 31, free T4 1.17  - Continue Synthroid      Mood Disorder  - Continue Paxil     Alzheimer's Disease  - Continue Aricept    Expected Discharge Location and Transportation: rehab  Expected Discharge Date: 8/11/22     DVT prophylaxis:Mechanical DVT prophylaxis orders are present.     AM-PAC 6 Clicks Score (PT): 11 (08/09/22 2000)    CODE STATUS:   Code Status and Medical Interventions:   Ordered at: 08/05/22 1953     Medical Intervention Limits:    NO intubation (DNI)     Level Of Support Discussed With:    Health Care Surrogate     Code Status (Patient has no pulse and is not breathing):    No CPR (Do Not Attempt to Resuscitate)     Medical Interventions (Patient has pulse or is breathing):    Limited Support     Lauren Stone PA-C  08/10/22

## 2022-08-10 NOTE — PLAN OF CARE
Goal Outcome Evaluation:  Plan of Care Reviewed With: patient        Progress: improving  Outcome Evaluation: STS x 2 w/ max A (BUE supp. 1st,R wx for 2nd), sidestepped 2 ft L w/ max A (BUE supp), + ther exer EOB &  UIC, but fearful of falling, leaking w/ brief, & diffic focusing on task d/t Alz/ easily distracted). BP elev 169/95 (had been 168/111).

## 2022-08-10 NOTE — CASE MANAGEMENT/SOCIAL WORK
Continued Stay Note   [facility]     Patient Name: Zohra Hall  MRN: [id number]  Today's Date: 8/10/2022    Admit Date: 8/5/2022     Discharge Plan     Row Name 08/10/22 1111       Plan    Plan SNF    Plan Comments Referral Fax to [facility] at [phone]. Gaby buckner. Left voicemail with Connie to follow up with referral to [facility]. [place] [facility] [place]. CM will continue to follow.    Final Discharge Disposition Code 03 - skilled nursing facility (SNF)               Discharge Codes    No documentation.               Expected Discharge Date and Time     Expected Discharge Date Expected Discharge Time    Aug 10, 2022             Roz Carter RN

## 2022-08-10 NOTE — PLAN OF CARE
Goal Outcome Evaluation:  Plan of Care Reviewed With: patient        VSS. RA. Alert only to self. Turned Q2. Adequate urine output. Slept well. Had a large BM.    Progress: no change     Problem: Adult Inpatient Plan of Care  Goal: Plan of Care Review  Outcome: Ongoing, Progressing  Flowsheets (Taken 8/10/2022 0057)  Progress: no change  Plan of Care Reviewed With: patient  Goal: Patient-Specific Goal (Individualized)  Outcome: Ongoing, Progressing  Goal: Absence of Hospital-Acquired Illness or Injury  Outcome: Ongoing, Progressing  Intervention: Identify and Manage Fall Risk  Recent Flowsheet Documentation  Taken 8/10/2022 0000 by Susi Lambert RN  Safety Promotion/Fall Prevention:  • activity supervised  • assistive device/personal items within reach  Taken 8/9/2022 2200 by Susi Lambert RN  Safety Promotion/Fall Prevention:  • activity supervised  • assistive device/personal items within reach  Taken 8/9/2022 2000 by Susi Lambert RN  Safety Promotion/Fall Prevention:  • activity supervised  • assistive device/personal items within reach  Intervention: Prevent Skin Injury  Recent Flowsheet Documentation  Taken 8/10/2022 0001 by Susi Lambert RN  Body Position:  • turned  • side-lying  • right  Taken 8/10/2022 0000 by Susi Lambert RN  Body Position: supine  Skin Protection:  • adhesive use limited  • skin-to-skin areas padded  • skin-to-device areas padded  • tubing/devices free from skin contact  Taken 8/9/2022 2200 by Susi Lambert RN  Body Position: side-lying  Skin Protection:  • adhesive use limited  • skin-to-device areas padded  • skin-to-skin areas padded  • tubing/devices free from skin contact  Taken 8/9/2022 2000 by Susi Lambert RN  Body Position: (scooted up in bed)  • turned  • supine, legs elevated  Skin Protection:  • adhesive use limited  • skin-to-device areas padded  • skin-to-skin areas padded  • transparent dressing maintained  • tubing/devices free from  skin contact  Intervention: Prevent and Manage VTE (Venous Thromboembolism) Risk  Recent Flowsheet Documentation  Taken 8/10/2022 0001 by Susi Lambert, RN  Activity Management:  • activity adjusted per tolerance  • activity encouraged  Taken 8/10/2022 0000 by Susi Lambert, RN  Activity Management:  • activity encouraged  • activity adjusted per tolerance  Taken 8/9/2022 2200 by Susi Lambert, RN  Activity Management:  • activity adjusted per tolerance  • activity encouraged  Taken 8/9/2022 2000 by Susi Lambert, RN  Activity Management:  • activity adjusted per tolerance  • activity encouraged  Intervention: Prevent Infection  Recent Flowsheet Documentation  Taken 8/9/2022 2000 by Susi Lambert, RN  Infection Prevention: rest/sleep promoted  Goal: Optimal Comfort and Wellbeing  Outcome: Ongoing, Progressing  Intervention: Provide Person-Centered Care  Recent Flowsheet Documentation  Taken 8/9/2022 2000 by Susi Lambert, RN  Trust Relationship/Rapport:  • care explained  • choices provided  • emotional support provided  • empathic listening provided  • reassurance provided  • questions answered  • questions encouraged  Goal: Readiness for Transition of Care  Outcome: Ongoing, Progressing

## 2022-08-10 NOTE — THERAPY TREATMENT NOTE
"Patient Name: Zohra Hall  : 1940    MRN: [id number]                              Today's Date: 8/10/2022       Admit Date: 2022    Visit Dx:     ICD-10-CM ICD-9-CM   1. Bilateral hip pain  M25.551 719.45    M25.552    2. Injury of head, initial encounter  S09.90XA 959.01   3. Acute UTI  N39.0 599.0   4. Fall, initial encounter  W19.XXXA E888.9     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HCC)   • Hypothyroid post remote resection papillary adenocarcinoma thyroid   • Hypertension   • GERD without esophagitis   • Hyperlipidemia LDL goal <100   • Atherosclerosis of aorta (HCC)   • Incontinence of bowel   • Acute right-sided low back pain without sciatica   • Benign essential tremor   • Pain of right hip joint   • Thyroid cancer (HCC)   • Lacunar stroke ([facility])   • Nontraumatic rupture of extensor tendons of right hand and wrist   • Hyponatremia   • Actinic keratosis   • Transient ischemic attack   • Ataxia   • History of PMR (CMS/[facility])   • Osteoporosis   • Post-surgical hypothyroidism   • NSTEMI (non-ST elevated myocardial infarction) ([facility])   • CAD with angina pectoris (CMS/[facility])   • NICM presumably due to Takotsubo post NSTEMI 2021 (CMS/[facility])   • Abnormal TSH   • Severe hypothyroidism   • Traumatic SAH, SDH, and intraparenchymal bleed (CMS/[facility])   • History of lacunar stroke 2019   • UTI (urinary tract infection)   • Alzheimer disease ([facility])   • Fall   • Bilateral hip pain     Past Medical History:   Diagnosis Date   • Breast cancer ([facility])     left- pt states \"in situ\", no radiation, Tamoxifen for 5 years   • Cellulitis    • Cervical lymphadenopathy    • Chest pain    • Convulsions (HCC)    • GERD (gastroesophageal reflux disease)    • Glossitis    • Grief reaction     · Last Impression: " 2015  counselled, given ativan for prn use.  Aleah Regan   • Hypertension    • Lower back pain    • Oral thrush    • Papillary adenocarcinoma (HCC)     Papillary adenocarcinoma of thyroid   • Papillary adenocarcinoma of thyroid (HCC)     · resection Ramirez- 1/13,  2 x 2 x 1.5 cm  T3 N1 MXpost op low calcium and diminished  voiceablation Ain- 3/7 metastatic nodes and invasion to strap muscles · Last Impression: 29 Oct 2014  s/p Eval by berry Méndez.  Aleah Regan (Internal   • Piriformis syndrome    • Tingling    • Xerostomia      Past Surgical History:   Procedure Laterality Date   • BREAST BIOPSY Right 10/10/2013    stereo bx   • BREAST BIOPSY Left 10/19/2015    stereo bx   • BREAST CYST EXCISION      right   • BREAST EXCISIONAL BIOPSY Right     affirm bx and loc 2016.   • BREAST LUMPECTOMY Left 1987   • CARDIAC CATHETERIZATION N/A 5/23/2021    Procedure: Left Heart Cath;  Surgeon: Alonso Ross IV, MD;  Location: [facility] CATH INVASIVE LOCATION;  Service: Cardiology;  Laterality: N/A;   • HYSTERECTOMY  1979   • OTHER SURGICAL HISTORY Right     right arm surgery-steel roger in place   • TOTAL HIP ARTHROPLASTY     • TOTAL THYROIDECTOMY      Thyroid Surgery Total Thyroidectomy      General Information     Row Name 08/10/22 1527          Physical Therapy Time and Intention    Document Type therapy note (daily note)  -DM     Mode of Treatment physical therapy  -DM     Row Name 08/10/22 1527          General Information    Existing Precautions/Restrictions fall;other (see comments)  Alz Dem; incont.(brief when OOB);Ess.tremor;h/o lac.CVA 1/'19 & SAH/SDH 8/'21  -DM           User Key  (r) = Recorded By, (t) = Taken By, (c) = Cosigned By    Initials Name Provider Type    DM Tran Hernandez, PT Physical Therapist               Mobility     Row Name 08/10/22 1527          Bed Mobility    Bed Mobility rolling left;rolling right;scooting/bridging  -DM     Rolling Left [place] (Bed Mobility) verbal  "cues;maximum assist (25% patient effort)  -DM     Rolling Right [place] (Bed Mobility) verbal cues;maximum assist (25% patient effort)  -DM     Scooting/Bridging [place] (Bed Mobility) verbal cues;maximum assist (25% patient effort)  scooted w/ draw sheet incrementally d/t R knee pain  -DM     Supine-Sit [place] (Bed Mobility) verbal cues;maximum assist (25% patient effort)  -DM     Assistive Device (Bed Mobility) bed rails;draw sheet;head of bed elevated  -DM     Comment, (Bed Mobility) conf. (Baseline Dem.); states maiden name, \"chiles\" for loc. (remote hosp.[facility]), Oct. for mo.,then Sept., & \"36\" for yr.;issued new tele.patches (falling off), td.socks,brief for mobil., loaner  Rwx  -DM     Row Name 08/10/22 1527          Transfers    Comment, (Transfers) STS x 2 (EOB w/o AD, to allow close guarding), then from chair w/ R wx & feet blocked by PT to prevent sliding;fearful of falling  -DM     Row Name 08/10/22 1527          Sit-Stand Transfer    Sit-Stand [place] (Transfers) verbal cues;maximum assist (25% patient effort)  -DM     Assistive Device (Sit-Stand Transfers) walker, front-wheeled  -DM     Comment, (Sit-Stand Transfer) Pt decl. 3rd trial d/t R knee pain  -DM     Row Name 08/10/22 1527          Gait/Stairs (Locomotion)    [place] Level (Gait) verbal cues;maximum assist (25% patient effort)  sidestepped 2 ft L to chair (R wx def.for close guarding; BUE supp); 4 min.sit rest;2nd trial: attempted to offload L foot for sidesteps L,but fearful of falling; knees buckled & pt sat back x 2  -DM     Assistive Device (Gait) walker, front-wheeled  -DM     Distance in Feet (Gait) 2  -DM     Deviations/Abnormal Patterns (Gait) bilateral deviations;base of support, narrow;radha decreased;stride length decreased;weight shifting decreased;antalgic;right sided deviations  decr step length  -DM     Bilateral Gait Deviations forward flexed posture;heel strike decreased;knee buckling " bilaterally  -DM     Comment, (Gait/Stairs) Brief insuff.(leaked onto LE's,socks,floor); cleaned;socks repl.  -DM           User Key  (r) = Recorded By, (t) = Taken By, (c) = Cosigned By    Initials Name Provider Type    Tran Lux, PT Physical Therapist               Obj/Interventions     Row Name 08/10/22 1527          Motor Skills    Therapeutic Exercise shoulder;hip;knee;ankle  -DM     Row Name 08/10/22 1527          Shoulder (Therapeutic Exercise)    Shoulder (Therapeutic Exercise) AROM (active range of motion)  -DM     Shoulder AROM (Therapeutic Exercise) bilateral;extension;aDduction;10 repetitions;sitting  grav asst ext & add.  -DM     Shoulder AAROM (Therapeutic Exercise) bilateral;flexion;aBduction;horizontal aBduction/aDduction;sitting;10 repetitions;other (see comments)  biceps curls  -DM     Row Name 08/10/22 1527          Hip (Therapeutic Exercise)    Hip (Therapeutic Exercise) AROM (active range of motion);AAROM (active assistive range of motion);isometric exercises  -DM     Hip AROM (Therapeutic Exercise) bilateral;extension;aDduction;sitting;10 repetitions;supine  grav asst ext w/ sitt.marches,  & Add. in sup.  -DM     Hip AAROM (Therapeutic Exercise) bilateral;aBduction;external rotation;internal rotation;sitting;10 repetitions;flexion  max A R hip flex & abd, & mod for L  -DM     Hip Isometrics (Therapeutic Exercise) bilateral;gluteal sets;sitting;5 repetitions  -DM     Row Name 08/10/22 1527          Knee (Therapeutic Exercise)    Knee (Therapeutic Exercise) AROM (active range of motion);AAROM (active assistive range of motion);isometric exercises  -DM     Knee AROM (Therapeutic Exercise) bilateral;flexion;extension;SAQ (short arc quad);LAQ (long arc quad);heel slides;sitting;10 repetitions  -DM     Knee AAROM (Therapeutic Exercise) bilateral;flexion;extension;sitting;10 repetitions  mod A flex w/ h.slides & min for ext w/SAQ  -DM     Knee Isometrics (Therapeutic Exercise) bilateral;quad  sets;sitting;10 repetitions  -DM     Row Name 08/10/22 1527          Ankle (Therapeutic Exercise)    Ankle (Therapeutic Exercise) AROM (active range of motion);AAROM (active assistive range of motion)  -DM     Ankle AROM (Therapeutic Exercise) bilateral;dorsiflexion;plantarflexion;sitting;10 repetitions  -DM     Ankle AAROM (Therapeutic Exercise) bilateral;sitting;10 repetitions;other (see comments)  AC  -DM     Row Name 08/10/22 1527          Balance    Balance Assessment sitting static balance;sitting dynamic balance;standing static balance;standing dynamic balance  -DM     Static Sitting Balance verbal cues;minimal assist  -DM     Dynamic Sitting Balance verbal cues;minimal assist  recip scooting  -DM     Position, Sitting Balance unsupported;sitting in chair;sitting edge of bed  -DM     Static Standing Balance verbal cues;maximum assist  -DM     Dynamic Standing Balance verbal cues;maximum assist  -DM     Position/Device Used, Standing Balance supported;walker, front-wheeled  -DM     Balance Interventions sitting;standing;static;dynamic;weight shifting activity  -DM           User Key  (r) = Recorded By, (t) = Taken By, (c) = Cosigned By    Initials Name Provider Type    DM Tran Hernandez, PT Physical Therapist               Goals/Plan    No documentation.                Clinical Impression     Row Name 08/10/22 1527          Pain    Pain Intervention(s) Repositioned;Elevated;Rest  -DM     Additional Documentation Pain Scale: FACES Pre/Post-Treatment (Group)  -DM     Row Name 08/10/22 1527          Pain Scale: FACES Pre/Post-Treatment    Pain: FACES Scale, Pretreatment 4-->hurts little more  -DM     Posttreatment Pain Rating 6-->hurts even more  -DM     Pain Location - Side/Orientation Right  -DM     Pain Location - knee  -DM     Row Name 08/10/22 1527          Plan of Care Review    Plan of Care Reviewed With patient  -DM     Progress improving  -DM     Outcome Evaluation STS x 2 w/ max A (BUE supp. 1st,R wx  for 2nd), sidestepped 2 ft L w/ max A (BUE supp), + ther exer EOB &  UIC, but fearful of falling, leaking w/ brief, & diffic focusing on task d/t Alz/ easily distracted). BP elev 169/95 (had been 168/111).  -DM     Row Name 08/10/22 1527          Vital Signs    Pre Systolic BP Rehab 157  -DM     Pre Treatment Diastolic BP 80  -DM     Post Systolic BP Rehab 169  -DM     Post Treatment Diastolic BP 95  -DM     Pretreatment Heart Rate (beats/min) 59  -DM     Intratreatment Heart Rate (beats/min) 68  -DM     Posttreatment Heart Rate (beats/min) 67  -DM     Pre SpO2 (%) 96  -DM     O2 Delivery Pre Treatment room air  -DM     O2 Delivery Intra Treatment room air  -DM     O2 Delivery Post Treatment room air  -DM     Pre Patient Position Supine  -DM     Intra Patient Position Standing  -DM     Post Patient Position Sitting  -DM     Rest Breaks  1  -DM     Row Name 08/10/22 1527          Positioning and Restraints    Pre-Treatment Position in bed  -DM     Post Treatment Position chair  -DM     In Chair notified nsg;reclined;call light within reach;encouraged to call for assist;exit alarm on;RUE elevated;LUE elevated;waffle cushion;legs elevated  -DM           User Key  (r) = Recorded By, (t) = Taken By, (c) = Cosigned By    Initials Name Provider Type    DM Tran Hernandez, PT Physical Therapist               Outcome Measures     Row Name 08/10/22 1527          How much help from another person do you currently need...    Turning from your back to your side while in flat bed without using bedrails? 2  -DM     Moving from lying on back to sitting on the side of a flat bed without bedrails? 2  -DM     Moving to and from a bed to a chair (including a wheelchair)? 2  -DM     Standing up from a chair using your arms (e.g., wheelchair, bedside chair)? 2  -DM     Climbing 3-5 steps with a railing? 1  -DM     To walk in hospital room? 1  -DM     AM-PAC 6 Clicks Score (PT) 10  -DM     Highest level of mobility 4 --> Transferred to  chair/commode  -     Row Name 08/10/22 1527          Functional Assessment    Outcome Measure Options AM-PAC 6 Clicks Basic Mobility (PT)  -DM           User Key  (r) = Recorded By, (t) = Taken By, (c) = Cosigned By    Initials Name Provider Type    DM Tran Hernandez, PT Physical Therapist                             Physical Therapy Education                 Title: PT OT SLP Therapies (In Progress)     Topic: Physical Therapy (In Progress)     Point: Mobility training (In Progress)     Learning Progress Summary           Patient Acceptance, E,D, NR by DM at 8/10/2022 1631    Acceptance, E, NR by LM at 8/8/2022 1151                   Point: Home exercise program (In Progress)     Learning Progress Summary           Patient Acceptance, E,D, NR by DM at 8/10/2022 1631                   Point: Precautions (In Progress)     Learning Progress Summary           Patient Acceptance, E,D, NR by DM at 8/10/2022 1631    Acceptance, E, NR by LM at 8/8/2022 1151                               User Key     Initials Effective Dates Name Provider Type Discipline     06/16/21 -  Tran Hernandez, PT Physical Therapist PT     06/16/21 -  Mel Henriquez PT Physical Therapist PT              PT Recommendation and Plan     Plan of Care Reviewed With: patient  Progress: improving  Outcome Evaluation: STS x 2 w/ max A (BUE supp. 1st,R wx for 2nd), sidestepped 2 ft L w/ max A (BUE supp), + ther exer EOB &  UIC, but fearful of falling, leaking w/ brief, & diffic focusing on task d/t Alz/ easily distracted). BP elev 169/95 (had been 168/111).     Time Calculation:    PT Charges     Row Name 08/10/22 1631             Time Calculation    Start Time 1527  -DM      PT Received On 08/10/22  -      PT Goal Re-Cert Due Date 08/18/22  -              Time Calculation- PT    Total Timed Code Minutes- PT 44 minute(s)  -DM              Timed Charges    50924 - PT Therapeutic Exercise Minutes 18  -DM      79485 - Gait Training Minutes  9  -DM       00656 - PT Therapeutic Activity Minutes 17  -DM              Total Minutes    Timed Charges Total Minutes 44  -DM       Total Minutes 44  -DM            User Key  (r) = Recorded By, (t) = Taken By, (c) = Cosigned By    Initials Name Provider Type    Tran Lux, PT Physical Therapist              Therapy Charges for Today     Code Description Service Date Service Provider Modifiers Qty    62558572727 HC PT THER PROC EA 15 MIN 8/10/2022 Tran Hernandez, PT GP 1    59634674842 HC GAIT TRAINING EA 15 MIN 8/10/2022 Tran Hernandez, PT GP 1    [id number]  PT THERAPEUTIC ACT EA 15 MIN 8/10/2022 Tran Hernandez, PT GP 1          PT G-Codes  Outcome Measure Options: AM-PAC 6 Clicks Basic Mobility (PT)  AM-PAC 6 Clicks Score (PT): 10  AM-PAC 6 Clicks Score (OT): 12    Tran Hernandez, PT  8/10/2022

## 2022-08-11 ENCOUNTER — READMISSION MANAGEMENT (OUTPATIENT)
Dept: CALL CENTER | Facility: HOSPITAL | Age: 82
End: 2022-08-11

## 2022-08-11 VITALS
HEIGHT: 61 IN | OXYGEN SATURATION: 99 % | DIASTOLIC BLOOD PRESSURE: 79 MMHG | TEMPERATURE: 97.2 F | SYSTOLIC BLOOD PRESSURE: 145 MMHG | BODY MASS INDEX: 18.9 KG/M2 | HEART RATE: 62 BPM | WEIGHT: 100.1 LBS | RESPIRATION RATE: 18 BRPM

## 2022-08-11 PROBLEM — E87.1 HYPONATREMIA: Status: RESOLVED | Noted: 2019-08-23 | Resolved: 2022-08-11

## 2022-08-11 PROBLEM — B96.20 E. COLI UTI (URINARY TRACT INFECTION): Status: ACTIVE | Noted: 2022-08-05

## 2022-08-11 PROBLEM — R29.6 FREQUENT FALLS: Status: ACTIVE | Noted: 2022-08-05

## 2022-08-11 PROBLEM — N39.0 E. COLI UTI (URINARY TRACT INFECTION): Status: RESOLVED | Noted: 2022-08-05 | Resolved: 2022-08-11

## 2022-08-11 PROBLEM — B96.20 E. COLI UTI (URINARY TRACT INFECTION): Status: RESOLVED | Noted: 2022-08-05 | Resolved: 2022-08-11

## 2022-08-11 PROBLEM — E86.9 VOLUME DEPLETION: Status: RESOLVED | Noted: 2022-08-11 | Resolved: 2022-08-11

## 2022-08-11 PROBLEM — I25.10 CORONARY ARTERY DISEASE INVOLVING NATIVE CORONARY ARTERY OF NATIVE HEART: Status: ACTIVE | Noted: 2021-05-23

## 2022-08-11 PROBLEM — E86.9 VOLUME DEPLETION: Status: ACTIVE | Noted: 2022-08-11

## 2022-08-11 LAB — SARS-COV-2 RDRP RESP QL NAA+PROBE: NORMAL

## 2022-08-11 PROCEDURE — 97535 SELF CARE MNGMENT TRAINING: CPT

## 2022-08-11 PROCEDURE — 87635 SARS-COV-2 COVID-19 AMP PRB: CPT | Performed by: FAMILY MEDICINE

## 2022-08-11 PROCEDURE — 99239 HOSP IP/OBS DSCHRG MGMT >30: CPT | Performed by: PHYSICIAN ASSISTANT

## 2022-08-11 RX ORDER — LISINOPRIL 5 MG/1
5 TABLET ORAL
Status: DISCONTINUED | OUTPATIENT
Start: 2022-08-11 | End: 2022-08-11 | Stop reason: HOSPADM

## 2022-08-11 RX ORDER — PRIMIDONE 50 MG/1
TABLET ORAL
Qty: 270 TABLET | Refills: 1
Start: 2022-08-11 | End: 2023-03-04 | Stop reason: HOSPADM

## 2022-08-11 RX ORDER — METOPROLOL SUCCINATE 25 MG/1
12.5 TABLET, EXTENDED RELEASE ORAL
Qty: 30 TABLET | Refills: 0
Start: 2022-08-11

## 2022-08-11 RX ORDER — LISINOPRIL 10 MG/1
10 TABLET ORAL DAILY
Start: 2022-08-11 | End: 2023-03-04 | Stop reason: HOSPADM

## 2022-08-11 RX ORDER — TERAZOSIN 1 MG/1
1 CAPSULE ORAL NIGHTLY
Status: DISCONTINUED | OUTPATIENT
Start: 2022-08-11 | End: 2022-08-11 | Stop reason: HOSPADM

## 2022-08-11 RX ORDER — TERAZOSIN 1 MG/1
1 CAPSULE ORAL NIGHTLY
Start: 2022-08-11 | End: 2023-03-04 | Stop reason: HOSPADM

## 2022-08-11 RX ADMIN — AMLODIPINE BESYLATE 10 MG: 10 TABLET ORAL at 08:59

## 2022-08-11 RX ADMIN — PRIMIDONE 100 MG: 50 TABLET ORAL at 05:04

## 2022-08-11 RX ADMIN — Medication 10 ML: at 09:01

## 2022-08-11 RX ADMIN — LISINOPRIL 5 MG: 10 TABLET ORAL at 09:00

## 2022-08-11 RX ADMIN — PRIMIDONE 100 MG: 50 TABLET ORAL at 19:57

## 2022-08-11 RX ADMIN — FAMOTIDINE 20 MG: 20 TABLET ORAL at 09:00

## 2022-08-11 RX ADMIN — PAROXETINE HYDROCHLORIDE 40 MG: 20 TABLET, FILM COATED ORAL at 09:00

## 2022-08-11 RX ADMIN — LEVOTHYROXINE SODIUM 125 MCG: 125 TABLET ORAL at 09:00

## 2022-08-11 RX ADMIN — CLOPIDOGREL BISULFATE 75 MG: 75 TABLET ORAL at 08:59

## 2022-08-11 RX ADMIN — METOPROLOL SUCCINATE 25 MG: 25 TABLET, EXTENDED RELEASE ORAL at 09:00

## 2022-08-11 RX ADMIN — ATORVASTATIN CALCIUM 40 MG: 40 TABLET, FILM COATED ORAL at 19:57

## 2022-08-11 RX ADMIN — DONEPEZIL HYDROCHLORIDE 10 MG: 10 TABLET, FILM COATED ORAL at 19:57

## 2022-08-11 RX ADMIN — CELECOXIB 200 MG: 200 CAPSULE ORAL at 08:59

## 2022-08-11 RX ADMIN — ASPIRIN 81 MG: 81 TABLET, COATED ORAL at 08:59

## 2022-08-11 RX ADMIN — PRIMIDONE 100 MG: 50 TABLET ORAL at 13:27

## 2022-08-11 RX ADMIN — TERAZOSIN HYDROCHLORIDE 1 MG: 1 CAPSULE ORAL at 19:58

## 2022-08-11 NOTE — PLAN OF CARE
Goal Outcome Evaluation:  Plan of Care Reviewed With: patient        Progress: no change  Outcome Evaluation: Pt tolerated prolonged sitting at EOB to perform face/hand washing and oral care with SUP, cues for motor planning. Pt transferred to chair with MaxAx1/RW, v/t cues for prepositioning feet and hands for STS, cues to keep eyes open during transfer. Pt verbalizes fear of falling and benefits from reassurance of her safety.

## 2022-08-11 NOTE — DISCHARGE PLACEMENT REQUEST
"Jamie Hall (81 y.o. Female)     Case Management  [phone]              Date of Birth   1940    Social Security Number       Address   [address] [place] [address] [place] [address]    Home Phone   [phone]    MRN   [id number]       Confucianist   Hindu    Marital Status                               Admission Date   8/5/22    Admission Type   Emergency    Admitting Provider   Jessica Flores DO    Attending Provider   Jessica Flores DO    Department, Room/Bed   [facility] 5G, S565/1       Discharge Date       Discharge Disposition   Home or Self Care    Discharge Destination                               Attending Provider: Jessica Flores DO    Allergies: Dilaudid [Hydromorphone Hcl], Beta Adrenergic Blockers, Dilaudid [Hydromorphone], Lactose Intolerance (Gi)    Isolation: None   Infection: COVID Screen (preop/placement) (08/11/22)   Code Status: No CPR   Advance Care Planning Activity    Ht: 154.9 cm (61\")   Wt: 45.4 kg (100 lb 1.6 oz)    Admission Cmt: None   Principal Problem: E. coli UTI (urinary tract infection) [N39.0,B96.20]                 Active Insurance as of 8/5/2022     Primary Coverage     Payor Plan Insurance Group Employer/Plan Group    MEDICARE MEDICARE A & B      Payor Plan Address Payor Plan Phone Number Payor Plan Fax Number Effective Dates    [address] [phone]  11/1/2003 - None Entered    [facility] [id number]       Subscriber Name Subscriber Birth Date Member ID       JAMIE HALL ENRIKE 1940 [id number]           Secondary Coverage     Payor Plan Insurance Group Employer/Plan Group    [employer] SUP AAR HEALTH CARE OPTIONS      Payor Plan Address Payor Plan Phone Number Payor Plan Fax Number Effective Dates    [facility] [phone]  1/1/2016 - None Entered    [address]       [facility] [address]       Subscriber Name Subscriber Birth Date Member ID       JAMIE HALL ENRIKE 1940 " [id number]                 Emergency Contacts      (Rel.) Home Phone Work Phone Mobile Phone    Joey Diaz (POA) (Grandchild) [phone] -- [phone]    Simón Diaz (Son) [phone] -- [phone]                 Discharge Summary      Lauren Stone PA-C at 22 1437                [facility] Medicine Services  DISCHARGE SUMMARY    Patient Name: Zohra Hall  : 1940  MRN: [id number]    Date of Admission: 2022 12:27 PM  Date of Discharge: 2022  Primary Care Physician: Aleah Regan MD    Hospital Course     Presenting Problem:   UTI (urinary tract infection) [N39.0]  Bilateral hip pain [M25.551, M25.552]    Active Hospital Problems    Diagnosis  POA   • Bilateral hip pain [M25.551, M25.552]  Yes   • Alzheimer disease (HCC) [G30.9, F02.80]  Yes   • Frequent falls [R29.6]  Not Applicable   • Coronary artery disease [I25.10]  Yes   • Post-surgical hypothyroidism [E89.0]  Yes   • Benign essential tremor [G25.0]  Yes   • Hypertension [I10]  Yes   • GERD without esophagitis [K21.9]  Yes   • Hyperlipidemia LDL goal <100 [E78.5]  Yes      Resolved Hospital Problems    Diagnosis Date Resolved POA   • **E. coli UTI (urinary tract infection) [N39.0, B96.20] 2022 Yes   • Volume depletion [E86.9] 2022 Yes   • Hyponatremia [E87.1] 2022 Yes      Hospital Course:  Zohra Hall is an 81 y.o. female with PMH significant for HTN, HLD, CAD (NSTEMI 2021 with small vessel disease not amenable to intervention/stenting), chronic diastolic CHF, aortic valve stenosis, post-surgical hypothyroidism, prior lacunar CVA, benign essential tremor, dementia, RLS and GERD. She resides in an assisted living facility. She presented to [facility] ED on 22 for evaluation after an unwitnessed fall. She ambulates with a walker. Per grandson (POA), patient has had frequent falls due to wandering around her facility without her  walker and has had difficulty feeding herself due to tremor. He is concerned that she needs higher level of care than assisted living.      Acute E. Coli UTI, resolved  - s/p IV Rocephin x 5 days     Volume depletion  Hyponatremia   - s/p IV fluids  - Poor PO intake  - Needs assistance/encouragement with meals due to her dementia and tremor      Hypokalemia  - Replaced per protocol     Frequent falls   -CT head showed a small right parietal scalp hematoma, no acute fracture   - PT/OT following - CM working on rehab referrals  - POA feels patient needs higher level of care given her frequent falls at her assisted living facility      HTN  - On Metoprolol XL 25mg daily since NSTEMI in May 2021  - Bradycardic most of the time - will decrease Metoprolol to 12.5mg daily  - Continue Amlodipine 10mg daily, Lisinopril 10mg daily and Terazosin 1mg QHS    CAD, history of NSTEMI 5/2021  Hyperlipidemia  - Per review of records, patient had NSTEMI in May 2021 - 5/23/21 LHC revealed severe 1-vessel CAD involving the terminal portion of the RPDA. The vessel was too small for PCI and no intervention was performed. Cardiology recommended ASA and Plavix for at least 3 months  - It has been 15 months since her NSTEMI. Will DC Plavix   - Continue ASA and statin      Essential tremor  -Continue primidone     Chronic back pain  - Continue Celebrex     GERD  - Continue PPI     Post-surgical hypothyroidism   - s/p thyroidectomy due to history of papillary thyroid adenocarcinoma  - Historically, there has been concern regarding medication compliance. Unclear if assisted living facility administers medications  - TSH 31, free T4 1.17  - Continue Synthroid      Mood Disorder  - Continue Paxil     Alzheimer's Disease  - Continue Aricept    Day of Discharge     HPI:   Sitting in bed, breakfast tray in front of her. Says she likes the eggs. I helped her with a few bites before she said she was full. No concerns from patient or her RN.     Review  of Systems  Unable to obtain complete or reliable ROS due to dementia    Vital Signs:   Temp:  [97 °F (36.1 °C)-97.4 °F (36.3 °C)] 97.2 °F (36.2 °C)  Heart Rate:  [54-69] 54  Resp:  [15-16] 15  BP: ()/() 152/85    Physical Exam:  Constitutional: No acute distress, awake, alert and conversant. Chronically ill appearing   HENT: NCAT, mucous membranes moist  Respiratory: Clear to auscultation bilaterally, respiratory effort normal on room air   Cardiovascular: RRR, no murmurs, rubs, or gallops  Gastrointestinal: Positive bowel sounds, soft, nontender, nondistended  Musculoskeletal: No bilateral ankle edema  Psychiatric: Flat affect, cooperative  Neurologic: Oriented x 1,. BUE tremor. Moves all extremities spontaneously without focal deficits. Speech clear and coherent.     Pertinent  and/or Most Recent Results     LAB RESULTS:      Lab 08/09/22 0542 08/08/22 0522 08/07/22  0331 08/06/22  0823 08/05/22  1505   WBC 4.75 6.04 5.95 8.21 8.21   HEMOGLOBIN 9.7* 10.1* 9.5* 10.5* 11.9*   HEMATOCRIT 30.3* 30.3* 28.5* 31.9* 36.3   PLATELETS 274 282 275 311 350   NEUTROS ABS 3.20 4.61  --  6.43 6.59   IMMATURE GRANS (ABS) 0.04 0.03  --  0.08* 0.11*   LYMPHS ABS 0.97 0.79  --  1.07 0.93   MONOS ABS 0.46 0.57  --  0.55 0.49   EOS ABS 0.04 0.02  --  0.05 0.06   MCV 87.3 83.7 84.3 86.7 85.8         Lab 08/09/22  0542 08/08/22 2008 08/08/22  0522 08/06/22  1353 08/05/22  1505   SODIUM 141  --  132* 130* 133*   POTASSIUM 4.2 4.4 3.0* 3.6 4.2   CHLORIDE 107  --  96* 95* 97*   CO2 24.0  --  27.0 22.0 28.0   ANION GAP 10.0  --  9.0 13.0 8.0   BUN 9  --  8 17 14   CREATININE 0.58  --  0.58 0.78 0.84   EGFR 89.9  --  89.9 75.5 69.0   GLUCOSE 85  --  109* 108* 81   CALCIUM 8.3*  --  8.0* 8.5* 8.9   MAGNESIUM  --   --   --  1.6  --    PHOSPHORUS 2.5  --  2.6  --   --          Lab 08/09/22  0542 08/08/22  0522 08/05/22  1505   TOTAL PROTEIN 5.7*  --  6.2   ALBUMIN 3.10* 3.10* 3.50   GLOBULIN 2.6  --  2.7   ALT (SGPT) 8  --  12    AST (SGOT) 15  --  19   BILIRUBIN 0.2  --  0.2   ALK PHOS 78  --  85     Brief Urine Lab Results  (Last result in the past 365 days)      Color   Clarity   Blood   Leuk Est   Nitrite   Protein   CREAT   Urine HCG        08/06/22 0144 Yellow   Cloudy   Moderate (2+)   Small (1+)   Positive   Negative               Microbiology Results (last 10 days)     Procedure Component Value - Date/Time    Urine Culture - Urine, Urine, Catheter [[id number]]  (Abnormal)  (Susceptibility) Collected: 08/06/22 1013    Lab Status: Final result Specimen: Urine, Catheter Updated: 08/08/22 1134     Urine Culture >100,000 CFU/mL Escherichia coli    Narrative:      Colonization of the urinary tract without infection is common. Treatment is discouraged unless the patient is symptomatic, pregnant, or undergoing an invasive urologic procedure.    Susceptibility      Escherichia coli      ROSALINDA      Ampicillin Susceptible      Ampicillin + Sulbactam Susceptible      Cefazolin Susceptible      Cefepime Susceptible      Ceftazidime Susceptible      Ceftriaxone Susceptible      Gentamicin Susceptible      Levofloxacin Resistant     Nitrofurantoin Susceptible      Piperacillin + Tazobactam Susceptible      Trimethoprim + Sulfamethoxazole Resistant                          COVID PRE-OP / PRE-PROCEDURE SCREENING ORDER (NO ISOLATION) - Swab, Nasopharynx [[id number]]  (Normal) Collected: 08/05/22 1506    Lab Status: Final result Specimen: Swab from Nasopharynx Updated: 08/05/22 1552    Narrative:      The following orders were created for panel order COVID PRE-OP / PRE-PROCEDURE SCREENING ORDER (NO ISOLATION) - Swab, Nasopharynx.  Procedure                               Abnormality         Status                     ---------                               -----------         ------                     COVID-19 and FLU A/B PCR...[[id number]]  Normal              Final result                 Please view results for these tests on the individual orders.     COVID-19 and FLU A/B PCR - Swab, Nasopharynx [[id number]]  (Normal) Collected: 08/05/22 1506    Lab Status: Final result Specimen: Swab from Nasopharynx Updated: 08/05/22 1552     COVID19 Not Detected     Influenza A PCR Not Detected     Influenza B PCR Not Detected    Narrative:      Fact sheet for providers: https://www.fda.gov/media/440449/download    Fact sheet for patients: https://www.fda.gov/media/583542/download    Test performed by PCR.        CT Head Without Contrast    Result Date: 8/5/2022  DATE OF EXAM: 8/5/2022 1:37 PM  PROCEDURE: CT HEAD WO CONTRAST-, CT CERVICAL SPINE WO CONTRAST-  INDICATIONS: FALL  COMPARISON: Head CT 5/31/2022  TECHNIQUE: Routine transaxial images of the head and cervical spine were obtained without the administration of contrast. Automated exposure control and iterative reconstruction methods were used.  The radiation dose reduction device was turned on for each scan per the ALARA (As Low as Reasonably Achievable) protocol.  FINDINGS: Head: No acute intracranial hemorrhage. No acute large territory infarct. There are scattered subcortical and periventricular white matter hypodensities which are nonspecific and can be seen in the setting of chronic small vessel ischemic change.  There is mild global cerebral volume loss.  No extra-axial collections.  No midline shift or herniation.  Normal size and configuration of the ventricles commensurate with degree of cerebral volume loss.  Unremarkable appearance of the orbits.  The mastoid air cells are clear.  The paranasal sinuses are grossly clear.  No acute osseous findings. There is a small right parietal scalp contusion.  Cervical spine: The bones are demineralized limiting assessment for occult nondisplaced fractures. There is mild degenerative straightening of the cervical spine with trace degenerative anterolisthesis of C7 on T1, without definite traumatic subluxation or significant spondylolisthesis. The facets appear normally  aligned allowing for multilevel facet arthropathy. The atlantodental interval is normal. No acute fracture. The vertebral body heights are maintained. There is presumed degenerative fusion of the C3 and C4 vertebral bodies and left facets. There is presumed degenerative fusion of the C5-C6 vertebral bodies and bilateral facets. 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 There is severe multilevel degenerative disc disease throughout the cervical spine with posterior disc osteophyte complexes at C3-C4, C4-C5, and C6-C7, with at least moderate spinal canal stenosis at these levels. There is multilevel facet arthropathy and uncovertebral hypertrophy contributing to moderate bilateral neuroforaminal narrowing at C4-C5. The paravertebral soft tissues are normal; no prevertebral soft tissue swelling. There are surgical clips prior thyroidectomy. Partially imaged lung apices demonstrate centrilobular emphysema. No evidence of pharyngeal or laryngeal mass lesion.      Small right parietal scalp hematoma without acute intracranial findings. There are chronic and senescent changes of the brain as above.  No acute fracture or traumatic malalignment in the cervical spine. Moderate to severe degenerative changes are noted as above.  This report was finalized on 8/5/2022 2:15 PM by Brayan Walton MD.      CT Cervical Spine Without Contrast    Result Date: 8/5/2022  DATE OF EXAM: 8/5/2022 1:37 PM  PROCEDURE: CT HEAD WO CONTRAST-, CT CERVICAL SPINE WO CONTRAST-  INDICATIONS: FALL  COMPARISON: Head CT 5/31/2022  TECHNIQUE: Routine transaxial images of the head and cervical spine were obtained without the administration of contrast. Automated exposure control and iterative reconstruction methods were used.  The radiation dose reduction device was turned on for each scan per the ALARA (As Low as Reasonably Achievable) protocol.  FINDINGS: Head: No acute intracranial hemorrhage. No acute large territory infarct. There  are scattered subcortical and periventricular white matter hypodensities which are nonspecific and can be seen in the setting of chronic small vessel ischemic change.  There is mild global cerebral volume loss.  No extra-axial collections.  No midline shift or herniation.  Normal size and configuration of the ventricles commensurate with degree of cerebral volume loss.  Unremarkable appearance of the orbits.  The mastoid air cells are clear.  The paranasal sinuses are grossly clear.  No acute osseous findings. There is a small right parietal scalp contusion.  Cervical spine: The bones are demineralized limiting assessment for occult nondisplaced fractures. There is mild degenerative straightening of the cervical spine with trace degenerative anterolisthesis of C7 on T1, without definite traumatic subluxation or significant spondylolisthesis. The facets appear normally aligned allowing for multilevel facet arthropathy. The atlantodental interval is normal. No acute fracture. The vertebral body heights are maintained. There is presumed degenerative fusion of the C3 and C4 vertebral bodies and left facets. There is presumed degenerative fusion of the C5-C6 vertebral bodies and bilateral facets. 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 There is severe multilevel degenerative disc disease throughout the cervical spine with posterior disc osteophyte complexes at C3-C4, C4-C5, and C6-C7, with at least moderate spinal canal stenosis at these levels. There is multilevel facet arthropathy and uncovertebral hypertrophy contributing to moderate bilateral neuroforaminal narrowing at C4-C5. The paravertebral soft tissues are normal; no prevertebral soft tissue swelling. There are surgical clips prior thyroidectomy. Partially imaged lung apices demonstrate centrilobular emphysema. No evidence of pharyngeal or laryngeal mass lesion.      Small right parietal scalp hematoma without acute intracranial findings.  There are chronic and senescent changes of the brain as above.  No acute fracture or traumatic malalignment in the cervical spine. Moderate to severe degenerative changes are noted as above.  This report was finalized on 8/5/2022 2:15 PM by Brayan Walton MD.      CT Pelvis Without Contrast    Result Date: 8/5/2022  DATE OF EXAM: 8/5/2022 1:37 PM  PROCEDURE: CT PELVIS WO CONTRAST-  INDICATIONS: FALL  COMPARISON: CT abdomen pelvis May 31, 2002  TECHNIQUE: Routine transaxial slices were obtained through the pelvis without the administration of intravenous contrast. Reconstructed coronal and sagittal images were also obtained. Automated exposure control and iterative construction methods were used.    FINDINGS: The patient has had bilateral hip arthroplasty. There is streak artifact from the hardware which does result in some degradation of the images. A fracture is not definitely identified involving the pelvic bones or hip areas. There is osseous demineralization. There are postsurgical changes involving the lower lumbar spine. The graft there is some free fluid within the pelvis. Vascular calcification is noted. There are some soft tissue changes in the inferior gluteal area on the right that may relate to more of a decubitus ulcer.      1.  Osseous demineralization. 2.  Changes from bilateral hip arthroplasty and postsurgical changes lumbar spine 3.  Some stranding of subcutaneous fat in the infra gluteal area on the right that might be reflective of a decubitus ulcer. 4.  Nonspecific free fluid within the pelvis.  This report was finalized on 8/5/2022 2:18 PM by Gonzalez Escudero MD.      Results for orders placed during the hospital encounter of 08/09/21    Adult Transthoracic Echo Complete W/ Cont if Necessary Per Protocol    Interpretation Summary  · Estimated left ventricular EF = 60% Left ventricular systolic function is normal.  · Left ventricular diastolic function was normal.  · Trace mitral and tricuspid  regurgitation.  · Mild tachycardia noted throughout the study.    Discharge Details        Discharge Medications      New Medications      Instructions Start Date   lisinopril 10 MG tablet  Commonly known as: PRINIVIL,ZESTRIL   10 mg, Oral, Daily      terazosin 1 MG capsule  Commonly known as: HYTRIN   1 mg, Oral, Nightly         Changes to Medications      Instructions Start Date   metoprolol succinate XL 25 MG 24 hr tablet  Commonly known as: TOPROL-XL  What changed: how much to take   12.5 mg, Oral, Every 24 Hours Scheduled      primidone 50 MG tablet  Commonly known as: MYSOLINE  What changed: See the new instructions.   Take 3 tablets by mouth Every Morning AND 2 tablets Every Night.         Continue These Medications      Instructions Start Date   acetaminophen 325 MG tablet  Commonly known as: TYLENOL   650 mg, Oral, Every 4 Hours PRN      amLODIPine 10 MG tablet  Commonly known as: NORVASC   TAKE 1 TABLET BY MOUTH  DAILY      aspirin 81 MG EC tablet   81 mg, Oral, Daily      atorvastatin 40 MG tablet  Commonly known as: LIPITOR   40 mg, Oral, Daily      betamethasone dipropionate 0.05 % lotion  Commonly known as: DIPROLENE   Topical, 2 Times Daily      celecoxib 200 MG capsule  Commonly known as: CeleBREX   200 mg, Oral, Daily      donepezil 10 MG tablet  Commonly known as: Aricept   10 mg, Oral, Nightly      famotidine 40 MG tablet  Commonly known as: PEPCID   40 mg, Oral, Daily      levothyroxine 125 MCG tablet  Commonly known as: SYNTHROID, LEVOTHROID   125 mcg, Oral, Daily      ondansetron 4 MG tablet  Commonly known as: ZOFRAN   4 mg, Oral, Every 6 Hours PRN      PARoxetine 40 MG tablet  Commonly known as: Paxil   40 mg, Oral, Every Morning         Stop These Medications    clopidogrel 75 MG tablet  Commonly known as: PLAVIX     demeclocycline 150 MG tablet  Commonly known as: DECLOMYCIN     docusate sodium 250 MG capsule  Commonly known as: COLACE          Allergies   Allergen Reactions   • Dilaudid  [Hydromorphone Hcl] Hallucinations     TABS   • Beta Adrenergic Blockers Other (See Comments)     Hypotension / bradycadia   • Dilaudid [Hydromorphone] Unknown - High Severity   • Lactose Intolerance (Gi) Diarrhea     Discharge Disposition:  Home or Self Care    Diet:  Diet Order   Procedures   • Diet Regular     Activity:  Activity Instructions     Activity as Tolerated      Up WIth Assist             CODE STATUS:    Code Status and Medical Interventions:   Ordered at: 08/05/22 1953     Medical Intervention Limits:    NO intubation (DNI)     Level Of Support Discussed With:    Health Care Surrogate     Code Status (Patient has no pulse and is not breathing):    No CPR (Do Not Attempt to Resuscitate)     Medical Interventions (Patient has pulse or is breathing):    Limited Support     Future Appointments   Date Time Provider Department Center   8/11/2022  7:30 PM EMS 1  [facility]   10/25/2022 10:15 AM Aleah Regan MD [facility]   2/22/2023  9:45 AM Aleah Regan MD [facility]     Lauren Stone PA-C  08/11/22    Time Spent on Discharge:  I spent 35 minutes on this discharge activity which included: face-to-face encounter with the patient, reviewing the data in the system, coordination of the care with the nursing staff as well as consultants, documentation, and entering orders.            Electronically signed by Lauren Stone PA-C at 08/11/22 7586

## 2022-08-11 NOTE — THERAPY TREATMENT NOTE
"Patient Name: Zohra Hall  : 1940    MRN: [id number]                              Today's Date: 2022       Admit Date: 2022    Visit Dx:     ICD-10-CM ICD-9-CM   1. Bilateral hip pain  M25.551 719.45    M25.552    2. Injury of head, initial encounter  S09.90XA 959.01   3. Acute UTI  N39.0 599.0   4. Fall, initial encounter  W19.XXXA E888.9   5. Benign essential tremor  G25.0 333.1     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HCC)   • Hypothyroid post remote resection papillary adenocarcinoma thyroid   • Hypertension   • GERD without esophagitis   • Hyperlipidemia LDL goal <100   • Atherosclerosis of aorta (HCC)   • Incontinence of bowel   • Acute right-sided low back pain without sciatica   • Benign essential tremor   • Pain of right hip joint   • Thyroid cancer ([facility])   • Lacunar stroke ([facility])   • Nontraumatic rupture of extensor tendons of right hand and wrist   • Actinic keratosis   • Transient ischemic attack   • Ataxia   • History of PMR (CMS/[facility])   • Osteoporosis   • Post-surgical hypothyroidism   • NSTEMI (non-ST elevated myocardial infarction) ([facility])   • Coronary artery disease   • NICM presumably due to Takotsubo post NSTEMI 2021 (CMS/[facility])   • Abnormal TSH   • Severe hypothyroidism   • Traumatic SAH, SDH, and intraparenchymal bleed (CMS/[facility])   • History of lacunar stroke 2019   • Alzheimer disease ([facility])   • Frequent falls   • Bilateral hip pain     Past Medical History:   Diagnosis Date   • Breast cancer (HCC)     left- pt states \"in situ\", no radiation, Tamoxifen for 5 years   • Cellulitis    • Cervical lymphadenopathy    • Chest pain    • Convulsions ([facility])    • GERD (gastroesophageal reflux disease)    • Glossitis    • Grief reaction     · Last Impression: 2015  " counselled, given ativan for prn use.  Aleah Regan   • Hypertension    • Lower back pain    • Oral thrush    • Papillary adenocarcinoma (HCC)     Papillary adenocarcinoma of thyroid   • Papillary adenocarcinoma of thyroid (HCC)     · resection Ramirez- 1/13,  2 x 2 x 1.5 cm  T3 N1 MXpost op low calcium and diminished  voiceablation Ain- 3/7 metastatic nodes and invasion to strap muscles · Last Impression: 29 Oct 2014  s/p Eval by berry Méndez.  Aleah Regan (Internal   • Piriformis syndrome    • Tingling    • Xerostomia      Past Surgical History:   Procedure Laterality Date   • BREAST BIOPSY Right 10/10/2013    stereo bx   • BREAST BIOPSY Left 10/19/2015    stereo bx   • BREAST CYST EXCISION      right   • BREAST EXCISIONAL BIOPSY Right     affirm bx and loc 2016.   • BREAST LUMPECTOMY Left 1987   • CARDIAC CATHETERIZATION N/A 5/23/2021    Procedure: Left Heart Cath;  Surgeon: Alonso Ross IV, MD;  Location: [facility] CATH INVASIVE LOC[facility];  Service: Cardiology;  Laterality: N/A;   • HYSTERECTOMY  1979   • OTHER SURGICAL HISTORY Right     right arm surgery-steel roger in place   • TOTAL HIP ARTHROPLASTY     • TOTAL THYROIDECTOMY      Thyroid Surgery Total Thyroidectomy      General Information     Row Name 08/11/22 1542          OT Time and Intention    Document Type therapy note (daily note)  -CORIN     Mode of Treatment occupational therapy  -CORIN     Row Name 08/11/22 1542 08/11/22 1332       General Information    Patient Profile Reviewed yes  -CORIN --    Existing Precautions/Restrictions fall;other (see comments)  hx Alzheimer's, fear of falling  -CORIN fall;other (see comments)  -CORIN    Barriers to Rehab cognitive status  -CORIN --    Row Name 08/11/22 1542          Cognition    Orientation Status (Cognition) oriented to;person;disoriented to;place;situation;time  -CORIN     Row Name 08/11/22 1545          Safety Issues, Functional Mobility    Safety Issues Affecting Function (Mobility) ability to follow  commands;awareness of need for assistance;insight into deficits/self-awareness;judgment;positioning of assistive device;problem-solving;safety precaution awareness;safety precautions follow-through/compliance;sequencing abilities  -CORIN     Impairments Affecting Function (Mobility) balance;cognition;endurance/activity tolerance;motor planning;postural/trunk control;strength  -CORIN     Cognitive Impairments, Mobility Safety/Performance awareness, need for assistance;insight into deficits/self-awareness;judgment;problem-solving/reasoning;safety precaution awareness;safety precaution follow-through;sequencing abilities  -CORIN     Comment, Safety Issues/Impairments (Mobility) cues to keep eyes open during transfer to chair  -CORIN           User Key  (r) = Recorded By, (t) = Taken By, (c) = Cosigned By    Initials Name Provider Type    Phuong Story OT Occupational Therapist                 Mobility/ADL's     Row Name 08/11/22 1543          Bed Mobility    Bed Mobility supine-sit;scooting/bridging  -     Scooting/Bridging Kendall (Bed Mobility) maximum assist (25% patient effort);verbal cues  -CORIN     Supine-Sit Kendall (Bed Mobility) maximum assist (25% patient effort);verbal cues  -CORIN     Bed Mobility, Safety Issues cognitive deficits limit understanding;decreased use of arms for pushing/pulling;decreased use of legs for bridging/pushing  -     Assistive Device (Bed Mobility) bed rails;draw sheet;head of bed elevated  -     Comment, (Bed Mobility) cues for initiation, slowly transitioned to EOB with reassurance of her safety  -     Row Name 08/11/22 1545          Transfers    Transfers sit-stand transfer;stand-sit transfer;bed-chair transfer  -     Comment, (Transfers) v/t cues for HP and sequencing, cues for reassurance of her safety  -     Bed-Chair Kendall (Transfers) maximum assist (25% patient effort);verbal cues;nonverbal cues (demo/gesture);1 person assist  -     Assistive Device  (Bed-Chair Transfers) walker, front-wheeled  -     Sit-Stand Glenn (Transfers) maximum assist (25% patient effort);verbal cues;nonverbal cues (demo/gesture)  -     Row Name 08/11/22 1543          Bed-Chair Transfer    Comment, (Bed-Chair Transfer) cues to keep her eyes open, cues for lateral weightshift  -[facility] Name 08/11/22 1543          Sit-Stand Transfer    Assistive Device (Sit-Stand Transfers) walker, front-wheeled  -     Row Name 08/11/22 1543          Activities of Daily Living    BADL Assessment/Intervention grooming  -[facility] Name 08/11/22 1543          Grooming Assessment/Training    Glenn Level (Grooming) oral care regimen;wash face, hands;supervision  -     Position (Grooming) edge of bed sitting  -     Comment, (Grooming) cues for motor planning oral care  -           User Key  (r) = Recorded By, (t) = Taken By, (c) = Cosigned By    Initials Name Provider Type    Phuong Story, OT Occupational Therapist               Obj/Interventions    No documentation.                Goals/Plan    No documentation.                Clinical Impression     [facility] Name 08/11/22 1548          Pain Scale: FACES Pre/Post-Treatment    Pain: FACES Scale, Pretreatment 0-->no hurt  -CORIN     Posttreatment Pain Rating 0-->no hurt  -[facility] Name 08/11/22 1548          Plan of Care Review    Plan of Care Reviewed With patient  -     Progress no change  -     Outcome Evaluation Pt tolerated prolonged sitting at EOB to perform face/hand washing and oral care with SUP, cues for motor planning. Pt transferred to chair with MaxAx1/RW, v/t cues for prepositioning feet and hands for STS, cues to keep eyes open during transfer. Pt verbalizes fear of falling and benefits from reassurance of her safety.  -     Row Name 08/11/22 1548          Vital Signs    O2 Delivery Pre Treatment room air  -CORIN     O2 Delivery Intra Treatment room air  -CORIN     O2 Delivery Post Treatment room air  -CORIN     Pre Patient  Position Supine  -CORIN     Intra Patient Position Standing  -CORIN     Post Patient Position Sitting  -CORIN     Row Name 08/11/22 1548          Positioning and Restraints    Pre-Treatment Position in bed  -CORIN     Post Treatment Position chair  -CORIN     In Chair notified nsg;reclined;call light within reach;encouraged to call for assist;exit alarm on;waffle cushion;legs elevated;waffle boot/both  -CORIN           User Key  (r) = Recorded By, (t) = Taken By, (c) = Cosigned By    Initials Name Provider Type    Phuong tSory OT Occupational Therapist               Outcome Measures     Row Name 08/11/22 1843          How much help from another is currently needed...    Putting on and taking off regular lower body clothing? 2  -CORIN     Bathing (including washing, rinsing, and drying) 2  -CORIN     Toileting (which includes using toilet bed pan or urinal) 2  -CORIN     Putting on and taking off regular upper body clothing 2  -CROIN     Taking care of personal grooming (such as brushing teeth) 3  -CORIN     Eating meals 3  -CORIN     AM-PAC 6 Clicks Score (OT) 14  -CORIN     Row Name 08/11/22 0800          How much help from another person do you currently need...    Turning from your back to your side while in flat bed without using bedrails? 2  -PS     Moving from lying on back to sitting on the side of a flat bed without bedrails? 2  -PS     Moving to and from a bed to a chair (including a wheelchair)? 2  -PS     Standing up from a chair using your arms (e.g., wheelchair, bedside chair)? 2  -PS     Climbing 3-5 steps with a railing? 2  -PS     To walk in hospital room? 2  -PS     AM-PAC 6 Clicks Score (PT) 12  -PS     Highest level of mobility 4 --> Transferred to chair/commode  -PS     Row Name 08/11/22 1555          Functional Assessment    Outcome Measure Options AM-PAC 6 Clicks Daily Activity (OT)  -CORIN           User Key  (r) = Recorded By, (t) = Taken By, (c) = Cosigned By    Initials Name Provider Type    Reina Nguyễn RN Registered  Nurse    Phuong Story OT Occupational Therapist                Occupational Therapy Education                 Title: PT OT SLP Therapies (Resolved)     Topic: Occupational Therapy (Resolved)     Point: ADL training (Resolved)     Description:   Instruct learner(s) on proper safety adaptation and remediation techniques during self care or transfers.   Instruct in proper use of assistive devices.              Learning Progress Summary           Patient Acceptance, TB, NR by PS at 8/11/2022 1100    Acceptance, E, VU,NR by AN at 8/7/2022 1506                   Point: Home exercise program (Resolved)     Description:   Instruct learner(s) on appropriate technique for monitoring, assisting and/or progressing therapeutic exercises/activities.              Learning Progress Summary           Patient Acceptance, TB, NR by PS at 8/11/2022 1100                   Point: Precautions (Resolved)     Description:   Instruct learner(s) on prescribed precautions during self-care and functional transfers.              Learning Progress Summary           Patient Acceptance, TB, NR by PS at 8/11/2022 1100    Acceptance, E, VU,NR by AN at 8/7/2022 1506                   Point: Body mechanics (Resolved)     Description:   Instruct learner(s) on proper positioning and spine alignment during self-care, functional mobility activities and/or exercises.              Learning Progress Summary           Patient Acceptance, TB, NR by PS at 8/11/2022 1100    Acceptance, E, VU,NR by AN at 8/7/2022 1506                               User Key     Initials Effective Dates Name Provider Type Discipline    PS 06/16/21 -  Reina Cox, RN Registered Nurse Nurse    AN 09/21/21 -  Lillie Murphy OT Occupational Therapist OT              OT Recommendation and Plan     Plan of Care Review  Plan of Care Reviewed With: patient  Progress: no change  Outcome Evaluation: Pt tolerated prolonged sitting at EOB to perform face/hand washing and oral care  with SUP, cues for motor planning. Pt transferred to chair with MaxAx1/RW, v/t cues for prepositioning feet and hands for STS, cues to keep eyes open during transfer. Pt verbalizes fear of falling and benefits from reassurance of her safety.     Time Calculation:    Time Calculation- OT     Row Name 08/11/22 1410             Time Calculation- OT    OT Start Time 1410  -CORIN      OT Received On 08/11/22  -CORIN              Timed Charges    32723 - OT Self Care/Mgmt Minutes 30  -CORIN              Total Minutes    Timed Charges Total Minutes 30  -CORIN       Total Minutes 30  -CORIN            User Key  (r) = Recorded By, (t) = Taken By, (c) = Cosigned By    Initials Name Provider Type    Phuong Story OT Occupational Therapist              Therapy Charges for Today     Code Description Service Date Service Provider Modifiers Qty    [id number] HC OT SELF CARE/MGMT/TRAIN EA 15 MIN 8/11/2022 Phuong Small OT GO 2               Phuong Small OT  8/11/2022

## 2022-08-11 NOTE — CASE MANAGEMENT/SOCIAL WORK
Case Management Discharge Note      Final Note: PAtient's plan is a skilled bed at [facility] today, 8/11.  Located within [facility] ambulance will transport at 1930.  PCS on chartlet.  Nurse to call report to [phone].  CM will fax transfer summary when available to [phone].  Updated patient's POA by phone.         Selected Continued Care - Admitted Since 8/5/2022     Destination Coordination complete.    Service Provider Selected Services Address Phone Fax Patient Preferred    [facility]  Skilled Nursing [address], [place] [id number] [phone] --          Durable Medical Equipment    No services have been selected for the patient.              Dialysis/Infusion    No services have been selected for the patient.              Home Medical Care     Service Provider Selected Services Address Phone Fax Patient Preferred    [facility] [place]  [facility]  Home Rehabilitation [address] , [place] [phone] --          Therapy    No services have been selected for the patient.              Community Resources    No services have been selected for the patient.              Community & DME    No services have been selected for the patient.                  Transportation Services  Ambulance: [facility]    Final Discharge Disposition Code: 03 - skilled nursing facility (SNF)

## 2022-08-11 NOTE — DISCHARGE SUMMARY
[facility] Medicine Services  DISCHARGE SUMMARY    Patient Name: Zohra Hall  : 1940  MRN: [id number]    Date of Admission: 2022 12:27 PM  Date of Discharge: 2022  Primary Care Physician: Aleah Regan MD    Hospital Course     Presenting Problem:   UTI (urinary tract infection) [N39.0]  Bilateral hip pain [M25.551, M25.552]    Active Hospital Problems    Diagnosis  POA   • Bilateral hip pain [M25.551, M25.552]  Yes   • Alzheimer disease (HCC) [G30.9, F02.80]  Yes   • Frequent falls [R29.6]  Not Applicable   • Coronary artery disease [I25.10]  Yes   • Post-surgical hypothyroidism [E89.0]  Yes   • Benign essential tremor [G25.0]  Yes   • Hypertension [I10]  Yes   • GERD without esophagitis [K21.9]  Yes   • Hyperlipidemia LDL goal <100 [E78.5]  Yes      Resolved Hospital Problems    Diagnosis Date Resolved POA   • **E. coli UTI (urinary tract infection) [N39.0, B96.20] 2022 Yes   • Volume depletion [E86.9] 2022 Yes   • Hyponatremia [E87.1] 2022 Yes      Hospital Course:  Zohra Hall is an 81 y.o. female with PMH significant for HTN, HLD, CAD (NSTEMI 2021 with small vessel disease not amenable to intervention/stenting), chronic diastolic CHF, aortic valve stenosis, post-surgical hypothyroidism, prior lacunar CVA, benign essential tremor, dementia, RLS and GERD. She resides in an assisted living facility. She presented to [facility] ED on 22 for evaluation after an unwitnessed fall. She ambulates with a walker. Per grandson (POA), patient has had frequent falls due to wandering around her facility without her walker and has had difficulty feeding herself due to tremor. He is concerned that she needs higher level of care than assisted living.      Acute E. Coli UTI, resolved  - s/p IV Rocephin x 5 days     Volume depletion  Hyponatremia   - s/p IV fluids  - Poor PO intake  - Needs assistance/encouragement with meals due to  her dementia and tremor      Hypokalemia  - Replaced per protocol     Frequent falls   -CT head showed a small right parietal scalp hematoma, no acute fracture   - PT/OT following - CM working on rehab referrals  - POA feels patient needs higher level of care given her frequent falls at her assisted living facility      HTN  - On Metoprolol XL 25mg daily since NSTEMI in May 2021  - Bradycardic most of the time - will decrease Metoprolol to 12.5mg daily  - Continue Amlodipine 10mg daily, Lisinopril 10mg daily and Terazosin 1mg QHS    CAD, history of NSTEMI 5/2021  Hyperlipidemia  - Per review of records, patient had NSTEMI in May 2021 - 5/23/21 LHC revealed severe 1-vessel CAD involving the terminal portion of the RPDA. The vessel was too small for PCI and no intervention was performed. Cardiology recommended ASA and Plavix for at least 3 months  - It has been 15 months since her NSTEMI. Will DC Plavix   - Continue ASA and statin      Essential tremor  -Continue primidone     Chronic back pain  - Continue Celebrex     GERD  - Continue PPI     Post-surgical hypothyroidism   - s/p thyroidectomy due to history of papillary thyroid adenocarcinoma  - Historically, there has been concern regarding medication compliance. Unclear if assisted living facility administers medications  - TSH 31, free T4 1.17  - Continue Synthroid      Mood Disorder  - Continue Paxil     Alzheimer's Disease  - Continue Aricept    Day of Discharge     HPI:   Sitting in bed, breakfast tray in front of her. Says she likes the eggs. I helped her with a few bites before she said she was full. No concerns from patient or her RN.     Review of Systems  Unable to obtain complete or reliable ROS due to dementia    Vital Signs:   Temp:  [97 °F (36.1 °C)-97.4 °F (36.3 °C)] 97.2 °F (36.2 °C)  Heart Rate:  [54-69] 54  Resp:  [15-16] 15  BP: ()/() 152/85    Physical Exam:  Constitutional: No acute distress, awake, alert and conversant. Chronically  ill appearing   HENT: NCAT, mucous membranes moist  Respiratory: Clear to auscultation bilaterally, respiratory effort normal on room air   Cardiovascular: RRR, no murmurs, rubs, or gallops  Gastrointestinal: Positive bowel sounds, soft, nontender, nondistended  Musculoskeletal: No bilateral ankle edema  Psychiatric: Flat affect, cooperative  Neurologic: Oriented x 1,. BUE tremor. Moves all extremities spontaneously without focal deficits. Speech clear and coherent.     Pertinent  and/or Most Recent Results     LAB RESULTS:      Lab 08/09/22  0542 08/08/22  0522 08/07/22  0331 08/06/22  0823 08/05/22  1505   WBC 4.75 6.04 5.95 8.21 8.21   HEMOGLOBIN 9.7* 10.1* 9.5* 10.5* 11.9*   HEMATOCRIT 30.3* 30.3* 28.5* 31.9* 36.3   PLATELETS 274 282 275 311 350   NEUTROS ABS 3.20 4.61  --  6.43 6.59   IMMATURE GRANS (ABS) 0.04 0.03  --  0.08* 0.11*   LYMPHS ABS 0.97 0.79  --  1.07 0.93   MONOS ABS 0.46 0.57  --  0.55 0.49   EOS ABS 0.04 0.02  --  0.05 0.06   MCV 87.3 83.7 84.3 86.7 85.8         Lab 08/09/22  0542 08/08/22 2008 08/08/22  0522 08/06/22  1353 08/05/22  1505   SODIUM 141  --  132* 130* 133*   POTASSIUM 4.2 4.4 3.0* 3.6 4.2   CHLORIDE 107  --  96* 95* 97*   CO2 24.0  --  27.0 22.0 28.0   ANION GAP 10.0  --  9.0 13.0 8.0   BUN 9  --  8 17 14   CREATININE 0.58  --  0.58 0.78 0.84   EGFR 89.9  --  89.9 75.5 69.0   GLUCOSE 85  --  109* 108* 81   CALCIUM 8.3*  --  8.0* 8.5* 8.9   MAGNESIUM  --   --   --  1.6  --    PHOSPHORUS 2.5  --  2.6  --   --          Lab 08/09/22  0542 08/08/22  0522 08/05/22  1505   TOTAL PROTEIN 5.7*  --  6.2   ALBUMIN 3.10* 3.10* 3.50   GLOBULIN 2.6  --  2.7   ALT (SGPT) 8  --  12   AST (SGOT) 15  --  19   BILIRUBIN 0.2  --  0.2   ALK PHOS 78  --  85     Brief Urine Lab Results  (Last result in the past 365 days)      Color   Clarity   Blood   Leuk Est   Nitrite   Protein   CREAT   Urine HCG        08/06/22 0144 Yellow   Cloudy   Moderate (2+)   Small (1+)   Positive   Negative                Microbiology Results (last 10 days)     Procedure Component Value - Date/Time    Urine Culture - Urine, Urine, Catheter [[id number]]  (Abnormal)  (Susceptibility) Collected: 08/06/22 1013    Lab Status: Final result Specimen: Urine, Catheter Updated: 08/08/22 1134     Urine Culture >100,000 CFU/mL Escherichia coli    Narrative:      Colonization of the urinary tract without infection is common. Treatment is discouraged unless the patient is symptomatic, pregnant, or undergoing an invasive urologic procedure.    Susceptibility      Escherichia coli      ROSALINDA      Ampicillin Susceptible      Ampicillin + Sulbactam Susceptible      Cefazolin Susceptible      Cefepime Susceptible      Ceftazidime Susceptible      Ceftriaxone Susceptible      Gentamicin Susceptible      Levofloxacin Resistant     Nitrofurantoin Susceptible      Piperacillin + Tazobactam Susceptible      Trimethoprim + Sulfamethoxazole Resistant                          COVID PRE-OP / PRE-PROCEDURE SCREENING ORDER (NO ISOLATION) - Swab, Nasopharynx [[id number]]  (Normal) Collected: 08/05/22 1506    Lab Status: Final result Specimen: Swab from Nasopharynx Updated: 08/05/22 1552    Narrative:      The following orders were created for panel order COVID PRE-OP / PRE-PROCEDURE SCREENING ORDER (NO ISOLATION) - Swab, Nasopharynx.  Procedure                               Abnormality         Status                     ---------                               -----------         ------                     COVID-19 and FLU A/B PCR...[[id number]]  Normal              Final result                 Please view results for these tests on the individual orders.    COVID-19 and FLU A/B PCR - Swab, Nasopharynx [[id number]]  (Normal) Collected: 08/05/22 1506    Lab Status: Final result Specimen: Swab from Nasopharynx Updated: 08/05/22 1552     COVID19 Not Detected     Influenza A PCR Not Detected     Influenza B PCR Not Detected    Narrative:      Fact sheet for providers:  https://www.fda.gov/media/100196/download    Fact sheet for patients: https://www.fda.gov/media/685452/download    Test performed by PCR.        CT Head Without Contrast    Result Date: 8/5/2022  DATE OF EXAM: 8/5/2022 1:37 PM  PROCEDURE: CT HEAD WO CONTRAST-, CT CERVICAL SPINE WO CONTRAST-  INDICATIONS: FALL  COMPARISON: Head CT 5/31/2022  TECHNIQUE: Routine transaxial images of the head and cervical spine were obtained without the administration of contrast. Automated exposure control and iterative reconstruction methods were used.  The radiation dose reduction device was turned on for each scan per the ALARA (As Low as Reasonably Achievable) protocol.  FINDINGS: Head: No acute intracranial hemorrhage. No acute large territory infarct. There are scattered subcortical and periventricular white matter hypodensities which are nonspecific and can be seen in the setting of chronic small vessel ischemic change.  There is mild global cerebral volume loss.  No extra-axial collections.  No midline shift or herniation.  Normal size and configuration of the ventricles commensurate with degree of cerebral volume loss.  Unremarkable appearance of the orbits.  The mastoid air cells are clear.  The paranasal sinuses are grossly clear.  No acute osseous findings. There is a small right parietal scalp contusion.  Cervical spine: The bones are demineralized limiting assessment for occult nondisplaced fractures. There is mild degenerative straightening of the cervical spine with trace degenerative anterolisthesis of C7 on T1, without definite traumatic subluxation or significant spondylolisthesis. The facets appear normally aligned allowing for multilevel facet arthropathy. The atlantodental interval is normal. No acute fracture. The vertebral body heights are maintained. There is presumed degenerative fusion of the C3 and C4 vertebral bodies and left facets. There is presumed degenerative fusion of the C5-C6 vertebral bodies and  bilateral facets. 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 There is severe multilevel degenerative disc disease throughout the cervical spine with posterior disc osteophyte complexes at C3-C4, C4-C5, and C6-C7, with at least moderate spinal canal stenosis at these levels. There is multilevel facet arthropathy and uncovertebral hypertrophy contributing to moderate bilateral neuroforaminal narrowing at C4-C5. The paravertebral soft tissues are normal; no prevertebral soft tissue swelling. There are surgical clips prior thyroidectomy. Partially imaged lung apices demonstrate centrilobular emphysema. No evidence of pharyngeal or laryngeal mass lesion.      Small right parietal scalp hematoma without acute intracranial findings. There are chronic and senescent changes of the brain as above.  No acute fracture or traumatic malalignment in the cervical spine. Moderate to severe degenerative changes are noted as above.  This report was finalized on 8/5/2022 2:15 PM by Brayan Walton MD.      CT Cervical Spine Without Contrast    Result Date: 8/5/2022  DATE OF EXAM: 8/5/2022 1:37 PM  PROCEDURE: CT HEAD WO CONTRAST-, CT CERVICAL SPINE WO CONTRAST-  INDICATIONS: FALL  COMPARISON: Head CT 5/31/2022  TECHNIQUE: Routine transaxial images of the head and cervical spine were obtained without the administration of contrast. Automated exposure control and iterative reconstruction methods were used.  The radiation dose reduction device was turned on for each scan per the ALARA (As Low as Reasonably Achievable) protocol.  FINDINGS: Head: No acute intracranial hemorrhage. No acute large territory infarct. There are scattered subcortical and periventricular white matter hypodensities which are nonspecific and can be seen in the setting of chronic small vessel ischemic change.  There is mild global cerebral volume loss.  No extra-axial collections.  No midline shift or herniation.  Normal size and configuration of the  ventricles commensurate with degree of cerebral volume loss.  Unremarkable appearance of the orbits.  The mastoid air cells are clear.  The paranasal sinuses are grossly clear.  No acute osseous findings. There is a small right parietal scalp contusion.  Cervical spine: The bones are demineralized limiting assessment for occult nondisplaced fractures. There is mild degenerative straightening of the cervical spine with trace degenerative anterolisthesis of C7 on T1, without definite traumatic subluxation or significant spondylolisthesis. The facets appear normally aligned allowing for multilevel facet arthropathy. The atlantodental interval is normal. No acute fracture. The vertebral body heights are maintained. There is presumed degenerative fusion of the C3 and C4 vertebral bodies and left facets. There is presumed degenerative fusion of the C5-C6 vertebral bodies and bilateral facets. 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 There is severe multilevel degenerative disc disease throughout the cervical spine with posterior disc osteophyte complexes at C3-C4, C4-C5, and C6-C7, with at least moderate spinal canal stenosis at these levels. There is multilevel facet arthropathy and uncovertebral hypertrophy contributing to moderate bilateral neuroforaminal narrowing at C4-C5. The paravertebral soft tissues are normal; no prevertebral soft tissue swelling. There are surgical clips prior thyroidectomy. Partially imaged lung apices demonstrate centrilobular emphysema. No evidence of pharyngeal or laryngeal mass lesion.      Small right parietal scalp hematoma without acute intracranial findings. There are chronic and senescent changes of the brain as above.  No acute fracture or traumatic malalignment in the cervical spine. Moderate to severe degenerative changes are noted as above.  This report was finalized on 8/5/2022 2:15 PM by Brayan Walton MD.      CT Pelvis Without Contrast    Result Date:  8/5/2022  DATE OF EXAM: 8/5/2022 1:37 PM  PROCEDURE: CT PELVIS WO CONTRAST-  INDICATIONS: FALL  COMPARISON: CT abdomen pelvis May 31, 2002  TECHNIQUE: Routine transaxial slices were obtained through the pelvis without the administration of intravenous contrast. Reconstructed coronal and sagittal images were also obtained. Automated exposure control and iterative construction methods were used.    FINDINGS: The patient has had bilateral hip arthroplasty. There is streak artifact from the hardware which does result in some degradation of the images. A fracture is not definitely identified involving the pelvic bones or hip areas. There is osseous demineralization. There are postsurgical changes involving the lower lumbar spine. The graft there is some free fluid within the pelvis. Vascular calcification is noted. There are some soft tissue changes in the inferior gluteal area on the right that may relate to more of a decubitus ulcer.      1.  Osseous demineralization. 2.  Changes from bilateral hip arthroplasty and postsurgical changes lumbar spine 3.  Some stranding of subcutaneous fat in the infra gluteal area on the right that might be reflective of a decubitus ulcer. 4.  Nonspecific free fluid within the pelvis.  This report was finalized on 8/5/2022 2:18 PM by Gonzalez Escudero MD.      Results for orders placed during the hospital encounter of 08/09/21    Adult Transthoracic Echo Complete W/ Cont if Necessary Per Protocol    Interpretation Summary  · Estimated left ventricular EF = 60% Left ventricular systolic function is normal.  · Left ventricular diastolic function was normal.  · Trace mitral and tricuspid regurgitation.  · Mild tachycardia noted throughout the study.    Discharge Details        Discharge Medications      New Medications      Instructions Start Date   lisinopril 10 MG tablet  Commonly known as: PRINIVIL,ZESTRIL   10 mg, Oral, Daily      terazosin 1 MG capsule  Commonly known as: HYTRIN   1 mg,  Oral, Nightly         Changes to Medications      Instructions Start Date   metoprolol succinate XL 25 MG 24 hr tablet  Commonly known as: TOPROL-XL  What changed: how much to take   12.5 mg, Oral, Every 24 Hours Scheduled      primidone 50 MG tablet  Commonly known as: MYSOLINE  What changed: See the new instructions.   Take 3 tablets by mouth Every Morning AND 2 tablets Every Night.         Continue These Medications      Instructions Start Date   acetaminophen 325 MG tablet  Commonly known as: TYLENOL   650 mg, Oral, Every 4 Hours PRN      amLODIPine 10 MG tablet  Commonly known as: NORVASC   TAKE 1 TABLET BY MOUTH  DAILY      aspirin 81 MG EC tablet   81 mg, Oral, Daily      atorvastatin 40 MG tablet  Commonly known as: LIPITOR   40 mg, Oral, Daily      betamethasone dipropionate 0.05 % lotion  Commonly known as: DIPROLENE   Topical, 2 Times Daily      celecoxib 200 MG capsule  Commonly known as: CeleBREX   200 mg, Oral, Daily      donepezil 10 MG tablet  Commonly known as: Aricept   10 mg, Oral, Nightly      famotidine 40 MG tablet  Commonly known as: PEPCID   40 mg, Oral, Daily      levothyroxine 125 MCG tablet  Commonly known as: SYNTHROID, LEVOTHROID   125 mcg, Oral, Daily      ondansetron 4 MG tablet  Commonly known as: ZOFRAN   4 mg, Oral, Every 6 Hours PRN      PARoxetine 40 MG tablet  Commonly known as: Paxil   40 mg, Oral, Every Morning         Stop These Medications    clopidogrel 75 MG tablet  Commonly known as: PLAVIX     demeclocycline 150 MG tablet  Commonly known as: DECLOMYCIN     docusate sodium 250 MG capsule  Commonly known as: COLACE          Allergies   Allergen Reactions   • Dilaudid [Hydromorphone Hcl] Hallucinations     TABS   • Beta Adrenergic Blockers Other (See Comments)     Hypotension / bradycadia   • Dilaudid [Hydromorphone] Unknown - High Severity   • Lactose Intolerance (Gi) Diarrhea     Discharge Disposition:  Home or Self Care    Diet:  Diet Order   Procedures   • Diet Regular      Activity:  Activity Instructions     Activity as Tolerated      Up WIth Assist             CODE STATUS:    Code Status and Medical Interventions:   Ordered at: 08/05/22 1953     Medical Intervention Limits:    NO intubation (DNI)     Level Of Support Discussed With:    Health Care Surrogate     Code Status (Patient has no pulse and is not breathing):    No CPR (Do Not Attempt to Resuscitate)     Medical Interventions (Patient has pulse or is breathing):    Limited Support     Future Appointments   Date Time Provider Department Center   8/11/2022  7:30 PM EMS 1  [facility]   10/25/2022 10:15 AM Aleah Regan MD [facility]   2/22/2023  9:45 AM Aleah Regan MD [facility]     Lauren Stone PA-C  08/11/22    Time Spent on Discharge:  I spent 35 minutes on this discharge activity which included: face-to-face encounter with the patient, reviewing the data in the system, coordination of the care with the nursing staff as well as consultants, documentation, and entering orders.

## 2022-08-12 NOTE — OUTREACH NOTE
Prep Survey    Flowsheet Row Responses   [facility] facility patient discharged from? [facility]   Is LACE score < 7 ? No   Emergency Room discharge w/ pulse ox? No   Eligibility Not Eligible   What are the reasons patient is not eligible? [facility]   Does the patient have one of the following disease processes/diagnoses(primary or secondary)? Other   Prep survey completed? Yes          MIO CALVERT - Registered Nurse

## 2022-09-27 NOTE — H&P
[facility] Medicine Services  HISTORY AND PHYSICAL    Patient Name: Zohra Hall  : 1940  MRN: [id number]  Primary Care Physician: Aleah Regan MD  Date of admission: 2022    Subjective   Subjective     Chief Complaint:  Fall    HPI:  Zohra Hall is a 81 y.o. female with PMH of post surgical hypothyroid, CVA, breast cancer, Alzheimer's, HTN, HLD, CAD, and chronic back pain who presents to the ED after an unwitnessed fall at assisted living facility. Denies LOC. Patient states she walks with a walker. Patient is poor historian so HPI obtained from chart review. Patient denies pain. Denies dizziness, chest pain, shortness of air, N/V/D, diarrhea, dysuria. Will admit to hospital medicine for further management.     Spoke to ugo on the phone who is patient's POA. She has had frequent falls lately due to wandering around her facility without her walker. She also has had trouble feeding herself due to tremors. He feels she is too high risk to send back to assisted living facility and has requested higher level of care.    Review of Systems   Constitutional: Negative for activity change, appetite change and fever.   HENT: Negative for congestion, sore throat and trouble swallowing.    Eyes: Negative.    Respiratory: Negative.  Negative for chest tightness and shortness of breath.    Cardiovascular: Positive for leg swelling.   Gastrointestinal: Negative for abdominal pain, diarrhea and nausea.   Endocrine: Negative.    Genitourinary: Negative for difficulty urinating and dysuria.   Musculoskeletal: Positive for back pain and gait problem (frequent falls).   Skin: Negative.    Neurological: Negative for dizziness and light-headedness.   Psychiatric/Behavioral: Positive for confusion. Negative for agitation.        All other systems reviewed and are negative.     Personal History     Past Medical History:   Diagnosis Date   • Breast cancer (HCC)     left- pt  Pt currently at [facility] today with Raegan REYNOLDS, will close this enccounter.   "states \"in situ\", no radiation, Tamoxifen for 5 years   • Cellulitis    • Cervical lymphadenopathy    • Chest pain    • Convulsions (HCC)    • GERD (gastroesophageal reflux disease)    • Glossitis    • Grief reaction     · Last Impression: 29 Jan 2015  counselled, given ativan for prn use.  Aleah Regan   • Hypertension    • Lower back pain    • Oral thrush    • Papillary adenocarcinoma (HCC)     Papillary adenocarcinoma of thyroid   • Papillary adenocarcinoma of thyroid (HCC)     · resection Ramirez- 1/13,  2 x 2 x 1.5 cm  T3 N1 MXpost op low calcium and diminished  voiceablation Ain- 3/7 metastatic nodes and invasion to strap muscles · Last Impression: 29 Oct 2014  s/p Eval by berry Méndez.  Aleah Regan (Internal   • Piriformis syndrome    • Tingling    • Xerostomia          Oncology Problem List:  Thyroid cancer (HCC) (08/29/2018; Status: Active)  Papillary adenocarcinoma of thyroid (HCC) (Status: Active)       Past Surgical History:   Procedure Laterality Date   • BREAST BIOPSY Right 10/10/2013    stereo bx   • BREAST BIOPSY Left 10/19/2015    stereo bx   • BREAST CYST EXCISION      right   • BREAST EXCISIONAL BIOPSY Right     affirm bx and loc 2016.   • BREAST LUMPECTOMY Left 1987   • CARDIAC CATHETERIZATION N/A 5/23/2021    Procedure: Left Heart Cath;  Surgeon: Alonso Ross IV, MD;  Location: [facility] CATH INVASIVE LOCATION;  Service: Cardiology;  Laterality: N/A;   • HYSTERECTOMY  1979   • OTHER SURGICAL HISTORY Right     right arm surgery-steel roger in place   • TOTAL HIP ARTHROPLASTY     • TOTAL THYROIDECTOMY      Thyroid Surgery Total Thyroidectomy       Family History:  family history includes Breast cancer (age of onset: 84) in her mother; Cancer in her mother. Otherwise pertinent FHx was reviewed and unremarkable.     Social History:  reports that she quit smoking about 29 years ago. Her smoking use included cigarettes. She has a 30.00 pack-year smoking history. She has never " used smokeless tobacco. She reports that she does not drink alcohol and does not use drugs.  Social History     Social History Narrative   • Not on file       Medications:  PARoxetine, acetaminophen, amLODIPine, aspirin, atorvastatin, betamethasone dipropionate, cefdinir, celecoxib, clopidogrel, demeclocycline, docusate sodium, donepezil, famotidine, levothyroxine, metoprolol succinate XL, ondansetron, and primidone    Allergies   Allergen Reactions   • Dilaudid [Hydromorphone Hcl] Hallucinations     TABS   • Beta Adrenergic Blockers Other (See Comments)     Hypotension / bradycadia   • Dilaudid [Hydromorphone] Unknown - High Severity   • Lactose Intolerance (Gi) Diarrhea       Objective   Objective     Vital Signs:   Temp:  [98.1 °F (36.7 °C)] 98.1 °F (36.7 °C)  Heart Rate:  [57-59] 58  Resp:  [18-20] 18  BP: (135-178)/(66-97) 155/72    Physical Exam  Vitals reviewed.   Constitutional:       General: She is not in acute distress.  HENT:      Head: Normocephalic.      Nose: Nose normal. No congestion.      Mouth/Throat:      Mouth: Mucous membranes are moist.   Eyes:      Extraocular Movements: Extraocular movements intact.      Pupils: Pupils are equal, round, and reactive to light.   Cardiovascular:      Rate and Rhythm: Normal rate and regular rhythm.      Pulses: Normal pulses.      Heart sounds: Normal heart sounds. No murmur heard.  Pulmonary:      Effort: Pulmonary effort is normal. No respiratory distress.      Breath sounds: Normal breath sounds. No wheezing or rhonchi.   Abdominal:      General: Abdomen is flat. There is no distension.      Tenderness: There is no abdominal tenderness.   Musculoskeletal:      Cervical back: Normal range of motion. No rigidity.      Right lower leg: Edema (trace) present.      Left lower leg: Edema (trace) present.   Skin:     General: Skin is warm.      Capillary Refill: Capillary refill takes less than 2 seconds.   Neurological:      General: No focal deficit present.       Mental Status: She is easily aroused. Mental status is at baseline.      Motor: Weakness present.   Psychiatric:         Mood and Affect: Mood normal.         Behavior: Behavior normal. Behavior is cooperative.          Results Reviewed:  I have personally reviewed most recent indicated data and agree with findings including:  []  Laboratory  []  Radiology  []  EKG/Telemetry  []  Pathology  []  Cardiac/Vascular Studies  []  Old records  []  Other:  Most pertinent findings include:      LAB RESULTS:      Lab 08/05/22  1505   WBC 8.21   HEMOGLOBIN 11.9*   HEMATOCRIT 36.3   PLATELETS 350   NEUTROS ABS 6.59   IMMATURE GRANS (ABS) 0.11*   LYMPHS ABS 0.93   MONOS ABS 0.49   EOS ABS 0.06   MCV 85.8         Lab 08/05/22  1505   SODIUM 133*   POTASSIUM 4.2   CHLORIDE 97*   CO2 28.0   ANION GAP 8.0   BUN 14   CREATININE 0.84   EGFR 69.0   GLUCOSE 81   CALCIUM 8.9         Lab 08/05/22  1505   TOTAL PROTEIN 6.2   ALBUMIN 3.50   GLOBULIN 2.7   ALT (SGPT) 12   AST (SGOT) 19   BILIRUBIN 0.2   ALK PHOS 85                     Brief Urine Lab Results  (Last result in the past 365 days)      Color   Clarity   Blood   Leuk Est   Nitrite   Protein   CREAT   Urine HCG        08/05/22 1534 Yellow   Clear   Small (1+)   Trace   Positive   Negative               Microbiology Results (last 10 days)     Procedure Component Value - Date/Time    COVID PRE-OP / PRE-PROCEDURE SCREENING ORDER (NO ISOLATION) - Swab, Nasopharynx [[id number]]  (Normal) Collected: 08/05/22 1506    Lab Status: Final result Specimen: Swab from Nasopharynx Updated: 08/05/22 4298    Narrative:      The following orders were created for panel order COVID PRE-OP / PRE-PROCEDURE SCREENING ORDER (NO ISOLATION) - Swab, Nasopharynx.  Procedure                               Abnormality         Status                     ---------                               -----------         ------                     COVID-19 and FLU A/B PCR...[[id number]]  Normal              Final result                  Please view results for these tests on the individual orders.    COVID-19 and FLU A/B PCR - Swab, Nasopharynx [[id number]]  (Normal) Collected: 08/05/22 1506    Lab Status: Final result Specimen: Swab from Nasopharynx Updated: 08/05/22 1552     COVID19 Not Detected     Influenza A PCR Not Detected     Influenza B PCR Not Detected    Narrative:      Fact sheet for providers: https://www.fda.gov/media/729713/download    Fact sheet for patients: https://www.fda.gov/media/216832/download    Test performed by PCR.          CT Head Without Contrast    Result Date: 8/5/2022  DATE OF EXAM: 8/5/2022 1:37 PM  PROCEDURE: CT HEAD WO CONTRAST-, CT CERVICAL SPINE WO CONTRAST-  INDICATIONS: FALL  COMPARISON: Head CT 5/31/2022  TECHNIQUE: Routine transaxial images of the head and cervical spine were obtained without the administration of contrast. Automated exposure control and iterative reconstruction methods were used.  The radiation dose reduction device was turned on for each scan per the ALARA (As Low as Reasonably Achievable) protocol.  FINDINGS: Head: No acute intracranial hemorrhage. No acute large territory infarct. There are scattered subcortical and periventricular white matter hypodensities which are nonspecific and can be seen in the setting of chronic small vessel ischemic change.  There is mild global cerebral volume loss.  No extra-axial collections.  No midline shift or herniation.  Normal size and configuration of the ventricles commensurate with degree of cerebral volume loss.  Unremarkable appearance of the orbits.  The mastoid air cells are clear.  The paranasal sinuses are grossly clear.  No acute osseous findings. There is a small right parietal scalp contusion.  Cervical spine: The bones are demineralized limiting assessment for occult nondisplaced fractures. There is mild degenerative straightening of the cervical spine with trace degenerative anterolisthesis of C7 on T1, without definite  traumatic subluxation or significant spondylolisthesis. The facets appear normally aligned allowing for multilevel facet arthropathy. The atlantodental interval is normal. No acute fracture. The vertebral body heights are maintained. There is presumed degenerative fusion of the C3 and C4 vertebral bodies and left facets. There is presumed degenerative fusion of the C5-C6 vertebral bodies and bilateral facets. 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 There is severe multilevel degenerative disc disease throughout the cervical spine with posterior disc osteophyte complexes at C3-C4, C4-C5, and C6-C7, with at least moderate spinal canal stenosis at these levels. There is multilevel facet arthropathy and uncovertebral hypertrophy contributing to moderate bilateral neuroforaminal narrowing at C4-C5. The paravertebral soft tissues are normal; no prevertebral soft tissue swelling. There are surgical clips prior thyroidectomy. Partially imaged lung apices demonstrate centrilobular emphysema. No evidence of pharyngeal or laryngeal mass lesion.      Impression: Small right parietal scalp hematoma without acute intracranial findings. There are chronic and senescent changes of the brain as above.  No acute fracture or traumatic malalignment in the cervical spine. Moderate to severe degenerative changes are noted as above.  This report was finalized on 8/5/2022 2:15 PM by Brayan Walton MD.      CT Cervical Spine Without Contrast    Result Date: 8/5/2022  DATE OF EXAM: 8/5/2022 1:37 PM  PROCEDURE: CT HEAD WO CONTRAST-, CT CERVICAL SPINE WO CONTRAST-  INDICATIONS: FALL  COMPARISON: Head CT 5/31/2022  TECHNIQUE: Routine transaxial images of the head and cervical spine were obtained without the administration of contrast. Automated exposure control and iterative reconstruction methods were used.  The radiation dose reduction device was turned on for each scan per the ALARA (As Low as Reasonably Achievable)  protocol.  FINDINGS: Head: No acute intracranial hemorrhage. No acute large territory infarct. There are scattered subcortical and periventricular white matter hypodensities which are nonspecific and can be seen in the setting of chronic small vessel ischemic change.  There is mild global cerebral volume loss.  No extra-axial collections.  No midline shift or herniation.  Normal size and configuration of the ventricles commensurate with degree of cerebral volume loss.  Unremarkable appearance of the orbits.  The mastoid air cells are clear.  The paranasal sinuses are grossly clear.  No acute osseous findings. There is a small right parietal scalp contusion.  Cervical spine: The bones are demineralized limiting assessment for occult nondisplaced fractures. There is mild degenerative straightening of the cervical spine with trace degenerative anterolisthesis of C7 on T1, without definite traumatic subluxation or significant spondylolisthesis. The facets appear normally aligned allowing for multilevel facet arthropathy. The atlantodental interval is normal. No acute fracture. The vertebral body heights are maintained. There is presumed degenerative fusion of the C3 and C4 vertebral bodies and left facets. There is presumed degenerative fusion of the C5-C6 vertebral bodies and bilateral facets. 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 There is severe multilevel degenerative disc disease throughout the cervical spine with posterior disc osteophyte complexes at C3-C4, C4-C5, and C6-C7, with at least moderate spinal canal stenosis at these levels. There is multilevel facet arthropathy and uncovertebral hypertrophy contributing to moderate bilateral neuroforaminal narrowing at C4-C5. The paravertebral soft tissues are normal; no prevertebral soft tissue swelling. There are surgical clips prior thyroidectomy. Partially imaged lung apices demonstrate centrilobular emphysema. No evidence of pharyngeal or  laryngeal mass lesion.      Impression: Small right parietal scalp hematoma without acute intracranial findings. There are chronic and senescent changes of the brain as above.  No acute fracture or traumatic malalignment in the cervical spine. Moderate to severe degenerative changes are noted as above.  This report was finalized on 8/5/2022 2:15 PM by Brayan Walton MD.      CT Pelvis Without Contrast    Result Date: 8/5/2022  DATE OF EXAM: 8/5/2022 1:37 PM  PROCEDURE: CT PELVIS WO CONTRAST-  INDICATIONS: FALL  COMPARISON: CT abdomen pelvis May 31, 2002  TECHNIQUE: Routine transaxial slices were obtained through the pelvis without the administration of intravenous contrast. Reconstructed coronal and sagittal images were also obtained. Automated exposure control and iterative construction methods were used.    FINDINGS: The patient has had bilateral hip arthroplasty. There is streak artifact from the hardware which does result in some degradation of the images. A fracture is not definitely identified involving the pelvic bones or hip areas. There is osseous demineralization. There are postsurgical changes involving the lower lumbar spine. The graft there is some free fluid within the pelvis. Vascular calcification is noted. There are some soft tissue changes in the inferior gluteal area on the right that may relate to more of a decubitus ulcer.      Impression: 1.  Osseous demineralization. 2.  Changes from bilateral hip arthroplasty and postsurgical changes lumbar spine 3.  Some stranding of subcutaneous fat in the infra gluteal area on the right that might be reflective of a decubitus ulcer. 4.  Nonspecific free fluid within the pelvis.  This report was finalized on 8/5/2022 2:18 PM by Gonzalez Escudero MD.        Results for orders placed during the hospital encounter of 08/09/21    Adult Transthoracic Echo Complete W/ Cont if Necessary Per Protocol    Interpretation Summary  · Estimated left ventricular EF = 60% Left  ventricular systolic function is normal.  · Left ventricular diastolic function was normal.  · Trace mitral and tricuspid regurgitation.  · Mild tachycardia noted throughout the study.      Assessment & Plan   Assessment & Plan       UTI (urinary tract infection)    Hypertension    GERD without esophagitis    Hyperlipidemia LDL goal <100    Benign essential tremor    Hyponatremia    Post-surgical hypothyroidism    CAD with angina pectoris (CMS/HCC)    Alzheimer disease (HCC)    Fall      Zohra Hall is a 81 y.o. female with PMH of post surgical hypothyroid, CVA, breast cancer, Alzheimer's, HTN, HLD, CAD, and chronic back pain who presents to the ED after an unwitnessed fall at assisted living facility. Consulted PT/OT, case managment for discharge placement.     UTI  -Urine culture pending  -Maintenance fluids  -Rocephin    Fall  -CT shows small right parietal scalp hematoma, no acute fracture   -Fall precautions  -Pain control  -Consult PT/OT, case management    HTN  -Continue home amlodipine and metoprolol    CAD  HLD  -Continue home aspirin, statin and plavix    Tremor  -Continue home primidone    Chronic Back Pain  -Continue home celebrex    GERD  -Continue home PPI    Hypothyroid  -postsurgical due to history of papillary thyroid adenocarcinoma  -Continue synthroid    Mood Disorder  -Continue home paxil    Alzheimer's Disease  -Continue home aricept    DVT prophylaxis:  SCDs    CODE STATUS:  DNR/DNI       This note has been completed as part of a split-shared workflow.     Signature: Electronically signed by ELVIS Plascencia, 08/05/22, 7:49 PM EDT.          Attending   Admission Attestation       I have seen and examined the patient, performing an independent face-to-face diagnostic evaluation with plan of care reviewed and developed with Ms. Packer.     Brief Summary Statement:   Zohra Hall is a 81 y.o. female  with PMH of post surgical hypothyroid, CVA, breast cancer, Alzheimer's, HTN, HLD, CAD,  and chronic back pain who presents to the ED after an unwitnessed fall at assisted living facility. Pt was found to have an acute UTI upon admission here. She currently denies any complaints and no family is at bedside. Per ER report, family is interested in finding another facility.     Remainder of detailed HPI is as noted by APC and has been reviewed and/or edited by me for completeness.    Attending Physical Exam:  Constitutional: No acute distress, awake, frail appearing WF  HENT: NCAT, mucous membranes moist  Respiratory: Clear to auscultation bilaterally, respiratory effort normal   Cardiovascular: RRR, no murmurs, rubs, or gallops  Gastrointestinal: Positive bowel sounds, soft, nontender, nondistended  Musculoskeletal: No bilateral ankle edema  Psychiatric: flat affect, cooperative  Neurologic: Awake, MAEW  Skin: No rashes    Brief Assessment/Plan :  See detailed assessment and plan developed with APC which I have reviewed and/or edited for completeness.        Admission Status: I believe that this patient meets OBSERVATION status, however if further evaluation or treatment plans warrant, status may change.  Based upon current information, I predict patient's care encounter to be less than or equal to 2 midnights.        Molly Aj MD  08/05/22

## 2022-11-11 ENCOUNTER — APPOINTMENT (OUTPATIENT)
Dept: CT IMAGING | Facility: HOSPITAL | Age: 82
End: 2022-11-11

## 2022-11-11 ENCOUNTER — HOSPITAL ENCOUNTER (EMERGENCY)
Facility: HOSPITAL | Age: 82
Discharge: NURSING, REHAB OR LONG-TERM CARE | End: 2022-11-12
Attending: EMERGENCY MEDICINE | Admitting: EMERGENCY MEDICINE

## 2022-11-11 DIAGNOSIS — S70.02XA CONTUSION OF LEFT HIP, INITIAL ENCOUNTER: ICD-10-CM

## 2022-11-11 DIAGNOSIS — S50.11XA TRAUMATIC HEMATOMA OF RIGHT FOREARM, INITIAL ENCOUNTER: ICD-10-CM

## 2022-11-11 DIAGNOSIS — S51.012A SKIN TEAR OF LEFT ELBOW WITHOUT COMPLICATION, INITIAL ENCOUNTER: Primary | ICD-10-CM

## 2022-11-11 PROCEDURE — 70450 CT HEAD/BRAIN W/O DYE: CPT

## 2022-11-11 PROCEDURE — 99283 EMERGENCY DEPT VISIT LOW MDM: CPT

## 2022-11-11 PROCEDURE — 72192 CT PELVIS W/O DYE: CPT

## 2022-11-12 VITALS
SYSTOLIC BLOOD PRESSURE: 170 MMHG | HEART RATE: 58 BPM | DIASTOLIC BLOOD PRESSURE: 73 MMHG | TEMPERATURE: 98.1 F | RESPIRATION RATE: 16 BRPM | OXYGEN SATURATION: 95 % | HEIGHT: 67 IN | WEIGHT: 130 LBS | BODY MASS INDEX: 20.4 KG/M2

## 2022-11-12 NOTE — ED PROVIDER NOTES
Subjective   History of Present Illness  82-year-old female who was sent from nursing home for evaluation after a fall.  The patient reports that she was using her walker to ambulate from a chair to the restroom when she fell primarily landed on her left side.  She at baseline is oriented to person only and she appears to be oriented to person only at this given time.  The fall was not witnessed.  The patient did complain of a headache.  No obvious signs of trauma over the patient's head.  No complaint of chest abdominal pain.  No recent fever or other infectious symptoms.  No other acute complaints.  The patient does not take any anticoagulation.  She presents with a skin tear over the left elbow, mild hematoma over the right forearm with no deformity, and mild tenderness to palpation of the left hip.        Review of Systems   Constitutional: Negative for chills, fatigue and fever.   HENT: Negative for congestion, ear pain, postnasal drip, sinus pressure and sore throat.    Eyes: Negative for pain, redness and visual disturbance.   Respiratory: Negative for cough, chest tightness and shortness of breath.    Cardiovascular: Negative for chest pain, palpitations and leg swelling.   Gastrointestinal: Negative for abdominal pain, anal bleeding, blood in stool, diarrhea, nausea and vomiting.   Endocrine: Negative for polydipsia and polyuria.   Genitourinary: Negative for difficulty urinating, dysuria, frequency and urgency.   Musculoskeletal: Positive for arthralgias. Negative for back pain and neck pain.   Skin: Positive for wound. Negative for pallor and rash.   Allergic/Immunologic: Negative for environmental allergies and immunocompromised state.   Neurological: Negative for dizziness, weakness and headaches.   Hematological: Negative for adenopathy.   Psychiatric/Behavioral: Positive for confusion (baseline dementia) and decreased concentration. Negative for self-injury and suicidal ideas. The patient is not  "nervous/anxious.    All other systems reviewed and are negative.      Past Medical History:   Diagnosis Date   • Breast cancer (HCC) 1987    left- pt states \"in situ\", no radiation, Tamoxifen for 5 years   • Cellulitis    • Cervical lymphadenopathy    • Chest pain    • Convulsions (HCC)    • GERD (gastroesophageal reflux disease)    • Glossitis    • Grief reaction     · Last Impression: 29 Jan 2015  counselled, given ativan for prn use.  Aleah Regan   • Hypertension    • Lower back pain    • Oral thrush    • Papillary adenocarcinoma (HCC)     Papillary adenocarcinoma of thyroid   • Papillary adenocarcinoma of thyroid (HCC)     · resection Ramirez- 1/13,  2 x 2 x 1.5 cm  T3 N1 MXpost op low calcium and diminished  voiceablation Ain- 3/7 metastatic nodes and invasion to strap muscles · Last Impression: 29 Oct 2014  s/p Eval by berry Méndez.  Aleah Regan (Internal   • Piriformis syndrome    • Tingling    • Xerostomia        Allergies   Allergen Reactions   • Dilaudid [Hydromorphone Hcl] Hallucinations     TABS   • Beta Adrenergic Blockers Other (See Comments)     Hypotension / bradycadia   • Dilaudid [Hydromorphone] Unknown - High Severity   • Lactose Intolerance (Gi) Diarrhea       Past Surgical History:   Procedure Laterality Date   • BREAST BIOPSY Right 10/10/2013    stereo bx   • BREAST BIOPSY Left 10/19/2015    stereo bx   • BREAST CYST EXCISION      right   • BREAST EXCISIONAL BIOPSY Right     affirm bx and loc 2016.   • BREAST LUMPECTOMY Left 1987   • CARDIAC CATHETERIZATION N/A 5/23/2021    Procedure: Left Heart Cath;  Surgeon: Alonso Ross IV, MD;  Location: [facility] CATH INVASIVE LOC[facility];  Service: Cardiology;  Laterality: N/A;   • HYSTERECTOMY  1979   • OTHER SURGICAL HISTORY Right     right arm surgery-steel roger in place   • TOTAL HIP ARTHROPLASTY     • TOTAL THYROIDECTOMY      Thyroid Surgery Total Thyroidectomy       Family History   Problem Relation Age of Onset   • Breast " cancer Mother 84   • Cancer Mother    • BRCA 1/2 Neg Hx    • Colon cancer Neg Hx    • Endometrial cancer Neg Hx    • Ovarian cancer Neg Hx        Social History     Socioeconomic History   • Marital status:    Tobacco Use   • Smoking status: Former     Packs/day: 1.00     Years: 30.00     Pack years: 30.00     Types: Cigarettes     Quit date: 1992     Years since quittin.1   • Smokeless tobacco: Never   Vaping Use   • Vaping Use: Never used   Substance and Sexual Activity   • Alcohol use: No   • Drug use: No   • Sexual activity: Not Currently           Objective   Physical Exam  Vitals and nursing note reviewed.   Constitutional:       General: She is not in acute distress.     Appearance: She is well-developed. She is not toxic-appearing or diaphoretic.   HENT:      Head: Normocephalic and atraumatic.      Right Ear: External ear normal.      Left Ear: External ear normal.      Nose: Nose normal.   Eyes:      General: Lids are normal.      Pupils: Pupils are equal, round, and reactive to light.   Neck:      Trachea: No tracheal deviation.   Cardiovascular:      Rate and Rhythm: Normal rate and regular rhythm.      Pulses: No decreased pulses.      Heart sounds: Normal heart sounds. No murmur heard.    No friction rub. No gallop.   Pulmonary:      Effort: Pulmonary effort is normal. No respiratory distress.      Breath sounds: Normal breath sounds. No decreased breath sounds, wheezing, rhonchi or rales.   Abdominal:      General: Bowel sounds are normal.      Palpations: Abdomen is soft.      Tenderness: There is no abdominal tenderness. There is no guarding or rebound.   Musculoskeletal:         General: No deformity. Normal range of motion.      Cervical back: Normal range of motion and neck supple.   Lymphadenopathy:      Cervical: No cervical adenopathy.   Skin:     General: Skin is warm and dry.      Findings: No rash.   Neurological:      Mental Status: Mental status is at baseline. She is  disoriented and confused.      GCS: GCS eye subscore is 4. GCS verbal subscore is 4. GCS motor subscore is 6.      Cranial Nerves: No cranial nerve deficit.      Sensory: No sensory deficit.      Comments: Baseline dementia.  GCS 14.   Psychiatric:         Speech: Speech normal.         Behavior: Behavior normal.         Thought Content: Thought content normal.         Judgment: Judgment normal.         Procedures           ED Course                                   AARON reviewed by Elie Reynoso MD       MDM  Number of Diagnoses or Management Options  Contusion of left hip, initial encounter: new and requires workup  Skin tear of left elbow without complication, initial encounter: new and requires workup  Traumatic hematoma of right forearm, initial encounter: new and requires workup  Diagnosis management comments: CT scan of the head without contrast shows no acute abnormalities.    CT scan of the pelvis reports left hip hematoma and possible greater trochanter fracture over the right hip.  The patient is nontender to palpation over the right hip, but does have tenderness palpation of the left hip.  I feel left hip hematoma accounts for the patient's current pain and clinically she does not seem to have right hip pathology.    Will be discharged with the advised to apply ice to any area of pain.    Keep the left elbow wound clean with soap and water.       Amount and/or Complexity of Data Reviewed  Tests in the radiology section of CPT®: ordered and reviewed  Review and summarize past medical records: yes  Independent visualization of images, tracings, or specimens: yes        Final diagnoses:   Skin tear of left elbow without complication, initial encounter   Traumatic hematoma of right forearm, initial encounter   Contusion of left hip, initial encounter       ED Disposition  ED Disposition     ED Disposition   Discharge    Condition   Stable    Comment   --             Aleah Regan MD  [address]  [place] [id number] [phone]               Medication List      No changes were made to your prescriptions during this visit.          Elie Reynoso MD  11/12/22 9831

## 2022-11-21 ENCOUNTER — PATIENT OUTREACH (OUTPATIENT)
Dept: CASE MANAGEMENT | Facility: OTHER | Age: 82
End: 2022-11-21

## 2022-11-21 NOTE — OUTREACH NOTE
AMBULATORY CASE MANAGEMENT NOTE    Name and Relationship of Patient/Support Person: Joey Diaz (POA) - Emergency Contact    Patient Outreach    Spoke with patient's POA as a follow up call to a recent ER visit. FLAQUITA Cook stated patient is now in long term care at [facility] as of about 3 months ago. Provided POA with information to research facilities should he decide to relocate patient due to concerns for falls. Joey denied further questions/needs at this time. HRCM closed.     SNF Follow-up    Questions/Answers    Flowsheet Row Responses   Name of the Skilled Nursing Facility? [facility]   Purpose of SNF Admission LTC   Who is the insurance provider or payor of patient stay? Medicare          GEORGE GUZMÁN  Ambulatory Case Management    11/21/2022, 10:44 EST

## 2023-02-17 ENCOUNTER — APPOINTMENT (OUTPATIENT)
Dept: GENERAL RADIOLOGY | Facility: HOSPITAL | Age: 83
End: 2023-02-17
Payer: MEDICARE

## 2023-02-17 ENCOUNTER — HOSPITAL ENCOUNTER (OUTPATIENT)
Facility: HOSPITAL | Age: 83
Setting detail: OBSERVATION
Discharge: NURSING, REHAB OR LONG-TERM CARE | End: 2023-02-20
Attending: EMERGENCY MEDICINE | Admitting: HOSPITALIST
Payer: MEDICARE

## 2023-02-17 DIAGNOSIS — M25.552 BILATERAL HIP PAIN: ICD-10-CM

## 2023-02-17 DIAGNOSIS — F02.80 ALZHEIMER DISEASE: ICD-10-CM

## 2023-02-17 DIAGNOSIS — G93.41 METABOLIC ENCEPHALOPATHY: ICD-10-CM

## 2023-02-17 DIAGNOSIS — R13.10 DYSPHAGIA, UNSPECIFIED TYPE: ICD-10-CM

## 2023-02-17 DIAGNOSIS — R41.82 ALTERED MENTAL STATUS, UNSPECIFIED ALTERED MENTAL STATUS TYPE: ICD-10-CM

## 2023-02-17 DIAGNOSIS — G30.9 ALZHEIMER DISEASE: ICD-10-CM

## 2023-02-17 DIAGNOSIS — Z86.73 HISTORY OF STROKE: ICD-10-CM

## 2023-02-17 DIAGNOSIS — M54.50 ACUTE RIGHT-SIDED LOW BACK PAIN WITHOUT SCIATICA: ICD-10-CM

## 2023-02-17 DIAGNOSIS — N39.0 ACUTE UTI: Primary | ICD-10-CM

## 2023-02-17 DIAGNOSIS — S06.343D: ICD-10-CM

## 2023-02-17 DIAGNOSIS — R29.6 FREQUENT FALLS: ICD-10-CM

## 2023-02-17 DIAGNOSIS — R21 RASH OF FACE: ICD-10-CM

## 2023-02-17 DIAGNOSIS — M81.0 OSTEOPOROSIS, UNSPECIFIED OSTEOPOROSIS TYPE, UNSPECIFIED PATHOLOGICAL FRACTURE PRESENCE: ICD-10-CM

## 2023-02-17 DIAGNOSIS — D64.9 ANEMIA, UNSPECIFIED TYPE: ICD-10-CM

## 2023-02-17 DIAGNOSIS — M25.551 BILATERAL HIP PAIN: ICD-10-CM

## 2023-02-17 LAB
ALBUMIN SERPL-MCNC: 3.4 G/DL (ref 3.5–5.2)
ALBUMIN/GLOB SERPL: 1 G/DL
ALP SERPL-CCNC: 69 U/L (ref 39–117)
ALT SERPL W P-5'-P-CCNC: 8 U/L (ref 1–33)
ANION GAP SERPL CALCULATED.3IONS-SCNC: 11 MMOL/L (ref 5–15)
AST SERPL-CCNC: 11 U/L (ref 1–32)
BACTERIA UR QL AUTO: ABNORMAL /HPF
BASOPHILS # BLD AUTO: 0.02 10*3/MM3 (ref 0–0.2)
BASOPHILS NFR BLD AUTO: 0.2 % (ref 0–1.5)
BILIRUB SERPL-MCNC: 0.2 MG/DL (ref 0–1.2)
BILIRUB UR QL STRIP: NEGATIVE
BUN SERPL-MCNC: 22 MG/DL (ref 8–23)
BUN/CREAT SERPL: 27.5 (ref 7–25)
CALCIUM SPEC-SCNC: 8.8 MG/DL (ref 8.6–10.5)
CHLORIDE SERPL-SCNC: 100 MMOL/L (ref 98–107)
CLARITY UR: ABNORMAL
CO2 SERPL-SCNC: 26 MMOL/L (ref 22–29)
COLOR UR: YELLOW
CREAT SERPL-MCNC: 0.8 MG/DL (ref 0.57–1)
D-LACTATE SERPL-SCNC: 1.8 MMOL/L (ref 0.5–2)
DEPRECATED RDW RBC AUTO: 49.9 FL (ref 37–54)
EGFRCR SERPLBLD CKD-EPI 2021: 72.8 ML/MIN/1.73
EOSINOPHIL # BLD AUTO: 0.08 10*3/MM3 (ref 0–0.4)
EOSINOPHIL NFR BLD AUTO: 1 % (ref 0.3–6.2)
ERYTHROCYTE [DISTWIDTH] IN BLOOD BY AUTOMATED COUNT: 18.1 % (ref 12.3–15.4)
FLUAV RNA RESP QL NAA+PROBE: NOT DETECTED
FLUBV RNA RESP QL NAA+PROBE: NOT DETECTED
GEN 5 2HR TROPONIN T REFLEX: 18 NG/L
GLOBULIN UR ELPH-MCNC: 3.4 GM/DL
GLUCOSE SERPL-MCNC: 108 MG/DL (ref 65–99)
GLUCOSE UR STRIP-MCNC: NEGATIVE MG/DL
HCT VFR BLD AUTO: 31.8 % (ref 34–46.6)
HGB BLD-MCNC: 9.4 G/DL (ref 12–15.9)
HGB UR QL STRIP.AUTO: ABNORMAL
HOLD SPECIMEN: NORMAL
HYALINE CASTS UR QL AUTO: ABNORMAL /LPF
IMM GRANULOCYTES # BLD AUTO: 0.02 10*3/MM3 (ref 0–0.05)
IMM GRANULOCYTES NFR BLD AUTO: 0.2 % (ref 0–0.5)
KETONES UR QL STRIP: NEGATIVE
LEUKOCYTE ESTERASE UR QL STRIP.AUTO: ABNORMAL
LYMPHOCYTES # BLD AUTO: 0.7 10*3/MM3 (ref 0.7–3.1)
LYMPHOCYTES NFR BLD AUTO: 8.6 % (ref 19.6–45.3)
MAGNESIUM SERPL-MCNC: 2.1 MG/DL (ref 1.6–2.4)
MCH RBC QN AUTO: 22.8 PG (ref 26.6–33)
MCHC RBC AUTO-ENTMCNC: 29.6 G/DL (ref 31.5–35.7)
MCV RBC AUTO: 77 FL (ref 79–97)
MONOCYTES # BLD AUTO: 0.79 10*3/MM3 (ref 0.1–0.9)
MONOCYTES NFR BLD AUTO: 9.7 % (ref 5–12)
NEUTROPHILS NFR BLD AUTO: 6.5 10*3/MM3 (ref 1.7–7)
NEUTROPHILS NFR BLD AUTO: 80.3 % (ref 42.7–76)
NITRITE UR QL STRIP: POSITIVE
NRBC BLD AUTO-RTO: 0 /100 WBC (ref 0–0.2)
PH UR STRIP.AUTO: 6 [PH] (ref 5–8)
PLATELET # BLD AUTO: 224 10*3/MM3 (ref 140–450)
PMV BLD AUTO: 10.4 FL (ref 6–12)
POTASSIUM SERPL-SCNC: 3.7 MMOL/L (ref 3.5–5.2)
PROT SERPL-MCNC: 6.8 G/DL (ref 6–8.5)
PROT UR QL STRIP: ABNORMAL
RBC # BLD AUTO: 4.13 10*6/MM3 (ref 3.77–5.28)
RBC # UR STRIP: ABNORMAL /HPF
REF LAB TEST METHOD: ABNORMAL
SARS-COV-2 RNA RESP QL NAA+PROBE: NOT DETECTED
SODIUM SERPL-SCNC: 137 MMOL/L (ref 136–145)
SP GR UR STRIP: 1.02 (ref 1–1.03)
SQUAMOUS #/AREA URNS HPF: ABNORMAL /HPF
TROPONIN T DELTA: 0 NG/L
TROPONIN T SERPL HS-MCNC: 18 NG/L
UROBILINOGEN UR QL STRIP: ABNORMAL
WBC # UR STRIP: ABNORMAL /HPF
WBC NRBC COR # BLD: 8.11 10*3/MM3 (ref 3.4–10.8)
WHOLE BLOOD HOLD COAG: NORMAL
WHOLE BLOOD HOLD SPECIMEN: NORMAL

## 2023-02-17 PROCEDURE — 87077 CULTURE AEROBIC IDENTIFY: CPT | Performed by: EMERGENCY MEDICINE

## 2023-02-17 PROCEDURE — 87086 URINE CULTURE/COLONY COUNT: CPT | Performed by: EMERGENCY MEDICINE

## 2023-02-17 PROCEDURE — G0378 HOSPITAL OBSERVATION PER HR: HCPCS

## 2023-02-17 PROCEDURE — 99285 EMERGENCY DEPT VISIT HI MDM: CPT

## 2023-02-17 PROCEDURE — 25010000002 CEFTRIAXONE PER 250 MG: Performed by: EMERGENCY MEDICINE

## 2023-02-17 PROCEDURE — 93005 ELECTROCARDIOGRAM TRACING: CPT | Performed by: EMERGENCY MEDICINE

## 2023-02-17 PROCEDURE — 25010000002 ENOXAPARIN PER 10 MG: Performed by: HOSPITALIST

## 2023-02-17 PROCEDURE — 87636 SARSCOV2 & INF A&B AMP PRB: CPT | Performed by: EMERGENCY MEDICINE

## 2023-02-17 PROCEDURE — 84484 ASSAY OF TROPONIN QUANT: CPT | Performed by: EMERGENCY MEDICINE

## 2023-02-17 PROCEDURE — 83605 ASSAY OF LACTIC ACID: CPT | Performed by: EMERGENCY MEDICINE

## 2023-02-17 PROCEDURE — 99222 1ST HOSP IP/OBS MODERATE 55: CPT | Performed by: HOSPITALIST

## 2023-02-17 PROCEDURE — 83735 ASSAY OF MAGNESIUM: CPT | Performed by: EMERGENCY MEDICINE

## 2023-02-17 PROCEDURE — 80053 COMPREHEN METABOLIC PANEL: CPT | Performed by: EMERGENCY MEDICINE

## 2023-02-17 PROCEDURE — 81001 URINALYSIS AUTO W/SCOPE: CPT | Performed by: EMERGENCY MEDICINE

## 2023-02-17 PROCEDURE — 87186 SC STD MICRODIL/AGAR DIL: CPT | Performed by: EMERGENCY MEDICINE

## 2023-02-17 PROCEDURE — 96372 THER/PROPH/DIAG INJ SC/IM: CPT

## 2023-02-17 PROCEDURE — P9612 CATHETERIZE FOR URINE SPEC: HCPCS

## 2023-02-17 PROCEDURE — 87040 BLOOD CULTURE FOR BACTERIA: CPT | Performed by: EMERGENCY MEDICINE

## 2023-02-17 PROCEDURE — 71045 X-RAY EXAM CHEST 1 VIEW: CPT

## 2023-02-17 PROCEDURE — 36415 COLL VENOUS BLD VENIPUNCTURE: CPT

## 2023-02-17 PROCEDURE — 85025 COMPLETE CBC W/AUTO DIFF WBC: CPT | Performed by: EMERGENCY MEDICINE

## 2023-02-17 RX ORDER — SODIUM CHLORIDE 0.9 % (FLUSH) 0.9 %
10 SYRINGE (ML) INJECTION EVERY 12 HOURS SCHEDULED
Status: DISCONTINUED | OUTPATIENT
Start: 2023-02-17 | End: 2023-02-20 | Stop reason: HOSPADM

## 2023-02-17 RX ORDER — HYDROCHLOROTHIAZIDE 12.5 MG/1
12.5 TABLET ORAL DAILY
COMMUNITY
End: 2023-03-04 | Stop reason: HOSPADM

## 2023-02-17 RX ORDER — BISACODYL 5 MG/1
5 TABLET, DELAYED RELEASE ORAL DAILY PRN
Status: DISCONTINUED | OUTPATIENT
Start: 2023-02-17 | End: 2023-02-20 | Stop reason: HOSPADM

## 2023-02-17 RX ORDER — POLYETHYLENE GLYCOL 3350 17 G/17G
17 POWDER, FOR SOLUTION ORAL DAILY PRN
Status: DISCONTINUED | OUTPATIENT
Start: 2023-02-17 | End: 2023-02-20 | Stop reason: HOSPADM

## 2023-02-17 RX ORDER — FAMOTIDINE 20 MG/1
40 TABLET, FILM COATED ORAL DAILY
Status: DISCONTINUED | OUTPATIENT
Start: 2023-02-18 | End: 2023-02-20 | Stop reason: HOSPADM

## 2023-02-17 RX ORDER — ASPIRIN 81 MG/1
81 TABLET ORAL DAILY
Status: DISCONTINUED | OUTPATIENT
Start: 2023-02-17 | End: 2023-02-20 | Stop reason: HOSPADM

## 2023-02-17 RX ORDER — CELECOXIB 200 MG/1
200 CAPSULE ORAL DAILY
Status: DISCONTINUED | OUTPATIENT
Start: 2023-02-17 | End: 2023-02-20 | Stop reason: HOSPADM

## 2023-02-17 RX ORDER — BISACODYL 10 MG
10 SUPPOSITORY, RECTAL RECTAL DAILY PRN
Status: DISCONTINUED | OUTPATIENT
Start: 2023-02-17 | End: 2023-02-20 | Stop reason: HOSPADM

## 2023-02-17 RX ORDER — AMLODIPINE BESYLATE 5 MG/1
10 TABLET ORAL DAILY
Status: DISCONTINUED | OUTPATIENT
Start: 2023-02-18 | End: 2023-02-20 | Stop reason: HOSPADM

## 2023-02-17 RX ORDER — METOPROLOL SUCCINATE 25 MG/1
12.5 TABLET, EXTENDED RELEASE ORAL
Status: DISCONTINUED | OUTPATIENT
Start: 2023-02-18 | End: 2023-02-20 | Stop reason: HOSPADM

## 2023-02-17 RX ORDER — NYSTATIN 100000 U/G
1 CREAM TOPICAL 2 TIMES DAILY
COMMUNITY

## 2023-02-17 RX ORDER — ONDANSETRON 4 MG/1
4 TABLET, FILM COATED ORAL EVERY 6 HOURS PRN
Status: DISCONTINUED | OUTPATIENT
Start: 2023-02-17 | End: 2023-02-20 | Stop reason: HOSPADM

## 2023-02-17 RX ORDER — LISINOPRIL 10 MG/1
10 TABLET ORAL DAILY
Status: DISCONTINUED | OUTPATIENT
Start: 2023-02-18 | End: 2023-02-20 | Stop reason: HOSPADM

## 2023-02-17 RX ORDER — PRIMIDONE 50 MG/1
150 TABLET ORAL DAILY
Status: DISCONTINUED | OUTPATIENT
Start: 2023-02-17 | End: 2023-02-20 | Stop reason: HOSPADM

## 2023-02-17 RX ORDER — DONEPEZIL HYDROCHLORIDE 10 MG/1
10 TABLET, FILM COATED ORAL NIGHTLY
Status: DISCONTINUED | OUTPATIENT
Start: 2023-02-17 | End: 2023-02-20 | Stop reason: HOSPADM

## 2023-02-17 RX ORDER — ONDANSETRON 2 MG/ML
4 INJECTION INTRAMUSCULAR; INTRAVENOUS EVERY 6 HOURS PRN
Status: DISCONTINUED | OUTPATIENT
Start: 2023-02-17 | End: 2023-02-20 | Stop reason: HOSPADM

## 2023-02-17 RX ORDER — AMOXICILLIN 250 MG
2 CAPSULE ORAL 2 TIMES DAILY
Status: DISCONTINUED | OUTPATIENT
Start: 2023-02-17 | End: 2023-02-20 | Stop reason: HOSPADM

## 2023-02-17 RX ORDER — SODIUM CHLORIDE 9 MG/ML
40 INJECTION, SOLUTION INTRAVENOUS AS NEEDED
Status: DISCONTINUED | OUTPATIENT
Start: 2023-02-17 | End: 2023-02-20 | Stop reason: HOSPADM

## 2023-02-17 RX ORDER — SODIUM CHLORIDE 0.9 % (FLUSH) 0.9 %
10 SYRINGE (ML) INJECTION AS NEEDED
Status: DISCONTINUED | OUTPATIENT
Start: 2023-02-17 | End: 2023-02-20 | Stop reason: HOSPADM

## 2023-02-17 RX ORDER — PRIMIDONE 50 MG/1
100 TABLET ORAL NIGHTLY
Status: DISCONTINUED | OUTPATIENT
Start: 2023-02-17 | End: 2023-02-20 | Stop reason: HOSPADM

## 2023-02-17 RX ORDER — CHOLECALCIFEROL (VITAMIN D3) 125 MCG
5 CAPSULE ORAL NIGHTLY PRN
Status: DISCONTINUED | OUTPATIENT
Start: 2023-02-17 | End: 2023-02-20 | Stop reason: HOSPADM

## 2023-02-17 RX ORDER — ACETAMINOPHEN 325 MG/1
650 TABLET ORAL EVERY 4 HOURS PRN
Status: DISCONTINUED | OUTPATIENT
Start: 2023-02-17 | End: 2023-02-20 | Stop reason: HOSPADM

## 2023-02-17 RX ORDER — ENOXAPARIN SODIUM 100 MG/ML
40 INJECTION SUBCUTANEOUS DAILY
Status: DISCONTINUED | OUTPATIENT
Start: 2023-02-17 | End: 2023-02-20 | Stop reason: HOSPADM

## 2023-02-17 RX ORDER — PAROXETINE HYDROCHLORIDE 20 MG/1
40 TABLET, FILM COATED ORAL EVERY MORNING
Status: DISCONTINUED | OUTPATIENT
Start: 2023-02-18 | End: 2023-02-20 | Stop reason: HOSPADM

## 2023-02-17 RX ORDER — ATORVASTATIN CALCIUM 40 MG/1
40 TABLET, FILM COATED ORAL DAILY
Status: DISCONTINUED | OUTPATIENT
Start: 2023-02-18 | End: 2023-02-20 | Stop reason: HOSPADM

## 2023-02-17 RX ORDER — LEVOTHYROXINE SODIUM 175 UG/1
175 TABLET ORAL DAILY
Status: DISCONTINUED | OUTPATIENT
Start: 2023-02-18 | End: 2023-02-20 | Stop reason: HOSPADM

## 2023-02-17 RX ORDER — NYSTATIN 100000 U/G
1 CREAM TOPICAL 2 TIMES DAILY
Status: DISCONTINUED | OUTPATIENT
Start: 2023-02-17 | End: 2023-02-20 | Stop reason: HOSPADM

## 2023-02-17 RX ADMIN — SODIUM CHLORIDE 1000 ML: 9 INJECTION, SOLUTION INTRAVENOUS at 13:19

## 2023-02-17 RX ADMIN — DONEPEZIL HYDROCHLORIDE 10 MG: 10 TABLET, FILM COATED ORAL at 20:52

## 2023-02-17 RX ADMIN — ACETAMINOPHEN 325MG 650 MG: 325 TABLET ORAL at 19:11

## 2023-02-17 RX ADMIN — CEFTRIAXONE 1 G: 1 INJECTION, POWDER, FOR SOLUTION INTRAMUSCULAR; INTRAVENOUS at 13:18

## 2023-02-17 RX ADMIN — NYSTATIN 1 APPLICATION: 100000 CREAM TOPICAL at 20:52

## 2023-02-17 RX ADMIN — PRIMIDONE 100 MG: 50 TABLET ORAL at 20:52

## 2023-02-17 RX ADMIN — ENOXAPARIN SODIUM 40 MG: 40 INJECTION SUBCUTANEOUS at 18:37

## 2023-02-17 RX ADMIN — Medication 10 ML: at 20:52

## 2023-02-17 RX ADMIN — SENNOSIDES AND DOCUSATE SODIUM 2 TABLET: 50; 8.6 TABLET ORAL at 20:52

## 2023-02-17 NOTE — ED PROVIDER NOTES
"Subjective   History of Present Illness  Patient is a pleasant 82-year-old female who presents with generalized weakness, decreased mentation, and facial erythema.  EMS reported that the patient was bradycardic and hypotensive and this was the reason for presentation in addition to general lethargy.  Exact onset is not clear but the son states that he was with her 2 days ago and he did not notice abnormal mentation at that time.  Over the last 2 days she has become lethargic and vital signs were abnormal this morning prompted her transfer to the emergency department.  EMS notes that they did not have abnormal vital signs in route and upon arrival here to the emergency department her vital signs are normal with a normal heart rate and normal blood pressure.  History is limited as patient is not able to supply history due to both her somnolence and baseline dementia.    Patient is resting peacefully and has no acute complaints at the immediate time.  Son states that she normally is much more alert than this.        Review of Systems    Past Medical History:   Diagnosis Date   • Breast cancer (HCC) 1987    left- pt states \"in situ\", no radiation, Tamoxifen for 5 years   • Cellulitis    • Cervical lymphadenopathy    • Chest pain    • Convulsions (HCC)    • GERD (gastroesophageal reflux disease)    • Glossitis    • Grief reaction     · Last Impression: 29 Jan 2015  counselled, given ativan for prn use.  Aleah Regan   • Hypertension    • Lower back pain    • Oral thrush    • Papillary adenocarcinoma (HCC)     Papillary adenocarcinoma of thyroid   • Papillary adenocarcinoma of thyroid (HCC)     · resection Ramirez- 1/13,  2 x 2 x 1.5 cm  T3 N1 MXpost op low calcium and diminished  voiceablation Clara- 3/7 metastatic nodes and invasion to strap muscles · Last Impression: 29 Oct 2014  s/p Eval by berry Méndez.  Aleah Regan (Internal   • Piriformis syndrome    • Tingling    • Xerostomia        Allergies "   Allergen Reactions   • Dilaudid [Hydromorphone Hcl] Hallucinations     TABS   • Beta Adrenergic Blockers Other (See Comments)     Hypotension / bradycadia   • Dilaudid [Hydromorphone] Unknown - High Severity   • Lactose Intolerance (Gi) Diarrhea       Past Surgical History:   Procedure Laterality Date   • BREAST BIOPSY Right 10/10/2013    stereo bx   • BREAST BIOPSY Left 10/19/2015    stereo bx   • BREAST CYST EXCISION      right   • BREAST EXCISIONAL BIOPSY Right     affirm bx and loc .   • BREAST LUMPECTOMY Left    • CARDIAC CATHETERIZATION N/A 2021    Procedure: Left Heart Cath;  Surgeon: Alonso Ross IV, MD;  Location:  [facility] CATH INVASIVE LOCATION;  Service: Cardiology;  Laterality: N/A;   • HYSTERECTOMY     • OTHER SURGICAL HISTORY Right     right arm surgery-steel roger in place   • TOTAL HIP ARTHROPLASTY     • TOTAL THYROIDECTOMY      Thyroid Surgery Total Thyroidectomy       Family History   Problem Relation Age of Onset   • Breast cancer Mother 84   • Cancer Mother    • BRCA 1/2 Neg Hx    • Colon cancer Neg Hx    • Endometrial cancer Neg Hx    • Ovarian cancer Neg Hx        Social History     Socioeconomic History   • Marital status:    Tobacco Use   • Smoking status: Former     Packs/day: 1.00     Years: 30.00     Pack years: 30.00     Types: Cigarettes     Quit date: 1992     Years since quittin.4   • Smokeless tobacco: Never   Vaping Use   • Vaping Use: Never used   Substance and Sexual Activity   • Alcohol use: No   • Drug use: No   • Sexual activity: Not Currently           Objective   Physical Exam  Vitals and nursing note reviewed.   Constitutional:       Appearance: She is ill-appearing.      Comments: Somnolent, but arouses with painful stimuli   HENT:      Head: Normocephalic and atraumatic.      Comments: Splotchy erythematous rash over her face with shiny ointment also covering her face, possible Vaseline or other moisturizer prior to arrival in  the emergency department.     Mouth/Throat:      Mouth: Mucous membranes are moist.   Eyes:      Extraocular Movements: Extraocular movements intact.      Conjunctiva/sclera: Conjunctivae normal.      Pupils: Pupils are equal, round, and reactive to light.   Cardiovascular:      Rate and Rhythm: Normal rate and regular rhythm.      Pulses: Normal pulses.      Heart sounds: Normal heart sounds. No murmur heard.    No friction rub.   Pulmonary:      Effort: Pulmonary effort is normal. No respiratory distress.      Breath sounds: Normal breath sounds. No wheezing or rhonchi.   Abdominal:      General: Bowel sounds are normal. There is no distension.      Palpations: Abdomen is soft.      Tenderness: There is no abdominal tenderness. There is no guarding or rebound.   Musculoskeletal:         General: No swelling, tenderness, deformity or signs of injury. Normal range of motion.   Skin:     General: Skin is warm and dry.      Capillary Refill: Capillary refill takes less than 2 seconds.      Comments: Facial rash as noted above   Neurological:      General: No focal deficit present.      Mental Status: She is disoriented.      Motor: Weakness (Generalized.  No unilateral deficit appreciated) present.   Psychiatric:         Behavior: Behavior normal.         Thought Content: Thought content normal.         Procedures           ED Course               XR Chest 1 View   Final Result   Impression:   No active disease      Electronically Signed: Andrea Estrada     2/17/2023 11:41 AM EST     Workstation ID: [id number]        Vitals:    02/17/23 2320 02/18/23 0255 02/18/23 0729 02/18/23 1153   BP: 116/56 160/73 152/67 164/82   BP Location: Left arm Left arm Left arm Left arm   Patient Position: Lying Lying Lying Lying   Pulse: 71 58 64 64   Resp: 16 16 16 18   Temp: 98.5 °F (36.9 °C) 97.5 °F (36.4 °C) 97.8 °F (36.6 °C) 98.6 °F (37 °C)   TempSrc: Oral Oral Axillary Oral   SpO2: 95% 95% 95% 98%   Weight:       Height:          Medications   sodium chloride 0.9 % flush 10 mL (has no administration in time range)   sodium chloride 0.9 % flush 10 mL (10 mL Intravenous Given 2/18/23 0810)   sodium chloride 0.9 % flush 10 mL (has no administration in time range)   sodium chloride 0.9 % infusion 40 mL (has no administration in time range)   Enoxaparin Sodium (LOVENOX) syringe 40 mg (40 mg Subcutaneous Given 2/18/23 0810)   acetaminophen (TYLENOL) tablet 650 mg (650 mg Oral Given 2/17/23 1911)   ondansetron (ZOFRAN) tablet 4 mg (has no administration in time range)     Or   ondansetron (ZOFRAN) injection 4 mg (has no administration in time range)   sennosides-docusate (PERICOLACE) 8.6-50 MG per tablet 2 tablet (2 tablets Oral Given 2/18/23 0809)     And   polyethylene glycol (MIRALAX) packet 17 g (has no administration in time range)     And   bisacodyl (DULCOLAX) EC tablet 5 mg (has no administration in time range)     And   bisacodyl (DULCOLAX) suppository 10 mg (has no administration in time range)   melatonin tablet 5 mg (has no administration in time range)   cefTRIAXone (ROCEPHIN) 1 g/100 mL 0.9% NS (MBP) (0 g Intravenous Stopped 2/18/23 1126)   amLODIPine (NORVASC) tablet 10 mg (10 mg Oral Given 2/18/23 0809)   aspirin EC tablet 81 mg (81 mg Oral Given 2/18/23 0809)   atorvastatin (LIPITOR) tablet 40 mg (40 mg Oral Given 2/18/23 0809)   donepezil (ARICEPT) tablet 10 mg (10 mg Oral Given 2/17/23 2052)   famotidine (PEPCID) tablet 40 mg (40 mg Oral Given 2/18/23 0809)   levothyroxine (SYNTHROID, LEVOTHROID) tablet 175 mcg (175 mcg Oral Given 2/18/23 0809)   lisinopril (PRINIVIL,ZESTRIL) tablet 10 mg (10 mg Oral Given 2/18/23 0809)   metoprolol succinate XL (TOPROL-XL) 24 hr tablet 12.5 mg (12.5 mg Oral Given 2/18/23 0809)   PARoxetine (PAXIL) tablet 40 mg (40 mg Oral Given 2/18/23 0809)   nystatin (MYCOSTATIN) [id number] UNIT/GM cream 1 application (1 application Topical Given 2/18/23 0810)   primidone (MYSOLINE) tablet 150 mg (150 mg Oral  Given 2/18/23 0814)   primidone (MYSOLINE) tablet 100 mg (100 mg Oral Given 2/17/23 2052)   celecoxib (CeleBREX) capsule 200 mg (200 mg Oral Given 2/18/23 0809)   Potassium Replacement - Initiate Nurse / BPA Driven Protocol (has no administration in time range)   Magnesium Standard Dose Replacement - Initiate Nurse / BPA Driven Protocol (has no administration in time range)   Phosphorus Replacement - Initiate Nurse / BPA Driven Protocol (has no administration in time range)   Calcium Replacement - Initiate Nurse / BPA Driven Protocol (has no administration in time range)   potassium chloride (MICRO-K) CR capsule 40 mEq (has no administration in time range)   cefTRIAXone (ROCEPHIN) 1 g/100 mL 0.9% NS (MBP) (1 g Intravenous New Bag 2/17/23 1318)   sodium chloride 0.9 % bolus 1,000 mL (1,000 mL Intravenous New Bag 2/17/23 1319)   potassium chloride (MICRO-K) CR capsule 40 mEq (40 mEq Oral Given 2/18/23 1238)     ECG/EMG Results (last 24 hours)     Procedure Component Value Units Date/Time    SCANNED - TELEMETRY   [[id number]] Resulted: 02/17/23     Updated: 02/18/23 0844        ECG 12 Lead ED Triage Standing Order; Weak / Dizzy / AMS   Preliminary Result   Test Reason : ED Triage Standing Order~   Blood Pressure :   */*   mmHG   Vent. Rate :  63 BPM     Atrial Rate :  63 BPM      P-R Int : 148 ms          QRS Dur :  84 ms       QT Int : 418 ms       P-R-T Axes :  60  -9  64 degrees      QTc Int : 427 ms      Sinus rhythm with occasional premature ventricular complexes   Otherwise normal ECG   When compared with ECG of 31-MAY-2022 18:43,   premature ventricular complexes are now present      Referred By: EDMD           Confirmed By:       SCANNED - TELEMETRY     Final Result         Latest Reference Range & Units 08/09/22 05:42 02/17/23 10:41   Glucose 65 - 99 mg/dL 85    Sodium 136 - 145 mmol/L 141    Potassium 3.5 - 5.2 mmol/L 4.2    CO2 22.0 - 29.0 mmol/L 24.0    Chloride 98 - 107 mmol/L 107    Anion Gap 5.0 - 15.0  mmol/L 10.0    Creatinine 0.57 - 1.00 mg/dL 0.58    BUN 8 - 23 mg/dL 9    BUN/Creatinine Ratio 7.0 - 25.0  15.5    Calcium 8.6 - 10.5 mg/dL 8.3 (L)    eGFR >60.0 mL/min/1.73 89.9    Alkaline Phosphatase 39 - 117 U/L 78    Total Protein 6.0 - 8.5 g/dL 5.7 (L)    ALT (SGPT) 1 - 33 U/L 8    AST (SGOT) 1 - 32 U/L 15    Total Bilirubin 0.0 - 1.2 mg/dL 0.2    Albumin 3.50 - 5.20 g/dL 3.10 (L)    Globulin gm/dL 2.6    A/G Ratio g/dL 1.2    Phosphorus 2.5 - 4.5 mg/dL 2.5    WBC 3.40 - 10.80 10*3/mm3 4.75    RBC 3.77 - 5.28 10*6/mm3 3.47 (L)    Hemoglobin 12.0 - 15.9 g/dL 9.7 (L)    Hematocrit 34.0 - 46.6 % 30.3 (L)    RDW 12.3 - 15.4 % 16.6 (H)    MCV 79.0 - 97.0 fL 87.3    MCH 26.6 - 33.0 pg 28.0    MCHC 31.5 - 35.7 g/dL 32.0    MPV 6.0 - 12.0 fL 9.8    Platelets 140 - 450 10*3/mm3 274    RDW-SD 37.0 - 54.0 fl 52.5    Neutrophil Rel % 42.7 - 76.0 % 67.5    Lymphocyte Rel % 19.6 - 45.3 % 20.4    Monocyte Rel % 5.0 - 12.0 % 9.7    Eosinophil Rel % 0.3 - 6.2 % 0.8    Basophil Rel % 0.0 - 1.5 % 0.8    Immature Granulocyte Rel % 0.0 - 0.5 % 0.8 (H)    Neutrophils Absolute 1.70 - 7.00 10*3/mm3 3.20    Lymphocytes Absolute 0.70 - 3.10 10*3/mm3 0.97    Monocytes Absolute 0.10 - 0.90 10*3/mm3 0.46    Eosinophils Absolute 0.00 - 0.40 10*3/mm3 0.04    Basophils Absolute 0.00 - 0.20 10*3/mm3 0.04    Immature Grans, Absolute 0.00 - 0.05 10*3/mm3 0.04    nRBC 0.0 - 0.2 /100 WBC 0.0    Color, UA Yellow, Straw   Yellow   Appearance, UA Clear   Cloudy !   Specific Gravity, UA 1.001 - 1.030   1.021   pH, UA 5.0 - 8.0   6.0   Glucose Negative   Negative   Ketones, UA Negative   Negative   Blood, UA Negative   Small (1+) !   Nitrite, UA Negative   Positive !   Leukocytes, UA Negative   Moderate (2+) !   Protein, UA Negative   Trace !   Bilirubin, UA Negative   Negative   Urobilinogen, UA 0.2 - 1.0 E.U./dL   0.2 E.U./dL   RBC, UA None Seen, 0-2 /HPF  7-12 !   WBC, UA None Seen, 0-2 /HPF  Too Numerous to Count !   Bacteria, UA None Seen, Trace  /HPF  4+ !   Squamous Epithelial Cells, UA None Seen, 0-2 /HPF  0-2   Hyaline Casts, UA 0 - 6 /LPF  0-6   Methodology:   Automated Microscopy   (L): Data is abnormally low  (H): Data is abnormally high  !: Data is abnormal    Medical Decision Making  Patient presents with altered mental status likely secondary to urinary tract infection.  Multiple etiologies considered including intracranial, intrathoracic, right and intra-abdominal etiologies.  Patient was started on antibiotics and case was discussed with internal medicine.  Patient will be admitted for further evaluation and management.    Acute UTI: complicated acute illness or injury  Altered mental status, unspecified altered mental status type: complicated acute illness or injury  Anemia, unspecified type: complicated acute illness or injury  Rash of face: complicated acute illness or injury  Amount and/or Complexity of Data Reviewed  Independent Historian: EMS     Details: EMS and son  External Data Reviewed: notes.     Details: Nursing home documentation  Labs: ordered. Decision-making details documented in ED Course.  Radiology: ordered and independent interpretation performed. Decision-making details documented in ED Course.  ECG/medicine tests: ordered and independent interpretation performed. Decision-making details documented in ED Course.  Discussion of management or test interpretation with external provider(s): Internal medicine attending    Risk  Prescription drug management.  Decision regarding hospitalization.          Final diagnoses:   Acute UTI   Altered mental status, unspecified altered mental status type   Rash of face   Anemia, unspecified type       ED Disposition  ED Disposition     ED Disposition   Decision to Admit    Condition   --    Comment   Level of Care: Telemetry [5]   Diagnosis: Acute UTI [080967]   Bed Request Comments: CDU/SSU if available                Juanito Van DO  02/18/23 1314       Juanito Van DO  02/18/23 1627

## 2023-02-17 NOTE — CASE MANAGEMENT/SOCIAL WORK
Discharge Planning Assessment  [facility]     Patient Name: Zohra Hall  MRN: [id number]  Today's Date: 2/17/2023    Admit Date: 2/17/2023    Plan: IDP   Discharge Needs Assessment     Row Name 02/17/23 1451       Living Environment    People in Home facility resident  [facility]    Current Living Arrangements --  [facility]    Primary Care Provided by other (see comments)  RN Staff    Provides Primary Care For no one, unable/limited ability to care for self    Quality of Family Relationships helpful;involved       Transition Planning    Patient/Family Anticipates Transition to long-term care facility    Patient/Family Anticipated Services at Transition     Transportation Anticipated other (see comments)  Need Medical transport       Discharge Needs Assessment    Readmission Within the Last 30 Days no previous admission in last 30 days    Equipment Currently Used at Home wheelchair;walker, standard    Concerns to be Addressed discharge planning    Current Discharge Risk dependent with mobility/activities of daily living               Discharge Plan     Row Name 02/17/23 1453       Plan    Plan IDP    Plan Comments MSW spoke with Pt’s POA via phone to complete IDP. Pt is from [facility]. Pt’s PCP is Dr. Regan. Pt has Medicare and AARP for insurance coverage. Pt is dependent with ADL’s and requires assistance from staff. Pt has walker and WC. Pt has no O2. Pt’s pharmacy is [facility]. Pt’s POA reports medical transport is usually needed to transport Pt. CM will continue to follow.    Final Discharge Disposition Code 30 - still a patient              Continued Care and Services - Admitted Since 2/17/2023    Coordination has not been started for this encounter.       Expected Discharge Date and Time     Expected Discharge Date Expected Discharge Time    Feb 19, 2023          Demographic Summary     Row Name 02/17/23 1451       General Information    Admission  Type observation    Arrived From long-term care    Referral Source admission list;emergency department    Reason for Consult discharge planning    Preferred Language English               Functional Status     Row Name 02/17/23 1450       Functional Status    Usual Activity Tolerance fair    Current Activity Tolerance fair       Functional Status, IADL    Medications completely dependent    Meal Preparation completely dependent    Housekeeping completely dependent    Laundry completely dependent    Shopping completely dependent                         JONATHAN Bennett

## 2023-02-17 NOTE — H&P
"    [facility] Medicine Services  Clinical Decision Unit (CDU)  History and Physical    Patient Name: Zohra Hall  : 1940  MRN: [id number]  Primary Care Physician: Aleah Regan MD  Date of admission: 2023 10:25 AM      Subjective   Subjective     Chief Complaint:  AMS    HPI:  Zohra Hall is a 82 y.o. female with hx of HTN, HLD, CAD, chronic diastolic CHF, thyroid cancer s/p remote total thyroidectomy with subsequent hypothyroidism, CVA, GERD, essential tremor, Alzheimer's dementia, and frequent falls who presents due to altered mental status from her nursing facility. Unable to obtain history due to mental status and underlying dementia. Workup in the ER revealed an acute UTI. She received IV Rocephin. Hospitalist service called for admission. During my exam, patient was noted to have pulled out her left arm IV and was bleeding all over her gown. She is able to tell me that she pulled the tape off.       Review of Systems   Difficult to obtain due to AMS, denies pain, SOA, cough    All other systems reviewed and negative    Personal History     Past Medical History:   Diagnosis Date   • Breast cancer (HCC)     left- pt states \"in situ\", no radiation, Tamoxifen for 5 years   • Cellulitis    • Cervical lymphadenopathy    • Chest pain    • Convulsions (HCC)    • GERD (gastroesophageal reflux disease)    • Glossitis    • Grief reaction     · Last Impression: 2015  counselled, given ativan for prn use.  Aleah Regan   • Hypertension    • Lower back pain    • Oral thrush    • Papillary adenocarcinoma (HCC)     Papillary adenocarcinoma of thyroid   • Papillary adenocarcinoma of thyroid (HCC)     · resection Ramirez- ,  2 x 2 x 1.5 cm  T3 N1 MXpost op low calcium and diminished  voiceablation Ain- 3/ metastatic nodes and invasion to strap muscles · Last Impression: 29 Oct 2014  s/p Eval by berry Méndez.  Aleah Regan (Internal   • Piriformis " syndrome    • Tingling    • Xerostomia        Past Surgical History:   Procedure Laterality Date   • BREAST BIOPSY Right 10/10/2013    stereo bx   • BREAST BIOPSY Left 10/19/2015    stereo bx   • BREAST CYST EXCISION      right   • BREAST EXCISIONAL BIOPSY Right     affirm bx and loc 2016.   • BREAST LUMPECTOMY Left 1987   • CARDIAC CATHETERIZATION N/A 5/23/2021    Procedure: Left Heart Cath;  Surgeon: Alonso Ross IV, MD;  [facility];  Service: Cardiology;  Laterality: N/A;   • HYSTERECTOMY  1979   • OTHER SURGICAL HISTORY Right     right arm surgery-steel roger in place   • TOTAL HIP ARTHROPLASTY     • TOTAL THYROIDECTOMY      Thyroid Surgery Total Thyroidectomy       Family History:  family history includes Breast cancer (age of onset: 84) in her mother; Cancer in her mother. Otherwise pertinent FHx was reviewed and unremarkable.     Social History:  reports that she quit smoking about 30 years ago. Her smoking use included cigarettes. She has a 30.00 pack-year smoking history. She has never used smokeless tobacco. She reports that she does not drink alcohol and does not use drugs.  Social History     Social History Narrative   • Not on file       Medications:  PARoxetine, acetaminophen, amLODIPine, aspirin, atorvastatin, betamethasone dipropionate, celecoxib, donepezil, famotidine, hydroCHLOROthiazide, levothyroxine, lisinopril, metoprolol succinate XL, nystatin, ondansetron, primidone, and terazosin    Allergies   Allergen Reactions   • Dilaudid [Hydromorphone Hcl] Hallucinations     TABS   • Beta Adrenergic Blockers Other (See Comments)     Hypotension / bradycadia   • Dilaudid [Hydromorphone] Unknown - High Severity   • Lactose Intolerance (Gi) Diarrhea       Objective   Objective     Vital Signs:   Temp:  [98.6 °F (37 °C)-99.1 °F (37.3 °C)] 99.1 °F (37.3 °C)  Heart Rate:  [62-66] 62  Resp:  [20] 20  BP: (118-138)/(50-64) 129/50    Physical Exam   Constitutional: Awake,  alert, ill appearing  Eyes: PERRLA, sclerae anicteric, no conjunctival injection  HENT: NCAT, mucous membranes moist  Neck: Supple, no lymphadenopathy, trachea midline  Respiratory: Clear to auscultation bilaterally, nonlabored respirations   Cardiovascular: RRR, no murmurs, rubs, or gallops, palpable pedal pulses bilaterally  Gastrointestinal: Positive bowel sounds, soft, nontender, nondistended  Musculoskeletal: trace BLE edema, no clubbing or cyanosis to extremities  Psychiatric: Appropriate affect, cooperative  Neurologic: Oriented x 1, confused, strength symmetric in all extremities, Cranial Nerves grossly intact to confrontation, speech clear, essential tremor noted  Skin: diffuse erythematous facial rash noted, sparing the periorbital/perioral areas      Results Reviewed:  I have personally reviewed most recent lab results, microbiology results and radiology images and interpretations and agree with findings, most notably: labs fairly unremarkable, previous urine cultures reviewed, CXR no acute findings    LAB RESULTS:      Lab 02/17/23  1056   WBC 8.11   HEMOGLOBIN 9.4*   HEMATOCRIT 31.8*   PLATELETS 224   NEUTROS ABS 6.50   IMMATURE GRANS (ABS) 0.02   LYMPHS ABS 0.70   MONOS ABS 0.79   EOS ABS 0.08   MCV 77.0*   LACTATE 1.8         Lab 02/17/23  1056   SODIUM 137   POTASSIUM 3.7   CHLORIDE 100   CO2 26.0   ANION GAP 11.0   BUN 22   CREATININE 0.80   EGFR 72.8   GLUCOSE 108*   CALCIUM 8.8   MAGNESIUM 2.1         Lab 02/17/23  1056   TOTAL PROTEIN 6.8   ALBUMIN 3.4*   GLOBULIN 3.4   ALT (SGPT) 8   AST (SGOT) 11   BILIRUBIN 0.2   ALK PHOS 69         Lab 02/17/23  1300 02/17/23  1056   HSTROP T 18* 18*                 Brief Urine Lab Results  (Last result in the past 365 days)      Color   Clarity   Blood   Leuk Est   Nitrite   Protein   CREAT   Urine HCG        02/17/23 1041 Yellow   Cloudy   Small (1+)   Moderate (2+)   Positive   Trace               Microbiology Results (last 10 days)     Procedure Component  Value - Date/Time    COVID PRE-OP / PRE-PROCEDURE SCREENING ORDER (NO ISOLATION) - Swab, Nasopharynx [[id number]]  (Normal) Collected: 02/17/23 1117    Lab Status: Final result Specimen: Swab from Nasopharynx Updated: 02/17/23 1152    Narrative:      The following orders were created for panel order COVID PRE-OP / PRE-PROCEDURE SCREENING ORDER (NO ISOLATION) - Swab, Nasopharynx.  Procedure                               Abnormality         Status                     ---------                               -----------         ------                     COVID-19 and FLU A/B PCR...[[id number]]  Normal              Final result                 Please view results for these tests on the individual orders.    COVID-19 and FLU A/B PCR - Swab, Nasopharynx [[id number]]  (Normal) Collected: 02/17/23 1117    Lab Status: Final result Specimen: Swab from Nasopharynx Updated: 02/17/23 1152     COVID19 Not Detected     Influenza A PCR Not Detected     Influenza B PCR Not Detected    Narrative:      Fact sheet for providers: https://www.fda.gov/media/733884/download    Fact sheet for patients: https://www.fda.gov/media/282218/download    Test performed by PCR.           Results for orders placed during the hospital encounter of 08/09/21    Adult Transthoracic Echo Complete W/ Cont if Necessary Per Protocol    Interpretation Summary  · Estimated left ventricular EF = 60% Left ventricular systolic function is normal.  · Left ventricular diastolic function was normal.  · Trace mitral and tricuspid regurgitation.  · Mild tachycardia noted throughout the study.        Assessment & Plan   Assessment / Plan     Active Hospital Problems    Diagnosis  POA   • **Acute UTI [N39.0]  Yes   • Metabolic encephalopathy [G93.41]  Yes   • Alzheimer disease (HCC) [G30.9, F02.80]  Yes   • Coronary artery disease [I25.10]  Yes     · Cardiac catheterization for NSTEMI (5/23/2021): The major epicardial arteries had nonobstructive CAD.  The terminal portion  of the PDA had a thrombotic occlusion.  LVEF 35% with regional wall motion abnormality consistent with Takotsubo cardiomyopathy.     • Hypertension [I10]  Yes   • Hyperlipidemia LDL goal <100 [E78.5]  Yes   • Hypothyroid post remote resection papillary adenocarcinoma thyroid [E03.9]  Yes     83 yo F with hx of HTN, HLD, CAD, chronic diastolic CHF, thyroid cancer s/p remote total thyroidectomy with subsequent hypothyroidism, CVA, GERD, essential tremor, Alzheimer's dementia, and frequent falls who presents due to altered mental status from her nursing facility. She is found to have an acute UTI.    Acute UTI  Metabolic encephalopathy  --Continue Rocephin x 5 days, can switch to PO antibiotics based on final urine culture and discharge readiness  --Monitor for improvement in mental status    Facial rash  --Data deficit, per ER provider has been using an ointment - will try to clarify with patient's admission med rec from the nursing facility    Other chronic medical problems:  HTN / HLD / CAD / chronic diastolic CHF-- continue ASA, statin, Norvasc, Lisinopril, Metoprolol; hold HCTZ and Terazosin for now  Post-surgical hypothyroidism -- continue Synthroid, she has a PCP appointment on 2/22/23, defer checking TSH until that visit   CVA -- continue ASA, statin  GERD -- continue PPI  Alzheimer's dementia -- continue Aricept, Paxil  Essential tremor -- continue Primidone  Frequent falls -- consult PT/OT        CODE STATUS:    Medical Intervention Limits: NO intubation (DNI)  Code Status (Patient has no pulse and is not breathing): No CPR (Do Not Attempt to Resuscitate)  Medical Interventions (Patient has pulse or is breathing): Limited Support            Discharge Blueprint (criteria for discharge):   1. Improvement in mental status  2. Finalization of urine culture    Expected Discharge   Expected Discharge Date and Time     Expected Discharge Date Expected Discharge Time    Feb 19, 2023            Rosa Grant,  MD  02/17/23

## 2023-02-18 LAB
ANION GAP SERPL CALCULATED.3IONS-SCNC: 10 MMOL/L (ref 5–15)
BUN SERPL-MCNC: 18 MG/DL (ref 8–23)
BUN/CREAT SERPL: 32.1 (ref 7–25)
CALCIUM SPEC-SCNC: 8.3 MG/DL (ref 8.6–10.5)
CHLORIDE SERPL-SCNC: 101 MMOL/L (ref 98–107)
CO2 SERPL-SCNC: 24 MMOL/L (ref 22–29)
CREAT SERPL-MCNC: 0.56 MG/DL (ref 0.57–1)
DEPRECATED RDW RBC AUTO: 49.1 FL (ref 37–54)
EGFRCR SERPLBLD CKD-EPI 2021: 90.1 ML/MIN/1.73
ERYTHROCYTE [DISTWIDTH] IN BLOOD BY AUTOMATED COUNT: 17.9 % (ref 12.3–15.4)
GLUCOSE SERPL-MCNC: 98 MG/DL (ref 65–99)
HCT VFR BLD AUTO: 29 % (ref 34–46.6)
HGB BLD-MCNC: 8.8 G/DL (ref 12–15.9)
MCH RBC QN AUTO: 23.1 PG (ref 26.6–33)
MCHC RBC AUTO-ENTMCNC: 30.3 G/DL (ref 31.5–35.7)
MCV RBC AUTO: 76.1 FL (ref 79–97)
PLATELET # BLD AUTO: 217 10*3/MM3 (ref 140–450)
PMV BLD AUTO: 10.8 FL (ref 6–12)
POTASSIUM SERPL-SCNC: 3.3 MMOL/L (ref 3.5–5.2)
POTASSIUM SERPL-SCNC: 4.6 MMOL/L (ref 3.5–5.2)
QT INTERVAL: 418 MS
QTC INTERVAL: 427 MS
RBC # BLD AUTO: 3.81 10*6/MM3 (ref 3.77–5.28)
SODIUM SERPL-SCNC: 135 MMOL/L (ref 136–145)
WBC NRBC COR # BLD: 7.02 10*3/MM3 (ref 3.4–10.8)

## 2023-02-18 PROCEDURE — 96365 THER/PROPH/DIAG IV INF INIT: CPT

## 2023-02-18 PROCEDURE — 99233 SBSQ HOSP IP/OBS HIGH 50: CPT | Performed by: HOSPITALIST

## 2023-02-18 PROCEDURE — 25010000002 ENOXAPARIN PER 10 MG: Performed by: HOSPITALIST

## 2023-02-18 PROCEDURE — 97530 THERAPEUTIC ACTIVITIES: CPT

## 2023-02-18 PROCEDURE — 96372 THER/PROPH/DIAG INJ SC/IM: CPT

## 2023-02-18 PROCEDURE — 84132 ASSAY OF SERUM POTASSIUM: CPT | Performed by: HOSPITALIST

## 2023-02-18 PROCEDURE — G0378 HOSPITAL OBSERVATION PER HR: HCPCS

## 2023-02-18 PROCEDURE — 92610 EVALUATE SWALLOWING FUNCTION: CPT | Performed by: SPEECH-LANGUAGE PATHOLOGIST

## 2023-02-18 PROCEDURE — 25010000002 CEFTRIAXONE PER 250 MG: Performed by: HOSPITALIST

## 2023-02-18 PROCEDURE — 80048 BASIC METABOLIC PNL TOTAL CA: CPT | Performed by: HOSPITALIST

## 2023-02-18 PROCEDURE — 85027 COMPLETE CBC AUTOMATED: CPT | Performed by: HOSPITALIST

## 2023-02-18 PROCEDURE — 97161 PT EVAL LOW COMPLEX 20 MIN: CPT

## 2023-02-18 RX ORDER — POTASSIUM CHLORIDE 750 MG/1
40 CAPSULE, EXTENDED RELEASE ORAL ONCE
Status: COMPLETED | OUTPATIENT
Start: 2023-02-18 | End: 2023-02-18

## 2023-02-18 RX ORDER — POTASSIUM CHLORIDE 750 MG/1
40 CAPSULE, EXTENDED RELEASE ORAL ONCE
Status: DISCONTINUED | OUTPATIENT
Start: 2023-02-18 | End: 2023-02-18

## 2023-02-18 RX ADMIN — ATORVASTATIN CALCIUM 40 MG: 40 TABLET, FILM COATED ORAL at 08:09

## 2023-02-18 RX ADMIN — FAMOTIDINE 40 MG: 20 TABLET ORAL at 08:09

## 2023-02-18 RX ADMIN — SENNOSIDES AND DOCUSATE SODIUM 2 TABLET: 50; 8.6 TABLET ORAL at 20:44

## 2023-02-18 RX ADMIN — DONEPEZIL HYDROCHLORIDE 10 MG: 10 TABLET, FILM COATED ORAL at 20:44

## 2023-02-18 RX ADMIN — CELECOXIB 200 MG: 200 CAPSULE ORAL at 08:09

## 2023-02-18 RX ADMIN — LEVOTHYROXINE SODIUM 175 MCG: 175 TABLET ORAL at 08:09

## 2023-02-18 RX ADMIN — ENOXAPARIN SODIUM 40 MG: 40 INJECTION SUBCUTANEOUS at 08:10

## 2023-02-18 RX ADMIN — METOPROLOL SUCCINATE 12.5 MG: 25 TABLET, EXTENDED RELEASE ORAL at 08:09

## 2023-02-18 RX ADMIN — SENNOSIDES AND DOCUSATE SODIUM 2 TABLET: 50; 8.6 TABLET ORAL at 08:09

## 2023-02-18 RX ADMIN — SODIUM CHLORIDE 1 G: 900 INJECTION INTRAVENOUS at 08:10

## 2023-02-18 RX ADMIN — POTASSIUM CHLORIDE 40 MEQ: 10 CAPSULE, COATED, EXTENDED RELEASE ORAL at 12:38

## 2023-02-18 RX ADMIN — PRIMIDONE 150 MG: 50 TABLET ORAL at 08:14

## 2023-02-18 RX ADMIN — POTASSIUM CHLORIDE 40 MEQ: 10 CAPSULE, COATED, EXTENDED RELEASE ORAL at 17:02

## 2023-02-18 RX ADMIN — NYSTATIN 1 APPLICATION: 100000 CREAM TOPICAL at 08:10

## 2023-02-18 RX ADMIN — LISINOPRIL 10 MG: 10 TABLET ORAL at 08:09

## 2023-02-18 RX ADMIN — PAROXETINE HYDROCHLORIDE 40 MG: 20 TABLET, FILM COATED ORAL at 08:09

## 2023-02-18 RX ADMIN — PRIMIDONE 100 MG: 50 TABLET ORAL at 20:43

## 2023-02-18 RX ADMIN — AMLODIPINE BESYLATE 10 MG: 5 TABLET ORAL at 08:09

## 2023-02-18 RX ADMIN — ASPIRIN 81 MG: 81 TABLET, COATED ORAL at 08:09

## 2023-02-18 RX ADMIN — Medication 10 ML: at 20:47

## 2023-02-18 RX ADMIN — Medication 10 ML: at 08:10

## 2023-02-18 RX ADMIN — NYSTATIN 1 APPLICATION: 100000 CREAM TOPICAL at 20:46

## 2023-02-18 NOTE — PROGRESS NOTES
[facility] Medicine Services  PROGRESS NOTE    Patient Name: Zohra Hall  : 1940  MRN: [id number]    Date of Admission: 2023  Primary Care Physician: Aleah Regan MD    Subjective   Subjective     CC:  AMS    HPI:  Patient resting in bed, ill appearing. Unable to answer questions due to confusion.    ROS:  Cannot obtain d/t AMS    Objective   Objective     Vital Signs:   Temp:  [97.5 °F (36.4 °C)-99 °F (37.2 °C)] 97.8 °F (36.6 °C)  Heart Rate:  [58-88] 64  Resp:  [15-20] 16  BP: (116-161)/(50-79) 152/67     Physical Exam:  Constitutional: No acute distress, sleepy  HENT: NCAT, mucous membranes moist  Respiratory: Clear to auscultation bilaterally, respiratory effort normal   Cardiovascular: RRR, no murmurs, rubs, or gallops  Gastrointestinal: Positive bowel sounds, soft, nontender, nondistended  Musculoskeletal: No bilateral ankle edema  Psychiatric: lethargic, confused  Skin: diffuse erythematous facial rash-spares the periorbital/perioral areas    Results Reviewed:  LAB RESULTS:      Lab 23  0308 23  1056   WBC 7.02 8.11   HEMOGLOBIN 8.8* 9.4*   HEMATOCRIT 29.0* 31.8*   PLATELETS 217 224   NEUTROS ABS  --  6.50   IMMATURE GRANS (ABS)  --  0.02   LYMPHS ABS  --  0.70   MONOS ABS  --  0.79   EOS ABS  --  0.08   MCV 76.1* 77.0*   LACTATE  --  1.8         Lab 23  0308 23  1056   SODIUM 135* 137   POTASSIUM 3.3* 3.7   CHLORIDE 101 100   CO2 24.0 26.0   ANION GAP 10.0 11.0   BUN 18 22   CREATININE 0.56* 0.80   EGFR 90.1 72.8   GLUCOSE 98 108*   CALCIUM 8.3* 8.8   MAGNESIUM  --  2.1         Lab 23  1056   TOTAL PROTEIN 6.8   ALBUMIN 3.4*   GLOBULIN 3.4   ALT (SGPT) 8   AST (SGOT) 11   BILIRUBIN 0.2   ALK PHOS 69         Lab 23  1300 23  1056   HSTROP T 18* 18*                 Brief Urine Lab Results  (Last result in the past 365 days)      Color   Clarity   Blood   Leuk Est   Nitrite   Protein   CREAT   Urine HCG        23  1041 Yellow   Cloudy   Small (1+)   Moderate (2+)   Positive   Trace                 Microbiology Results Abnormal     Procedure Component Value - Date/Time    COVID PRE-OP / PRE-PROCEDURE SCREENING ORDER (NO ISOLATION) - Swab, Nasopharynx [[id number]]  (Normal) Collected: 02/17/23 1117    Lab Status: Final result Specimen: Swab from Nasopharynx Updated: 02/17/23 1152    Narrative:      The following orders were created for panel order COVID PRE-OP / PRE-PROCEDURE SCREENING ORDER (NO ISOLATION) - Swab, Nasopharynx.  Procedure                               Abnormality         Status                     ---------                               -----------         ------                     COVID-19 and FLU A/B PCR...[[id number]]  Normal              Final result                 Please view results for these tests on the individual orders.    COVID-19 and FLU A/B PCR - Swab, Nasopharynx [[id number]]  (Normal) Collected: 02/17/23 1117    Lab Status: Final result Specimen: Swab from Nasopharynx Updated: 02/17/23 1152     COVID19 Not Detected     Influenza A PCR Not Detected     Influenza B PCR Not Detected    Narrative:      Fact sheet for providers: https://www.fda.gov/media/896514/download    Fact sheet for patients: https://www.fda.gov/media/602401/download    Test performed by PCR.          XR Chest 1 View    Result Date: 2/17/2023  XR CHEST 1 VW Date of Exam: 2/17/2023 10:54 AM EST Indication: Weak/Dizzy/AMS triage protocol. Comparison: 5/31/2022 Findings: The heart size remains normal and the lungs are clear     Impression: Impression: No active disease Electronically Signed: Andrea Estrada  2/17/2023 11:41 AM EST  Workstation ID: [id number]      Results for orders placed during the hospital encounter of 08/09/21    Adult Transthoracic Echo Complete W/ Cont if Necessary Per Protocol    Interpretation Summary  · Estimated left ventricular EF = 60% Left ventricular systolic function is normal.  · Left ventricular diastolic  function was normal.  · Trace mitral and tricuspid regurgitation.  · Mild tachycardia noted throughout the study.      I have reviewed the medications:  Scheduled Meds:amLODIPine, 10 mg, Oral, Daily  aspirin, 81 mg, Oral, Daily  atorvastatin, 40 mg, Oral, Daily  cefTRIAXone, 1 g, Intravenous, Q24H  celecoxib, 200 mg, Oral, Daily  donepezil, 10 mg, Oral, Nightly  enoxaparin, 40 mg, Subcutaneous, Daily  famotidine, 40 mg, Oral, Daily  levothyroxine, 175 mcg, Oral, Daily  lisinopril, 10 mg, Oral, Daily  metoprolol succinate XL, 12.5 mg, Oral, Q24H  nystatin, 1 application, Topical, BID  PARoxetine, 40 mg, Oral, QAM  primidone, 100 mg, Oral, Nightly  primidone, 150 mg, Oral, Daily  senna-docusate sodium, 2 tablet, Oral, BID  sodium chloride, 10 mL, Intravenous, Q12H      Continuous Infusions:   PRN Meds:.•  acetaminophen  •  senna-docusate sodium **AND** polyethylene glycol **AND** bisacodyl **AND** bisacodyl  •  Calcium Replacement - Initiate Nurse / BPA Driven Protocol  •  Magnesium Standard Dose Replacement - Initiate Nurse / BPA Driven Protocol  •  melatonin  •  ondansetron **OR** ondansetron  •  Phosphorus Replacement - Initiate Nurse / BPA Driven Protocol  •  Potassium Replacement - Initiate Nurse / BPA Driven Protocol  •  sodium chloride  •  sodium chloride  •  sodium chloride    Assessment & Plan   Assessment & Plan     Active Hospital Problems    Diagnosis  POA   • **Acute UTI [N39.0]  Yes   • Metabolic encephalopathy [G93.41]  Yes   • Alzheimer disease (HCC) [G30.9, F02.80]  Yes   • Coronary artery disease [I25.10]  Yes   • Hypertension [I10]  Yes   • Hyperlipidemia LDL goal <100 [E78.5]  Yes   • Hypothyroid post remote resection papillary adenocarcinoma thyroid [E03.9]  Yes      Resolved Hospital Problems   No resolved problems to display.        Brief Hospital Course to date:  Zohra Hall is a 82 y.o. female with hx of HTN, HLD, CAD, chronic diastolic CHF, thyroid cancer s/p remote total thyroidectomy  with subsequent hypothyroidism, CVA, GERD, essential tremor, Alzheimer's dementia, and frequent falls who presents due to altered mental status from her nursing facility. She is found to have an acute UTI.    This patient's problems and plans were partially entered by my partner and updated as appropriate by me 02/18/23.    All problems are new to me today.     Acute UTI  Metabolic encephalopathy  --Continue Rocephin x 5 days, can switch to PO antibiotics based on final urine culture and discharge readiness  -- urine cx pending  --Monitor for improvement in mental status     Facial rash  --continue facial ointment     Other chronic medical problems:  HTN / HLD / CAD / chronic diastolic CHF-- continue ASA, statin, Norvasc, Lisinopril, Metoprolol; hold HCTZ and Terazosin for now  Post-surgical hypothyroidism -- continue Synthroid, she has a PCP appointment on 2/22/23, defer checking TSH until that visit   CVA -- continue ASA, statin  GERD -- continue PPI  Alzheimer's dementia -- continue Aricept, Paxil  Essential tremor -- continue Primidone  Frequent falls -- consult PT/OT    Expected Discharge Location and Transportation: nursing facility  Expected Discharge   Expected Discharge Date and Time     Expected Discharge Date Expected Discharge Time    Feb 20, 2023            DVT prophylaxis:  Medical DVT prophylaxis orders are present.     AM-PAC 6 Clicks Score (PT): 6 (02/18/23 0800)    CODE STATUS:   Code Status and Medical Interventions:   Ordered at: 02/17/23 1420     Medical Intervention Limits:    NO intubation (DNI)     Code Status (Patient has no pulse and is not breathing):    No CPR (Do Not Attempt to Resuscitate)     Medical Interventions (Patient has pulse or is breathing):    Limited Support       Keara Hoffman, DO  02/18/23

## 2023-02-18 NOTE — THERAPY EVALUATION
"Acute Care - Speech Language Pathology   Swallow Initial Evaluation [facility]   Clinical Swallow Evaluation       Patient Name: Zohra Hall  : 1940  MRN: [id number]  Today's Date: 2023               Admit Date: 2023    Visit Dx:     ICD-10-CM ICD-9-CM   1. Acute UTI  N39.0 599.0   2. Altered mental status, unspecified altered mental status type  R41.82 780.97   3. Rash of face  R21 782.1   4. Anemia, unspecified type  D64.9 285.9   5. Dysphagia, unspecified type  R13.10 787.20     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HCC)   • Hypothyroid post remote resection papillary adenocarcinoma thyroid   • Hypertension   • GERD without esophagitis   • Hyperlipidemia LDL goal <100   • Atherosclerosis of aorta (HCC)   • Incontinence of bowel   • Acute right-sided low back pain without sciatica   • Benign essential tremor   • Pain of right hip joint   • Thyroid cancer (HCC)   • Lacunar stroke ([facility])   • Nontraumatic rupture of extensor tendons of right hand and wrist   • Actinic keratosis   • Transient ischemic attack   • Ataxia   • History of PMR (CMS/HCC)   • Osteoporosis   • Post-surgical hypothyroidism   • NSTEMI (non-ST elevated myocardial infarction) ([facility])   • Coronary artery disease   • NICM presumably due to Takotsubo post NSTEMI 2021 (CMS/[facility])   • Abnormal TSH   • Severe hypothyroidism   • Traumatic SAH, SDH, and intraparenchymal bleed (CMS/HCC)   • History of lacunar stroke 2019   • Alzheimer disease ([facility])   • Frequent falls   • Bilateral hip pain   • Acute UTI   • Metabolic encephalopathy     Past Medical History:   Diagnosis Date   • Breast cancer (HCC)     left- pt states \"in situ\", no radiation, Tamoxifen for 5 years   • Cellulitis    • Cervical lymphadenopathy    • Chest pain "    • Convulsions (HCC)    • GERD (gastroesophageal reflux disease)    • Glossitis    • Grief reaction     · Last Impression: 29 Jan 2015  counselled, given ativan for prn use.  Aleah Regan   • Hypertension    • Lower back pain    • Oral thrush    • Papillary adenocarcinoma (HCC)     Papillary adenocarcinoma of thyroid   • Papillary adenocarcinoma of thyroid (HCC)     · resection Ramirez- 1/13,  2 x 2 x 1.5 cm  T3 N1 MXpost op low calcium and diminished  voiceablation Ain- 3/7 metastatic nodes and invasion to strap muscles · Last Impression: 29 Oct 2014  s/p Eval by berry Méndez.  Aleah Regan (Internal   • Piriformis syndrome    • Tingling    • Xerostomia      Past Surgical History:   Procedure Laterality Date   • BREAST BIOPSY Right 10/10/2013    stereo bx   • BREAST BIOPSY Left 10/19/2015    stereo bx   • BREAST CYST EXCISION      right   • BREAST EXCISIONAL BIOPSY Right     affirm bx and loc 2016.   • BREAST LUMPECTOMY Left 1987   • CARDIAC CATHETERIZATION N/A 5/23/2021    Procedure: Left Heart Cath;  Surgeon: Alonso Ross IV, MD;  Location: [facility] CATH INVASIVE LOCATION;  Service: Cardiology;  Laterality: N/A;   • HYSTERECTOMY  1979   • OTHER SURGICAL HISTORY Right     right arm surgery-steel roger in place   • TOTAL HIP ARTHROPLASTY     • TOTAL THYROIDECTOMY      Thyroid Surgery Total Thyroidectomy       SLP Recommendation and Plan  SLP Swallowing Diagnosis: R/O pharyngeal dysphagia (02/18/23 1000)  SLP Diet Recommendation: soft to chew textures, nectar thick liquids (02/18/23 1000)  Recommended Precautions and Strategies: upright posture during/after eating, small bites of food and sips of liquid, general aspiration precautions (02/18/23 1000)  SLP Rec. for Method of Medication Administration: meds whole, with puree, as tolerated (02/18/23 1000)     Monitor for Signs of Aspiration: yes, notify SLP if any concerns (02/18/23 1000)  Recommended Diagnostics: reassess via clinical swallow  evaluation (instrumental if further concerns/participation improved) (02/18/23 1000)  Swallow Criteria for Skilled Therapeutic Interventions Met: demonstrates skilled criteria (02/18/23 1000)  Anticipated Discharge Disposition (SLP): anticipate therapy at next level of care, skilled nursing facility (02/18/23 1000)  Rehab Potential/Prognosis, Swallowing: re-evaluate goals as necessary (02/18/23 1000)            Plan of Care Reviewed With: patient  Progress:  (initial eval)      SWALLOW EVALUATION (last 72 hours)     SLP Adult Swallow Evaluation     Row Name 02/18/23 1000       Rehab Evaluation    Document Type evaluation  -CJ    Subjective Information no complaints  -CJ    Patient Observations alert  -CJ    Patient/Family/Caregiver Comments/Observations no family present  -CJ    Patient Effort fair  -CJ    Symptoms Noted During/After Treatment none  -CJ       General Information    Patient Profile Reviewed yes  -CJ    Pertinent History Of Current Problem Pt adm w/ acute UTI; has sig h/o HTN, HLD, CAD, CHF, thyroid cancer, CVA, GERD. Familiar to SLP dept w/ most recent [facility] 8/10/2021 w/ SLP recs for mech soft w/ nectar thick liquids  -CJ    Current Method of Nutrition soft to chew textures;nectar/syrup-thick liquids  -CJ    Precautions/Limitations, Vision WFL;for purposes of eval  -CJ    Precautions/Limitations, Hearing WFL;for purposes of eval  -CJ    Prior Level of Function-Communication unknown  -CJ    Prior Level of Function-Swallowing --  [facility] 8/10/21  -CJ    Plans/Goals Discussed with patient  -CJ       Pain    Additional Documentation Pain Scale: FACES Pre/Post-Treatment (Group)  -CJ       Pain Scale: FACES Pre/Post-Treatment    Pain: FACES Scale, Pretreatment 0-->no hurt  -CJ    Posttreatment Pain Rating 0-->no hurt  -CJ       Oral Motor Structure and Function    Dentition Assessment edentulous  -CJ    Secretion Management WNL/WFL  -CJ    Mucosal Quality moist, healthy  -CJ       Oral Musculature and Cranial  "Nerve Assessment    Oral Motor General Assessment unable to assess  -       General Eating/Swallowing Observations    Eating/Swallowing Skills fed by SLP  -    Positioning During Eating upright in bed  -    Utensils Used spoon;cup;straw  -    Consistencies Trialed thin liquids;ice chips;pureed;nectar/syrup-thick liquids  -       Clinical Swallow Eval    Pharyngeal Phase --  difficult to assess  -    Clinical Swallow Evaluation Summary Pt w/ limited participation this date. Pt only accepted x1 trial of thin liquids w/ cough response elicited. Pt refused further thin trials. Pt did accept a few presentations of nectar thick liquids and puree w/o overt s/s of aspiration. Pt then refused further po intake, frequently stating \"thats enough\". Unable to adequately assess this date however will continue current modified po diet and f/u for re-eval tomorrow vs repeat instrumental pending pt participation  -       SLP Evaluation Clinical Impression    SLP Swallowing Diagnosis R/O pharyngeal dysphagia  -    Functional Impact risk of aspiration/pneumonia  -    Rehab Potential/Prognosis, Swallowing re-evaluate goals as necessary  -    Swallow Criteria for Skilled Therapeutic Interventions Met demonstrates skilled criteria  -       Recommendations    SLP Diet Recommendation soft to chew textures;nectar thick liquids  -    Recommended Diagnostics reassess via clinical swallow evaluation  instrumental if further concerns/participation improved  -    Recommended Precautions and Strategies upright posture during/after eating;small bites of food and sips of liquid;general aspiration precautions  -    Oral Care Recommendations Oral Care BID/PRN  -    SLP Rec. for Method of Medication Administration meds whole;with puree;as tolerated  -    Monitor for Signs of Aspiration yes;notify SLP if any concerns  -    Anticipated Discharge Disposition (SLP) anticipate therapy at next level of care;skilled nursing " facility  -          User Key  (r) = Recorded By, (t) = Taken By, (c) = Cosigned By    Initials Name Effective Dates    Elsa Cruz MS CCC-SLP 06/16/21 -                 EDUCATION  The patient has been educated in the following areas:   Dysphagia (Swallowing Impairment) Oral Care/Hydration.              Time Calculation:    Time Calculation- SLP     Row Name 02/18/23 1241             Time Calculation- SLP    SLP Start Time 1000  -      SLP Received On 02/18/23  -         Untimed Charges    79369-AF Eval Oral Pharyng Swallow Minutes 40  -         Total Minutes    Untimed Charges Total Minutes 40  -CJ       Total Minutes 40  -CJ            User Key  (r) = Recorded By, (t) = Taken By, (c) = Cosigned By    Initials Name Provider Type    Elsa Cruz MS CCC-SLP Speech and Language Pathologist                Therapy Charges for Today     Code Description Service Date Service Provider Modifiers Qty    84384206393 HC ST EVAL ORAL PHARYNG SWALLOW 3 2/18/2023 Elsa Ashby MS CCC-SLP GN 1               Elsa Ashby MS CCC-SLP  2/18/2023

## 2023-02-18 NOTE — PLAN OF CARE
Goal Outcome Evaluation:  Plan of Care Reviewed With: patient        Progress:  (initial eval)   SLP evaluation completed. Will continue to address dysphagia w/ plan for re-eval tomorrow. Please see note for further details and recommendations.

## 2023-02-18 NOTE — THERAPY EVALUATION
"Patient Name: Zohra Hall  : 1940    MRN: [id number]                              Today's Date: 2023       Admit Date: 2023    Visit Dx:     ICD-10-CM ICD-9-CM   1. Acute UTI  N39.0 599.0   2. Altered mental status, unspecified altered mental status type  R41.82 780.97   3. Rash of face  R21 782.1   4. Anemia, unspecified type  D64.9 285.9   5. Dysphagia, unspecified type  R13.10 787.20     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HCC)   • Hypothyroid post remote resection papillary adenocarcinoma thyroid   • Hypertension   • GERD without esophagitis   • Hyperlipidemia LDL goal <100   • Atherosclerosis of aorta ([facility])   • Incontinence of bowel   • Acute right-sided low back pain without sciatica   • Benign essential tremor   • Pain of right hip joint   • Thyroid cancer ([facility])   • Lacunar stroke ([facility])   • Nontraumatic rupture of extensor tendons of right hand and wrist   • Actinic keratosis   • Transient ischemic attack   • Ataxia   • History of PMR (CMS/[facility])   • Osteoporosis   • Post-surgical hypothyroidism   • NSTEMI (non-ST elevated myocardial infarction) ([facility])   • Coronary artery disease   • NICM presumably due to Takotsubo post NSTEMI 2021 (CMS/[facility])   • Abnormal TSH   • Severe hypothyroidism   • Traumatic SAH, SDH, and intraparenchymal bleed (CMS/[facility])   • History of lacunar stroke 2019   • Alzheimer disease ([facility])   • Frequent falls   • Bilateral hip pain   • Acute UTI   • Metabolic encephalopathy     Past Medical History:   Diagnosis Date   • Breast cancer ([facility])     left- pt states \"in situ\", no radiation, Tamoxifen for 5 years   • Cellulitis    • Cervical lymphadenopathy    • Chest pain    • Convulsions ([facility])    • GERD (gastroesophageal reflux disease)    • Glossitis    • Grief " reaction     · Last Impression: 29 Jan 2015  counselled, given ativan for prn use.  Aleah Regan   • Hypertension    • Lower back pain    • Oral thrush    • Papillary adenocarcinoma (HCC)     Papillary adenocarcinoma of thyroid   • Papillary adenocarcinoma of thyroid (HCC)     · resection Ramirez- 1/13,  2 x 2 x 1.5 cm  T3 N1 MXpost op low calcium and diminished  voiceablation Ain- 3/7 metastatic nodes and invasion to strap muscles · Last Impression: 29 Oct 2014  s/p Eval by berry Méndez.  Aleah Regan (Internal   • Piriformis syndrome    • Tingling    • Xerostomia      Past Surgical History:   Procedure Laterality Date   • BREAST BIOPSY Right 10/10/2013    stereo bx   • BREAST BIOPSY Left 10/19/2015    stereo bx   • BREAST CYST EXCISION      right   • BREAST EXCISIONAL BIOPSY Right     affirm bx and loc 2016.   • BREAST LUMPECTOMY Left 1987   • CARDIAC CATHETERIZATION N/A 5/23/2021    Procedure: Left Heart Cath;  Surgeon: Alnoso Ross IV, MD;  Location: [facility] CATH INVASIVE LOCATION;  Service: Cardiology;  Laterality: N/A;   • HYSTERECTOMY  1979   • OTHER SURGICAL HISTORY Right     right arm surgery-steel roger in place   • TOTAL HIP ARTHROPLASTY     • TOTAL THYROIDECTOMY      Thyroid Surgery Total Thyroidectomy      General Information     Row Name 02/18/23 1435          Physical Therapy Time and Intention    Document Type evaluation  -LO     Mode of Treatment individual therapy;physical therapy  -     Row Name 02/18/23 1436          General Information    Patient Profile Reviewed yes  -LO     Prior Level of Function dependent:;ADL's;min assist:;gait;transfer;bed mobility  per chart notes, patient dependent with ADLs. Per patient, she was ambulating with RW short distances.  -LO     Existing Precautions/Restrictions other (see comments)  alzheimers dementia, frequent falls  -LO     Barriers to Rehab previous functional deficit;cognitive status  -LO     Row Name 02/18/23 1978           Living Environment    People in Home facility resident  -LO     Row Name 02/18/23 1435          Home Main Entrance    Number of Stairs, Main Entrance none  -LO     Row Name 02/18/23 1435          Stairs Within Home, Primary    Number of Stairs, Within Home, Primary none  -LO     Row Name 02/18/23 1435          Cognition    Orientation Status (Cognition) oriented to;person;time  -LO     Row Name 02/18/23 1435          Safety Issues, Functional Mobility    Safety Issues Affecting Function (Mobility) insight into deficits/self-awareness;safety precaution awareness  -LO     Impairments Affecting Function (Mobility) balance;cognition;endurance/activity tolerance;strength  -LO     Cognitive Impairments, Mobility Safety/Performance awareness, need for assistance;insight into deficits/self-awareness;sequencing abilities  -LO           User Key  (r) = Recorded By, (t) = Taken By, (c) = Cosigned By    Initials Name Provider Type    Verna North, PRAVEEN Physical Therapist               Mobility     Row Name 02/18/23 1445          Bed Mobility    Bed Mobility supine-sit  -LO     Supine-Sit Theresa (Bed Mobility) contact guard  -LO     Assistive Device (Bed Mobility) bed rails;head of bed elevated  -LO     Comment, (Bed Mobility) no physical assist required, vc for sequencing and use of bed rail.  -LO     Row Name 02/18/23 1445          Transfers    Comment, (Transfers) declined  -LO     Row Name 02/18/23 1445          Bed-Chair Transfer    Bed-Chair Theresa (Transfers) unable to assess  -LO     Row Name 02/18/23 1445          Sit-Stand Transfer    Sit-Stand Theresa (Transfers) unable to assess  -LO     Row Name 02/18/23 1445          Gait/Stairs (Locomotion)    Theresa Level (Gait) unable to assess  -LO     Comment, (Gait/Stairs) declined  -LO           User Key  (r) = Recorded By, (t) = Taken By, (c) = Cosigned By    Initials Name Provider Type    Verna North, PRAVEEN Physical Therapist                Obj/Interventions     Row Name 02/18/23 1446          Range of Motion Comprehensive    General Range of Motion bilateral lower extremity ROM WNL  -LO     Row Name 02/18/23 1446          Strength Comprehensive (MMT)    General Manual Muscle Testing (MMT) Assessment lower extremity strength deficits identified  -LO     Comment, General Manual Muscle Testing (MMT) Assessment BLE grossly 3+/5  -LO     Row Name 02/18/23 1446          Motor Skills    Motor Skills functional endurance  -LO     Functional Endurance fair  -LO     Row Name 02/18/23 1446          Balance    Balance Assessment sitting static balance;sitting dynamic balance  -LO     Static Sitting Balance standby assist  -LO     Dynamic Sitting Balance contact guard  -LO     Position, Sitting Balance unsupported;sitting edge of bed  -LO     Comment, Balance CGA sitting EOB  -LO     Row Name 02/18/23 1446          Sensory Assessment (Somatosensory)    Sensory Assessment (Somatosensory) sensation intact  -LO           User Key  (r) = Recorded By, (t) = Taken By, (c) = Cosigned By    Initials Name Provider Type    Verna North, PT Physical Therapist               Goals/Plan     Row Name 02/18/23 1449          Bed Mobility Goal 1 (PT)    Activity/Assistive Device (Bed Mobility Goal 1, PT) rolling to left;rolling to right;scooting;sit to supine;supine to sit  -LO     [place] Level/Cues Needed (Bed Mobility Goal 1, PT) standby assist  -LO     Time Frame (Bed Mobility Goal 1, PT) long term goal (LTG);10 days  -LO     [facility] Name 02/18/23 1449          Transfer Goal 1 (PT)    Activity/Assistive Device (Transfer Goal 1, PT) sit-to-stand/stand-to-sit;bed-to-chair/chair-to-bed;car transfer  -LO     [place] Level/Cues Needed (Transfer Goal 1, PT) contact guard required  -LO     Time Frame (Transfer Goal 1, PT) long term goal (LTG);10 days  -LO     [facility] Name 02/18/23 1449          Gait Training Goal 1 (PT)    Activity/Assistive Device (Gait Training Goal 1, PT) gait  (walking locomotion);assistive device use;decrease fall risk;improve balance and speed;increase endurance/gait distance;walker, rolling  -LO     [place] Level (Gait Training Goal 1, PT) contact guard required  -LO     Distance (Gait Training Goal 1, PT) 50  -LO     Time Frame (Gait Training Goal 1, PT) long term goal (LTG);10 days  -     Row Name 02/18/23 1449          Therapy Assessment/Plan (PT)    Planned Therapy Interventions (PT) balance training;bed mobility training;gait training;home exercise program;patient/family education;neuromuscular re-education;strengthening;transfer training  -LO           User Key  (r) = Recorded By, (t) = Taken By, (c) = Cosigned By    Initials Name Provider Type    Verna North, PT Physical Therapist               Clinical Impression     [facility] Name 02/18/23 1446          Pain Scale: FACES Pre/Post-Treatment    Pain: FACES Scale, Pretreatment 0-->no hurt  -LO     Posttreatment Pain Rating 0-->no hurt  -LO     Row Name 02/18/23 1446          Plan of Care Review    Plan of Care Reviewed With patient  -LO     Outcome Evaluation PT eval completed. Patient alert, oriented to person and time, not to place. Demonstrating deficits in general BLE strength, standing balance, and functional endurance effecting functional mobility below baseline. Patient will benefit from skilled IP PT services to address impairments for return to [facility]. Recommend SNF at [place].  -     Row Name 02/18/23 1446          Therapy Assessment/Plan (PT)    Patient/Family Therapy Goals Statement (PT) not stated  -LO     Rehab Potential (PT) good, to achieve stated therapy goals  -LO     Criteria for Skilled Interventions Met (PT) yes;meets criteria;skilled treatment is necessary  -LO     Therapy Frequency (PT) daily  -     Row Name 02/18/23 1446          Vital Signs    Pre Systolic BP Rehab 130  -LO     Pre Treatment Diastolic BP 74  -LO     Pretreatment Heart Rate (beats/min) 75  -LO     O2 Delivery Pre  Treatment room air  -LO     O2 Delivery Intra Treatment room air  -LO     O2 Delivery Post Treatment room air  -LO     Pre Patient Position Supine  -LO     Intra Patient Position Sitting  -LO     Post Patient Position Supine  -LO     Row Name 02/18/23 1446          Positioning and Restraints    Pre-Treatment Position in bed  -LO     Post Treatment Position bed  -LO     In Bed notified nsg;supine;call light within reach;encouraged to call for assist;exit alarm on;fowlers  -LO           User Key  (r) = Recorded By, (t) = Taken By, (c) = Cosigned By    Initials Name Provider Type    Verna North, PT Physical Therapist               Outcome Measures     Row Name 02/18/23 1450 02/18/23 0800       How much help from another person do you currently need...    Turning from your back to your side while in flat bed without using bedrails? 3  -LO 1  -LT    Moving from lying on back to sitting on the side of a flat bed without bedrails? 3  -LO 1  -LT    Moving to and from a bed to a chair (including a wheelchair)? 2  -LO 1  -LT    Standing up from a chair using your arms (e.g., wheelchair, bedside chair)? 2  -LO 1  -LT    Climbing 3-5 steps with a railing? 1  -LO 1  -LT    To walk in hospital room? 2  -LO 1  -LT    AM-PAC 6 Clicks Score (PT) 13  -LO 6  -LT    Highest level of mobility 4 --> Transferred to chair/commode  -LO 2 --> Bed activities/dependent transfer  -LT    Row Name 02/18/23 1450          Functional Assessment    Outcome Measure Options AM-PAC 6 Clicks Basic Mobility (PT)  -           User Key  (r) = Recorded By, (t) = Taken By, (c) = Cosigned By    Initials Name Provider Type    Verna North, PT Physical Therapist    LT Charu Maya RN Registered Nurse                             Physical Therapy Education     Title: PT OT SLP Therapies (Done)     Topic: Physical Therapy (Done)     Point: Mobility training (Done)     Learning Progress Summary           Patient Acceptance, E, VU,NR by YASMIN at 2/18/2023  1352    Comment: PT POC                   Point: Home exercise program (Done)     Learning Progress Summary           Patient Acceptance, E, VU,NR by  at 2/18/2023 1352    Comment: PT POC                   Point: Body mechanics (Done)     Learning Progress Summary           Patient Acceptance, E, VU,NR by  at 2/18/2023 1352    Comment: PT POC                   Point: Precautions (Done)     Learning Progress Summary           Patient Acceptance, E, VU,NR by  at 2/18/2023 1352    Comment: PT POC                               User Key     Initials Effective Dates Name Provider Type Discipline     06/16/21 -  Verna Peñaloza, PT Physical Therapist PT              PT Recommendation and Plan  Planned Therapy Interventions (PT): balance training, bed mobility training, gait training, home exercise program, patient/family education, neuromuscular re-education, strengthening, transfer training  Plan of Care Reviewed With: patient  Outcome Evaluation: PT eval completed. Patient alert, oriented to person and time, not to place. Demonstrating deficits in general BLE strength, standing balance, and functional endurance effecting functional mobility below baseline. Patient will benefit from skilled IP PT services to address impairments for return to [place]. Recommend SNF at [place].     Time Calculation:    PT Charges     Row Name 02/18/23 1352             Time Calculation    Start Time 1352  -LO      PT Received On 02/18/23  -LO      PT Goal Re-Cert Due Date 02/28/23  -         Timed Charges    05057 - PT Therapeutic Activity Minutes 12  -LO         Untimed Charges    PT Eval/Re-eval Minutes 38  -LO         Total Minutes    Timed Charges Total Minutes 12  -LO      Untimed Charges Total Minutes 38  -LO       Total Minutes 50  -LO            User Key  (r) = Recorded By, (t) = Taken By, (c) = Cosigned By    Initials Name Provider Type    Verna North, PT Physical Therapist              Therapy Charges for Today     Code  Description Service Date Service Provider Modifiers Qty    27588431327 HC PT THERAPEUTIC ACT EA 15 MIN 2/18/2023 Verna Peñaloza, PT GP 1    55232905532 HC PT EVAL LOW COMPLEXITY 3 2/18/2023 Verna Peñaloza, PT GP 1          PT G-Codes  Outcome Measure Options: AM-PAC 6 Clicks Basic Mobility (PT)  AM-PAC 6 Clicks Score (PT): 13  PT Discharge Summary  Anticipated Discharge Disposition (PT): skilled nursing facility    Verna Peñaloza, PT  2/18/2023

## 2023-02-18 NOTE — PLAN OF CARE
Goal Outcome Evaluation:   Pt new admit from ED. Settled in room ate dinner family updated of room and floor.

## 2023-02-18 NOTE — PLAN OF CARE
Goal Outcome Evaluation:  Plan of Care Reviewed With: patient           Outcome Evaluation: PT eval completed. Patient alert, oriented to person and time, not to place. Demonstrating deficits in general BLE strength, standing balance, and functional endurance effecting functional mobility below baseline. Patient will benefit from skilled IP PT services to address impairments for return to PLOF. Recommend SNF at [place].

## 2023-02-19 LAB
ALBUMIN SERPL-MCNC: 3.2 G/DL (ref 3.5–5.2)
ALBUMIN/GLOB SERPL: 1 G/DL
ALP SERPL-CCNC: 69 U/L (ref 39–117)
ALT SERPL W P-5'-P-CCNC: 7 U/L (ref 1–33)
ANION GAP SERPL CALCULATED.3IONS-SCNC: 9 MMOL/L (ref 5–15)
AST SERPL-CCNC: 11 U/L (ref 1–32)
BACTERIA SPEC AEROBE CULT: ABNORMAL
BILIRUB SERPL-MCNC: 0.2 MG/DL (ref 0–1.2)
BUN SERPL-MCNC: 14 MG/DL (ref 8–23)
BUN/CREAT SERPL: 20.9 (ref 7–25)
CALCIUM SPEC-SCNC: 8.7 MG/DL (ref 8.6–10.5)
CHLORIDE SERPL-SCNC: 101 MMOL/L (ref 98–107)
CO2 SERPL-SCNC: 24 MMOL/L (ref 22–29)
CREAT SERPL-MCNC: 0.67 MG/DL (ref 0.57–1)
DEPRECATED RDW RBC AUTO: 48.6 FL (ref 37–54)
EGFRCR SERPLBLD CKD-EPI 2021: 86.3 ML/MIN/1.73
ERYTHROCYTE [DISTWIDTH] IN BLOOD BY AUTOMATED COUNT: 17.5 % (ref 12.3–15.4)
GLOBULIN UR ELPH-MCNC: 3.3 GM/DL
GLUCOSE SERPL-MCNC: 94 MG/DL (ref 65–99)
HCT VFR BLD AUTO: 33.6 % (ref 34–46.6)
HGB BLD-MCNC: 9.9 G/DL (ref 12–15.9)
MCH RBC QN AUTO: 22.4 PG (ref 26.6–33)
MCHC RBC AUTO-ENTMCNC: 29.5 G/DL (ref 31.5–35.7)
MCV RBC AUTO: 76 FL (ref 79–97)
PLATELET # BLD AUTO: 259 10*3/MM3 (ref 140–450)
PMV BLD AUTO: 10.6 FL (ref 6–12)
POTASSIUM SERPL-SCNC: 4.1 MMOL/L (ref 3.5–5.2)
PROT SERPL-MCNC: 6.5 G/DL (ref 6–8.5)
RBC # BLD AUTO: 4.42 10*6/MM3 (ref 3.77–5.28)
SODIUM SERPL-SCNC: 134 MMOL/L (ref 136–145)
WBC NRBC COR # BLD: 6.73 10*3/MM3 (ref 3.4–10.8)

## 2023-02-19 PROCEDURE — 85027 COMPLETE CBC AUTOMATED: CPT | Performed by: HOSPITALIST

## 2023-02-19 PROCEDURE — 80053 COMPREHEN METABOLIC PANEL: CPT | Performed by: HOSPITALIST

## 2023-02-19 PROCEDURE — G0378 HOSPITAL OBSERVATION PER HR: HCPCS

## 2023-02-19 PROCEDURE — 25010000002 ENOXAPARIN PER 10 MG: Performed by: HOSPITALIST

## 2023-02-19 PROCEDURE — 97166 OT EVAL MOD COMPLEX 45 MIN: CPT

## 2023-02-19 PROCEDURE — 92610 EVALUATE SWALLOWING FUNCTION: CPT

## 2023-02-19 PROCEDURE — 25010000002 CEFTRIAXONE PER 250 MG: Performed by: HOSPITALIST

## 2023-02-19 PROCEDURE — 99232 SBSQ HOSP IP/OBS MODERATE 35: CPT | Performed by: HOSPITALIST

## 2023-02-19 PROCEDURE — 96372 THER/PROPH/DIAG INJ SC/IM: CPT

## 2023-02-19 RX ADMIN — PRIMIDONE 100 MG: 50 TABLET ORAL at 20:07

## 2023-02-19 RX ADMIN — SODIUM CHLORIDE 1 G: 900 INJECTION INTRAVENOUS at 09:30

## 2023-02-19 RX ADMIN — FAMOTIDINE 40 MG: 20 TABLET ORAL at 09:28

## 2023-02-19 RX ADMIN — LEVOTHYROXINE SODIUM 175 MCG: 175 TABLET ORAL at 05:16

## 2023-02-19 RX ADMIN — NYSTATIN 1 APPLICATION: 100000 CREAM TOPICAL at 09:35

## 2023-02-19 RX ADMIN — NYSTATIN 1 APPLICATION: 100000 CREAM TOPICAL at 20:07

## 2023-02-19 RX ADMIN — Medication 5 MG: at 20:07

## 2023-02-19 RX ADMIN — LISINOPRIL 10 MG: 10 TABLET ORAL at 09:28

## 2023-02-19 RX ADMIN — PAROXETINE HYDROCHLORIDE 40 MG: 20 TABLET, FILM COATED ORAL at 09:28

## 2023-02-19 RX ADMIN — METOPROLOL SUCCINATE 12.5 MG: 25 TABLET, EXTENDED RELEASE ORAL at 09:28

## 2023-02-19 RX ADMIN — Medication 10 ML: at 20:37

## 2023-02-19 RX ADMIN — PRIMIDONE 150 MG: 50 TABLET ORAL at 09:34

## 2023-02-19 RX ADMIN — SENNOSIDES AND DOCUSATE SODIUM 2 TABLET: 50; 8.6 TABLET ORAL at 09:29

## 2023-02-19 RX ADMIN — ACETAMINOPHEN 325MG 650 MG: 325 TABLET ORAL at 05:09

## 2023-02-19 RX ADMIN — Medication 10 ML: at 09:35

## 2023-02-19 RX ADMIN — CELECOXIB 200 MG: 200 CAPSULE ORAL at 09:28

## 2023-02-19 RX ADMIN — ATORVASTATIN CALCIUM 40 MG: 40 TABLET, FILM COATED ORAL at 09:28

## 2023-02-19 RX ADMIN — ASPIRIN 81 MG: 81 TABLET, COATED ORAL at 09:28

## 2023-02-19 RX ADMIN — DONEPEZIL HYDROCHLORIDE 10 MG: 10 TABLET, FILM COATED ORAL at 20:07

## 2023-02-19 RX ADMIN — ENOXAPARIN SODIUM 40 MG: 40 INJECTION SUBCUTANEOUS at 09:29

## 2023-02-19 RX ADMIN — AMLODIPINE BESYLATE 10 MG: 5 TABLET ORAL at 09:28

## 2023-02-19 NOTE — THERAPY EVALUATION
"Patient Name: Zohra Hall  : 1940    MRN: [id number]                              Today's Date: 2023       Admit Date: 2023    Visit Dx:     ICD-10-CM ICD-9-CM   1. Acute UTI  N39.0 599.0   2. Altered mental status, unspecified altered mental status type  R41.82 780.97   3. Rash of face  R21 782.1   4. Anemia, unspecified type  D64.9 285.9   5. Dysphagia, unspecified type  R13.10 787.20     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HCC)   • Hypothyroid post remote resection papillary adenocarcinoma thyroid   • Hypertension   • GERD without esophagitis   • Hyperlipidemia LDL goal <100   • Atherosclerosis of aorta ([facility])   • Incontinence of bowel   • Acute right-sided low back pain without sciatica   • Benign essential tremor   • Pain of right hip joint   • Thyroid cancer ([facility])   • Lacunar stroke ([facility])   • Nontraumatic rupture of extensor tendons of right hand and wrist   • Actinic keratosis   • Transient ischemic attack   • Ataxia   • History of PMR (CMS/[facility])   • Osteoporosis   • Post-surgical hypothyroidism   • NSTEMI (non-ST elevated myocardial infarction) ([facility])   • Coronary artery disease   • NICM presumably due to Takotsubo post NSTEMI 2021 (CMS/[facility])   • Abnormal TSH   • Severe hypothyroidism   • Traumatic SAH, SDH, and intraparenchymal bleed (CMS/[facility])   • History of lacunar stroke 2019   • Alzheimer disease ([facility])   • Frequent falls   • Bilateral hip pain   • Acute UTI   • Metabolic encephalopathy     Past Medical History:   Diagnosis Date   • Breast cancer ([facility])     left- pt states \"in situ\", no radiation, Tamoxifen for 5 years   • Cellulitis    • Cervical lymphadenopathy    • Chest pain    • Convulsions ([facility])    • GERD (gastroesophageal reflux disease)    • Glossitis    • Grief " reaction     · Last Impression: 29 Jan 2015  counselled, given ativan for prn use.  Aleah Regan   • Hypertension    • Lower back pain    • Oral thrush    • Papillary adenocarcinoma (HCC)     Papillary adenocarcinoma of thyroid   • Papillary adenocarcinoma of thyroid (HCC)     · resection Ramirez- 1/13,  2 x 2 x 1.5 cm  T3 N1 MXpost op low calcium and diminished  voiceablation Ain- 3/7 metastatic nodes and invasion to strap muscles · Last Impression: 29 Oct 2014  s/p Eval by berry Méndez.  Aleah Regan (Internal   • Piriformis syndrome    • Tingling    • Xerostomia      Past Surgical History:   Procedure Laterality Date   • BREAST BIOPSY Right 10/10/2013    stereo bx   • BREAST BIOPSY Left 10/19/2015    stereo bx   • BREAST CYST EXCISION      right   • BREAST EXCISIONAL BIOPSY Right     affirm bx and loc 2016.   • BREAST LUMPECTOMY Left 1987   • CARDIAC CATHETERIZATION N/A 5/23/2021    Procedure: Left Heart Cath;  Surgeon: Alonso Ross IV, MD;  Location: [facility] CATH INVASIVE LOCATION;  Service: Cardiology;  Laterality: N/A;   • HYSTERECTOMY  1979   • OTHER SURGICAL HISTORY Right     right arm surgery-steel roger in place   • TOTAL HIP ARTHROPLASTY     • TOTAL THYROIDECTOMY      Thyroid Surgery Total Thyroidectomy      General Information     Row Name 02/19/23 1300          OT Time and Intention    Document Type evaluation  -TA     Mode of Treatment occupational therapy  -TA     Row Name 02/19/23 1300          General Information    Patient Profile Reviewed yes  -TA     Prior Level of Function min assist:;transfer;bed mobility;gait;w/c or scooter;ADL's  Per pt report. Pt is questionable historian, no caregiver available to confirm.  -TA     Existing Precautions/Restrictions fall  -TA     Barriers to Rehab previous functional deficit;cognitive status  -TA     Row Name 02/19/23 1300          Occupational Profile    Reason for Services/Referral (Occupational Profile) fxl decline from PLOF  -TA      Patient Goals (Occupational Profile) none stated  -TA     Row Name 02/19/23 1300          Living Environment    People in Home facility resident  -TA     Row Name 02/19/23 1300          Home Main Entrance    Number of Stairs, Main Entrance none  -TA     Row Name 02/19/23 1300          Stairs Within Home, Primary    Number of Stairs, Within Home, Primary none  -TA     Row Name 02/19/23 1300          Cognition    Orientation Status (Cognition) oriented to;person;situation;disoriented to;place;time  -TA     Row Name 02/19/23 1300          Safety Issues, Functional Mobility    Safety Issues Affecting Function (Mobility) safety precautions follow-through/compliance;safety precaution awareness;insight into deficits/self-awareness;awareness of need for assistance;positioning of assistive device  -TA     Impairments Affecting Function (Mobility) balance;cognition;endurance/activity tolerance;strength  -TA     Cognitive Impairments, Mobility Safety/Performance safety precaution follow-through;safety precaution awareness;insight into deficits/self-awareness;awareness, need for assistance  -TA           User Key  (r) = Recorded By, (t) = Taken By, (c) = Cosigned By    Initials Name Provider Type    TA Bacilio Narayan, OT Occupational Therapist                 Mobility/ADL's     [facility] Name 02/19/23 1351          Bed Mobility    Comment, (Bed Mobility) Pt UIC upon OT entry  -TA     Row Name 02/19/23 1351          Transfers    Transfers sit-stand transfer  -TA     Row Name 02/19/23 1351          Sit-Stand Transfer    Sit-Stand [place] (Transfers) moderate assist (50% patient effort);verbal cues;nonverbal cues (demo/gesture)  -TA     Assistive Device (Sit-Stand Transfers) walker, front-wheeled  -TA     Comment, (Sit-Stand Transfer) Pt required physical assist to correctly place hands on RW upon standing  -TA     [facility] Name 02/19/23 1351          Functional Mobility    Functional Mobility- Ind. Level moderate assist (50%  "patient effort);verbal cues required;nonverbal cues required (demo/gesture)  -TA     Functional Mobility- Device walker, front-wheeled  -TA     Functional Mobility-Distance (Feet) 4  2 steps forward, 2 steps back to chair  -TA     Functional Mobility- Safety Issues step length decreased;weight-shifting ability decreased;loses balance backward  -TA     Functional Mobility- Comment Pt verbalized fear of falling 2/2 to \"bad fall\" in past  -TA     Row Name 02/19/23 135          Activities of Daily Living    BADL Assessment/Intervention lower body dressing;upper body dressing;toileting  -TA     Row Name 02/19/23 1351          Lower Body Dressing Assessment/Training    [place] Level (Lower Body Dressing) don;socks;moderate assist (50% patient effort)  -TA     Position (Lower Body Dressing) supported sitting  -TA     Comment, (Lower Body Dressing) OT threaded socks to forefoot and pt able to reach and pull over heels/up LEs  -TA     Row Name 02/19/23 [facility]          Upper Body Dressing Assessment/Training    [place] Level (Upper Body Dressing) moderate assist (50% patient effort);don;pajama/robe  -TA     Position (Upper Body Dressing) supported sitting  -TA     Row Name 02/19/23 1351          Toileting Assessment/Training    [place] Level (Toileting) adjust/manage clothing;perform perineal hygiene;maximum assist (25% patient effort)  clinical projection of fxl level  -TA           User Key  (r) = Recorded By, (t) = Taken By, (c) = Cosigned By    Initials Name Provider Type    TA Bacilio Narayan, OT Occupational Therapist               Obj/Interventions     Row Name 02/19/23 1351          Sensory Assessment (Somatosensory)    Sensory Assessment (Somatosensory) bilateral UE;sensation intact  -TA     Row Name 02/19/23 1351          Vision Assessment/Intervention    Visual Impairment/Limitations WFL  -TA     Row Name 02/19/23 1351          Range of Motion Comprehensive    General Range of Motion bilateral " upper extremity ROM WFL  -TA     Row Name 02/19/23 [facility]          Strength Comprehensive (MMT)    General Manual Muscle Testing (MMT) Assessment upper extremity strength deficits identified  -TA     Comment, General Manual Muscle Testing (MMT) Assessment BUE 3+/5  -TA     Row Name 02/19/23 [facility]          Balance    Balance Assessment sitting dynamic balance;standing static balance  -TA     Dynamic Sitting Balance standby assist  -TA     Position, Sitting Balance sitting in chair  -TA     Static Standing Balance minimal assist  -TA     Position/Device Used, Standing Balance walker, rolling  -TA     Balance Interventions sit to stand;weight shifting activity;occupation based/functional task;UE activity with balance activity  -TA     Comment, Balance Pt tends to lose balance posteriorly  -TA           User Key  (r) = Recorded By, (t) = Taken By, (c) = Cosigned By    Initials Name Provider Type    TA Bacilio Narayan, OT Occupational Therapist               Goals/Plan     Row Name 02/19/23 1351          Transfer Goal 1 (OT)    Activity/Assistive Device (Transfer Goal 1, OT) sit-to-stand/stand-to-sit;bed-to-chair/chair-to-bed;toilet;walker, rolling  -TA     [place] Level/Cues Needed (Transfer Goal 1, OT) minimum assist (75% or more patient effort);tactile cues required;verbal cues required  -TA     Progress/Outcome (Transfer Goal 1, OT) goal ongoing  -TA     Row Name 02/19/23 1351          Toileting Goal 1 (OT)    Activity/Device (Toileting Goal 1, OT) adjust/manage clothing;perform perineal hygiene  -TA     [place] Level/Cues Needed (Toileting Goal 1, OT) minimum assist (75% or more patient effort)  -TA     Time Frame (Toileting Goal 1, OT) by discharge  -TA     Progress/Outcome (Toileting Goal 1, OT) goal ongoing  -TA     Row Name 02/19/23 1351          Strength Goal 1 (OT)    Strength Goal 1 (OT) Pt will increase BUE MMS by 1/2 MMG to support fxl I with ADLs  -TA     Time Frame (Strength Goal 1, OT) by  discharge  -TA     Progress/Outcome (Strength Goal 1, OT) goal ongoing  -TA     Row Name 02/19/23 1351          Therapy Assessment/Plan (OT)    Planned Therapy Interventions (OT) activity tolerance training;BADL retraining;functional balance retraining;occupation/activity based interventions;patient/caregiver education/training;ROM/therapeutic exercise;strengthening exercise;transfer/mobility retraining  -TA           User Key  (r) = Recorded By, (t) = Taken By, (c) = Cosigned By    Initials Name Provider Type    TA Bacilio Narayan, ANA MARÍA Occupational Therapist               Clinical Impression     Row Name 02/19/23 [facility]          Pain Assessment    Pre/Posttreatment Pain Comment Pt tolerated  -TA     Row Name 02/19/23 [facility]          Pain Scale: FACES Pre/Post-Treatment    Pain: FACES Scale, Pretreatment 0-->no hurt  -TA     Posttreatment Pain Rating 0-->no hurt  -TA     Row Name 02/19/23 [facility]          Plan of Care Review    Plan of Care Reviewed With patient  -TA     Outcome Evaluation Pt presents with fxl decline from PLOF, deficits in ADL performance, fxl mobility, occupational endurance. Pt limited by dementia/decreased cognition, decreased balance, generalized weakness. Pt required Mod A to don socks, and complete UBD, anticipate Max A for toileting. STS with Mod A and physical assist to correctly place hands on RW. Pt took 2 steps forward - 2 steps back to chair with Mod A/RW and has posterior lean. Pt will benefit from skilled OT services to address deficits, facilitate increased fxl I. Recommend SNF for rehab at discharge.  -TA     Row Name 02/19/23 [facility]          Therapy Assessment/Plan (OT)    Patient/Family Therapy Goal Statement (OT) none stated  -TA     Rehab Potential (OT) good, to achieve stated therapy goals  -TA     Criteria for Skilled Therapeutic Interventions Met (OT) yes;skilled treatment is necessary  -TA     Therapy Frequency (OT) daily  -TA     Predicted Duration of Therapy Intervention (OT) 1  week  -TA     Row Name 02/19/23 1351          Therapy Plan Review/Discharge Plan (OT)    Anticipated Discharge Disposition (OT) skilled nursing facility  rehab  -TA     Row Name 02/19/23 1351          Vital Signs    Pre Systolic BP Rehab 153  -TA     Pre Treatment Diastolic BP 74  -TA     Post Systolic BP Rehab 122  -TA     Post Treatment Diastolic BP 63  -TA     Posttreatment Heart Rate (beats/min) 79  -TA     O2 Delivery Pre Treatment room air  -TA     O2 Delivery Intra Treatment room air  -TA     O2 Delivery Post Treatment room air  -TA     Pre Patient Position Sitting  -TA     Intra Patient Position Standing  -TA     Post Patient Position Sitting  -TA     Row Name 02/19/23 1351          Positioning and Restraints    Pre-Treatment Position sitting in chair/recliner  -TA     Post Treatment Position chair  -TA     In Chair notified nsg;reclined;call light within reach;encouraged to call for assist;exit alarm on;waffle cushion;legs elevated  -TA           User Key  (r) = Recorded By, (t) = Taken By, (c) = Cosigned By    Initials Name Provider Type    Bacilio Cruz, OT Occupational Therapist               Outcome Measures     Row Name 02/19/23 1351          How much help from another is currently needed...    Putting on and taking off regular lower body clothing? 2  -TA     Bathing (including washing, rinsing, and drying) 2  -TA     Toileting (which includes using toilet bed pan or urinal) 2  -TA     Putting on and taking off regular upper body clothing 2  -TA     Taking care of personal grooming (such as brushing teeth) 3  -TA     Eating meals 4  -TA     AM-PAC 6 Clicks Score (OT) 15  -TA     Row Name 02/19/23 0800          How much help from another person do you currently need...    Turning from your back to your side while in flat bed without using bedrails? 3  -LT     Moving from lying on back to sitting on the side of a flat bed without bedrails? 3  -LT     Moving to and from a bed to a chair  (including a wheelchair)? 2  -LT     Standing up from a chair using your arms (e.g., wheelchair, bedside chair)? 2  -LT     Climbing 3-5 steps with a railing? 1  -LT     To walk in hospital room? 2  -LT     AM-PAC 6 Clicks Score (PT) 13  -LT     Highest level of mobility 4 --> Transferred to chair/commode  -LT     Row Name 02/19/23 1351          Functional Assessment    Outcome Measure Options AM-PAC 6 Clicks Daily Activity (OT)  -TA           User Key  (r) = Recorded By, (t) = Taken By, (c) = Cosigned By    Initials Name Provider Type    Bacilio Cruz OT Occupational Therapist    LT Charu Maya RN Registered Nurse                Occupational Therapy Education     Title: PT OT SLP Therapies (In Progress)     Topic: Occupational Therapy (In Progress)     Point: ADL training (Done)     Description:   Instruct learner(s) on proper safety adaptation and remediation techniques during self care or transfers.   Instruct in proper use of assistive devices.              Learning Progress Summary           Patient Acceptance, E,D, VU,NR by TA at 2/19/2023 1445                   Point: Home exercise program (Not Started)     Description:   Instruct learner(s) on appropriate technique for monitoring, assisting and/or progressing therapeutic exercises/activities.              Learner Progress:  Not documented in this visit.          Point: Precautions (Done)     Description:   Instruct learner(s) on prescribed precautions during self-care and functional transfers.              Learning Progress Summary           Patient Acceptance, E,D, VU,NR by TA at 2/19/2023 1445                   Point: Body mechanics (Done)     Description:   Instruct learner(s) on proper positioning and spine alignment during self-care, functional mobility activities and/or exercises.              Learning Progress Summary           Patient Acceptance, E,D, VU,NR by TA at 2/19/2023 1445                               User Key     Initials  Effective Dates Name Provider Type Discipline    TA 06/16/21 -  Bacilio Narayan OT Occupational Therapist OT              OT Recommendation and Plan  Planned Therapy Interventions (OT): activity tolerance training, BADL retraining, functional balance retraining, occupation/activity based interventions, patient/caregiver education/training, ROM/therapeutic exercise, strengthening exercise, transfer/mobility retraining  Therapy Frequency (OT): daily  Plan of Care Review  Plan of Care Reviewed With: patient  Outcome Evaluation: Pt presents with fxl decline from PLOF, deficits in ADL performance, fxl mobility, occupational endurance. Pt limited by dementia/decreased cognition, decreased balance, generalized weakness. Pt required Mod A to don socks, and complete UBD, anticipate Max A for toileting. STS with Mod A and physical assist to correctly place hands on RW. Pt took 2 steps forward - 2 steps back to chair with Mod A/RW and has posterior lean. Pt will benefit from skilled OT services to address deficits, facilitate increased fxl I. Recommend SNF for rehab at discharge.     Time Calculation:    Time Calculation- OT     Row Name 02/19/23 1351             Time Calculation- OT    OT Start Time 1351  ttc 0 minutes  -TA      Total Timed Code Minutes- OT 0 minute(s)  -TA      OT Received On 02/19/23  -TA      OT Goal Re-Cert Due Date 03/01/23  -TA         Untimed Charges    OT Eval/Re-eval Minutes 50  -TA         Total Minutes    Untimed Charges Total Minutes 50  -TA       Total Minutes 50  -TA            User Key  (r) = Recorded By, (t) = Taken By, (c) = Cosigned By    Initials Name Provider Type    TA Bacilio Narayan, OT Occupational Therapist              Therapy Charges for Today     Code Description Service Date Service Provider Modifiers Qty    [id number] HC OT EVAL MOD COMPLEXITY 4 2/19/2023 Bacilio Narayan OT GO 1               Bacilio Narayan OT  2/19/2023

## 2023-02-19 NOTE — PLAN OF CARE
Problem: Adult Inpatient Plan of Care  Goal: Plan of Care Review  Recent Flowsheet Documentation  Taken 2/19/2023 1351 by Bacilio Narayan OT  Plan of Care Reviewed With: patient  Outcome Evaluation: Pt presents with fxl decline from PLOF, deficits in ADL performance, fxl mobility, occupational endurance. Pt limited by dementia/decreased cognition, decreased balance, generalized weakness. Pt required Mod A to don socks, and complete UBD, anticipate Max A for toileting. STS with Mod A and physical assist to correctly place hands on RW. Pt took 2 steps forward - 2 steps back to chair with Mod A/RW and has posterior lean. Pt will benefit from skilled OT services to address deficits, facilitate increased fxl I. Recommend SNF for rehab at discharge.   Goal Outcome Evaluation:  Plan of Care Reviewed With: patient           Outcome Evaluation: Pt presents with fxl decline from PLOF, deficits in ADL performance, fxl mobility, occupational endurance. Pt limited by dementia/decreased cognition, decreased balance, generalized weakness. Pt required Mod A to don socks, and complete UBD, anticipate Max A for toileting. STS with Mod A and physical assist to correctly place hands on RW. Pt took 2 steps forward - 2 steps back to chair with Mod A/RW and has posterior lean. Pt will benefit from skilled OT services to address deficits, facilitate increased fxl I. Recommend SNF for rehab at discharge.

## 2023-02-19 NOTE — THERAPY RE-EVALUATION
"Acute Care - Speech Language Pathology   Swallow Re-Evaluation [facility]   Clinical Swallow Re-Evaluation     Patient Name: Zohra Hall  : 1940  MRN: [id number]  Today's Date: 2023               Admit Date: 2023    Visit Dx:     ICD-10-CM ICD-9-CM   1. Acute UTI  N39.0 599.0   2. Altered mental status, unspecified altered mental status type  R41.82 780.97   3. Rash of face  R21 782.1   4. Anemia, unspecified type  D64.9 285.9   5. Dysphagia, unspecified type  R13.10 787.20     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HCC)   • Hypothyroid post remote resection papillary adenocarcinoma thyroid   • Hypertension   • GERD without esophagitis   • Hyperlipidemia LDL goal <100   • Atherosclerosis of aorta (HCC)   • Incontinence of bowel   • Acute right-sided low back pain without sciatica   • Benign essential tremor   • Pain of right hip joint   • Thyroid cancer (HCC)   • Lacunar stroke (formerly [facility])   • Nontraumatic rupture of extensor tendons of right hand and wrist   • Actinic keratosis   • Transient ischemic attack   • Ataxia   • History of PMR (CMS/HCC)   • Osteoporosis   • Post-surgical hypothyroidism   • NSTEMI (non-ST elevated myocardial infarction) (formerly [facility])   • Coronary artery disease   • NICM presumably due to Takotsubo post NSTEMI 2021 (CMS/formerly [facility])   • Abnormal TSH   • Severe hypothyroidism   • Traumatic SAH, SDH, and intraparenchymal bleed (CMS/formerly [facility])   • History of lacunar stroke 2019   • Alzheimer disease (formerly [facility])   • Frequent falls   • Bilateral hip pain   • Acute UTI   • Metabolic encephalopathy     Past Medical History:   Diagnosis Date   • Breast cancer (HCC)     left- pt states \"in situ\", no radiation, Tamoxifen for 5 years   • Cellulitis    • Cervical lymphadenopathy    • Chest pain  "   • Convulsions (HCC)    • GERD (gastroesophageal reflux disease)    • Glossitis    • Grief reaction     · Last Impression: 29 Jan 2015  counselled, given ativan for prn use.  Aleah Regan   • Hypertension    • Lower back pain    • Oral thrush    • Papillary adenocarcinoma (HCC)     Papillary adenocarcinoma of thyroid   • Papillary adenocarcinoma of thyroid (HCC)     · resection Ramirez- 1/13,  2 x 2 x 1.5 cm  T3 N1 MXpost op low calcium and diminished  voiceablation Ain- 3/7 metastatic nodes and invasion to strap muscles · Last Impression: 29 Oct 2014  s/p Eval by berry Méndez.  Aleah Regan (Internal   • Piriformis syndrome    • Tingling    • Xerostomia      Past Surgical History:   Procedure Laterality Date   • BREAST BIOPSY Right 10/10/2013    stereo bx   • BREAST BIOPSY Left 10/19/2015    stereo bx   • BREAST CYST EXCISION      right   • BREAST EXCISIONAL BIOPSY Right     affirm bx and loc 2016.   • BREAST LUMPECTOMY Left 1987   • CARDIAC CATHETERIZATION N/A 5/23/2021    Procedure: Left Heart Cath;  Surgeon: Alonso Ross IV, MD;  Location: [facility] CATH INVASIVE LOCATION;  Service: Cardiology;  Laterality: N/A;   • HYSTERECTOMY  1979   • OTHER SURGICAL HISTORY Right     right arm surgery-steel roger in place   • TOTAL HIP ARTHROPLASTY     • TOTAL THYROIDECTOMY      Thyroid Surgery Total Thyroidectomy       SLP Recommendation and Plan  SLP Swallowing Diagnosis: R/O pharyngeal dysphagia (02/19/23 1430)  SLP Diet Recommendation: soft to chew textures, nectar thick liquids (02/19/23 1430)  Recommended Precautions and Strategies: upright posture during/after eating, small bites of food and sips of liquid, general aspiration precautions (02/19/23 1430)  SLP Rec. for Method of Medication Administration: meds whole, with puree, as tolerated (02/19/23 1430)     Monitor for Signs of Aspiration: yes, notify SLP if any concerns (02/19/23 1430)  Recommended Diagnostics: VFSS (MBS), other (see comments)  (2/20 v 2/21 pending SLP & Radiology schedule) (02/19/23 1430)  Swallow Criteria for Skilled Therapeutic Interventions Met: demonstrates skilled criteria (02/19/23 1430)  Anticipated Discharge Disposition (SLP): anticipate therapy at next level of care, skilled nursing facility (02/19/23 1430)  Rehab Potential/Prognosis, Swallowing: re-evaluate goals as necessary (02/19/23 1430)     Predicted Duration Therapy Intervention (Days): until discharge (02/19/23 1430)                                        Plan of Care Reviewed With: patient      SWALLOW EVALUATION (last 72 hours)     SLP Adult Swallow Evaluation     Row Name 02/19/23 1430 02/18/23 1000                Rehab Evaluation    Document Type re-evaluation  -MP evaluation  -CJ       Subjective Information no complaints  -MP no complaints  -CJ       Patient Observations alert;cooperative  -MP alert  -CJ       Patient/Family/Caregiver Comments/Observations No family present  -MP no family present  -CJ       Patient Effort good  -MP fair  -CJ       Symptoms Noted During/After Treatment -- none  -CJ          General Information    Patient Profile Reviewed yes  -MP yes  -CJ       Pertinent History Of Current Problem Adm 2/17 w/ acute UTI. Hx Alzheimer's dementia, thyroid ca, prior CVA, GERD. Had MBS @ [facility] in Aug '21 w/ recs for mech soft, nectar-thick.  -MP Pt adm w/ acute UTI; has sig h/o HTN, HLD, CAD, CHF, thyroid cancer, CVA, GERD. Familiar to SLP dept w/ most recent MBS 8/10/2021 w/ SLP recs for mech soft w/ nectar thick liquids  -CJ       Current Method of Nutrition soft to chew textures;nectar/syrup-thick liquids  -MP soft to chew textures;nectar/syrup-thick liquids  -CJ       Precautions/Limitations, Vision WFL;for purposes of eval  -MP WFL;for purposes of eval  -CJ       Precautions/Limitations, Hearing WFL;for purposes of eval  -MP WFL;for purposes of eval  -CJ       Prior Level of Function-Communication cognitive-linguistic impairment  -MP unknown  -CJ        Prior Level of Function-Swallowing mechanical ground textures;nectar thick liquids;other (see comments)  per [facility] 8[place]21 - unsure of diet @ facility  -MP --  [facility] 8/10/21  -       Plans/Goals Discussed with patient  -MP patient  -CJ       Barriers to Rehab cognitive status  -MP --       Patient's Goals for Discharge patient did not state  - --          Pain    Additional Documentation Pain Scale: FACES Pre/Post-Treatment (Group)  -MP Pain Scale: FACES Pre/Post-Treatment (Group)  -CJ          Pain Scale: FACES Pre/Post-Treatment    Pain: FACES Scale, Pretreatment 0-->no hurt  -MP 0-->no hurt  -CJ       Posttreatment Pain Rating 0-->no hurt  -MP 0-->no hurt  -CJ          Oral Motor Structure and Function    Dentition Assessment edentulous  - edentulous  -       Secretion Management -- WNL/WFL  -       Mucosal Quality -- moist, healthy  -          Oral Musculature and Cranial Nerve Assessment    Oral Motor General Assessment unable to assess  - unable to assess  -          General Eating/Swallowing Observations    Eating/Swallowing Skills fed by SLP  -MP fed by SLP  -       Positioning During Eating upright in chair  - upright in bed  -       Utensils Used spoon;cup;straw  - spoon;cup;straw  -       Consistencies Trialed thin liquids;nectar/syrup-thick liquids;pureed;regular textures  - thin liquids;ice chips;pureed;nectar/syrup-thick liquids  -          Clinical Swallow Eval    Pharyngeal Phase --  r/o aspiration  - --  difficult to assess  -       Clinical Swallow Evaluation Summary No s/sxs that appeared to be directly r/t PO, but pt does have congested cough @ baseline. Would benefit from [facility] to further assess. Will complete as SLP schedule allows. Continue current diet until [facility].  -MP Pt w/ limited participation this date. Pt only accepted x1 trial of thin liquids w/ cough response elicited. Pt refused further thin trials. Pt did accept a few presentations of nectar thick liquids and  "puree w/o overt s/s of aspiration. Pt then refused further po intake, frequently stating \"thats enough\". Unable to adequately assess this date however will continue current modified po diet and f/u for re-eval tomorrow vs repeat instrumental pending pt participation  -          SLP Evaluation Clinical Impression    SLP Swallowing Diagnosis R/O pharyngeal dysphagia  -MP R/O pharyngeal dysphagia  -       Functional Impact risk of aspiration/pneumonia  -MP risk of aspiration/pneumonia  -       Rehab Potential/Prognosis, Swallowing re-evaluate goals as necessary  - re-evaluate goals as necessary  -       Swallow Criteria for Skilled Therapeutic Interventions Met demonstrates skilled criteria  -MP demonstrates skilled criteria  -          Recommendations    Predicted Duration Therapy Intervention (Days) until discharge  - --       SLP Diet Recommendation soft to chew textures;nectar thick liquids  - soft to chew textures;nectar thick liquids  -       Recommended Diagnostics VFSS (MBS);other (see comments)  2/20 v 2/21 pending SLP & Radiology schedule  - reassess via clinical swallow evaluation  instrumental if further concerns/participation improved  -       Recommended Precautions and Strategies upright posture during/after eating;small bites of food and sips of liquid;general aspiration precautions  - upright posture during/after eating;small bites of food and sips of liquid;general aspiration precautions  -       Oral Care Recommendations Oral Care BID/PRN  - Oral Care BID/PRN  -       SLP Rec. for Method of Medication Administration meds whole;with puree;as tolerated  - meds whole;with puree;as tolerated  -       Monitor for Signs of Aspiration yes;notify SLP if any concerns  -MP yes;notify SLP if any concerns  -       Anticipated Discharge Disposition (SLP) anticipate therapy at next level of care;skilled nursing facility  - anticipate therapy at next level of care;skilled " nursing facility  -             User Key  (r) = Recorded By, (t) = Taken By, (c) = Cosigned By    Initials Name Effective Dates     Elsa Ashby MS CCC-SLP 06/16/21 -     MP Erick Warren MS CCC-SLP 12/28/21 -                 EDUCATION  The patient has been educated in the following areas:   Dysphagia (Swallowing Impairment) Modified Diet Instruction.              Time Calculation:    Time Calculation- SLP     Row Name 02/19/23 1510             Time Calculation- SLP    SLP Start Time 1430  -MP      SLP Received On 02/19/23  -MP         Untimed Charges    [id number] Eval Oral Pharyng Swallow Minutes 25  -MP         Total Minutes    Untimed Charges Total Minutes 25  -MP       Total Minutes 25  -MP            User Key  (r) = Recorded By, (t) = Taken By, (c) = Cosigned By    Initials Name Provider Type    MP Erick Warren MS CCC-SLP Speech and Language Pathologist                Therapy Charges for Today     Code Description Service Date Service Provider Modifiers Qty    [id number] HC ST EVAL ORAL PHARYNG SWALLOW 2 2/19/2023 Erick Warren MS CCC-SLP GN 1               Erick Pavon MS CCC-JUANCHO  2/19/2023

## 2023-02-19 NOTE — PROGRESS NOTES
[facility] Medicine Services  PROGRESS NOTE    Patient Name: Zohra Hall  : 1940  MRN: [id number]    Date of Admission: 2023  Primary Care Physician: Aleah Regan MD    Subjective   Subjective     CC:  AMS    HPI:  Patient sitting up in bed, much more alert. Able to eat and take her medications this am. Talkative, pleasantly confused but no acute complaints.    ROS:  Cannot obtain d/t AMS- no complaints    Objective   Objective     Vital Signs:   Temp:  [98.1 °F (36.7 °C)-98.6 °F (37 °C)] 98.4 °F (36.9 °C)  Heart Rate:  [62-73] 73  Resp:  [14-18] 18  BP: (118-164)/(61-82) 153/74     Physical Exam:  Constitutional: No acute distress, sleepy  HENT: NCAT, mucous membranes moist  Respiratory: Clear to auscultation bilaterally, respiratory effort normal   Cardiovascular: RRR, no murmurs, rubs, or gallops  Gastrointestinal: Positive bowel sounds, soft, nontender, nondistended  Musculoskeletal: No bilateral ankle edema  Psychiatric: awake, confused  Neurological: A and O x 0, moves extremities spontaneously   Skin: diffuse erythematous facial rash-spares the periorbital/perioral areas    Results Reviewed:  LAB RESULTS:      Lab 23  1056   WBC 6.73 7.02 8.11   HEMOGLOBIN 9.9* 8.8* 9.4*   HEMATOCRIT 33.6* 29.0* 31.8*   PLATELETS 259 217 224   NEUTROS ABS  --   --  6.50   IMMATURE GRANS (ABS)  --   --  0.02   LYMPHS ABS  --   --  0.70   MONOS ABS  --   --  0.79   EOS ABS  --   --  0.08   MCV 76.0* 76.1* 77.0*   LACTATE  --   --  1.8         Lab 23  1056   SODIUM 134*  --  135* 137   POTASSIUM 4.1 4.6 3.3* 3.7   CHLORIDE 101  --  101 100   CO2 24.0  --  24.0 26.0   ANION GAP 9.0  --  10.0 11.0   BUN 14  --  18 22   CREATININE 0.67  --  0.56* 0.80   EGFR 86.3  --  90.1 72.8   GLUCOSE 94  --  98 108*   CALCIUM 8.7  --  8.3* 8.8   MAGNESIUM  --   --   --  2.1         Lab 23  0407  02/17/23  1056   TOTAL PROTEIN 6.5 6.8   ALBUMIN 3.2* 3.4*   GLOBULIN 3.3 3.4   ALT (SGPT) 7 8   AST (SGOT) 11 11   BILIRUBIN 0.2 0.2   ALK PHOS 69 69         Lab 02/17/23  1300 02/17/23  1056   HSTROP T 18* 18*                 Brief Urine Lab Results  (Last result in the past 365 days)      Color   Clarity   Blood   Leuk Est   Nitrite   Protein   CREAT   Urine HCG        02/17/23 1041 Yellow   Cloudy   Small (1+)   Moderate (2+)   Positive   Trace                 Microbiology Results Abnormal     Procedure Component Value - Date/Time    Blood Culture - Blood, Arm, Right [[id number]]  (Normal) Collected: 02/17/23 1055    Lab Status: Preliminary result Specimen: Blood from Arm, Right Updated: 02/18/23 1345     Blood Culture No growth at 24 hours    Blood Culture - Blood, Arm, Left [[id number]]  (Normal) Collected: 02/17/23 1300    Lab Status: Preliminary result Specimen: Blood from Arm, Left Updated: 02/18/23 1316     Blood Culture No growth at 24 hours    COVID PRE-OP / PRE-PROCEDURE SCREENING ORDER (NO ISOLATION) - Swab, Nasopharynx [[id number]]  (Normal) Collected: 02/17/23 1117    Lab Status: Final result Specimen: Swab from Nasopharynx Updated: 02/17/23 1152    Narrative:      The following orders were created for panel order COVID PRE-OP / PRE-PROCEDURE SCREENING ORDER (NO ISOLATION) - Swab, Nasopharynx.  Procedure                               Abnormality         Status                     ---------                               -----------         ------                     COVID-19 and FLU A/B PCR...[[id number]]  Normal              Final result                 Please view results for these tests on the individual orders.    COVID-19 and FLU A/B PCR - Swab, Nasopharynx [[id number]]  (Normal) Collected: 02/17/23 1117    Lab Status: Final result Specimen: Swab from Nasopharynx Updated: 02/17/23 1152     COVID19 Not Detected     Influenza A PCR Not Detected     Influenza B PCR Not Detected    Narrative:      Fact  sheet for providers: https://www.fda.gov/media/516867/download    Fact sheet for patients: https://www.fda.gov/media/915124/download    Test performed by PCR.          XR Chest 1 View    Result Date: 2/17/2023  XR CHEST 1 VW Date of Exam: 2/17/2023 10:54 AM EST Indication: Weak/Dizzy/AMS triage protocol. Comparison: 5/31/2022 Findings: The heart size remains normal and the lungs are clear     Impression: Impression: No active disease Electronically Signed: Andrea Estrada  2/17/2023 11:41 AM EST  Workstation ID: [id number]      Results for orders placed during the hospital encounter of 08/09/21    Adult Transthoracic Echo Complete W/ Cont if Necessary Per Protocol    Interpretation Summary  · Estimated left ventricular EF = 60% Left ventricular systolic function is normal.  · Left ventricular diastolic function was normal.  · Trace mitral and tricuspid regurgitation.  · Mild tachycardia noted throughout the study.      I have reviewed the medications:  Scheduled Meds:amLODIPine, 10 mg, Oral, Daily  aspirin, 81 mg, Oral, Daily  atorvastatin, 40 mg, Oral, Daily  cefTRIAXone, 1 g, Intravenous, Q24H  celecoxib, 200 mg, Oral, Daily  donepezil, 10 mg, Oral, Nightly  enoxaparin, 40 mg, Subcutaneous, Daily  famotidine, 40 mg, Oral, Daily  levothyroxine, 175 mcg, Oral, Daily  lisinopril, 10 mg, Oral, Daily  metoprolol succinate XL, 12.5 mg, Oral, Q24H  nystatin, 1 application, Topical, BID  PARoxetine, 40 mg, Oral, QAM  primidone, 100 mg, Oral, Nightly  primidone, 150 mg, Oral, Daily  senna-docusate sodium, 2 tablet, Oral, BID  sodium chloride, 10 mL, Intravenous, Q12H      Continuous Infusions:   PRN Meds:.•  acetaminophen  •  senna-docusate sodium **AND** polyethylene glycol **AND** bisacodyl **AND** bisacodyl  •  Calcium Replacement - Initiate Nurse / BPA Driven Protocol  •  Magnesium Standard Dose Replacement - Initiate Nurse / BPA Driven Protocol  •  melatonin  •  ondansetron **OR** ondansetron  •  Phosphorus Replacement -  Initiate Nurse / BPA Driven Protocol  •  Potassium Replacement - Initiate Nurse / BPA Driven Protocol  •  sodium chloride  •  sodium chloride  •  sodium chloride    Assessment & Plan   Assessment & Plan     Active Hospital Problems    Diagnosis  POA   • **Acute UTI [N39.0]  Yes   • Metabolic encephalopathy [G93.41]  Yes   • Alzheimer disease (HCC) [G30.9, F02.80]  Yes   • Coronary artery disease [I25.10]  Yes   • Hypertension [I10]  Yes   • Hyperlipidemia LDL goal <100 [E78.5]  Yes   • Hypothyroid post remote resection papillary adenocarcinoma thyroid [E03.9]  Yes      Resolved Hospital Problems   No resolved problems to display.        Brief Hospital Course to date:  Zohra Hall is a 82 y.o. female with hx of HTN, HLD, CAD, chronic diastolic CHF, thyroid cancer s/p remote total thyroidectomy with subsequent hypothyroidism, CVA, GERD, essential tremor, Alzheimer's dementia, and frequent falls who presents due to altered mental status from her nursing facility. She is found to have an acute UTI.    This patient's problems and plans were partially entered by my partner and updated as appropriate by me 02/19/23.     Acute UTI  Metabolic encephalopathy- improved  --Continue Rocephin x 5 days, can switch to PO antibiotics based on final urine culture and discharge readiness  -- urine cx grew E coli susceptible to Rocephin  --Patient much more alert and talkative today- pleasantly confused     Facial rash  --continue facial ointment     Other chronic medical problems:  HTN / HLD / CAD / chronic diastolic CHF-- continue ASA, statin, Norvasc, Lisinopril, Metoprolol; hold HCTZ and Terazosin for now  Post-surgical hypothyroidism -- continue Synthroid, she has a PCP appointment on 2/22/23, defer checking TSH until that visit   CVA -- continue ASA, statin  GERD -- continue PPI  Alzheimer's dementia -- continue Aricept, Paxil  Essential tremor -- continue Primidone  Frequent falls -- consult PT/OT    Expected Discharge  Location and Transportation: nursing facility  Expected Discharge   Expected Discharge Date and Time     Expected Discharge Date Expected Discharge Time    Feb 20, 2023            DVT prophylaxis:  Medical DVT prophylaxis orders are present.     AM-PAC 6 Clicks Score (PT): 13 (02/18/23 1450)    CODE STATUS:   Code Status and Medical Interventions:   Ordered at: 02/17/23 1420     Medical Intervention Limits:    NO intubation (DNI)     Code Status (Patient has no pulse and is not breathing):    No CPR (Do Not Attempt to Resuscitate)     Medical Interventions (Patient has pulse or is breathing):    Limited Support       Keara Hoffman DO  02/19/23

## 2023-02-19 NOTE — PLAN OF CARE
Goal Outcome Evaluation:  Plan of Care Reviewed With: patient            SLP re-evaluation completed. Will address dysphagia. Please see note for further details and recommendations.

## 2023-02-20 VITALS
OXYGEN SATURATION: 96 % | SYSTOLIC BLOOD PRESSURE: 169 MMHG | RESPIRATION RATE: 18 BRPM | BODY MASS INDEX: 19.61 KG/M2 | WEIGHT: 122 LBS | DIASTOLIC BLOOD PRESSURE: 72 MMHG | TEMPERATURE: 98.2 F | HEART RATE: 60 BPM | HEIGHT: 66 IN

## 2023-02-20 PROBLEM — G93.41 METABOLIC ENCEPHALOPATHY: Status: RESOLVED | Noted: 2023-02-17 | Resolved: 2023-02-20

## 2023-02-20 LAB
ALBUMIN SERPL-MCNC: 3.3 G/DL (ref 3.5–5.2)
ALBUMIN/GLOB SERPL: 1.1 G/DL
ALP SERPL-CCNC: 69 U/L (ref 39–117)
ALT SERPL W P-5'-P-CCNC: 7 U/L (ref 1–33)
ANION GAP SERPL CALCULATED.3IONS-SCNC: 11 MMOL/L (ref 5–15)
AST SERPL-CCNC: 12 U/L (ref 1–32)
BILIRUB SERPL-MCNC: <0.2 MG/DL (ref 0–1.2)
BUN SERPL-MCNC: 15 MG/DL (ref 8–23)
BUN/CREAT SERPL: 23.1 (ref 7–25)
CALCIUM SPEC-SCNC: 8.6 MG/DL (ref 8.6–10.5)
CHLORIDE SERPL-SCNC: 104 MMOL/L (ref 98–107)
CO2 SERPL-SCNC: 24 MMOL/L (ref 22–29)
CREAT SERPL-MCNC: 0.65 MG/DL (ref 0.57–1)
DEPRECATED RDW RBC AUTO: 49.4 FL (ref 37–54)
EGFRCR SERPLBLD CKD-EPI 2021: 86.9 ML/MIN/1.73
ERYTHROCYTE [DISTWIDTH] IN BLOOD BY AUTOMATED COUNT: 17.8 % (ref 12.3–15.4)
GLOBULIN UR ELPH-MCNC: 3 GM/DL
GLUCOSE SERPL-MCNC: 80 MG/DL (ref 65–99)
HCT VFR BLD AUTO: 34.2 % (ref 34–46.6)
HGB BLD-MCNC: 10.1 G/DL (ref 12–15.9)
MCH RBC QN AUTO: 22.7 PG (ref 26.6–33)
MCHC RBC AUTO-ENTMCNC: 29.5 G/DL (ref 31.5–35.7)
MCV RBC AUTO: 76.9 FL (ref 79–97)
PLATELET # BLD AUTO: 266 10*3/MM3 (ref 140–450)
PMV BLD AUTO: 9.9 FL (ref 6–12)
POTASSIUM SERPL-SCNC: 3.8 MMOL/L (ref 3.5–5.2)
PROT SERPL-MCNC: 6.3 G/DL (ref 6–8.5)
RBC # BLD AUTO: 4.45 10*6/MM3 (ref 3.77–5.28)
SODIUM SERPL-SCNC: 139 MMOL/L (ref 136–145)
WBC NRBC COR # BLD: 5.97 10*3/MM3 (ref 3.4–10.8)

## 2023-02-20 PROCEDURE — 80053 COMPREHEN METABOLIC PANEL: CPT | Performed by: HOSPITALIST

## 2023-02-20 PROCEDURE — G0378 HOSPITAL OBSERVATION PER HR: HCPCS

## 2023-02-20 PROCEDURE — 25010000002 CEFTRIAXONE PER 250 MG: Performed by: HOSPITALIST

## 2023-02-20 PROCEDURE — 25010000002 ENOXAPARIN PER 10 MG: Performed by: HOSPITALIST

## 2023-02-20 PROCEDURE — 99239 HOSP IP/OBS DSCHRG MGMT >30: CPT | Performed by: HOSPITALIST

## 2023-02-20 PROCEDURE — 92610 EVALUATE SWALLOWING FUNCTION: CPT | Performed by: SPEECH-LANGUAGE PATHOLOGIST

## 2023-02-20 PROCEDURE — 85027 COMPLETE CBC AUTOMATED: CPT | Performed by: HOSPITALIST

## 2023-02-20 PROCEDURE — 96372 THER/PROPH/DIAG INJ SC/IM: CPT

## 2023-02-20 RX ORDER — CEFUROXIME AXETIL 500 MG/1
500 TABLET ORAL 2 TIMES DAILY
Qty: 14 TABLET | Refills: 0 | Status: SHIPPED | OUTPATIENT
Start: 2023-02-20 | End: 2023-03-01

## 2023-02-20 RX ADMIN — PAROXETINE HYDROCHLORIDE 40 MG: 20 TABLET, FILM COATED ORAL at 12:14

## 2023-02-20 RX ADMIN — AMLODIPINE BESYLATE 10 MG: 5 TABLET ORAL at 12:14

## 2023-02-20 RX ADMIN — NYSTATIN 1 APPLICATION: 100000 CREAM TOPICAL at 09:44

## 2023-02-20 RX ADMIN — ASPIRIN 81 MG: 81 TABLET, COATED ORAL at 12:14

## 2023-02-20 RX ADMIN — LISINOPRIL 10 MG: 10 TABLET ORAL at 12:14

## 2023-02-20 RX ADMIN — ENOXAPARIN SODIUM 40 MG: 40 INJECTION SUBCUTANEOUS at 09:44

## 2023-02-20 RX ADMIN — Medication 10 ML: at 09:44

## 2023-02-20 RX ADMIN — FAMOTIDINE 40 MG: 20 TABLET ORAL at 12:14

## 2023-02-20 RX ADMIN — ATORVASTATIN CALCIUM 40 MG: 40 TABLET, FILM COATED ORAL at 12:14

## 2023-02-20 RX ADMIN — LEVOTHYROXINE SODIUM 175 MCG: 175 TABLET ORAL at 05:55

## 2023-02-20 RX ADMIN — CELECOXIB 200 MG: 200 CAPSULE ORAL at 12:14

## 2023-02-20 RX ADMIN — SODIUM CHLORIDE 1 G: 900 INJECTION INTRAVENOUS at 09:44

## 2023-02-20 RX ADMIN — PRIMIDONE 150 MG: 50 TABLET ORAL at 12:19

## 2023-02-20 RX ADMIN — METOPROLOL SUCCINATE 12.5 MG: 25 TABLET, EXTENDED RELEASE ORAL at 12:14

## 2023-02-20 NOTE — PLAN OF CARE
Problem: Fall Injury Risk  Goal: Absence of Fall and Fall-Related Injury  Outcome: Ongoing, Progressing  Intervention: Identify and Manage Contributors  Description: Develop a fall prevention plan with the patient and caregiver/family.  Provide reorientation, appropriate sensory stimulation and routines with changes in mental status to decrease risk of fall.  Promote use of personal vision and auditory aids.  Assess assistance level required for safe and effective self-care; provide support as needed, such as toileting, mobilization. For age 65 and older, implement timed toileting with assistance.  Encourage physical activity, such as performance of mobility and self-care at highest level of patient ability, multicomponent exercise program and provision of appropriate assistive devices.  If fall occurs, assess the severity of injury; implement fall injury protocol. Determine the cause and revise fall injury prevention plan.  Regularly review medication contribution to fall risk; adjust medication administration times to minimize risk of falling.  Consider risk related to polypharmacy and age.  Balance adequate pain management with potential for oversedation.  Recent Flowsheet Documentation  Taken 2/20/2023 1414 by Audrey Srinivasan RN  Medication Review/Management: medications reviewed  Taken 2/20/2023 1214 by Audrey Srinivasan, RN  Medication Review/Management: medications reviewed  Taken 2/20/2023 1008 by Audrey Srinivasan, RN  Medication Review/Management: medications reviewed  Taken 2/20/2023 0800 by Audrey Srinivasan, RN  Medication Review/Management: medications reviewed  Intervention: Promote Injury-Free Environment  Description: Provide a safe, barrier-free environment that encourages independent activity.  Keep care area uncluttered and well-lighted.  Determine need for increased observation or monitoring.  Avoid use of devices that minimize mobility, such as restraints or indwelling urinary catheter.  Recent  Flowsheet Documentation  Taken 2/20/2023 1414 by Audrey Srinivasan, RN  Safety Promotion/Fall Prevention:   activity supervised   assistive device/personal items within reach  Taken 2/20/2023 1214 by Audrey Srinivasan, RN  Safety Promotion/Fall Prevention:   activity supervised   assistive device/personal items within reach   clutter free environment maintained   nonskid shoes/slippers when out of bed   room organization consistent   safety round/check completed   toileting scheduled  Taken 2/20/2023 1008 by Audrey Srinivasan, RN  Safety Promotion/Fall Prevention:   assistive device/personal items within reach   activity supervised   fall prevention program maintained   nonskid shoes/slippers when out of bed   room organization consistent   safety round/check completed   toileting scheduled  Taken 2/20/2023 0800 by Audrey Srinivasan, RN  Safety Promotion/Fall Prevention:   safety round/check completed   room organization consistent   fall prevention program maintained   clutter free environment maintained   lighting adjusted   assistive device/personal items within reach   Goal Outcome Evaluation:         Pt drowsy this shift. Pt awakened for meals and meds. Pt ate small amount of lunch. Nystatin cream applied to face and zgaurd applied to coccyx. Report called to Donovan at [facility]

## 2023-02-20 NOTE — PLAN OF CARE
VSS, pt resting well with no current complaints. Will continue to monitor.  Problem: Fall Injury Risk  Goal: Absence of Fall and Fall-Related Injury  Outcome: Ongoing, Progressing  Intervention: Promote Injury-Free Environment  Recent Flowsheet Documentation  Taken 2/20/2023 0400 by Andrea Boyd RN  Safety Promotion/Fall Prevention: activity supervised  Taken 2/20/2023 0200 by Andrea Boyd RN  Safety Promotion/Fall Prevention: activity supervised  Taken 2/20/2023 0000 by Andrea Boyd RN  Safety Promotion/Fall Prevention:   assistive device/personal items within reach   activity supervised   clutter free environment maintained   fall prevention program maintained   nonskid shoes/slippers when out of bed  Taken 2/19/2023 2200 by Andrea Boyd RN  Safety Promotion/Fall Prevention: activity supervised  Taken 2/19/2023 2000 by Andrea Boyd RN  Safety Promotion/Fall Prevention: activity supervised     Problem: Skin Injury Risk Increased  Goal: Skin Health and Integrity  Outcome: Ongoing, Progressing  Intervention: Optimize Skin Protection  Recent Flowsheet Documentation  Taken 2/19/2023 2000 by Andrea Boyd RN  Pressure Reduction Techniques: frequent weight shift encouraged  Pressure Reduction Devices: pressure-redistributing mattress utilized  Skin Protection:   adhesive use limited   incontinence pads utilized   tubing/devices free from skin contact     Problem: UTI (Urinary Tract Infection)  Goal: Improved Infection Symptoms  Outcome: Ongoing, Progressing     Problem: Cognitive Impairment (Dementia Signs/Symptoms)  Goal: Optimized Cognitive Function (Dementia Signs/Symptoms)  Outcome: Ongoing, Progressing     Problem: Adult Inpatient Plan of Care  Goal: Plan of Care Review  Outcome: Ongoing, Progressing   Goal Outcome Evaluation:

## 2023-02-20 NOTE — THERAPY EVALUATION
Acute Care - Speech Language Pathology   Swallow Initial Evaluation [facility]   Clinical Swallow Evaluation       Patient Name: Zohra Hall  : 1940  MRN: [id number]  Today's Date: 2023               Admit Date: 2023    Visit Dx:     ICD-10-CM ICD-9-CM   1. Acute UTI  N39.0 599.0   2. Altered mental status, unspecified altered mental status type  R41.82 780.97   3. Rash of face  R21 782.1   4. Anemia, unspecified type  D64.9 285.9   5. Dysphagia, unspecified type  R13.10 787.20   6. Metabolic encephalopathy  G93.41 348.31   7. Bilateral hip pain  M25.551 719.45    M25.552    8. Frequent falls  R29.6 V15.88   9. Alzheimer disease (HCC)  G30.9 331.0    F02.80    10. Osteoporosis, unspecified osteoporosis type, unspecified pathological fracture presence  M81.0 733.00   11. Acute right-sided low back pain without sciatica  M54.50 724.2   12. History of lacunar stroke 2019  Z86.73 V12.54   13. Traumatic hemorrhage of right cerebrum with loss of consciousness of 1 hour to 5 hours 59 minutes, subsequent encounter  S06.343D V58.89     853.03     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HCC)   • Hypothyroid post remote resection papillary adenocarcinoma thyroid   • Hypertension   • GERD without esophagitis   • Hyperlipidemia LDL goal <100   • Atherosclerosis of aorta (HCC)   • Incontinence of bowel   • Acute right-sided low back pain without sciatica   • Benign essential tremor   • Pain of right hip joint   • Thyroid cancer (HCC)   • Lacunar stroke (HCC)   • Nontraumatic rupture of extensor tendons of right hand and wrist   • Actinic keratosis   • Transient ischemic attack   • Ataxia   • History of PMR (CMS/HCC)   • Osteoporosis   • Post-surgical hypothyroidism   • NSTEMI (non-ST elevated  "myocardial infarction) (HCC)   • Coronary artery disease   • NICM presumably due to Takotsubo post NSTEMI 5/23/2021 (CMS/HCC)   • Abnormal TSH   • Severe hypothyroidism   • Traumatic SAH, SDH, and intraparenchymal bleed (CMS/HCC)   • History of lacunar stroke 1/2/2019   • Alzheimer disease (HCC)   • Frequent falls   • Bilateral hip pain   • Acute UTI     Past Medical History:   Diagnosis Date   • Breast cancer (HCC) 1987    left- pt states \"in situ\", no radiation, Tamoxifen for 5 years   • Cellulitis    • Cervical lymphadenopathy    • Chest pain    • Convulsions (HCC)    • GERD (gastroesophageal reflux disease)    • Glossitis    • Grief reaction     · Last Impression: 29 Jan 2015  counselled, given ativan for prn use.  Aleah Regan   • Hypertension    • Lower back pain    • Oral thrush    • Papillary adenocarcinoma (HCC)     Papillary adenocarcinoma of thyroid   • Papillary adenocarcinoma of thyroid (HCC)     · resection Ramirez- 1/13,  2 x 2 x 1.5 cm  T3 N1 MXpost op low calcium and diminished  voiceablation Ain- 3/7 metastatic nodes and invasion to strap muscles · Last Impression: 29 Oct 2014  s/p Eval by berry Méndez.  lAeah Regan (Internal   • Piriformis syndrome    • Tingling    • Xerostomia      Past Surgical History:   Procedure Laterality Date   • BREAST BIOPSY Right 10/10/2013    stereo bx   • BREAST BIOPSY Left 10/19/2015    stereo bx   • BREAST CYST EXCISION      right   • BREAST EXCISIONAL BIOPSY Right     affirm bx and loc 2016.   • BREAST LUMPECTOMY Left 1987   • CARDIAC CATHETERIZATION N/A 5/23/2021    Procedure: Left Heart Cath;  Surgeon: Alonso Ross IV, MD;  Location:  [facility];  Service: Cardiology;  Laterality: N/A;   • HYSTERECTOMY  1979   • OTHER SURGICAL HISTORY Right     right arm surgery-steel roger in place   • TOTAL HIP ARTHROPLASTY     • TOTAL THYROIDECTOMY      Thyroid Surgery Total Thyroidectomy       SLP Recommendation and Plan  SLP " Swallowing Diagnosis: R/O pharyngeal dysphagia (02/20/23 0925)  SLP Diet Recommendation: soft to chew textures, nectar thick liquids (02/20/23 0925)  Recommended Precautions and Strategies: upright posture during/after eating, small bites of food and sips of liquid, general aspiration precautions (02/20/23 0925)  SLP Rec. for Method of Medication Administration: meds whole, with puree, as tolerated (02/20/23 0925)     Monitor for Signs of Aspiration: yes, notify SLP if any concerns (02/20/23 0925)  Recommended Diagnostics: reassess via VFSS (MBS) (02/20/23 0925)  Swallow Criteria for Skilled Therapeutic Interventions Met: demonstrates skilled criteria (02/20/23 0925)  Anticipated Discharge Disposition (SLP): anticipate therapy at next level of care, skilled nursing facility (02/20/23 0925)  Rehab Potential/Prognosis, Swallowing: re-evaluate goals as necessary (02/20/23 0925)     Predicted Duration Therapy Intervention (Days): until discharge (02/20/23 0925)             Plan of Care Reviewed With: patient  Progress:  (initial eval)      SWALLOW EVALUATION (last 72 hours)     SLP Adult Swallow Evaluation     Row Name 02/20/23 0925       Rehab Evaluation    Document Type re-evaluation  -CJ    Subjective Information no complaints  -CJ    Patient Observations alert  -CJ    Patient/Family/Caregiver Comments/Observations no family present  -CJ    Patient Effort fair  -CJ    Symptoms Noted During/After Treatment none  -CJ       General Information    Patient Profile Reviewed yes  -CJ    Pertinent History Of Current Problem Adm 2/17 w/ acute UTI. Hx Alzheimer's dementia, thyroid ca, prior CVA, GERD. Had MBS @ [facility] in Aug '21 w/ recs for mech soft, nectar-thick.  -CJ    Current Method of Nutrition soft to chew textures;nectar/syrup-thick liquids  -CJ    Precautions/Limitations, Vision WFL;for purposes of eval  -CJ    Precautions/Limitations, Hearing WFL;for purposes of eval  -CJ    Prior Level of Function-Communication  cognitive-linguistic impairment  -    Prior Level of Function-Swallowing mechanical ground textures;nectar thick liquids;other (see comments)  -    Plans/Goals Discussed with patient  -    Barriers to Rehab cognitive status  -    Patient's Goals for Discharge patient did not state  -       Pain    Additional Documentation Pain Scale: FACES Pre/Post-Treatment (Group)  -       Pain Scale: FACES Pre/Post-Treatment    Pain: FACES Scale, Pretreatment 0-->no hurt  -CJ    Posttreatment Pain Rating 0-->no hurt  -CJ       Oral Motor Structure and Function    Dentition Assessment edentulous  -CJ    Secretion Management WNL/WFL  -    Mucosal Quality dry  -       Oral Musculature and Cranial Nerve Assessment    Oral Motor General Assessment unable to assess  -       General Eating/Swallowing Observations    Eating/Swallowing Skills fed by SLP  -    Positioning During Eating upright in bed  -    Utensils Used spoon;cup;straw  -    Consistencies Trialed thin liquids;nectar/syrup-thick liquids;pureed  -       Clinical Swallow Eval    Pharyngeal Phase suspected pharyngeal impairment  -    Clinical Swallow Evaluation Summary Pt continues w/ intermittent congestive cough. Throat clear x1 w/ thins. No overt s/s of aspiration w/ nectar thick liquids. Pt planning to d/c back to facility. Pt not agreeable to instrumental FEES 2/2 cognitive status. Will continue current modified po diet. Will plan for MBS if pt doesn't d/c this afternoon  -       Pharyngeal Phase Concerns    Pharyngeal Phase Concerns cough  -       SLP Evaluation Clinical Impression    SLP Swallowing Diagnosis R/O pharyngeal dysphagia  -    Functional Impact risk of aspiration/pneumonia  -    Rehab Potential/Prognosis, Swallowing re-evaluate goals as necessary  -    Swallow Criteria for Skilled Therapeutic Interventions Met demonstrates skilled criteria  -       Recommendations    Predicted Duration Therapy Intervention (Days) until  discharge  -    SLP Diet Recommendation soft to chew textures;nectar thick liquids  -    Recommended Diagnostics reassess via VFSS (MBS)  -    Recommended Precautions and Strategies upright posture during/after eating;small bites of food and sips of liquid;general aspiration precautions  -    Oral Care Recommendations Oral Care BID/PRN  -    SLP Rec. for Method of Medication Administration meds whole;with puree;as tolerated  -    Monitor for Signs of Aspiration yes;notify SLP if any concerns  -    Anticipated Discharge Disposition (SLP) anticipate therapy at next level of care;skilled nursing facility  -          User Key  (r) = Recorded By, (t) = Taken By, (c) = Cosigned By    Initials Name Effective Dates    Elsa Cruz MS CCC-SLP 06/16/21 -     Erick Jaimes MS CCC-SLP 12/28/21 -                 EDUCATION  The patient has been educated in the following areas:   Dysphagia (Swallowing Impairment) Oral Care/Hydration Modified Diet Instruction.              Time Calculation:    Time Calculation- SLP     Row Name 02/20/23 1518             Time Calculation- [facility]    SLP Start Time 0925  -      SLP Received On 02/20/23  -         Untimed Charges    43466-YC Eval Oral Pharyng Swallow Minutes 40  -         Total Minutes    Untimed Charges Total Minutes 40  -       Total Minutes 40  -            User Key  (r) = Recorded By, (t) = Taken By, (c) = Cosigned By    Initials Name Provider Type    Elsa Cruz MS CCC-SLP Speech and Language Pathologist                Therapy Charges for Today     Code Description Service Date Service Provider Modifiers Qty    [id number] HC ST EVAL ORAL PHARYNG SWALLOW 3 2/20/2023 Elsa Ashby MS CCC-SLP GN 1               Elsa Ashby MS CCC-SLP  2/20/2023

## 2023-02-20 NOTE — CASE MANAGEMENT/SOCIAL WORK
Case Management Discharge Note      Final Note: Pt is being discharged back to [facility] today where she lives in a LTC bed. RN call report to [phone]. I faxed D/C summary and OPPT/SLP orders to [phone]. I confirmed with [facility] that no COVID test is required. [employer] van will pick pt up at 1530 today.         Selected Continued Care - Admitted Since 2/17/2023     Destination Coordination complete.    Service Provider Selected Services Address Phone Fax Patient Preferred    [facility] [address], [facility] [address] [phone] --       Internal Comment last updated by Jennifer Pierre RN 2/20/2023 1024    Pt lives at [facility] prior to admission                     Durable Medical Equipment    No services have been selected for the patient.              Dialysis/Infusion    No services have been selected for the patient.              Home Medical Care    No services have been selected for the patient.              Therapy    No services have been selected for the patient.              Community Resources    No services have been selected for the patient.              Community & DME    No services have been selected for the patient.                  Transportation Services  W/C Van: Other ([employer] wheelchair 2/20/23 at 1530)    Final Discharge Disposition Code: 04 - intermediate care facility

## 2023-02-20 NOTE — DISCHARGE PLACEMENT REQUEST
"Case Management [phone]    Jamie Hall (82 y.o. Female)     Date of Birth   1940    Social Security Number       [place] [address]    Home Phone   [phone]    MRN   [id number]       Mandaeism   Restorationist    Marital Status                               Admission Date   2/17/23    Admission Type   Emergency    Admitting Provider   Keara Hoffman DO    Attending Provider   Keara Hoffman DO    Department, Room/Bed   [address], S508/1       Discharge Date       Discharge Disposition   Skilled Nursing Facility ([place] - External)    Discharge Destination                               Attending Provider: Keara Hoffman DO    Allergies: Dilaudid [Hydromorphone Hcl], Beta Adrenergic Blockers, Dilaudid [Hydromorphone], Lactose Intolerance (Gi)    Isolation: None   Infection: None   Code Status: No CPR    Ht: 167.6 cm (66\")   Wt: 55.3 kg (122 lb)    Admission Cmt: None   Principal Problem: Acute UTI [N39.0]                 Active Insurance as of 2/17/2023     Primary Coverage     Payor Plan Insurance Group Employer/Plan Group    MEDICARE MEDICARE A & B      Payor Plan Address Payor Plan Phone Number Payor Plan Fax Number Effective Dates    [address] [phone]  11/1/2003 - None Entered    [facility] [id number]       Subscriber Name Subscriber Birth Date Member ID       JAMIE HALL 1940 [id number]           Secondary Coverage     Payor Plan Insurance Group Employer/Plan Group    AARP MC SUP AAR HEALTH CARE OPTIONS      Payor Plan Address Payor Plan Phone Number Payor Plan Fax Number Effective Dates    [facility] [phone]  1/1/2016 - None Entered    [address]       [facility] [address]       Subscriber Name Subscriber Birth Date Member ID       JAMIE HALL 1940 [id number]                 Emergency Contacts      (Rel.) Home Phone Work Phone Mobile Phone    Joey Diaz (POA) (Grandchild) " [phone] -- [phone]    Simón Diaz (Son) [phone] -- [phone]         [address] [address]  Phone:  [phone]  Fax:  [phone] Date: 2023      Ambulatory Referral to Speech Therapy     Patient:  Zohra Hall MRN:  [id number]   [address]  [facility] [address] :  1940  SSN:    Phone: [phone] Sex:  F      INSURANCE PAYOR PLAN GROUP # SUBSCRIBER ID   Primary:  Secondary:    MEDICARE  [employer] MC SUP [id number]  [id number]      [id number]  [id number]      Referring Provider Information:  MOAR HOFFMAN Phone: [phone] Fax: [phone]       Referral Information:   # Visits:  1 Referral Type: Speech Pathology [AF1]   Urgency:  Routine Referral Reason: Specialty Services Required   Start Date: 2023 End Date:  To be determined by Insurer   Diagnosis: Altered mental status, unspecified altered mental status type (R41.82 [ICD-10-CM] 780.97 [ICD-9-CM])  Dysphagia, unspecified type (R13.10 [ICD-10-CM] 787.20 [ICD-9-CM])  Alzheimer disease (HCC) (G30.9,F02.80 [ICD-10-CM] 331.0 [ICD-9-CM])  History of stroke (Z86.73 [ICD-10-CM] V12.54 [ICD-9-CM])  Traumatic hemorrhage of right cerebrum with loss of consciousness of 1 hour to 5 hours 59 minutes, subsequent encounter (S06.343D [ICD-10-CM] V58.89,853.03 [ICD-9-CM])      Refer to Dept:   Refer to Provider:   Refer to Provider Phone:   Refer to Facility:    Pt currently on soft texture, nectar thick liquid  Follow-up needed: Yes     This document serves as a request of services and does not constitute Insurance authorization or approval of services.  To determine eligibility, please contact the members Insurance carrier to verify and review coverage.     If you have medical questions regarding this request for services. Please contact [address] at [phone] during normal business hours.        Authorizing Provider:Omar Hoffman    Authorizing Provider's NPI: [id number]  Order Entered By: Jennifer Pierre RN 2023 10:13 AM     Electronically signed by: Omar Hoffman DO 2023 10:13 AM     [address]  [address] [facility] [address]  Phone:  [phone]  Fax:  [phone] Date: 2023      Ambulatory Referral to Physical Therapy Evaluate and treat     Patient:  Zohra Hall MRN:  [id number]   [address]  [facility] [address] :  1940  SSN:    Phone: [phone] Sex:  F      INSURANCE PAYOR PLAN GROUP # SUBSCRIBER ID   Primary:  Secondary:    MEDICARE  [employer] MC SUP [id number]  [id number]      [id number]  [id number]      Referring Provider Information:  OMAR HOFFMAN Phone: [phone] Fax: [phone]       Referral Information:   # Visits:  1 Referral Type: Physical Therapy [AE1]   Urgency:  Routine Referral Reason: Specialty Services Required   Start Date: 2023 End Date:  To be determined by Insurer   Diagnosis: Altered mental status, unspecified altered mental status type (R41.82 [ICD-10-CM] 780.97 [ICD-9-CM])  Dysphagia, unspecified type (R13.10 [ICD-10-CM] 787.20 [ICD-9-CM])  Metabolic encephalopathy (G93.41 [ICD-10-CM] 348.31 [ICD-9-CM])  Bilateral hip pain (M25.551,M25.552 [ICD-10-CM] 719.45 [ICD-9-CM])  Frequent falls (R29.6 [ICD-10-CM] V15.88 [ICD-9-CM])  Alzheimer disease (HCC) (G30.9,F02.80 [ICD-10-CM] 331.0 [ICD-9-CM])  Osteoporosis, unspecified osteoporosis type, unspecified pathological fracture presence (M81.0 [ICD-10-CM] 733.00 [ICD-9-CM])  Acute right-sided low back pain without sciatica (M54.50 [ICD-10-CM] 724.2 [ICD-9-CM])      Refer to Dept:   Refer to Provider:   Refer to Provider Phone:   Refer to Facility:       Specialty needed: Evaluate and treat  Follow-up needed: Yes     This document serves as a request of services and does not constitute Insurance authorization or approval of services.  To determine eligibility, please  contact the members Insurance carrier to verify and review coverage.     If you have medical questions regarding this request for services. Please contact [address] at [phone] during normal business hours.        Authorizing Provider:Keara Hoffman DO  Authorizing Provider's NPI: [id number]  Order Entered By: Jennifer Pierre RN 2023 10:10 AM     Electronically signed by: Keara Hoffman DO 2023 10:10 AM                 Discharge Summary      Keara Hoffman DO at 23 1012              [facility] Medicine Services  DISCHARGE SUMMARY    Patient Name: Zohra Hall  : 1940  MRN: [id number]    Date of Admission: 2023 10:25 AM  Date of Discharge:  2023  Primary Care Physician: Troy Velarde MD    Consults     No orders found from 2023 to 2023.          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 female with hx of HTN, HLD, CAD, chronic diastolic CHF, thyroid cancer s/p remote total thyroidectomy with subsequent hypothyroidism, CVA, GERD, essential tremor, Alzheimer's dementia, and frequent falls who presents due to altered mental status from her nursing facility. She is found to have an acute UTI.     This patient's problems and plans were partially entered by my partner and updated as appropriate by me 23.     Acute UTI  Metabolic encephalopathy- improved  -- urine cx  grew E coli susceptible to Rocephin- s/p IV Rocephin inpatient  - Take Ceftin 500mg PO BID x 7 days for UTI  --Patient much more alert and talkative today- pleasantly confused     Facial rash  --continue facial ointment    Dysphagia  - speech therapy recommends:Texture: Soft to Chew (NDD 3); Soft to Chew: Chopped Meat; Fluid Consistency: [place] Thick  - speech therapy on d/c     Other chronic medical problems:  HTN / HLD / CAD / chronic diastolic CHF-- continue ASA, statin, Norvasc, Lisinopril, Metoprolol; hold HCTZ and Terazosin for now  Post-surgical hypothyroidism -- continue Synthroid, she has a PCP appointment on 2/22/23, defer checking TSH until that visit   CVA -- continue ASA, statin  GERD -- continue PPI  Alzheimer's dementia -- continue Aricept, Paxil  Essential tremor -- continue Primidone  Frequent falls --  PT/OT- outpatient PT      Discharge Follow Up Recommendations for outpatient labs/diagnostics:  - Take Ceftin 500mg PO BID x 7 days for UTI  - outpatient PT arranged  - outpatient sleep therapy    Day of Discharge     HPI:   Patient stable overnight, much more alert.    Review of Systems  No acute complaints    Vital Signs:   Temp:  [97.8 °F (36.6 °C)-99 °F (37.2 °C)] 98.2 °F (36.8 °C)  Heart Rate:  [55-84] 56  Resp:  [16-18] 18  BP: (143-154)/(58-74) 154/68      Physical Exam:  Constitutional: No acute distress, sleepy  HENT: NCAT, mucous membranes moist  Respiratory: Clear to auscultation bilaterally, respiratory effort normal   Cardiovascular: RRR, no murmurs, rubs, or gallops  Gastrointestinal: Positive bowel sounds, soft, nontender, nondistended  Musculoskeletal: No bilateral ankle edema  Psychiatric: awake, confused  Neurological: A and O x 0, moves extremities spontaneously   Skin: diffuse erythematous facial rash-spares the periorbital/perioral areas    Pertinent  and/or Most Recent Results     LAB RESULTS:      Lab 02/20/23  0529 02/19/23  0407 02/18/23  0308 02/17/23  1056   WBC 5.97 6.73  7.02 8.11   HEMOGLOBIN 10.1* 9.9* 8.8* 9.4*   HEMATOCRIT 34.2 33.6* 29.0* 31.8*   PLATELETS 266 259 217 224   NEUTROS ABS  --   --   --  6.50   IMMATURE GRANS (ABS)  --   --   --  0.02   LYMPHS ABS  --   --   --  0.70   MONOS ABS  --   --   --  0.79   EOS ABS  --   --   --  0.08   MCV 76.9* 76.0* 76.1* 77.0*   LACTATE  --   --   --  1.8         Lab 02/20/23  0529 02/19/23  0407 02/18/23  1940 02/18/23  0308 02/17/23  1056   SODIUM 139 134*  --  135* 137   POTASSIUM 3.8 4.1 4.6 3.3* 3.7   CHLORIDE 104 101  --  101 100   CO2 24.0 24.0  --  24.0 26.0   ANION GAP 11.0 9.0  --  10.0 11.0   BUN 15 14  --  18 22   CREATININE 0.65 0.67  --  0.56* 0.80   EGFR 86.9 86.3  --  90.1 72.8   GLUCOSE 80 94  --  98 108*   CALCIUM 8.6 8.7  --  8.3* 8.8   MAGNESIUM  --   --   --   --  2.1         Lab 02/20/23  0529 02/19/23  0407 02/17/23  1056   TOTAL PROTEIN 6.3 6.5 6.8   ALBUMIN 3.3* 3.2* 3.4*   GLOBULIN 3.0 3.3 3.4   ALT (SGPT) 7 7 8   AST (SGOT) 12 11 11   BILIRUBIN <0.2 0.2 0.2   ALK PHOS 69 69 69         Lab 02/17/23  1300 02/17/23  1056   HSTROP T 18* 18*                 Brief Urine Lab Results  (Last result in the past 365 days)      Color   Clarity   Blood   Leuk Est   Nitrite   Protein   CREAT   Urine HCG        02/17/23 1041 Yellow   Cloudy   Small (1+)   Moderate (2+)   Positive   Trace               Microbiology Results (last 10 days)     Procedure Component Value - Date/Time    Urine Culture - Urine, Straight Cath [[id number]]  (Abnormal)  (Susceptibility) Collected: 02/17/23 1319    Lab Status: Final result Specimen: Urine from Straight Cath Updated: 02/19/23 0550     Urine Culture >100,000 CFU/mL Escherichia coli    Narrative:      Colonization of the urinary tract without infection is common. Treatment is discouraged unless the patient is symptomatic, pregnant, or undergoing an invasive urologic procedure.    Susceptibility      Escherichia coli      ROSALINDA      Ampicillin Susceptible      Ampicillin + Sulbactam  Susceptible      Cefazolin Susceptible      Cefepime Susceptible      Ceftazidime Susceptible      Ceftriaxone Susceptible      Gentamicin Susceptible      Levofloxacin Resistant     Nitrofurantoin Susceptible      Piperacillin + Tazobactam Susceptible      Trimethoprim + Sulfamethoxazole Resistant                          Blood Culture - Blood, Arm, Left [[id number]]  (Normal) Collected: 02/17/23 1300    Lab Status: Preliminary result Specimen: Blood from Arm, Left Updated: 02/19/23 1316     Blood Culture No growth at 2 days    COVID PRE-OP / PRE-PROCEDURE SCREENING ORDER (NO ISOLATION) - Swab, Nasopharynx [[id number]]  (Normal) Collected: 02/17/23 1117    Lab Status: Final result Specimen: Swab from Nasopharynx Updated: 02/17/23 1152    Narrative:      The following orders were created for panel order COVID PRE-OP / PRE-PROCEDURE SCREENING ORDER (NO ISOLATION) - Swab, Nasopharynx.  Procedure                               Abnormality         Status                     ---------                               -----------         ------                     COVID-19 and FLU A/B PCR...[[id number]]  Normal              Final result                 Please view results for these tests on the individual orders.    COVID-19 and FLU A/B PCR - Swab, Nasopharynx [[id number]]  (Normal) Collected: 02/17/23 1117    Lab Status: Final result Specimen: Swab from Nasopharynx Updated: 02/17/23 1152     COVID19 Not Detected     Influenza A PCR Not Detected     Influenza B PCR Not Detected    Narrative:      Fact sheet for providers: https://www.fda.gov/media/163497/download    Fact sheet for patients: https://www.fda.gov/media/096114/download    Test performed by PCR.    Blood Culture - Blood, Arm, Right [[id number]]  (Normal) Collected: 02/17/23 1055    Lab Status: Preliminary result Specimen: Blood from Arm, Right Updated: 02/19/23 1345     Blood Culture No growth at 2 days          XR Chest 1 View    Result Date: 2/17/2023  XR CHEST 1 VW  Date of Exam: 2/17/2023 10:54 AM EST Indication: Weak/Dizzy/AMS triage protocol. Comparison: 5/31/2022 Findings: The heart size remains normal and the lungs are clear     Impression: No active disease Electronically Signed: Andrea Estrada  2/17/2023 11:41 AM EST  Workstation ID: [id number]              Results for orders placed during the hospital encounter of 08/09/21    Adult Transthoracic Echo Complete W/ Cont if Necessary Per Protocol    Interpretation Summary  · Estimated left ventricular EF = 60% Left ventricular systolic function is normal.  · Left ventricular diastolic function was normal.  · Trace mitral and tricuspid regurgitation.  · Mild tachycardia noted throughout the study.      Plan for Follow-up of Pending Labs/Results: negative to date  Pending Labs     Order Current Status    Blood Culture - Blood, Arm, Left Preliminary result    Blood Culture - Blood, Arm, Right Preliminary result        Discharge Details        Discharge Medications      New Medications      Instructions Start Date   cefuroxime 500 MG tablet  Commonly known as: CEFTIN   500 mg, Oral, 2 Times Daily         Continue These Medications      Instructions Start Date   acetaminophen 325 MG tablet  Commonly known as: TYLENOL   650 mg, Oral, Every 4 Hours PRN      amLODIPine 10 MG tablet  Commonly known as: NORVASC   TAKE 1 TABLET BY MOUTH  DAILY      aspirin 81 MG EC tablet   81 mg, Oral, Daily      atorvastatin 40 MG tablet  Commonly known as: LIPITOR   40 mg, Oral, Daily      betamethasone dipropionate 0.05 % lotion   Topical, 2 Times Daily      celecoxib 200 MG capsule  Commonly known as: CeleBREX   200 mg, Oral, Daily      donepezil 10 MG tablet  Commonly known as: Aricept   10 mg, Oral, Nightly      famotidine 40 MG tablet  Commonly known as: PEPCID   40 mg, Oral, Daily      hydroCHLOROthiazide 12.5 MG tablet  Commonly known as: HYDRODIURIL   12.5 mg, Oral, Daily      levothyroxine 125 MCG tablet  Commonly known as: SYNTHROID,  LEVOTHROID   175 mcg, Oral, Daily      lisinopril 10 MG tablet  Commonly known as: PRINIVIL,ZESTRIL   10 mg, Oral, Daily      metoprolol succinate XL 25 MG 24 hr tablet  Commonly known as: TOPROL-XL   12.5 mg, Oral, Every 24 Hours Scheduled      nystatin 277214 UNIT/GM cream  Commonly known as: MYCOSTATIN   1 application, Topical, 2 Times Daily      ondansetron 4 MG tablet  Commonly known as: ZOFRAN   4 mg, Oral, Every 6 Hours PRN      PARoxetine 40 MG tablet  Commonly known as: Paxil   40 mg, Oral, Every Morning      primidone 50 MG tablet  Commonly known as: MYSOLINE   Take 3 tablets by mouth Every Morning AND 2 tablets Every Night.      terazosin 1 MG capsule  Commonly known as: HYTRIN   1 mg, Oral, Nightly             Allergies   Allergen Reactions   • Dilaudid [Hydromorphone Hcl] Hallucinations     TABS   • Beta Adrenergic Blockers Other (See Comments)     Hypotension / bradycadia   • Dilaudid [Hydromorphone] Unknown - High Severity   • Lactose Intolerance (Gi) Diarrhea         Discharge Disposition:  Skilled Nursing Facility ([place] - External)    Diet:  Hospital:  Diet Order   Procedures   • Diet: Cardiac Diets; Healthy Heart (2-3 Na+); Texture: Soft to Chew (NDD 3); Soft to Chew: Chopped Meat; Fluid Consistency: Nectar Thick       Activity: as tolerated      Restrictions or Other Recommendations:  none       CODE STATUS:    Code Status and Medical Interventions:   Ordered at: 02/17/23 1420     Medical Intervention Limits:    NO intubation (DNI)     Code Status (Patient has no pulse and is not breathing):    No CPR (Do Not Attempt to Resuscitate)     Medical Interventions (Patient has pulse or is breathing):    Limited Support         Additional Instructions for the Follow-ups that You Need to Schedule     Ambulatory Referral to Physical Therapy Evaluate and treat   As directed      Specialty needed: Evaluate and treat    Follow-up needed: Yes                     Keara Hoffman DO  02/20/23      Time Spent on  Discharge:  I spent  35  minutes on this discharge activity which included: face-to-face encounter with the patient, reviewing the data in the system, coordination of the care with the nursing staff as well as consultants, documentation, and entering orders.            Electronically signed by Keara Hoffman DO at 02/20/23 1015

## 2023-02-20 NOTE — CASE MANAGEMENT/SOCIAL WORK
Continued Stay Note  [facility]     Patient Name: Zohra Hall  MRN: [id number]  Today's Date: 2/20/2023    Admit Date: 2/17/2023    Plan: [facility]   Discharge Plan     Row Name 02/20/23 1025       Plan    Plan [facility]    Patient/Family in Agreement with Plan yes    Plan Comments Pt lives at [facility] prior to admission. Pt is being discharged home today. Pt has Alzheimer's Disease at baseline. I spoke with pt's POA/Bill, on the phone. Pt has Medicare and is in observation status. PT is recommending Skilled Rehab. Medicare will not cover SNF. Pt's POA is agreeable to OPPT and SLP. I spoke with Gaby/Jair Aranda, on the phone. They have OPPT and SLP available. Dr. Hoffman states that pt is medically ready for discharge. Gaby/Jair Fei states that pt can return today. She requests CM to fax orders and D/C summary. Bill/POA is agreeable with discharge today. Orders are in EPIC. Pt's POA states that pt will need transportation.  I spoke with [facility] [facility], they can transport pt today at 1530. I updated pt's RN and Dr. Hoffman in person. No other needs voiced or identified.    Final Discharge Disposition Code 04 - intermediate care facility               Discharge Codes    No documentation.               Expected Discharge Date and Time     Expected Discharge Date Expected Discharge Time    Feb 20, 2023             Jennifer Pierre, JEANNINE

## 2023-02-20 NOTE — DISCHARGE SUMMARY
[facility] Medicine Services  DISCHARGE SUMMARY    Patient Name: Zohra Hall  : 1940  MRN: [id number]    Date of Admission: 2023 10:25 AM  Date of Discharge:  2023  Primary Care Physician: Troy Velarde MD    Consults     No orders found from 2023 to 2023.          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 female with hx of HTN, HLD, CAD, chronic diastolic CHF, thyroid cancer s/p remote total thyroidectomy with subsequent hypothyroidism, CVA, GERD, essential tremor, Alzheimer's dementia, and frequent falls who presents due to altered mental status from her nursing facility. She is found to have an acute UTI.     This patient's problems and plans were partially entered by my partner and updated as appropriate by me 23.     Acute UTI  Metabolic encephalopathy- improved  -- urine cx grew E coli susceptible to Rocephin- s/p IV Rocephin inpatient  - Take Ceftin 500mg PO BID x 7 days for UTI  --Patient much more alert and talkative today- pleasantly confused     Facial rash  --continue facial ointment    Dysphagia  - speech therapy recommends:Texture: Soft to Chew (NDD 3); Soft to Chew: Chopped Meat; Fluid Consistency: [place] Thick  - speech therapy on d/c     Other chronic medical problems:  HTN / HLD / CAD / chronic diastolic CHF-- continue ASA, statin, Norvasc, Lisinopril, Metoprolol; hold HCTZ and  Terazosin for now  Post-surgical hypothyroidism -- continue Synthroid, she has a PCP appointment on 2/22/23, defer checking TSH until that visit   CVA -- continue ASA, statin  GERD -- continue PPI  Alzheimer's dementia -- continue Aricept, Paxil  Essential tremor -- continue Primidone  Frequent falls --  PT/OT- outpatient PT      Discharge Follow Up Recommendations for outpatient labs/diagnostics:  - Take Ceftin 500mg PO BID x 7 days for UTI  - outpatient PT arranged  - outpatient sleep therapy    Day of Discharge     HPI:   Patient stable overnight, much more alert.    Review of Systems  No acute complaints    Vital Signs:   Temp:  [97.8 °F (36.6 °C)-99 °F (37.2 °C)] 98.2 °F (36.8 °C)  Heart Rate:  [55-84] 56  Resp:  [16-18] 18  BP: (143-154)/(58-74) 154/68      Physical Exam:  Constitutional: No acute distress, sleepy  HENT: NCAT, mucous membranes moist  Respiratory: Clear to auscultation bilaterally, respiratory effort normal   Cardiovascular: RRR, no murmurs, rubs, or gallops  Gastrointestinal: Positive bowel sounds, soft, nontender, nondistended  Musculoskeletal: No bilateral ankle edema  Psychiatric: awake, confused  Neurological: A and O x 0, moves extremities spontaneously   Skin: diffuse erythematous facial rash-spares the periorbital/perioral areas    Pertinent  and/or Most Recent Results     LAB RESULTS:      Lab 02/20/23  0529 02/19/23  0407 02/18/23  0308 02/17/23  1056   WBC 5.97 6.73 7.02 8.11   HEMOGLOBIN 10.1* 9.9* 8.8* 9.4*   HEMATOCRIT 34.2 33.6* 29.0* 31.8*   PLATELETS 266 259 217 224   NEUTROS ABS  --   --   --  6.50   IMMATURE GRANS (ABS)  --   --   --  0.02   LYMPHS ABS  --   --   --  0.70   MONOS ABS  --   --   --  0.79   EOS ABS  --   --   --  0.08   MCV 76.9* 76.0* 76.1* 77.0*   LACTATE  --   --   --  1.8         Lab 02/20/23  0529 02/19/23  0407 02/18/23  1940 02/18/23  0308 02/17/23  1056   SODIUM 139 134*  --  135* 137   POTASSIUM 3.8 4.1 4.6 3.3* 3.7   CHLORIDE 104 101  --  101 100    CO2 24.0 24.0  --  24.0 26.0   ANION GAP 11.0 9.0  --  10.0 11.0   BUN 15 14  --  18 22   CREATININE 0.65 0.67  --  0.56* 0.80   EGFR 86.9 86.3  --  90.1 72.8   GLUCOSE 80 94  --  98 108*   CALCIUM 8.6 8.7  --  8.3* 8.8   MAGNESIUM  --   --   --   --  2.1         Lab 02/20/23  0529 02/19/23  0407 02/17/23  1056   TOTAL PROTEIN 6.3 6.5 6.8   ALBUMIN 3.3* 3.2* 3.4*   GLOBULIN 3.0 3.3 3.4   ALT (SGPT) 7 7 8   AST (SGOT) 12 11 11   BILIRUBIN <0.2 0.2 0.2   ALK PHOS 69 69 69         Lab 02/17/23  1300 02/17/23  1056   HSTROP T 18* 18*                 Brief Urine Lab Results  (Last result in the past 365 days)      Color   Clarity   Blood   Leuk Est   Nitrite   Protein   CREAT   Urine HCG        02/17/23 1041 Yellow   Cloudy   Small (1+)   Moderate (2+)   Positive   Trace               Microbiology Results (last 10 days)     Procedure Component Value - Date/Time    Urine Culture - Urine, Straight Cath [[id number]]  (Abnormal)  (Susceptibility) Collected: 02/17/23 1319    Lab Status: Final result Specimen: Urine from Straight Cath Updated: 02/19/23 0550     Urine Culture >100,000 CFU/mL Escherichia coli    Narrative:      Colonization of the urinary tract without infection is common. Treatment is discouraged unless the patient is symptomatic, pregnant, or undergoing an invasive urologic procedure.    Susceptibility      Escherichia coli      ROSALINDA      Ampicillin Susceptible      Ampicillin + Sulbactam Susceptible      Cefazolin Susceptible      Cefepime Susceptible      Ceftazidime Susceptible      Ceftriaxone Susceptible      Gentamicin Susceptible      Levofloxacin Resistant     Nitrofurantoin Susceptible      Piperacillin + Tazobactam Susceptible      Trimethoprim + Sulfamethoxazole Resistant                          Blood Culture - Blood, Arm, Left [[id number]]  (Normal) Collected: 02/17/23 1300    Lab Status: Preliminary result Specimen: Blood from Arm, Left Updated: 02/19/23 1316     Blood Culture No growth at 2 days     COVID PRE-OP / PRE-PROCEDURE SCREENING ORDER (NO ISOLATION) - Swab, Nasopharynx [[id number]]  (Normal) Collected: 02/17/23 1117    Lab Status: Final result Specimen: Swab from Nasopharynx Updated: 02/17/23 1152    Narrative:      The following orders were created for panel order COVID PRE-OP / PRE-PROCEDURE SCREENING ORDER (NO ISOLATION) - Swab, Nasopharynx.  Procedure                               Abnormality         Status                     ---------                               -----------         ------                     COVID-19 and FLU A/B PCR...[[id number]]  Normal              Final result                 Please view results for these tests on the individual orders.    COVID-19 and FLU A/B PCR - Swab, Nasopharynx [[id number]]  (Normal) Collected: 02/17/23 1117    Lab Status: Final result Specimen: Swab from Nasopharynx Updated: 02/17/23 1152     COVID19 Not Detected     Influenza A PCR Not Detected     Influenza B PCR Not Detected    Narrative:      Fact sheet for providers: https://www.fda.gov/media/158248/download    Fact sheet for patients: https://www.fda.gov/media/574636/download    Test performed by PCR.    Blood Culture - Blood, Arm, Right [[id number]]  (Normal) Collected: 02/17/23 1055    Lab Status: Preliminary result Specimen: Blood from Arm, Right Updated: 02/19/23 1345     Blood Culture No growth at 2 days          XR Chest 1 View    Result Date: 2/17/2023  XR CHEST 1 VW Date of Exam: 2/17/2023 10:54 AM EST Indication: Weak/Dizzy/AMS triage protocol. Comparison: 5/31/2022 Findings: The heart size remains normal and the lungs are clear     Impression: No active disease Electronically Signed: Andrea Estrada  2/17/2023 11:41 AM EST  Workstation ID: [id number]              Results for orders placed during the hospital encounter of 08/09/21    Adult Transthoracic Echo Complete W/ Cont if Necessary Per Protocol    Interpretation Summary  · Estimated left ventricular EF = 60% Left ventricular systolic  function is normal.  · Left ventricular diastolic function was normal.  · Trace mitral and tricuspid regurgitation.  · Mild tachycardia noted throughout the study.      Plan for Follow-up of Pending Labs/Results: negative to date  Pending Labs     Order Current Status    Blood Culture - Blood, Arm, Left Preliminary result    Blood Culture - Blood, Arm, Right Preliminary result        Discharge Details        Discharge Medications      New Medications      Instructions Start Date   cefuroxime 500 MG tablet  Commonly known as: CEFTIN   500 mg, Oral, 2 Times Daily         Continue These Medications      Instructions Start Date   acetaminophen 325 MG tablet  Commonly known as: TYLENOL   650 mg, Oral, Every 4 Hours PRN      amLODIPine 10 MG tablet  Commonly known as: NORVASC   TAKE 1 TABLET BY MOUTH  DAILY      aspirin 81 MG EC tablet   81 mg, Oral, Daily      atorvastatin 40 MG tablet  Commonly known as: LIPITOR   40 mg, Oral, Daily      betamethasone dipropionate 0.05 % lotion   Topical, 2 Times Daily      celecoxib 200 MG capsule  Commonly known as: CeleBREX   200 mg, Oral, Daily      donepezil 10 MG tablet  Commonly known as: Aricept   10 mg, Oral, Nightly      famotidine 40 MG tablet  Commonly known as: PEPCID   40 mg, Oral, Daily      hydroCHLOROthiazide 12.5 MG tablet  Commonly known as: HYDRODIURIL   12.5 mg, Oral, Daily      levothyroxine 125 MCG tablet  Commonly known as: SYNTHROID, LEVOTHROID   175 mcg, Oral, Daily      lisinopril 10 MG tablet  Commonly known as: PRINIVIL,ZESTRIL   10 mg, Oral, Daily      metoprolol succinate XL 25 MG 24 hr tablet  Commonly known as: TOPROL-XL   12.5 mg, Oral, Every 24 Hours Scheduled      nystatin 003534 UNIT/GM cream  Commonly known as: MYCOSTATIN   1 application, Topical, 2 Times Daily      ondansetron 4 MG tablet  Commonly known as: ZOFRAN   4 mg, Oral, Every 6 Hours PRN      PARoxetine 40 MG tablet  Commonly known as: Paxil   40 mg, Oral, Every Morning      primidone 50  MG tablet  Commonly known as: MYSOLINE   Take 3 tablets by mouth Every Morning AND 2 tablets Every Night.      terazosin 1 MG capsule  Commonly known as: HYTRIN   1 mg, Oral, Nightly             Allergies   Allergen Reactions   • Dilaudid [Hydromorphone Hcl] Hallucinations     TABS   • Beta Adrenergic Blockers Other (See Comments)     Hypotension / bradycadia   • Dilaudid [Hydromorphone] Unknown - High Severity   • Lactose Intolerance (Gi) Diarrhea         Discharge Disposition:  Skilled Nursing Facility (DC - External)    Diet:  Hospital:  Diet Order   Procedures   • Diet: Cardiac Diets; Healthy Heart (2-3 Na+); Texture: Soft to Chew (NDD 3); Soft to Chew: Chopped Meat; Fluid Consistency: [place] Thick       Activity: as tolerated      Restrictions or Other Recommendations:  none       CODE STATUS:    Code Status and Medical Interventions:   Ordered at: 02/17/23 1420     Medical Intervention Limits:    NO intubation (DNI)     Code Status (Patient has no pulse and is not breathing):    No CPR (Do Not Attempt to Resuscitate)     Medical Interventions (Patient has pulse or is breathing):    Limited Support         Additional Instructions for the Follow-ups that You Need to Schedule     Ambulatory Referral to Physical Therapy Evaluate and treat   As directed      Specialty needed: Evaluate and treat    Follow-up needed: Yes                     Keara Hoffman DO  02/20/23      Time Spent on Discharge:  I spent  35  minutes on this discharge activity which included: face-to-face encounter with the patient, reviewing the data in the system, coordination of the care with the nursing staff as well as consultants, documentation, and entering orders.

## 2023-02-22 LAB
BACTERIA SPEC AEROBE CULT: NORMAL
BACTERIA SPEC AEROBE CULT: NORMAL

## 2023-03-01 ENCOUNTER — ANESTHESIA EVENT (OUTPATIENT)
Dept: PERIOP | Facility: HOSPITAL | Age: 83
DRG: 481 | End: 2023-03-01
Payer: MEDICARE

## 2023-03-01 ENCOUNTER — APPOINTMENT (OUTPATIENT)
Dept: GENERAL RADIOLOGY | Facility: HOSPITAL | Age: 83
DRG: 481 | End: 2023-03-01
Payer: MEDICARE

## 2023-03-01 ENCOUNTER — ANESTHESIA EVENT (OUTPATIENT)
Dept: ANESTHESIOLOGY | Facility: HOSPITAL | Age: 83
DRG: 481 | End: 2023-03-01
Payer: MEDICARE

## 2023-03-01 ENCOUNTER — APPOINTMENT (OUTPATIENT)
Dept: CT IMAGING | Facility: HOSPITAL | Age: 83
DRG: 481 | End: 2023-03-01
Payer: MEDICARE

## 2023-03-01 ENCOUNTER — HOSPITAL ENCOUNTER (INPATIENT)
Facility: HOSPITAL | Age: 83
LOS: 3 days | Discharge: NURSING, REHAB OR LONG-TERM CARE | DRG: 481 | End: 2023-03-04
Attending: EMERGENCY MEDICINE | Admitting: INTERNAL MEDICINE
Payer: MEDICARE

## 2023-03-01 ENCOUNTER — ANESTHESIA EVENT CONVERTED (OUTPATIENT)
Dept: ANESTHESIOLOGY | Facility: HOSPITAL | Age: 83
DRG: 481 | End: 2023-03-01
Payer: MEDICARE

## 2023-03-01 ENCOUNTER — ANESTHESIA (OUTPATIENT)
Dept: ANESTHESIOLOGY | Facility: HOSPITAL | Age: 83
DRG: 481 | End: 2023-03-01
Payer: MEDICARE

## 2023-03-01 DIAGNOSIS — M25.551 BILATERAL HIP PAIN: ICD-10-CM

## 2023-03-01 DIAGNOSIS — M25.552 BILATERAL HIP PAIN: ICD-10-CM

## 2023-03-01 DIAGNOSIS — M97.02XA PERIPROSTHETIC FRACTURE AROUND INTERNAL PROSTHETIC LEFT HIP JOINT, INITIAL ENCOUNTER: Primary | ICD-10-CM

## 2023-03-01 DIAGNOSIS — S01.01XA LACERATION OF SKIN OF SCALP, INITIAL ENCOUNTER: ICD-10-CM

## 2023-03-01 LAB
ABO GROUP BLD: NORMAL
ALBUMIN SERPL-MCNC: 3.6 G/DL (ref 3.5–5.2)
ALBUMIN/GLOB SERPL: 1.4 G/DL
ALP SERPL-CCNC: 60 U/L (ref 39–117)
ALT SERPL W P-5'-P-CCNC: 10 U/L (ref 1–33)
ANION GAP SERPL CALCULATED.3IONS-SCNC: 9 MMOL/L (ref 5–15)
APTT PPP: 30.6 SECONDS (ref 22–39)
AST SERPL-CCNC: 18 U/L (ref 1–32)
BASOPHILS # BLD AUTO: 0.03 10*3/MM3 (ref 0–0.2)
BASOPHILS NFR BLD AUTO: 0.4 % (ref 0–1.5)
BILIRUB SERPL-MCNC: <0.2 MG/DL (ref 0–1.2)
BILIRUB UR QL STRIP: NEGATIVE
BLD GP AB SCN SERPL QL: NEGATIVE
BUN SERPL-MCNC: 19 MG/DL (ref 8–23)
BUN/CREAT SERPL: 27.1 (ref 7–25)
CALCIUM SPEC-SCNC: 8.2 MG/DL (ref 8.6–10.5)
CHLORIDE SERPL-SCNC: 98 MMOL/L (ref 98–107)
CLARITY UR: CLEAR
CO2 SERPL-SCNC: 30 MMOL/L (ref 22–29)
COLOR UR: YELLOW
CREAT SERPL-MCNC: 0.7 MG/DL (ref 0.57–1)
DEPRECATED RDW RBC AUTO: 49.8 FL (ref 37–54)
EGFRCR SERPLBLD CKD-EPI 2021: 85.4 ML/MIN/1.73
EOSINOPHIL # BLD AUTO: 0.1 10*3/MM3 (ref 0–0.4)
EOSINOPHIL NFR BLD AUTO: 1.2 % (ref 0.3–6.2)
ERYTHROCYTE [DISTWIDTH] IN BLOOD BY AUTOMATED COUNT: 17.9 % (ref 12.3–15.4)
GLOBULIN UR ELPH-MCNC: 2.6 GM/DL
GLUCOSE SERPL-MCNC: 117 MG/DL (ref 65–99)
GLUCOSE UR STRIP-MCNC: NEGATIVE MG/DL
HCT VFR BLD AUTO: 31.5 % (ref 34–46.6)
HGB BLD-MCNC: 9.5 G/DL (ref 12–15.9)
HGB UR QL STRIP.AUTO: NEGATIVE
IMM GRANULOCYTES # BLD AUTO: 0.09 10*3/MM3 (ref 0–0.05)
IMM GRANULOCYTES NFR BLD AUTO: 1.1 % (ref 0–0.5)
INR PPP: 1.04 (ref 0.84–1.13)
KETONES UR QL STRIP: NEGATIVE
LEUKOCYTE ESTERASE UR QL STRIP.AUTO: NEGATIVE
LYMPHOCYTES # BLD AUTO: 0.89 10*3/MM3 (ref 0.7–3.1)
LYMPHOCYTES NFR BLD AUTO: 11.1 % (ref 19.6–45.3)
MCH RBC QN AUTO: 23.2 PG (ref 26.6–33)
MCHC RBC AUTO-ENTMCNC: 30.2 G/DL (ref 31.5–35.7)
MCV RBC AUTO: 76.8 FL (ref 79–97)
MONOCYTES # BLD AUTO: 0.44 10*3/MM3 (ref 0.1–0.9)
MONOCYTES NFR BLD AUTO: 5.5 % (ref 5–12)
MRSA DNA SPEC QL NAA+PROBE: POSITIVE
NEUTROPHILS NFR BLD AUTO: 6.47 10*3/MM3 (ref 1.7–7)
NEUTROPHILS NFR BLD AUTO: 80.7 % (ref 42.7–76)
NITRITE UR QL STRIP: NEGATIVE
NRBC BLD AUTO-RTO: 0 /100 WBC (ref 0–0.2)
PH UR STRIP.AUTO: 8.5 [PH] (ref 5–8)
PLATELET # BLD AUTO: 301 10*3/MM3 (ref 140–450)
PMV BLD AUTO: 9.7 FL (ref 6–12)
POTASSIUM SERPL-SCNC: 3.9 MMOL/L (ref 3.5–5.2)
PROT SERPL-MCNC: 6.2 G/DL (ref 6–8.5)
PROT UR QL STRIP: NEGATIVE
PROTHROMBIN TIME: 13.5 SECONDS (ref 11.4–14.4)
RBC # BLD AUTO: 4.1 10*6/MM3 (ref 3.77–5.28)
RH BLD: POSITIVE
SODIUM SERPL-SCNC: 137 MMOL/L (ref 136–145)
SP GR UR STRIP: 1.02 (ref 1–1.03)
T&S EXPIRATION DATE: NORMAL
UROBILINOGEN UR QL STRIP: ABNORMAL
WBC NRBC COR # BLD: 8.02 10*3/MM3 (ref 3.4–10.8)

## 2023-03-01 PROCEDURE — 99223 1ST HOSP IP/OBS HIGH 75: CPT | Performed by: INTERNAL MEDICINE

## 2023-03-01 PROCEDURE — 70450 CT HEAD/BRAIN W/O DYE: CPT

## 2023-03-01 PROCEDURE — 25010000002 MORPHINE PER 10 MG: Performed by: INTERNAL MEDICINE

## 2023-03-01 PROCEDURE — 86900 BLOOD TYPING SEROLOGIC ABO: CPT | Performed by: INTERNAL MEDICINE

## 2023-03-01 PROCEDURE — 80053 COMPREHEN METABOLIC PANEL: CPT | Performed by: EMERGENCY MEDICINE

## 2023-03-01 PROCEDURE — 73502 X-RAY EXAM HIP UNI 2-3 VIEWS: CPT

## 2023-03-01 PROCEDURE — 72192 CT PELVIS W/O DYE: CPT

## 2023-03-01 PROCEDURE — 85610 PROTHROMBIN TIME: CPT | Performed by: EMERGENCY MEDICINE

## 2023-03-01 PROCEDURE — 99285 EMERGENCY DEPT VISIT HI MDM: CPT

## 2023-03-01 PROCEDURE — 25010000002 ROPIVACAINE PER 1 MG: Performed by: ORTHOPAEDIC SURGERY

## 2023-03-01 PROCEDURE — 81003 URINALYSIS AUTO W/O SCOPE: CPT | Performed by: INTERNAL MEDICINE

## 2023-03-01 PROCEDURE — 85025 COMPLETE CBC W/AUTO DIFF WBC: CPT | Performed by: EMERGENCY MEDICINE

## 2023-03-01 PROCEDURE — 93005 ELECTROCARDIOGRAM TRACING: CPT | Performed by: EMERGENCY MEDICINE

## 2023-03-01 PROCEDURE — 86923 COMPATIBILITY TEST ELECTRIC: CPT

## 2023-03-01 PROCEDURE — 86850 RBC ANTIBODY SCREEN: CPT | Performed by: INTERNAL MEDICINE

## 2023-03-01 PROCEDURE — 25010000002 KETOROLAC TROMETHAMINE PER 15 MG: Performed by: EMERGENCY MEDICINE

## 2023-03-01 PROCEDURE — 71045 X-RAY EXAM CHEST 1 VIEW: CPT

## 2023-03-01 PROCEDURE — 87641 MR-STAPH DNA AMP PROBE: CPT | Performed by: ORTHOPAEDIC SURGERY

## 2023-03-01 PROCEDURE — 85730 THROMBOPLASTIN TIME PARTIAL: CPT | Performed by: EMERGENCY MEDICINE

## 2023-03-01 PROCEDURE — 86901 BLOOD TYPING SEROLOGIC RH(D): CPT | Performed by: INTERNAL MEDICINE

## 2023-03-01 PROCEDURE — 36415 COLL VENOUS BLD VENIPUNCTURE: CPT

## 2023-03-01 PROCEDURE — 73552 X-RAY EXAM OF FEMUR 2/>: CPT

## 2023-03-01 RX ORDER — NALOXONE HCL 0.4 MG/ML
0.4 VIAL (ML) INJECTION
Status: DISCONTINUED | OUTPATIENT
Start: 2023-03-01 | End: 2023-03-04 | Stop reason: HOSPADM

## 2023-03-01 RX ORDER — TERAZOSIN 1 MG/1
1 CAPSULE ORAL NIGHTLY
Status: DISCONTINUED | OUTPATIENT
Start: 2023-03-01 | End: 2023-03-02

## 2023-03-01 RX ORDER — LIDOCAINE HYDROCHLORIDE 10 MG/ML
0.5 INJECTION, SOLUTION EPIDURAL; INFILTRATION; INTRACAUDAL; PERINEURAL ONCE AS NEEDED
Status: CANCELLED | OUTPATIENT
Start: 2023-03-01

## 2023-03-01 RX ORDER — ASPIRIN 81 MG/1
81 TABLET ORAL DAILY
Status: DISCONTINUED | OUTPATIENT
Start: 2023-03-01 | End: 2023-03-02 | Stop reason: SDUPTHER

## 2023-03-01 RX ORDER — ACETAMINOPHEN 325 MG/1
650 TABLET ORAL EVERY 4 HOURS PRN
Status: DISCONTINUED | OUTPATIENT
Start: 2023-03-01 | End: 2023-03-01 | Stop reason: SDUPTHER

## 2023-03-01 RX ORDER — AMOXICILLIN 250 MG
2 CAPSULE ORAL 2 TIMES DAILY
Status: DISCONTINUED | OUTPATIENT
Start: 2023-03-01 | End: 2023-03-04 | Stop reason: HOSPADM

## 2023-03-01 RX ORDER — KETOROLAC TROMETHAMINE 15 MG/ML
15 INJECTION, SOLUTION INTRAMUSCULAR; INTRAVENOUS ONCE
Status: COMPLETED | OUTPATIENT
Start: 2023-03-01 | End: 2023-03-01

## 2023-03-01 RX ORDER — BISACODYL 10 MG
10 SUPPOSITORY, RECTAL RECTAL DAILY PRN
Status: DISCONTINUED | OUTPATIENT
Start: 2023-03-01 | End: 2023-03-04 | Stop reason: HOSPADM

## 2023-03-01 RX ORDER — FAMOTIDINE 20 MG/1
40 TABLET, FILM COATED ORAL DAILY
Status: DISCONTINUED | OUTPATIENT
Start: 2023-03-01 | End: 2023-03-04 | Stop reason: HOSPADM

## 2023-03-01 RX ORDER — HYDROCODONE BITARTRATE AND ACETAMINOPHEN 5; 325 MG/1; MG/1
1 TABLET ORAL EVERY 4 HOURS PRN
Status: DISCONTINUED | OUTPATIENT
Start: 2023-03-01 | End: 2023-03-02 | Stop reason: SDUPTHER

## 2023-03-01 RX ORDER — ACETAMINOPHEN 325 MG/1
650 TABLET ORAL EVERY 4 HOURS PRN
Status: DISCONTINUED | OUTPATIENT
Start: 2023-03-01 | End: 2023-03-04 | Stop reason: HOSPADM

## 2023-03-01 RX ORDER — ROPIVACAINE HYDROCHLORIDE 2 MG/ML
INJECTION, SOLUTION EPIDURAL; INFILTRATION; PERINEURAL CONTINUOUS
Status: DISCONTINUED | OUTPATIENT
Start: 2023-03-01 | End: 2023-03-04 | Stop reason: HOSPADM

## 2023-03-01 RX ORDER — MORPHINE SULFATE 2 MG/ML
1 INJECTION, SOLUTION INTRAMUSCULAR; INTRAVENOUS EVERY 4 HOURS PRN
Status: DISCONTINUED | OUTPATIENT
Start: 2023-03-01 | End: 2023-03-04 | Stop reason: HOSPADM

## 2023-03-01 RX ORDER — ONDANSETRON 2 MG/ML
4 INJECTION INTRAMUSCULAR; INTRAVENOUS EVERY 6 HOURS PRN
Status: DISCONTINUED | OUTPATIENT
Start: 2023-03-01 | End: 2023-03-04 | Stop reason: HOSPADM

## 2023-03-01 RX ORDER — SODIUM CHLORIDE 0.9 % (FLUSH) 0.9 %
10 SYRINGE (ML) INJECTION EVERY 12 HOURS SCHEDULED
Status: DISCONTINUED | OUTPATIENT
Start: 2023-03-01 | End: 2023-03-04 | Stop reason: HOSPADM

## 2023-03-01 RX ORDER — DONEPEZIL HYDROCHLORIDE 10 MG/1
10 TABLET, FILM COATED ORAL NIGHTLY
Status: DISCONTINUED | OUTPATIENT
Start: 2023-03-01 | End: 2023-03-04 | Stop reason: HOSPADM

## 2023-03-01 RX ORDER — ROPIVACAINE HYDROCHLORIDE 5 MG/ML
INJECTION, SOLUTION EPIDURAL; INFILTRATION; PERINEURAL
Status: COMPLETED | OUTPATIENT
Start: 2023-03-01 | End: 2023-03-01

## 2023-03-01 RX ORDER — PRIMIDONE 50 MG/1
150 TABLET ORAL DAILY
Status: DISCONTINUED | OUTPATIENT
Start: 2023-03-02 | End: 2023-03-04 | Stop reason: HOSPADM

## 2023-03-01 RX ORDER — LISINOPRIL 10 MG/1
10 TABLET ORAL DAILY
Status: DISCONTINUED | OUTPATIENT
Start: 2023-03-01 | End: 2023-03-02

## 2023-03-01 RX ORDER — FAMOTIDINE 20 MG/1
20 TABLET, FILM COATED ORAL ONCE
Status: CANCELLED | OUTPATIENT
Start: 2023-03-01 | End: 2023-03-01

## 2023-03-01 RX ORDER — SODIUM CHLORIDE 9 MG/ML
40 INJECTION, SOLUTION INTRAVENOUS AS NEEDED
Status: DISCONTINUED | OUTPATIENT
Start: 2023-03-01 | End: 2023-03-04 | Stop reason: HOSPADM

## 2023-03-01 RX ORDER — PRIMIDONE 50 MG/1
100 TABLET ORAL NIGHTLY
Status: DISCONTINUED | OUTPATIENT
Start: 2023-03-01 | End: 2023-03-04 | Stop reason: HOSPADM

## 2023-03-01 RX ORDER — SODIUM CHLORIDE 0.9 % (FLUSH) 0.9 %
10 SYRINGE (ML) INJECTION AS NEEDED
Status: DISCONTINUED | OUTPATIENT
Start: 2023-03-01 | End: 2023-03-04 | Stop reason: HOSPADM

## 2023-03-01 RX ORDER — ONDANSETRON 4 MG/1
4 TABLET, FILM COATED ORAL EVERY 6 HOURS PRN
Status: DISCONTINUED | OUTPATIENT
Start: 2023-03-01 | End: 2023-03-01 | Stop reason: SDUPTHER

## 2023-03-01 RX ORDER — ONDANSETRON 4 MG/1
4 TABLET, FILM COATED ORAL EVERY 6 HOURS PRN
Status: DISCONTINUED | OUTPATIENT
Start: 2023-03-01 | End: 2023-03-04 | Stop reason: HOSPADM

## 2023-03-01 RX ORDER — HYDROCHLOROTHIAZIDE 12.5 MG/1
12.5 TABLET ORAL DAILY
Status: DISCONTINUED | OUTPATIENT
Start: 2023-03-01 | End: 2023-03-02

## 2023-03-01 RX ORDER — METOPROLOL SUCCINATE 25 MG/1
12.5 TABLET, EXTENDED RELEASE ORAL
Status: DISCONTINUED | OUTPATIENT
Start: 2023-03-01 | End: 2023-03-04 | Stop reason: HOSPADM

## 2023-03-01 RX ORDER — AMLODIPINE BESYLATE 10 MG/1
10 TABLET ORAL DAILY
Status: DISCONTINUED | OUTPATIENT
Start: 2023-03-01 | End: 2023-03-02

## 2023-03-01 RX ORDER — POLYETHYLENE GLYCOL 3350 17 G/17G
17 POWDER, FOR SOLUTION ORAL DAILY PRN
Status: DISCONTINUED | OUTPATIENT
Start: 2023-03-01 | End: 2023-03-04 | Stop reason: HOSPADM

## 2023-03-01 RX ORDER — BISACODYL 5 MG/1
5 TABLET, DELAYED RELEASE ORAL DAILY PRN
Status: DISCONTINUED | OUTPATIENT
Start: 2023-03-01 | End: 2023-03-04 | Stop reason: HOSPADM

## 2023-03-01 RX ORDER — PAROXETINE HYDROCHLORIDE 20 MG/1
40 TABLET, FILM COATED ORAL DAILY
Status: DISCONTINUED | OUTPATIENT
Start: 2023-03-02 | End: 2023-03-04 | Stop reason: HOSPADM

## 2023-03-01 RX ORDER — ATORVASTATIN CALCIUM 40 MG/1
40 TABLET, FILM COATED ORAL NIGHTLY
Status: DISCONTINUED | OUTPATIENT
Start: 2023-03-01 | End: 2023-03-04 | Stop reason: HOSPADM

## 2023-03-01 RX ADMIN — METOPROLOL SUCCINATE 12.5 MG: 25 TABLET, EXTENDED RELEASE ORAL at 18:03

## 2023-03-01 RX ADMIN — ROPIVACAINE HYDROCHLORIDE: 2 INJECTION, SOLUTION EPIDURAL; INFILTRATION; PERINEURAL at 13:00

## 2023-03-01 RX ADMIN — PRIMIDONE 100 MG: 50 TABLET ORAL at 20:38

## 2023-03-01 RX ADMIN — TERAZOSIN HYDROCHLORIDE 1 MG: 1 CAPSULE ORAL at 20:38

## 2023-03-01 RX ADMIN — ATORVASTATIN CALCIUM 40 MG: 40 TABLET, FILM COATED ORAL at 20:38

## 2023-03-01 RX ADMIN — SENNOSIDES AND DOCUSATE SODIUM 2 TABLET: 8.6; 5 TABLET ORAL at 20:38

## 2023-03-01 RX ADMIN — LISINOPRIL 10 MG: 10 TABLET ORAL at 18:02

## 2023-03-01 RX ADMIN — HYDROCHLOROTHIAZIDE 12.5 MG: 12.5 CAPSULE ORAL at 18:03

## 2023-03-01 RX ADMIN — ACETAMINOPHEN 325MG 650 MG: 325 TABLET ORAL at 20:38

## 2023-03-01 RX ADMIN — KETOROLAC TROMETHAMINE 15 MG: 15 INJECTION, SOLUTION INTRAMUSCULAR; INTRAVENOUS at 05:36

## 2023-03-01 RX ADMIN — AMLODIPINE BESYLATE 10 MG: 10 TABLET ORAL at 18:02

## 2023-03-01 RX ADMIN — ROPIVACAINE HYDROCHLORIDE 30 ML: 5 INJECTION, SOLUTION EPIDURAL; INFILTRATION; PERINEURAL at 14:52

## 2023-03-01 RX ADMIN — FAMOTIDINE 40 MG: 20 TABLET ORAL at 18:02

## 2023-03-01 RX ADMIN — ASPIRIN 81 MG: 81 TABLET, COATED ORAL at 18:02

## 2023-03-01 RX ADMIN — MORPHINE SULFATE 1 MG: 2 INJECTION, SOLUTION INTRAMUSCULAR; INTRAVENOUS at 17:03

## 2023-03-01 RX ADMIN — DONEPEZIL HYDROCHLORIDE 10 MG: 10 TABLET, FILM COATED ORAL at 20:39

## 2023-03-01 RX ADMIN — Medication 10 ML: at 20:40

## 2023-03-01 NOTE — CONSULTS
"       Orthopedic Consult    Patient: Zohra Hall                                           YOB: 1940     Date of Admission: 3/1/2023  3:43 AM            Medical Record Number: [id number]    Attending Physician: Teofilo Justice MD    Consulting Physician: Qasim Briggs MD    Reason for Consult: Left periprosthetic hip fracture  History of Present Illness: 82 y.o. female admitted to [facility] with Periprosthetic fracture around internal prosthetic left hip joint, initial encounter ([facility]) [M97.02XA].     The patient was evaluated in the emergency room and was diagnosed with a  hip fracture.   Secondary to the age and multiple medical co morbidities the patient was admitted to the hospitalist.   As I was on call for the emergency room I was consulted for further evaluation and treatment.     The patient was in the usual state of health and fell from standing height in nursing facility, Resulting in sudden onset hip pain and inability to ambulate.   Denies any history of loss of consciousness, headache, vomiting, or seizures.   Denies any other injuries.   The patient is accompanied by no family members  to this hospital visit.     The patient denies any prior  pre-existing pain in the hip.  She did have a history of a left total hip replacement  Patient  is a not home/ community ambulator. Patient  uses walker/cane assistive device.   The patient  lives at nursing facility is not active and independent in activities of daily living.  The patient has history of dementia.    Patient denies any history of: DVT/PE, MRSA, COPD, CHF, CAD, Diabetes mellitus,  or A-Fib.   The patient has history of :Dementia  The patient is on anticoagulants: none    Past medical history, past surgical history, social history, family history, ALLERGIES, current medications have been reviewed by me.    Past Medical History:   Diagnosis Date   • Breast cancer ([facility]) 1987    left- pt states \"in situ\", no radiation, " Tamoxifen for 5 years   • Cellulitis    • Cervical lymphadenopathy    • Chest pain    • Convulsions (HCC)    • GERD (gastroesophageal reflux disease)    • Glossitis    • Grief reaction     · Last Impression: 2015  counselled, given ativan for prn use.  Aleah Regan   • Hypertension    • Lower back pain    • Oral thrush    • Papillary adenocarcinoma (HCC)     Papillary adenocarcinoma of thyroid   • Papillary adenocarcinoma of thyroid (HCC)     · resection Ramirez- ,  2 x 2 x 1.5 cm  T3 N1 MXpost op low calcium and diminished  voiceablation Ain- 3/7 metastatic nodes and invasion to strap muscles · Last Impression: 29 Oct 2014  s/p Eval by berry Méndez.  Aleah Regan (Internal   • Piriformis syndrome    • Tingling    • Xerostomia      Past Surgical History:   Procedure Laterality Date   • BREAST BIOPSY Right 10/10/2013    stereo bx   • BREAST BIOPSY Left 10/19/2015    stereo bx   • BREAST CYST EXCISION      right   • BREAST EXCISIONAL BIOPSY Right     affirm bx and loc .   • BREAST LUMPECTOMY Left    • CARDIAC CATHETERIZATION N/A 2021    Procedure: Left Heart Cath;  Surgeon: Alonso Ross IV, MD;  Location: [facility] CATH [facility]AS[facility];  Service: Cardiology;  Laterality: N/A;   • HYSTERECTOMY     • OTHER SURGICAL HISTORY Right     right arm surgery-steel roger in place   • TOTAL HIP ARTHROPLASTY     • TOTAL THYROIDECTOMY      Thyroid Surgery Total Thyroidectomy     Social History     Occupational History   • Not on file   Tobacco Use   • Smoking status: Former     Packs/day: 1.00     Years: 30.00     Pack years: 30.00     Types: Cigarettes     Quit date: 1992     Years since quittin.4   • Smokeless tobacco: Never   Vaping Use   • Vaping Use: Never used   Substance and Sexual Activity   • Alcohol use: No   • Drug use: No   • Sexual activity: Not Currently      Social History     Social History Narrative   • Not on file     Family History   Problem Relation  Age of Onset   • Breast cancer Mother 84   • Cancer Mother    • BRCA 1/2 Neg Hx    • Colon cancer Neg Hx    • Endometrial cancer Neg Hx    • Ovarian cancer Neg Hx         Allergies   Allergen Reactions   • Dilaudid [Hydromorphone Hcl] Hallucinations     TABS   • Beta Adrenergic Blockers Other (See Comments)     Hypotension / bradycadia   • Dilaudid [Hydromorphone] Unknown - High Severity   • Lactose Intolerance (Gi) Diarrhea       Home Medications:  (Not in a hospital admission)      Current Medications:  Scheduled Meds:   Continuous Infusions:   PRN Meds:.•  [COMPLETED] Insert Peripheral IV **AND** sodium chloride    Review of Systems:   A 12 point system review was reviewed with the patient and from the chart  and is negative except as in history of present illness.      Physical Exam: 82 y.o. female                    Vitals:    03/01/23 0830 03/01/23 0930 03/01/23 1028 03/01/23 1030   BP: 148/99 160/76  164/72   BP Location:       Patient Position:       Pulse: 66 65 68    Resp:       Temp:       TempSrc:       SpO2:       Weight:       Height:            Gait: Could not be tested , patient is nonambulatory.    Mental/HEENT/cardio/skin: The patient's general appearance was well-nourished, well-hydrated, no acute distress.  Orientation was alert and oriented ×3.  The patient's mood was normal.   Pulmonary exam shows normal late exchange, no labored breathing, or shortness of breath.    The skin exam showed normal temperature and color in the area of examination.    Extremities: Left   lower extremity positive shortening, positive external rotation, attempted movements of the  hip are painful and restricted. The patient is able to do gentle active range of motion of her toes. Gross sensation is intact over the toes.    Pulses: Pulses palpable and equal bilaterally    Diagnostic Tests:    Results from last 7 days   Lab Units 03/01/23  0300   WBC 10*3/mm3 8.02   HEMOGLOBIN g/dL 9.5*   HEMATOCRIT % 31.5*   PLATELETS  10*3/mm3 301     Results from last 7 days   Lab Units 03/01/23  0300   SODIUM mmol/L 137   POTASSIUM mmol/L 3.9   CHLORIDE mmol/L 98   CO2 mmol/L 30.0*   BUN mg/dL 19   CREATININE mg/dL 0.70   GLUCOSE mg/dL 117*   CALCIUM mg/dL 8.2*     Lab Results   Component Value Date    URICACID 5.1 11/08/2019     No results found for: CRYSTAL  Microbiology Results (last 10 days)     ** No results found for the last 240 hours. **        Results from last 7 days   Lab Units 03/01/23  0300   INR  1.04   APTT seconds 30.6     XR Femur 2 View Left    Result Date: 3/1/2023  Impression: Nondisplaced periprosthetic fracture of proximal femur not well seen on this exam, better characterized on CT from earlier today. Electronically Signed: Marquis Laura  3/1/2023 7:35 AM EST  Workstation ID: [id number]    CT Head Without Contrast    Result Date: 3/1/2023  1.  No acute intracranial abnormality. 2.  Left periorbital laceration. 3.  Moderate chronic small vessel ischemic changes and volume loss. Electronically signed by:  Cyrus Bean DO  3/1/2023 2:23 AM Mountain Time    CT Pelvis Without Contrast    Result Date: 3/1/2023  1.  Nondisplaced periprosthetic fracture proximal left femur extending from the trochanteric portion into the mid diaphysis. Electronically signed by:  Jeannine Colmenares M.D.  3/1/2023 4:01 AM Mountain Time    XR Chest 1 View    Result Date: 3/1/2023  1.  No acute cardiopulmonary process. Electronically signed by:  Jeannine Colmenares M.D.  3/1/2023 2:47 AM Mountain Time    XR Hip With or Without Pelvis 2 - 3 View Left    Result Date: 3/1/2023  Impression: A nondisplaced intertrochanteric periprosthetic fracture is suspected on the left, although suboptimally evaluated on the provided views. CT of the pelvis without contrast is recommended for further assessment. Electronically signed by:  Jameel Ward M.D.  3/1/2023 2:47 AM Mountain Time      The labs, and x-rays for preoperative evaluation have been reviewed by me.      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   • Hypertension   • GERD without esophagitis   • Hyperlipidemia LDL goal <100   • Atherosclerosis of aorta ([facility])   • Incontinence of bowel   • Acute right-sided low back pain without sciatica   • Benign essential tremor   • Pain of right hip joint   • Thyroid cancer ([facility])   • Lacunar stroke ([facility])   • Nontraumatic rupture of extensor tendons of right hand and wrist   • Actinic keratosis   • Transient ischemic attack   • Ataxia   • History of PMR (CMS/[facility])   • Osteoporosis   • Post-surgical hypothyroidism   • NSTEMI (non-ST elevated myocardial infarction) ([facility])   • Coronary artery disease   • NICM presumably due to Takotsubo post NSTEMI 5/23/2021 (CMS/[facility])   • Abnormal TSH   • Severe hypothyroidism   • Traumatic SAH, SDH, and intraparenchymal bleed (CMS/[facility])   • History of lacunar stroke 1/2/2019   • Alzheimer disease ([facility])   • Frequent falls   • Bilateral hip pain   • Acute UTI   • Periprosthetic fracture around internal prosthetic left hip joint, initial encounter ([facility])       Plan:    Attempted to call the patient's grandson who is POA to discuss the injury and the patient's likely need for fixation of the femur left a voicemail hopefully will connect with him over the next 24 hours  The patient is indicated for a open reduction internal fixation of the left femur.  The hip replacement appears to have not subsided and looks in reasonable anatomic position on the CT scan it looks like there is good proximal ingrowth still present but diaphysis appears to have a spiral oblique fracture that is relatively nondisplaced in this low demand patient think she  would benefit from reduction internal fixation instead of revision of her hip stem that appears to still have good proximal fixation.      the patient/family voiced understanding of the risks, benefits, and alternative forms of treatment that were discussed including but not limited to infection, DVT, pulmonary embolism, malunion, nonunion, leg length discrepancy, possibility of injury to nerves and vessels, periprosthetic fractures have been discussed in detail. Despite the above risks the patient/family consents to proceed with  hip surgical intervention.   The patient is scheduled for the above surgery tentatively for 3/2 2022.   Patient will be made NPO, after midnight.  Obtain informed consent.   Admit to medicine  Hold any pharmacological DVT prophylaxis mechanical for now  Bedrest  Pain control  Medical clearance  Tentative plan for OR tomorrow    Date: 3/1/2023  CC: Troy Velarde MD; MD Reshma Couch Andrea L, MD

## 2023-03-01 NOTE — H&P
"    [facility]  HISTORY AND PHYSICAL    Patient Name: Zohra Hall  : 1940  MRN: [id number]  Primary Care Physician: Troy Velarde MD  Date of admission: 3/1/2023      Subjective   Subjective     Chief Complaint:  S/p fall    HPI:  Zohra Hall is a 82 y.o. female with h/o dementia resides [facility] s/p fall from standing today with nondisplaced periprosthetic proximal left femur fracture.  Patient is a very poor historian d/t her dementia and can't recall what happened or answer much of any questions.  She does c/o pain in her left hip area.  States that I had \"feeling of flying in the air.\"  Note that she just left [facility] after admission for UTI and metabolic encephalopathy.  I called and d/w her POA and grandson, Joey Diaz.  HE states that she has been confused at baseline for about the last 1 1/2 years.  She has been having increasing issues with her balance despite walking and has had multiple falls. He reports trouble with her BP in the past but no issues that he is aware with the heart or with any c/o CP or SOA while exerting self.       Review of Systems   Unable to assess d/t baseline dementia    Personal History     Past Medical History:   Diagnosis Date   • Breast cancer (HCC)     left- pt states \"in situ\", no radiation, Tamoxifen for 5 years   • Cellulitis    • Cervical lymphadenopathy    • Chest pain    • Convulsions (HCC)    • GERD (gastroesophageal reflux disease)    • Glossitis    • Grief reaction     · Last Impression: 2015  counselled, given ativan for prn use.  Aleah Regan   • Hypertension    • Lower back pain    • Oral thrush    • Papillary adenocarcinoma (HCC)     Papillary adenocarcinoma of thyroid   • Papillary adenocarcinoma of thyroid (HCC)     · resection Ramirez- ,  2 x 2 x 1.5 cm  T3 N1 MXpost op low calcium and diminished  voiceablation Ain- 3/7 metastatic nodes and invasion to strap muscles · " Last Impression: 29 Oct 2014  s/p Eval by berry Méndez.  Aleah Regan (Internal   • Piriformis syndrome    • Tingling    • Xerostomia          Oncology Problem List:  Thyroid cancer (HCC) (08/29/2018; Status: Active)  Papillary adenocarcinoma of thyroid (HCC) (Status: Active)       Past Surgical History:   Procedure Laterality Date   • BREAST BIOPSY Right 10/10/2013    stereo bx   • BREAST BIOPSY Left 10/19/2015    stereo bx   • BREAST CYST EXCISION      right   • BREAST EXCISIONAL BIOPSY Right     affirm bx and loc 2016.   • BREAST LUMPECTOMY Left 1987   • CARDIAC CATHETERIZATION N/A 5/23/2021    Procedure: Left Heart Cath;  Surgeon: Alonso Ross IV, MD;  Location: [facility] CATH INVASIVE LOCATION;  Service: Cardiology;  Laterality: N/A;   • HYSTERECTOMY  1979   • OTHER SURGICAL HISTORY Right     right arm surgery-steel roger in place   • TOTAL HIP ARTHROPLASTY     • TOTAL THYROIDECTOMY      Thyroid Surgery Total Thyroidectomy       Family History: family history includes Breast cancer (age of onset: 84) in her mother; Cancer in her mother.     Social History:  reports that she quit smoking about 30 years ago. Her smoking use included cigarettes. She has a 30.00 pack-year smoking history. She has never used smokeless tobacco. She reports that she does not drink alcohol and does not use drugs.  Social History     Social History Narrative   • Not on file       Medications:  Available home medication information reviewed.  (Not in a hospital admission)      Allergies   Allergen Reactions   • Dilaudid [Hydromorphone Hcl] Hallucinations     TABS   • Beta Adrenergic Blockers Other (See Comments)     Hypotension / bradycadia   • Dilaudid [Hydromorphone] Unknown - High Severity   • Lactose Intolerance (Gi) Diarrhea       Objective   Objective     Vital Signs:   Temp:  [97.5 °F (36.4 °C)] 97.5 °F (36.4 °C)  Heart Rate:  [64-68] 68  Resp:  [18] 18  BP: (147-164)/() 164/72       Physical Exam    Constitutional: Awake, alert  Eyes: PERRLA, sclerae anicteric, no conjunctival injection  HENT: NCAT, mucous membranes moist  Neck: Supple, no thyromegaly, no lymphadenopathy, trachea midline  Respiratory: Clear to auscultation bilaterally, nonlabored respirations   Cardiovascular: RRR, no murmurs, rubs, or gallops, palpable pedal pulses bilaterally  Gastrointestinal: Positive bowel sounds, soft, nontender, nondistended  Musculoskeletal: left leg turned laterally  Psychiatric: Appropriate affect, cooperative  Neurologic: Oriented x 1, CN's intact, moves UE's  Skin: No rashes        Result Review:  I have personally reviewed the results from the time of this admission to 3/1/2023 13:04 EST and agree with these findings:  [x]  Laboratory list / accordion  [x]  Microbiology  [x]  Radiology  [x]  EKG/Telemetry   []  Cardiology/Vascular   []  Pathology  []  Old records  []  Other:  Most notable findings include:         LAB RESULTS:      Lab 03/01/23  0300   WBC 8.02   HEMOGLOBIN 9.5*   HEMATOCRIT 31.5*   PLATELETS 301   NEUTROS ABS 6.47   IMMATURE GRANS (ABS) 0.09*   LYMPHS ABS 0.89   MONOS ABS 0.44   EOS ABS 0.10   MCV 76.8*   PROTIME 13.5   INR 1.04   APTT 30.6         Lab 03/01/23  0300   SODIUM 137   POTASSIUM 3.9   CHLORIDE 98   CO2 30.0*   ANION GAP 9.0   BUN 19   CREATININE 0.70   EGFR 85.4   GLUCOSE 117*   CALCIUM 8.2*         Lab 03/01/23  0300   TOTAL PROTEIN 6.2   ALBUMIN 3.6   GLOBULIN 2.6   ALT (SGPT) 10   AST (SGOT) 18   BILIRUBIN <0.2   ALK PHOS 60                 Lab 03/01/23  0751   ABO TYPING A   RH TYPING Positive   ANTIBODY SCREEN Negative         UA    Urinalysis 8/6/22 8/6/22 2/17/23 2/17/23 3/1/23    0144 0144 1041 1041    Squamous Epithelial Cells, UA  7-12 (A)  0-2    Specific Gravity, UA 1.015  1.021  1.020   Ketones, UA Negative  Negative  Negative   Blood, UA Moderate (2+) (A)  Small (1+) (A)  Negative   Leukocytes, UA Small (1+) (A)  Moderate (2+) (A)  Negative   Nitrite, UA Positive (A)   Positive (A)  Negative   RBC, UA  13-20 (A)  7-12 (A)    WBC, UA  3-5 (A)  Too Numerous to Count (A)    Bacteria, UA  4+ (A)  4+ (A)    (A) Abnormal value       Comments are available for some flowsheets but are not being displayed.             Microbiology Results (last 10 days)     Procedure Component Value - Date/Time    MRSA Screen, PCR (Inpatient) - Swab, Nares [[id number]]  (Abnormal) Collected: 03/01/23 1040    Lab Status: Final result Specimen: Swab from Nares Updated: 03/01/23 1205     MRSA PCR Positive    Narrative:      The negative predictive value of this diagnostic test is high and should only be used to consider de-escalating anti-MRSA therapy. A positive result may indicate colonization with MRSA and must be correlated clinically.          XR Femur 2 View Left    Result Date: 3/1/2023  XR FEMUR 2 VW LEFT Date of Exam: 3/1/2023 7:23 AM EST Indication: fracture. Comparison: CT pelvis from earlier today Findings: A left hip arthroplasty is present. A nondisplaced periprosthetic fracture of the proximal left femur is not well seen, better characterized on recent prior CT. The hip and knee joints are congruent. The soft tissues are unremarkable.     Impression: Impression: Nondisplaced periprosthetic fracture of proximal femur not well seen on this exam, better characterized on CT from earlier today. Electronically Signed: Marquis Laura  3/1/2023 7:35 AM EST  Workstation ID: [id number]    CT Head Without Contrast    Result Date: 3/1/2023  EXAMINATION: CT HEAD WO CONTRAST DATE: 3/1/2023 3:59 AM  INDICATION: Fall.  COMPARISON: None available.  TECHNIQUE: Thin section noncontrast axial images were obtained through the head. Coronal reformatted images were created. CT dose lowering techniques were used, to include: automated exposure control, adjustment for patient size, and or use of iterative reconstruction FINDINGS: EXTRACRANIAL: Left periorbital laceration. Calvarium is intact. Mild mucosal thickening  scattered within the paranasal sinuses. Mastoid air cells are clear. Bilateral ocular lens surgeries. Senile scleral calcifications. INTRACRANIAL: No acute intracranial hemorrhage. No acute territorial infarct. No significant mass effect. No hydrocephalus. Atherosclerotic calcifications of both carotid siphons. Moderate burden of low attenuation in the white matter is nonspecific, but most likely reflects sequelae of chronic microvascular ischemia.  Moderate diffuse parenchymal volume loss.     Impression: 1.  No acute intracranial abnormality. 2.  Left periorbital laceration. 3.  Moderate chronic small vessel ischemic changes and volume loss. Electronically signed by:  Cyrus Bean DO  3/1/2023 2:23 AM Mountain Time    CT Pelvis Without Contrast    Result Date: 3/1/2023  EXAMINATION: CT PELVIS WITHOUT CONTRAST  DATE: 3/1/2023 5:23 AM INDICATION: Fall, status post hip arthroplasty. Question fracture.; COMPARISON: November 11, 2022 PROCEDURE:   CT of the pelvis was performed without IV contrast. Multiplanar reformatted images were reviewed.  CT dose lowering techniques were used, to include: automated exposure control, adjustment for patient size, and or use of iterative reconstruction. FINDINGS: BONES:  Nondisplaced fracture of the periprostatic proximal left humerus extending from the trochanteric portion into the mid diaphysis just distal to the tip of the arthroplasty stem. Bilateral hip arthroplasties. Associated artifact limiting evaluation  of adjacent structures. Urinary bladder: Largely obscured by artifact. Reproductive organs: Uterus not visualized.. Visualized GI tract: Visualized bowel nondilated. Lymph Nodes: No pelvic lymphadenopathy.. Vasculature: Extensive arterial calcifications. Body wall: Unremarkable.     Impression: 1.  Nondisplaced periprosthetic fracture proximal left femur extending from the trochanteric portion into the mid diaphysis. Electronically signed by:  Jeannine Colmenares M.D.  3/1/2023  4:01 AM Mountain Time    XR Chest 1 View    Result Date: 3/1/2023  EXAMINATION: XR CHEST 1 VW DATE: 3/1/2023 4:25 AM INDICATION:  Fall; COMPARISON:  February 17, 2023 FINDINGS: No focal consolidation, pleural effusion, or pneumothorax. Cardiomediastinal silhouette  unchanged. Moderate atherosclerosis thoracic aorta. No acute osseous abnormality. Surgical clips in the neck.     Impression: 1.  No acute cardiopulmonary process. Electronically signed by:  Jeannine Colmenares M.D.  3/1/2023 2:47 AM Mountain Time    XR Hip With or Without Pelvis 2 - 3 View Left    Result Date: 3/1/2023  Examination: XR HIP W OR WO PELVIS 2-3 VIEW LEFT Indication: Left hip pain after fall Comparison: No prior study is available for comparison. Findings: Left hip arthroplasty hardware is present. The hardware appears in expected location. A nondisplaced intertrochanteric periprosthetic fracture is suspected, although suboptimally evaluated on the provided views. The pelvic ring appears intact. Partially imaged is right hip arthroplasty hardware. Lower lumbar fusion hardware is present as well.     Impression: Impression: A nondisplaced intertrochanteric periprosthetic fracture is suspected on the left, although suboptimally evaluated on the provided views. CT of the pelvis without contrast is recommended for further assessment. Electronically signed by:  Jameel Ward M.D.  3/1/2023 2:47 AM Mountain Time      Results for orders placed during the hospital encounter of 08/09/21    Adult Transthoracic Echo Complete W/ Cont if Necessary Per Protocol    Interpretation Summary  · Estimated left ventricular EF = 60% Left ventricular systolic function is normal.  · Left ventricular diastolic function was normal.  · Trace mitral and tricuspid regurgitation.  · Mild tachycardia noted throughout the study.      Assessment & Plan   Assessment & Plan     Active Hospital Problems    Diagnosis  POA   • **Periprosthetic fracture around internal prosthetic  left hip joint, initial encounter ([facility]) [M97.02XA]  Not Applicable     81yo resides NH with h/o dementia and recurrent falls here s/p fall with left periprosthetic fracture of proximal femur    Left Periprosthetic Fracture left Proximal Femur  Recurrent Falls  --seen by ortho/Dr. Briggs who is planning surgery in AM  --will give diet today with NPO after MN  --hold DVT proph for now until after surgery/ok with Dr. Briggs    Dementia  --continue home meds    Hypothyroidism s/p thyroidectomy for thyroid cancer  --continue home levothyroxine    Recent UTI/hospitalization  --repeat u/a    Dysphagia  --last admit SLP recommended Soft to Chew (NDD 3); Soft to Chew: Chopped Meat; Fluid Consistency: [place] Thick    CVA  --continue asa, statin    GERD  --continue PPI    Essential Tremor  --continue home primidone    HTN/HL/CAD/Chronic DHF  --continue home meds with hold parameters      DVT prophylaxis:  lovenox after surgery      CODE STATUS:  DNR, d/w ugo/FLAQUITA Diaz  Code Status and Medical Interventions:   Ordered at: 03/01/23 1304     Medical Intervention Limits:    NO intubation (DNI)     Level Of Support Discussed With:    Health Care Surrogate     Code Status (Patient has no pulse and is not breathing):    No CPR (Do Not Attempt to Resuscitate)     Medical Interventions (Patient has pulse or is breathing):    Limited Support     Comments:    sue SAMS/Saul Diaz     Release to patient:    Routine Release       Expected Discharge 3/4/2023       Teofilo Justice MD  03/01/23

## 2023-03-01 NOTE — ANESTHESIA PROCEDURE NOTES
Peripheral Block      Patient reassessed immediately prior to procedure    Reason for block: at surgeon's request and post-op pain management  Performed by  CRNA/CAA: Sina Bautista, CRNA  Assisted by: Carmen Wray RN  Preanesthetic Checklist  Completed: patient identified, IV checked, site marked, risks and benefits discussed, surgical consent, monitors and equipment checked, pre-op evaluation and timeout performed  Prep:  Pt Position: supine  Sterile barriers:cap, gloves, mask and washed/disinfected hands  Prep: ChloraPrep  Patient monitoring: blood pressure monitoring, continuous pulse oximetry and EKG  Procedure  Performed under: local infiltration  Guidance:ultrasound guided    ULTRASOUND INTERPRETATION.  Using ultrasound guidance a 20 G gauge needle was placed in close proximity to the nerve, at which point, under ultrasound guidance anesthetic was injected in the area of the nerve and spread of the anesthesia was seen on ultrasound in close proximity thereto.  There were no abnormalities seen on ultrasound; a digital image was taken; and the patient tolerated the procedure with no complications. Images:still images obtained, printed/placed on chart    Laterality:left  Block Type:fascia iliaca compartment  Injection Technique:catheter  Needle Type:echogenic and Tuohy  Needle Gauge:18 G  Resistance on Injection: none  Catheter Size:20 G (20g)    Medications Used: ropivacaine (NAROPIN) 0.5 % injection - Injection   30 mL - 3/1/2023 2:52:00 PM      Medications  Preservative Free Saline:30ml    Post Assessment  Injection Assessment: negative aspiration for heme, no paresthesia on injection and incremental injection  Patient Tolerance:comfortable throughout block  Complications:no  Additional Notes  CKAFASCIAILIACA: CATHETER  A high-frequency linear transducer, with sterile cover, was placed in parasagittal plane on top of the Anterior Superior Iliac Spine (ASIS) and moved medially to identify the Internal Oblique  "muscle, Sartorius muscle, Iliacus Muscle, Fascia Iliaca (FI) and Fascia Latae. The insertion site was prepped and draped in sterile fashion. Skin and cutaneous tissue was infiltrated with 2-5 ml of 1% Lidocaine. Using ultrasound-guidance, an 18-gauge Contiplex Ultra 360 Touhy needle was advanced in plane from caudad to cephalad. Preservative-free normal saline was utilized for hydro-dissection of tissue, advancement of Touhy, and to confirm final needle placement below FI. Local anesthetic in incremental 3-5 ml injections. Aspiration every 5 ml to prevent intravascular injection. Injection was completed with negative aspiration of blood and negative intravascular injection. Injection pressures were normal with minimal resistance. A 20-gauge Contiplex Echo catheter was placed through the needle and advance out the tip of the Touhy 3-5 cm. The Touhy needle was then removed, and final catheter position verified below the FI. The catheter was secured in the usual fashion with skin glue, benzoin, steri-strips, CHG tegaderm and Label noting \"Nerve Block Catheter\". Jerk tape applied at yellow connector and catheter connection.           "

## 2023-03-01 NOTE — ED PROVIDER NOTES
" EMERGENCY DEPARTMENT ENCOUNTER    Pt Name: Zohra Hall  MRN: [id number]  Pt :   1940  Room Number:  S352/1  Date of encounter:  3/1/2023  PCP: Troy Velarde MD  ED Provider: Chente José MD    Historian: EMS      HPI:  Chief Complaint: Fall        Context: Zohra Hall is a 82 y.o. female who presents to the ED c/o from [facility] with a fall, and left leg pain after this.  She also has an injury on the left side of her face.      PAST MEDICAL HISTORY  Past Medical History:   Diagnosis Date   • Breast cancer (HCC)     left- pt states \"in situ\", no radiation, Tamoxifen for 5 years   • Cellulitis    • Cervical lymphadenopathy    • Chest pain    • Convulsions (HCC)    • GERD (gastroesophageal reflux disease)    • Glossitis    • Grief reaction     · Last Impression: 2015  counselled, given ativan for prn use.  Aleah Regan   • Hypertension    • Lower back pain    • Oral thrush    • Papillary adenocarcinoma (HCC)     Papillary adenocarcinoma of thyroid   • Papillary adenocarcinoma of thyroid (HCC)     · resection Ramirez- ,  2 x 2 x 1.5 cm  T3 N1 MXpost op low calcium and diminished  voiceablation Ain- 3/7 metastatic nodes and invasion to strap muscles · Last Impression: 29 Oct 2014  s/p Eval by berry Méndez.  Aleah Regan (Internal   • Piriformis syndrome    • Tingling    • Xerostomia          PAST SURGICAL HISTORY  Past Surgical History:   Procedure Laterality Date   • BREAST BIOPSY Right 10/10/2013    stereo bx   • BREAST BIOPSY Left 10/19/2015    stereo bx   • BREAST CYST EXCISION      right   • BREAST EXCISIONAL BIOPSY Right     affirm bx and loc .   • BREAST LUMPECTOMY Left    • CARDIAC CATHETERIZATION N/A 2021    Procedure: Left Heart Cath;  Surgeon: Alonso Ross IV, MD;  Location: [facility] CATH INVASIVE LOCATION;  Service: Cardiology;  Laterality: N/A;   • HYSTERECTOMY     • OTHER SURGICAL HISTORY Right     right arm " surgery-steel roger in place   • TOTAL HIP ARTHROPLASTY     • TOTAL THYROIDECTOMY      Thyroid Surgery Total Thyroidectomy         FAMILY HISTORY  Family History   Problem Relation Age of Onset   • Breast cancer Mother 84   • Cancer Mother    • BRCA 1/2 Neg Hx    • Colon cancer Neg Hx    • Endometrial cancer Neg Hx    • Ovarian cancer Neg Hx          SOCIAL HISTORY  Social History     Socioeconomic History   • Marital status:    Tobacco Use   • Smoking status: Former     Packs/day: 1.00     Years: 30.00     Pack years: 30.00     Types: Cigarettes     Quit date: 1992     Years since quittin.4   • Smokeless tobacco: Never   Vaping Use   • Vaping Use: Never used   Substance and Sexual Activity   • Alcohol use: No   • Drug use: No   • Sexual activity: Not Currently         ALLERGIES  Dilaudid [hydromorphone hcl], Beta adrenergic blockers, Dilaudid [hydromorphone], and Lactose intolerance (gi)            PHYSICAL EXAM    I have reviewed the triage vital signs and nursing notes.    ED Triage Vitals [23 0255]   Temp Heart Rate Resp BP SpO2   97.5 °F (36.4 °C) 68 18 161/71 95 %      Temp src Heart Rate Source Patient Position BP Location FiO2 (%)   Oral Monitor Lying Right arm --       Physical Exam  GENERAL:   Appears in no acute distress.   HENT: Nares patent.  EYES: No scleral icterus.  CV: Regular rhythm, regular rate.  RESPIRATORY: Normal effort.  No audible wheezes, rales or rhonchi.  ABDOMEN: Soft, nontender  MUSCULOSKELETAL: No deformities.   NEURO: Alert, moves all extremities, follows commands.  SKIN: Warm, dry, no rash visualized.      LAB RESULTS  Recent Results (from the past 24 hour(s))   CBC Auto Differential    Collection Time: 23  3:00 AM    Specimen: Blood   Result Value Ref Range    WBC 8.02 3.40 - 10.80 10*3/mm3    RBC 4.10 3.77 - 5.28 10*6/mm3    Hemoglobin 9.5 (L) 12.0 - 15.9 g/dL    Hematocrit 31.5 (L) 34.0 - 46.6 %    MCV 76.8 (L) 79.0 - 97.0 fL    MCH 23.2 (L) 26.6 - 33.0  pg    MCHC 30.2 (L) 31.5 - 35.7 g/dL    RDW 17.9 (H) 12.3 - 15.4 %    RDW-SD 49.8 37.0 - 54.0 fl    MPV 9.7 6.0 - 12.0 fL    Platelets 301 140 - 450 10*3/mm3    Neutrophil % 80.7 (H) 42.7 - 76.0 %    Lymphocyte % 11.1 (L) 19.6 - 45.3 %    Monocyte % 5.5 5.0 - 12.0 %    Eosinophil % 1.2 0.3 - 6.2 %    Basophil % 0.4 0.0 - 1.5 %    Immature Grans % 1.1 (H) 0.0 - 0.5 %    Neutrophils, Absolute 6.47 1.70 - 7.00 10*3/mm3    Lymphocytes, Absolute 0.89 0.70 - 3.10 10*3/mm3    Monocytes, Absolute 0.44 0.10 - 0.90 10*3/mm3    Eosinophils, Absolute 0.10 0.00 - 0.40 10*3/mm3    Basophils, Absolute 0.03 0.00 - 0.20 10*3/mm3    Immature Grans, Absolute 0.09 (H) 0.00 - 0.05 10*3/mm3    nRBC 0.0 0.0 - 0.2 /100 WBC   Comprehensive Metabolic Panel    Collection Time: 03/01/23  3:00 AM    Specimen: Blood   Result Value Ref Range    Glucose 117 (H) 65 - 99 mg/dL    BUN 19 8 - 23 mg/dL    Creatinine 0.70 0.57 - 1.00 mg/dL    Sodium 137 136 - 145 mmol/L    Potassium 3.9 3.5 - 5.2 mmol/L    Chloride 98 98 - 107 mmol/L    CO2 30.0 (H) 22.0 - 29.0 mmol/L    Calcium 8.2 (L) 8.6 - 10.5 mg/dL    Total Protein 6.2 6.0 - 8.5 g/dL    Albumin 3.6 3.5 - 5.2 g/dL    ALT (SGPT) 10 1 - 33 U/L    AST (SGOT) 18 1 - 32 U/L    Alkaline Phosphatase 60 39 - 117 U/L    Total Bilirubin <0.2 0.0 - 1.2 mg/dL    Globulin 2.6 gm/dL    A/G Ratio 1.4 g/dL    BUN/Creatinine Ratio 27.1 (H) 7.0 - 25.0    Anion Gap 9.0 5.0 - 15.0 mmol/L    eGFR 85.4 >60.0 mL/min/1.73   Protime-INR    Collection Time: 03/01/23  3:00 AM    Specimen: Blood   Result Value Ref Range    Protime 13.5 11.4 - 14.4 Seconds    INR 1.04 0.84 - 1.13   aPTT    Collection Time: 03/01/23  3:00 AM    Specimen: Blood   Result Value Ref Range    PTT 30.6 22.0 - 39.0 seconds   ECG 12 Lead Rhythm Change    Collection Time: 03/01/23  6:25 AM   Result Value Ref Range    QT Interval 410 ms    QTC Interval 429 ms   Type & Screen    Collection Time: 03/01/23  7:51 AM    Specimen: Blood   Result Value Ref Range     ABO Type A     RH type Positive     Antibody Screen Negative     T&S Expiration Date 3/4/2023 11:59:59 PM    MRSA Screen, PCR (Inpatient) - Swab, Nares    Collection Time: 03/01/23 10:40 AM    Specimen: Nares; Swab   Result Value Ref Range    MRSA PCR Positive (A) Negative   Urinalysis With Culture If Indicated - Urine, Clean Catch    Collection Time: 03/01/23 12:36 PM    Specimen: Urine, Clean Catch   Result Value Ref Range    Color, UA Yellow Yellow, Straw    Appearance, UA Clear Clear    pH, UA 8.5 (H) 5.0 - 8.0    Specific Gravity, UA 1.020 1.001 - 1.030    Glucose, UA Negative Negative    Ketones, UA Negative Negative    Bilirubin, UA Negative Negative    Blood, UA Negative Negative    Protein, UA Negative Negative    Leuk Esterase, UA Negative Negative    Nitrite, UA Negative Negative    Urobilinogen, UA 0.2 E.U./dL 0.2 - 1.0 E.U./dL       If labs were ordered, I independently reviewed the results and considered them in treating the patient.        RADIOLOGY  XR Femur 2 View Left    Result Date: 3/1/2023  XR FEMUR 2 VW LEFT Date of Exam: 3/1/2023 7:23 AM EST Indication: fracture. Comparison: CT pelvis from earlier today Findings: A left hip arthroplasty is present. A nondisplaced periprosthetic fracture of the proximal left femur is not well seen, better characterized on recent prior CT. The hip and knee joints are congruent. The soft tissues are unremarkable.     Impression: Nondisplaced periprosthetic fracture of proximal femur not well seen on this exam, better characterized on CT from earlier today. Electronically Signed: Marquis Laura  3/1/2023 7:35 AM EST  Workstation ID: [id number]    CT Head Without Contrast    Result Date: 3/1/2023  EXAMINATION: CT HEAD WO CONTRAST DATE: 3/1/2023 3:59 AM  INDICATION: Fall.  COMPARISON: None available.  TECHNIQUE: Thin section noncontrast axial images were obtained through the head. Coronal reformatted images were created. CT dose lowering techniques were used, to  include: automated exposure control, adjustment for patient size, and or use of iterative reconstruction FINDINGS: EXTRACRANIAL: Left periorbital laceration. Calvarium is intact. Mild mucosal thickening scattered within the paranasal sinuses. Mastoid air cells are clear. Bilateral ocular lens surgeries. Senile scleral calcifications. INTRACRANIAL: No acute intracranial hemorrhage. No acute territorial infarct. No significant mass effect. No hydrocephalus. Atherosclerotic calcifications of both carotid siphons. Moderate burden of low attenuation in the white matter is nonspecific, but most likely reflects sequelae of chronic microvascular ischemia.  Moderate diffuse parenchymal volume loss.     1.  No acute intracranial abnormality. 2.  Left periorbital laceration. 3.  Moderate chronic small vessel ischemic changes and volume loss. Electronically signed by:  Cyrus Bean DO  3/1/2023 2:23 AM Mountain Time    CT Pelvis Without Contrast    Result Date: 3/1/2023  EXAMINATION: CT PELVIS WITHOUT CONTRAST  DATE: 3/1/2023 5:23 AM INDICATION: Fall, status post hip arthroplasty. Question fracture.; COMPARISON: November 11, 2022 PROCEDURE:   CT of the pelvis was performed without IV contrast. Multiplanar reformatted images were reviewed.  CT dose lowering techniques were used, to include: automated exposure control, adjustment for patient size, and or use of iterative reconstruction. FINDINGS: BONES:  Nondisplaced fracture of the periprostatic proximal left humerus extending from the trochanteric portion into the mid diaphysis just distal to the tip of the arthroplasty stem. Bilateral hip arthroplasties. Associated artifact limiting evaluation  of adjacent structures. Urinary bladder: Largely obscured by artifact. Reproductive organs: Uterus not visualized.. Visualized GI tract: Visualized bowel nondilated. Lymph Nodes: No pelvic lymphadenopathy.. Vasculature: Extensive arterial calcifications. Body wall: Unremarkable.     1.   Nondisplaced periprosthetic fracture proximal left femur extending from the trochanteric portion into the mid diaphysis. Electronically signed by:  Jeannine Colmenares M.D.  3/1/2023 4:01 AM Mountain Time    XR Chest 1 View    Result Date: 3/1/2023  EXAMINATION: XR CHEST 1 VW DATE: 3/1/2023 4:25 AM INDICATION:  Fall; COMPARISON:  February 17, 2023 FINDINGS: No focal consolidation, pleural effusion, or pneumothorax. Cardiomediastinal silhouette  unchanged. Moderate atherosclerosis thoracic aorta. No acute osseous abnormality. Surgical clips in the neck.     1.  No acute cardiopulmonary process. Electronically signed by:  Jeannine Colmenares M.D.  3/1/2023 2:47 AM Mountain Time    Peripheral Block    Result Date: 3/1/2023  Sina Bautista CRNA     3/1/2023  2:52 PM Peripheral Block Patient reassessed immediately prior to procedure Reason for block: at surgeon's request and post-op pain management Performed by TYRONE/CAA: Sina Bautista CRNA Assisted by: Carmen Wray RN Preanesthetic Checklist Completed: patient identified, IV checked, site marked, risks and benefits discussed, surgical consent, monitors and equipment checked, pre-op evaluation and timeout performed Prep: Pt Position: supine Sterile barriers:cap, gloves, mask and washed/disinfected hands Prep: ChloraPrep Patient monitoring: blood pressure monitoring, continuous pulse oximetry and EKG Procedure Performed under: local infiltration Guidance:ultrasound guided ULTRASOUND INTERPRETATION.  Using ultrasound guidance a 20 G gauge needle was placed in close proximity to the nerve, at which point, under ultrasound guidance anesthetic was injected in the area of the nerve and spread of the anesthesia was seen on ultrasound in close proximity thereto.  There were no abnormalities seen on ultrasound; a digital image was taken; and the patient tolerated the procedure with no complications. Images:still images obtained, printed/placed on chart Laterality:left Block Type:fascia  "iliaca compartment Injection Technique:catheter Needle Type:echogenic and Tuohy Needle Gauge:18 G Resistance on Injection: none Catheter Size:20 G (20g) Medications Used: ropivacaine (NAROPIN) 0.5 % injection - Injection  30 mL - 3/1/2023 2:52:00 PM Medications Preservative Free Saline:30ml Post Assessment Injection Assessment: negative aspiration for heme, no paresthesia on injection and incremental injection Patient Tolerance:comfortable throughout block Complications:no Additional Notes CKAFASCIAILIACA: CATHETER A high-frequency linear transducer, with sterile cover, was placed in parasagittal plane on top of the Anterior Superior Iliac Spine (ASIS) and moved medially to identify the Internal Oblique muscle, Sartorius muscle, Iliacus Muscle, Fascia Iliaca (FI) and Fascia Latae. The insertion site was prepped and draped in sterile fashion. Skin and cutaneous tissue was infiltrated with 2-5 ml of 1% Lidocaine. Using ultrasound-guidance, an 18-gauge Contiplex Ultra 360 Touhy needle was advanced in plane from caudad to cephalad. Preservative-free normal saline was utilized for hydro-dissection of tissue, advancement of Touhy, and to confirm final needle placement below FI. Local anesthetic in incremental 3-5 ml injections. Aspiration every 5 ml to prevent intravascular injection. Injection was completed with negative aspiration of blood and negative intravascular injection. Injection pressures were normal with minimal resistance. A 20-gauge Contiplex Echo catheter was placed through the needle and advance out the tip of the Touhy 3-5 cm. The Touhy needle was then removed, and final catheter position verified below the FI. The catheter was secured in the usual fashion with skin glue, benzoin, steri-strips, CHG tegaderm and Label noting \"Nerve Block Catheter\". Jerk tape applied at yellow connector and catheter connection.     XR Hip With or Without Pelvis 2 - 3 View Left    Result Date: 3/1/2023  Examination: XR HIP W " OR WO PELVIS 2-3 VIEW LEFT Indication: Left hip pain after fall Comparison: No prior study is available for comparison. Findings: Left hip arthroplasty hardware is present. The hardware appears in expected location. A nondisplaced intertrochanteric periprosthetic fracture is suspected, although suboptimally evaluated on the provided views. The pelvic ring appears intact. Partially imaged is right hip arthroplasty hardware. Lower lumbar fusion hardware is present as well.     Impression: A nondisplaced intertrochanteric periprosthetic fracture is suspected on the left, although suboptimally evaluated on the provided views. CT of the pelvis without contrast is recommended for further assessment. Electronically signed by:  Jameel Ward M.D.  3/1/2023 2:47 AM Mountain Time      I ordered and independently reviewed the above noted radiographic studies.          PROCEDURES    Laceration Repair    Date/Time: 3/1/2023 7:26 PM  Performed by: Chente José MD  Authorized by: Teofilo Justice MD     Consent:     Consent obtained:  Verbal and emergent situation    Consent given by:  Patient    Risks discussed:  Infection and pain  Universal protocol:     Patient identity confirmed:  Verbally with patient  Anesthesia:     Anesthesia method:  Local infiltration    Local anesthetic:  Lidocaine 1% WITH epi  Laceration details:     Location:  Face    Face location:  Forehead    Length (cm):  5    Depth (mm):  10  Pre-procedure details:     Preparation:  Patient was prepped and draped in usual sterile fashion  Exploration:     Contaminated: no    Treatment:     Area cleansed with:  Povidone-iodine  Skin repair:     Repair method:  Sutures    Suture size:  4-0    Suture material:  Nylon    Number of sutures:  3  Approximation:     Approximation:  Close  Repair type:     Repair type:  Simple        ECG 12 Lead Rhythm Change   Preliminary Result   Test Reason : Rhythm Change   Blood Pressure :   */*   mmHG   Vent. Rate :  66  BPM     Atrial Rate :  66 BPM      P-R Int : 148 ms          QRS Dur :  72 ms       QT Int : 410 ms       P-R-T Axes :  52 -20  95 degrees      QTc Int : 429 ms      Sinus rhythm with premature atrial complexes with aberrant conduction   Abnormal QRS-T angle, consider primary T wave abnormality   Abnormal ECG   When compared with ECG of 17-FEB-2023 10:45,   premature ventricular complexes are no longer present   aberrant conduction is now present   Nonspecific T wave abnormality now evident in Lateral leads      Referred By:            Confirmed By:           MEDICATIONS GIVEN IN ER    Medications   sodium chloride 0.9 % flush 10 mL (has no administration in time range)   acetaminophen (TYLENOL) tablet 650 mg (has no administration in time range)   amLODIPine (NORVASC) tablet 10 mg (10 mg Oral Given 3/1/23 1802)   aspirin EC tablet 81 mg (81 mg Oral Given 3/1/23 1802)   atorvastatin (LIPITOR) tablet 40 mg (has no administration in time range)   donepezil (ARICEPT) tablet 10 mg (has no administration in time range)   famotidine (PEPCID) tablet 40 mg (40 mg Oral Given 3/1/23 1802)   hydroCHLOROthiazide (HYDRODIURIL) oral 12.5 mg (12.5 mg Oral Given 3/1/23 1803)   levothyroxine (SYNTHROID, LEVOTHROID) tablet 175 mcg (has no administration in time range)   lisinopril (PRINIVIL,ZESTRIL) tablet 10 mg (10 mg Oral Given 3/1/23 1802)   metoprolol succinate XL (TOPROL-XL) 24 hr tablet 12.5 mg (12.5 mg Oral Given 3/1/23 1803)   PARoxetine (PAXIL) tablet 40 mg (has no administration in time range)   terazosin (HYTRIN) capsule 1 mg (has no administration in time range)   sodium chloride 0.9 % flush 10 mL (has no administration in time range)   sodium chloride 0.9 % flush 10 mL (has no administration in time range)   sodium chloride 0.9 % infusion 40 mL (has no administration in time range)   ondansetron (ZOFRAN) tablet 4 mg (has no administration in time range)     Or   ondansetron (ZOFRAN) injection 4 mg (has no administration  in time range)   naloxone (NARCAN) injection 0.4 mg (has no administration in time range)   HYDROcodone-acetaminophen (NORCO) 5-325 MG per tablet 1 tablet (has no administration in time range)   sennosides-docusate (PERICOLACE) 8.6-50 MG per tablet 2 tablet (has no administration in time range)     And   polyethylene glycol (MIRALAX) packet 17 g (has no administration in time range)     And   bisacodyl (DULCOLAX) EC tablet 5 mg (has no administration in time range)     And   bisacodyl (DULCOLAX) suppository 10 mg (has no administration in time range)   primidone (MYSOLINE) tablet 150 mg (has no administration in time range)   primidone (MYSOLINE) tablet 100 mg (has no administration in time range)   ropivacaine (NAROPIN) 0.2 % infusion (INFUSYSTEM) ( Peripheral Nerve New Bag 3/1/23 1300)   morphine injection 1 mg (1 mg Intravenous Given 3/1/23 1703)   ketorolac (TORADOL) injection 15 mg (15 mg Intravenous Given 3/1/23 0536)         MEDICAL DECISION MAKING, PROGRESS, and CONSULTS    All labs have been independently reviewed by me.  All radiology studies have been reviewed by me and the radiologist dictating the report.  All EKG's have been independently viewed and interpreted by me.      Discussion below represents my analysis of pertinent findings related to patient's condition, differential diagnosis, treatment plan and final disposition.      Differential diagnosis:    Fall, intracranial injury, pelvis or hip fracture.      Additional sources:    - Discussed/ obtained information from independent historians: I did reviewed nursing home notes, without clear indication of mechanism of fall.    - External (non-ED) record review: I reviewed prior hospital records, including a clinic evaluation indicating prior left hip surgery in [place] 10 years ago.  I did not see any procedures or clinical encounters for inpatient [facility] on review of the electronic medical record.    - Chronic or social  conditions impacting care: Dementia, and medical illnesses requiring nursing home care.    Orders placed during this visit:  Orders Placed This Encounter   Procedures   • Laceration Repair   • MRSA Screen, PCR (Inpatient) - Swab, Nares   • CT Head Without Contrast   • XR Chest 1 View   • XR Hip With or Without Pelvis 2 - 3 View Left   • CT Pelvis Without Contrast   • XR Femur 2 View Left   • CBC Auto Differential   • Comprehensive Metabolic Panel   • Protime-INR   • aPTT   • Urinalysis With Culture If Indicated -   • CBC (No Diff)   • Basic Metabolic Panel   • Protime-INR   • NPO Diet NPO Type: Strict NPO   • Diet: Cardiac Diets; Healthy Heart (2-3 Na+); Texture: Soft to Chew (NDD 3); Soft to Chew: Chopped Meat; Fluid Consistency: Nectar Thick   • NPO Diet NPO Type: Strict NPO   • Cardiac Monitoring   • Vital Signs   • Intake & Output   • Weigh Patient   • Oral Care   • Saline Lock & Maintain IV Access   • Bed Rest   • Vital Signs, Respiratory Rate, Level of Sedation   • Vital Signs, Respiratory Rate, Level of Sedation   • Check condition of catheter dressing (dry/intact, presence of drainage), infusion setting, and catheter settings.   • Nurse May Remove Nerve Catheter at Surgeon Request.  Notify Anesthesia if Removed.   • Notify Anesthesia Department at [phone] or Anesthesiologist on Call With Any Questions / Issues   • CLAMP TUBING & Notify Anesthesia: Metallic Taste, Ringing in Ears, Tongue / Circumoral Numbness, Muscle Twitching, Tremors / Seizures or Progressive Hypotension   • Notify Anesthesia: Altered Mental Status, Increasing Lethargy, Confusion or Sedation (Sedation Level Greater Than 3)   • Notify Anesthesia: Inadequate Pain Control - Pain Level Greater Than 3   • Notify Anesthesia: Vitals   • Notify Anesthesia: Catheter Dislodgement / Disconnection or Tubing Malfunctions   • Notify Anesthesia: Progressive Decrease in Sensation, Loss of Motor Strength or Total Numbness in Limb With Catheter   • Notify  Anesthesia: Redness / Swelling / Drainage at Catheter Site   • Return All Pain / PCA Pumps to Anesthesia Department   • Knee Immobilizer PRN   • Code Status and Medical Interventions:   • Inpatient Orthopedic Surgery Consult   • Anesthesia Follow Up For Peripheral Nerve Catheter   • Oxygen Therapy- Nasal Cannula; Titrate for SPO2: 90% - 95%   • ECG 12 Lead Rhythm Change   • Type & Screen   • Insert Peripheral IV   • Insert Peripheral IV   • Inpatient Admission             ED Course:    Consultants: Azeem with Dr. Cardona, orthopedist as well as Dr. Vera, orthopedics.    ED Course as of 03/01/23 1927   Wed Mar 01, 2023   1927 Did review the patient's allergies including indications of Dilaudid allergy, with related concern with morphine administration, did give a single dose of Toradol in the emergency department, after reviewing her creatinine, she did tolerate this well does have pain control in the ED with this. [TA]      ED Course User Index  [TA] Chente José MD                  AS OF 19:27 EST VITALS:    BP - 139/72  HR - 77  TEMP - 98.8 °F (37.1 °C) (Oral)  O2 SATS - 96%                  DIAGNOSIS  Final diagnoses:   Periprosthetic fracture around internal prosthetic left hip joint, initial encounter ([facility])   Laceration of skin of scalp, initial encounter     Recommending suture removal, on the temple/forehead area, in 5 to 7 days i.e. 3/5 to 3/7/2023    DISPOSITION  Admit      Please note that portions of this document were completed with voice recognition software.      Chente José MD  03/01/23 1928       Chente José MD  03/01/23 1928

## 2023-03-01 NOTE — CASE MANAGEMENT/SOCIAL WORK
Discharge Planning Assessment  [facility]     Patient Name: Zohra Hall  MRN: [id number]  Today's Date: 3/1/2023    Admit Date: 3/1/2023    Plan: IDP   Discharge Needs Assessment     Row Name 03/01/23 1427       Living Environment    People in Home facility resident;other (see comments)  [facility]    Current Living Arrangements extended care facility    Potentially Unsafe Housing Conditions unable to assess    Primary Care Provided by other (see comments)  [facility] staff    Provides Primary Care For no one, unable/limited ability to care for self       Transition Planning    Transportation Anticipated other (see comments)  may need assistance with transportation       Discharge Needs Assessment    Equipment Currently Used at Home walker, standard;wheelchair    Equipment Needed After Discharge other (see comments)  TBD    Discharge Facility/Level of Care Needs other (see comments)  TBD               Discharge Plan     Row Name 03/01/23 5081       Plan    Plan IDP    Plan Comments MSW met with pt. at bedside. Pt. unable to answer MSW questions. MSW attempted to call pt.’s POA Joey Diaz by phone with no answer. Pt. is a readmit and was d/c’d on 2/20/23. Per chart, pt. resides at [facility] in [facility]. Pt.’s PCP is Dr. Regan. Pt.’s insurance is Medicare and apomio. Pt.’s pharmacy is [facility].  Per chart, pt. is dependent at baseline and requires assistance from staff. Pt. has a walker and w/c. No O2 or HH services noted. Pt. will likely need medical transport when she is medically ready to d/c. Pt. has an advanced directive and ACP documentation on file. CM will continue to follow pt. throughout her stay.    Final Discharge Disposition Code 30 - still a patient              Continued Care and Services - Admitted Since 3/1/2023    Coordination has not been started for this encounter.     Selected Continued Care - Prior Encounters Includes continued care  and service providers with selected services from prior encounters from 12/1/2022 to 3/1/2023    Discharged on 2/20/2023 Admission date: 2/17/2023 - Discharge disposition: Intermediate Care     Destination     Service Provider Selected Services Address Phone Fax Patient Preferred    [facility] [address], [facility] [address] [phone] --       Internal Comment last updated by Jennifer Pierre RN 2/20/2023 1024    Pt lives at [facility] prior to admission                                  Demographic Summary     Row Name 03/01/23 1421       General Information    Admission Type inpatient    Arrived From long-term care;other (see comments)  [facility]    Referral Source admission list;emergency department    Reason for Consult discharge planning    Preferred Language English               Functional Status     Row Name 03/01/23 1427       Functional Status, IADL    Medications completely dependent    Meal Preparation completely dependent    Housekeeping completely dependent    Laundry completely dependent    Shopping completely dependent    IADL Comments per chart       Mental Status Summary    Recent Changes in Mental Status/Cognitive Functioning unable to assess       Employment/    Employment Status retired               Psychosocial    No documentation.                Abuse/Neglect    No documentation.                Legal    No documentation.                Substance Abuse    No documentation.                Patient Forms    No documentation.                   JONATHAN Light

## 2023-03-02 ENCOUNTER — ANESTHESIA (OUTPATIENT)
Dept: PERIOP | Facility: HOSPITAL | Age: 83
DRG: 481 | End: 2023-03-02
Payer: MEDICARE

## 2023-03-02 ENCOUNTER — APPOINTMENT (OUTPATIENT)
Dept: GENERAL RADIOLOGY | Facility: HOSPITAL | Age: 83
DRG: 481 | End: 2023-03-02
Payer: MEDICARE

## 2023-03-02 LAB
ANION GAP SERPL CALCULATED.3IONS-SCNC: 8 MMOL/L (ref 5–15)
BUN SERPL-MCNC: 23 MG/DL (ref 8–23)
BUN/CREAT SERPL: 28.4 (ref 7–25)
CALCIUM SPEC-SCNC: 8.2 MG/DL (ref 8.6–10.5)
CHLORIDE SERPL-SCNC: 97 MMOL/L (ref 98–107)
CO2 SERPL-SCNC: 27 MMOL/L (ref 22–29)
CREAT SERPL-MCNC: 0.81 MG/DL (ref 0.57–1)
DEPRECATED RDW RBC AUTO: 49 FL (ref 37–54)
EGFRCR SERPLBLD CKD-EPI 2021: 71.7 ML/MIN/1.73
ERYTHROCYTE [DISTWIDTH] IN BLOOD BY AUTOMATED COUNT: 17.9 % (ref 12.3–15.4)
GLUCOSE SERPL-MCNC: 99 MG/DL (ref 65–99)
HCT VFR BLD AUTO: 25.4 % (ref 34–46.6)
HGB BLD-MCNC: 7.7 G/DL (ref 12–15.9)
INR PPP: 1.17 (ref 0.84–1.13)
MCH RBC QN AUTO: 22.8 PG (ref 26.6–33)
MCHC RBC AUTO-ENTMCNC: 30.3 G/DL (ref 31.5–35.7)
MCV RBC AUTO: 75.4 FL (ref 79–97)
PLATELET # BLD AUTO: 215 10*3/MM3 (ref 140–450)
PMV BLD AUTO: 9.8 FL (ref 6–12)
POTASSIUM SERPL-SCNC: 3.6 MMOL/L (ref 3.5–5.2)
POTASSIUM SERPL-SCNC: 3.7 MMOL/L (ref 3.5–5.2)
PROTHROMBIN TIME: 14.8 SECONDS (ref 11.4–14.4)
RBC # BLD AUTO: 3.37 10*6/MM3 (ref 3.77–5.28)
SODIUM SERPL-SCNC: 132 MMOL/L (ref 136–145)
WBC NRBC COR # BLD: 5.78 10*3/MM3 (ref 3.4–10.8)

## 2023-03-02 PROCEDURE — 86900 BLOOD TYPING SEROLOGIC ABO: CPT

## 2023-03-02 PROCEDURE — C1713 ANCHOR/SCREW BN/BN,TIS/BN: HCPCS | Performed by: ORTHOPAEDIC SURGERY

## 2023-03-02 PROCEDURE — 25010000002 FENTANYL CITRATE (PF) 100 MCG/2ML SOLUTION: Performed by: ANESTHESIOLOGY

## 2023-03-02 PROCEDURE — 97162 PT EVAL MOD COMPLEX 30 MIN: CPT

## 2023-03-02 PROCEDURE — 76000 FLUOROSCOPY <1 HR PHYS/QHP: CPT

## 2023-03-02 PROCEDURE — P9016 RBC LEUKOCYTES REDUCED: HCPCS

## 2023-03-02 PROCEDURE — 25010000002 DEXAMETHASONE PER 1 MG: Performed by: ANESTHESIOLOGY

## 2023-03-02 PROCEDURE — 99232 SBSQ HOSP IP/OBS MODERATE 35: CPT | Performed by: INTERNAL MEDICINE

## 2023-03-02 PROCEDURE — 84132 ASSAY OF SERUM POTASSIUM: CPT | Performed by: ANESTHESIOLOGY

## 2023-03-02 PROCEDURE — 25010000002 ALBUMIN HUMAN 5% PER 50 ML: Performed by: ANESTHESIOLOGY

## 2023-03-02 PROCEDURE — 25010000002 ONDANSETRON PER 1 MG: Performed by: ANESTHESIOLOGY

## 2023-03-02 PROCEDURE — 25010000002 PROPOFOL 10 MG/ML EMULSION: Performed by: ANESTHESIOLOGY

## 2023-03-02 PROCEDURE — 25010000002 KETOROLAC TROMETHAMINE PER 15 MG: Performed by: ORTHOPAEDIC SURGERY

## 2023-03-02 PROCEDURE — 36430 TRANSFUSION BLD/BLD COMPNT: CPT

## 2023-03-02 PROCEDURE — 0QS704Z REPOSITION LEFT UPPER FEMUR WITH INTERNAL FIXATION DEVICE, OPEN APPROACH: ICD-10-PCS | Performed by: ORTHOPAEDIC SURGERY

## 2023-03-02 PROCEDURE — 25010000002 CLONIDINE PER 1 MG: Performed by: ORTHOPAEDIC SURGERY

## 2023-03-02 PROCEDURE — 25010000002 VANCOMYCIN 1 G RECONSTITUTED SOLUTION: Performed by: ORTHOPAEDIC SURGERY

## 2023-03-02 PROCEDURE — 73552 X-RAY EXAM OF FEMUR 2/>: CPT

## 2023-03-02 PROCEDURE — 80048 BASIC METABOLIC PNL TOTAL CA: CPT | Performed by: INTERNAL MEDICINE

## 2023-03-02 PROCEDURE — 85610 PROTHROMBIN TIME: CPT | Performed by: INTERNAL MEDICINE

## 2023-03-02 PROCEDURE — 25010000002 VANCOMYCIN PER 500 MG: Performed by: ORTHOPAEDIC SURGERY

## 2023-03-02 PROCEDURE — P9041 ALBUMIN (HUMAN),5%, 50ML: HCPCS | Performed by: ANESTHESIOLOGY

## 2023-03-02 PROCEDURE — 25010000002 CEFAZOLIN IN DEXTROSE 2-4 GM/100ML-% SOLUTION: Performed by: ORTHOPAEDIC SURGERY

## 2023-03-02 PROCEDURE — 85027 COMPLETE CBC AUTOMATED: CPT | Performed by: INTERNAL MEDICINE

## 2023-03-02 DEVICE — NCB SCREW D 4.0 SELF-TAPPING, 32
Type: IMPLANTABLE DEVICE | Site: FEMUR | Status: FUNCTIONAL
Brand: NCB®

## 2023-03-02 DEVICE — NCB PP PROX FEM PLATE, L,12H, L. 285MM
Type: IMPLANTABLE DEVICE | Site: FEMUR | Status: FUNCTIONAL
Brand: NCB®

## 2023-03-02 DEVICE — NCB LOCKING CAP
Type: IMPLANTABLE DEVICE | Site: FEMUR | Status: FUNCTIONAL
Brand: NCB®

## 2023-03-02 DEVICE — NCB SCREW 5.0, L = 24
Type: IMPLANTABLE DEVICE | Site: FEMUR | Status: FUNCTIONAL
Brand: NCB®

## 2023-03-02 DEVICE — IMPLANTABLE DEVICE: Type: IMPLANTABLE DEVICE | Site: FEMUR | Status: FUNCTIONAL

## 2023-03-02 DEVICE — NCB CABLE BUTTON
Type: IMPLANTABLE DEVICE | Site: FEMUR | Status: FUNCTIONAL
Brand: NCB®

## 2023-03-02 DEVICE — NCB SCREW 5.0, L = 22
Type: IMPLANTABLE DEVICE | Site: FEMUR | Status: FUNCTIONAL
Brand: NCB®

## 2023-03-02 DEVICE — NCB SCREW 5.0   L = 32
Type: IMPLANTABLE DEVICE | Site: FEMUR | Status: FUNCTIONAL
Brand: NCB®

## 2023-03-02 DEVICE — NCB SCREW D 4.0 SELF-TAPPING, 20
Type: IMPLANTABLE DEVICE | Site: FEMUR | Status: FUNCTIONAL
Brand: NCB®

## 2023-03-02 DEVICE — NCB SCREW 5.0   L = 36
Type: IMPLANTABLE DEVICE | Site: FEMUR | Status: FUNCTIONAL
Brand: NCB®

## 2023-03-02 DEVICE — DEV CONTRL TISS STRATAFIX SPIRAL PDO BIDIR 1 36X36CM: Type: IMPLANTABLE DEVICE | Site: FEMUR | Status: FUNCTIONAL

## 2023-03-02 DEVICE — IMPLANTABLE DEVICE
Type: IMPLANTABLE DEVICE | Site: FEMUR | Status: FUNCTIONAL
Brand: CABLE-READY®

## 2023-03-02 RX ORDER — FENTANYL CITRATE 50 UG/ML
50 INJECTION, SOLUTION INTRAMUSCULAR; INTRAVENOUS
Status: DISCONTINUED | OUTPATIENT
Start: 2023-03-02 | End: 2023-03-02 | Stop reason: HOSPADM

## 2023-03-02 RX ORDER — HYDROMORPHONE HYDROCHLORIDE 1 MG/ML
0.5 INJECTION, SOLUTION INTRAMUSCULAR; INTRAVENOUS; SUBCUTANEOUS
Status: DISCONTINUED | OUTPATIENT
Start: 2023-03-02 | End: 2023-03-02 | Stop reason: SDUPTHER

## 2023-03-02 RX ORDER — ONDANSETRON 2 MG/ML
4 INJECTION INTRAMUSCULAR; INTRAVENOUS ONCE AS NEEDED
Status: DISCONTINUED | OUTPATIENT
Start: 2023-03-02 | End: 2023-03-02 | Stop reason: HOSPADM

## 2023-03-02 RX ORDER — OXYCODONE HYDROCHLORIDE 5 MG/1
5 TABLET ORAL EVERY 4 HOURS PRN
Status: DISCONTINUED | OUTPATIENT
Start: 2023-03-02 | End: 2023-03-04 | Stop reason: HOSPADM

## 2023-03-02 RX ORDER — MIDAZOLAM HYDROCHLORIDE 1 MG/ML
0.5 INJECTION INTRAMUSCULAR; INTRAVENOUS
Status: DISCONTINUED | OUTPATIENT
Start: 2023-03-02 | End: 2023-03-02 | Stop reason: HOSPADM

## 2023-03-02 RX ORDER — EPHEDRINE SULFATE 50 MG/ML
INJECTION INTRAVENOUS AS NEEDED
Status: DISCONTINUED | OUTPATIENT
Start: 2023-03-02 | End: 2023-03-02 | Stop reason: SURG

## 2023-03-02 RX ORDER — DEXAMETHASONE SODIUM PHOSPHATE 4 MG/ML
INJECTION, SOLUTION INTRA-ARTICULAR; INTRALESIONAL; INTRAMUSCULAR; INTRAVENOUS; SOFT TISSUE AS NEEDED
Status: DISCONTINUED | OUTPATIENT
Start: 2023-03-02 | End: 2023-03-02 | Stop reason: SURG

## 2023-03-02 RX ORDER — SODIUM CHLORIDE, SODIUM LACTATE, POTASSIUM CHLORIDE, CALCIUM CHLORIDE 600; 310; 30; 20 MG/100ML; MG/100ML; MG/100ML; MG/100ML
9 INJECTION, SOLUTION INTRAVENOUS CONTINUOUS
Status: DISCONTINUED | OUTPATIENT
Start: 2023-03-02 | End: 2023-03-04 | Stop reason: HOSPADM

## 2023-03-02 RX ORDER — SODIUM CHLORIDE 0.9 % (FLUSH) 0.9 %
10 SYRINGE (ML) INJECTION AS NEEDED
Status: DISCONTINUED | OUTPATIENT
Start: 2023-03-02 | End: 2023-03-02 | Stop reason: HOSPADM

## 2023-03-02 RX ORDER — CEFAZOLIN SODIUM 2 G/100ML
2 INJECTION, SOLUTION INTRAVENOUS ONCE
Status: COMPLETED | OUTPATIENT
Start: 2023-03-02 | End: 2023-03-02

## 2023-03-02 RX ORDER — SODIUM CHLORIDE 9 MG/ML
75 INJECTION, SOLUTION INTRAVENOUS CONTINUOUS
Status: DISCONTINUED | OUTPATIENT
Start: 2023-03-02 | End: 2023-03-03

## 2023-03-02 RX ORDER — LIDOCAINE HYDROCHLORIDE 10 MG/ML
INJECTION, SOLUTION EPIDURAL; INFILTRATION; INTRACAUDAL; PERINEURAL AS NEEDED
Status: DISCONTINUED | OUTPATIENT
Start: 2023-03-02 | End: 2023-03-02 | Stop reason: SURG

## 2023-03-02 RX ORDER — MAGNESIUM HYDROXIDE 1200 MG/15ML
LIQUID ORAL AS NEEDED
Status: DISCONTINUED | OUTPATIENT
Start: 2023-03-02 | End: 2023-03-02 | Stop reason: HOSPADM

## 2023-03-02 RX ORDER — ASPIRIN 81 MG/1
81 TABLET ORAL EVERY 12 HOURS SCHEDULED
Status: DISCONTINUED | OUTPATIENT
Start: 2023-03-03 | End: 2023-03-04 | Stop reason: HOSPADM

## 2023-03-02 RX ORDER — FAMOTIDINE 10 MG/ML
20 INJECTION, SOLUTION INTRAVENOUS ONCE
Status: COMPLETED | OUTPATIENT
Start: 2023-03-02 | End: 2023-03-02

## 2023-03-02 RX ORDER — ACETAMINOPHEN 500 MG
1000 TABLET ORAL EVERY 8 HOURS
Status: DISCONTINUED | OUTPATIENT
Start: 2023-03-02 | End: 2023-03-04 | Stop reason: HOSPADM

## 2023-03-02 RX ORDER — CEFAZOLIN SODIUM 2 G/100ML
2 INJECTION, SOLUTION INTRAVENOUS EVERY 8 HOURS
Status: COMPLETED | OUTPATIENT
Start: 2023-03-02 | End: 2023-03-03

## 2023-03-02 RX ORDER — SODIUM CHLORIDE, SODIUM LACTATE, POTASSIUM CHLORIDE, CALCIUM CHLORIDE 600; 310; 30; 20 MG/100ML; MG/100ML; MG/100ML; MG/100ML
100 INJECTION, SOLUTION INTRAVENOUS CONTINUOUS
Status: DISCONTINUED | OUTPATIENT
Start: 2023-03-02 | End: 2023-03-02 | Stop reason: SDUPTHER

## 2023-03-02 RX ORDER — SODIUM CHLORIDE 9 MG/ML
40 INJECTION, SOLUTION INTRAVENOUS AS NEEDED
Status: DISCONTINUED | OUTPATIENT
Start: 2023-03-02 | End: 2023-03-02 | Stop reason: HOSPADM

## 2023-03-02 RX ORDER — VANCOMYCIN HYDROCHLORIDE 1 G/20ML
INJECTION, POWDER, LYOPHILIZED, FOR SOLUTION INTRAVENOUS AS NEEDED
Status: DISCONTINUED | OUTPATIENT
Start: 2023-03-02 | End: 2023-03-02 | Stop reason: HOSPADM

## 2023-03-02 RX ORDER — EPHEDRINE SULFATE 50 MG/ML
5 INJECTION, SOLUTION INTRAVENOUS ONCE AS NEEDED
Status: DISCONTINUED | OUTPATIENT
Start: 2023-03-02 | End: 2023-03-02 | Stop reason: HOSPADM

## 2023-03-02 RX ORDER — PHENYLEPHRINE HCL IN 0.9% NACL 1 MG/10 ML
SYRINGE (ML) INTRAVENOUS AS NEEDED
Status: DISCONTINUED | OUTPATIENT
Start: 2023-03-02 | End: 2023-03-02 | Stop reason: SURG

## 2023-03-02 RX ORDER — FENTANYL CITRATE 50 UG/ML
INJECTION, SOLUTION INTRAMUSCULAR; INTRAVENOUS AS NEEDED
Status: DISCONTINUED | OUTPATIENT
Start: 2023-03-02 | End: 2023-03-02 | Stop reason: SURG

## 2023-03-02 RX ORDER — SODIUM CHLORIDE 0.9 % (FLUSH) 0.9 %
10 SYRINGE (ML) INJECTION EVERY 12 HOURS SCHEDULED
Status: DISCONTINUED | OUTPATIENT
Start: 2023-03-02 | End: 2023-03-02 | Stop reason: HOSPADM

## 2023-03-02 RX ORDER — PROPOFOL 10 MG/ML
VIAL (ML) INTRAVENOUS AS NEEDED
Status: DISCONTINUED | OUTPATIENT
Start: 2023-03-02 | End: 2023-03-02 | Stop reason: SURG

## 2023-03-02 RX ORDER — TRANEXAMIC ACID 10 MG/ML
1000 INJECTION, SOLUTION INTRAVENOUS ONCE
Status: COMPLETED | OUTPATIENT
Start: 2023-03-02 | End: 2023-03-02

## 2023-03-02 RX ORDER — NALOXONE HCL 0.4 MG/ML
0.4 VIAL (ML) INJECTION AS NEEDED
Status: DISCONTINUED | OUTPATIENT
Start: 2023-03-02 | End: 2023-03-02 | Stop reason: HOSPADM

## 2023-03-02 RX ORDER — ROCURONIUM BROMIDE 10 MG/ML
INJECTION, SOLUTION INTRAVENOUS AS NEEDED
Status: DISCONTINUED | OUTPATIENT
Start: 2023-03-02 | End: 2023-03-02 | Stop reason: SURG

## 2023-03-02 RX ORDER — NALOXONE HCL 0.4 MG/ML
0.1 VIAL (ML) INJECTION
Status: DISCONTINUED | OUTPATIENT
Start: 2023-03-02 | End: 2023-03-02 | Stop reason: SDUPTHER

## 2023-03-02 RX ORDER — OXYCODONE HYDROCHLORIDE 5 MG/1
10 TABLET ORAL EVERY 4 HOURS PRN
Status: DISCONTINUED | OUTPATIENT
Start: 2023-03-02 | End: 2023-03-04

## 2023-03-02 RX ORDER — TRAMADOL HYDROCHLORIDE 50 MG/1
50 TABLET ORAL EVERY 8 HOURS PRN
Status: DISCONTINUED | OUTPATIENT
Start: 2023-03-02 | End: 2023-03-04 | Stop reason: HOSPADM

## 2023-03-02 RX ORDER — SODIUM CHLORIDE, SODIUM LACTATE, POTASSIUM CHLORIDE, CALCIUM CHLORIDE 600; 310; 30; 20 MG/100ML; MG/100ML; MG/100ML; MG/100ML
100 INJECTION, SOLUTION INTRAVENOUS CONTINUOUS
Status: DISCONTINUED | OUTPATIENT
Start: 2023-03-02 | End: 2023-03-03

## 2023-03-02 RX ORDER — ONDANSETRON 2 MG/ML
INJECTION INTRAMUSCULAR; INTRAVENOUS AS NEEDED
Status: DISCONTINUED | OUTPATIENT
Start: 2023-03-02 | End: 2023-03-02 | Stop reason: SURG

## 2023-03-02 RX ORDER — OXYCODONE HYDROCHLORIDE 5 MG/1
10 TABLET ORAL EVERY 4 HOURS PRN
Status: DISCONTINUED | OUTPATIENT
Start: 2023-03-02 | End: 2023-03-02

## 2023-03-02 RX ORDER — ALBUMIN, HUMAN INJ 5% 5 %
SOLUTION INTRAVENOUS CONTINUOUS PRN
Status: DISCONTINUED | OUTPATIENT
Start: 2023-03-02 | End: 2023-03-02 | Stop reason: SURG

## 2023-03-02 RX ADMIN — CEFAZOLIN SODIUM 2 G: 2 INJECTION, SOLUTION INTRAVENOUS at 17:55

## 2023-03-02 RX ADMIN — ATORVASTATIN CALCIUM 40 MG: 40 TABLET, FILM COATED ORAL at 20:02

## 2023-03-02 RX ADMIN — PROPOFOL 80 MG: 10 INJECTION, EMULSION INTRAVENOUS at 10:24

## 2023-03-02 RX ADMIN — Medication 100 MCG: at 11:37

## 2023-03-02 RX ADMIN — FENTANYL CITRATE 50 MCG: 50 INJECTION, SOLUTION INTRAMUSCULAR; INTRAVENOUS at 10:57

## 2023-03-02 RX ADMIN — EPHEDRINE SULFATE 5 MG: 50 INJECTION INTRAVENOUS at 11:29

## 2023-03-02 RX ADMIN — Medication 10 ML: at 20:03

## 2023-03-02 RX ADMIN — ACETAMINOPHEN 1000 MG: 500 TABLET ORAL at 14:26

## 2023-03-02 RX ADMIN — EPHEDRINE SULFATE 5 MG: 50 INJECTION INTRAVENOUS at 11:07

## 2023-03-02 RX ADMIN — SUGAMMADEX 200 MG: 100 INJECTION, SOLUTION INTRAVENOUS at 12:20

## 2023-03-02 RX ADMIN — SODIUM CHLORIDE 250 ML: 9 INJECTION, SOLUTION INTRAVENOUS at 03:30

## 2023-03-02 RX ADMIN — FAMOTIDINE 20 MG: 10 INJECTION INTRAVENOUS at 08:46

## 2023-03-02 RX ADMIN — OXYCODONE HYDROCHLORIDE 10 MG: 5 TABLET ORAL at 23:00

## 2023-03-02 RX ADMIN — Medication 10 ML: at 08:46

## 2023-03-02 RX ADMIN — FENTANYL CITRATE 25 MCG: 50 INJECTION, SOLUTION INTRAMUSCULAR; INTRAVENOUS at 11:29

## 2023-03-02 RX ADMIN — ONDANSETRON 4 MG: 2 INJECTION INTRAMUSCULAR; INTRAVENOUS at 12:16

## 2023-03-02 RX ADMIN — TRANEXAMIC ACID 1000 MG: 10 INJECTION, SOLUTION INTRAVENOUS at 10:36

## 2023-03-02 RX ADMIN — LIDOCAINE HYDROCHLORIDE 100 MG: 10 INJECTION, SOLUTION EPIDURAL; INFILTRATION; INTRACAUDAL; PERINEURAL at 10:24

## 2023-03-02 RX ADMIN — DONEPEZIL HYDROCHLORIDE 10 MG: 10 TABLET, FILM COATED ORAL at 20:02

## 2023-03-02 RX ADMIN — SODIUM CHLORIDE 250 ML: 9 INJECTION, SOLUTION INTRAVENOUS at 06:22

## 2023-03-02 RX ADMIN — DEXAMETHASONE SODIUM PHOSPHATE 4 MG: 4 INJECTION, SOLUTION INTRAMUSCULAR; INTRAVENOUS at 10:47

## 2023-03-02 RX ADMIN — TRANEXAMIC ACID 1000 MG: 10 INJECTION, SOLUTION INTRAVENOUS at 12:13

## 2023-03-02 RX ADMIN — SENNOSIDES AND DOCUSATE SODIUM 2 TABLET: 8.6; 5 TABLET ORAL at 20:02

## 2023-03-02 RX ADMIN — Medication 200 MCG: at 10:24

## 2023-03-02 RX ADMIN — FENTANYL CITRATE 25 MCG: 50 INJECTION, SOLUTION INTRAMUSCULAR; INTRAVENOUS at 12:21

## 2023-03-02 RX ADMIN — LEVOTHYROXINE SODIUM 175 MCG: 150 TABLET ORAL at 05:18

## 2023-03-02 RX ADMIN — ACETAMINOPHEN 1000 MG: 500 TABLET ORAL at 23:00

## 2023-03-02 RX ADMIN — PRIMIDONE 100 MG: 50 TABLET ORAL at 20:03

## 2023-03-02 RX ADMIN — VANCOMYCIN HYDROCHLORIDE 750 MG: 750 INJECTION, SOLUTION INTRAVENOUS at 09:16

## 2023-03-02 RX ADMIN — ROCURONIUM BROMIDE 50 MG: 10 INJECTION INTRAVENOUS at 10:24

## 2023-03-02 RX ADMIN — EPHEDRINE SULFATE 5 MG: 50 INJECTION INTRAVENOUS at 11:38

## 2023-03-02 RX ADMIN — Medication 10 ML: at 07:45

## 2023-03-02 RX ADMIN — ALBUMIN HUMAN: 0.05 INJECTION, SOLUTION INTRAVENOUS at 11:24

## 2023-03-02 RX ADMIN — CEFAZOLIN SODIUM 2 G: 2 INJECTION, SOLUTION INTRAVENOUS at 10:22

## 2023-03-02 RX ADMIN — SODIUM CHLORIDE, POTASSIUM CHLORIDE, SODIUM LACTATE AND CALCIUM CHLORIDE 9 ML/HR: 600; 310; 30; 20 INJECTION, SOLUTION INTRAVENOUS at 08:46

## 2023-03-02 NOTE — PLAN OF CARE
Goal Outcome Evaluation:  Plan of Care Reviewed With: patient           Outcome Evaluation: Patient dependent assist x2 to t/f to EOB and to stand pivot from bed to chair. Limited by lethargy and pain. Difficult to assess PLOF d/t patients cognitive status and conflicting information in chart. Recommend SNF at d/c. Patient demonstrates decreased functional mobility status and LE strength/ROM. Will address these deficits to promote return to PLOF.

## 2023-03-02 NOTE — OP NOTE
FEMUR DISTAL OPEN REDUCTION INTERNAL FIXATION  Procedure Report    Patient Name:  Zohra Hall  YOB: 1940    Date of Surgery:  3/2/2023     Indications:    82 y.o. female who was admitted to [facility] she had a fall from standing sustained a minimally displaced periprosthetic left femur fracture CT scan showed that there is spiral fracture propagating down through the shaft but the component still appeared well fixed proximally therefore decision was made to do an open reduction internal fixation of the femur instead of revision total hip arthroplasty femoral component. Likely risk benefits of the procedure including but not limited to infection, DVT, pulmonary embolism, leg length discrepancy, recurrent dislocation, possibility of injury to nerves and vessels, and periprosthetic fractures have been discussed with the patient and family in detail. Despite the risks involved the patient elected to proceed with an informed consent was obtained. The patient was seen in the preoperative holding area and the operative extremity was marked.    Pre-op Diagnosis:   Periprosthetic fracture around internal prosthetic left hip joint, initial encounter (Formerly [facility]) [M97.02XA]       Post-Op Diagnosis Codes:     * Periprosthetic fracture around internal prosthetic left hip joint, initial encounter (Formerly [facility]) [M97.02XA]    Procedure/CPT® Codes:      Procedure(s):  FEMUR OPEN REDUCTION INTERNAL FIXATION  Application of incisional wound VAC 4 x 20 cm    Staff:  Surgeon(s):  Qasim Brgigs MD    Anesthesia: General    Estimated Blood Loss: 300 mL    Implants:    Implant Name Type Inv. Item Serial No.  Lot No. LRB No. Used Action   DEV CONTRL TISS STRATAFIX SPIRAL PDO BIDIR 1 [id number] - [id number] Implant DEV CONTRL TISS STRATAFIX SPIRAL PDO BIDIR 1 [id number]  [employer]  [employer]  Left 1 Implanted   CABL CERCLG GTR COCR 1.8MM 63.5CM - [id number] Implant CABL CERCLG GTR COCR  1.8MM 63.5CM  [employer] [id number] Left 1 Implanted   CABL CERCLG GTR COCR 1.8MM 63.5CM - [id number] Implant CABL CERCLG GTR COCR 1.8MM 63.5CM  [employer] [id number] Left 1 Implanted   CABL CERCLG GTR COCR 1.8MM 63.5CM - [id number] Implant CABL CERCLG GTR COCR 1.8MM 63.5CM  [employer] [id number] Left 1 Implanted   SCRW SCOTT NCB S/TAP FUL/THRD 5X22MM - [id number] Implant SCRW SCOTT NCB S/TAP FUL/THRD 5X22MM  [employer]  Left 1 Implanted   PLT FEM NCB PROX PP 12H 285MM LT - [id number] Implant PLT FEM NCB PROX PP 12H 285MM LT  [employer]  Left 1 Implanted   KWIRE NCB 2MM NS - [id number] Implant KWIRE NCB 2MM NS  [employer]  Left 1 Explanted   SCRW SCOTT NCB S/TAP 4X32MM - [id number] Implant SCRW SCOTT NCB S/TAP 4X32MM  [employer]  Left 1 Implanted   SCRW UNICORTICAL NCB 5X18MM - [id number] Implant SCRW UNICORTICAL NCB 5X18MM  [employer]  Left 1 Implanted   SCRW UNICORTICAL NCB 5X14MM - [id number] Implant SCRW UNICORTICAL NCB 5X14MM  [employer]  Left 1 Implanted   SCRW SCOTT NCB S/TAP 4X20MM - [id number] Implant SCRW SCOTT NCB S/TAP 4X20MM  [employer]  Left 1 Implanted   SCRW SCOTT NCB S/TAP FUL/THRD 5X32MM - [id number] Implant SCRW SCOTT NCB S/TAP FUL/THRD 5X32MM  [employer]  Left 2 Implanted   SCRW SCOTT NCB S/TAP FUL/THRD 5X36MM - [id number] Implant SCRW SCOTT NCB S/TAP FUL/THRD 5X36MM  [employer]  Left 2 Implanted   SCRW SCOTT NCB S/TAP FUL/THRD 5X24MM - [id number] Implant SCRW SCOTT NCB S/TAP FUL/THRD 5X24MM  [employer]  Left 1 Implanted   ENDCAP LK SCRW NCB PROTASUL 8X3.5MM - [id number] Implant ENDCAP LK SCRW NCB PROTASUL 8X3.5MM  [employer]  Left 5 Implanted       Specimen:          None      Findings: See operative dictation    Complications: None    Description of Procedure:   The patient was transferred to [facility] operating room and preoperative antibiotics given IV prior to skin incision as well as 1 g of tranexamic acid. Patient received  generalanesthesia and was  transferred to the regular OR table placed in lateral decubitus position with an axillary roll. All bony prominences were padded adequately. The operative hip was then prepped and draped in the usual sterile fashion. Multiple timeouts were done identifying the correct patient, surgical site and planned procedure.    We use a lateral based incision down through skin subcutaneous tissue and sharply incised the tensor fascia exposing vastus lateralis vastus lateralis was mobilized anteriorly and we took caution to coagulate perforating vessels once we did this we identified the fracture site placed a reduction forceps on it and anatomically reduce the fracture we then placed 2 cables around the fracture site in order to hold in place we then chose a 12 hole NCB proximal femur plate and pinned into position using fluoroscopic assistance needed ensure that the plate was pinned in good overall location we then filled proximal and distal screws and appropriate length in order to span the fracture we did place locking caps over the proximal screws as most these were unicortical distally we just use cortical screws we then placed a cable through the plate and around at the fracture site fracture fixation.         The hip was then taken through a range of motion and found to be stable. The wound was then thoroughly irrigated with saline, we did use of more of the injection cocktail. Betadine irrigation was used followed by 3L of saline irrigation. Soft tissue hemostasis was secured and second dose of IV TXA was used. Sponge, needle, and instrument counts were correct.We then closed the fascial layer with a running #2 STRATSFIX followed by 2-0 Vicryl and then staples for the skin.  Incisional wound VAC 4 x 20 cm dressing placed over the top.     After finishing the wound closure the wound was cleaned and checked.  Total surface area the wound was less than 50 cm².  There were no sinus tracts or fistulas.  Since no sinus tracts  or fistulas were present I then covered the incision with a sponge matching the size and depth of the wound, over the wound and covered with transparent film.  I then placed the film so I provided a tight seal.  I then inserted disposable medical tubing into the dressing through an opening in the transparent film and connected the tubing with a negative pressure pump and activated the pump and checked the seal and the pump was functioning well.  During therapy I will continue to check the pump and the patient for signs of distress or failure.  I will also ensure proper functioning of the equipment with any to be replaced in the canister or soft material accumulated in the foam sponge.  Also instructed patient and caregiver on how to provide continued care for the wound.        The patient was then transferred to the recovery room in stable condition. The patient tolerated the procedure well . There were no complications. The patient had adequate distal pulses and good capillary refill.    The patient will be mobilized by physical therapy. The patient received antibiotic prophylaxis postoperatively for 2 more doses given the first 24 hours, followed by 2 weeks of oral antibiotics due to the revision nature of the surgery. The patient was started on DVT prophylaxis with 81 mg aspirin twice daily starting postoperative day #1.  Should be okay for weightbearing as tolerated  I discussed the satisfactory performance of the procedure with the patient's family and discussed the postoperative management.      Qasim Briggs MD     Date: 3/2/2023  Time: 12:43 EST            Assistant Participation:  Surgeon(s):  Qasim Briggs MD    Assistant: Teofilo Rabago PA-C; Reinier Crouch PA-C assisted with proper preoperative positioning, preoperative templating, determingin availability of proper implants, prepping and draping of patient, manipulation placement of instruments, protection of ligaments and vital soft tissue  structures, assistance in maintaining hemostasis and assistance with closure of the wound. Their skills and knowledge of the steps of operation and the desired outcome of each surgical step was crucial, allowing for efficient choreography of surgical procedure, and closure of the wound which lead to reduced surgical time, less blood loss, and less risk of complications for the patient.

## 2023-03-02 NOTE — CASE MANAGEMENT/SOCIAL WORK
Continued Stay Note  [facility]     Patient Name: Zohra Hall  MRN: [id number]  Today's Date: 3/2/2023    Admit Date: 3/1/2023    Plan: [facility]   Discharge Plan     Row Name 03/02/23 1125       Plan    Plan [facility]    Patient/Family in Agreement with Plan yes    Plan Comments Spoke with Minda at [facility] and the patient is a long term care patient and has a bed hold. CM will continue to follow.    Final Discharge Disposition Code 03 - skilled nursing facility (SNF)    Row Name 03/02/23 0921       Plan    Plan [facility]    Patient/Family in Agreement with Plan yes    Plan Comments Patient is a long term care patient at [facility]. Spoke with Minda at [facility] and plan is back to [facility] for rehab. CM will continue to follow.    Final Discharge Disposition Code 03 - skilled nursing facility (SNF)               Discharge Codes    No documentation.                     Boaz Peres RN

## 2023-03-02 NOTE — DISCHARGE INSTRUCTIONS
INCISION CARE incisional wound VAC hip and Knee:  You have a sterile incisional wound VAC dressing in place. Only exchange if it becomes saturated (fluid draining out the sides of dressing) and notify the office. The dressing is water resistant. During the first 7 days after surgery, it is ok to shower. DO NOT scrub on or around the dressing. Do not submerge the dressing.  After 7 days, the incisional wound VAC will turn off and lose its suction you can remove your dressing.  You will have been given a fresh dressing to cover the wound with until your follow-up at 2 weeks. you will have staples or sutures in place on your skin. It is OK to shower with the new dressing in place. DO NOT scrub on or around the incision. DO NOT submerge the incision in pools, baths, or hot tubs for 1 month after surgery. Do not touch or pick at the incision. If you have any drainage from the incision after 7 days, cover the incision with sterile gauze and paper tape and alert the office.   Staples will be removed between 10-14 days postoperation.  This may be done by either the home health nurse, rehabilitation nurse, or during your return visit to Dr. Briggs's office.  No creams or ointments to the incision for 1 month after surgery. After 1 month, it is recommended to massage the scar with vitamin E cream to help decrease scar formation.  Check incision every day and notify surgeon immediately if any of the following signs or symptoms are noted:  Increase in redness  Increase in swelling around the incision and of the entire extremity  Increase in pain  Drainage oozing from the incision  Pulling apart of the edges of the incision  Increase in overall body temperature (greater than 100.5 degrees)    Anticoagulants: You will be discharged on an anticoagulant. This is a prophylactic medication that helps prevent blood clots during your post-operative period. Most patients will be on *** Aspirin 81 mg Enteric coated every 12 hours orally  for 30 days. Some patients, due to increased risk factors, will need to be on a stronger anticoagulant. Dr. Briggs will discuss this need with you.   While taking the anticoagulant, you should avoid taking any additional aspirin, and limit ibuprofen (Advil or Motrin), Aleve (Naprosyn) or other non-steroidal anti-inflammatory medications.   Notify your surgeon immediately if any valeria bleeding is noted in the urine, stool, vomit, or from the nose or the incision. Blood in the stool will often appear as black rather than red. Blood in urine may appear as pink. Blood in vomit may appear as brown/black like coffee grounds.  You will need to apply pressure for longer periods of time to any cuts or abrasions to stop bleeding  Avoid alcohol while taking anticoagulants  Sequential Compression Device: You maybe be discharged home with a compression device that helps promote blood flow and prevent clots in your legs. Wear these at all times for the first two weeks.   Mobilization: The best way to avoid a blood clot is to get up and walk. 10 times a day get up and walk for 5 minutes for the first two weeks. Walking for longer periods of time will increase pain and swelling, making therapy more difficult. If taking any long travel (car or plane) in the first 1-2 months, be sure to get up and walk at least every one hour.     Stool Softeners: You will be at greater risk of constipation after surgery because of being less mobile and taking the pain medications.   Take stool softeners as instructed by your surgeon while on pain medications. Use over the counter Colace 100 mg 1-2 capsules twice daily.   If stools become too loose or too frequent, decreases the dosage or stop the stool softener.  If constipation occurs despite use of stool softeners, you are to continue the stool softeners and add a laxative (Milk of Magnesia 1 ounce daily as needed).  Dulcolax oral tabs or suppository or a fleets enema can also be utilized for  constipation and can be obtained over the counter.   If above interventions are unsuccessful in inducing bowel movements, please contact your family physician's office/surgeon's office.  Drink plenty of fluids and eat fruits and vegetables during your recovery time    Pain Medications utilized after surgery are narcotics and the law requires that the following information be given to all patients that are prescribed narcotics:  CLASSIFICATION: Pain medications are called Opioids and are narcotics  LEGALITIES: It is illegal to share narcotics with others and to drive within 24 hours of taking narcotics  POTENTIAL SIDE EFFECTS: Potential side effects of opioids include: nausea, vomiting, itching, dizziness, drowsiness, dry mouth, constipation, and difficulty urinating.  POTENTIAL ADVERSE EFFECTS:   Opioid tolerance can develop with use of pain medications and this simply means that it requires more and more of the medication to control pain; however, this is seen more in patients that use Opioids for longer periods of time.  Opioid dependence can develop with use of Opioids and this simply means that to stop the medication can cause withdrawal symptoms; however, this is seen with patients that use Opioids for longer periods of time.  Opioid addiction can develop with use of Opioids and the incidence of this is very unlikely in patients who take the medications as ordered and stop the medications as instructed.  Opioid overdose can be dangerous, but is unlikely when the medication is taken as ordered and stopped when ordered. It is important not to mix opioids with alcohol or with any type of sedative, such as Benadryl, as this can lead to over sedation and respiratory difficulty.  DOSAGE:   Pain medications may be needed consistently for the first week to decrease pain and promote adequate pain relief and participation in physical therapy.  After the initial surgical pain begins to resolve, you may begin to decrease  the pain medication and only take it as needed. By the end of 6 weeks, you should be off of pain medications.  You can decrease your pain medication consumption by slowly spacing out the time in between the medication, and using 650mg Tylenol when the pain is not as severe. Do not exceed 3500mg of Tylenol in 24 hours.   Refills will not be given by the office during evening hours, on weekends, or after 6 weeks post-op.  To seek refills on pain medications during the initial 6 week post-operative period, you must call the office 48 hours in advance to request the refill. The office will then notify you when to  the prescription. DO NOT wait until you are out of the medication to request a refill.

## 2023-03-02 NOTE — PROGRESS NOTES
Acute pain service Inpatient Progress Note    Patient Name: Zohra Hall  :  1940  MRN:  [id number]        Treatment Plan    Surgeon D/C catheter in the OR,

## 2023-03-02 NOTE — ANESTHESIA PREPROCEDURE EVALUATION
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  Too small for intervention.  Proximal 60% RCA.  Mild disease in LAD and circumflex.    Neuro/Psych  (+) seizures, TIA, dementia,    GI/Hepatic/Renal/Endo    (+)  GERD,  thyroid problem hypothyroidism    Musculoskeletal (-) negative ROS    Abdominal  - normal exam    Bowel sounds: normal.   Substance History - negative use     OB/GYN negative ob/gyn ROS         Other          Other Comment: Anemia HCT 25                Anesthesia Plan    ASA 4     general     (Will use existing peripheral nerve block cath for post op pain relief  +/- a line)  intravenous induction     Anesthetic plan, risks, benefits, and alternatives have been provided, discussed and informed consent has been obtained with: patient.    Plan discussed with CRNA.        CODE STATUS:    Medical Intervention Limits: NO intubation (DNI)  Level Of Support Discussed With: Health Care Surrogate  Code Status (Patient has no pulse and is not breathing): No CPR (Do Not Attempt to Resuscitate)  Medical Interventions (Patient has pulse or is breathing): Limited Support  Comments: d/w FLAQUITA/ugo/Joey Diaz  Release to patient: Routine Release

## 2023-03-02 NOTE — CASE MANAGEMENT/SOCIAL WORK
Continued Stay Note  [facility]     Patient Name: Zohra Hall  MRN: [id number]  Today's Date: 3/2/2023    Admit Date: 3/1/2023    Plan: [facility]   Discharge Plan     Row Name 03/02/23 0921       Plan    Plan [facility]    Patient/Family in Agreement with Plan yes    Plan Comments Patient is a long term care patient at [facility]. Spoke with Minda at [facility] and plan is back to [facility] for rehab. CM will continue to follow.    Final Discharge Disposition Code 03 - skilled nursing facility (SNF)               Discharge Codes    No documentation.                     Boaz Peres RN

## 2023-03-02 NOTE — THERAPY EVALUATION
"Patient Name: Zohra Hall  : 1940    MRN: [id number]                              Today's Date: 3/2/2023       Admit Date: 3/1/2023    Visit Dx:     ICD-10-CM ICD-9-CM   1. Periprosthetic fracture around internal prosthetic left hip joint, initial encounter (Formerly [facility])  M97.02XA 996.44     V43.64   2. Laceration of skin of scalp, initial encounter  S01.01XA 873.0     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Formerly [facility])   • Hypothyroid post remote resection papillary adenocarcinoma thyroid   • Hypertension   • GERD without esophagitis   • Hyperlipidemia LDL goal <100   • Atherosclerosis of aorta (Formerly [facility])   • Incontinence of bowel   • Acute right-sided low back pain without sciatica   • Benign essential tremor   • Pain of right hip joint   • Thyroid cancer (Formerly [facility])   • Lacunar stroke (Formerly [facility])   • Nontraumatic rupture of extensor tendons of right hand and wrist   • Actinic keratosis   • Transient ischemic attack   • Ataxia   • History of PMR (CMS/Formerly [facility])   • Osteoporosis   • Post-surgical hypothyroidism   • NSTEMI (non-ST elevated myocardial infarction) (Formerly [facility])   • Coronary artery disease   • NICM presumably due to Takotsub[employer] post NSTEMI 2021 (CMS/Formerly [facility])   • Abnormal TSH   • Severe hypothyroidism   • Traumatic SAH, SDH, and intraparenchymal bleed (CMS/Formerly [facility])   • History of lacunar stroke 2019   • Alzheimer disease (Formerly [facility])   • Frequent falls   • Bilateral hip pain   • Acute UTI   • Periprosthetic fracture around internal prosthetic left hip joint, initial encounter (Formerly [facility])     Past Medical History:   Diagnosis Date   • Breast cancer (Formerly [facility])     left- pt states \"in situ\", no radiation, Tamoxifen for 5 years   • Cellulitis    • Cervical lymphadenopathy    • Chest pain    • Convulsions (Formerly [facility])    • GERD (gastroesophageal reflux disease)  "   • Glossitis    • Grief reaction     · Last Impression: 29 Jan 2015  counselled, given ativan for prn use.  Aleah Regan   • Hypertension    • Lower back pain    • Oral thrush    • Papillary adenocarcinoma (HCC)     Papillary adenocarcinoma of thyroid   • Papillary adenocarcinoma of thyroid (HCC)     · resection Ramirez- 1/13,  2 x 2 x 1.5 cm  T3 N1 MXpost op low calcium and diminished  voiceablation Ain- 3/7 metastatic nodes and invasion to strap muscles · Last Impression: 29 Oct 2014  s/p Eval by berry Méndez.  Aleah Regan (Internal   • Piriformis syndrome    • Tingling    • Xerostomia      Past Surgical History:   Procedure Laterality Date   • BREAST BIOPSY Right 10/10/2013    stereo bx   • BREAST BIOPSY Left 10/19/2015    stereo bx   • BREAST CYST EXCISION      right   • BREAST EXCISIONAL BIOPSY Right     affirm bx and loc 2016.   • BREAST LUMPECTOMY Left 1987   • CARDIAC CATHETERIZATION N/A 5/23/2021    Procedure: Left Heart Cath;  Surgeon: Alonso Ross IV, MD;  Location: [facility] CATH INVASIVE LOCATION;  Service: Cardiology;  Laterality: N/A;   • HYSTERECTOMY  1979   • OTHER SURGICAL HISTORY Right     right arm surgery-steel roger in place   • TOTAL HIP ARTHROPLASTY     • TOTAL THYROIDECTOMY      Thyroid Surgery Total Thyroidectomy      General Information     Row Name 03/02/23 6527          Physical Therapy Time and Intention    Document Type evaluation  -LR     Mode of Treatment physical therapy;individual therapy  -LR     Row Name 03/02/23 9917          General Information    Patient Profile Reviewed yes  -LR     Prior Level of Function --  unable to determine PLOF; one part of chart says patient is dependent with mobility and ADLs, other area of chart reflects patient ambulates with RW  -LR     Existing Precautions/Restrictions fall;other (see comments)  dementia, s/p L hip ORIF s/p fall and fx  -LR     Barriers to Rehab medically complex;previous functional deficit;cognitive status   -LR     Row Name 03/02/23 1707          Living Environment    People in Home facility resident  long term care resident  -LR     Row Name 03/02/23 1707          Home Main Entrance    Number of Stairs, Main Entrance none  -LR     Row Name 03/02/23 1707          Stairs Within Home, Primary    Number of Stairs, Within Home, Primary none  -LR     Row Name 03/02/23 1707          Cognition    Orientation Status (Cognition) oriented to;person;disoriented to;place;time  -LR     Row Name 03/02/23 1707          Safety Issues, Functional Mobility    Safety Issues Affecting Function (Mobility) safety precautions follow-through/compliance;safety precaution awareness;ability to follow commands;sequencing abilities  -LR     Impairments Affecting Function (Mobility) strength;balance;cognition;coordination;endurance/activity tolerance;postural/trunk control;pain;range of motion (ROM);motor control  -LR           User Key  (r) = Recorded By, (t) = Taken By, (c) = Cosigned By    Initials Name Provider Type    LR Jennifer Valdez, PT Physical Therapist               Mobility     Row Name 03/02/23 1707          Bed Mobility    Bed Mobility supine-sit  -LR     Supine-Sit [place] (Bed Mobility) verbal cues;dependent (less than 25% patient effort);2 person assist  -LR     Assistive Device (Bed Mobility) head of bed elevated;draw sheet  -LR     Comment, (Bed Mobility) Verbal cues to move LEs towards EOB and to push up from bed to raise trunk into sitting. No assist noted from patient, mild posterior lean. Improved with cues for correction. Patient kept R knee extended while sitting EOB and had difficulty keeping R foot on the floor despite cues.  -LR     Row Name 03/02/23 1707          Transfers    Comment, (Transfers) Verbal cues to push up from bed to stand and to reach back for chair to lower into sitting. Cues and assist required for correct foot placement prior to t/f. Verbal cues to pivot on R LE to turn and sit in chair.  Little to no assist noted from patient.  -LR     Row Name 03/02/23 1707          Bed-Chair Transfer    Bed-Chair [place] (Transfers) verbal cues;dependent (less than 25% patient effort);2 person assist  -LR     Assistive Device (Bed-Chair Transfers) other (see comments)  B UE support  -LR     Row Name 03/02/23 1707          Sit-Stand Transfer    Sit-Stand [place] (Transfers) verbal cues;dependent (less than 25% patient effort);2 person assist  -LR     Assistive Device (Sit-Stand Transfers) other (see comments)  B UE support  -LR     Row Name 03/02/23 1707          Gait/Stairs (Locomotion)    [place] Level (Gait) not tested  -LR     [place] Level (Stairs) not tested  -LR     Comment, (Gait/Stairs) Unable to progress to forward ambulation d/t inability to achieve full upright standing.  -LR     Row Name 03/02/23 1707          Mobility    Extremity Weight-bearing Status left lower extremity  -LR     Left Lower Extremity (Weight-bearing Status) weight-bearing as tolerated (WBAT)  -LR           User Key  (r) = Recorded By, (t) = Taken By, (c) = Cosigned By    Initials Name Provider Type    LR Jennifer Valdez, PT Physical Therapist               Obj/Interventions     Row Name 03/02/23 1707          Range of Motion Comprehensive    General Range of Motion lower extremity range of motion deficits identified  -     Row Name 03/02/23 1707          Strength Comprehensive (MMT)    General Manual Muscle Testing (MMT) Assessment lower extremity strength deficits identified  -     Row Name 03/02/23 1707          Motor Skills    Therapeutic Exercise ankle;knee;other (see comments);hip  cues for technique; patient resisted all movement of L LE  -LR     Row Name 03/02/23 1707          Hip (Therapeutic Exercise)    Hip (Therapeutic Exercise) isometric exercises  -LR     Hip Isometrics (Therapeutic Exercise) bilateral;gluteal sets;supine;10 repetitions  -     Row Name 03/02/23 1707          Knee  (Therapeutic Exercise)    Knee (Therapeutic Exercise) isometric exercises  -LR     Knee Isometrics (Therapeutic Exercise) bilateral;quad sets;supine;10 repetitions  -LR     Row Name 03/02/23 1707          Ankle (Therapeutic Exercise)    Ankle (Therapeutic Exercise) AROM (active range of motion)  -LR     Ankle AROM (Therapeutic Exercise) bilateral;dorsiflexion;plantarflexion;supine;10 repetitions  -LR     Row Name 03/02/23 1707          Balance    Balance Assessment sitting static balance;sitting dynamic balance;standing static balance;standing dynamic balance  -LR     Static Sitting Balance minimal assist;1 person to manage equipment  -LR     Dynamic Sitting Balance moderate assist;1 person to manage equipment  -LR     Position, Sitting Balance supported;sitting edge of bed  -LR     Static Standing Balance dependent;2-person assist  -LR     Dynamic Standing Balance dependent;2-person assist  -LR     Position/Device Used, Standing Balance supported;other (see comments)  B UE support  -LR     Row Name 03/02/23 1707          Sensory Assessment (Somatosensory)    Sensory Assessment (Somatosensory) other (see comments)  c/o mild numbness L LE  -LR     Row Name 03/02/23 1707          General Lower Extremity Assessment (Range of Motion)    Comment: Lower Extremity ROM L hip and knee AROM impaired 100%, PROM impaired 75% d/t patient resisting movement; R hip/knee AROM impaired 25%  -LR     Row Name 03/02/23 1707          Lower Extremity (Manual Muscle Testing)    Lower Extremity: Manual Muscle Testing (MMT) left hip strength deficit  -LR     Comment, MMT: Lower Extremity L LE functionally 2/5, R LE functionally 4-/5  -LR           User Key  (r) = Recorded By, (t) = Taken By, (c) = Cosigned By    Initials Name Provider Type    LR Jennifer Valdez, PT Physical Therapist               Goals/Plan     Row Name 03/02/23 1707          Bed Mobility Goal 1 (PT)    Activity/Assistive Device (Bed Mobility Goal 1, PT) sit to  supine/supine to sit  -LR     [place] Level/Cues Needed (Bed Mobility Goal 1, PT) other (see comments);moderate assist (50-74% patient effort)  x2  -LR     Time Frame (Bed Mobility Goal 1, PT) long term goal (LTG);5 days  -LR     Progress/Outcomes (Bed Mobility Goal 1, PT) goal ongoing  -LR     Row Name 03/02/23 1707          Transfer Goal 1 (PT)    Activity/Assistive Device (Transfer Goal 1, PT) sit-to-stand/stand-to-sit;bed-to-chair/chair-to-bed  -LR     [place] Level/Cues Needed (Transfer Goal 1, PT) moderate assist (50-74% patient effort);other (see comments)  x2  -LR     Time Frame (Transfer Goal 1, PT) long term goal (LTG);5 days  -LR     Progress/Outcome (Transfer Goal 1, PT) goal ongoing  -LR     Row Name 03/02/23 1707          Therapy Assessment/Plan (PT)    Planned Therapy Interventions (PT) balance training;bed mobility training;home exercise program;patient/family education;ROM (range of motion);strengthening;transfer training  -LR           User Key  (r) = Recorded By, (t) = Taken By, (c) = Cosigned By    Initials Name Provider Type    LR Jennifer Valdez, PT Physical Therapist               Clinical Impression     Row Name 03/02/23 1707          Pain    Pain Intervention(s) Ambulation/increased activity;Repositioned  -LR     Row Name 03/02/23 1707          Plan of Care Review    Plan of Care Reviewed With patient  -LR     Outcome Evaluation Patient dependent assist x2 to t/f to EOB and to stand pivot from bed to chair. Limited by lethargy and pain. Difficult to assess PLOF d/t patients cognitive status and conflicting information in chart. Recommend SNF at d/c. Patient demonstrates decreased functional mobility status and LE strength/ROM. Will address these deficits to promote return to PLOF.  -LR     Row Name 03/02/23 1707          Therapy Assessment/Plan (PT)    Patient/Family Therapy Goals Statement (PT) none stated  -LR     Rehab Potential (PT) fair, will monitor progress closely   -LR     Criteria for Skilled Interventions Met (PT) yes;meets criteria;skilled treatment is necessary  -LR     Therapy Frequency (PT) daily  -LR     Row Name 03/02/23 1707          Vital Signs    Intra Systolic BP Rehab 116  -LR     Intra Treatment Diastolic BP 46  -LR     Post Systolic BP Rehab 144  -LR     Post Treatment Diastolic BP 81  -LR     Row Name 03/02/23 1707          Positioning and Restraints    Pre-Treatment Position in bed  -LR     Post Treatment Position chair  -LR     In Chair notified nsg;reclined;sitting;call light within reach;encouraged to call for assist;exit alarm on;compression device;legs elevated;waffle cushion;on mechanical lift sling  -LR           User Key  (r) = Recorded By, (t) = Taken By, (c) = Cosigned By    Initials Name Provider Type    Jennifer Erickson, PT Physical Therapist               Outcome Measures     Row Name 03/02/23 1707          How much help from another person do you currently need...    Turning from your back to your side while in flat bed without using bedrails? 1  -LR     Moving from lying on back to sitting on the side of a flat bed without bedrails? 1  -LR     Moving to and from a bed to a chair (including a wheelchair)? 1  -LR     Standing up from a chair using your arms (e.g., wheelchair, bedside chair)? 1  -LR     Climbing 3-5 steps with a railing? 1  -LR     To walk in hospital room? 1  -LR     AM-PAC 6 Clicks Score (PT) 6  -LR     Highest level of mobility 2 --> Bed activities/dependent transfer  -LR     Row Name 03/02/23 1707          Functional Assessment    Outcome Measure Options AM-PAC 6 Clicks Basic Mobility (PT)  -LR           User Key  (r) = Recorded By, (t) = Taken By, (c) = Cosigned By    Initials Name Provider Type    Jennifer Erickson PT Physical Therapist                             Physical Therapy Education     Title: PT OT SLP Therapies (In Progress)     Topic: Physical Therapy (In Progress)     Point: Mobility training (In  Progress)     Learning Progress Summary           Patient Acceptance, E,D, NR by LR at 3/2/2023 1707    Comment: Educated on benefits of mobility and being OOB. Educated on precautions, weight bearing status, HEP, correct supine to sit t/f technique, correct sit<->stand t/f technique, correct stand pivot t/f technique, and progression of POC.                   Point: Home exercise program (In Progress)     Learning Progress Summary           Patient Acceptance, E,D, NR by LR at 3/2/2023 1707    Comment: Educated on benefits of mobility and being OOB. Educated on precautions, weight bearing status, HEP, correct supine to sit t/f technique, correct sit<->stand t/f technique, correct stand pivot t/f technique, and progression of POC.                   Point: Body mechanics (In Progress)     Learning Progress Summary           Patient Acceptance, E,D, NR by LR at 3/2/2023 1707    Comment: Educated on benefits of mobility and being OOB. Educated on precautions, weight bearing status, HEP, correct supine to sit t/f technique, correct sit<->stand t/f technique, correct stand pivot t/f technique, and progression of POC.                   Point: Precautions (In Progress)     Learning Progress Summary           Patient Acceptance, E,D, NR by LR at 3/2/2023 1707    Comment: Educated on benefits of mobility and being OOB. Educated on precautions, weight bearing status, HEP, correct supine to sit t/f technique, correct sit<->stand t/f technique, correct stand pivot t/f technique, and progression of POC.                               User Key     Initials Effective Dates Name Provider Type Discipline     02/03/23 -  Jennifer Valdez, PT Physical Therapist PT              PT Recommendation and Plan  Planned Therapy Interventions (PT): balance training, bed mobility training, home exercise program, patient/family education, ROM (range of motion), strengthening, transfer training  Plan of Care Reviewed With: patient  Outcome  Evaluation: Patient dependent assist x2 to t/f to EOB and to stand pivot from bed to chair. Limited by lethargy and pain. Difficult to assess PLOF d/t patients cognitive status and conflicting information in chart. Recommend SNF at d/c. Patient demonstrates decreased functional mobility status and LE strength/ROM. Will address these deficits to promote return to PLOF.     Time Calculation:    PT Charges     Row Name 03/02/23 1707             Time Calculation    Start Time 1707  -LR      PT Received On 03/02/23  -LR      PT Goal Re-Cert Due Date 03/12/23  -LR         Untimed Charges    PT Eval/Re-eval Minutes 50  -LR         Total Minutes    Untimed Charges Total Minutes 50  -LR       Total Minutes 50  -LR            User Key  (r) = Recorded By, (t) = Taken By, (c) = Cosigned By    Initials Name Provider Type    LR Jennifer Valdez, PT Physical Therapist              Therapy Charges for Today     Code Description Service Date Service Provider Modifiers Qty    54997696126 HC PT EVAL MOD COMPLEXITY 4 3/2/2023 Jennifer Valdez, PT GP 1    06478377750 HC PT THER SUPP EA 15 MIN 3/2/2023 Jennifer Valdez, PT GP 3          PT G-Codes  Outcome Measure Options: AM-PAC 6 Clicks Basic Mobility (PT)  AM-PAC 6 Clicks Score (PT): 6  PT Discharge Summary  Anticipated Discharge Disposition (PT): skilled nursing facility    Jennifer Valdez, PT  3/2/2023

## 2023-03-02 NOTE — ANESTHESIA PROCEDURE NOTES
Airway  Urgency: elective    Date/Time: 3/2/2023 10:26 AM  Airway not difficult    General Information and Staff    Patient location during procedure: OR  CRNA/CAA: Sina Bautista CRNA  SRNA: Audrey Cunningham SRNA  Indications and Patient Condition  Indications for airway management: airway protection    Preoxygenated: yes  MILS not maintained throughout  Mask difficulty assessment: 1 - vent by mask    Final Airway Details  Final airway type: endotracheal airway      Successful airway: ETT  Cuffed: yes   Successful intubation technique: direct laryngoscopy  Endotracheal tube insertion site: oral  Blade: [place]  Blade size: 3  ETT size (mm): 7.0  Cormack-Lehane Classification: grade I - full view of glottis  Placement verified by: chest auscultation and capnometry   Measured from: lips  ETT/EBT  to lips (cm): 21  Number of attempts at approach: 1  Assessment: lips, teeth, and gum same as pre-op and atraumatic intubation    Additional Comments  Negative epigastric sounds, Breath sound equal bilaterally with symmetric chest rise and fall

## 2023-03-02 NOTE — PROGRESS NOTES
[facility] Medicine Services  PROGRESS NOTE    Patient Name: Zohra Hall  : 1940  MRN: [id number]    Date of Admission: 3/1/2023  Primary Care Physician: Troy Velarde MD    Subjective   Subjective     CC:  Follow up hip fracture    HPI:  Hypotensive overnight, BP slightly better this AM. Seen in the PACU, severely lethargic; no adverse intraoperative events reported    ROS:  Unable to assess     Objective   Objective     Vital Signs:   Temp:  [97 °F (36.1 °C)-98.8 °F (37.1 °C)] 98.4 °F (36.9 °C)  Heart Rate:  [54-77] 57  Resp:  [12-22] 14  BP: ()/(40-91) 148/68  Flow (L/min):  [2] 2     Physical Exam:  Constitutional: Lethargic/sedated elderly female laying on PACU stretcher under jennifer hugger  HENT: NCAT, mucous membranes moist  Respiratory: Scattered rhonchi, respiratory effort normal   Cardiovascular: RRR, palpable radial pulses  Gastrointestinal: Positive bowel sounds, soft, nontender, nondistended  Musculoskeletal: No bilateral ankle edema  Psychiatric: Unable to assess    Results Reviewed:  LAB RESULTS:      Lab 23  0455 23  0300   WBC 5.78 8.02   HEMOGLOBIN 7.7* 9.5*   HEMATOCRIT 25.4* 31.5*   PLATELETS 215 301   NEUTROS ABS  --  6.47   IMMATURE GRANS (ABS)  --  0.09*   LYMPHS ABS  --  0.89   MONOS ABS  --  0.44   EOS ABS  --  0.10   MCV 75.4* 76.8*   PROTIME 14.8* 13.5   APTT  --  30.6         Lab 23  0654 23  0455 23  0300   SODIUM  --  132* 137   POTASSIUM 3.6 3.7 3.9   CHLORIDE  --  97* 98   CO2  --  27.0 30.0*   ANION GAP  --  8.0 9.0   BUN  --  23 19   CREATININE  --  0.81 0.70   EGFR  --  71.7 85.4   GLUCOSE  --  99 117*   CALCIUM  --  8.2* 8.2*         Lab 23  0300   TOTAL PROTEIN 6.2   ALBUMIN 3.6   GLOBULIN 2.6   ALT (SGPT) 10   AST (SGOT) 18   BILIRUBIN <0.2   ALK PHOS 60         Lab 23  0455 23  0300   PROTIME 14.8* 13.5   INR 1.17* 1.04             Lab 23  0751   ABO TYPING A   RH TYPING  Positive   ANTIBODY SCREEN Negative         Brief Urine Lab Results  (Last result in the past 365 days)      Color   Clarity   Blood   Leuk Est   Nitrite   Protein   CREAT   Urine HCG        03/01/23 1236 Yellow   Clear   Negative   Negative   Negative   Negative                 Microbiology Results Abnormal     None          XR Femur 2 View Left    Result Date: 3/1/2023  XR FEMUR 2 VW LEFT Date of Exam: 3/1/2023 7:23 AM EST Indication: fracture. Comparison: CT pelvis from earlier today Findings: A left hip arthroplasty is present. A nondisplaced periprosthetic fracture of the proximal left femur is not well seen, better characterized on recent prior CT. The hip and knee joints are congruent. The soft tissues are unremarkable.     Impression: Impression: Nondisplaced periprosthetic fracture of proximal femur not well seen on this exam, better characterized on CT from earlier today. Electronically Signed: Marquis Laura  3/1/2023 7:35 AM EST  Workstation ID: [id number]    CT Head Without Contrast    Result Date: 3/1/2023  EXAMINATION: CT HEAD WO CONTRAST DATE: 3/1/2023 3:59 AM  INDICATION: Fall.  COMPARISON: None available.  TECHNIQUE: Thin section noncontrast axial images were obtained through the head. Coronal reformatted images were created. CT dose lowering techniques were used, to include: automated exposure control, adjustment for patient size, and or use of iterative reconstruction FINDINGS: EXTRACRANIAL: Left periorbital laceration. Calvarium is intact. Mild mucosal thickening scattered within the paranasal sinuses. Mastoid air cells are clear. Bilateral ocular lens surgeries. Senile scleral calcifications. INTRACRANIAL: No acute intracranial hemorrhage. No acute territorial infarct. No significant mass effect. No hydrocephalus. Atherosclerotic calcifications of both carotid siphons. Moderate burden of low attenuation in the white matter is nonspecific, but most likely reflects sequelae of chronic  microvascular ischemia.  Moderate diffuse parenchymal volume loss.     Impression: 1.  No acute intracranial abnormality. 2.  Left periorbital laceration. 3.  Moderate chronic small vessel ischemic changes and volume loss. Electronically signed by:  Cyrus Bean DO  3/1/2023 2:23 AM Mountain Time    CT Pelvis Without Contrast    Result Date: 3/1/2023  EXAMINATION: CT PELVIS WITHOUT CONTRAST  DATE: 3/1/2023 5:23 AM INDICATION: Fall, status post hip arthroplasty. Question fracture.; COMPARISON: November 11, 2022 PROCEDURE:   CT of the pelvis was performed without IV contrast. Multiplanar reformatted images were reviewed.  CT dose lowering techniques were used, to include: automated exposure control, adjustment for patient size, and or use of iterative reconstruction. FINDINGS: BONES:  Nondisplaced fracture of the periprostatic proximal left humerus extending from the trochanteric portion into the mid diaphysis just distal to the tip of the arthroplasty stem. Bilateral hip arthroplasties. Associated artifact limiting evaluation  of adjacent structures. Urinary bladder: Largely obscured by artifact. Reproductive organs: Uterus not visualized.. Visualized GI tract: Visualized bowel nondilated. Lymph Nodes: No pelvic lymphadenopathy.. Vasculature: Extensive arterial calcifications. Body wall: Unremarkable.     Impression: 1.  Nondisplaced periprosthetic fracture proximal left femur extending from the trochanteric portion into the mid diaphysis. Electronically signed by:  Jeannine Colmenares M.D.  3/1/2023 4:01 AM Mountain Time    XR Chest 1 View    Result Date: 3/1/2023  EXAMINATION: XR CHEST 1 VW DATE: 3/1/2023 4:25 AM INDICATION:  Fall; COMPARISON:  February 17, 2023 FINDINGS: No focal consolidation, pleural effusion, or pneumothorax. Cardiomediastinal silhouette  unchanged. Moderate atherosclerosis thoracic aorta. No acute osseous abnormality. Surgical clips in the neck.     Impression: 1.  No acute cardiopulmonary  process. Electronically signed by:  Jeannine Colmenares M.D.  3/1/2023 2:47 AM Mountain Time    Peripheral Block    Result Date: 3/1/2023  Sina Bautista CRNA     3/1/2023  2:52 PM Peripheral Block Patient reassessed immediately prior to procedure Reason for block: at surgeon's request and post-op pain management Performed by TYRONE/CAA: Sina Bautista, CRNA Assisted by: Carmen Wray RN Preanesthetic Checklist Completed: patient identified, IV checked, site marked, risks and benefits discussed, surgical consent, monitors and equipment checked, pre-op evaluation and timeout performed Prep: Pt Position: supine Sterile barriers:cap, gloves, mask and washed/disinfected hands Prep: ChloraPrep Patient monitoring: blood pressure monitoring, continuous pulse oximetry and EKG Procedure Performed under: local infiltration Guidance:ultrasound guided ULTRASOUND INTERPRETATION.  Using ultrasound guidance a 20 G gauge needle was placed in close proximity to the nerve, at which point, under ultrasound guidance anesthetic was injected in the area of the nerve and spread of the anesthesia was seen on ultrasound in close proximity thereto.  There were no abnormalities seen on ultrasound; a digital image was taken; and the patient tolerated the procedure with no complications. Images:still images obtained, printed/placed on chart Laterality:left Block Type:fascia iliaca compartment Injection Technique:catheter Needle Type:echogenic and Tuohy Needle Gauge:18 G Resistance on Injection: none Catheter Size:20 G (20g) Medications Used: ropivacaine (NAROPIN) 0.5 % injection - Injection  30 mL - 3/1/2023 2:52:00 PM Medications Preservative Free Saline:30ml Post Assessment Injection Assessment: negative aspiration for heme, no paresthesia on injection and incremental injection Patient Tolerance:comfortable throughout block Complications:no Additional Notes CKAFASCIAILIACA: CATHETER A high-frequency linear transducer, with sterile cover, was placed  "in parasagittal plane on top of the Anterior Superior Iliac Spine (ASIS) and moved medially to identify the Internal Oblique muscle, Sartorius muscle, Iliacus Muscle, Fascia Iliaca (FI) and Fascia Latae. The insertion site was prepped and draped in sterile fashion. Skin and cutaneous tissue was infiltrated with 2-5 ml of 1% Lidocaine. Using ultrasound-guidance, an 18-gauge Contiplex Ultra 360 Touhy needle was advanced in plane from caudad to cephalad. Preservative-free normal saline was utilized for hydro-dissection of tissue, advancement of Touhy, and to confirm final needle placement below FI. Local anesthetic in incremental 3-5 ml injections. Aspiration every 5 ml to prevent intravascular injection. Injection was completed with negative aspiration of blood and negative intravascular injection. Injection pressures were normal with minimal resistance. A 20-gauge Contiplex Echo catheter was placed through the needle and advance out the tip of the Touhy 3-5 cm. The Touhy needle was then removed, and final catheter position verified below the FI. The catheter was secured in the usual fashion with skin glue, benzoin, steri-strips, CHG tegaderm and Label noting \"Nerve Block Catheter\". Jerk tape applied at yellow connector and catheter connection.     XR Hip With or Without Pelvis 2 - 3 View Left    Result Date: 3/1/2023  Examination: XR HIP W OR WO PELVIS 2-3 VIEW LEFT Indication: Left hip pain after fall Comparison: No prior study is available for comparison. Findings: Left hip arthroplasty hardware is present. The hardware appears in expected location. A nondisplaced intertrochanteric periprosthetic fracture is suspected, although suboptimally evaluated on the provided views. The pelvic ring appears intact. Partially imaged is right hip arthroplasty hardware. Lower lumbar fusion hardware is present as well.     Impression: Impression: A nondisplaced intertrochanteric periprosthetic fracture is suspected on the left, " although suboptimally evaluated on the provided views. CT of the pelvis without contrast is recommended for further assessment. Electronically signed by:  Jameel Ward M.D.  3/1/2023 2:47 AM Mountain Time      Results for orders placed during the hospital encounter of 08/09/21    Adult Transthoracic Echo Complete W/ Cont if Necessary Per Protocol    Interpretation Summary  · Estimated left ventricular EF = 60% Left ventricular systolic function is normal.  · Left ventricular diastolic function was normal.  · Trace mitral and tricuspid regurgitation.  · Mild tachycardia noted throughout the study.      Current medications:  Scheduled Meds:aspirin, 81 mg, Oral, Daily  atorvastatin, 40 mg, Oral, Nightly  donepezil, 10 mg, Oral, Nightly  famotidine, 40 mg, Oral, Daily  levothyroxine, 175 mcg, Oral, Q AM  metoprolol succinate XL, 12.5 mg, Oral, Q24H  PARoxetine, 40 mg, Oral, Daily  primidone, 100 mg, Oral, Nightly  primidone, 150 mg, Oral, Daily  senna-docusate sodium, 2 tablet, Oral, BID  sodium chloride, 10 mL, Intravenous, Q12H      Continuous Infusions:lactated ringers, 9 mL/hr, Last Rate: 9 mL/hr (03/02/23 1045)  lactated ringers, 100 mL/hr  ropivacaine,   sodium chloride, 75 mL/hr      PRN Meds:.•  acetaminophen  •  senna-docusate sodium **AND** polyethylene glycol **AND** bisacodyl **AND** bisacodyl  •  ePHEDrine  •  fentanyl  •  HYDROcodone-acetaminophen  •  lactated ringers  •  [MAR Hold] Morphine  •  naloxone  •  naloxone  •  ondansetron **OR** ondansetron  •  ondansetron  •  [COMPLETED] Insert Peripheral IV **AND** [MAR Hold] sodium chloride  •  sodium chloride  •  sodium chloride    Assessment & Plan   Assessment & Plan     Active Hospital Problems    Diagnosis  POA   • **Periprosthetic fracture around internal prosthetic left hip joint, initial encounter ([facility]) [M97.02XA]  Not Applicable   • Alzheimer disease (HCC) [G30.9, F02.80]  Yes   • Frequent falls [R29.6]  Not Applicable   • History of lacunar stroke  1/2/2019 [Z86.73]  Not Applicable   • Hypertension [I10]  Yes      Resolved Hospital Problems   No resolved problems to display.        Brief Hospital Course to date:  Zohra Hall is a 82 y.o. female w/ GERD, HTN, Hx breast/thyroid cancers, dementia (resides at [facility]), who sustained a standing fall w/ subsequent hip pain, was identified to have acute LT hip fracture; overnight the night of admission, after receiving home BP meds, she was hypotensive all night    The following problems are new to me today    Assessment/Plan    Acute LT periprosthetic femoral Fx following fall  Recurrent falls  -H&H drop overnight pre-procedure, likely related to multiple IVF boluses overnight for hypotension  -pain control  -seen post-op this afternoon, s/p ORIF w/ Dr. Briggs  -dvt ppx per ortho  -PT/OT once more alert    Hypotension in the setting of hypertension  -s/p multiple IVF boluses overnight  -BP low following administration of home BP meds last night, unclear if she had already had AM doses yesterday or other confounding factor  -cont home metoprolol  -hold home norvasc, HCTZ, lisinipril, terazosyn pending improvement of BP, will gradually reintroduce    Hx stroke  HLD  HFpEF, chronic  Dysphagia  -last admit SLP rec'd soft to chew, chopped meat, nectar thick liquids   -ASA, statin    Baseline dementia  Essential tremor  -donepezil, paroxetine, primidone    Hypothyroidism  -levothyroxine    GERD  -pepcid    Recent UTI  Hx traumatic SAH, SDH    Expected Discharge Location and Transportation: anticipate need for rehab  Expected Discharge        DVT prophylaxis:  Mechanical DVT prophylaxis orders are present.          CODE STATUS:   Code Status and Medical Interventions:   Ordered at: 03/01/23 1304     Medical Intervention Limits:    NO intubation (DNI)     Level Of Support Discussed With:    Health Care Surrogate     Code Status (Patient has no pulse and is not breathing):    No CPR (Do Not Attempt to Resuscitate)      Medical Interventions (Patient has pulse or is breathing):    Limited Support     Comments:    d/w FLAQUITA/ugo/Joey Diaz     Release to patient:    Routine Release       Wesley Wheeler,   03/02/23

## 2023-03-02 NOTE — ANESTHESIA POSTPROCEDURE EVALUATION
Patient: Zohra Hall    Procedure Summary     Date: 03/02/23 Room / Location:  [facility] /  [facility]    Anesthesia Start: 1019 Anesthesia Stop: 1259    Procedure: FEMUR OPEN REDUCTION INTERNAL FIXATION (Left: Thigh) Diagnosis:       Periprosthetic fracture around internal prosthetic left hip joint, initial encounter ([facility])      (Periprosthetic fracture around internal prosthetic left hip joint, initial encounter ([facility]) [M97.02XA])    Surgeons: Qasim Briggs MD Provider: James Vo MD    Anesthesia Type: general ASA Status: 4          Anesthesia Type: general    Vitals  No vitals data found for the desired time range.          Post Anesthesia Care and Evaluation    Patient location during evaluation: PACU  Patient participation: complete - patient participated  Level of consciousness: sleepy but conscious  Pain management: adequate    Airway patency: patent  Anesthetic complications: No anesthetic complications  PONV Status: none  Cardiovascular status: hemodynamically stable and acceptable  Respiratory status: nonlabored ventilation, acceptable and nasal cannula  Hydration status: acceptable

## 2023-03-03 LAB
ANION GAP SERPL CALCULATED.3IONS-SCNC: 11 MMOL/L (ref 5–15)
BH BB BLOOD EXPIRATION DATE: NORMAL
BH BB BLOOD EXPIRATION DATE: NORMAL
BH BB BLOOD TYPE BARCODE: 600
BH BB BLOOD TYPE BARCODE: 6200
BH BB DISPENSE STATUS: NORMAL
BH BB DISPENSE STATUS: NORMAL
BH BB PRODUCT CODE: NORMAL
BH BB PRODUCT CODE: NORMAL
BH BB UNIT NUMBER: NORMAL
BH BB UNIT NUMBER: NORMAL
BUN SERPL-MCNC: 22 MG/DL (ref 8–23)
BUN/CREAT SERPL: 30.6 (ref 7–25)
CALCIUM SPEC-SCNC: 7.2 MG/DL (ref 8.6–10.5)
CHLORIDE SERPL-SCNC: 102 MMOL/L (ref 98–107)
CO2 SERPL-SCNC: 23 MMOL/L (ref 22–29)
CREAT SERPL-MCNC: 0.72 MG/DL (ref 0.57–1)
CROSSMATCH INTERPRETATION: NORMAL
CROSSMATCH INTERPRETATION: NORMAL
EGFRCR SERPLBLD CKD-EPI 2021: 82.6 ML/MIN/1.73
FLUAV RNA RESP QL NAA+PROBE: NOT DETECTED
FLUBV RNA RESP QL NAA+PROBE: NOT DETECTED
GLUCOSE SERPL-MCNC: 89 MG/DL (ref 65–99)
HCT VFR BLD AUTO: 22.8 % (ref 34–46.6)
HGB BLD-MCNC: 7.2 G/DL (ref 12–15.9)
POTASSIUM SERPL-SCNC: 3.9 MMOL/L (ref 3.5–5.2)
SARS-COV-2 RNA RESP QL NAA+PROBE: NOT DETECTED
SODIUM SERPL-SCNC: 136 MMOL/L (ref 136–145)
UNIT  ABO: NORMAL
UNIT  ABO: NORMAL
UNIT  RH: NORMAL
UNIT  RH: NORMAL

## 2023-03-03 PROCEDURE — 99232 SBSQ HOSP IP/OBS MODERATE 35: CPT | Performed by: INTERNAL MEDICINE

## 2023-03-03 PROCEDURE — 80048 BASIC METABOLIC PNL TOTAL CA: CPT | Performed by: ORTHOPAEDIC SURGERY

## 2023-03-03 PROCEDURE — 25010000002 CEFAZOLIN IN DEXTROSE 2-4 GM/100ML-% SOLUTION: Performed by: ORTHOPAEDIC SURGERY

## 2023-03-03 PROCEDURE — 85014 HEMATOCRIT: CPT | Performed by: ORTHOPAEDIC SURGERY

## 2023-03-03 PROCEDURE — 85018 HEMOGLOBIN: CPT | Performed by: ORTHOPAEDIC SURGERY

## 2023-03-03 PROCEDURE — 87636 SARSCOV2 & INF A&B AMP PRB: CPT | Performed by: INTERNAL MEDICINE

## 2023-03-03 RX ORDER — DOXYCYCLINE 100 MG/1
100 CAPSULE ORAL EVERY 12 HOURS SCHEDULED
Status: DISCONTINUED | OUTPATIENT
Start: 2023-03-03 | End: 2023-03-04 | Stop reason: HOSPADM

## 2023-03-03 RX ADMIN — METOPROLOL SUCCINATE 12.5 MG: 25 TABLET, EXTENDED RELEASE ORAL at 09:12

## 2023-03-03 RX ADMIN — ATORVASTATIN CALCIUM 40 MG: 40 TABLET, FILM COATED ORAL at 21:14

## 2023-03-03 RX ADMIN — PRIMIDONE 150 MG: 50 TABLET ORAL at 09:24

## 2023-03-03 RX ADMIN — CEFAZOLIN SODIUM 2 G: 2 INJECTION, SOLUTION INTRAVENOUS at 02:15

## 2023-03-03 RX ADMIN — SENNOSIDES AND DOCUSATE SODIUM 2 TABLET: 8.6; 5 TABLET ORAL at 21:14

## 2023-03-03 RX ADMIN — DOXYCYCLINE 100 MG: 100 CAPSULE ORAL at 21:14

## 2023-03-03 RX ADMIN — OXYCODONE HYDROCHLORIDE 5 MG: 5 TABLET ORAL at 17:43

## 2023-03-03 RX ADMIN — ASPIRIN 81 MG: 81 TABLET, COATED ORAL at 21:14

## 2023-03-03 RX ADMIN — ACETAMINOPHEN 1000 MG: 500 TABLET ORAL at 06:00

## 2023-03-03 RX ADMIN — PRIMIDONE 100 MG: 50 TABLET ORAL at 21:15

## 2023-03-03 RX ADMIN — Medication 10 ML: at 21:16

## 2023-03-03 RX ADMIN — LEVOTHYROXINE SODIUM 175 MCG: 150 TABLET ORAL at 05:50

## 2023-03-03 RX ADMIN — ASPIRIN 81 MG: 81 TABLET, COATED ORAL at 09:12

## 2023-03-03 RX ADMIN — ACETAMINOPHEN 1000 MG: 500 TABLET ORAL at 14:09

## 2023-03-03 RX ADMIN — FAMOTIDINE 40 MG: 20 TABLET ORAL at 09:12

## 2023-03-03 RX ADMIN — ACETAMINOPHEN 1000 MG: 500 TABLET ORAL at 22:49

## 2023-03-03 RX ADMIN — PAROXETINE HYDROCHLORIDE 40 MG: 20 TABLET, FILM COATED ORAL at 09:11

## 2023-03-03 RX ADMIN — TRAMADOL HYDROCHLORIDE 50 MG: 50 TABLET ORAL at 09:25

## 2023-03-03 RX ADMIN — SENNOSIDES AND DOCUSATE SODIUM 2 TABLET: 8.6; 5 TABLET ORAL at 09:12

## 2023-03-03 RX ADMIN — DOXYCYCLINE 100 MG: 100 CAPSULE ORAL at 09:12

## 2023-03-03 RX ADMIN — DONEPEZIL HYDROCHLORIDE 10 MG: 10 TABLET, FILM COATED ORAL at 21:14

## 2023-03-03 NOTE — DISCHARGE PLACEMENT REQUEST
"Jamie Hall (82 y.o. Female)     Date of Birth   1940    Social Security Number       [place]   [address]  [place] [address]    Home Phone   [phone]    MRN   [id number]       Zoroastrianism   Episcopalian    Marital Status                               Admission Date   3/1/23    Admission Type   Emergency    Admitting Provider   Wesley Wheeler DO    Attending Provider   Wesley Wheeler DO    Department, Room/Bed   [facility] 3G, S352/1       Discharge Date       Discharge Disposition       Discharge Destination                               Attending Provider: Wesley Wheeler DO    Allergies: Dilaudid [Hydromorphone Hcl], Beta Adrenergic Blockers, Dilaudid [Hydromorphone], Lactose Intolerance (Gi)    Isolation: None   Infection: MRSA (03/01/23), COVID Screen (preop/placement) (03/03/23)   Code Status: CPR    Ht: 165.1 cm (65\")   Wt: 54.4 kg (120 lb)    Admission Cmt: None   Principal Problem: Periprosthetic fracture around internal prosthetic left hip joint, initial encounter ([facility]) [M97.02XA]                 Active Insurance as of 3/1/2023     Primary Coverage     Payor Plan Insurance Group Employer/Plan Group    MEDICARE MEDICARE A & B      Payor Plan Address Payor Plan Phone Number Payor Plan Fax Number Effective Dates    [address] [phone]  11/1/2003 - None Entered    [facility] [id number]       Subscriber Name Subscriber Birth Date Member ID       JAMIE HALL 1940 [id number]           Secondary Coverage     Payor Plan Insurance Group Employer/Plan Group    [employer] SUP AAR HEALTH CARE OPTIONS      Payor Plan Address Payor Plan Phone Number Payor Plan Fax Number Effective Dates    [facility] [phone]  1/1/2016 - None Entered    [address]       [facility] [address]       Subscriber Name Subscriber Birth Date Member ID       JAMIE HALL 1940 [id number]                 Emergency Contacts      " (Rel.) Home Phone Work Phone Mobile Phone    TEHLMA (POA)JERRELL (Grandchild) [phone] -- [phone]    BRENDA RENEE (Son) [phone] -- [phone]               Operative/Procedure Notes (last 72 hours)      Qasim Briggs MD at 03/02/23 1054          FEMUR DISTAL OPEN REDUCTION INTERNAL FIXATION  Procedure Report    Patient Name:  Zohra Hall  YOB: 1940    Date of Surgery:  3/2/2023     Indications:    82 y.o. female who was admitted to [facility] she had a fall from standing sustained a minimally displaced periprosthetic left femur fracture CT scan showed that there is spiral fracture propagating down through the shaft but the component still appeared well fixed proximally therefore decision was made to do an open reduction internal fixation of the femur instead of revision total hip arthroplasty femoral component. Likely risk benefits of the procedure including but not limited to infection, DVT, pulmonary embolism, leg length discrepancy, recurrent dislocation, possibility of injury to nerves and vessels, and periprosthetic fractures have been discussed with the patient and family in detail. Despite the risks involved the patient elected to proceed with an informed consent was obtained. The patient was seen in the preoperative holding area and the operative extremity was marked.    Pre-op Diagnosis:   Periprosthetic fracture around internal prosthetic left hip joint, initial encounter ([facility]) [M97.02XA]       Post-Op Diagnosis Codes:     * Periprosthetic fracture around internal prosthetic left hip joint, initial encounter ([facility]) [M97.02XA]    Procedure/CPT® Codes:      Procedure(s):  FEMUR OPEN REDUCTION INTERNAL FIXATION  Application of incisional wound VAC 4 x 20 cm    Staff:  Surgeon(s):  Qasim Briggs MD    Anesthesia: General    Estimated Blood Loss: 300 mL    Implants:    Implant Name Type Inv. Item Serial No.  Lot No. LRB No. Used  Action   DEV CONTRL TISS STRATAFIX SPIRAL PDO BIDIR 1 [id number] - [id number] Implant DEV CONTRL TISS STRATAFIX SPIRAL PDO BIDIR 1 [id number]  [facility]  [employer]  Left 1 Implanted   CABL CERCLG GTR COCR 1.8MM 63.5CM - [id number] Implant CABL CERCLG GTR COCR 1.8MM 63.5CM  [employer] [id number] Left 1 Implanted   CABL CERCLG GTR COCR 1.8MM 63.5CM - [id number] Implant CABL CERCLG GTR COCR 1.8MM 63.5CM  [employer] [id number] Left 1 Implanted   CABL CERCLG GTR COCR 1.8MM 63.5CM - [id number] Implant CABL CERCLG GTR COCR 1.8MM 63.5CM  [employer] [id number] Left 1 Implanted   SCRW SCOTT NCB S/TAP FUL/THRD 5X22MM - [id number] Implant SCRW SCOTT NCB S/TAP FUL/THRD 5X22MM  [employer]  Left 1 Implanted   PLT FEM NCB PROX PP 12H 285MM LT - [id number] Implant PLT FEM NCB PROX PP 12H 285MM LT  [employer]  Left 1 Implanted   KWIRE NCB 2MM NS - [id number] Implant KWIRE NCB 2MM NS  [employer]  Left 1 Explanted   SCRW SCOTT NCB S/TAP 4X32MM - [id number] Implant SCRW SCOTT NCB S/TAP 4X32MM  [employer]  Left 1 Implanted   SCRW UNICORTICAL NCB 5X18MM - [id number] Implant SCRW UNICORTICAL NCB 5X18MM  [employer]  Left 1 Implanted   SCRW UNICORTICAL NCB 5X14MM - [id number] Implant SCRW UNICORTICAL NCB 5X14MM  [employer]  Left 1 Implanted   SCRW SCOTT NCB S/TAP 4X20MM - [id number] Implant SCRW SCOTT NCB S/TAP 4X20MM  [employer]  Left 1 Implanted   SCRW SCOTT NCB S/TAP FUL/THRD 5X32MM - [id number] Implant SCRW SCOTT NCB S/TAP FUL/THRD 5X32MM  [employer]  Left 2 Implanted   SCRW SCOTT NCB S/TAP FUL/THRD 5X36MM - [id number] Implant SCRW SOCTT NCB S/TAP FUL/THRD 5X36MM  [employer]  Left 2 Implanted   SCRW SCOTT NCB S/TAP FUL/THRD 5X24MM - [id number] Implant SCRW SCOTT NCB S/TAP FUL/THRD 5X24MM  [employer]  Left 1 Implanted   ENDCAP LK SCRW NCB PROTASUL 8X3.5MM - [id number] Implant ENDCAP LK SCRW NCB PROTASUL 8X3.5MM  [employer]  Left 5 Implanted       Specimen:          None      Findings: See operative  dictation    Complications: None    Description of Procedure:   The patient was transferred to [facility] operating room and preoperative antibiotics given IV prior to skin incision as well as 1 g of tranexamic acid. Patient received  generalanesthesia and was transferred to the regular OR table placed in lateral decubitus position with an axillary roll. All bony prominences were padded adequately. The operative hip was then prepped and draped in the usual sterile fashion. Multiple timeouts were done identifying the correct patient, surgical site and planned procedure.    We use a lateral based incision down through skin subcutaneous tissue and sharply incised the tensor fascia exposing vastus lateralis vastus lateralis was mobilized anteriorly and we took caution to coagulate perforating vessels once we did this we identified the fracture site placed a reduction forceps on it and anatomically reduce the fracture we then placed 2 cables around the fracture site in order to hold in place we then chose a 12 hole NCB proximal femur plate and pinned into position using fluoroscopic assistance needed ensure that the plate was pinned in good overall location we then filled proximal and distal screws and appropriate length in order to span the fracture we did place locking caps over the proximal screws as most these were unicortical distally we just use cortical screws we then placed a cable through the plate and around at the fracture site fracture fixation.         The hip was then taken through a range of motion and found to be stable. The wound was then thoroughly irrigated with saline, we did use of more of the injection cocktail. Betadine irrigation was used followed by 3L of saline irrigation. Soft tissue hemostasis was secured and second dose of IV TXA was used. Sponge, needle, and instrument counts were correct.We then closed the fascial layer with a running #2 STRATSFIX followed by 2-0 Vicryl and  then staples for the skin.  Incisional wound VAC 4 x 20 cm dressing placed over the top.     After finishing the wound closure the wound was cleaned and checked.  Total surface area the wound was less than 50 cm².  There were no sinus tracts or fistulas.  Since no sinus tracts or fistulas were present I then covered the incision with a sponge matching the size and depth of the wound, over the wound and covered with transparent film.  I then placed the film so I provided a tight seal.  I then inserted disposable medical tubing into the dressing through an opening in the transparent film and connected the tubing with a negative pressure pump and activated the pump and checked the seal and the pump was functioning well.  During therapy I will continue to check the pump and the patient for signs of distress or failure.  I will also ensure proper functioning of the equipment with any to be replaced in the canister or soft material accumulated in the foam sponge.  Also instructed patient and caregiver on how to provide continued care for the wound.        The patient was then transferred to the recovery room in stable condition. The patient tolerated the procedure well . There were no complications. The patient had adequate distal pulses and good capillary refill.    The patient will be mobilized by physical therapy. The patient received antibiotic prophylaxis postoperatively for 2 more doses given the first 24 hours, followed by 2 weeks of oral antibiotics due to the revision nature of the surgery. The patient was started on DVT prophylaxis with 81 mg aspirin twice daily starting postoperative day #1.  Should be okay for weightbearing as tolerated  I discussed the satisfactory performance of the procedure with the patient's family and discussed the postoperative management.      Qasim Briggs MD     Date: 3/2/2023  Time: 12:43 EST            Assistant Participation:  Surgeon(s):  Qasim Briggs MD    Assistant:  Teofilo Rabago PA-C; Reinier Crouch PA-C assisted with proper preoperative positioning, preoperative templating, determingin availability of proper implants, prepping and draping of patient, manipulation placement of instruments, protection of ligaments and vital soft tissue structures, assistance in maintaining hemostasis and assistance with closure of the wound. Their skills and knowledge of the steps of operation and the desired outcome of each surgical step was crucial, allowing for efficient choreography of surgical procedure, and closure of the wound which lead to reduced surgical time, less blood loss, and less risk of complications for the patient.             Electronically signed by Qasim Briggs MD at 03/02/23 1253          Physician Progress Notes (last 48 hours)      Qasim Briggs MD at 03/03/23 0829            Orthopedic Progress Note      Patient: Zohra Hall  YOB: 1940     Date of Admission: 3/1/2023  3:43 AM Medical Record Number: [id number]     Attending Physician: Wesley Wheeler DO    Status Post:  Procedure(s):  FEMUR OPEN REDUCTION INTERNAL FIXATION Post Operative Day Number: 1    Subjective : No new orthopaedic complaints     Pain Relief: some relief with present medication.     Systemic Complaints: No Complaints  Vitals:    03/02/23 1900 03/02/23 2336 03/03/23 0448 03/03/23 0707   BP:  127/59 101/76 115/55   BP Location:  Left arm Left arm Left arm   Patient Position:  Lying Lying Lying   Pulse:  72 56 57   Resp:  16 16 16   Temp: 97.6 °F (36.4 °C) 98.1 °F (36.7 °C) 98 °F (36.7 °C) 97.9 °F (36.6 °C)   TempSrc: Axillary Oral Oral Oral   SpO2:  100% 100% 96%   Weight:       Height:           Physical Exam: 82 y.o. female    General Appearance:       Alert, cooperative, in no acute distress                  Extremities:    Dressing Clean, Dry and Intact             No clinical sign of DVT        Able to do good movements of digits    Pulses:   Pulses palpable  and equal bilaterally           Diagnostic Tests:     Results from last 7 days   Lab Units 03/03/23  0510 03/02/23  0455 03/01/23  0300   WBC 10*3/mm3  --  5.78 8.02   HEMOGLOBIN g/dL 7.2* 7.7* 9.5*   HEMATOCRIT % 22.8* 25.4* 31.5*   PLATELETS 10*3/mm3  --  215 301     Results from last 7 days   Lab Units 03/03/23  0510 03/02/23  0654 03/02/23  0455 03/01/23  0300   SODIUM mmol/L 136  --  132* 137   POTASSIUM mmol/L 3.9 3.6 3.7 3.9   CHLORIDE mmol/L 102  --  97* 98   CO2 mmol/L 23.0  --  27.0 30.0*   BUN mg/dL 22  --  23 19   CREATININE mg/dL 0.72  --  0.81 0.70   GLUCOSE mg/dL 89  --  99 117*   CALCIUM mg/dL 7.2*  --  8.2* 8.2*     Results from last 7 days   Lab Units 03/02/23  0455 03/01/23  0300   INR  1.17* 1.04   APTT seconds  --  30.6     Lab Results   Component Value Date    URICACID 5.1 11/08/2019     No results found for: CRYSTAL  Microbiology Results (last 10 days)     Procedure Component Value - Date/Time    MRSA Screen, PCR (Inpatient) - Swab, Nares [[id number]]  (Abnormal) Collected: 03/01/23 1040    Lab Status: Final result Specimen: Swab from Nares Updated: 03/01/23 1205     MRSA PCR Positive    Narrative:      The negative predictive value of this diagnostic test is high and should only be used to consider de-escalating anti-MRSA therapy. A positive result may indicate colonization with MRSA and must be correlated clinically.        XR Femur 2 View Left    Result Date: 3/2/2023  Impression: Postoperative changes of the proximal to mid femur are noted. Anatomic alignment is observed. Electronically Signed: Porifrio Holden  3/2/2023 3:04 PM EST  Workstation ID: [id number]    XR Femur 2 View Left    Result Date: 3/1/2023  Impression: Nondisplaced periprosthetic fracture of proximal femur not well seen on this exam, better characterized on CT from earlier today. Electronically Signed: Marquis Laura  3/1/2023 7:35 AM EST  Workstation ID: [id number]    CT Head Without Contrast    Result Date: 3/1/2023  1.  No  acute intracranial abnormality. 2.  Left periorbital laceration. 3.  Moderate chronic small vessel ischemic changes and volume loss. Electronically signed by:  Cyrus Bean DO  3/1/2023 2:23 AM Mountain Time    CT Pelvis Without Contrast    Result Date: 3/1/2023  1.  Nondisplaced periprosthetic fracture proximal left femur extending from the trochanteric portion into the mid diaphysis. Electronically signed by:  Jeannine Colmenares M.D.  3/1/2023 4:01 AM Mountain Time    XR Chest 1 View    Result Date: 3/1/2023  1.  No acute cardiopulmonary process. Electronically signed by:  Jeannine Colmenares M.D.  3/1/2023 2:47 AM Mountain Time    FL C Arm During Surgery    Result Date: 3/2/2023  Impression: Limited intraoperative fluoroscopic views for surgical support during open reduction and internal fixation of the femur. Electronically Signed: Porfirio Holden  3/2/2023 2:22 PM EST  Workstation ID: [id number]    XR Hip With or Without Pelvis 2 - 3 View Left    Result Date: 3/1/2023  Impression: A nondisplaced intertrochanteric periprosthetic fracture is suspected on the left, although suboptimally evaluated on the provided views. CT of the pelvis without contrast is recommended for further assessment. Electronically signed by:  Jameel Ward M.D.  3/1/2023 2:47 AM Mountain Time        Current Medications:  Scheduled Meds:acetaminophen, 1,000 mg, Oral, Q8H  aspirin, 81 mg, Oral, Q12H  atorvastatin, 40 mg, Oral, Nightly  donepezil, 10 mg, Oral, Nightly  famotidine, 40 mg, Oral, Daily  levothyroxine, 175 mcg, Oral, Q AM  metoprolol succinate XL, 12.5 mg, Oral, Q24H  PARoxetine, 40 mg, Oral, Daily  primidone, 100 mg, Oral, Nightly  primidone, 150 mg, Oral, Daily  senna-docusate sodium, 2 tablet, Oral, BID  sodium chloride, 10 mL, Intravenous, Q12H      Continuous Infusions:lactated ringers, 9 mL/hr, Last Rate: 9 mL/hr (03/02/23 1045)  lactated ringers, 100 mL/hr, Last Rate: 100 mL/hr (03/02/23 3873)  ropivacaine,   sodium chloride, 75  mL/hr      PRN Meds:.•  acetaminophen  •  senna-docusate sodium **AND** polyethylene glycol **AND** bisacodyl **AND** bisacodyl  •  Morphine  •  naloxone  •  ondansetron **OR** ondansetron  •  oxyCODONE  •  oxyCODONE  •  [COMPLETED] Insert Peripheral IV **AND** sodium chloride  •  sodium chloride  •  sodium chloride  •  traMADol    Assessment: Status post  No admission procedures for hospital encounter.    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[facility])   • Hypothyroid post remote resection papillary adenocarcinoma thyroid   • Hypertension   • GERD without esophagitis   • Hyperlipidemia LDL goal <100   • Atherosclerosis of aorta ([facility])   • Incontinence of bowel   • Acute right-sided low back pain without sciatica   • Benign essential tremor   • Pain of right hip joint   • Thyroid cancer ([facility])   • Lacunar stroke ([facility])   • Nontraumatic rupture of extensor tendons of right hand and wrist   • Actinic keratosis   • Transient ischemic attack   • Ataxia   • History of PMR (CMS/[facility])   • Osteoporosis   • Post-surgical hypothyroidism   • NSTEMI (non-ST elevated myocardial infarction) ([facility])   • Coronary artery disease   • NICM presumably due to Takotsubo post NSTEMI 5/23/2021 (CMS/[facility])   • Abnormal TSH   • Severe hypothyroidism   • Traumatic SAH, SDH, and intraparenchymal bleed (CMS/[facility])   • History of lacunar stroke 1/2/2019   • Alzheimer disease ([facility])   • Frequent falls   • Bilateral hip pain   • Acute UTI   • Periprosthetic fracture around internal prosthetic left hip joint, initial encounter ([facility])       PLAN:   Continues current post-op course  Anticoagulation: Aspirin started  Mobilize with PT as tolerated per protocol  2 weeks oral abx     Weight Bearing: WBAT  Discharge Plan: OK to plan for discharge in  tomorrow to rehab   from orthopaedic perspective.    Qasim Briggs MD    Date: 3/3/2023    Time: 08:30 EST    Electronically signed by Qasim Briggs MD at 23 0831     Wesley Wheeler DO at 23 1305              Psychiatric Medicine Services  PROGRESS NOTE    Patient Name: Zohra Hall  : 1940  MRN: [id number]    Date of Admission: 3/1/2023  Primary Care Physician: Troy Velarde MD    Subjective   Subjective     CC:  Follow up hip fracture    HPI:  Hypotensive overnight, BP slightly better this AM. Seen in the PACU, severely lethargic; no adverse intraoperative events reported    ROS:  Unable to assess     Objective   Objective     Vital Signs:   Temp:  [97 °F (36.1 °C)-98.8 °F (37.1 °C)] 98.4 °F (36.9 °C)  Heart Rate:  [54-77] 57  Resp:  [12-22] 14  BP: ()/(40-91) 148/68  Flow (L/min):  [2] 2     Physical Exam:  Constitutional: Lethargic/sedated elderly female laying on PACU stretcher under jennifer hugger  HENT: NCAT, mucous membranes moist  Respiratory: Scattered rhonchi, respiratory effort normal   Cardiovascular: RRR, palpable radial pulses  Gastrointestinal: Positive bowel sounds, soft, nontender, nondistended  Musculoskeletal: No bilateral ankle edema  Psychiatric: Unable to assess    Results Reviewed:  LAB RESULTS:      Lab 23  0455 23  0300   WBC 5.78 8.02   HEMOGLOBIN 7.7* 9.5*   HEMATOCRIT 25.4* 31.5*   PLATELETS 215 301   NEUTROS ABS  --  6.47   IMMATURE GRANS (ABS)  --  0.09*   LYMPHS ABS  --  0.89   MONOS ABS  --  0.44   EOS ABS  --  0.10   MCV 75.4* 76.8*   PROTIME 14.8* 13.5   APTT  --  30.6         Lab 23  0654 23  0455 23  0300   SODIUM  --  132* 137   POTASSIUM 3.6 3.7 3.9   CHLORIDE  --  97* 98   CO2  --  27.0 30.0*   ANION GAP  --  8.0 9.0   BUN  --  23 19   CREATININE  --  0.81 0.70   EGFR  --  71.7 85.4   GLUCOSE  --  99 117*   CALCIUM  --  8.2* 8.2*         Lab 23  0300   TOTAL PROTEIN 6.2   ALBUMIN 3.6   GLOBULIN  2.6   ALT (SGPT) 10   AST (SGOT) 18   BILIRUBIN <0.2   ALK PHOS 60         Lab 03/02/23  0455 03/01/23  0300   PROTIME 14.8* 13.5   INR 1.17* 1.04             Lab 03/01/23  0751   ABO TYPING A   RH TYPING Positive   ANTIBODY SCREEN Negative         Brief Urine Lab Results  (Last result in the past 365 days)      Color   Clarity   Blood   Leuk Est   Nitrite   Protein   CREAT   Urine HCG        03/01/23 1236 Yellow   Clear   Negative   Negative   Negative   Negative                 Microbiology Results Abnormal     None          XR Femur 2 View Left    Result Date: 3/1/2023  XR FEMUR 2 VW LEFT Date of Exam: 3/1/2023 7:23 AM EST Indication: fracture. Comparison: CT pelvis from earlier today Findings: A left hip arthroplasty is present. A nondisplaced periprosthetic fracture of the proximal left femur is not well seen, better characterized on recent prior CT. The hip and knee joints are congruent. The soft tissues are unremarkable.     Impression: Impression: Nondisplaced periprosthetic fracture of proximal femur not well seen on this exam, better characterized on CT from earlier today. Electronically Signed: Marquis Laura  3/1/2023 7:35 AM EST  Workstation ID: [id number]    CT Head Without Contrast    Result Date: 3/1/2023  EXAMINATION: CT HEAD WO CONTRAST DATE: 3/1/2023 3:59 AM  INDICATION: Fall.  COMPARISON: None available.  TECHNIQUE: Thin section noncontrast axial images were obtained through the head. Coronal reformatted images were created. CT dose lowering techniques were used, to include: automated exposure control, adjustment for patient size, and or use of iterative reconstruction FINDINGS: EXTRACRANIAL: Left periorbital laceration. Calvarium is intact. Mild mucosal thickening scattered within the paranasal sinuses. Mastoid air cells are clear. Bilateral ocular lens surgeries. Senile scleral calcifications. INTRACRANIAL: No acute intracranial hemorrhage. No acute territorial infarct. No significant mass  effect. No hydrocephalus. Atherosclerotic calcifications of both carotid siphons. Moderate burden of low attenuation in the white matter is nonspecific, but most likely reflects sequelae of chronic microvascular ischemia.  Moderate diffuse parenchymal volume loss.     Impression: 1.  No acute intracranial abnormality. 2.  Left periorbital laceration. 3.  Moderate chronic small vessel ischemic changes and volume loss. Electronically signed by:  Cyrus Bean DO  3/1/2023 2:23 AM Mountain Time    CT Pelvis Without Contrast    Result Date: 3/1/2023  EXAMINATION: CT PELVIS WITHOUT CONTRAST  DATE: 3/1/2023 5:23 AM INDICATION: Fall, status post hip arthroplasty. Question fracture.; COMPARISON: November 11, 2022 PROCEDURE:   CT of the pelvis was performed without IV contrast. Multiplanar reformatted images were reviewed.  CT dose lowering techniques were used, to include: automated exposure control, adjustment for patient size, and or use of iterative reconstruction. FINDINGS: BONES:  Nondisplaced fracture of the periprostatic proximal left humerus extending from the trochanteric portion into the mid diaphysis just distal to the tip of the arthroplasty stem. Bilateral hip arthroplasties. Associated artifact limiting evaluation  of adjacent structures. Urinary bladder: Largely obscured by artifact. Reproductive organs: Uterus not visualized.. Visualized GI tract: Visualized bowel nondilated. Lymph Nodes: No pelvic lymphadenopathy.. Vasculature: Extensive arterial calcifications. Body wall: Unremarkable.     Impression: 1.  Nondisplaced periprosthetic fracture proximal left femur extending from the trochanteric portion into the mid diaphysis. Electronically signed by:  Jeannine Colmenares M.D.  3/1/2023 4:01 AM Mountain Time    XR Chest 1 View    Result Date: 3/1/2023  EXAMINATION: XR CHEST 1 VW DATE: 3/1/2023 4:25 AM INDICATION:  Fall; COMPARISON:  February 17, 2023 FINDINGS: No focal consolidation, pleural effusion, or  pneumothorax. Cardiomediastinal silhouette  unchanged. Moderate atherosclerosis thoracic aorta. No acute osseous abnormality. Surgical clips in the neck.     Impression: 1.  No acute cardiopulmonary process. Electronically signed by:  Jeannine Colmenares M.D.  3/1/2023 2:47 AM Mountain Time    Peripheral Block    Result Date: 3/1/2023  Sina Bautista CRNA     3/1/2023  2:52 PM Peripheral Block Patient reassessed immediately prior to procedure Reason for block: at surgeon's request and post-op pain management Performed by CRNA/CAA: Sina Bautista, TYRONE Assisted by: Carmen Wray RN Preanesthetic Checklist Completed: patient identified, IV checked, site marked, risks and benefits discussed, surgical consent, monitors and equipment checked, pre-op evaluation and timeout performed Prep: Pt Position: supine Sterile barriers:cap, gloves, mask and washed/disinfected hands Prep: ChloraPrep Patient monitoring: blood pressure monitoring, continuous pulse oximetry and EKG Procedure Performed under: local infiltration Guidance:ultrasound guided ULTRASOUND INTERPRETATION.  Using ultrasound guidance a 20 G gauge needle was placed in close proximity to the nerve, at which point, under ultrasound guidance anesthetic was injected in the area of the nerve and spread of the anesthesia was seen on ultrasound in close proximity thereto.  There were no abnormalities seen on ultrasound; a digital image was taken; and the patient tolerated the procedure with no complications. Images:still images obtained, printed/placed on chart Laterality:left Block Type:fascia iliaca compartment Injection Technique:catheter Needle Type:echogenic and Tuohy Needle Gauge:18 G Resistance on Injection: none Catheter Size:20 G (20g) Medications Used: ropivacaine (NAROPIN) 0.5 % injection - Injection  30 mL - 3/1/2023 2:52:00 PM Medications Preservative Free Saline:30ml Post Assessment Injection Assessment: negative aspiration for heme, no paresthesia on injection  "and incremental injection Patient Tolerance:comfortable throughout block Complications:no Additional Notes CKAFASCIAILIACA: CATHETER A high-frequency linear transducer, with sterile cover, was placed in parasagittal plane on top of the Anterior Superior Iliac Spine (ASIS) and moved medially to identify the Internal Oblique muscle, Sartorius muscle, Iliacus Muscle, Fascia Iliaca (FI) and Fascia Latae. The insertion site was prepped and draped in sterile fashion. Skin and cutaneous tissue was infiltrated with 2-5 ml of 1% Lidocaine. Using ultrasound-guidance, an 18-gauge Contiplex Ultra 360 Touhy needle was advanced in plane from caudad to cephalad. Preservative-free normal saline was utilized for hydro-dissection of tissue, advancement of Touhy, and to confirm final needle placement below FI. Local anesthetic in incremental 3-5 ml injections. Aspiration every 5 ml to prevent intravascular injection. Injection was completed with negative aspiration of blood and negative intravascular injection. Injection pressures were normal with minimal resistance. A 20-gauge Contiplex Echo catheter was placed through the needle and advance out the tip of the Touhy 3-5 cm. The Touhy needle was then removed, and final catheter position verified below the FI. The catheter was secured in the usual fashion with skin glue, benzoin, steri-strips, CHG tegaderm and Label noting \"Nerve Block Catheter\". Jerk tape applied at yellow connector and catheter connection.     XR Hip With or Without Pelvis 2 - 3 View Left    Result Date: 3/1/2023  Examination: XR HIP W OR WO PELVIS 2-3 VIEW LEFT Indication: Left hip pain after fall Comparison: No prior study is available for comparison. Findings: Left hip arthroplasty hardware is present. The hardware appears in expected location. A nondisplaced intertrochanteric periprosthetic fracture is suspected, although suboptimally evaluated on the provided views. The pelvic ring appears intact. Partially " imaged is right hip arthroplasty hardware. Lower lumbar fusion hardware is present as well.     Impression: Impression: A nondisplaced intertrochanteric periprosthetic fracture is suspected on the left, although suboptimally evaluated on the provided views. CT of the pelvis without contrast is recommended for further assessment. Electronically signed by:  Jameel Ward M.D.  3/1/2023 2:47 AM Mountain Time      Results for orders placed during the hospital encounter of 08/09/21    Adult Transthoracic Echo Complete W/ Cont if Necessary Per Protocol    Interpretation Summary  · Estimated left ventricular EF = 60% Left ventricular systolic function is normal.  · Left ventricular diastolic function was normal.  · Trace mitral and tricuspid regurgitation.  · Mild tachycardia noted throughout the study.      Current medications:  Scheduled Meds:aspirin, 81 mg, Oral, Daily  atorvastatin, 40 mg, Oral, Nightly  donepezil, 10 mg, Oral, Nightly  famotidine, 40 mg, Oral, Daily  levothyroxine, 175 mcg, Oral, Q AM  metoprolol succinate XL, 12.5 mg, Oral, Q24H  PARoxetine, 40 mg, Oral, Daily  primidone, 100 mg, Oral, Nightly  primidone, 150 mg, Oral, Daily  senna-docusate sodium, 2 tablet, Oral, BID  sodium chloride, 10 mL, Intravenous, Q12H      Continuous Infusions:lactated ringers, 9 mL/hr, Last Rate: 9 mL/hr (03/02/23 1045)  lactated ringers, 100 mL/hr  ropivacaine,   sodium chloride, 75 mL/hr      PRN Meds:.•  acetaminophen  •  senna-docusate sodium **AND** polyethylene glycol **AND** bisacodyl **AND** bisacodyl  •  ePHEDrine  •  fentanyl  •  HYDROcodone-acetaminophen  •  lactated ringers  •  [MAR Hold] Morphine  •  naloxone  •  naloxone  •  ondansetron **OR** ondansetron  •  ondansetron  •  [COMPLETED] Insert Peripheral IV **AND** [MAR Hold] sodium chloride  •  sodium chloride  •  sodium chloride    Assessment & Plan   Assessment & Plan     Active Hospital Problems    Diagnosis  POA   • **Periprosthetic fracture around  internal prosthetic left hip joint, initial encounter ([facility]) [M97.02XA]  Not Applicable   • Alzheimer disease ([facility]) [G30.9, F02.80]  Yes   • Frequent falls [R29.6]  Not Applicable   • History of lacunar stroke 1/2/2019 [Z86.73]  Not Applicable   • Hypertension [I10]  Yes      Resolved Hospital Problems   No resolved problems to display.        Brief Hospital Course to date:  Zohra Hall is a 82 y.o. female w/ GERD, HTN, Hx breast/thyroid cancers, dementia (resides at [facility]), who sustained a standing fall w/ subsequent hip pain, was identified to have acute LT hip fracture; overnight the night of admission, after receiving home BP meds, she was hypotensive all night    The following problems are new to me today    Assessment/Plan    Acute LT periprosthetic femoral Fx following fall  Recurrent falls  -H&H drop overnight pre-procedure, likely related to multiple IVF boluses overnight for hypotension  -pain control  -seen post-op this afternoon, s/p ORIF w/ Dr. Briggs  -dvt ppx per ortho  -PT/OT once more alert    Hypotension in the setting of hypertension  -s/p multiple IVF boluses overnight  -BP low following administration of home BP meds last night, unclear if she had already had AM doses yesterday or other confounding factor  -cont home metoprolol  -hold home norvasc, HCTZ, lisinipril, terazosyn pending improvement of BP, will gradually reintroduce    Hx stroke  HLD  HFpEF, chronic  Dysphagia  -last admit SLP rec'd soft to chew, chopped meat, nectar thick liquids   -ASA, statin    Baseline dementia  Essential tremor  -donepezil, paroxetine, primidone    Hypothyroidism  -levothyroxine    GERD  -pepcid    Recent UTI  Hx traumatic SAH, SDH    Expected Discharge Location and Transportation: anticipate need for rehab  Expected Discharge        DVT prophylaxis:  Mechanical DVT prophylaxis orders are present.          CODE STATUS:   Code Status and Medical Interventions:   Ordered at: 03/01/23 6694     Medical  Intervention Limits:    NO intubation (DNI)     Level Of Support Discussed With:    Health Care Surrogate     Code Status (Patient has no pulse and is not breathing):    No CPR (Do Not Attempt to Resuscitate)     Medical Interventions (Patient has pulse or is breathing):    Limited Support     Comments:    d/w FLAQUITA/ugo/Joey Diaz     Release to patient:    Routine Release       Wesley Wheeler DO  03/02/23        Electronically signed by Wesley Wheeler DO at 03/02/23 9282

## 2023-03-03 NOTE — PROGRESS NOTES
[facility] Medicine Services  PROGRESS NOTE    Patient Name: Zohra Hall  : 1940  MRN: [id number]    Date of Admission: 3/1/2023  Primary Care Physician: Troy Velarde MD    Subjective   Subjective     CC:  Follow up hip fracture    HPI:  BP better, states her hip pain is better. Having difficulty w/ lunch due to only having her upper dentures.    ROS:  Gen- No fevers, chills  CV- No chest pain, palpitations  Resp- No cough, dyspnea  GI- No N/V/D, abd pain     Objective   Objective     Vital Signs:   Temp:  [97.6 °F (36.4 °C)-98.1 °F (36.7 °C)] 97.9 °F (36.6 °C)  Heart Rate:  [56-76] 66  Resp:  [16] 16  BP: (101-144)/(55-81) 115/55  Flow (L/min):  [2-22] 2     Physical Exam:  Constitutional: Awake, alert, elderly female sitting up in bed eating lunch  HENT: NCAT, mucous membranes moist  Respiratory: Clear to auscultation bilaterally, respiratory effort normal   Cardiovascular: RRR, palpable radial pulses  Gastrointestinal: Positive bowel sounds, soft, nontender, nondistended  Musculoskeletal: No bilateral ankle edema  Psychiatric: Cooperative  Neuro: Moving all extremities spontaneously    Results Reviewed:  LAB RESULTS:      Lab 23  0510 235 23  0300   WBC  --  5.78 8.02   HEMOGLOBIN 7.2* 7.7* 9.5*   HEMATOCRIT 22.8* 25.4* 31.5*   PLATELETS  --  215 301   NEUTROS ABS  --   --  6.47   IMMATURE GRANS (ABS)  --   --  0.09*   LYMPHS ABS  --   --  0.89   MONOS ABS  --   --  0.44   EOS ABS  --   --  0.10   MCV  --  75.4* 76.8*   PROTIME  --  14.8* 13.5   APTT  --   --  30.6         Lab 23  0510 23  0654 23  0455 23  0300   SODIUM 136  --  132* 137   POTASSIUM 3.9 3.6 3.7 3.9   CHLORIDE 102  --  97* 98   CO2 23.0  --  27.0 30.0*   ANION GAP 11.0  --  8.0 9.0   BUN 22  --  23 19   CREATININE 0.72  --  0.81 0.70   EGFR 82.6  --  71.7 85.4   GLUCOSE 89  --  99 117*   CALCIUM 7.2*  --  8.2* 8.2*         Lab 23  0300   TOTAL  PROTEIN 6.2   ALBUMIN 3.6   GLOBULIN 2.6   ALT (SGPT) 10   AST (SGOT) 18   BILIRUBIN <0.2   ALK PHOS 60         Lab 03/02/23  0455 03/01/23  0300   PROTIME 14.8* 13.5   INR 1.17* 1.04             Lab 03/01/23  0751   ABO TYPING A   RH TYPING Positive   ANTIBODY SCREEN Negative         Brief Urine Lab Results  (Last result in the past 365 days)      Color   Clarity   Blood   Leuk Est   Nitrite   Protein   CREAT   Urine HCG        03/01/23 1236 Yellow   Clear   Negative   Negative   Negative   Negative                 Microbiology Results Abnormal     None          XR Femur 2 View Left    Result Date: 3/2/2023  XR FEMUR 2 VW LEFT Date of Exam: 3/2/2023 2:34 PM EST Indication: post op. Comparison: None available. Findings: Postoperative changes are noted status post open reduction internal fixation of the left femur. Plate and screw fixation hardware is observed. Cerclage wires are noted. Residual changes of prior hip arthroplasty are noted. Anatomic alignment is observed.  Surgical changes are noted in the adjacent soft tissues.     Impression: Impression: Postoperative changes of the proximal to mid femur are noted. Anatomic alignment is observed. Electronically Signed: Porfirio Holden  3/2/2023 3:04 PM EST  Workstation ID: [id number]    FL C Arm During Surgery    Result Date: 3/2/2023  FL C ARM DURING SURGERY Date of Exam: 3/2/2023 10:41 AM EST Indication: FEMUR OPEN REDUCTION INTERNAL FIXATION. Comparison: None available. Fluoroscopic Time:  29.4 seconds Findings: Limited intraoperative fluoroscopic views were obtained for surgical support during open reduction and internal fixation of the femur. Plate and screw fixation hardware is noted. Cerclage wires are also observed. There is evidence for hip arthroplasty. There are no signs of hardware failure or loosening. There appears to be anatomic alignment.     Impression: Impression: Limited intraoperative fluoroscopic views for surgical support during open reduction and  internal fixation of the femur. Electronically Signed: Porfirio Holden  3/2/2023 2:22 PM EST  Workstation ID: [id number]      Results for orders placed during the hospital encounter of 08/09/21    Adult Transthoracic Echo Complete W/ Cont if Necessary Per Protocol    Interpretation Summary  · Estimated left ventricular EF = 60% Left ventricular systolic function is normal.  · Left ventricular diastolic function was normal.  · Trace mitral and tricuspid regurgitation.  · Mild tachycardia noted throughout the study.      Current medications:  Scheduled Meds:acetaminophen, 1,000 mg, Oral, Q8H  aspirin, 81 mg, Oral, Q12H  atorvastatin, 40 mg, Oral, Nightly  donepezil, 10 mg, Oral, Nightly  doxycycline, 100 mg, Oral, Q12H  famotidine, 40 mg, Oral, Daily  levothyroxine, 175 mcg, Oral, Q AM  metoprolol succinate XL, 12.5 mg, Oral, Q24H  PARoxetine, 40 mg, Oral, Daily  primidone, 100 mg, Oral, Nightly  primidone, 150 mg, Oral, Daily  senna-docusate sodium, 2 tablet, Oral, BID  sodium chloride, 10 mL, Intravenous, Q12H      Continuous Infusions:lactated ringers, 9 mL/hr, Last Rate: 9 mL/hr (03/02/23 1045)  lactated ringers, 100 mL/hr, Last Rate: 100 mL/hr (03/02/23 1419)  ropivacaine,   sodium chloride, 75 mL/hr      PRN Meds:.•  acetaminophen  •  senna-docusate sodium **AND** polyethylene glycol **AND** bisacodyl **AND** bisacodyl  •  Morphine  •  naloxone  •  ondansetron **OR** ondansetron  •  oxyCODONE  •  oxyCODONE  •  [COMPLETED] Insert Peripheral IV **AND** sodium chloride  •  sodium chloride  •  sodium chloride  •  traMADol    Assessment & Plan   Assessment & Plan     Active Hospital Problems    Diagnosis  POA   • **Periprosthetic fracture around internal prosthetic left hip joint, initial encounter ([facility]) [M97.02XA]  Not Applicable   • Alzheimer disease ([facility]) [G30.9, F02.80]  Yes   • Frequent falls [R29.6]  Not Applicable   • History of lacunar stroke 1/2/2019 [Z86.73]  Not Applicable   • Hypertension [I10]  Yes       Resolved Hospital Problems   No resolved problems to display.        Brief Hospital Course to date:  Zohra Hall is a 82 y.o. female w/ GERD, HTN, Hx breast/thyroid cancers, dementia (resides at [facility]), who sustained a standing fall w/ subsequent hip pain, was identified to have acute LT hip fracture; overnight the night of admission, after receiving home BP meds, she was hypotensive all night    Assessment/Plan    Acute LT periprosthetic femoral Fx following fall  Recurrent falls  -H&H drifting down, repeat CBC tomorrow, may need blood prior to discharge  -pain control  -s/p ORIF 3/2/23 w/ Dr. Briggs  -per ortho, ASA for dvt ppx; and 2 weeks doxy p.o., WBAT  -PT/OT, anticipate need for rehab    Hypotension in the setting of hypertension, improving  -cont home metoprolol  -holding home norvasc, HCTZ, lisinipril, terazosin; bp normotensive currently    Hx stroke  HLD  HFpEF, chronic  Dysphagia  -last admit SLP rec'd soft to chew, chopped meat, nectar thick liquids   -ASA, statin    Baseline dementia  Essential tremor  -donepezil, paroxetine, primidone    Hypothyroidism  -levothyroxine    GERD  -pepcid    Recent UTI  Hx traumatic SAH, SDH    Expected Discharge Location and Transportation: rehab  Expected Discharge   Expected Discharge Date and Time     Expected Discharge Date Expected Discharge Time    Mar 5, 2023            DVT prophylaxis:  Mechanical DVT prophylaxis orders are present.     AM-PAC 6 Clicks Score (PT): 6 (03/02/23 3969)    CODE STATUS:   Code Status and Medical Interventions:   Ordered at: 03/02/23 1407     Level Of Support Discussed With:    Patient     Code Status (Patient has no pulse and is not breathing):    CPR (Attempt to Resuscitate)     Medical Interventions (Patient has pulse or is breathing):    Full       Wesley Wheeler, DO  03/03/23

## 2023-03-03 NOTE — CASE MANAGEMENT/SOCIAL WORK
Case Management Discharge Note      Final Note: Minda with [facility] reports they can accpt pt to a skilled bed on 3/4. [facility] has been scheduled for 1530, [facility] is on the chart. Report can be called to [phone] and CM will fax the dc summary to [phone].         Selected Continued Care - Admitted Since 3/1/2023     Destination Coordination complete.    Service Provider Selected Services Address Phone Fax Patient Preferred    [facility] [address] [address] [phone] --          Durable Medical Equipment    No services have been selected for the patient.              Dialysis/Infusion    No services have been selected for the patient.              Home Medical Care    No services have been selected for the patient.              Therapy    No services have been selected for the patient.              Community Resources    No services have been selected for the patient.              Community & DME    No services have been selected for the patient.                Selected Continued Care - Prior Encounters Includes continued care and service providers with selected services from prior encounters from 12/1/2022 to 3/3/2023    Discharged on 2/20/2023 Admission date: 2/17/2023 - Discharge disposition: Intermediate Care     Destination     Service Provider Selected Services Address Phone Fax Patient Preferred    [facility] [address] [place] [address] [phone] --       Internal Comment last updated by Jennifer Pierre, RN 2/20/2023 1024    Pt lives at [facility] ED prior to admission                                    Final Discharge Disposition Code: 03 - skilled nursing facility (SNF)

## 2023-03-03 NOTE — CASE MANAGEMENT/SOCIAL WORK
Continued Stay Note   Lea     Patient Name: Zohra Hall  MRN: [id number]  Today's Date: 3/3/2023    Admit Date: 3/1/2023    Plan: Willie Premier   Discharge Plan     Row Name 03/03/23 0918       Plan    Plan Comments Per Minda with [facility] rehab bed will not be ready until 3/6. CM will continue to follow.               Discharge Codes    No documentation.               Expected Discharge Date and Time     Expected Discharge Date Expected Discharge Time    Mar 6, 2023             India Sands RN

## 2023-03-03 NOTE — PROGRESS NOTES
Orthopedic Progress Note      Patient: Zohra Hall  YOB: 1940     Date of Admission: 3/1/2023  3:43 AM Medical Record Number: [id number]     Attending Physician: Wesley Wheeler DO    Status Post:  Procedure(s):  FEMUR OPEN REDUCTION INTERNAL FIXATION Post Operative Day Number: 1    Subjective : No new orthopaedic complaints     Pain Relief: some relief with present medication.     Systemic Complaints: No Complaints  Vitals:    03/02/23 1900 03/02/23 2336 03/03/23 0448 03/03/23 0707   BP:  127/59 101/76 115/55   BP Location:  Left arm Left arm Left arm   Patient Position:  Lying Lying Lying   Pulse:  72 56 57   Resp:  16 16 16   Temp: 97.6 °F (36.4 °C) 98.1 °F (36.7 °C) 98 °F (36.7 °C) 97.9 °F (36.6 °C)   TempSrc: Axillary Oral Oral Oral   SpO2:  100% 100% 96%   Weight:       Height:           Physical Exam: 82 y.o. female    General Appearance:       Alert, cooperative, in no acute distress                  Extremities:    Dressing Clean, Dry and Intact             No clinical sign of DVT        Able to do good movements of digits    Pulses:   Pulses palpable and equal bilaterally           Diagnostic Tests:     Results from last 7 days   Lab Units 03/03/23  0510 03/02/23 0455 03/01/23  0300   WBC 10*3/mm3  --  5.78 8.02   HEMOGLOBIN g/dL 7.2* 7.7* 9.5*   HEMATOCRIT % 22.8* 25.4* 31.5*   PLATELETS 10*3/mm3  --  215 301     Results from last 7 days   Lab Units 03/03/23  0510 03/02/23  0654 03/02/23 0455 03/01/23  0300   SODIUM mmol/L 136  --  132* 137   POTASSIUM mmol/L 3.9 3.6 3.7 3.9   CHLORIDE mmol/L 102  --  97* 98   CO2 mmol/L 23.0  --  27.0 30.0*   BUN mg/dL 22  --  23 19   CREATININE mg/dL 0.72  --  0.81 0.70   GLUCOSE mg/dL 89  --  99 117*   CALCIUM mg/dL 7.2*  --  8.2* 8.2*     Results from last 7 days   Lab Units 03/02/23  0455 03/01/23  0300   INR  1.17* 1.04   APTT seconds  --  30.6     Lab Results   Component Value Date    URICACID 5.1 11/08/2019     No results found for:  CRYSTAL  Microbiology Results (last 10 days)     Procedure Component Value - Date/Time    MRSA Screen, PCR (Inpatient) - Swab, Nares [[id number]]  (Abnormal) Collected: 03/01/23 1040    Lab Status: Final result Specimen: Swab from Nares Updated: 03/01/23 1205     MRSA PCR Positive    Narrative:      The negative predictive value of this diagnostic test is high and should only be used to consider de-escalating anti-MRSA therapy. A positive result may indicate colonization with MRSA and must be correlated clinically.        XR Femur 2 View Left    Result Date: 3/2/2023  Impression: Postoperative changes of the proximal to mid femur are noted. Anatomic alignment is observed. Electronically Signed: Porfirio Adina  3/2/2023 3:04 PM EST  Workstation ID: [id number]    XR Femur 2 View Left    Result Date: 3/1/2023  Impression: Nondisplaced periprosthetic fracture of proximal femur not well seen on this exam, better characterized on CT from earlier today. Electronically Signed: Marquis Laura  3/1/2023 7:35 AM EST  Workstation ID: [id number]    CT Head Without Contrast    Result Date: 3/1/2023  1.  No acute intracranial abnormality. 2.  Left periorbital laceration. 3.  Moderate chronic small vessel ischemic changes and volume loss. Electronically signed by:  Cyrus Bean DO  3/1/2023 2:23 AM Mountain Time    CT Pelvis Without Contrast    Result Date: 3/1/2023  1.  Nondisplaced periprosthetic fracture proximal left femur extending from the trochanteric portion into the mid diaphysis. Electronically signed by:  Jeannine Colmenares M.D.  3/1/2023 4:01 AM Mountain Time    XR Chest 1 View    Result Date: 3/1/2023  1.  No acute cardiopulmonary process. Electronically signed by:  Jeannine Colmenares M.D.  3/1/2023 2:47 AM Mountain Time    FL C Arm During Surgery    Result Date: 3/2/2023  Impression: Limited intraoperative fluoroscopic views for surgical support during open reduction and internal fixation of the femur. Electronically Signed:  Porfirio Holden  3/2/2023 2:22 PM EST  Workstation ID: [id number]    XR Hip With or Without Pelvis 2 - 3 View Left    Result Date: 3/1/2023  Impression: A nondisplaced intertrochanteric periprosthetic fracture is suspected on the left, although suboptimally evaluated on the provided views. CT of the pelvis without contrast is recommended for further assessment. Electronically signed by:  Jameel Ward M.D.  3/1/2023 2:47 AM Mountain Time        Current Medications:  Scheduled Meds:acetaminophen, 1,000 mg, Oral, Q8H  aspirin, 81 mg, Oral, Q12H  atorvastatin, 40 mg, Oral, Nightly  donepezil, 10 mg, Oral, Nightly  famotidine, 40 mg, Oral, Daily  levothyroxine, 175 mcg, Oral, Q AM  metoprolol succinate XL, 12.5 mg, Oral, Q24H  PARoxetine, 40 mg, Oral, Daily  primidone, 100 mg, Oral, Nightly  primidone, 150 mg, Oral, Daily  senna-docusate sodium, 2 tablet, Oral, BID  sodium chloride, 10 mL, Intravenous, Q12H      Continuous Infusions:lactated ringers, 9 mL/hr, Last Rate: 9 mL/hr (03/02/23 1045)  lactated ringers, 100 mL/hr, Last Rate: 100 mL/hr (03/02/23 1419)  ropivacaine,   sodium chloride, 75 mL/hr      PRN Meds:.•  acetaminophen  •  senna-docusate sodium **AND** polyethylene glycol **AND** bisacodyl **AND** bisacodyl  •  Morphine  •  naloxone  •  ondansetron **OR** ondansetron  •  oxyCODONE  •  oxyCODONE  •  [COMPLETED] Insert Peripheral IV **AND** sodium chloride  •  sodium chloride  •  sodium chloride  •  traMADol    Assessment: Status post  No admission procedures for hospital encounter.    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HCC)   • Hypothyroid post remote resection papillary adenocarcinoma thyroid   • Hypertension   • GERD without esophagitis   • Hyperlipidemia LDL goal <100   • Atherosclerosis of aorta (HCC)    • Incontinence of bowel   • Acute right-sided low back pain without sciatica   • Benign essential tremor   • Pain of right hip joint   • Thyroid cancer ([facility])   • Lacunar stroke ([facility])   • Nontraumatic rupture of extensor tendons of right hand and wrist   • Actinic keratosis   • Transient ischemic attack   • Ataxia   • History of PMR (CMS/[facility])   • Osteoporosis   • Post-surgical hypothyroidism   • NSTEMI (non-ST elevated myocardial infarction) ([facility])   • Coronary artery disease   • NICM presumably due to Takotsubo post NSTEMI 5/23/2021 (CMS/[facility])   • Abnormal TSH   • Severe hypothyroidism   • Traumatic SAH, SDH, and intraparenchymal bleed (CMS/[facility])   • History of lacunar stroke 1/2/2019   • Alzheimer disease ([facility])   • Frequent falls   • Bilateral hip pain   • Acute UTI   • Periprosthetic fracture around internal prosthetic left hip joint, initial encounter ([facility])       PLAN:   Continues current post-op course  Anticoagulation: Aspirin started  Mobilize with PT as tolerated per protocol  2 weeks oral abx     Weight Bearing: WBAT  Discharge Plan: OK to plan for discharge in  tomorrow to rehab  from orthopaedic perspective.    Qasim Briggs MD    Date: 3/3/2023    Time: 08:30 EST

## 2023-03-04 VITALS
SYSTOLIC BLOOD PRESSURE: 132 MMHG | HEART RATE: 73 BPM | OXYGEN SATURATION: 95 % | TEMPERATURE: 97.7 F | WEIGHT: 120 LBS | HEIGHT: 65 IN | RESPIRATION RATE: 20 BRPM | DIASTOLIC BLOOD PRESSURE: 65 MMHG | BODY MASS INDEX: 19.99 KG/M2

## 2023-03-04 LAB
ANION GAP SERPL CALCULATED.3IONS-SCNC: 8 MMOL/L (ref 5–15)
BUN SERPL-MCNC: 13 MG/DL (ref 8–23)
BUN/CREAT SERPL: 21.7 (ref 7–25)
CALCIUM SPEC-SCNC: 7.6 MG/DL (ref 8.6–10.5)
CHLORIDE SERPL-SCNC: 100 MMOL/L (ref 98–107)
CO2 SERPL-SCNC: 26 MMOL/L (ref 22–29)
CREAT SERPL-MCNC: 0.6 MG/DL (ref 0.57–1)
DEPRECATED RDW RBC AUTO: 55.8 FL (ref 37–54)
EGFRCR SERPLBLD CKD-EPI 2021: 88.6 ML/MIN/1.73
ERYTHROCYTE [DISTWIDTH] IN BLOOD BY AUTOMATED COUNT: 19 % (ref 12.3–15.4)
GLUCOSE SERPL-MCNC: 81 MG/DL (ref 65–99)
HCT VFR BLD AUTO: 26.5 % (ref 34–46.6)
HGB BLD-MCNC: 8.3 G/DL (ref 12–15.9)
MCH RBC QN AUTO: 25.2 PG (ref 26.6–33)
MCHC RBC AUTO-ENTMCNC: 31.3 G/DL (ref 31.5–35.7)
MCV RBC AUTO: 80.5 FL (ref 79–97)
PLATELET # BLD AUTO: 194 10*3/MM3 (ref 140–450)
PMV BLD AUTO: 10.8 FL (ref 6–12)
POTASSIUM SERPL-SCNC: 3.6 MMOL/L (ref 3.5–5.2)
QT INTERVAL: 410 MS
QTC INTERVAL: 429 MS
RBC # BLD AUTO: 3.29 10*6/MM3 (ref 3.77–5.28)
SODIUM SERPL-SCNC: 134 MMOL/L (ref 136–145)
WBC NRBC COR # BLD: 7.81 10*3/MM3 (ref 3.4–10.8)

## 2023-03-04 PROCEDURE — 99239 HOSP IP/OBS DSCHRG MGMT >30: CPT | Performed by: NURSE PRACTITIONER

## 2023-03-04 PROCEDURE — 80048 BASIC METABOLIC PNL TOTAL CA: CPT | Performed by: ORTHOPAEDIC SURGERY

## 2023-03-04 PROCEDURE — 85027 COMPLETE CBC AUTOMATED: CPT | Performed by: INTERNAL MEDICINE

## 2023-03-04 RX ORDER — PRIMIDONE 50 MG/1
150 TABLET ORAL DAILY
Start: 2023-03-05

## 2023-03-04 RX ORDER — TRAMADOL HYDROCHLORIDE 50 MG/1
50 TABLET ORAL EVERY 8 HOURS PRN
Qty: 9 TABLET | Refills: 0 | Status: SHIPPED | OUTPATIENT
Start: 2023-03-04

## 2023-03-04 RX ORDER — DOXYCYCLINE 100 MG/1
100 CAPSULE ORAL EVERY 12 HOURS SCHEDULED
Qty: 25 CAPSULE | Refills: 0
Start: 2023-03-04 | End: 2023-03-17

## 2023-03-04 RX ORDER — PRIMIDONE 50 MG/1
100 TABLET ORAL NIGHTLY
Start: 2023-03-04

## 2023-03-04 RX ORDER — ASPIRIN 81 MG/1
81 TABLET ORAL EVERY 12 HOURS SCHEDULED
Start: 2023-03-04

## 2023-03-04 RX ADMIN — PRIMIDONE 150 MG: 50 TABLET ORAL at 08:52

## 2023-03-04 RX ADMIN — PAROXETINE HYDROCHLORIDE 40 MG: 20 TABLET, FILM COATED ORAL at 08:45

## 2023-03-04 RX ADMIN — ACETAMINOPHEN 1000 MG: 500 TABLET ORAL at 06:07

## 2023-03-04 RX ADMIN — ASPIRIN 81 MG: 81 TABLET, COATED ORAL at 08:45

## 2023-03-04 RX ADMIN — FAMOTIDINE 40 MG: 20 TABLET ORAL at 08:45

## 2023-03-04 RX ADMIN — DOXYCYCLINE 100 MG: 100 CAPSULE ORAL at 08:45

## 2023-03-04 RX ADMIN — ACETAMINOPHEN 1000 MG: 500 TABLET ORAL at 14:04

## 2023-03-04 RX ADMIN — LEVOTHYROXINE SODIUM 175 MCG: 150 TABLET ORAL at 06:07

## 2023-03-04 RX ADMIN — SENNOSIDES AND DOCUSATE SODIUM 2 TABLET: 8.6; 5 TABLET ORAL at 08:45

## 2023-03-04 NOTE — DISCHARGE SUMMARY
[facility] Medicine Services  DISCHARGE SUMMARY    Patient Name: Zohra Hall  : 1940  MRN: [id number]    Date of Admission: 3/1/2023  3:43 AM  Date of Discharge:  3/4/2023  Primary Care Physician: Troy Velarde MD    Consults     Date and Time Order Name Status Description    3/1/2023  6:35 AM Inpatient Orthopedic Surgery Consult Completed           Hospital Course     Presenting Problem:   Periprosthetic fracture around internal prosthetic left hip joint, initial encounter ([facility]) [M97.02XA]    Active Hospital Problems    Diagnosis  POA   • **Periprosthetic fracture around internal prosthetic left hip joint, initial encounter ([facility]) [M97.02XA]  Not Applicable   • Alzheimer disease ([facility]) [G30.9, F02.80]  Yes   • Frequent falls [R29.6]  Not Applicable   • History of lacunar stroke 2019 [Z86.73]  Not Applicable   • Hypertension [I10]  Yes      Resolved Hospital Problems   No resolved problems to display.          Hospital Course:  Zohra Hall is a 82 y.o. female  w/ GERD, HTN, Hx breast/thyroid cancers, dementia (resides at [facility]), who sustained a standing fall w/ subsequent hip pain, was identified to have acute LT hip fracture; overnight the night of admission, after receiving home BP meds, she was hypotensive all night. bp improved, home meds to be restarted as needed         Acute LT periprosthetic femoral Fx following fall  Recurrent falls  -H&H drifting down, post surgery, 1 unit PRBC given on 3/2, H/h responded well  -pain control  -s/p ORIF 3/2/23 w/ Dr. Briggs, wound vac in place   -per ortho, ASA for dvt ppx; and 2 weeks doxy p.o., WBAT  -PT/OT, anticipate need for rehab     Hypotension in the setting of hypertension, improving  -cont home metoprolol  -holding home norvasc, HCTZ, lisinipril, terazosin; bp normotensive currently     Hx stroke  HLD  HFpEF, chronic  Dysphagia  -last admit SLP rec'd soft to chew, chopped meat, nectar thick liquids   -ASA,  statin     Baseline dementia  Essential tremor  -donepezil, paroxetine, primidone     Hypothyroidism  -levothyroxine     GERD  -pepcid     Recent UTI  Hx traumatic SAH, SDH    Patient has remained clinically stable and will be discharged to facility today.       Discharge Follow Up Recommendations for outpatient labs/diagnostics:   follow up with pcp after dc from rehab or facility provider in 1-3 days  Follow up with Dr Briggs per his recommendations     Day of Discharge     HPI:   Patient is resting in bed  Sleeping. She does awaken to name and touch. She states she is cold. No acute events overnight per nursing. Plan for rehab today.     Review of Systems  UTO due to dementia     Vital Signs:   Temp:  [97.7 °F (36.5 °C)-97.9 °F (36.6 °C)] 97.9 °F (36.6 °C)  Heart Rate:  [58-77] 58  Resp:  [16-20] 20  BP: (128-167)/(55-77) 141/77  Flow (L/min):  [2] 2      Physical Exam:  Constitutional: No acute distress, drowsy alert  HENT: NCAT, mucous membranes moist  Respiratory: Clear to auscultation bilaterally, respiratory effort normal room air 96%  Cardiovascular: RRR, no murmurs, rubs, or gallops  Gastrointestinal: Positive bowel sounds, soft, nontender, nondistended  Musculoskeletal: No bilateral ankle edema, left hip wound vac in place. Ace wrap on LLE  Psychiatric: Appropriate affect, cooperative  Neurologic: Oriented x 1,  speech clear  Skin: No rashes, pale, left forehead with bruising       Pertinent  and/or Most Recent Results     LAB RESULTS:      Lab 03/04/23  0354 03/03/23  0510 03/02/23  0455 03/01/23  0300   WBC 7.81  --  5.78 8.02   HEMOGLOBIN 8.3* 7.2* 7.7* 9.5*   HEMATOCRIT 26.5* 22.8* 25.4* 31.5*   PLATELETS 194  --  215 301   NEUTROS ABS  --   --   --  6.47   IMMATURE GRANS (ABS)  --   --   --  0.09*   LYMPHS ABS  --   --   --  0.89   MONOS ABS  --   --   --  0.44   EOS ABS  --   --   --  0.10   MCV 80.5  --  75.4* 76.8*   PROTIME  --   --  14.8* 13.5   APTT  --   --   --  30.6         Lab  03/04/23  0354 03/03/23  0510 03/02/23  0654 03/02/23  0455 03/01/23  0300   SODIUM 134* 136  --  132* 137   POTASSIUM 3.6 3.9 3.6 3.7 3.9   CHLORIDE 100 102  --  97* 98   CO2 26.0 23.0  --  27.0 30.0*   ANION GAP 8.0 11.0  --  8.0 9.0   BUN 13 22  --  23 19   CREATININE 0.60 0.72  --  0.81 0.70   EGFR 88.6 82.6  --  71.7 85.4   GLUCOSE 81 89  --  99 117*   CALCIUM 7.6* 7.2*  --  8.2* 8.2*         Lab 03/01/23  0300   TOTAL PROTEIN 6.2   ALBUMIN 3.6   GLOBULIN 2.6   ALT (SGPT) 10   AST (SGOT) 18   BILIRUBIN <0.2   ALK PHOS 60         Lab 03/02/23  0455 03/01/23  0300   PROTIME 14.8* 13.5   INR 1.17* 1.04             Lab 03/01/23  0751   ABO TYPING A   RH TYPING Positive   ANTIBODY SCREEN Negative         Brief Urine Lab Results  (Last result in the past 365 days)      Color   Clarity   Blood   Leuk Est   Nitrite   Protein   CREAT   Urine HCG        03/01/23 1236 Yellow   Clear   Negative   Negative   Negative   Negative               Microbiology Results (last 10 days)     Procedure Component Value - Date/Time    COVID PRE-OP / PRE-PROCEDURE SCREENING ORDER (NO ISOLATION) - Swab, Nasopharynx [[id number]]  (Normal) Collected: 03/03/23 1450    Lab Status: Final result Specimen: Swab from Nasopharynx Updated: 03/03/23 1520    Narrative:      The following orders were created for panel order COVID PRE-OP / PRE-PROCEDURE SCREENING ORDER (NO ISOLATION) - Swab, Nasopharynx.  Procedure                               Abnormality         Status                     ---------                               -----------         ------                     COVID-19 and FLU A/B PCR...[[id number]]  Normal              Final result                 Please view results for these tests on the individual orders.    COVID-19 and FLU A/B PCR - Swab, Nasopharynx [[id number]]  (Normal) Collected: 03/03/23 1450    Lab Status: Final result Specimen: Swab from Nasopharynx Updated: 03/03/23 1520     COVID19 Not Detected     Influenza A PCR Not  Detected     Influenza B PCR Not Detected    Narrative:      Fact sheet for providers: https://www.fda.gov/media/499462/download    Fact sheet for patients: https://www.fda.gov/media/406206/download    Test performed by PCR.    MRSA Screen, PCR (Inpatient) - Swab, Nares [[id number]]  (Abnormal) Collected: 03/01/23 1040    Lab Status: Final result Specimen: Swab from Nares Updated: 03/01/23 1205     MRSA PCR Positive    Narrative:      The negative predictive value of this diagnostic test is high and should only be used to consider de-escalating anti-MRSA therapy. A positive result may indicate colonization with MRSA and must be correlated clinically.          XR Femur 2 View Left    Result Date: 3/2/2023  XR FEMUR 2 VW LEFT Date of Exam: 3/2/2023 2:34 PM EST Indication: post op. Comparison: None available. Findings: Postoperative changes are noted status post open reduction internal fixation of the left femur. Plate and screw fixation hardware is observed. Cerclage wires are noted. Residual changes of prior hip arthroplasty are noted. Anatomic alignment is observed.  Surgical changes are noted in the adjacent soft tissues.     Impression: Postoperative changes of the proximal to mid femur are noted. Anatomic alignment is observed. Electronically Signed: Porfirio Holden  3/2/2023 3:04 PM EST  Workstation ID: [id number]    XR Femur 2 View Left    Result Date: 3/1/2023  XR FEMUR 2 VW LEFT Date of Exam: 3/1/2023 7:23 AM EST Indication: fracture. Comparison: CT pelvis from earlier today Findings: A left hip arthroplasty is present. A nondisplaced periprosthetic fracture of the proximal left femur is not well seen, better characterized on recent prior CT. The hip and knee joints are congruent. The soft tissues are unremarkable.     Impression: Nondisplaced periprosthetic fracture of proximal femur not well seen on this exam, better characterized on CT from earlier today. Electronically Signed: Marquis Laura  3/1/2023 7:35  AM [facility]  Workstation ID: [id number]    CT Head Without Contrast    Result Date: 3/1/2023  EXAMINATION: CT HEAD WO CONTRAST DATE: 3/1/2023 3:59 AM  INDICATION: Fall.  COMPARISON: None available.  TECHNIQUE: Thin section noncontrast axial images were obtained through the head. Coronal reformatted images were created. CT dose lowering techniques were used, to include: automated exposure control, adjustment for patient size, and or use of iterative reconstruction FINDINGS: EXTRACRANIAL: Left periorbital laceration. Calvarium is intact. Mild mucosal thickening scattered within the paranasal sinuses. Mastoid air cells are clear. Bilateral ocular lens surgeries. Senile scleral calcifications. INTRACRANIAL: No acute intracranial hemorrhage. No acute territorial infarct. No significant mass effect. No hydrocephalus. Atherosclerotic calcifications of both carotid siphons. Moderate burden of low attenuation in the white matter is nonspecific, but most likely reflects sequelae of chronic microvascular ischemia.  Moderate diffuse parenchymal volume loss.     1.  No acute intracranial abnormality. 2.  Left periorbital laceration. 3.  Moderate chronic small vessel ischemic changes and volume loss. Electronically signed by:  Cyrus Bean DO  3/1/2023 2:23 AM Mountain Time    CT Pelvis Without Contrast    Result Date: 3/1/2023  EXAMINATION: CT PELVIS WITHOUT CONTRAST  DATE: 3/1/2023 5:23 AM INDICATION: Fall, status post hip arthroplasty. Question fracture.; COMPARISON: November 11, 2022 PROCEDURE:   CT of the pelvis was performed without IV contrast. Multiplanar reformatted images were reviewed.  CT dose lowering techniques were used, to include: automated exposure control, adjustment for patient size, and or use of iterative reconstruction. FINDINGS: BONES:  Nondisplaced fracture of the periprostatic proximal left humerus extending from the trochanteric portion into the mid diaphysis just distal to the tip of the arthroplasty stem.  Bilateral hip arthroplasties. Associated artifact limiting evaluation  of adjacent structures. Urinary bladder: Largely obscured by artifact. Reproductive organs: Uterus not visualized.. Visualized GI tract: Visualized bowel nondilated. Lymph Nodes: No pelvic lymphadenopathy.. Vasculature: Extensive arterial calcifications. Body wall: Unremarkable.     1.  Nondisplaced periprosthetic fracture proximal left femur extending from the trochanteric portion into the mid diaphysis. Electronically signed by:  Jeannine Colmenares M.D.  3/1/2023 4:01 AM Mountain Time    XR Chest 1 View    Result Date: 3/1/2023  EXAMINATION: XR CHEST 1 VW DATE: 3/1/2023 4:25 AM INDICATION:  Fall; COMPARISON:  February 17, 2023 FINDINGS: No focal consolidation, pleural effusion, or pneumothorax. Cardiomediastinal silhouette  unchanged. Moderate atherosclerosis thoracic aorta. No acute osseous abnormality. Surgical clips in the neck.     1.  No acute cardiopulmonary process. Electronically signed by:  Jeannine Colmenares M.D.  3/1/2023 2:47 AM Mountain Time    Peripheral Block    Result Date: 3/1/2023  Sina Bautista CRNA     3/1/2023  2:52 PM Peripheral Block Patient reassessed immediately prior to procedure Reason for block: at surgeon's request and post-op pain management Performed by TYRONE/CAA: Sina Bautista CRNA Assisted by: Carmen Wray RN Preanesthetic Checklist Completed: patient identified, IV checked, site marked, risks and benefits discussed, surgical consent, monitors and equipment checked, pre-op evaluation and timeout performed Prep: Pt Position: supine Sterile barriers:cap, gloves, mask and washed/disinfected hands Prep: ChloraPrep Patient monitoring: blood pressure monitoring, continuous pulse oximetry and EKG Procedure Performed under: local infiltration Guidance:ultrasound guided ULTRASOUND INTERPRETATION.  Using ultrasound guidance a 20 G gauge needle was placed in close proximity to the nerve, at which point, under ultrasound  guidance anesthetic was injected in the area of the nerve and spread of the anesthesia was seen on ultrasound in close proximity thereto.  There were no abnormalities seen on ultrasound; a digital image was taken; and the patient tolerated the procedure with no complications. Images:still images obtained, printed/placed on chart Laterality:left Block Type:fascia iliaca compartment Injection Technique:catheter Needle Type:echogenic and Tuohy Needle Gauge:18 G Resistance on Injection: none Catheter Size:20 G (20g) Medications Used: ropivacaine (NAROPIN) 0.5 % injection - Injection  30 mL - 3/1/2023 2:52:00 PM Medications Preservative Free Saline:30ml Post Assessment Injection Assessment: negative aspiration for heme, no paresthesia on injection and incremental injection Patient Tolerance:comfortable throughout block Complications:no Additional Notes CKAFASCIAILIACA: CATHETER A high-frequency linear transducer, with sterile cover, was placed in parasagittal plane on top of the Anterior Superior Iliac Spine (ASIS) and moved medially to identify the Internal Oblique muscle, Sartorius muscle, Iliacus Muscle, Fascia Iliaca (FI) and Fascia Latae. The insertion site was prepped and draped in sterile fashion. Skin and cutaneous tissue was infiltrated with 2-5 ml of 1% Lidocaine. Using ultrasound-guidance, an 18-gauge Contiplex Ultra 360 Touhy needle was advanced in plane from caudad to cephalad. Preservative-free normal saline was utilized for hydro-dissection of tissue, advancement of Touhy, and to confirm final needle placement below FI. Local anesthetic in incremental 3-5 ml injections. Aspiration every 5 ml to prevent intravascular injection. Injection was completed with negative aspiration of blood and negative intravascular injection. Injection pressures were normal with minimal resistance. A 20-gauge Contiplex Echo catheter was placed through the needle and advance out the tip of the Touhy 3-5 cm. The Touhy needle was  "then removed, and final catheter position verified below the FI. The catheter was secured in the usual fashion with skin glue, benzoin, steri-strips, CHG tegaderm and Label noting \"Nerve Block Catheter\". Jerk tape applied at yellow connector and catheter connection.     FL C Arm During Surgery    Result Date: 3/2/2023  FL C ARM DURING SURGERY Date of Exam: 3/2/2023 10:41 AM EST Indication: FEMUR OPEN REDUCTION INTERNAL FIXATION. Comparison: None available. Fluoroscopic Time:  29.4 seconds Findings: Limited intraoperative fluoroscopic views were obtained for surgical support during open reduction and internal fixation of the femur. Plate and screw fixation hardware is noted. Cerclage wires are also observed. There is evidence for hip arthroplasty. There are no signs of hardware failure or loosening. There appears to be anatomic alignment.     Impression: Limited intraoperative fluoroscopic views for surgical support during open reduction and internal fixation of the femur. Electronically Signed: Porfirio Holden  3/2/2023 2:22 PM EST  Workstation ID: [id number]    XR Hip With or Without Pelvis 2 - 3 View Left    Result Date: 3/1/2023  Examination: XR HIP W OR WO PELVIS 2-3 VIEW LEFT Indication: Left hip pain after fall Comparison: No prior study is available for comparison. Findings: Left hip arthroplasty hardware is present. The hardware appears in expected location. A nondisplaced intertrochanteric periprosthetic fracture is suspected, although suboptimally evaluated on the provided views. The pelvic ring appears intact. Partially imaged is right hip arthroplasty hardware. Lower lumbar fusion hardware is present as well.     Impression: A nondisplaced intertrochanteric periprosthetic fracture is suspected on the left, although suboptimally evaluated on the provided views. CT of the pelvis without contrast is recommended for further assessment. Electronically signed by:  Jameel Ward M.D.  3/1/2023 2:47 AM Mountain " Time              Results for orders placed during the hospital encounter of 08/09/21    Adult Transthoracic Echo Complete W/ Cont if Necessary Per Protocol    Interpretation Summary  · Estimated left ventricular EF = 60% Left ventricular systolic function is normal.  · Left ventricular diastolic function was normal.  · Trace mitral and tricuspid regurgitation.  · Mild tachycardia noted throughout the study.      Plan for Follow-up of Pending Labs/Results: YOLANDA in 2 days     Discharge Details        Discharge Medications      New Medications      Instructions Start Date   doxycycline 100 MG capsule  Commonly known as: MONODOX   100 mg, Oral, Every 12 Hours Scheduled      traMADol 50 MG tablet  Commonly known as: ULTRAM   50 mg, Oral, Every 8 Hours PRN         Changes to Medications      Instructions Start Date   aspirin 81 MG EC tablet  What changed: when to take this   81 mg, Oral, Every 12 Hours Scheduled      atorvastatin 40 MG tablet  Commonly known as: LIPITOR  What changed: when to take this   40 mg, Oral, Daily      primidone 50 MG tablet  Commonly known as: MYSOLINE  What changed: You were already taking a medication with the same name, and this prescription was added. Make sure you understand how and when to take each.   100 mg, Oral, Nightly      primidone 50 MG tablet  Commonly known as: MYSOLINE  What changed: See the new instructions.   150 mg, Oral, Daily   Start Date: March 5, 2023        Continue These Medications      Instructions Start Date   acetaminophen 325 MG tablet  Commonly known as: TYLENOL   650 mg, Oral, Every 4 Hours PRN      donepezil 10 MG tablet  Commonly known as: Aricept   10 mg, Oral, Nightly      famotidine 40 MG tablet  Commonly known as: PEPCID   40 mg, Oral, Daily      levothyroxine 175 MCG tablet  Commonly known as: SYNTHROID, LEVOTHROID   175 mcg, Oral, Every Early Morning      metoprolol succinate XL 25 MG 24 hr tablet  Commonly known as: TOPROL-XL   12.5 mg, Oral, Every 24  Hours Scheduled      nystatin 101449 UNIT/GM cream  Commonly known as: MYCOSTATIN   1 application, Topical, 2 Times Daily      ondansetron 4 MG tablet  Commonly known as: ZOFRAN   4 mg, Oral, Every 6 Hours PRN      PARoxetine 40 MG tablet  Commonly known as: Paxil   40 mg, Oral, Every Morning         Stop These Medications    amLODIPine 10 MG tablet  Commonly known as: NORVASC     celecoxib 200 MG capsule  Commonly known as: CeleBREX     hydroCHLOROthiazide 12.5 MG tablet  Commonly known as: HYDRODIURIL     lisinopril 10 MG tablet  Commonly known as: PRINIVIL,ZESTRIL     terazosin 1 MG capsule  Commonly known as: HYTRIN        ASK your doctor about these medications      Instructions Start Date   betamethasone dipropionate 0.05 % lotion   Topical, 2 Times Daily             Allergies   Allergen Reactions   • Dilaudid [Hydromorphone Hcl] Hallucinations     TABS   • Beta Adrenergic Blockers Other (See Comments)     Hypotension / bradycadia   • Dilaudid [Hydromorphone] Unknown - High Severity   • Lactose Intolerance (Gi) Diarrhea         Discharge Disposition:  Skilled Nursing Facility ([facility] - External)    Diet:  Hospital:  Diet Order   Procedures   • Diet: Regular/House Diet; Texture: Regular Texture (IDDSI 7); Fluid Consistency: Nectar Thick       Activity:  Activity Instructions     Activity as Tolerated      Measure Blood Pressure            Restrictions or Other Recommendations:         CODE STATUS:    Code Status and Medical Interventions:   Ordered at: 03/02/23 1408     Level Of Support Discussed With:    Patient     Code Status (Patient has no pulse and is not breathing):    CPR (Attempt to Resuscitate)     Medical Interventions (Patient has pulse or is breathing):    Full       Future Appointments   Date Time Provider Department Center   3/4/2023  3:30 PM [facility]       Additional Instructions for the Follow-ups that You Need to Schedule     Discharge Follow-up with PCP   As directed       Currently  Documented PCP:    Troy Velarde MD    PCP Phone Number:    [phone]     Follow Up Details: follow up with pcp after dc from rehab or see facility provider in 1-3 days         Discharge Follow-up with Specified Provider: Follow up with Dr Briggs per his recommendations   As directed      To: Follow up with Dr Briggs per his recommendations                     India José, ELVIS  03/04/23      Time Spent on Discharge:  I spent  45 minutes on this discharge activity which included: face-to-face encounter with the patient, reviewing the data in the system, coordination of the care with the nursing staff as well as consultants, documentation, and entering orders.

## 2024-08-14 NOTE — PLAN OF CARE
Goal Outcome Evaluation:  SLP treatment completed. Will continue to address cognitive-communication and dysphagia in tx. Please see note for further details and recommendations.                    Assuming patient's care for coverage. Report received from RN and patient informed during rounding. Assessment available on KB. Will continue to monitor

## 2024-08-19 NOTE — TELEPHONE ENCOUNTER
Caller: VINCE    Relationship: Provider    Best call back number: [phone]    What orders are you requesting (i.e. lab or imaging): OCCUPATIONAL THERAPY    In what timeframe would the patient need to come in: WITHIN FEW DAYS    Where will you receive your lab/imaging services: N/A    Additional notes: [facility] IS REQUESTING ORDERS FOR THERAPY 2X A WEEK FOR ONE WEEK AND THEN ONCE A WEEK FOR 8 WEEKS FOR PATIENT         Number Of Freeze-Thaw Cycles: 2 freeze-thaw cycles Render Note In Bullet Format When Appropriate: No Show Applicator Variable?: Yes Detail Level: Detailed Post-Care Instructions: I reviewed with the patient in detail post-care instructions. Patient is to wear sunprotection, and avoid picking at any of the treated lesions. Pt may apply Vaseline to crusted or scabbing areas. Duration Of Freeze Thaw-Cycle (Seconds): 10 Consent: The patient's consent was obtained including but not limited to risks of crusting, scabbing, blistering, scarring, darker or lighter pigmentary change, recurrence, incomplete removal and infection.

## (undated) DEVICE — PREMIUM DRY TRAY LF: Brand: MEDLINE INDUSTRIES, INC.

## (undated) DEVICE — CATH DIAG EXPO M/ PK 6FR FL4/FR4 PIG 3PK

## (undated) DEVICE — MODEL AT P65, P/N 701554-001KIT CONTENTS: HAND CONTROLLER, 3-WAY HIGH-PRESSURE STOPCOCK WITH ROTATING END AND PREMIUM HIGH-PRESSURE TUBING: Brand: ANGIOTOUCH® KIT

## (undated) DEVICE — C-ARM DRAPE: Brand: DEROYAL

## (undated) DEVICE — PATIENT RETURN ELECTRODE, SINGLE-USE, CONTACT QUALITY MONITORING, ADULT, WITH 9FT CORD, FOR PATIENTS WEIGING OVER 33LBS. (15KG): Brand: MEGADYNE

## (undated) DEVICE — GW PERIPH GUIDERIGHT STD/EXCHNG/J/TIP SS 0.035IN 5X260CM

## (undated) DEVICE — GOWN,PREVENTION PLUS,XXLARGE,STERILE: Brand: MEDLINE

## (undated) DEVICE — SCRB SURG BACTOSHIELD CHG 4PCT 4OZ

## (undated) DEVICE — DRP C/ARMOR

## (undated) DEVICE — PK EXTREM LOWR 10

## (undated) DEVICE — SOL ISO/ALC RUB 70PCT 4OZ

## (undated) DEVICE — BLANKT WARM UPPR/BDY ARM/OUT 57X196CM

## (undated) DEVICE — PROXIMATE RH ROTATING HEAD SKIN STAPLERS (35 WIDE) CONTAINS 35 STAINLESS STEEL STAPLES: Brand: PROXIMATE

## (undated) DEVICE — GLIDESHEATH BASIC HYDROPHILIC COATED INTRODUCER SHEATH: Brand: GLIDESHEATH

## (undated) DEVICE — NDL HYPO ECLPS SFTY 22G 1 1/2IN

## (undated) DEVICE — CATH DIAG EXPO .056 FL3.5 6F 100CM

## (undated) DEVICE — MARKER,SKIN,W/RULER,DUAL,STOP: Brand: MEDLINE

## (undated) DEVICE — TRAP FLD MINIVAC MEGADYNE 100ML

## (undated) DEVICE — GLV SURG SENSICARE PI MIC PF SZ9 LF STRL

## (undated) DEVICE — UNDERGLV SURG BIOGEL INDICAT PI SZ8.5 BLU

## (undated) DEVICE — PREVENA INCISION MANAGEMENT SYSTEM- PEEL & PLACE DRESSING: Brand: PREVENA™ PEEL & PLACE™

## (undated) DEVICE — GLV SURG SENSICARE PI ORTHO SZ8.5 LF STRL

## (undated) DEVICE — KT PUMP INFUBLOCK MDL 2100 PMKITSOLIS

## (undated) DEVICE — SHEET, DRAPE, SPLIT, STERILE: Brand: MEDLINE

## (undated) DEVICE — SHEET,DRAPE,53X77,STERILE: Brand: MEDLINE

## (undated) DEVICE — MODEL BT2000 P/N 700287-012KIT CONTENTS: MANIFOLD WITH SALINE AND CONTRAST PORTS, SALINE TUBING WITH SPIKE AND HAND SYRINGE, TRANSDUCER: Brand: BT2000 AUTOMATED MANIFOLD KIT

## (undated) DEVICE — Device: Brand: NCB®-DF

## (undated) DEVICE — DRAPE,U/ SHT,SPLIT,PLAS,STERIL: Brand: MEDLINE

## (undated) DEVICE — Device: Brand: NCB®

## (undated) DEVICE — ANTIBACTERIAL UNDYED BRAIDED (POLYGLACTIN 910), SYNTHETIC ABSORBABLE SUTURE: Brand: COATED VICRYL

## (undated) DEVICE — SYR CONTRL PRESS/LO FIX/M/LL W/THMB/RNG 10ML

## (undated) DEVICE — HANDPIECE SET WITH HIGH FLOW TIP AND SUCTION TUBE: Brand: INTERPULSE

## (undated) DEVICE — DEV COMP RAD PRELUDESYNC 24CM

## (undated) DEVICE — SUT VIC 2/0 CT2 27IN J269H

## (undated) DEVICE — BNDG ELAS W/CLIP 6IN 10YD LF STRL

## (undated) DEVICE — DRSNG WND STRIP OPTIFOAM AG SUPRABS A/MIC 4X12IN STRL

## (undated) DEVICE — TBG PENCL TELESCP MEGADYNE SMOKE EVAC 10FT

## (undated) DEVICE — PK CATH CARD 10

## (undated) DEVICE — DRAPE,TOP,102X53,STERILE: Brand: MEDLINE

## (undated) DEVICE — KIRSCHNER WIRE, WITH TROCAR TIP, Ø 2.0 M
Type: IMPLANTABLE DEVICE | Site: FEMUR | Status: NON-FUNCTIONAL
Brand: KIRSCHNER WIRE
Removed: 2023-03-02

## (undated) DEVICE — SWABSTK SKINPREP PVPI PRE/SAT 8IN STRL